# Patient Record
Sex: FEMALE | Race: WHITE | Employment: UNEMPLOYED | URBAN - METROPOLITAN AREA
[De-identification: names, ages, dates, MRNs, and addresses within clinical notes are randomized per-mention and may not be internally consistent; named-entity substitution may affect disease eponyms.]

---

## 2017-02-04 DIAGNOSIS — I10 ESSENTIAL HYPERTENSION: ICD-10-CM

## 2017-02-04 RX ORDER — METOPROLOL SUCCINATE 25 MG/1
TABLET, EXTENDED RELEASE ORAL
Qty: 30 TABLET | Refills: 3 | Status: SHIPPED | OUTPATIENT
Start: 2017-02-04 | End: 2017-08-02 | Stop reason: SDUPTHER

## 2017-02-15 ENCOUNTER — HOSPITAL ENCOUNTER (OUTPATIENT)
Age: 37
Setting detail: SPECIMEN
Discharge: HOME | End: 2017-02-15
Payer: COMMERCIAL

## 2017-02-15 ENCOUNTER — OFFICE VISIT (OUTPATIENT)
Dept: FAMILY MEDICINE CLINIC | Facility: CLINIC | Age: 37
End: 2017-02-15

## 2017-02-15 VITALS
WEIGHT: 129 LBS | BODY MASS INDEX: 20.25 KG/M2 | TEMPERATURE: 98.5 F | HEIGHT: 67 IN | OXYGEN SATURATION: 97 % | DIASTOLIC BLOOD PRESSURE: 78 MMHG | SYSTOLIC BLOOD PRESSURE: 142 MMHG | HEART RATE: 89 BPM

## 2017-02-15 DIAGNOSIS — Z01.818 PREOPERATIVE CLEARANCE: ICD-10-CM

## 2017-02-15 DIAGNOSIS — M54.2 NECK PAIN: ICD-10-CM

## 2017-02-15 DIAGNOSIS — R23.2 HOT FLASHES: ICD-10-CM

## 2017-02-15 DIAGNOSIS — G89.29 CHRONIC INTRACTABLE HEADACHE, UNSPECIFIED HEADACHE TYPE: Primary | ICD-10-CM

## 2017-02-15 DIAGNOSIS — R51.9 CHRONIC INTRACTABLE HEADACHE, UNSPECIFIED HEADACHE TYPE: Primary | ICD-10-CM

## 2017-02-15 LAB
AMPHETAMINE SCREEN URINE: NEGATIVE
AMPHETAMINE SCREEN, URINE: NEGATIVE
BARBITURATE SCREEN URINE: NEGATIVE
BARBITURATE SCREEN, URINE: NEGATIVE
BENZODIAZEPINE SCREEN, URINE: NEGATIVE
BENZODIAZEPINE SCREEN, URINE: NEGATIVE
BUPRENORPHINE URINE: NORMAL
CANNABINOID SCREEN URINE: NEGATIVE
COCAINE METABOLITE SCREEN URINE: NEGATIVE
COCAINE METABOLITE, URINE: NEGATIVE
MDMA URINE: NORMAL
MDMA URINE: NORMAL
METHADONE SCREEN, URINE: NEGATIVE
METHADONE SCREEN, URINE: POSITIVE
METHAMPHETAMINE, URINE: NEGATIVE
METHAMPHETAMINE, URINE: NORMAL
OPIATE SCREEN URINE: NEGATIVE
OPIATES, URINE: NEGATIVE
OXYCODONE SCREEN URINE: NEGATIVE
OXYCODONE SCREEN URINE: NORMAL
PHENCYCLIDINE SCREEN URINE: NEGATIVE
PHENCYCLIDINE, URINE: NEGATIVE
PROPOXYPHENE SCREEN, URINE: NORMAL
PROPOXYPHENE, URINE: NORMAL
TEST INFORMATION: NORMAL
THC: NEGATIVE
TRICYCLIC ANTIDEPRESSANTS, UR: NORMAL
TRICYCLIC ANTIDEPRESSANTS, UR: POSITIVE

## 2017-02-15 PROCEDURE — 99214 OFFICE O/P EST MOD 30 MIN: CPT | Performed by: FAMILY MEDICINE

## 2017-02-15 PROCEDURE — 80305 DRUG TEST PRSMV DIR OPT OBS: CPT | Performed by: FAMILY MEDICINE

## 2017-02-15 RX ORDER — HYDROCODONE BITARTRATE AND ACETAMINOPHEN 5; 325 MG/1; MG/1
1 TABLET ORAL 2 TIMES DAILY PRN
Qty: 20 TABLET | Refills: 0 | Status: CANCELLED | OUTPATIENT
Start: 2017-02-15

## 2017-02-15 RX ORDER — BUSPIRONE HYDROCHLORIDE 15 MG/1
15 TABLET ORAL 3 TIMES DAILY
Qty: 90 TABLET | Refills: 1 | Status: SHIPPED | OUTPATIENT
Start: 2017-02-15 | End: 2017-07-11 | Stop reason: SDUPTHER

## 2017-02-15 RX ORDER — AMLODIPINE BESYLATE 5 MG/1
5 TABLET ORAL DAILY
Qty: 30 TABLET | Refills: 3 | Status: SHIPPED | OUTPATIENT
Start: 2017-02-15 | End: 2017-04-25 | Stop reason: DRUGHIGH

## 2017-02-15 ASSESSMENT — ENCOUNTER SYMPTOMS
SHORTNESS OF BREATH: 0
NAUSEA: 0
COUGH: 0
VOMITING: 0
ABDOMINAL PAIN: 0

## 2017-02-22 DIAGNOSIS — Z01.818 PREOPERATIVE CLEARANCE: ICD-10-CM

## 2017-02-22 DIAGNOSIS — Z13.9 SCREENING: ICD-10-CM

## 2017-02-22 LAB
ALBUMIN SERPL-MCNC: 4.1 G/DL
ALP BLD-CCNC: 57 U/L
ALT SERPL-CCNC: 5 U/L
AST SERPL-CCNC: 12 U/L
BASOPHILS ABSOLUTE: 0.1 /ΜL
BASOPHILS RELATIVE PERCENT: 1.3 %
BILIRUB SERPL-MCNC: 0.3 MG/DL (ref 0.1–1.4)
BUN BLDV-MCNC: 13 MG/DL
CALCIUM SERPL-MCNC: 9.3 MG/DL
CHLORIDE BLD-SCNC: 109 MMOL/L
CHOLESTEROL, TOTAL: 202 MG/DL
CHOLESTEROL/HDL RATIO: 5.3
CO2: 26 MMOL/L
CREAT SERPL-MCNC: 1.1 MG/DL
EOSINOPHILS ABSOLUTE: 0.2 /ΜL
EOSINOPHILS RELATIVE PERCENT: 2.3 %
GFR CALCULATED: 56
GLUCOSE BLD-MCNC: 73 MG/DL
HBA1C MFR BLD: 4.9 %
HCT VFR BLD CALC: 43 % (ref 36–46)
HDLC SERPL-MCNC: 38 MG/DL (ref 35–70)
HEMOGLOBIN: 14.3 G/DL (ref 12–16)
LDL CHOLESTEROL CALCULATED: 121 MG/DL (ref 0–160)
LYMPHOCYTES ABSOLUTE: 2.5 /ΜL
LYMPHOCYTES RELATIVE PERCENT: 34.4 %
MCH RBC QN AUTO: 31.5 PG
MCHC RBC AUTO-ENTMCNC: 33.2 G/DL
MCV RBC AUTO: 95 FL
MONOCYTES ABSOLUTE: 0.5 /ΜL
MONOCYTES RELATIVE PERCENT: 6.5 %
NEUTROPHILS ABSOLUTE: 4 /ΜL
NEUTROPHILS RELATIVE PERCENT: 55.5 %
PDW BLD-RTO: NORMAL %
PLATELET # BLD: 233 K/ΜL
PMV BLD AUTO: 10 FL
POTASSIUM SERPL-SCNC: 4.2 MMOL/L
RBC # BLD: 4.54 10^6/ΜL
SODIUM BLD-SCNC: 141 MMOL/L
TOTAL PROTEIN: 7
TRIGL SERPL-MCNC: 214 MG/DL
TSH SERPL DL<=0.05 MIU/L-ACNC: 2.76 UIU/ML
VLDLC SERPL CALC-MCNC: NORMAL MG/DL
WBC # BLD: 7.1 10^3/ML

## 2017-03-17 ENCOUNTER — HOSPITAL ENCOUNTER (OUTPATIENT)
Dept: MRI IMAGING | Age: 37
Discharge: HOME | End: 2017-03-17
Payer: COMMERCIAL

## 2017-03-17 DIAGNOSIS — M54.2 NECK PAIN: ICD-10-CM

## 2017-03-18 ENCOUNTER — APPOINTMENT (OUTPATIENT)
Dept: CT IMAGING | Age: 37
End: 2017-03-18
Payer: COMMERCIAL

## 2017-03-18 ENCOUNTER — HOSPITAL ENCOUNTER (EMERGENCY)
Age: 37
Discharge: HOME | End: 2017-03-19
Attending: EMERGENCY MEDICINE
Payer: COMMERCIAL

## 2017-03-18 VITALS
DIASTOLIC BLOOD PRESSURE: 106 MMHG | HEART RATE: 95 BPM | WEIGHT: 136 LBS | SYSTOLIC BLOOD PRESSURE: 189 MMHG | BODY MASS INDEX: 21.35 KG/M2 | RESPIRATION RATE: 18 BRPM | HEIGHT: 67 IN | TEMPERATURE: 98.3 F | OXYGEN SATURATION: 98 %

## 2017-03-18 DIAGNOSIS — N17.9 AKI (ACUTE KIDNEY INJURY) (HCC): ICD-10-CM

## 2017-03-18 DIAGNOSIS — M54.12 CERVICAL RADICULOPATHY: Primary | ICD-10-CM

## 2017-03-18 LAB
ABSOLUTE EOS #: 0.1 K/UL (ref 0–0.4)
ABSOLUTE LYMPH #: 2.6 K/UL (ref 1–4.8)
ABSOLUTE MONO #: 0.6 K/UL (ref 0.1–1.3)
ANION GAP SERPL CALCULATED.3IONS-SCNC: 15 MMOL/L (ref 9–17)
BASOPHILS # BLD: 1 % (ref 0–2)
BASOPHILS ABSOLUTE: 0.1 K/UL (ref 0–0.2)
BUN BLDV-MCNC: 19 MG/DL (ref 6–20)
BUN/CREAT BLD: ABNORMAL (ref 9–20)
CALCIUM SERPL-MCNC: 9.2 MG/DL (ref 8.6–10.4)
CHLORIDE BLD-SCNC: 104 MMOL/L (ref 98–107)
CO2: 21 MMOL/L (ref 20–31)
CREAT SERPL-MCNC: 0.97 MG/DL (ref 0.5–0.9)
DIFFERENTIAL TYPE: ABNORMAL
EOSINOPHILS RELATIVE PERCENT: 1 % (ref 0–4)
GFR AFRICAN AMERICAN: >60 ML/MIN
GFR NON-AFRICAN AMERICAN: >60 ML/MIN
GFR SERPL CREATININE-BSD FRML MDRD: ABNORMAL ML/MIN/{1.73_M2}
GFR SERPL CREATININE-BSD FRML MDRD: ABNORMAL ML/MIN/{1.73_M2}
GLUCOSE BLD-MCNC: 119 MG/DL (ref 70–99)
HCT VFR BLD CALC: 36.3 % (ref 36–46)
HEMOGLOBIN: 12.4 G/DL (ref 12–16)
INR BLD: 0.9
LYMPHOCYTES # BLD: 24 % (ref 24–44)
MCH RBC QN AUTO: 31.8 PG (ref 26–34)
MCHC RBC AUTO-ENTMCNC: 34 G/DL (ref 31–37)
MCV RBC AUTO: 93.4 FL (ref 80–100)
MONOCYTES # BLD: 5 % (ref 1–7)
PARTIAL THROMBOPLASTIN TIME: 25.3 SEC (ref 23–31)
PDW BLD-RTO: 13.8 % (ref 11.5–14.9)
PLATELET # BLD: 313 K/UL (ref 150–450)
PLATELET ESTIMATE: ABNORMAL
PMV BLD AUTO: 8.6 FL (ref 6–12)
POTASSIUM SERPL-SCNC: 4.3 MMOL/L (ref 3.7–5.3)
PROTHROMBIN TIME: 10 SEC (ref 9.7–12)
RBC # BLD: 3.89 M/UL (ref 4–5.2)
RBC # BLD: ABNORMAL 10*6/UL
SEG NEUTROPHILS: 69 % (ref 36–66)
SEGMENTED NEUTROPHILS ABSOLUTE COUNT: 7.6 K/UL (ref 1.3–9.1)
SODIUM BLD-SCNC: 140 MMOL/L (ref 135–144)
WBC # BLD: 11 K/UL (ref 3.5–11)
WBC # BLD: ABNORMAL 10*3/UL

## 2017-03-18 PROCEDURE — 85730 THROMBOPLASTIN TIME PARTIAL: CPT

## 2017-03-18 PROCEDURE — 99285 EMERGENCY DEPT VISIT HI MDM: CPT

## 2017-03-18 PROCEDURE — 85610 PROTHROMBIN TIME: CPT

## 2017-03-18 PROCEDURE — 96374 THER/PROPH/DIAG INJ IV PUSH: CPT

## 2017-03-18 PROCEDURE — 70450 CT HEAD/BRAIN W/O DYE: CPT

## 2017-03-18 PROCEDURE — 6360000004 HC RX CONTRAST MEDICATION: Performed by: EMERGENCY MEDICINE

## 2017-03-18 PROCEDURE — 36415 COLL VENOUS BLD VENIPUNCTURE: CPT

## 2017-03-18 PROCEDURE — 2580000003 HC RX 258: Performed by: EMERGENCY MEDICINE

## 2017-03-18 PROCEDURE — 85025 COMPLETE CBC W/AUTO DIFF WBC: CPT

## 2017-03-18 PROCEDURE — 70498 CT ANGIOGRAPHY NECK: CPT

## 2017-03-18 PROCEDURE — 70496 CT ANGIOGRAPHY HEAD: CPT

## 2017-03-18 PROCEDURE — 80048 BASIC METABOLIC PNL TOTAL CA: CPT

## 2017-03-18 RX ORDER — FENTANYL CITRATE 50 UG/ML
50 INJECTION, SOLUTION INTRAMUSCULAR; INTRAVENOUS ONCE
Status: COMPLETED | OUTPATIENT
Start: 2017-03-18 | End: 2017-03-19

## 2017-03-18 RX ORDER — SODIUM CHLORIDE 0.9 % (FLUSH) 0.9 %
10 SYRINGE (ML) INJECTION PRN
Status: DISCONTINUED | OUTPATIENT
Start: 2017-03-18 | End: 2017-03-19 | Stop reason: HOSPADM

## 2017-03-18 RX ORDER — 0.9 % SODIUM CHLORIDE 0.9 %
100 INTRAVENOUS SOLUTION INTRAVENOUS ONCE
Status: COMPLETED | OUTPATIENT
Start: 2017-03-18 | End: 2017-03-19

## 2017-03-18 RX ADMIN — SODIUM CHLORIDE 100 ML: 9 INJECTION, SOLUTION INTRAVENOUS at 23:40

## 2017-03-18 RX ADMIN — Medication 10 ML: at 23:37

## 2017-03-18 RX ADMIN — IOVERSOL 100 ML: 741 INJECTION INTRA-ARTERIAL; INTRAVENOUS at 23:36

## 2017-03-18 ASSESSMENT — PAIN DESCRIPTION - ORIENTATION: ORIENTATION: RIGHT

## 2017-03-18 ASSESSMENT — PAIN SCALES - GENERAL: PAINLEVEL_OUTOF10: 8

## 2017-03-18 ASSESSMENT — PAIN DESCRIPTION - PAIN TYPE: TYPE: ACUTE PAIN

## 2017-03-18 ASSESSMENT — PAIN DESCRIPTION - LOCATION: LOCATION: NECK

## 2017-03-19 PROCEDURE — 6360000002 HC RX W HCPCS: Performed by: EMERGENCY MEDICINE

## 2017-03-19 RX ORDER — HYDROCODONE BITARTRATE AND ACETAMINOPHEN 5; 325 MG/1; MG/1
1 TABLET ORAL EVERY 6 HOURS PRN
Qty: 10 TABLET | Refills: 0 | Status: SHIPPED | OUTPATIENT
Start: 2017-03-19 | End: 2017-03-26

## 2017-03-19 RX ORDER — 0.9 % SODIUM CHLORIDE 0.9 %
1000 INTRAVENOUS SOLUTION INTRAVENOUS ONCE
Status: DISCONTINUED | OUTPATIENT
Start: 2017-03-19 | End: 2017-03-19 | Stop reason: HOSPADM

## 2017-03-19 RX ADMIN — FENTANYL CITRATE 50 MCG: 50 INJECTION INTRAMUSCULAR; INTRAVENOUS at 00:11

## 2017-03-19 ASSESSMENT — PAIN SCALES - GENERAL: PAINLEVEL_OUTOF10: 8

## 2017-03-19 ASSESSMENT — ENCOUNTER SYMPTOMS
SHORTNESS OF BREATH: 0
NAUSEA: 0
VOMITING: 0
ABDOMINAL PAIN: 0
DIARRHEA: 0

## 2017-04-25 ENCOUNTER — OFFICE VISIT (OUTPATIENT)
Dept: FAMILY MEDICINE CLINIC | Age: 37
End: 2017-04-25
Payer: COMMERCIAL

## 2017-04-25 VITALS
DIASTOLIC BLOOD PRESSURE: 96 MMHG | WEIGHT: 126 LBS | RESPIRATION RATE: 20 BRPM | SYSTOLIC BLOOD PRESSURE: 140 MMHG | TEMPERATURE: 98 F | HEART RATE: 101 BPM | BODY MASS INDEX: 20.25 KG/M2 | HEIGHT: 66 IN

## 2017-04-25 DIAGNOSIS — G89.29 CHRONIC NECK PAIN: ICD-10-CM

## 2017-04-25 DIAGNOSIS — G89.29 CHRONIC NONINTRACTABLE HEADACHE, UNSPECIFIED HEADACHE TYPE: ICD-10-CM

## 2017-04-25 DIAGNOSIS — R51.9 CHRONIC NONINTRACTABLE HEADACHE, UNSPECIFIED HEADACHE TYPE: ICD-10-CM

## 2017-04-25 DIAGNOSIS — R93.89 ABNORMAL COMPUTED TOMOGRAPHY ANGIOGRAPHY (CTA) OF NECK: ICD-10-CM

## 2017-04-25 DIAGNOSIS — M54.2 CHRONIC NECK PAIN: ICD-10-CM

## 2017-04-25 DIAGNOSIS — I10 ESSENTIAL HYPERTENSION: Primary | ICD-10-CM

## 2017-04-25 PROCEDURE — 99214 OFFICE O/P EST MOD 30 MIN: CPT | Performed by: FAMILY MEDICINE

## 2017-04-25 RX ORDER — PROMETHAZINE HYDROCHLORIDE 25 MG/1
TABLET ORAL
Refills: 0 | COMMUNITY
Start: 2017-03-21 | End: 2017-06-26 | Stop reason: ALTCHOICE

## 2017-04-25 RX ORDER — ACETAMINOPHEN AND CODEINE PHOSPHATE 300; 30 MG/1; MG/1
1 TABLET ORAL DAILY PRN
Qty: 20 TABLET | Refills: 0 | Status: SHIPPED | OUTPATIENT
Start: 2017-04-25 | End: 2017-06-26 | Stop reason: ALTCHOICE

## 2017-04-25 RX ORDER — OXYCODONE HYDROCHLORIDE AND ACETAMINOPHEN 5; 325 MG/1; MG/1
TABLET ORAL
COMMUNITY
Start: 2017-04-05 | End: 2017-06-26 | Stop reason: ALTCHOICE

## 2017-04-25 RX ORDER — AMLODIPINE BESYLATE 10 MG/1
10 TABLET ORAL DAILY
Qty: 30 TABLET | Refills: 3 | Status: SHIPPED | OUTPATIENT
Start: 2017-04-25 | End: 2017-05-12 | Stop reason: SINTOL

## 2017-04-25 RX ORDER — BACLOFEN 10 MG/1
TABLET ORAL
Refills: 0 | COMMUNITY
Start: 2017-04-17 | End: 2017-06-26 | Stop reason: ALTCHOICE

## 2017-04-25 RX ORDER — CEPHALEXIN 500 MG/1
CAPSULE ORAL
Refills: 0 | COMMUNITY
Start: 2017-03-21 | End: 2017-06-26 | Stop reason: ALTCHOICE

## 2017-04-25 RX ORDER — TIZANIDINE 2 MG/1
2 TABLET ORAL EVERY 6 HOURS PRN
Qty: 40 TABLET | Refills: 0 | Status: SHIPPED | OUTPATIENT
Start: 2017-04-25 | End: 2017-06-26 | Stop reason: ALTCHOICE

## 2017-04-25 RX ORDER — DIMENHYDRINATE 50 MG/1
50 TABLET ORAL NIGHTLY PRN
Qty: 30 TABLET | Refills: 3 | Status: ON HOLD | OUTPATIENT
Start: 2017-04-25 | End: 2019-08-23 | Stop reason: HOSPADM

## 2017-04-25 ASSESSMENT — ENCOUNTER SYMPTOMS
VOMITING: 0
SHORTNESS OF BREATH: 0
COUGH: 0
NAUSEA: 0

## 2017-05-09 ENCOUNTER — TELEPHONE (OUTPATIENT)
Dept: FAMILY MEDICINE CLINIC | Age: 37
End: 2017-05-09

## 2017-05-10 ENCOUNTER — HOSPITAL ENCOUNTER (OUTPATIENT)
Dept: MRI IMAGING | Age: 37
Discharge: HOME | End: 2017-05-10
Payer: COMMERCIAL

## 2017-05-10 DIAGNOSIS — R93.89 ABNORMAL COMPUTED TOMOGRAPHY ANGIOGRAPHY (CTA) OF NECK: Primary | ICD-10-CM

## 2017-05-10 DIAGNOSIS — R93.89 ABNORMAL COMPUTED TOMOGRAPHY ANGIOGRAPHY (CTA) OF NECK: ICD-10-CM

## 2017-05-10 PROCEDURE — 2580000003 HC RX 258: Performed by: FAMILY MEDICINE

## 2017-05-10 PROCEDURE — A9579 GAD-BASE MR CONTRAST NOS,1ML: HCPCS | Performed by: FAMILY MEDICINE

## 2017-05-10 PROCEDURE — 70549 MR ANGIOGRAPH NECK W/O&W/DYE: CPT

## 2017-05-10 PROCEDURE — 6360000004 HC RX CONTRAST MEDICATION: Performed by: FAMILY MEDICINE

## 2017-05-10 RX ORDER — SODIUM CHLORIDE 0.9 % (FLUSH) 0.9 %
10 SYRINGE (ML) INJECTION PRN
Status: DISCONTINUED | OUTPATIENT
Start: 2017-05-10 | End: 2017-05-13 | Stop reason: HOSPADM

## 2017-05-10 RX ORDER — 0.9 % SODIUM CHLORIDE 0.9 %
50 INTRAVENOUS SOLUTION INTRAVENOUS ONCE
Status: COMPLETED | OUTPATIENT
Start: 2017-05-10 | End: 2017-05-10

## 2017-05-10 RX ADMIN — Medication 10 ML: at 15:05

## 2017-05-10 RX ADMIN — SODIUM CHLORIDE 50 ML: 9 INJECTION, SOLUTION INTRAVENOUS at 15:04

## 2017-05-10 RX ADMIN — GADOPENTETATE DIMEGLUMINE 30 ML: 469.01 INJECTION INTRAVENOUS at 15:04

## 2017-05-12 ENCOUNTER — NURSE ONLY (OUTPATIENT)
Dept: FAMILY MEDICINE CLINIC | Age: 37
End: 2017-05-12
Payer: COMMERCIAL

## 2017-05-12 VITALS — OXYGEN SATURATION: 98 % | HEART RATE: 109 BPM | DIASTOLIC BLOOD PRESSURE: 82 MMHG | SYSTOLIC BLOOD PRESSURE: 132 MMHG

## 2017-05-12 DIAGNOSIS — I10 ESSENTIAL HYPERTENSION: Primary | ICD-10-CM

## 2017-05-12 PROCEDURE — 99211 OFF/OP EST MAY X REQ PHY/QHP: CPT | Performed by: FAMILY MEDICINE

## 2017-05-12 RX ORDER — LISINOPRIL 40 MG/1
40 TABLET ORAL DAILY
Qty: 30 TABLET | Refills: 1 | Status: SHIPPED | OUTPATIENT
Start: 2017-05-12 | End: 2017-07-11 | Stop reason: SDUPTHER

## 2017-05-16 RX ORDER — ACETAMINOPHEN AND CODEINE PHOSPHATE 300; 30 MG/1; MG/1
1 TABLET ORAL DAILY PRN
Qty: 20 TABLET | Refills: 0 | OUTPATIENT
Start: 2017-05-16

## 2017-05-31 ENCOUNTER — HOSPITAL ENCOUNTER (EMERGENCY)
Age: 37
Discharge: HOME | End: 2017-05-31
Attending: EMERGENCY MEDICINE
Payer: COMMERCIAL

## 2017-05-31 VITALS
BODY MASS INDEX: 20.09 KG/M2 | WEIGHT: 125 LBS | OXYGEN SATURATION: 97 % | TEMPERATURE: 97.9 F | DIASTOLIC BLOOD PRESSURE: 70 MMHG | SYSTOLIC BLOOD PRESSURE: 118 MMHG | RESPIRATION RATE: 16 BRPM | HEART RATE: 66 BPM | HEIGHT: 66 IN

## 2017-05-31 DIAGNOSIS — R51.9 NONINTRACTABLE HEADACHE, UNSPECIFIED CHRONICITY PATTERN, UNSPECIFIED HEADACHE TYPE: Primary | ICD-10-CM

## 2017-05-31 PROCEDURE — 99283 EMERGENCY DEPT VISIT LOW MDM: CPT

## 2017-05-31 PROCEDURE — 96375 TX/PRO/DX INJ NEW DRUG ADDON: CPT

## 2017-05-31 PROCEDURE — 96374 THER/PROPH/DIAG INJ IV PUSH: CPT

## 2017-05-31 PROCEDURE — 6360000002 HC RX W HCPCS: Performed by: EMERGENCY MEDICINE

## 2017-05-31 PROCEDURE — 2580000003 HC RX 258: Performed by: EMERGENCY MEDICINE

## 2017-05-31 RX ORDER — 0.9 % SODIUM CHLORIDE 0.9 %
1000 INTRAVENOUS SOLUTION INTRAVENOUS ONCE
Status: COMPLETED | OUTPATIENT
Start: 2017-05-31 | End: 2017-05-31

## 2017-05-31 RX ORDER — DIPHENHYDRAMINE HYDROCHLORIDE 50 MG/ML
25 INJECTION INTRAMUSCULAR; INTRAVENOUS ONCE
Status: DISCONTINUED | OUTPATIENT
Start: 2017-05-31 | End: 2017-05-31

## 2017-05-31 RX ORDER — DEXAMETHASONE SODIUM PHOSPHATE 4 MG/ML
10 INJECTION, SOLUTION INTRA-ARTICULAR; INTRALESIONAL; INTRAMUSCULAR; INTRAVENOUS; SOFT TISSUE ONCE
Status: COMPLETED | OUTPATIENT
Start: 2017-05-31 | End: 2017-05-31

## 2017-05-31 RX ORDER — DIPHENHYDRAMINE HYDROCHLORIDE 50 MG/ML
50 INJECTION INTRAMUSCULAR; INTRAVENOUS ONCE
Status: COMPLETED | OUTPATIENT
Start: 2017-05-31 | End: 2017-05-31

## 2017-05-31 RX ADMIN — SODIUM CHLORIDE 1000 ML: 9 INJECTION, SOLUTION INTRAVENOUS at 17:24

## 2017-05-31 RX ADMIN — PROCHLORPERAZINE EDISYLATE 10 MG: 5 INJECTION INTRAMUSCULAR; INTRAVENOUS at 17:25

## 2017-05-31 RX ADMIN — DEXAMETHASONE SODIUM PHOSPHATE 10 MG: 4 INJECTION, SOLUTION INTRAMUSCULAR; INTRAVENOUS at 17:24

## 2017-05-31 RX ADMIN — DIPHENHYDRAMINE HYDROCHLORIDE 50 MG: 50 INJECTION, SOLUTION INTRAMUSCULAR; INTRAVENOUS at 17:25

## 2017-05-31 ASSESSMENT — ENCOUNTER SYMPTOMS
SHORTNESS OF BREATH: 0
COUGH: 0
PHOTOPHOBIA: 1
DIARRHEA: 0
STRIDOR: 0
VOMITING: 0
NAUSEA: 0
ABDOMINAL PAIN: 0

## 2017-05-31 ASSESSMENT — PAIN SCALES - GENERAL: PAINLEVEL_OUTOF10: 8

## 2017-05-31 ASSESSMENT — PAIN DESCRIPTION - FREQUENCY: FREQUENCY: CONTINUOUS

## 2017-05-31 ASSESSMENT — PAIN DESCRIPTION - PAIN TYPE: TYPE: ACUTE PAIN

## 2017-05-31 ASSESSMENT — PAIN DESCRIPTION - LOCATION: LOCATION: HEAD;NECK

## 2017-05-31 ASSESSMENT — PAIN DESCRIPTION - DESCRIPTORS: DESCRIPTORS: ACHING

## 2017-06-12 DIAGNOSIS — F41.8 DEPRESSION WITH ANXIETY: ICD-10-CM

## 2017-06-13 RX ORDER — DULOXETIN HYDROCHLORIDE 60 MG/1
CAPSULE, DELAYED RELEASE ORAL
Qty: 30 CAPSULE | Refills: 0 | Status: SHIPPED | OUTPATIENT
Start: 2017-06-13 | End: 2017-08-02 | Stop reason: SDUPTHER

## 2017-06-26 ENCOUNTER — OFFICE VISIT (OUTPATIENT)
Dept: NEUROLOGY | Age: 37
End: 2017-06-26
Payer: COMMERCIAL

## 2017-06-26 VITALS
DIASTOLIC BLOOD PRESSURE: 89 MMHG | HEIGHT: 66 IN | SYSTOLIC BLOOD PRESSURE: 137 MMHG | WEIGHT: 125.4 LBS | BODY MASS INDEX: 20.15 KG/M2 | HEART RATE: 96 BPM

## 2017-06-26 DIAGNOSIS — M54.12 CERVICAL RADICULAR PAIN: Primary | ICD-10-CM

## 2017-06-26 PROCEDURE — 99214 OFFICE O/P EST MOD 30 MIN: CPT | Performed by: PSYCHIATRY & NEUROLOGY

## 2017-06-26 RX ORDER — DIVALPROEX SODIUM 250 MG/1
TABLET, DELAYED RELEASE ORAL
Qty: 60 TABLET | Refills: 3 | Status: SHIPPED | OUTPATIENT
Start: 2017-06-26 | End: 2019-08-22

## 2017-07-10 ENCOUNTER — HOSPITAL ENCOUNTER (EMERGENCY)
Age: 37
Discharge: HOME | End: 2017-07-10
Attending: EMERGENCY MEDICINE
Payer: COMMERCIAL

## 2017-07-10 ENCOUNTER — APPOINTMENT (OUTPATIENT)
Dept: CT IMAGING | Age: 37
End: 2017-07-10
Payer: COMMERCIAL

## 2017-07-10 VITALS
DIASTOLIC BLOOD PRESSURE: 90 MMHG | BODY MASS INDEX: 20.09 KG/M2 | RESPIRATION RATE: 16 BRPM | SYSTOLIC BLOOD PRESSURE: 137 MMHG | HEIGHT: 67 IN | HEART RATE: 101 BPM | OXYGEN SATURATION: 99 % | WEIGHT: 128 LBS | TEMPERATURE: 98.5 F

## 2017-07-10 DIAGNOSIS — N30.01 ACUTE CYSTITIS WITH HEMATURIA: Primary | ICD-10-CM

## 2017-07-10 LAB
-: ABNORMAL
ABSOLUTE EOS #: 0.1 K/UL (ref 0–0.4)
ABSOLUTE LYMPH #: 2.6 K/UL (ref 1–4.8)
ABSOLUTE MONO #: 0.6 K/UL (ref 0.1–1.3)
AMORPHOUS: ABNORMAL
ANION GAP SERPL CALCULATED.3IONS-SCNC: 12 MMOL/L (ref 9–17)
BACTERIA: ABNORMAL
BASOPHILS # BLD: 1 %
BASOPHILS ABSOLUTE: 0.1 K/UL (ref 0–0.2)
BILIRUBIN URINE: NEGATIVE
BUN BLDV-MCNC: 16 MG/DL (ref 6–20)
BUN/CREAT BLD: ABNORMAL (ref 9–20)
CALCIUM SERPL-MCNC: 8.7 MG/DL (ref 8.6–10.4)
CASTS UA: ABNORMAL /LPF
CHLORIDE BLD-SCNC: 105 MMOL/L (ref 98–107)
CO2: 25 MMOL/L (ref 20–31)
COLOR: ABNORMAL
COMMENT UA: ABNORMAL
CREAT SERPL-MCNC: 1.46 MG/DL (ref 0.5–0.9)
CRYSTALS, UA: ABNORMAL /HPF
DIFFERENTIAL TYPE: ABNORMAL
EOSINOPHILS RELATIVE PERCENT: 1 %
EPITHELIAL CELLS UA: ABNORMAL /HPF
GFR AFRICAN AMERICAN: 48 ML/MIN
GFR NON-AFRICAN AMERICAN: 40 ML/MIN
GFR SERPL CREATININE-BSD FRML MDRD: ABNORMAL ML/MIN/{1.73_M2}
GFR SERPL CREATININE-BSD FRML MDRD: ABNORMAL ML/MIN/{1.73_M2}
GLUCOSE BLD-MCNC: 95 MG/DL (ref 70–99)
GLUCOSE URINE: NEGATIVE
HCG(URINE) PREGNANCY TEST: NEGATIVE
HCT VFR BLD CALC: 32.5 % (ref 36–46)
HEMOGLOBIN: 10.8 G/DL (ref 12–16)
KETONES, URINE: NEGATIVE
LEUKOCYTE ESTERASE, URINE: ABNORMAL
LYMPHOCYTES # BLD: 28 %
MCH RBC QN AUTO: 31.4 PG (ref 26–34)
MCHC RBC AUTO-ENTMCNC: 33.3 G/DL (ref 31–37)
MCV RBC AUTO: 94.3 FL (ref 80–100)
MONOCYTES # BLD: 7 %
MUCUS: ABNORMAL
NITRITE, URINE: NEGATIVE
OTHER OBSERVATIONS UA: ABNORMAL
PDW BLD-RTO: 13.5 % (ref 11.5–14.9)
PH UA: 6 (ref 5–8)
PLATELET # BLD: 245 K/UL (ref 150–450)
PLATELET ESTIMATE: ABNORMAL
PMV BLD AUTO: 8.5 FL (ref 6–12)
POTASSIUM SERPL-SCNC: 4.4 MMOL/L (ref 3.7–5.3)
PROTEIN UA: ABNORMAL
RBC # BLD: 3.44 M/UL (ref 4–5.2)
RBC # BLD: ABNORMAL 10*6/UL
RBC UA: ABNORMAL /HPF
RENAL EPITHELIAL, UA: ABNORMAL /HPF
SEG NEUTROPHILS: 63 %
SEGMENTED NEUTROPHILS ABSOLUTE COUNT: 5.7 K/UL (ref 1.3–9.1)
SODIUM BLD-SCNC: 142 MMOL/L (ref 135–144)
SPECIFIC GRAVITY UA: 1.01 (ref 1–1.03)
TRICHOMONAS: ABNORMAL
TURBIDITY: ABNORMAL
URINE HGB: ABNORMAL
UROBILINOGEN, URINE: NORMAL
WBC # BLD: 9 K/UL (ref 3.5–11)
WBC # BLD: ABNORMAL 10*3/UL
WBC UA: ABNORMAL /HPF
YEAST: ABNORMAL

## 2017-07-10 PROCEDURE — 87086 URINE CULTURE/COLONY COUNT: CPT

## 2017-07-10 PROCEDURE — 36415 COLL VENOUS BLD VENIPUNCTURE: CPT

## 2017-07-10 PROCEDURE — 99284 EMERGENCY DEPT VISIT MOD MDM: CPT

## 2017-07-10 PROCEDURE — 80048 BASIC METABOLIC PNL TOTAL CA: CPT

## 2017-07-10 PROCEDURE — 84703 CHORIONIC GONADOTROPIN ASSAY: CPT

## 2017-07-10 PROCEDURE — 6360000002 HC RX W HCPCS: Performed by: PHYSICIAN ASSISTANT

## 2017-07-10 PROCEDURE — 2580000003 HC RX 258: Performed by: PHYSICIAN ASSISTANT

## 2017-07-10 PROCEDURE — 85025 COMPLETE CBC W/AUTO DIFF WBC: CPT

## 2017-07-10 PROCEDURE — 96374 THER/PROPH/DIAG INJ IV PUSH: CPT

## 2017-07-10 PROCEDURE — 81001 URINALYSIS AUTO W/SCOPE: CPT

## 2017-07-10 PROCEDURE — 6370000000 HC RX 637 (ALT 250 FOR IP): Performed by: PHYSICIAN ASSISTANT

## 2017-07-10 PROCEDURE — 74176 CT ABD & PELVIS W/O CONTRAST: CPT

## 2017-07-10 RX ORDER — KETOROLAC TROMETHAMINE 30 MG/ML
30 INJECTION, SOLUTION INTRAMUSCULAR; INTRAVENOUS ONCE
Status: COMPLETED | OUTPATIENT
Start: 2017-07-10 | End: 2017-07-10

## 2017-07-10 RX ORDER — SULFAMETHOXAZOLE AND TRIMETHOPRIM 800; 160 MG/1; MG/1
1 TABLET ORAL ONCE
Status: COMPLETED | OUTPATIENT
Start: 2017-07-10 | End: 2017-07-10

## 2017-07-10 RX ORDER — 0.9 % SODIUM CHLORIDE 0.9 %
1000 INTRAVENOUS SOLUTION INTRAVENOUS ONCE
Status: COMPLETED | OUTPATIENT
Start: 2017-07-10 | End: 2017-07-10

## 2017-07-10 RX ORDER — SULFAMETHOXAZOLE AND TRIMETHOPRIM 800; 160 MG/1; MG/1
1 TABLET ORAL 2 TIMES DAILY
Qty: 20 TABLET | Refills: 0 | Status: SHIPPED | OUTPATIENT
Start: 2017-07-10 | End: 2017-07-20

## 2017-07-10 RX ORDER — ONDANSETRON 4 MG/1
4 TABLET, ORALLY DISINTEGRATING ORAL ONCE
Status: COMPLETED | OUTPATIENT
Start: 2017-07-10 | End: 2017-07-10

## 2017-07-10 RX ADMIN — SODIUM CHLORIDE 1000 ML: 9 INJECTION, SOLUTION INTRAVENOUS at 20:53

## 2017-07-10 RX ADMIN — KETOROLAC TROMETHAMINE 30 MG: 30 INJECTION, SOLUTION INTRAMUSCULAR at 20:56

## 2017-07-10 RX ADMIN — SULFAMETHOXAZOLE AND TRIMETHOPRIM 1 TABLET: 800; 160 TABLET ORAL at 22:08

## 2017-07-10 RX ADMIN — ONDANSETRON 4 MG: 4 TABLET, ORALLY DISINTEGRATING ORAL at 20:42

## 2017-07-10 ASSESSMENT — ENCOUNTER SYMPTOMS
COUGH: 0
NAUSEA: 1
VOMITING: 0
STRIDOR: 0
BACK PAIN: 1
ABDOMINAL PAIN: 0

## 2017-07-10 ASSESSMENT — PAIN SCALES - GENERAL
PAINLEVEL_OUTOF10: 5
PAINLEVEL_OUTOF10: 4

## 2017-07-10 ASSESSMENT — PAIN DESCRIPTION - LOCATION: LOCATION: BACK

## 2017-07-10 ASSESSMENT — PAIN DESCRIPTION - PAIN TYPE: TYPE: ACUTE PAIN

## 2017-07-10 ASSESSMENT — PAIN DESCRIPTION - DESCRIPTORS: DESCRIPTORS: ACHING

## 2017-07-11 ENCOUNTER — OFFICE VISIT (OUTPATIENT)
Dept: FAMILY MEDICINE CLINIC | Age: 37
End: 2017-07-11
Payer: COMMERCIAL

## 2017-07-11 VITALS
TEMPERATURE: 98.2 F | HEIGHT: 66 IN | BODY MASS INDEX: 20.79 KG/M2 | DIASTOLIC BLOOD PRESSURE: 88 MMHG | WEIGHT: 129.4 LBS | RESPIRATION RATE: 18 BRPM | SYSTOLIC BLOOD PRESSURE: 141 MMHG | HEART RATE: 88 BPM

## 2017-07-11 DIAGNOSIS — R31.9 HEMATURIA: ICD-10-CM

## 2017-07-11 DIAGNOSIS — N20.0 NEPHROLITHIASIS: Primary | ICD-10-CM

## 2017-07-11 DIAGNOSIS — R10.9 FLANK PAIN: ICD-10-CM

## 2017-07-11 DIAGNOSIS — Q61.3 POLYCYSTIC KIDNEY DISEASE: ICD-10-CM

## 2017-07-11 LAB
CULTURE: NORMAL
CULTURE: NORMAL
Lab: NORMAL
SPECIMEN DESCRIPTION: NORMAL
SPECIMEN DESCRIPTION: NORMAL
STATUS: NORMAL

## 2017-07-11 PROCEDURE — 99214 OFFICE O/P EST MOD 30 MIN: CPT | Performed by: NURSE PRACTITIONER

## 2017-07-11 RX ORDER — DIPHENHYDRAMINE HCL 25 MG
25 TABLET ORAL EVERY 6 HOURS PRN
COMMUNITY
End: 2020-06-30

## 2017-07-11 RX ORDER — ACETAMINOPHEN AND CODEINE PHOSPHATE 300; 30 MG/1; MG/1
1 TABLET ORAL 2 TIMES DAILY PRN
Qty: 10 TABLET | Refills: 0 | Status: SHIPPED | OUTPATIENT
Start: 2017-07-11 | End: 2019-08-22

## 2017-07-11 RX ORDER — LISINOPRIL 40 MG/1
40 TABLET ORAL DAILY
Qty: 30 TABLET | Refills: 2 | Status: SHIPPED | OUTPATIENT
Start: 2017-07-11 | End: 2017-10-16 | Stop reason: SDUPTHER

## 2017-07-11 RX ORDER — BUSPIRONE HYDROCHLORIDE 15 MG/1
15 TABLET ORAL 3 TIMES DAILY
Qty: 90 TABLET | Refills: 2 | Status: SHIPPED | OUTPATIENT
Start: 2017-07-11 | End: 2019-08-22

## 2017-07-11 ASSESSMENT — ENCOUNTER SYMPTOMS
SHORTNESS OF BREATH: 0
VOMITING: 0
NAUSEA: 0
COUGH: 0
ABDOMINAL PAIN: 0

## 2017-07-12 ENCOUNTER — HOSPITAL ENCOUNTER (OUTPATIENT)
Dept: NEUROLOGY | Age: 37
Discharge: HOME | End: 2017-07-12
Payer: COMMERCIAL

## 2017-07-12 PROCEDURE — 95910 NRV CNDJ TEST 7-8 STUDIES: CPT | Performed by: PHYSICAL MEDICINE & REHABILITATION

## 2017-07-12 PROCEDURE — 95886 MUSC TEST DONE W/N TEST COMP: CPT | Performed by: PHYSICAL MEDICINE & REHABILITATION

## 2017-08-02 DIAGNOSIS — I10 ESSENTIAL HYPERTENSION: ICD-10-CM

## 2017-08-02 DIAGNOSIS — R10.9 FLANK PAIN: ICD-10-CM

## 2017-08-02 DIAGNOSIS — F41.8 DEPRESSION WITH ANXIETY: ICD-10-CM

## 2017-08-02 RX ORDER — ACETAMINOPHEN AND CODEINE PHOSPHATE 300; 30 MG/1; MG/1
1 TABLET ORAL 2 TIMES DAILY PRN
Qty: 10 TABLET | Refills: 0 | OUTPATIENT
Start: 2017-08-02

## 2017-08-02 RX ORDER — METOPROLOL SUCCINATE 25 MG/1
25 TABLET, EXTENDED RELEASE ORAL DAILY
Qty: 30 TABLET | Refills: 3 | Status: SHIPPED | OUTPATIENT
Start: 2017-08-02 | End: 2017-12-07 | Stop reason: SDUPTHER

## 2017-08-02 RX ORDER — DULOXETIN HYDROCHLORIDE 60 MG/1
60 CAPSULE, DELAYED RELEASE ORAL DAILY
Qty: 30 CAPSULE | Refills: 0 | Status: SHIPPED | OUTPATIENT
Start: 2017-08-02 | End: 2017-09-21 | Stop reason: SDUPTHER

## 2017-09-21 DIAGNOSIS — F41.8 DEPRESSION WITH ANXIETY: ICD-10-CM

## 2017-09-25 RX ORDER — DULOXETIN HYDROCHLORIDE 60 MG/1
CAPSULE, DELAYED RELEASE ORAL
Qty: 30 CAPSULE | Refills: 0 | Status: SHIPPED | OUTPATIENT
Start: 2017-09-25 | End: 2017-11-09 | Stop reason: SDUPTHER

## 2017-10-16 RX ORDER — LISINOPRIL 40 MG/1
TABLET ORAL
Qty: 30 TABLET | Refills: 2 | Status: SHIPPED | OUTPATIENT
Start: 2017-10-16 | End: 2020-06-08 | Stop reason: ALTCHOICE

## 2017-11-09 DIAGNOSIS — F41.8 DEPRESSION WITH ANXIETY: ICD-10-CM

## 2017-11-10 RX ORDER — DULOXETIN HYDROCHLORIDE 60 MG/1
CAPSULE, DELAYED RELEASE ORAL
Qty: 30 CAPSULE | Refills: 0 | Status: SHIPPED | OUTPATIENT
Start: 2017-11-10

## 2017-11-10 NOTE — TELEPHONE ENCOUNTER
Please Approve or Refuse.        Next Visit Date:  Visit date not found    Hemoglobin A1C (%)   Date Value   02/20/2017 4.9             ( goal A1C is < 7)   No results found for: LABMICR  LDL Calculated (mg/dL)   Date Value   02/20/2017 121       (goal LDL is <100)   AST (U/L)   Date Value   02/20/2017 12     ALT (U/L)   Date Value   02/20/2017 5     BUN (mg/dL)   Date Value   07/10/2017 16     BP Readings from Last 3 Encounters:   07/11/17 (!) 141/88   07/10/17 (!) 137/90   06/26/17 137/89          (goal 120/80)        Patient Active Problem List:     Asthma     Smoker     Polycystic kidney     HTN (hypertension)     Eczema     Depression with anxiety     Chronic headaches     Tension vascular headache     Irregular bleeding     Metrorrhagia     Menorrhagia     Dysmenorrhea     Pelvic pain in female     Kidney stone     Acute back pain     Anxiety     Hypertension     Headache     Depression     CKD (chronic kidney disease) stage 3, GFR 30-59 ml/min     Chronic neck pain     Degenerative disc disease, cervical     Encounter for long-term (current) use of other medications     Cervicalgia     Chronic intractable headache

## 2017-12-07 DIAGNOSIS — I10 ESSENTIAL HYPERTENSION: ICD-10-CM

## 2017-12-07 RX ORDER — METOPROLOL SUCCINATE 25 MG/1
TABLET, EXTENDED RELEASE ORAL
Qty: 30 TABLET | Refills: 3 | Status: SHIPPED | OUTPATIENT
Start: 2017-12-07 | End: 2018-07-24 | Stop reason: SDUPTHER

## 2019-08-22 ENCOUNTER — APPOINTMENT (OUTPATIENT)
Dept: CT IMAGING | Age: 39
End: 2019-08-22
Payer: MEDICARE

## 2019-08-22 ENCOUNTER — HOSPITAL ENCOUNTER (OUTPATIENT)
Age: 39
Setting detail: OBSERVATION
Discharge: HOME | End: 2019-08-23
Attending: EMERGENCY MEDICINE | Admitting: INTERNAL MEDICINE
Payer: MEDICARE

## 2019-08-22 DIAGNOSIS — R10.9 RIGHT FLANK PAIN: ICD-10-CM

## 2019-08-22 DIAGNOSIS — N28.1 HEMORRHAGE OF CYST OF NATIVE KIDNEY: Primary | ICD-10-CM

## 2019-08-22 DIAGNOSIS — N28.89 HEMORRHAGE OF CYST OF NATIVE KIDNEY: Primary | ICD-10-CM

## 2019-08-22 LAB
-: ABNORMAL
ABO/RH: NORMAL
ABSOLUTE EOS #: 0.1 K/UL (ref 0–0.4)
ABSOLUTE IMMATURE GRANULOCYTE: ABNORMAL K/UL (ref 0–0.3)
ABSOLUTE LYMPH #: 1.9 K/UL (ref 1–4.8)
ABSOLUTE MONO #: 0.9 K/UL (ref 0.1–1.3)
ALBUMIN SERPL-MCNC: 4.6 G/DL (ref 3.5–5.2)
ALBUMIN/GLOBULIN RATIO: ABNORMAL (ref 1–2.5)
ALP BLD-CCNC: 81 U/L (ref 35–104)
ALT SERPL-CCNC: 8 U/L (ref 5–33)
AMORPHOUS: ABNORMAL
ANION GAP SERPL CALCULATED.3IONS-SCNC: 14 MMOL/L (ref 9–17)
ANTIBODY SCREEN: NEGATIVE
ARM BAND NUMBER: NORMAL
AST SERPL-CCNC: 18 U/L
BACTERIA: ABNORMAL
BASOPHILS # BLD: 1 % (ref 0–2)
BASOPHILS ABSOLUTE: 0.1 K/UL (ref 0–0.2)
BILIRUB SERPL-MCNC: 0.3 MG/DL (ref 0.3–1.2)
BILIRUBIN URINE: NEGATIVE
BUN BLDV-MCNC: 17 MG/DL (ref 6–20)
BUN/CREAT BLD: ABNORMAL (ref 9–20)
CALCIUM SERPL-MCNC: 9.4 MG/DL (ref 8.6–10.4)
CASTS UA: ABNORMAL /LPF
CHLORIDE BLD-SCNC: 104 MMOL/L (ref 98–107)
CO2: 19 MMOL/L (ref 20–31)
COLOR: YELLOW
COMMENT UA: ABNORMAL
CREAT SERPL-MCNC: 1.3 MG/DL (ref 0.5–0.9)
CRYSTALS, UA: ABNORMAL /HPF
DIFFERENTIAL TYPE: ABNORMAL
EOSINOPHILS RELATIVE PERCENT: 1 % (ref 0–4)
EPITHELIAL CELLS UA: ABNORMAL /HPF
EXPIRATION DATE: NORMAL
GFR AFRICAN AMERICAN: 55 ML/MIN
GFR NON-AFRICAN AMERICAN: 45 ML/MIN
GFR SERPL CREATININE-BSD FRML MDRD: ABNORMAL ML/MIN/{1.73_M2}
GFR SERPL CREATININE-BSD FRML MDRD: ABNORMAL ML/MIN/{1.73_M2}
GLUCOSE BLD-MCNC: 106 MG/DL (ref 70–99)
GLUCOSE URINE: NEGATIVE
HCG(URINE) PREGNANCY TEST: NEGATIVE
HCT VFR BLD CALC: 39.3 % (ref 36–46)
HEMOGLOBIN: 13.2 G/DL (ref 12–16)
IMMATURE GRANULOCYTES: ABNORMAL %
KETONES, URINE: NEGATIVE
LEUKOCYTE ESTERASE, URINE: NEGATIVE
LIPASE: 39 U/L (ref 13–60)
LYMPHOCYTES # BLD: 14 % (ref 24–44)
MCH RBC QN AUTO: 32.3 PG (ref 26–34)
MCHC RBC AUTO-ENTMCNC: 33.7 G/DL (ref 31–37)
MCV RBC AUTO: 96.1 FL (ref 80–100)
MONOCYTES # BLD: 6 % (ref 1–7)
MUCUS: ABNORMAL
NITRITE, URINE: NEGATIVE
NRBC AUTOMATED: ABNORMAL PER 100 WBC
OTHER OBSERVATIONS UA: ABNORMAL
PDW BLD-RTO: 12.9 % (ref 11.5–14.9)
PH UA: 5.5 (ref 5–8)
PLATELET # BLD: 327 K/UL (ref 150–450)
PLATELET ESTIMATE: ABNORMAL
PMV BLD AUTO: 8.5 FL (ref 6–12)
POTASSIUM SERPL-SCNC: 3.7 MMOL/L (ref 3.7–5.3)
PROTEIN UA: ABNORMAL
RBC # BLD: 4.09 M/UL (ref 4–5.2)
RBC # BLD: ABNORMAL 10*6/UL
RBC UA: ABNORMAL /HPF
RENAL EPITHELIAL, UA: ABNORMAL /HPF
SEG NEUTROPHILS: 78 % (ref 36–66)
SEGMENTED NEUTROPHILS ABSOLUTE COUNT: 10.9 K/UL (ref 1.3–9.1)
SODIUM BLD-SCNC: 137 MMOL/L (ref 135–144)
SPECIFIC GRAVITY UA: 1.01 (ref 1–1.03)
TOTAL PROTEIN: 8.1 G/DL (ref 6.4–8.3)
TRICHOMONAS: ABNORMAL
TURBIDITY: CLEAR
URINE HGB: ABNORMAL
UROBILINOGEN, URINE: NORMAL
WBC # BLD: 13.8 K/UL (ref 3.5–11)
WBC # BLD: ABNORMAL 10*3/UL
WBC UA: ABNORMAL /HPF
YEAST: ABNORMAL

## 2019-08-22 PROCEDURE — 85025 COMPLETE CBC W/AUTO DIFF WBC: CPT

## 2019-08-22 PROCEDURE — 80053 COMPREHEN METABOLIC PANEL: CPT

## 2019-08-22 PROCEDURE — 86901 BLOOD TYPING SEROLOGIC RH(D): CPT

## 2019-08-22 PROCEDURE — 2580000003 HC RX 258: Performed by: EMERGENCY MEDICINE

## 2019-08-22 PROCEDURE — 96376 TX/PRO/DX INJ SAME DRUG ADON: CPT

## 2019-08-22 PROCEDURE — 74176 CT ABD & PELVIS W/O CONTRAST: CPT

## 2019-08-22 PROCEDURE — 86900 BLOOD TYPING SEROLOGIC ABO: CPT

## 2019-08-22 PROCEDURE — 6360000002 HC RX W HCPCS: Performed by: INTERNAL MEDICINE

## 2019-08-22 PROCEDURE — G0378 HOSPITAL OBSERVATION PER HR: HCPCS

## 2019-08-22 PROCEDURE — 6360000002 HC RX W HCPCS: Performed by: EMERGENCY MEDICINE

## 2019-08-22 PROCEDURE — 96374 THER/PROPH/DIAG INJ IV PUSH: CPT

## 2019-08-22 PROCEDURE — 2580000003 HC RX 258: Performed by: INTERNAL MEDICINE

## 2019-08-22 PROCEDURE — 96375 TX/PRO/DX INJ NEW DRUG ADDON: CPT

## 2019-08-22 PROCEDURE — 86850 RBC ANTIBODY SCREEN: CPT

## 2019-08-22 PROCEDURE — 36415 COLL VENOUS BLD VENIPUNCTURE: CPT

## 2019-08-22 PROCEDURE — 81001 URINALYSIS AUTO W/SCOPE: CPT

## 2019-08-22 PROCEDURE — 99285 EMERGENCY DEPT VISIT HI MDM: CPT

## 2019-08-22 PROCEDURE — 81025 URINE PREGNANCY TEST: CPT

## 2019-08-22 PROCEDURE — 83690 ASSAY OF LIPASE: CPT

## 2019-08-22 RX ORDER — ACETAMINOPHEN 325 MG/1
650 TABLET ORAL EVERY 6 HOURS PRN
Status: ON HOLD | COMMUNITY
End: 2019-08-23 | Stop reason: HOSPADM

## 2019-08-22 RX ORDER — MORPHINE SULFATE 2 MG/ML
2 INJECTION, SOLUTION INTRAMUSCULAR; INTRAVENOUS EVERY 4 HOURS PRN
Status: DISCONTINUED | OUTPATIENT
Start: 2019-08-22 | End: 2019-08-23 | Stop reason: HOSPADM

## 2019-08-22 RX ORDER — MORPHINE SULFATE 4 MG/ML
4 INJECTION, SOLUTION INTRAMUSCULAR; INTRAVENOUS ONCE
Status: COMPLETED | OUTPATIENT
Start: 2019-08-22 | End: 2019-08-22

## 2019-08-22 RX ORDER — SODIUM CHLORIDE 9 MG/ML
INJECTION, SOLUTION INTRAVENOUS CONTINUOUS
Status: DISCONTINUED | OUTPATIENT
Start: 2019-08-22 | End: 2019-08-23 | Stop reason: HOSPADM

## 2019-08-22 RX ORDER — METOPROLOL TARTRATE 50 MG/1
50 TABLET, FILM COATED ORAL 2 TIMES DAILY
COMMUNITY
End: 2020-06-08 | Stop reason: ALTCHOICE

## 2019-08-22 RX ORDER — ONDANSETRON 2 MG/ML
4 INJECTION INTRAMUSCULAR; INTRAVENOUS ONCE
Status: COMPLETED | OUTPATIENT
Start: 2019-08-22 | End: 2019-08-22

## 2019-08-22 RX ORDER — 0.9 % SODIUM CHLORIDE 0.9 %
1000 INTRAVENOUS SOLUTION INTRAVENOUS ONCE
Status: COMPLETED | OUTPATIENT
Start: 2019-08-22 | End: 2019-08-22

## 2019-08-22 RX ORDER — KETOROLAC TROMETHAMINE 30 MG/ML
30 INJECTION, SOLUTION INTRAMUSCULAR; INTRAVENOUS ONCE
Status: COMPLETED | OUTPATIENT
Start: 2019-08-22 | End: 2019-08-22

## 2019-08-22 RX ORDER — ONDANSETRON 2 MG/ML
4 INJECTION INTRAMUSCULAR; INTRAVENOUS EVERY 6 HOURS PRN
Status: DISCONTINUED | OUTPATIENT
Start: 2019-08-22 | End: 2019-08-23 | Stop reason: HOSPADM

## 2019-08-22 RX ADMIN — MORPHINE SULFATE 2 MG: 2 INJECTION, SOLUTION INTRAMUSCULAR; INTRAVENOUS at 22:50

## 2019-08-22 RX ADMIN — SODIUM CHLORIDE: 9 INJECTION, SOLUTION INTRAVENOUS at 22:50

## 2019-08-22 RX ADMIN — ONDANSETRON 4 MG: 2 INJECTION INTRAMUSCULAR; INTRAVENOUS at 17:52

## 2019-08-22 RX ADMIN — ONDANSETRON 4 MG: 2 INJECTION INTRAMUSCULAR; INTRAVENOUS at 22:50

## 2019-08-22 RX ADMIN — KETOROLAC TROMETHAMINE 30 MG: 30 INJECTION, SOLUTION INTRAMUSCULAR at 17:52

## 2019-08-22 RX ADMIN — SODIUM CHLORIDE 1000 ML: 9 INJECTION, SOLUTION INTRAVENOUS at 17:52

## 2019-08-22 RX ADMIN — MORPHINE SULFATE 4 MG: 4 INJECTION, SOLUTION INTRAMUSCULAR; INTRAVENOUS at 17:52

## 2019-08-22 ASSESSMENT — ENCOUNTER SYMPTOMS
BLOOD IN STOOL: 0
SORE THROAT: 0
NAUSEA: 0
DIARRHEA: 0
COLOR CHANGE: 0
BACK PAIN: 0
ABDOMINAL PAIN: 0
VOMITING: 0
COUGH: 0
TROUBLE SWALLOWING: 0
CONSTIPATION: 0
SHORTNESS OF BREATH: 0

## 2019-08-22 ASSESSMENT — PAIN SCALES - GENERAL
PAINLEVEL_OUTOF10: 7
PAINLEVEL_OUTOF10: 9
PAINLEVEL_OUTOF10: 9

## 2019-08-23 VITALS
HEIGHT: 66 IN | HEART RATE: 66 BPM | RESPIRATION RATE: 16 BRPM | BODY MASS INDEX: 20.73 KG/M2 | TEMPERATURE: 97.6 F | WEIGHT: 129 LBS | OXYGEN SATURATION: 99 % | DIASTOLIC BLOOD PRESSURE: 91 MMHG | SYSTOLIC BLOOD PRESSURE: 171 MMHG

## 2019-08-23 LAB
ANION GAP SERPL CALCULATED.3IONS-SCNC: 6 MMOL/L (ref 9–17)
BUN BLDV-MCNC: 14 MG/DL (ref 6–20)
BUN/CREAT BLD: ABNORMAL (ref 9–20)
CALCIUM SERPL-MCNC: 8.7 MG/DL (ref 8.6–10.4)
CHLORIDE BLD-SCNC: 110 MMOL/L (ref 98–107)
CO2: 24 MMOL/L (ref 20–31)
CREAT SERPL-MCNC: 1.48 MG/DL (ref 0.5–0.9)
GFR AFRICAN AMERICAN: 47 ML/MIN
GFR NON-AFRICAN AMERICAN: 39 ML/MIN
GFR SERPL CREATININE-BSD FRML MDRD: ABNORMAL ML/MIN/{1.73_M2}
GFR SERPL CREATININE-BSD FRML MDRD: ABNORMAL ML/MIN/{1.73_M2}
GLUCOSE BLD-MCNC: 90 MG/DL (ref 70–99)
HCT VFR BLD CALC: 32.3 % (ref 36–46)
HCT VFR BLD CALC: 34.5 % (ref 36–46)
HEMOGLOBIN: 11 G/DL (ref 12–16)
HEMOGLOBIN: 11.4 G/DL (ref 12–16)
MCH RBC QN AUTO: 31.8 PG (ref 26–34)
MCHC RBC AUTO-ENTMCNC: 33.1 G/DL (ref 31–37)
MCV RBC AUTO: 96.1 FL (ref 80–100)
NRBC AUTOMATED: ABNORMAL PER 100 WBC
PDW BLD-RTO: 12.5 % (ref 11.5–14.9)
PLATELET # BLD: 251 K/UL (ref 150–450)
PMV BLD AUTO: 8.2 FL (ref 6–12)
POTASSIUM SERPL-SCNC: 4.4 MMOL/L (ref 3.7–5.3)
RBC # BLD: 3.59 M/UL (ref 4–5.2)
SODIUM BLD-SCNC: 140 MMOL/L (ref 135–144)
WBC # BLD: 7 K/UL (ref 3.5–11)

## 2019-08-23 PROCEDURE — G0378 HOSPITAL OBSERVATION PER HR: HCPCS

## 2019-08-23 PROCEDURE — 6370000000 HC RX 637 (ALT 250 FOR IP): Performed by: INTERNAL MEDICINE

## 2019-08-23 PROCEDURE — 85018 HEMOGLOBIN: CPT

## 2019-08-23 PROCEDURE — 96376 TX/PRO/DX INJ SAME DRUG ADON: CPT

## 2019-08-23 PROCEDURE — 6360000002 HC RX W HCPCS: Performed by: INTERNAL MEDICINE

## 2019-08-23 PROCEDURE — 80048 BASIC METABOLIC PNL TOTAL CA: CPT

## 2019-08-23 PROCEDURE — 85027 COMPLETE CBC AUTOMATED: CPT

## 2019-08-23 PROCEDURE — 85014 HEMATOCRIT: CPT

## 2019-08-23 PROCEDURE — 99219 PR INITIAL OBSERVATION CARE/DAY 50 MINUTES: CPT | Performed by: INTERNAL MEDICINE

## 2019-08-23 PROCEDURE — 36415 COLL VENOUS BLD VENIPUNCTURE: CPT

## 2019-08-23 RX ORDER — SODIUM CHLORIDE 0.9 % (FLUSH) 0.9 %
10 SYRINGE (ML) INJECTION EVERY 12 HOURS SCHEDULED
Status: DISCONTINUED | OUTPATIENT
Start: 2019-08-23 | End: 2019-08-23 | Stop reason: HOSPADM

## 2019-08-23 RX ORDER — METOPROLOL TARTRATE 50 MG/1
50 TABLET, FILM COATED ORAL 2 TIMES DAILY
Status: DISCONTINUED | OUTPATIENT
Start: 2019-08-23 | End: 2019-08-23 | Stop reason: HOSPADM

## 2019-08-23 RX ORDER — LISINOPRIL 20 MG/1
40 TABLET ORAL DAILY
Status: DISCONTINUED | OUTPATIENT
Start: 2019-08-23 | End: 2019-08-23 | Stop reason: HOSPADM

## 2019-08-23 RX ORDER — DIPHENHYDRAMINE HCL 25 MG
25 TABLET ORAL EVERY 6 HOURS PRN
Status: DISCONTINUED | OUTPATIENT
Start: 2019-08-23 | End: 2019-08-23 | Stop reason: HOSPADM

## 2019-08-23 RX ORDER — HYDROCODONE BITARTRATE AND ACETAMINOPHEN 5; 325 MG/1; MG/1
1 TABLET ORAL EVERY 8 HOURS PRN
Qty: 9 TABLET | Refills: 0 | Status: SHIPPED | OUTPATIENT
Start: 2019-08-23 | End: 2019-08-26

## 2019-08-23 RX ORDER — SODIUM CHLORIDE 0.9 % (FLUSH) 0.9 %
10 SYRINGE (ML) INJECTION PRN
Status: DISCONTINUED | OUTPATIENT
Start: 2019-08-23 | End: 2019-08-23 | Stop reason: HOSPADM

## 2019-08-23 RX ORDER — DULOXETIN HYDROCHLORIDE 60 MG/1
60 CAPSULE, DELAYED RELEASE ORAL DAILY
Status: DISCONTINUED | OUTPATIENT
Start: 2019-08-23 | End: 2019-08-23 | Stop reason: HOSPADM

## 2019-08-23 RX ORDER — HYDROCODONE BITARTRATE AND ACETAMINOPHEN 5; 325 MG/1; MG/1
1 TABLET ORAL EVERY 6 HOURS PRN
Status: DISCONTINUED | OUTPATIENT
Start: 2019-08-23 | End: 2019-08-23 | Stop reason: HOSPADM

## 2019-08-23 RX ADMIN — MORPHINE SULFATE 2 MG: 2 INJECTION, SOLUTION INTRAMUSCULAR; INTRAVENOUS at 06:11

## 2019-08-23 RX ADMIN — ONDANSETRON 4 MG: 2 INJECTION INTRAMUSCULAR; INTRAVENOUS at 06:11

## 2019-08-23 RX ADMIN — HYDROCODONE BITARTRATE AND ACETAMINOPHEN 1 TABLET: 5; 325 TABLET ORAL at 13:20

## 2019-08-23 RX ADMIN — MORPHINE SULFATE 2 MG: 2 INJECTION, SOLUTION INTRAMUSCULAR; INTRAVENOUS at 12:01

## 2019-08-23 RX ADMIN — METOPROLOL TARTRATE 50 MG: 50 TABLET ORAL at 13:20

## 2019-08-23 ASSESSMENT — PAIN SCALES - GENERAL
PAINLEVEL_OUTOF10: 7
PAINLEVEL_OUTOF10: 8
PAINLEVEL_OUTOF10: 7
PAINLEVEL_OUTOF10: 5

## 2019-08-23 ASSESSMENT — PAIN DESCRIPTION - ORIENTATION: ORIENTATION: RIGHT

## 2019-08-23 ASSESSMENT — PAIN DESCRIPTION - PAIN TYPE: TYPE: ACUTE PAIN

## 2019-08-23 ASSESSMENT — PAIN DESCRIPTION - LOCATION: LOCATION: FLANK

## 2019-08-23 NOTE — ED NOTES
Report given to Staci Pennington RN from ED. Report method in person   The following was reviewed with receiving RN:   Current vital signs:  BP (!) 155/89   Pulse 97   Temp 98.4 °F (36.9 °C) (Oral)   Resp 17   Ht 5' 6\" (1.676 m)   Wt 129 lb (58.5 kg)   LMP 08/22/2019   SpO2 98%   BMI 20.82 kg/m²                MEWS Score: 1     Any medication or safety alerts were reviewed. Any pending diagnostics and notifications were also reviewed, as well as any safety concerns or issues, abnormal labs, abnormal imaging, and abnormal assessment findings. Questions were answered.             Rossy Alvarenga RN  08/23/19 [id number]

## 2019-08-23 NOTE — H&P
[address] [facility].    Date:   8/23/2019  Patient name:  Sally Multani  Date of admission:  8/22/2019  5:27 PM  MRN:   [id number]  YOB: 1980      Cc right flank pain     HPI   Pt admitted with sympts of right flank pain         HPI   1) Location/Symptom right flank pain   2) Timing/Onset: 1 day   3) Context/Setting: hx of APKD   4) Quality: sharp pain   5) Duration: continuous   6) Modifying Factors: ct showed probable hemorrhage in right lower pole   7) Severity: moderate          Past Medical History:   Diagnosis Date    Acute back pain 7/30/2013    Anxiety     Asthma     Chronic headaches 7/10/2013    CKD (chronic kidney disease) stage 3, GFR 30-59 ml/min (HCC)     Degenerative disc disease, cervical     Depression     Dysmenorrhea 9/30/2014    Eczema 11/8/2012    Headache(784.0)     HTN (hypertension) 10/2/2011    Hypertension     Hypocalcemia 5/28/2014    Irregular bleeding 9/30/2014    Kidney stone     Menorrhagia 9/30/2014    Metrorrhagia 9/30/2014    Neck pain, bilateral 5/28/2014    Pelvic pain in female 9/30/2014    Polycystic kidney     Smoker 10/2/2011    Tachycardia 1/20/2014    Tension vascular headache 1/20/2014     Family History   Problem Relation Age of Onset    High Blood Pressure Mother     Asthma Sister     Migraines Sister     High Blood Pressure Father     Other Brother         bad knees, with recent total knee replacement       reports that she has been smoking cigarettes. She has a 12.00 pack-year smoking history. She has never used smokeless tobacco. She reports that she does not drink alcohol or use drugs.   Past Medical History:   Diagnosis Date    Acute back pain 7/30/2013    Anxiety     Asthma     Chronic headaches 7/10/2013    CKD (chronic kidney disease) stage 3, GFR 30-59 ml/min (HCC)     Degenerative disc disease, cervical     Depression     Dysmenorrhea Labs/Imaging     CBC:   Recent Labs     08/22/19  1745 08/23/19  0230   WBC 13.8*  --    HGB 13.2 11.0*     --      BMP:    Recent Labs     08/22/19  1745      K 3.7      CO2 19*   BUN 17   CREATININE 1.30*   GLUCOSE 106*     S. Calcium:  Recent Labs     08/22/19  1745   CALCIUM 9.4     Glycosylated hemoglobin A1C: No results for input(s): LABA1C in the last 72 hours. BNP:No results for input(s): BNP in the last 72 hours. Assessment / Plan      Patient Active Problem List   Diagnosis    Asthma    Smoker    Polycystic kidney    HTN (hypertension)    Eczema    Depression with anxiety    Chronic headaches    Tension vascular headache    Irregular bleeding    Metrorrhagia    Menorrhagia    Dysmenorrhea    Pelvic pain in female    Kidney stone    Acute back pain    Anxiety    Hypertension    Headache    Depression    CKD (chronic kidney disease) stage 3, GFR 30-59 ml/min ([facility])    Chronic neck pain    Degenerative disc disease, cervical    Encounter for long-term (current) use of other medications    Cervicalgia    Chronic intractable headache    Hemorrhage of cyst of native kidney    Abdominal pain       A/P  APKD pt with hemmorage in to right pole   Hb 13 to 11   Repeat hb evening if stable can MD NEVAEH Danielle [address], [address] [place] [address].    Phone [phone]   Fax: [phone]  Answering Service: [phone]

## 2019-08-23 NOTE — PROGRESS NOTES
Medication History completed:    New medications: metoprolol tartrate, acetaminophen    Medications discontinued: metoprolol succinate, multivitamin, fluticasone nasal spray, divalproex, buspirone, albuterol inhaler, acetaminophen-codeine    Changes to dosing: none    Stated allergies: As listed    Other pertinent information: Medications confirmed with [facility].     Thank you,  Dianna Basilio, PharmD, BCPS  [phone]

## 2019-08-23 NOTE — ED NOTES
Dr. Ellis Blinks at bedside to evaluate pt. No new orders received.       Akiko Cueto RN  08/23/19 [id number]

## 2020-06-08 ENCOUNTER — HOSPITAL ENCOUNTER (OUTPATIENT)
Dept: PREADMISSION TESTING | Age: 40
Discharge: HOME | End: 2020-06-12
Payer: MEDICARE

## 2020-06-08 ENCOUNTER — OFFICE VISIT (OUTPATIENT)
Dept: UROLOGY | Age: 40
End: 2020-06-08
Payer: MEDICARE

## 2020-06-08 VITALS — TEMPERATURE: 99 F

## 2020-06-08 PROCEDURE — U0004 COV-19 TEST NON-CDC HGH THRU: HCPCS

## 2020-06-08 PROCEDURE — G8427 DOCREV CUR MEDS BY ELIG CLIN: HCPCS | Performed by: UROLOGY

## 2020-06-08 PROCEDURE — G8420 CALC BMI NORM PARAMETERS: HCPCS | Performed by: UROLOGY

## 2020-06-08 PROCEDURE — 4004F PT TOBACCO SCREEN RCVD TLK: CPT | Performed by: UROLOGY

## 2020-06-08 PROCEDURE — 99214 OFFICE O/P EST MOD 30 MIN: CPT | Performed by: UROLOGY

## 2020-06-08 RX ORDER — LORATADINE 10 MG/1
10 TABLET ORAL DAILY PRN
Status: ON HOLD | COMMUNITY
End: 2021-02-24 | Stop reason: HOSPADM

## 2020-06-08 RX ORDER — AMLODIPINE BESYLATE 5 MG/1
5 TABLET ORAL DAILY
Status: ON HOLD | COMMUNITY
End: 2020-08-01 | Stop reason: HOSPADM

## 2020-06-08 RX ORDER — TAMSULOSIN HYDROCHLORIDE 0.4 MG/1
0.4 CAPSULE ORAL NIGHTLY
COMMUNITY
Start: 2020-05-18 | End: 2021-02-21

## 2020-06-08 ASSESSMENT — ENCOUNTER SYMPTOMS
COLOR CHANGE: 0
COUGH: 0
ABDOMINAL PAIN: 0
EYE REDNESS: 0
VOMITING: 0
BACK PAIN: 0
EYE PAIN: 0
SHORTNESS OF BREATH: 0
NAUSEA: 1
WHEEZING: 0

## 2020-06-08 NOTE — PROGRESS NOTES
Acute back pain 7/30/2013    Anxiety     Asthma     Chronic headaches 7/10/2013    CKD (chronic kidney disease) stage 3, GFR 30-59 ml/min ([facility])     Degenerative disc disease, cervical     Depression     Dysmenorrhea 9/30/2014    Eczema 11/8/2012    Headache(784.0)     HTN (hypertension) 10/2/2011    Hypertension     Hypocalcemia 5/28/2014    Irregular bleeding 9/30/2014    Kidney stone     Menorrhagia 9/30/2014    Metrorrhagia 9/30/2014    Neck pain, bilateral 5/28/2014    Pelvic pain in female 9/30/2014    Polycystic kidney     Smoker 10/2/2011    Tachycardia 1/20/2014    Tension vascular headache 1/20/2014     Past Surgical History:   Procedure Laterality Date    BREAST ENHANCEMENT SURGERY      BREAST LUMPECTOMY      left breast    COSMETIC SURGERY      DILATION AND CURETTAGE OF UTERUS      x2    NERVE BLOCK  07/01/2013    nerve block(right vervical C3/TON/C4/C5    NERVE BLOCK  07/08/2013    nerve block(right vervical C3/TON/C4/C5    OTHER SURGICAL HISTORY  03/10/2014     botox inj     TOE SURGERY      removal of ingrown toe nail-2nd toe on left foot      Family History   Problem Relation Age of Onset    High Blood Pressure Mother     Asthma Sister    Miguel Migraines Sister     High Blood Pressure Father     Other Brother         bad knees, with recent total knee replacement      Outpatient Medications Marked as Taking for the 6/8/20 encounter (Office Visit) with Christiana Moreira MD   Medication Sig Dispense Refill    loratadine (CLARITIN) 10 MG tablet Take 10 mg by mouth daily      amLODIPine (NORVASC) 5 MG tablet Take 5 mg by mouth daily      tamsulosin (FLOMAX) 0.4 MG capsule Take 1 capsule by mouth daily      DULoxetine (CYMBALTA) 60 MG extended release capsule take 1 capsule by mouth once daily 30 capsule 0    Blood Pressure KIT Check BP once/day- goal is <140/90. 1 kit 0       Dust mite extract;  Pollen extract; and Pcn [penicillins]  Social History     Tobacco Use   Smoking Status Current Every Day Smoker    Packs/day: 0.75    Years: 16.00    Pack years: 12.00    Types: Cigarettes   Smokeless Tobacco Never Used   Tobacco Comment    trying  to  cut  down      (If patient a smoker, smoking cessation counseling offered)   Social History     Substance and Sexual Activity   Alcohol Use No    Alcohol/week: 0.0 standard drinks       REVIEW OF SYSTEMS:  Review of Systems    Physical Exam:    This a 41 y.o. female      Vitals:    06/08/20 1419   Temp: 99 °F (37.2 °C)     There is no height or weight on file to calculate BMI. Physical Exam  Constitutional: Patient in no acute distress, ggod grooming, appropriately dressed  Neuro: Alert and oriented to person, place and time. Psych:Mood normal, affect normal  Skin: No rash noted  HEENT: Head: Normocephalic and atraumatic,Conjunctivae and EOM are normal,Nose- normal, Right/Left External Ear: normal, Mouth: Mucosa Moist  Neck: Supple  Lungs: Respiratory effort is normal  Cardiovascular: strong and regular, no lower leg edema  Abdomen: Soft, non-tender, non-distended with no CVA,    Lymphatics: No cervical palpable lymphadenopathy. Bladder non-tender and not distended. Musculoskeletal: Normal gait and station        Assessment and Plan      1. Kidney stone    2. Right flank pain    3. Autosomal dominant polycystic kidney disease            Plan:   Cysto right urs hll stent at [facility]  Pt had urs by Dr. Lien Whatley two weeks ago and no stone was seen; pt has significant continued pain and CT shows 7 mm calc (she has pkd, may be intraparenchymal or in the collecting system; will do URS to differentiate)       Prescriptions Ordered:  No orders of the defined types were placed in this encounter. Orders Placed:  No orders of the defined types were placed in this encounter. Rigo Miller MD    Agree with the ROS entered by the MA.

## 2020-06-08 NOTE — PROGRESS NOTES
Review of Systems   Constitutional: Positive for appetite change. Negative for activity change, chills and fever. Eyes: Negative for pain, redness and visual disturbance. Respiratory: Negative for cough, shortness of breath and wheezing. Cardiovascular: Negative for chest pain and leg swelling. Gastrointestinal: Positive for nausea. Negative for abdominal pain and vomiting. Genitourinary: Positive for flank pain (right ), hematuria and urgency. Negative for difficulty urinating, dysuria, frequency, pelvic pain, vaginal bleeding and vaginal discharge. Musculoskeletal: Negative for back pain, joint swelling and myalgias. Skin: Negative for color change and rash. Neurological: Negative for dizziness, tremors and numbness. Hematological: Negative for adenopathy. Does not bruise/bleed easily.

## 2020-06-09 ENCOUNTER — TELEPHONE (OUTPATIENT)
Dept: UROLOGY | Age: 40
End: 2020-06-09

## 2020-06-09 LAB
SARS-COV-2, PCR: NOT DETECTED
SARS-COV-2, RAPID: NORMAL
SARS-COV-2: NORMAL
SOURCE: NORMAL

## 2020-06-09 NOTE — TELEPHONE ENCOUNTER
Cysto, (RT) URS, HLL, (RT) stetn placement @ [facility] 6/12/20 4:00pm   PAT same day   COVID-19 testing @ [address] flu clinic 6/8/20 4:10pm           Spoke with patient procedure info emailed 6/9/20.

## 2020-06-10 ENCOUNTER — TELEPHONE (OUTPATIENT)
Dept: PRIMARY CARE CLINIC | Age: 40
End: 2020-06-10

## 2020-06-12 ENCOUNTER — ANESTHESIA EVENT (OUTPATIENT)
Dept: OPERATING ROOM | Age: 40
End: 2020-06-12
Payer: MEDICARE

## 2020-06-12 ENCOUNTER — ANESTHESIA (OUTPATIENT)
Dept: OPERATING ROOM | Age: 40
End: 2020-06-12
Payer: MEDICARE

## 2020-06-12 ENCOUNTER — APPOINTMENT (OUTPATIENT)
Dept: GENERAL RADIOLOGY | Age: 40
End: 2020-06-12
Attending: UROLOGY
Payer: MEDICARE

## 2020-06-12 ENCOUNTER — HOSPITAL ENCOUNTER (OUTPATIENT)
Age: 40
Setting detail: OUTPATIENT SURGERY
Discharge: HOME | End: 2020-06-12
Attending: UROLOGY | Admitting: UROLOGY
Payer: MEDICARE

## 2020-06-12 VITALS
HEIGHT: 65 IN | OXYGEN SATURATION: 99 % | BODY MASS INDEX: 18.33 KG/M2 | DIASTOLIC BLOOD PRESSURE: 85 MMHG | TEMPERATURE: 97.7 F | SYSTOLIC BLOOD PRESSURE: 181 MMHG | RESPIRATION RATE: 13 BRPM | WEIGHT: 110 LBS | HEART RATE: 82 BPM

## 2020-06-12 VITALS
DIASTOLIC BLOOD PRESSURE: 89 MMHG | OXYGEN SATURATION: 100 % | SYSTOLIC BLOOD PRESSURE: 140 MMHG | RESPIRATION RATE: 15 BRPM | TEMPERATURE: 95.7 F

## 2020-06-12 LAB
GFR NON-AFRICAN AMERICAN: 20 ML/MIN
GFR SERPL CREATININE-BSD FRML MDRD: 25 ML/MIN
GFR SERPL CREATININE-BSD FRML MDRD: ABNORMAL ML/MIN/{1.73_M2}
GLUCOSE BLD-MCNC: 77 MG/DL (ref 74–100)
POC CREATININE: 2.58 MG/DL (ref 0.51–1.19)

## 2020-06-12 PROCEDURE — 2720000010 HC SURG SUPPLY STERILE: Performed by: UROLOGY

## 2020-06-12 PROCEDURE — C1758 CATHETER, URETERAL: HCPCS | Performed by: UROLOGY

## 2020-06-12 PROCEDURE — C2617 STENT, NON-COR, TEM W/O DEL: HCPCS | Performed by: UROLOGY

## 2020-06-12 PROCEDURE — 2709999900 HC NON-CHARGEABLE SUPPLY: Performed by: UROLOGY

## 2020-06-12 PROCEDURE — 2580000003 HC RX 258: Performed by: ANESTHESIOLOGY

## 2020-06-12 PROCEDURE — 74018 RADEX ABDOMEN 1 VIEW: CPT

## 2020-06-12 PROCEDURE — 6360000002 HC RX W HCPCS: Performed by: PHYSICIAN ASSISTANT

## 2020-06-12 PROCEDURE — 82565 ASSAY OF CREATININE: CPT

## 2020-06-12 PROCEDURE — 6360000002 HC RX W HCPCS: Performed by: SPECIALIST

## 2020-06-12 PROCEDURE — C1769 GUIDE WIRE: HCPCS | Performed by: UROLOGY

## 2020-06-12 PROCEDURE — 6360000002 HC RX W HCPCS: Performed by: ANESTHESIOLOGY

## 2020-06-12 PROCEDURE — 3600000003 HC SURGERY LEVEL 3 BASE: Performed by: UROLOGY

## 2020-06-12 PROCEDURE — 6360000004 HC RX CONTRAST MEDICATION: Performed by: UROLOGY

## 2020-06-12 PROCEDURE — 7100000001 HC PACU RECOVERY - ADDTL 15 MIN: Performed by: UROLOGY

## 2020-06-12 PROCEDURE — 2500000003 HC RX 250 WO HCPCS: Performed by: SPECIALIST

## 2020-06-12 PROCEDURE — 2580000003 HC RX 258: Performed by: UROLOGY

## 2020-06-12 PROCEDURE — 3700000000 HC ANESTHESIA ATTENDED CARE: Performed by: UROLOGY

## 2020-06-12 PROCEDURE — 7100000000 HC PACU RECOVERY - FIRST 15 MIN: Performed by: UROLOGY

## 2020-06-12 PROCEDURE — 81025 URINE PREGNANCY TEST: CPT

## 2020-06-12 PROCEDURE — 7100000010 HC PHASE II RECOVERY - FIRST 15 MIN: Performed by: UROLOGY

## 2020-06-12 PROCEDURE — 3600000013 HC SURGERY LEVEL 3 ADDTL 15MIN: Performed by: UROLOGY

## 2020-06-12 PROCEDURE — 7100000011 HC PHASE II RECOVERY - ADDTL 15 MIN: Performed by: UROLOGY

## 2020-06-12 PROCEDURE — 82947 ASSAY GLUCOSE BLOOD QUANT: CPT

## 2020-06-12 PROCEDURE — 2580000003 HC RX 258: Performed by: SPECIALIST

## 2020-06-12 PROCEDURE — 3700000001 HC ADD 15 MINUTES (ANESTHESIA): Performed by: UROLOGY

## 2020-06-12 DEVICE — URETERAL STENT
Type: IMPLANTABLE DEVICE | Site: URETER | Status: FUNCTIONAL
Brand: POLARIS™ ULTRA

## 2020-06-12 RX ORDER — FENTANYL CITRATE 50 UG/ML
INJECTION, SOLUTION INTRAMUSCULAR; INTRAVENOUS PRN
Status: DISCONTINUED | OUTPATIENT
Start: 2020-06-12 | End: 2020-06-12 | Stop reason: SDUPTHER

## 2020-06-12 RX ORDER — MIDAZOLAM HYDROCHLORIDE 1 MG/ML
INJECTION INTRAMUSCULAR; INTRAVENOUS PRN
Status: DISCONTINUED | OUTPATIENT
Start: 2020-06-12 | End: 2020-06-12 | Stop reason: SDUPTHER

## 2020-06-12 RX ORDER — DOCUSATE SODIUM 100 MG/1
100 CAPSULE, LIQUID FILLED ORAL 2 TIMES DAILY PRN
Qty: 14 CAPSULE | Refills: 0 | Status: SHIPPED | OUTPATIENT
Start: 2020-06-12 | End: 2020-06-19

## 2020-06-12 RX ORDER — FENTANYL CITRATE 50 UG/ML
25 INJECTION, SOLUTION INTRAMUSCULAR; INTRAVENOUS EVERY 5 MIN PRN
Status: DISCONTINUED | OUTPATIENT
Start: 2020-06-12 | End: 2020-06-12 | Stop reason: HOSPADM

## 2020-06-12 RX ORDER — SODIUM CHLORIDE 0.9 % (FLUSH) 0.9 %
10 SYRINGE (ML) INJECTION PRN
Status: DISCONTINUED | OUTPATIENT
Start: 2020-06-12 | End: 2020-06-12 | Stop reason: HOSPADM

## 2020-06-12 RX ORDER — LIDOCAINE HYDROCHLORIDE 10 MG/ML
1 INJECTION, SOLUTION EPIDURAL; INFILTRATION; INTRACAUDAL; PERINEURAL
Status: DISCONTINUED | OUTPATIENT
Start: 2020-06-12 | End: 2020-06-12 | Stop reason: HOSPADM

## 2020-06-12 RX ORDER — LABETALOL HYDROCHLORIDE 5 MG/ML
5 INJECTION, SOLUTION INTRAVENOUS EVERY 10 MIN PRN
Status: DISCONTINUED | OUTPATIENT
Start: 2020-06-12 | End: 2020-06-12 | Stop reason: HOSPADM

## 2020-06-12 RX ORDER — MAGNESIUM HYDROXIDE 1200 MG/15ML
LIQUID ORAL CONTINUOUS PRN
Status: COMPLETED | OUTPATIENT
Start: 2020-06-12 | End: 2020-06-12

## 2020-06-12 RX ORDER — MIDAZOLAM HYDROCHLORIDE 1 MG/ML
1 INJECTION INTRAMUSCULAR; INTRAVENOUS EVERY 10 MIN PRN
Status: DISCONTINUED | OUTPATIENT
Start: 2020-06-12 | End: 2020-06-12 | Stop reason: HOSPADM

## 2020-06-12 RX ORDER — MAGNESIUM HYDROXIDE 1200 MG/15ML
LIQUID ORAL PRN
Status: DISCONTINUED | OUTPATIENT
Start: 2020-06-12 | End: 2020-06-12 | Stop reason: ALTCHOICE

## 2020-06-12 RX ORDER — ONDANSETRON 2 MG/ML
4 INJECTION INTRAMUSCULAR; INTRAVENOUS
Status: DISCONTINUED | OUTPATIENT
Start: 2020-06-12 | End: 2020-06-12 | Stop reason: SDUPTHER

## 2020-06-12 RX ORDER — ONDANSETRON 2 MG/ML
4 INJECTION INTRAMUSCULAR; INTRAVENOUS
Status: COMPLETED | OUTPATIENT
Start: 2020-06-12 | End: 2020-06-12

## 2020-06-12 RX ORDER — TAMSULOSIN HYDROCHLORIDE 0.4 MG/1
0.4 CAPSULE ORAL DAILY
Qty: 7 CAPSULE | Refills: 0 | Status: SHIPPED | OUTPATIENT
Start: 2020-06-12 | End: 2020-06-18

## 2020-06-12 RX ORDER — OXYCODONE HYDROCHLORIDE AND ACETAMINOPHEN 5; 325 MG/1; MG/1
1 TABLET ORAL PRN
Status: DISCONTINUED | OUTPATIENT
Start: 2020-06-12 | End: 2020-06-12 | Stop reason: HOSPADM

## 2020-06-12 RX ORDER — SODIUM CHLORIDE, SODIUM LACTATE, POTASSIUM CHLORIDE, CALCIUM CHLORIDE 600; 310; 30; 20 MG/100ML; MG/100ML; MG/100ML; MG/100ML
INJECTION, SOLUTION INTRAVENOUS CONTINUOUS PRN
Status: DISCONTINUED | OUTPATIENT
Start: 2020-06-12 | End: 2020-06-12 | Stop reason: SDUPTHER

## 2020-06-12 RX ORDER — ONDANSETRON 2 MG/ML
4 INJECTION INTRAMUSCULAR; INTRAVENOUS
Status: DISCONTINUED | OUTPATIENT
Start: 2020-06-12 | End: 2020-06-12 | Stop reason: HOSPADM

## 2020-06-12 RX ORDER — OXYCODONE HYDROCHLORIDE AND ACETAMINOPHEN 5; 325 MG/1; MG/1
2 TABLET ORAL PRN
Status: DISCONTINUED | OUTPATIENT
Start: 2020-06-12 | End: 2020-06-12 | Stop reason: HOSPADM

## 2020-06-12 RX ORDER — OXYBUTYNIN CHLORIDE 10 MG/1
10 TABLET, EXTENDED RELEASE ORAL DAILY
Qty: 7 TABLET | Refills: 0 | Status: SHIPPED | OUTPATIENT
Start: 2020-06-12 | End: 2020-06-30

## 2020-06-12 RX ORDER — LIDOCAINE HYDROCHLORIDE 10 MG/ML
INJECTION, SOLUTION EPIDURAL; INFILTRATION; INTRACAUDAL; PERINEURAL PRN
Status: DISCONTINUED | OUTPATIENT
Start: 2020-06-12 | End: 2020-06-12 | Stop reason: SDUPTHER

## 2020-06-12 RX ORDER — DIPHENHYDRAMINE HYDROCHLORIDE 50 MG/ML
12.5 INJECTION INTRAMUSCULAR; INTRAVENOUS
Status: DISCONTINUED | OUTPATIENT
Start: 2020-06-12 | End: 2020-06-12 | Stop reason: HOSPADM

## 2020-06-12 RX ORDER — IODIXANOL 320 MG/ML
INJECTION, SOLUTION INTRAVASCULAR PRN
Status: DISCONTINUED | OUTPATIENT
Start: 2020-06-12 | End: 2020-06-12 | Stop reason: ALTCHOICE

## 2020-06-12 RX ORDER — SULFAMETHOXAZOLE AND TRIMETHOPRIM 800; 160 MG/1; MG/1
1 TABLET ORAL 2 TIMES DAILY
Qty: 6 TABLET | Refills: 0 | Status: SHIPPED | OUTPATIENT
Start: 2020-06-12 | End: 2020-06-15

## 2020-06-12 RX ORDER — ONDANSETRON 2 MG/ML
INJECTION INTRAMUSCULAR; INTRAVENOUS PRN
Status: DISCONTINUED | OUTPATIENT
Start: 2020-06-12 | End: 2020-06-12 | Stop reason: SDUPTHER

## 2020-06-12 RX ORDER — SODIUM CHLORIDE 0.9 % (FLUSH) 0.9 %
10 SYRINGE (ML) INJECTION EVERY 12 HOURS SCHEDULED
Status: DISCONTINUED | OUTPATIENT
Start: 2020-06-12 | End: 2020-06-12 | Stop reason: HOSPADM

## 2020-06-12 RX ORDER — MORPHINE SULFATE 2 MG/ML
2 INJECTION, SOLUTION INTRAMUSCULAR; INTRAVENOUS EVERY 5 MIN PRN
Status: DISCONTINUED | OUTPATIENT
Start: 2020-06-12 | End: 2020-06-12 | Stop reason: HOSPADM

## 2020-06-12 RX ORDER — SODIUM CHLORIDE, SODIUM LACTATE, POTASSIUM CHLORIDE, CALCIUM CHLORIDE 600; 310; 30; 20 MG/100ML; MG/100ML; MG/100ML; MG/100ML
INJECTION, SOLUTION INTRAVENOUS CONTINUOUS
Status: DISCONTINUED | OUTPATIENT
Start: 2020-06-12 | End: 2020-06-12 | Stop reason: HOSPADM

## 2020-06-12 RX ORDER — HYDROCODONE BITARTRATE AND ACETAMINOPHEN 5; 325 MG/1; MG/1
2 TABLET ORAL EVERY 6 HOURS PRN
Qty: 15 TABLET | Refills: 0 | Status: SHIPPED | OUTPATIENT
Start: 2020-06-12 | End: 2020-06-19

## 2020-06-12 RX ORDER — PROPOFOL 10 MG/ML
INJECTION, EMULSION INTRAVENOUS PRN
Status: DISCONTINUED | OUTPATIENT
Start: 2020-06-12 | End: 2020-06-12 | Stop reason: SDUPTHER

## 2020-06-12 RX ADMIN — MORPHINE SULFATE 2 MG: 2 INJECTION, SOLUTION INTRAMUSCULAR; INTRAVENOUS at 17:14

## 2020-06-12 RX ADMIN — ONDANSETRON 4 MG: 2 INJECTION INTRAMUSCULAR; INTRAVENOUS at 15:51

## 2020-06-12 RX ADMIN — MIDAZOLAM HYDROCHLORIDE 2 MG: 1 INJECTION, SOLUTION INTRAMUSCULAR; INTRAVENOUS at 16:14

## 2020-06-12 RX ADMIN — LIDOCAINE HYDROCHLORIDE 50 MG: 10 INJECTION, SOLUTION EPIDURAL; INFILTRATION; INTRACAUDAL; PERINEURAL at 16:18

## 2020-06-12 RX ADMIN — PROPOFOL 150 MG: 10 INJECTION, EMULSION INTRAVENOUS at 16:18

## 2020-06-12 RX ADMIN — FENTANYL CITRATE 50 MCG: 50 INJECTION INTRAMUSCULAR; INTRAVENOUS at 16:14

## 2020-06-12 RX ADMIN — CEFAZOLIN 2 G: 1 INJECTION, POWDER, FOR SOLUTION INTRAMUSCULAR; INTRAVENOUS at 16:29

## 2020-06-12 RX ADMIN — FENTANYL CITRATE 50 MCG: 50 INJECTION INTRAMUSCULAR; INTRAVENOUS at 16:57

## 2020-06-12 RX ADMIN — ONDANSETRON 4 MG: 2 INJECTION, SOLUTION INTRAMUSCULAR; INTRAVENOUS at 16:45

## 2020-06-12 RX ADMIN — SODIUM CHLORIDE, POTASSIUM CHLORIDE, SODIUM LACTATE AND CALCIUM CHLORIDE: 600; 310; 30; 20 INJECTION, SOLUTION INTRAVENOUS at 16:18

## 2020-06-12 RX ADMIN — MIDAZOLAM HYDROCHLORIDE 1 MG: 1 INJECTION, SOLUTION INTRAMUSCULAR; INTRAVENOUS at 15:47

## 2020-06-12 RX ADMIN — SODIUM CHLORIDE, POTASSIUM CHLORIDE, SODIUM LACTATE AND CALCIUM CHLORIDE: 600; 310; 30; 20 INJECTION, SOLUTION INTRAVENOUS at 15:47

## 2020-06-12 RX ADMIN — FENTANYL CITRATE 50 MCG: 50 INJECTION INTRAMUSCULAR; INTRAVENOUS at 16:18

## 2020-06-12 RX ADMIN — MORPHINE SULFATE 2 MG: 2 INJECTION, SOLUTION INTRAMUSCULAR; INTRAVENOUS at 17:24

## 2020-06-12 RX ADMIN — FENTANYL CITRATE 50 MCG: 50 INJECTION INTRAMUSCULAR; INTRAVENOUS at 16:43

## 2020-06-12 RX ADMIN — FENTANYL CITRATE 25 MCG: 50 INJECTION, SOLUTION INTRAMUSCULAR; INTRAVENOUS at 15:45

## 2020-06-12 ASSESSMENT — PULMONARY FUNCTION TESTS
PIF_VALUE: 22
PIF_VALUE: 12
PIF_VALUE: 3
PIF_VALUE: 1
PIF_VALUE: 20
PIF_VALUE: 13
PIF_VALUE: 20
PIF_VALUE: 0
PIF_VALUE: 18
PIF_VALUE: 17
PIF_VALUE: 4
PIF_VALUE: 4
PIF_VALUE: 2
PIF_VALUE: 3
PIF_VALUE: 12
PIF_VALUE: 4
PIF_VALUE: 20
PIF_VALUE: 4
PIF_VALUE: 6
PIF_VALUE: 2
PIF_VALUE: 3
PIF_VALUE: 18
PIF_VALUE: 4
PIF_VALUE: 4
PIF_VALUE: 17
PIF_VALUE: 12
PIF_VALUE: 4
PIF_VALUE: 21
PIF_VALUE: 13
PIF_VALUE: 4
PIF_VALUE: 0
PIF_VALUE: 0
PIF_VALUE: 2
PIF_VALUE: 1
PIF_VALUE: 4
PIF_VALUE: 0
PIF_VALUE: 2
PIF_VALUE: 6
PIF_VALUE: 16
PIF_VALUE: 13

## 2020-06-12 ASSESSMENT — PAIN SCALES - GENERAL
PAINLEVEL_OUTOF10: 7
PAINLEVEL_OUTOF10: 3
PAINLEVEL_OUTOF10: 7
PAINLEVEL_OUTOF10: 7
PAINLEVEL_OUTOF10: 3

## 2020-06-12 ASSESSMENT — PAIN DESCRIPTION - PAIN TYPE
TYPE: SURGICAL PAIN
TYPE: SURGICAL PAIN

## 2020-06-12 ASSESSMENT — PAIN DESCRIPTION - LOCATION: LOCATION: BACK

## 2020-06-12 ASSESSMENT — PAIN - FUNCTIONAL ASSESSMENT: PAIN_FUNCTIONAL_ASSESSMENT: 0-10

## 2020-06-12 NOTE — ANESTHESIA PRE PROCEDURE
Answered  Comment: trying  to  cut  down      Vital Signs (Current):   Vitals:    06/11/20 1512 06/12/20 1442   BP:  (!) 145/87   Pulse:  87   Resp:  16   Temp:  98.1 °F (36.7 °C)   TempSrc:  Temporal   SpO2:  98%   Weight: 132 lb (59.9 kg) 110 lb (49.9 kg)   Height: 5' 5\" (1.651 m) 5' 5\" (1.651 m)                                              BP Readings from Last 3 Encounters:   06/12/20 (!) 145/87   08/23/19 (!) 171/91   07/11/17 (!) 141/88       NPO Status: Time of last liquid consumption: 2330                        Time of last solid consumption: 2330                        Date of last liquid consumption: 06/11/20                        Date of last solid food consumption: 06/11/20    BMI:   Wt Readings from Last 3 Encounters:   06/12/20 110 lb (49.9 kg)   08/22/19 129 lb (58.5 kg)   07/11/17 129 lb 6.4 oz (58.7 kg)     Body mass index is 18.3 kg/m².     CBC:   Lab Results   Component Value Date    WBC 7.0 08/23/2019    RBC 3.59 08/23/2019    RBC 3.71 01/13/2012    HGB 11.4 08/23/2019    HCT 34.5 08/23/2019    MCV 96.1 08/23/2019    RDW 12.5 08/23/2019     08/23/2019     01/13/2012       CMP:   Lab Results   Component Value Date     08/23/2019    K 4.4 08/23/2019     08/23/2019    CO2 24 08/23/2019    BUN 14 08/23/2019    CREATININE 2.58 06/12/2020    CREATININE 1.48 08/23/2019    GFRAA 47 08/23/2019    LABGLOM 20 06/12/2020    GLUCOSE 90 08/23/2019    PROT 8.1 08/22/2019    CALCIUM 8.7 08/23/2019    BILITOT 0.30 08/22/2019    ALKPHOS 81 08/22/2019    AST 18 08/22/2019    ALT 8 08/22/2019       POC Tests:   Recent Labs     06/12/20  1505   POCGLU 77       Coags:   Lab Results   Component Value Date    PROTIME 10.0 03/18/2017    INR 0.9 03/18/2017    APTT 25.3 03/18/2017       HCG (If Applicable):   Lab Results   Component Value Date    PREGTESTUR NEGATIVE 08/22/2019    HCG NEGATIVE 03/10/2014    HCGQUANT <1 09/30/2014        ABGs: No results found for: PHART, PO2ART, NUR2OHD, VJF4NTS,

## 2020-06-12 NOTE — ANESTHESIA POSTPROCEDURE EVALUATION
Department of Anesthesiology  Postprocedure Note    Patient: Ewell Cheadle  MRN: [id number]  [place]: 1980  Date of evaluation: 6/12/2020  Time:  6:49 PM     Procedure Summary     Date:  06/12/20 Room / Location:  [address]    Anesthesia Start:  1611 Anesthesia Stop:  1348    Procedure:  HOLMIUM - CYSTO, URETEROSCOPY, LASER LITHO, STENT PLACEMENT (Right ) Diagnosis:  (RIGHT KIDNEY STONE, FLANK PAIN)    Surgeon:  Ysabel Duncan MD Responsible Provider:  Anupama Lua MD    Anesthesia Type:  general ASA Status:  2          Anesthesia Type: general    Balbir Phase I: Balbir Score: 10    Balbir Phase II: Balbir Score: 10    Last vitals: Reviewed and per EMR flowsheets.        Anesthesia Post Evaluation    Patient location during evaluation: PACU  Patient participation: complete - patient participated  Level of consciousness: awake and alert  Pain score: 3  Nausea & Vomiting: no vomiting  Cardiovascular status: hemodynamically stable  Respiratory status: room air  Hydration status: euvolemic

## 2020-06-12 NOTE — H&P
Pre-op History and Physical  Nam Arora PA-C    Patient:  Raiza Sands  MRN: [id number]  YOB: 1980    HISTORY OF PRESENT ILLNESS:     The patient is a 41 y.o. female who presents with known right kidney stone 7mm (on CT from May). Underwent URS with Dr. Cookie Moeller two weeks ago, but no stone was seen. She has had continued right flank pain. Here for cystoscopy, right ureteroscopy, holmium laser lithotripsy, stent placement. Patient's old records, notes and chart reviewed and summarized above. Nam Arora PA-C independently reviewed the images and verified the radiology reports from:    No results found. Past Medical History:    Past Medical History:   Diagnosis Date    Anxiety     Asthma     Chronic headaches 7/10/2013    CKD (chronic kidney disease) stage 3, GFR 30-59 ml/min ([facility])     Degenerative disc disease, cervical     Depression     Dysmenorrhea 9/30/2014    Eczema 11/8/2012    Headache(784.0)     HTN (hypertension) 10/2/2011    Hypertension     Hypocalcemia 5/28/2014    Kidney stone     Menorrhagia 9/30/2014    Neck pain, bilateral 5/28/2014    Pelvic pain in female 9/30/2014    Polycystic kidney     Smoker 10/2/2011    Tachycardia 1/20/2014    Tension vascular headache 1/20/2014       Past Surgical History:    Past Surgical History:   Procedure Laterality Date    BREAST ENHANCEMENT SURGERY      BREAST LUMPECTOMY      left breast    DILATION AND CURETTAGE OF UTERUS      x2    NERVE BLOCK  07/01/2013    nerve block(right vervical C3/TON/C4/C5    NERVE BLOCK  07/08/2013    nerve block(right vervical C3/TON/C4/C5    OTHER SURGICAL HISTORY  03/10/2014     botox inj     TOE SURGERY      removal of ingrown toe nail-2nd toe on left foot        Medications Prior to Admission:    Prior to Admission medications    Medication Sig Start Date End Date Taking?  Authorizing Provider   loratadine (CLARITIN) 10 MG tablet Take 10 mg by mouth daily   Yes Historical Provider, MD   amLODIPine (NORVASC) 5 MG tablet Take 5 mg by mouth daily   Yes Historical Provider, MD   tamsulosin (FLOMAX) 0.4 MG capsule Take 1 capsule by mouth daily 5/18/20  Yes Historical Provider, MD   DULoxetine (CYMBALTA) 60 MG extended release capsule take 1 capsule by mouth once daily 11/10/17  Yes Vilma Irizarry MD   diphenhydrAMINE (BENADRYL) 25 MG tablet Take 25 mg by mouth every 6 hours as needed for Itching or Allergies     Historical Provider, MD   Blood Pressure KIT Check BP once/day- goal is <140/90. 3/9/16   ALBARO Astudillo CNP       Allergies:  Dust mite extract; Pcn [penicillins]; and Pollen extract    Social History:    Social History     Socioeconomic History    Marital status:      Spouse name: Not on file    Number of children: Not on file    Years of education: Not on file    Highest education level: Not on file   Occupational History    Occupation: stay at home mom   Social Needs    Financial resource strain: Not on file    Food insecurity     Worry: Not on file     Inability: Not on file    Transportation needs     Medical: Not on file     Non-medical: Not on file   Tobacco Use    Smoking status: Current Every Day Smoker     Packs/day: 0.75     Years: 16.00     Pack years: 12.00     Types: Cigarettes    Smokeless tobacco: Never Used    Tobacco comment: trying  to  cut  down   Substance and Sexual Activity    Alcohol use: No     Alcohol/week: 0.0 standard drinks    Drug use: No    Sexual activity: Yes     Partners: Male     Comment: steady boyfriend   Lifestyle    Physical activity     Days per week: Not on file     Minutes per session: Not on file    Stress: Not on file   Relationships    Social connections     Talks on phone: Not on file     Gets together: Not on file     Attends Worship service: Not on file     Active member of club or organization: Not on file     Attends meetings of clubs or organizations: Not on file     Relationship Jovany Alanis in the last 72 hours. Invalid input(s): NITRATE, GLUCOSEUKETONESUAMORPHOUS     -----------------------------------------------------------------    ASSESSMENT AND PLAN:    Impression:    Right flank pain  Right renal calculus    Patient Active Problem List   Diagnosis    Asthma    Smoker    Polycystic kidney    HTN (hypertension)    Eczema    Depression with anxiety    Chronic headaches    Tension vascular headache    Irregular bleeding    Metrorrhagia    Menorrhagia    Dysmenorrhea    Pelvic pain in female    Kidney stone    Acute back pain    Anxiety    Hypertension    Headache    Depression    CKD (chronic kidney disease) stage 3, GFR 30-59 ml/min ([facility])    Chronic neck pain    Degenerative disc disease, cervical    Encounter for long-term (current) use of other medications    Cervicalgia    Chronic intractable headache    Hemorrhage of cyst of native kidney    Abdominal pain       Plan: Cystoscopy, right ureteroscopy, holmium laser lithotripsy, stent placement in OR today. Consent obtained    The patient was counseled at length about the risks of eric Covid-19 during their perioperative period and any recovery window from their procedure. The patient was made aware that eric Covid-19  may worsen their prognosis for recovering from their procedure  and lend to a higher morbidity and/or mortality risk. All material risks, benefits, and reasonable alternatives including postponing the procedure were discussed. The patient does wish to proceed with the procedure at this time.           Nicholas Taylor PA-C  3:11 PM 6/12/2020

## 2020-06-15 LAB — HCG, PREGNANCY URINE (POC): NEGATIVE

## 2020-06-18 PROBLEM — R31.0 GROSS HEMATURIA: Status: ACTIVE | Noted: 2020-06-18

## 2020-06-18 PROBLEM — N18.30 ACUTE RENAL FAILURE WITH ACUTE TUBULAR NECROSIS SUPERIMPOSED ON STAGE 3 CHRONIC KIDNEY DISEASE (HCC): Status: ACTIVE | Noted: 2020-05-27

## 2020-06-18 PROBLEM — J30.81 ALLERGIC RHINITIS DUE TO ANIMAL HAIR AND DANDER: Status: ACTIVE | Noted: 2020-06-18

## 2020-06-18 PROBLEM — Z88.9 HISTORY OF MULTIPLE ALLERGIES: Status: ACTIVE | Noted: 2020-06-18

## 2020-06-18 PROBLEM — Z97.5 IUD (INTRAUTERINE DEVICE) IN PLACE: Status: ACTIVE | Noted: 2020-06-18

## 2020-06-18 PROBLEM — N18.9 HISTORY OF ANEMIA DUE TO CHRONIC KIDNEY DISEASE: Status: ACTIVE | Noted: 2020-05-27

## 2020-06-18 PROBLEM — I15.0 RENOVASCULAR HYPERTENSION: Status: ACTIVE | Noted: 2020-06-18

## 2020-06-18 PROBLEM — D72.829 LEUKOCYTOSIS: Status: ACTIVE | Noted: 2020-06-18

## 2020-06-18 PROBLEM — Z86.2 HISTORY OF ANEMIA DUE TO CHRONIC KIDNEY DISEASE: Status: ACTIVE | Noted: 2020-05-27

## 2020-06-18 PROBLEM — F33.42 RECURRENT MAJOR DEPRESSION IN FULL REMISSION (HCC): Status: ACTIVE | Noted: 2020-06-18

## 2020-06-18 PROBLEM — N28.89 OTHER SPECIFIED DISORDERS OF KIDNEY AND URETER: Status: ACTIVE | Noted: 2020-06-18

## 2020-06-18 PROBLEM — N17.0 ACUTE RENAL FAILURE WITH ACUTE TUBULAR NECROSIS SUPERIMPOSED ON STAGE 3 CHRONIC KIDNEY DISEASE (HCC): Status: ACTIVE | Noted: 2020-05-27

## 2020-06-18 PROBLEM — N20.1 RIGHT URETERAL STONE: Status: ACTIVE | Noted: 2020-05-27

## 2020-06-18 PROBLEM — E78.5 HYPERLIPIDEMIA: Status: ACTIVE | Noted: 2020-06-18

## 2020-06-18 PROBLEM — M25.50 PAIN IN JOINT: Status: ACTIVE | Noted: 2020-06-18

## 2020-06-18 PROBLEM — N18.4 CKD (CHRONIC KIDNEY DISEASE) STAGE 4, GFR 15-29 ML/MIN (HCC): Status: ACTIVE | Noted: 2020-06-18

## 2020-06-25 ENCOUNTER — HOSPITAL ENCOUNTER (OUTPATIENT)
Dept: CT IMAGING | Age: 40
Discharge: HOME | End: 2020-06-27
Payer: MEDICARE

## 2020-06-25 PROCEDURE — 74176 CT ABD & PELVIS W/O CONTRAST: CPT

## 2020-06-29 ENCOUNTER — TELEPHONE (OUTPATIENT)
Dept: UROLOGY | Age: 40
End: 2020-06-29

## 2020-06-29 NOTE — TELEPHONE ENCOUNTER
Pt called stating \" Dr. Jalen Brown reviewed my CT and told me to contact Dr. Rodriguez  office. I have a cyst on the right kidney and a stone on the right kidney. \" informed pt will discuss with Dr. Rodriguez  appointment made for 7/6/2020.

## 2020-06-30 ENCOUNTER — HOSPITAL ENCOUNTER (OUTPATIENT)
Age: 40
Setting detail: OBSERVATION
Discharge: HOME | End: 2020-07-01
Attending: EMERGENCY MEDICINE | Admitting: INTERNAL MEDICINE
Payer: MEDICARE

## 2020-06-30 ENCOUNTER — APPOINTMENT (OUTPATIENT)
Dept: GENERAL RADIOLOGY | Age: 40
End: 2020-06-30
Payer: MEDICARE

## 2020-06-30 PROBLEM — N20.0 NEPHROLITHIASIS: Status: ACTIVE | Noted: 2020-06-30

## 2020-06-30 LAB
-: ABNORMAL
ABSOLUTE EOS #: 0.1 K/UL (ref 0–0.4)
ABSOLUTE IMMATURE GRANULOCYTE: ABNORMAL K/UL (ref 0–0.3)
ABSOLUTE LYMPH #: 1.7 K/UL (ref 1–4.8)
ABSOLUTE MONO #: 0.6 K/UL (ref 0.1–1.3)
ALBUMIN SERPL-MCNC: 4.5 G/DL (ref 3.5–5.2)
ALBUMIN/GLOBULIN RATIO: ABNORMAL (ref 1–2.5)
ALP BLD-CCNC: 96 U/L (ref 35–104)
ALT SERPL-CCNC: 5 U/L (ref 5–33)
AMORPHOUS: ABNORMAL
AMPHETAMINE SCREEN URINE: NEGATIVE
ANION GAP SERPL CALCULATED.3IONS-SCNC: 10 MMOL/L (ref 9–17)
AST SERPL-CCNC: 11 U/L
BACTERIA: ABNORMAL
BARBITURATE SCREEN URINE: NEGATIVE
BASOPHILS # BLD: 1 % (ref 0–2)
BASOPHILS ABSOLUTE: 0.1 K/UL (ref 0–0.2)
BENZODIAZEPINE SCREEN, URINE: NEGATIVE
BILIRUB SERPL-MCNC: 0.15 MG/DL (ref 0.3–1.2)
BILIRUBIN URINE: NEGATIVE
BUN BLDV-MCNC: 17 MG/DL (ref 6–20)
BUN/CREAT BLD: ABNORMAL (ref 9–20)
BUPRENORPHINE URINE: ABNORMAL
CALCIUM SERPL-MCNC: 9 MG/DL (ref 8.6–10.4)
CANNABINOID SCREEN URINE: NEGATIVE
CASTS UA: ABNORMAL /LPF
CHLORIDE BLD-SCNC: 105 MMOL/L (ref 98–107)
CO2: 22 MMOL/L (ref 20–31)
COCAINE METABOLITE, URINE: NEGATIVE
COLOR: YELLOW
COMMENT UA: ABNORMAL
CREAT SERPL-MCNC: 2.15 MG/DL (ref 0.5–0.9)
CREATININE URINE: 42.2 MG/DL (ref 28–217)
CRYSTALS, UA: ABNORMAL /HPF
DIFFERENTIAL TYPE: ABNORMAL
EOSINOPHILS RELATIVE PERCENT: 1 % (ref 0–4)
EPITHELIAL CELLS UA: ABNORMAL /HPF
GFR AFRICAN AMERICAN: 30 ML/MIN
GFR NON-AFRICAN AMERICAN: 25 ML/MIN
GFR SERPL CREATININE-BSD FRML MDRD: ABNORMAL ML/MIN/{1.73_M2}
GFR SERPL CREATININE-BSD FRML MDRD: ABNORMAL ML/MIN/{1.73_M2}
GLUCOSE BLD-MCNC: 85 MG/DL (ref 70–99)
GLUCOSE URINE: NEGATIVE
HCG(URINE) PREGNANCY TEST: NEGATIVE
HCT VFR BLD CALC: 36.1 % (ref 36–46)
HEMOGLOBIN: 12.3 G/DL (ref 12–16)
IMMATURE GRANULOCYTES: ABNORMAL %
KETONES, URINE: NEGATIVE
LEUKOCYTE ESTERASE, URINE: NEGATIVE
LIPASE: 56 U/L (ref 13–60)
LYMPHOCYTES # BLD: 22 % (ref 24–44)
MCH RBC QN AUTO: 32.3 PG (ref 26–34)
MCHC RBC AUTO-ENTMCNC: 34 G/DL (ref 31–37)
MCV RBC AUTO: 94.8 FL (ref 80–100)
MDMA URINE: ABNORMAL
METHADONE SCREEN, URINE: NEGATIVE
METHAMPHETAMINE, URINE: ABNORMAL
MONOCYTES # BLD: 7 % (ref 1–7)
MUCUS: ABNORMAL
NITRITE, URINE: NEGATIVE
NRBC AUTOMATED: ABNORMAL PER 100 WBC
OPIATES, URINE: POSITIVE
OTHER OBSERVATIONS UA: ABNORMAL
OXYCODONE SCREEN URINE: NEGATIVE
PDW BLD-RTO: 12.5 % (ref 11.5–14.9)
PH UA: 6.5 (ref 5–8)
PHENCYCLIDINE, URINE: NEGATIVE
PLATELET # BLD: 273 K/UL (ref 150–450)
PLATELET ESTIMATE: ABNORMAL
PMV BLD AUTO: 7.9 FL (ref 6–12)
POTASSIUM SERPL-SCNC: 3.9 MMOL/L (ref 3.7–5.3)
PROPOXYPHENE, URINE: ABNORMAL
PROTEIN UA: ABNORMAL
RBC # BLD: 3.81 M/UL (ref 4–5.2)
RBC # BLD: ABNORMAL 10*6/UL
RBC UA: ABNORMAL /HPF
RENAL EPITHELIAL, UA: ABNORMAL /HPF
SEDIMENTATION RATE, ERYTHROCYTE: 40 MM (ref 0–20)
SEG NEUTROPHILS: 69 % (ref 36–66)
SEGMENTED NEUTROPHILS ABSOLUTE COUNT: 5.4 K/UL (ref 1.3–9.1)
SODIUM BLD-SCNC: 137 MMOL/L (ref 135–144)
SODIUM,UR: 54 MMOL/L
SPECIFIC GRAVITY UA: 1 (ref 1–1.03)
TEST INFORMATION: ABNORMAL
TOTAL PROTEIN: 7.3 G/DL (ref 6.4–8.3)
TRICHOMONAS: ABNORMAL
TRICYCLIC ANTIDEPRESSANTS, UR: ABNORMAL
TSH SERPL DL<=0.05 MIU/L-ACNC: 1.14 MIU/L (ref 0.3–5)
TURBIDITY: CLEAR
URINE HGB: NEGATIVE
UROBILINOGEN, URINE: NORMAL
WBC # BLD: 7.9 K/UL (ref 3.5–11)
WBC # BLD: ABNORMAL 10*3/UL
WBC UA: ABNORMAL /HPF
YEAST: ABNORMAL

## 2020-06-30 PROCEDURE — 99220 PR INITIAL OBSERVATION CARE/DAY 70 MINUTES: CPT | Performed by: INTERNAL MEDICINE

## 2020-06-30 PROCEDURE — 85025 COMPLETE CBC W/AUTO DIFF WBC: CPT

## 2020-06-30 PROCEDURE — 36415 COLL VENOUS BLD VENIPUNCTURE: CPT

## 2020-06-30 PROCEDURE — 96376 TX/PRO/DX INJ SAME DRUG ADON: CPT

## 2020-06-30 PROCEDURE — 84443 ASSAY THYROID STIM HORMONE: CPT

## 2020-06-30 PROCEDURE — 80307 DRUG TEST PRSMV CHEM ANLYZR: CPT

## 2020-06-30 PROCEDURE — 96375 TX/PRO/DX INJ NEW DRUG ADDON: CPT

## 2020-06-30 PROCEDURE — G0378 HOSPITAL OBSERVATION PER HR: HCPCS

## 2020-06-30 PROCEDURE — 6360000002 HC RX W HCPCS: Performed by: STUDENT IN AN ORGANIZED HEALTH CARE EDUCATION/TRAINING PROGRAM

## 2020-06-30 PROCEDURE — 80053 COMPREHEN METABOLIC PANEL: CPT

## 2020-06-30 PROCEDURE — 84300 ASSAY OF URINE SODIUM: CPT

## 2020-06-30 PROCEDURE — 2580000003 HC RX 258: Performed by: PHYSICIAN ASSISTANT

## 2020-06-30 PROCEDURE — 82570 ASSAY OF URINE CREATININE: CPT

## 2020-06-30 PROCEDURE — 81001 URINALYSIS AUTO W/SCOPE: CPT

## 2020-06-30 PROCEDURE — 81025 URINE PREGNANCY TEST: CPT

## 2020-06-30 PROCEDURE — 2580000003 HC RX 258: Performed by: STUDENT IN AN ORGANIZED HEALTH CARE EDUCATION/TRAINING PROGRAM

## 2020-06-30 PROCEDURE — 99285 EMERGENCY DEPT VISIT HI MDM: CPT

## 2020-06-30 PROCEDURE — 85651 RBC SED RATE NONAUTOMATED: CPT

## 2020-06-30 PROCEDURE — 6370000000 HC RX 637 (ALT 250 FOR IP): Performed by: STUDENT IN AN ORGANIZED HEALTH CARE EDUCATION/TRAINING PROGRAM

## 2020-06-30 PROCEDURE — 74018 RADEX ABDOMEN 1 VIEW: CPT

## 2020-06-30 PROCEDURE — 96374 THER/PROPH/DIAG INJ IV PUSH: CPT

## 2020-06-30 PROCEDURE — 96372 THER/PROPH/DIAG INJ SC/IM: CPT

## 2020-06-30 PROCEDURE — 83690 ASSAY OF LIPASE: CPT

## 2020-06-30 PROCEDURE — 6360000002 HC RX W HCPCS: Performed by: PHYSICIAN ASSISTANT

## 2020-06-30 RX ORDER — ONDANSETRON 2 MG/ML
4 INJECTION INTRAMUSCULAR; INTRAVENOUS ONCE
Status: COMPLETED | OUTPATIENT
Start: 2020-06-30 | End: 2020-06-30

## 2020-06-30 RX ORDER — MORPHINE SULFATE 4 MG/ML
4 INJECTION, SOLUTION INTRAMUSCULAR; INTRAVENOUS ONCE
Status: COMPLETED | OUTPATIENT
Start: 2020-06-30 | End: 2020-06-30

## 2020-06-30 RX ORDER — ACETAMINOPHEN 325 MG/1
650 TABLET ORAL EVERY 6 HOURS PRN
Status: DISCONTINUED | OUTPATIENT
Start: 2020-06-30 | End: 2020-07-01 | Stop reason: HOSPADM

## 2020-06-30 RX ORDER — MORPHINE SULFATE 4 MG/ML
4 INJECTION, SOLUTION INTRAMUSCULAR; INTRAVENOUS
Status: DISCONTINUED | OUTPATIENT
Start: 2020-06-30 | End: 2020-07-01 | Stop reason: HOSPADM

## 2020-06-30 RX ORDER — AMLODIPINE BESYLATE 5 MG/1
5 TABLET ORAL DAILY
Status: DISCONTINUED | OUTPATIENT
Start: 2020-06-30 | End: 2020-07-01 | Stop reason: HOSPADM

## 2020-06-30 RX ORDER — PROMETHAZINE HYDROCHLORIDE 25 MG/1
12.5 TABLET ORAL EVERY 6 HOURS PRN
Status: DISCONTINUED | OUTPATIENT
Start: 2020-06-30 | End: 2020-07-01 | Stop reason: HOSPADM

## 2020-06-30 RX ORDER — POLYETHYLENE GLYCOL 3350 17 G/17G
17 POWDER, FOR SOLUTION ORAL DAILY PRN
Status: DISCONTINUED | OUTPATIENT
Start: 2020-06-30 | End: 2020-07-01 | Stop reason: HOSPADM

## 2020-06-30 RX ORDER — TAMSULOSIN HYDROCHLORIDE 0.4 MG/1
0.4 CAPSULE ORAL DAILY
Status: DISCONTINUED | OUTPATIENT
Start: 2020-06-30 | End: 2020-07-01 | Stop reason: HOSPADM

## 2020-06-30 RX ORDER — SODIUM CHLORIDE 9 MG/ML
INJECTION, SOLUTION INTRAVENOUS CONTINUOUS
Status: DISCONTINUED | OUTPATIENT
Start: 2020-06-30 | End: 2020-07-01 | Stop reason: HOSPADM

## 2020-06-30 RX ORDER — SODIUM CHLORIDE 0.9 % (FLUSH) 0.9 %
10 SYRINGE (ML) INJECTION EVERY 12 HOURS SCHEDULED
Status: DISCONTINUED | OUTPATIENT
Start: 2020-06-30 | End: 2020-07-01 | Stop reason: HOSPADM

## 2020-06-30 RX ORDER — 0.9 % SODIUM CHLORIDE 0.9 %
1000 INTRAVENOUS SOLUTION INTRAVENOUS ONCE
Status: COMPLETED | OUTPATIENT
Start: 2020-06-30 | End: 2020-06-30

## 2020-06-30 RX ORDER — ONDANSETRON 2 MG/ML
4 INJECTION INTRAMUSCULAR; INTRAVENOUS EVERY 6 HOURS PRN
Status: DISCONTINUED | OUTPATIENT
Start: 2020-06-30 | End: 2020-07-01 | Stop reason: HOSPADM

## 2020-06-30 RX ORDER — DULOXETIN HYDROCHLORIDE 60 MG/1
60 CAPSULE, DELAYED RELEASE ORAL DAILY
Status: DISCONTINUED | OUTPATIENT
Start: 2020-06-30 | End: 2020-07-01 | Stop reason: HOSPADM

## 2020-06-30 RX ORDER — LANOLIN ALCOHOL/MO/W.PET/CERES
3 CREAM (GRAM) TOPICAL NIGHTLY PRN
COMMUNITY
End: 2021-06-19

## 2020-06-30 RX ORDER — MORPHINE SULFATE 2 MG/ML
2 INJECTION, SOLUTION INTRAMUSCULAR; INTRAVENOUS
Status: DISCONTINUED | OUTPATIENT
Start: 2020-06-30 | End: 2020-07-01 | Stop reason: HOSPADM

## 2020-06-30 RX ORDER — ACETAMINOPHEN 650 MG/1
650 SUPPOSITORY RECTAL EVERY 6 HOURS PRN
Status: DISCONTINUED | OUTPATIENT
Start: 2020-06-30 | End: 2020-07-01 | Stop reason: HOSPADM

## 2020-06-30 RX ORDER — SODIUM CHLORIDE 0.9 % (FLUSH) 0.9 %
10 SYRINGE (ML) INJECTION PRN
Status: DISCONTINUED | OUTPATIENT
Start: 2020-06-30 | End: 2020-07-01 | Stop reason: HOSPADM

## 2020-06-30 RX ADMIN — MORPHINE SULFATE 4 MG: 4 INJECTION, SOLUTION INTRAMUSCULAR; INTRAVENOUS at 16:00

## 2020-06-30 RX ADMIN — ONDANSETRON 4 MG: 2 INJECTION INTRAMUSCULAR; INTRAVENOUS at 19:48

## 2020-06-30 RX ADMIN — SODIUM CHLORIDE 1000 ML: 9 INJECTION, SOLUTION INTRAVENOUS at 11:36

## 2020-06-30 RX ADMIN — MORPHINE SULFATE 4 MG: 4 INJECTION, SOLUTION INTRAMUSCULAR; INTRAVENOUS at 13:46

## 2020-06-30 RX ADMIN — ONDANSETRON 4 MG: 2 INJECTION INTRAMUSCULAR; INTRAVENOUS at 11:24

## 2020-06-30 RX ADMIN — SODIUM CHLORIDE: 9 INJECTION, SOLUTION INTRAVENOUS at 15:57

## 2020-06-30 RX ADMIN — MORPHINE SULFATE 4 MG: 4 INJECTION, SOLUTION INTRAMUSCULAR; INTRAVENOUS at 11:24

## 2020-06-30 RX ADMIN — MORPHINE SULFATE 2 MG: 2 INJECTION, SOLUTION INTRAMUSCULAR; INTRAVENOUS at 19:47

## 2020-06-30 RX ADMIN — TAMSULOSIN HYDROCHLORIDE 0.4 MG: 0.4 CAPSULE ORAL at 16:44

## 2020-06-30 RX ADMIN — ENOXAPARIN SODIUM 30 MG: 30 INJECTION SUBCUTANEOUS at 16:00

## 2020-06-30 ASSESSMENT — PAIN DESCRIPTION - ONSET: ONSET: ON-GOING

## 2020-06-30 ASSESSMENT — ENCOUNTER SYMPTOMS
WHEEZING: 0
SINUS PAIN: 0
NAUSEA: 1
COUGH: 0
VOMITING: 1
COLOR CHANGE: 0
CHEST TIGHTNESS: 0
SHORTNESS OF BREATH: 0
SINUS PRESSURE: 0
ABDOMINAL PAIN: 1
ABDOMINAL DISTENTION: 0
DIARRHEA: 0
ANAL BLEEDING: 0
SORE THROAT: 0

## 2020-06-30 ASSESSMENT — PAIN DESCRIPTION - ORIENTATION
ORIENTATION: RIGHT
ORIENTATION: RIGHT

## 2020-06-30 ASSESSMENT — PAIN DESCRIPTION - LOCATION
LOCATION: FLANK
LOCATION: FLANK

## 2020-06-30 ASSESSMENT — PAIN SCALES - GENERAL
PAINLEVEL_OUTOF10: 6
PAINLEVEL_OUTOF10: 6
PAINLEVEL_OUTOF10: 0
PAINLEVEL_OUTOF10: 3
PAINLEVEL_OUTOF10: 7
PAINLEVEL_OUTOF10: 6
PAINLEVEL_OUTOF10: 6
PAINLEVEL_OUTOF10: 7

## 2020-06-30 ASSESSMENT — PAIN DESCRIPTION - FREQUENCY
FREQUENCY: CONTINUOUS
FREQUENCY: INTERMITTENT

## 2020-06-30 ASSESSMENT — PAIN DESCRIPTION - PAIN TYPE
TYPE: CHRONIC PAIN
TYPE: ACUTE PAIN

## 2020-06-30 ASSESSMENT — PAIN DESCRIPTION - PROGRESSION: CLINICAL_PROGRESSION: GRADUALLY WORSENING

## 2020-06-30 ASSESSMENT — PAIN DESCRIPTION - DESCRIPTORS
DESCRIPTORS: TIGHTNESS
DESCRIPTORS: TIGHTNESS;JABBING

## 2020-06-30 ASSESSMENT — PAIN - FUNCTIONAL ASSESSMENT: PAIN_FUNCTIONAL_ASSESSMENT: ACTIVITIES ARE NOT PREVENTED

## 2020-06-30 NOTE — ED NOTES
Report given to Leigh Ann Harvey RN from Med/Surg. Report method by phone. The following was reviewed with receiving RN:   Current vital signs:  BP (!) 159/89   Pulse 79   Temp 98.2 °F (36.8 °C)   Resp 14   Ht 5' 5\" (1.651 m)   Wt 115 lb (52.2 kg)   LMP 06/04/2020   SpO2 99%   BMI 19.14 kg/m²                MEWS Score: 0     Any medication or safety alerts were reviewed. Any pending diagnostics and notifications were also reviewed, as well as any safety concerns or issues, abnormal labs, abnormal imaging, and abnormal assessment findings. Questions were answered.             Jamia Huerta RN  06/30/20 [id number]

## 2020-06-30 NOTE — ED NOTES

## 2020-06-30 NOTE — CONSULTS
NEPHROLOGY CONSULT     Patient :  Janee Estevez; 41 y.o. MRN# [id number]  Location:  2055/2055-01  Attending:  Liudmila Smith MD  Admit Date:  6/30/2020   Hospital Day: 0      Reason for Consult: Acute kidney injury      Chief Complaint: Right-sided flank pain  History Obtained From: Patient    History of Present Illness: This is a 41 y.o. female with past medical history of polycystic kidney disease, CKD stage III, history of kidney stones presents to the ER with complaints of right-sided flank pain for about 1 month, patient has a history of kidney stones requiring ureteral stent placement on 6/12/2020. Patient had a repeat CT abdomen pelvis on 6/25/2020 which showed polycystic kidney disease, showed new small high density cyst noted on the right but otherwise stable disease also showed nonobstructing 7 mm right-sided calculus without definitive hydronephrosis. Patient baseline serum creatinine has been averaging between 2.0 to 2.3 mg/dL since May 2020. Most recent serum creatinine was noted to be elevated 2.8 mg/dL on June 23, 2020 and therefore patient was advised to go to the hospital for further evaluation and management  Serum creatinine today is 2.1 mg/dL. Patient complains of pain more on the right flank than on the left side tenderness pain is 7 out of 10 patient has nausea but no vomiting has not been eating well poor appetite poor oral intake. Patient denies any fever chills patient does have hesitancy in urination and also urgency sometimes during urination. Pt denies any history of  prolonged NSAID use  There has been no recent exposure to IV contrast.   There is no history  of paraprotein disease. Medication review shows no use of ACE-inhibitor/diuretics.     Past Medical History:        Diagnosis Date    Anxiety     Asthma     Chronic headaches 7/10/2013    CKD (chronic kidney disease) stage 3, GFR 30-59 ml/min ([facility])     Degenerative disc disease, cervical     Depression     Dysmenorrhea 9/30/2014    Eczema 11/8/2012    Headache(784.0)     HTN (hypertension) 10/2/2011    Hypertension     Hypocalcemia 5/28/2014    Kidney stone     Menorrhagia 9/30/2014    Neck pain, bilateral 5/28/2014    Pelvic pain in female 9/30/2014    Polycystic kidney     Smoker 10/2/2011    Tachycardia 1/20/2014    Tension vascular headache 1/20/2014       Past Surgical History:        Procedure Laterality Date    BREAST ENHANCEMENT SURGERY      BREAST LUMPECTOMY      left breast    CYSTO/URETERO/PYELOSCOPY, CALCULUS TX Right 6/12/2020    HOLMIUM - CYSTO, RIGHT RETROGRADE PYELOGRAM, RIGHT URETEROSCOPY, LASER LITHO ON STAND BY, RIGHT STENT PLACEMENT performed by Angelina La MD at [address] [place] [address]  06/12/2020    DILATION AND CURETTAGE OF UTERUS      x2    NERVE BLOCK  07/01/2013    nerve block(right vervical C3/TON/C4/C5    NERVE BLOCK  07/08/2013    nerve block(right vervical C3/TON/C4/C5    OTHER SURGICAL HISTORY  03/10/2014     botox inj     TOE SURGERY      removal of ingrown toe nail-2nd toe on left foot     URETEROSCOPY Right 06/12/2020    stent placement       Current Medications:    amLODIPine (NORVASC) tablet 5 mg, Daily  DULoxetine (CYMBALTA) extended release capsule 60 mg, Daily  tamsulosin (FLOMAX) capsule 0.4 mg, Daily  sodium chloride flush 0.9 % injection 10 mL, 2 times per day  sodium chloride flush 0.9 % injection 10 mL, PRN  acetaminophen (TYLENOL) tablet 650 mg, Q6H PRN    Or  acetaminophen (TYLENOL) suppository 650 mg, Q6H PRN  polyethylene glycol (GLYCOLAX) packet 17 g, Daily PRN  promethazine (PHENERGAN) tablet 12.5 mg, Q6H PRN    Or  ondansetron (ZOFRAN) injection 4 mg, Q6H PRN  0.9 % sodium chloride infusion, Continuous  enoxaparin (LOVENOX) injection 30 mg, Daily  morphine (PF) injection 2 mg, Q2H PRN    Or  morphine sulfate (PF) injection 4 mg, Q2H PRN        Allergies:  Bee pollen; Dust mite extract; Pcn [penicillins]; and Pollen extract    Social History: Social History     Socioeconomic History    Marital status:      Spouse name: Not on file    Number of children: Not on file    Years of education: Not on file    Highest education level: Not on file   Occupational History    Occupation: stay at home mom   Social Needs    Financial resource strain: Not on file    Food insecurity     Worry: Not on file     Inability: Not on file    Transportation needs     Medical: Not on file     Non-medical: Not on file   Tobacco Use    Smoking status: Current Every Day Smoker     Packs/day: 0.75     Years: 16.00     Pack years: 12.00     Types: Cigarettes    Smokeless tobacco: Never Used    Tobacco comment: trying  to  cut  down   Substance and Sexual Activity    Alcohol use: No     Alcohol/week: 0.0 standard drinks    Drug use: No    Sexual activity: Yes     Partners: Male     Comment: steady boyfriend   Lifestyle    Physical activity     Days per week: Not on file     Minutes per session: Not on file    Stress: Not on file   Relationships    Social connections     Talks on phone: Not on file     Gets together: Not on file     Attends Hinduism service: Not on file     Active member of club or organization: Not on file     Attends meetings of clubs or organizations: Not on file     Relationship status: Not on file    Intimate partner violence     Fear of current or ex partner: Not on file     Emotionally abused: Not on file     Physically abused: Not on file     Forced sexual activity: Not on file   Other Topics Concern    Not on file   Social History Narrative    Not on file       Family History:   Family History   Problem Relation Age of Onset    High Blood Pressure Mother     Asthma Sister     Migraines Sister     High Blood Pressure Father     Other Brother         bad knees, with recent total knee replacement        Review of Systems:    Constitutional: No fever, no chills, no night sweats,+ fatigue, generalized weakness, loss of appetite  HEENT:  No headache, otalgia, itchy eyes, epistaxis, nasal discharge or sore throat. Cardiac:  No chest pain, dyspnea, orthopnea or PND, palpitations  Chest:   No cough, hemoptysis, pleuritic chest pain, wheezing,SOB  Abdomen:  No abdominal pain, nausea, vomiting, diarrhea, melena, dysphagia                         hematemesis,constipation, abdominal bloating, flank pain  Neuro:              No CVA, TIA or seizure like activity. Skin:   No rashes, no itching. :   No hematuria, no pyuria, no dysuria, complains of right flank pain. Extremities:  No swelling or joint pains. Objective:  CURRENT TEMPERATURE:  Temp: 98.1 °F (36.7 °C)  MAXIMUM TEMPERATURE OVER 24HRS:  Temp (24hrs), Av.1 °F (36.7 °C), Min:98.1 °F (36.7 °C), Max:98.2 °F (36.8 °C)    CURRENT RESPIRATORY RATE:  Resp: 16  CURRENT PULSE:  Pulse: 86  CURRENT BLOOD PRESSURE:  BP: (!) 152/96  24HR BLOOD PRESSURE RANGE:  Systolic (27OXX), KKQ:978 , Min:152 , BKO:790   ; Diastolic (83ERL), CBS:59, Min:89, Max:105    24HR INTAKE/OUTPUT:      Intake/Output Summary (Last 24 hours) at 2020 1529  Last data filed at 2020 1252  Gross per 24 hour   Intake 1000 ml   Output --   Net 1000 ml     Patient Vitals for the past 96 hrs (Last 3 readings):   Weight   20 1410 114 lb 10.2 oz (52 kg)   20 1039 115 lb (52.2 kg)       Physical Exam:  GENERAL APPEARANCE: Alert and cooperative, and appears to be in no acute distress. HEAD: normocephalic  EYES: PERRL, EOMI. Not pale, anicteric   NOSE:  No nasal discharge. THROAT:  Oral cavity and pharynx normal. Moist  CARDIAC: Normal S1 and S2. No S3, S4 or murmurs. Rhythm is regular. LUNGS: Clear to auscultation and percussion without rales, rhonchi, wheezing or diminished breath sounds. NECK: Neck supple, non-tender without lymphadenopathy, masses or thyromegaly.  JVD-neg  BACK: Examination of the spine reveals normal gait and posture, no spinal deformity, symmetry of spinal muscles, without tenderness, decreased range of motion or muscular spasm  MUSKULOSKELETAL: Adequately aligned spine. No joint erythema or tenderness. EXTREMITIES: No edema. Peripheral pulses intact. NEURO: Nonfocal    Labs:   CBC:  Recent Labs     06/30/20  1116   WBC 7.9   RBC 3.81*   HGB 12.3   HCT 36.1   MCV 94.8   MCH 32.3   MCHC 34.0   RDW 12.5      MPV 7.9      BMP:   Recent Labs     06/30/20  1116      K 3.9      CO2 22   BUN 17   CREATININE 2.15*   GLUCOSE 85   CALCIUM 9.0      Phosphorus:  No results for input(s): PHOS in the last 72 hours. Magnesium: No results for input(s): MG in the last 72 hours. Albumin:   Recent Labs     06/30/20  1116   LABALBU 4.5       IRON:  No results for input(s): IRON in the last 72 hours. Iron Saturation:  Invalid input(s): PERCENTFE  TIBC:  No results for input(s): TIBC in the last 72 hours. FERRITIN:  No results for input(s): FERRITIN in the last 72 hours. SPEP: No results for input(s): SPEP in the last 72 hours. Recent Labs     06/30/20  1116   PROT 7.3     UPEP: No results for input(s): TPU in the last 72 hours. Urine Sodium:    Recent Labs     06/30/20  1341   NOHELIA 54      Urine Potassium: No results for input(s): KUR in the last 72 hours. Urine Chloride: Invalid input(s): CLU  Urine Ph:  Invalid input(s): PO4U  Urine Osmolarity: No results for input(s): OSMOU in the last 72 hours. Urine Creatinine:    Recent Labs     06/30/20  1341   LABCREA 42.2     Urine Eosinophils: Invalid input(s): EOSU  Urine Protein:  No results for input(s): TPU in the last 72 hours.   Urinalysis:    Recent Labs     06/30/20  [address] 6.5   WBCUA 0 TO 2   RBCUA 0 TO 2   MUCUS NOT REPORTED   TRICHOMONAS NOT REPORTED   YEAST NOT REPORTED   BACTERIA FEW*   [address] 1.005   LEUKOCYTESUR NEGATIVE   UROBILINOGEN Normal   [address] NEGATIVE   AMORPHOUS NOT REPORTED         Radiology:  Reviewed as

## 2020-06-30 NOTE — H&P (VIEW-ONLY)
[address] [facility].      [address] [facility]     HISTORY AND PHYSICAL EXAMINATION            Date:   6/30/2020  Patient name:  Luis Alfredo Garrison  Date of admission:  6/30/2020 10:34 AM  MRN:   [id number]  Account:  [de-identified]  YOB: 1980  PCP:    Jaye Fernandez MD  Room:   08/08  Code Status:    Prior    Chief Complaint:     Chief Complaint   Patient presents with    Flank Pain     right sided        History Obtained From:     patient, electronic medical record    History of Present Illness:     Pt is a 41 y.o. F with a PMH of CKD IV, adult polycystic kidney disease presenting with 1 month persistent flank pain. Pt follows Nephrology for her CKD and polycystic kidney disease. Nephro was notified of her flank pain and had outpt CT scan done. CT abd revealed 7 mm stone without hydronephrosis. Pt subsequently had a STAT urology consult. However, pt continued to have 8/10 radiating flank pain. Her nephrologist instructed her to present to the ER. Denies any F/C, abd pain, night sweats. Does complain of some nausea along with her pain; denies any vomiting. In the ER, pt was found to have a cr of 2.15 (baseline 1.3). UA was largely unremarkable. Neg for infx or hgb. Nephro, urology consulted in the ER. Pt admitted for further management of her nephrolithiasis and pain control.     Past Medical History:     Past Medical History:   Diagnosis Date    Anxiety     Asthma     Chronic headaches 7/10/2013    CKD (chronic kidney disease) stage 3, GFR 30-59 ml/min ([facility])     Degenerative disc disease, cervical     Depression     Dysmenorrhea 9/30/2014    Eczema 11/8/2012    Headache(784.0)     HTN (hypertension) 10/2/2011    Hypertension     Hypocalcemia 5/28/2014    Kidney stone     Menorrhagia 9/30/2014    Neck pain, bilateral 5/28/2014    Pelvic pain in female 9/30/2014  Polycystic kidney     Smoker 10/2/2011    Tachycardia 1/20/2014    Tension vascular headache 1/20/2014        Past SurgicalHistory:     Past Surgical History:   Procedure Laterality Date    BREAST ENHANCEMENT SURGERY      BREAST LUMPECTOMY      left breast    CYSTO/URETERO/PYELOSCOPY, CALCULUS TX Right 6/12/2020    HOLMIUM - CYSTO, RIGHT RETROGRADE PYELOGRAM, RIGHT URETEROSCOPY, LASER LITHO ON STAND BY, RIGHT STENT PLACEMENT performed by Dawit Pak MD at [address]  06/12/2020    DILATION AND CURETTAGE OF UTERUS      x2    NERVE BLOCK  07/01/2013    nerve block(right vervical C3/TON/C4/C5    NERVE BLOCK  07/08/2013    nerve block(right vervical C3/TON/C4/C5    OTHER SURGICAL HISTORY  03/10/2014     botox inj     TOE SURGERY      removal of ingrown toe nail-2nd toe on left foot     URETEROSCOPY Right 06/12/2020    stent placement        Medications Prior to Admission:        Prior to Admission medications    Medication Sig Start Date End Date Taking?  Authorizing Provider   melatonin 3 MG TABS tablet Take 3 mg by mouth nightly as needed (sleep)   Yes Historical Provider, MD   vitamin D (ERGOCALCIFEROL) 1.25 MG (59292 UT) CAPS capsule Take 1 capsule by mouth once a week 6/24/20  Yes Collette Mechanic, MD   loratadine (CLARITIN) 10 MG tablet Take 10 mg by mouth daily as needed (allergies)    Yes Historical Provider, MD   amLODIPine (NORVASC) 5 MG tablet Take 5 mg by mouth daily   Yes Historical Provider, MD   tamsulosin (FLOMAX) 0.4 MG capsule Take 1 capsule by mouth nightly  5/18/20  Yes Historical Provider, MD   DULoxetine (CYMBALTA) 60 MG extended release capsule take 1 capsule by mouth once daily 11/10/17  Yes Brad Killian MD   Blood Pressure KIT Check BP once/day- goal is <140/90. 3/9/16   ALBARO Mao - CNP        Allergies:     Bee pollen; Dust mite extract; Pcn [penicillins]; and Pollen extract    Social History:     Tobacco:    reports that she has been smoking membranes are moist.      Pharynx: No oropharyngeal exudate or posterior oropharyngeal erythema. Eyes:      Extraocular Movements: Extraocular movements intact. Neck:      Musculoskeletal: Normal range of motion. Cardiovascular:      Rate and Rhythm: Normal rate and regular rhythm. Heart sounds: No murmur. No friction rub. No gallop. Pulmonary:      Effort: Pulmonary effort is normal.      Breath sounds: No wheezing, rhonchi or rales. Abdominal:      General: Abdomen is flat. Tenderness: There is abdominal tenderness (epigastric and right flank). There is no guarding. Hernia: No hernia is present. Musculoskeletal: Normal range of motion. General: No swelling or tenderness. Skin:     General: Skin is warm. Capillary Refill: Capillary refill takes less than 2 seconds. Coloration: Skin is not jaundiced. Findings: No bruising. Neurological:      Mental Status: She is alert and oriented to person, place, and time.          Investigations:     Laboratory Testing:  Recent Results (from the past 24 hour(s))   CBC Auto Differential    Collection Time: 06/30/20 11:16 AM   Result Value Ref Range    WBC 7.9 3.5 - 11.0 k/uL    RBC 3.81 (L) 4.0 - 5.2 m/uL    Hemoglobin 12.3 12.0 - 16.0 g/dL    Hematocrit 36.1 36 - 46 %    MCV 94.8 80 - 100 fL    MCH 32.3 26 - 34 pg    MCHC 34.0 31 - 37 g/dL    RDW 12.5 11.5 - 14.9 %    Platelets 188 757 - 892 k/uL    MPV 7.9 6.0 - 12.0 fL    NRBC Automated NOT REPORTED per 100 WBC    Differential Type NOT REPORTED     Seg Neutrophils 69 (H) 36 - 66 %    Lymphocytes 22 (L) 24 - 44 %    Monocytes 7 1 - 7 %    Eosinophils % 1 0 - 4 %    Basophils 1 0 - 2 %    Immature Granulocytes NOT REPORTED 0 %    Segs Absolute 5.40 1.3 - 9.1 k/uL    Absolute Lymph # 1.70 1.0 - 4.8 k/uL    Absolute Mono # 0.60 0.1 - 1.3 k/uL    Absolute Eos # 0.10 0.0 - 0.4 k/uL    Basophils Absolute 0.10 0.0 - 0.2 k/uL    Absolute Immature Granulocyte NOT REPORTED 0.00 - 0.30 k/uL    WBC Morphology NOT REPORTED     RBC Morphology NOT REPORTED     Platelet Estimate NOT REPORTED    Comprehensive Metabolic Panel w/ Reflex to MG    Collection Time: 06/30/20 11:16 AM   Result Value Ref Range    Glucose 85 70 - 99 mg/dL    BUN 17 6 - 20 mg/dL    CREATININE 2.15 (H) 0.50 - 0.90 mg/dL    Bun/Cre Ratio NOT REPORTED 9 - 20    Calcium 9.0 8.6 - 10.4 mg/dL    Sodium 137 135 - 144 mmol/L    Potassium 3.9 3.7 - 5.3 mmol/L    Chloride 105 98 - 107 mmol/L    CO2 22 20 - 31 mmol/L    Anion Gap 10 9 - 17 mmol/L    Alkaline Phosphatase 96 35 - 104 U/L    ALT 5 5 - 33 U/L    AST 11 <32 U/L    Total Bilirubin 0.15 (L) 0.3 - 1.2 mg/dL    Total Protein 7.3 6.4 - 8.3 g/dL    Alb 4.5 3.5 - 5.2 g/dL    Albumin/Globulin Ratio NOT REPORTED 1.0 - 2.5    GFR Non- 25 (L) >60 mL/min    GFR African American 30 (L) >60 mL/min    GFR Comment          GFR Staging NOT REPORTED    Lipase    Collection Time: 06/30/20 11:16 AM   Result Value Ref Range    Lipase 56 13 - 60 U/L   Urinalysis Reflex to Culture    Collection Time: 06/30/20 11:20 AM   Result Value Ref Range    Color, UA YELLOW YELLOW    Turbidity UA CLEAR CLEAR    Glucose, Ur NEGATIVE NEGATIVE    Bilirubin Urine NEGATIVE NEGATIVE    Ketones, Urine NEGATIVE NEGATIVE    Specific Gravity, UA 1.005 1.000 - 1.030    Urine Hgb NEGATIVE NEGATIVE    pH, UA 6.5 5.0 - 8.0    Protein, UA 1+ (A) NEGATIVE    Urobilinogen, Urine Normal Normal    Nitrite, Urine NEGATIVE NEGATIVE    Leukocyte Esterase, Urine NEGATIVE NEGATIVE    Urinalysis Comments NOT REPORTED    HCG, Pregnancy,Urine    Collection Time: 06/30/20 11:20 AM   Result Value Ref Range    HCG(Urine) Pregnancy Test NEGATIVE NEGATIVE   Microscopic Urinalysis    Collection Time: 06/30/20 11:20 AM   Result Value Ref Range    -          WBC, UA 0 TO 2 /HPF    RBC, UA 0 TO 2 /HPF    Casts UA NOT REPORTED /LPF    Crystals, UA NOT REPORTED None /HPF    Epithelial Cells UA 2 TO 5 /HPF    Renal Epithelial, UA NOT REPORTED 0 /HPF    Bacteria, UA FEW (A) None    Mucus, UA NOT REPORTED None    Trichomonas, UA NOT REPORTED None    Amorphous, UA NOT REPORTED None    Other Observations UA NOT REPORTED NOT REQ. Yeast, UA NOT REPORTED None       Imaging/Diagnostics:  Ct Abdomen Pelvis Wo Contrast Additional Contrast? None    Result Date: 6/25/2020  EXAMINATION: CT OF THE ABDOMEN AND PELVIS WITHOUT CONTRAST 6/25/2020 9:40 am TECHNIQUE: CT of the abdomen and pelvis was performed without the administration of intravenous contrast. Multiplanar reformatted images are provided for review. Dose modulation, iterative reconstruction, and/or weight based adjustment of the mA/kV was utilized to reduce the radiation dose to as low as reasonably achievable. COMPARISON: CT scan the abdomen pelvis from 08/22/2019 HISTORY: ORDERING SYSTEM PROVIDED HISTORY: Kidney stone TECHNOLOGIST PROVIDED HISTORY: stone protocol Is the patient pregnant?->No Reason for Exam: rt flank pain, hx kidney stones Acuity: Unknown Type of Exam: Ongoing Relevant Medical/Surgical History: lithotripsy FINDINGS: Lower Chest: Visualized lung bases clear. Organs: Multiple probable mostly left-sided hepatic cysts, stable by comparison. Spleen, unenhanced limited visualization pancreas, and poorly visualized adrenal glands unchanged. Unremarkable partially contracted gallbladder. Bilateral multi-cystic kidneys compatible with polycystic kidney disease; right kidney now measures 20.1  X 11.3 x 8.4 cm, and the left kidney measures 18.9 x 10.6 x 8.2 cm (versus 19.8 x 10.0 x 8.4 cm, and 19.1 x 9.4 x 8.3 cm previously).   Unchanged 7.4 cm central right calculus, without obvious hydronephrosis (assessment limited without IV contrast..  Multiple bilateral high density cysts identified with a similar distribution; new peripheral 1.2 x 0.9 cm peripheral high density cyst mid lower right kidney (slice 86, series 2) and new 1.1 x 0.8 cm left medial lower high density cyst on the left (slice 76, series 2). No additional definite stone or left hydronephrosis. GI/Bowel: Moderate-large amount retained stool throughout large bowel without abnormal dilatation or wall thickening. Unremarkable stomach and small bowel. Pelvis: IUD in place in a somewhat right-sided unchanged appearing uterus. No clear cut adnexal mass. No pelvic fluid collection. Unremarkable partially fluid-filled urinary bladder. Peritoneum/Retroperitoneum: Moderate calcific ASVD aorta and iliac arteries without aneurysm or interval change. No bulky lymphadenopathy, but assessment limited on this study. Bones/Soft Tissues: No bone worrisome bony or soft tissue abnormality. Limited assessment without IV contrast.  Polycystic kidney disease again demonstrated without significant overall increase kidney size; new small high density cyst noted on the right but otherwise stable disease, as above; nonobstructing 7 mm right sided calculus without definite hydroureteronephrosis. Additional stable findings, as above. Xr Abdomen (kub) (single Ap View)    Result Date: 6/30/2020  EXAMINATION: ONE SUPINE XRAY VIEW(S) OF THE ABDOMEN 6/30/2020 11:37 am COMPARISON: 06/25/2020 HISTORY: ORDERING SYSTEM PROVIDED HISTORY: right kidney stone TECHNOLOGIST PROVIDED HISTORY: right kidney stone Reason for Exam: right flank pain, hx of stones Acuity: Unknown Type of Exam: Unknown FINDINGS: IUD in place centrally in the pelvis. 7 mm stone seen in the right kidney. Pelvic calcifications suggest phleboliths. Gas seen in nondilated bowel loops. 7 mm right renal stone     Xr Abdomen (kub) (single Ap View)    Result Date: 6/12/2020  EXAMINATION: ONE SUPINE XRAY VIEW(S) OF THE ABDOMEN 6/12/2020 4:50 pm COMPARISON: None.  HISTORY: ORDERING SYSTEM PROVIDED HISTORY: intra op image TECHNOLOGIST PROVIDED HISTORY: intra op image Reason for Exam: intra op images 16.9 seconds fluoro time and 883.8 mGy FINDINGS: Double-J ureteral stent and contrast in the collecting system and bladder. Please correlate with clinical service       Assessment :      Primary Problem  Nephrolithiasis    Active Hospital Problems    Diagnosis Date Noted    Nephrolithiasis [N20.0] 06/30/2020    Abdominal pain [R10.9] 08/22/2019    Kidney stone [N20.0]     CKD (chronic kidney disease) stage 3, GFR 30-59 ml/min ([facility]) [N18.3]     Depression with anxiety [F41.8] 05/21/2013    Polycystic kidney [Q61.3] 10/02/2011    Smoker [F17.200] 10/02/2011    HTN (hypertension) [I10] 10/02/2011       Plan:     Patient status Admit as inpatient in the  Med/Surge    Nephrolithiasis of the right kidney, hx of Polycystic kidney disease and Stage IV CKD  - CT scan (06/25): Polycystic kidney disease, small density cysts noted on the right nonobstructing 7 mm right-sided calculus without hydroureteronephrosis  - Urology (06/12): Right-sided ureteroscopy, right-sided stent placement  - Follows up with Dr. Lexi Patel outpt  -Normal saline 100 mls/hour  - Follow-up renal ultrasound  -Flomax 0.4 mg p.o. daily  -Zofran for nausea, Tylenol for pain control  - nephrology and urology on board    HTN  - 160/96 upon presentation   -Continue with Norvasc 5 mg p.o. daily  - continue to monitor    DVT prophylaxis: Lovenox 30 mg p.o. daily  PT/OT    Consultations:   IP CONSULT TO NEPHROLOGY  IP CONSULT TO UROLOGY  IP CONSULT TO INTERNAL MEDICINE  IP CONSULT TO SOCIAL WORK    Patient is admitted as inpatient status because of co-morbiditieslisted above, severity of signs and symptoms as outlined, requirement for current medical therapies and most importantly because of direct risk to patient if care not provided in a hospital setting.     Andria Vivas MD  6/30/2020  1:29 PM    Copy sent to Dr. Georges Kong MD    Attending Physician Statement  I have discussed the care of Yumiko Avila and I have examined the patient myselft and taken ros and hpi , including pertinent history and exam findings,  with the resident. I have reviewed the key elements of all parts of the encounter with the resident. I agree with the assessment, plan and orders as documented by the resident.   Right-sided flank pain for a month 7 out of 10 recent history of lithotripsy attempt 2 weeks ago pain is getting worse some blood in the urine hematuria no fever chills extreme fatigue admitted from nephrology office we will check for the sed rate ultrasound kidneys recent CT scan a week ago nonobstructing 7 mm calculus in the right kidney  Urology and nephrology consult  Moderate protein calorie malnutrition evidence muscle loss and subcutaneous fat loss    Electronically signed by Tre Monet MD

## 2020-06-30 NOTE — ED PROVIDER NOTES
[address]  eMERGENCY dEPARTMENT eNCOUnter      Pt Name: Emma Degroot  MRN: [id number]  [place] 1980  Date of evaluation: 6/30/2020  Provider: Florentin Chisholm PA-C    CHIEF COMPLAINT       Chief Complaint   Patient presents with    Flank Pain     right sided            HISTORY OF PRESENT ILLNESS  (Location/Symptom, Timing/Onset, Context/Setting, Quality, Duration, Modifying Factors, Severity.)   Emma Degroot is a 41 y.o. female with hx of CKD and kidney stones presents to ED with complaints of right sided flank pain x 1 month. Pt states she had an outpatient CT scan on 6/25 which showed an nonobstructing 7mm stone. Pt reports her nephrologist recommend she come into the ED. Currently, pt states pain is 7/10 over right flank and epigastric area. Denies radiation of pain. Reports associated nausea, emesis, and occasional difficult urinating. Denies frequency, urgency, hematuria, chest pain, sob, cough, fevers. Pt has no other complaints. Nursing Notes were reviewed. REVIEW OF SYSTEMS    (2-9 systems for level 4, 10 or more for level 5)     Review of Systems   Flank pain  abd pain  Nausea  Emesis       Except as noted above the remainder of the review of systems was reviewed and negative.        PAST MEDICAL HISTORY     Past Medical History:   Diagnosis Date    Anxiety     Asthma     Chronic headaches 7/10/2013    CKD (chronic kidney disease) stage 3, GFR 30-59 ml/min ([facility])     Degenerative disc disease, cervical     Depression     Dysmenorrhea 9/30/2014    Eczema 11/8/2012    Headache(784.0)     HTN (hypertension) 10/2/2011    Hypertension     Hypocalcemia 5/28/2014    Kidney stone     Menorrhagia 9/30/2014    Neck pain, bilateral 5/28/2014    Pelvic pain in female 9/30/2014    Polycystic kidney     Smoker 10/2/2011    Tachycardia 1/20/2014    Tension vascular headache 1/20/2014     None otherwise stated in nurses notes    SURGICAL HISTORY       Past HISTORY      reports that she has been smoking cigarettes. She has a 12.00 pack-year smoking history. She has never used smokeless tobacco. She reports that she does not drink alcohol or use drugs. lives at home with others     PHYSICAL EXAM    (up to 7 for level 4, 8 or more for level 5)     ED Triage Vitals [06/30/20 1039]   BP Temp Temp Source Pulse Resp SpO2 Height Weight   (!) 192/105 98.1 °F (36.7 °C) Temporal 107 12 98 % 5' 5\" (1.651 m) 115 lb (52.2 kg)       Physical Exam   Nursing note and vitals reviewed. Constitutional: Oriented to person, place, and time and well-developed, well-nourished. Head: Normocephalic and atraumatic. Ear: External ears normal.   Nose: Nose normal and midline. Eyes: Conjunctivae and EOM are normal. Pupils are equal, round, and reactive to light. Neck: Normal range of motion. Neck supple. Throat: Posterior pharynx is without erythema or exudates, airway is patent, no swelling  Cardiovascular: Normal rate, regular rhythm, normal heart sounds and intact distal pulses. Pulmonary/Chest: Effort normal and breath sounds normal. No respiratory distress. No wheezes. No rales. No chest tenderness. Abdominal: Soft. Bowel sounds are normal.  No distension or mass. Mild epigastric tenderness. No rebound, guarding, rigidity. No RLQ tenderness. Right CVA tenderness. Musculoskeletal: Normal range of motion. Neurological: Alert and oriented to person, place, and time. GCS score is 15. Skin: Skin is warm and dry. No rash noted. No erythema. No pallor. Psychiatric: Mood, memory, affect and judgment normal.           DIAGNOSTIC RESULTS     EKG: All EKG's are interpreted by the Emergency Department Physician who either signs or Co-signs this chart in the absence of a cardiologist.        RADIOLOGY:   All plain film, CT, MRI, and formal ultrasound images (except ED bedside ultrasound) are read by the radiologist, see reports below, unless otherwise noted in MDM or here. XR ABDOMEN (KUB) (SINGLE AP VIEW)   Final Result   7 mm right renal stone             Ct Abdomen Pelvis Wo Contrast Additional Contrast? None    Result Date: 6/25/2020  EXAMINATION: CT OF THE ABDOMEN AND PELVIS WITHOUT CONTRAST 6/25/2020 9:40 am TECHNIQUE: CT of the abdomen and pelvis was performed without the administration of intravenous contrast. Multiplanar reformatted images are provided for review. Dose modulation, iterative reconstruction, and/or weight based adjustment of the mA/kV was utilized to reduce the radiation dose to as low as reasonably achievable. COMPARISON: CT scan the abdomen pelvis from 08/22/2019 HISTORY: ORDERING SYSTEM PROVIDED HISTORY: Kidney stone TECHNOLOGIST PROVIDED HISTORY: stone protocol Is the patient pregnant?->No Reason for Exam: rt flank pain, hx kidney stones Acuity: Unknown Type of Exam: Ongoing Relevant Medical/Surgical History: lithotripsy FINDINGS: Lower Chest: Visualized lung bases clear. Organs: Multiple probable mostly left-sided hepatic cysts, stable by comparison. Spleen, unenhanced limited visualization pancreas, and poorly visualized adrenal glands unchanged. Unremarkable partially contracted gallbladder. Bilateral multi-cystic kidneys compatible with polycystic kidney disease; right kidney now measures 20.1  X 11.3 x 8.4 cm, and the left kidney measures 18.9 x 10.6 x 8.2 cm (versus 19.8 x 10.0 x 8.4 cm, and 19.1 x 9.4 x 8.3 cm previously). Unchanged 7.4 cm central right calculus, without obvious hydronephrosis (assessment limited without IV contrast..  Multiple bilateral high density cysts identified with a similar distribution; new peripheral 1.2 x 0.9 cm peripheral high density cyst mid lower right kidney (slice 86, series 2) and new 1.1 x 0.8 cm left medial lower high density cyst on the left (slice 76, series 2). No additional definite stone or left hydronephrosis. GI/Bowel:  Moderate-large amount retained stool throughout large bowel without abnormal dilatation or wall thickening. Unremarkable stomach and small bowel. Pelvis: IUD in place in a somewhat right-sided unchanged appearing uterus. No clear cut adnexal mass. No pelvic fluid collection. Unremarkable partially fluid-filled urinary bladder. Peritoneum/Retroperitoneum: Moderate calcific ASVD aorta and iliac arteries without aneurysm or interval change. No bulky lymphadenopathy, but assessment limited on this study. Bones/Soft Tissues: No bone worrisome bony or soft tissue abnormality. Limited assessment without IV contrast.  Polycystic kidney disease again demonstrated without significant overall increase kidney size; new small high density cyst noted on the right but otherwise stable disease, as above; nonobstructing 7 mm right sided calculus without definite hydroureteronephrosis. Additional stable findings, as above. Xr Abdomen (kub) (single Ap View)    Result Date: 6/12/2020  EXAMINATION: ONE SUPINE XRAY VIEW(S) OF THE ABDOMEN 6/12/2020 4:50 pm COMPARISON: None. HISTORY: ORDERING SYSTEM PROVIDED HISTORY: intra op image TECHNOLOGIST PROVIDED HISTORY: intra op image Reason for Exam: intra op images 16.9 seconds fluoro time and 883.8 mGy FINDINGS: Double-J ureteral stent and contrast in the collecting system and bladder.      Please correlate with clinical service           LABS:  Labs Reviewed   URINE RT REFLEX TO CULTURE - Abnormal; Notable for the following components:       Result Value    Protein, UA 1+ (*)     All other components within normal limits   CBC WITH AUTO DIFFERENTIAL - Abnormal; Notable for the following components:    RBC 3.81 (*)     Seg Neutrophils 69 (*)     Lymphocytes 22 (*)     All other components within normal limits   COMPREHENSIVE METABOLIC PANEL W/ REFLEX TO MG FOR LOW K - Abnormal; Notable for the following components:    CREATININE 2.15 (*)     Total Bilirubin 0.15 (*)     GFR Non- 25 (*)     GFR  30 (*)     All other components has right sided CVA tenderness. Mild epigastric tenderness. Vitals are stable. Hgb Stable. BUN 17. Creatinine improved at 2.15. Was 2.46 on 6/25. UA shows no signs of infection. I spoke with Dr. Mevelyn Cranker. Recommend admission for urology eval.    Will get bladder scan, Renal US. I also spoke with Dr. Karla Holm who is agreeable with admission. Dr. Татьяна Dominguez will be admitting physician. Discussed case with medicine residents. Plan discussed with patient. She is agreeable to admission. ED Medications administered this visit:    Medications   morphine sulfate (PF) injection 4 mg (has no administration in time range)   ondansetron (ZOFRAN) injection 4 mg (4 mg Intravenous Given 6/30/20 1124)   morphine sulfate (PF) injection 4 mg (4 mg Intravenous Given 6/30/20 1124)   0.9 % sodium chloride bolus (0 mLs Intravenous Stopped 6/30/20 1252)       New Prescriptions from this visit:    New Prescriptions    No medications on file       Follow-up:  [facility]  [address]  [phone]              Final Impression:   1. Right flank pain    2. Renal cyst, right    3.  Polycystic kidney disease               (Please note that portions of this note were completed with a voice recognition program.  Efforts were made to edit the dictations but occasionally words are mis-transcribed.)      (Please note that portions of this note were completed with a voice recognition program.  Efforts were made to edit the dictations but occasionally words are mis-transcribed.)    Hola Okeefe, [address] [facility]  06/30/20 [id number]

## 2020-06-30 NOTE — ED PROVIDER NOTES
[address]  eMERGENCY dEPARTMENT eNCOUnter   Attending Attestation     Pt Name: Dale Edmonds  MRN: [id number]  [place] 1980  Date of evaluation: 6/30/20    History, EXAM, MDM:    Dale Edmonds is a 41 y.o. female who presents with Flank Pain (right sided )        Vitals:   Vitals:    06/30/20 1039 06/30/20 1207 06/30/20 1349 06/30/20 1410   BP: (!) 192/105 (!) 160/96 (!) 159/89 (!) 152/96   Pulse: 107 80 79 86   Resp: 12 14 14 16   Temp: 98.1 °F (36.7 °C)  98.2 °F (36.8 °C) 98.1 °F (36.7 °C)   TempSrc: Temporal   Oral   SpO2: 98% 98% 99% 97%   Weight: 115 lb (52.2 kg)   114 lb 10.2 oz (52 kg)   Height: 5' 5\" (1.651 m)   5' 5\" (1.651 m)       I performed a history and physical examination of the patient and discussed management with the APC. I reviewed the APC note and agree with the documented findings and plan of care. Any areas of disagreement are noted on the chart. I was personally present for the key portions of any procedures. I have documented in the chart those procedures where I was not present during the key portions. I have personally reviewed all images and agree with the resident's interpretation. I have reviewed the emergency nurses triage note. I agree with the chief complaint, past medical history, past surgical history, allergies, medications, social and family history as documented unless otherwise noted below. Documentation of the HPI, Physical Exam and Medical Decision Making performed by medical students or scribes is based on my personal performance of the HPI, PE and MDM. For Phys Assistant/ Nurse Practitioner cases/documentation I have had a face to face evaluation of this patient and have completed at least one if not all key elements of the E/M (history, physical exam, and MDM). Additional findings are as noted.     Bernabe Castellanos MD  Attending Emergency  Physician             Bernabe Castellanos MD  06/30/20 [address] MD  06/30/20 8256 Ricki Barton MD  06/30/20 [id number]

## 2020-06-30 NOTE — H&P
[address] [facility]      [facility]     HISTORY AND PHYSICAL EXAMINATION            Date:   6/30/2020  Patient name:  Taqueria Schuster  Date of admission:  6/30/2020 10:34 AM  MRN:   [id number]  Account:  [de-identified]  YOB: 1980  PCP:    Lazara Islas MD  Room:   08/08  Code Status:    Prior    Chief Complaint:     Chief Complaint   Patient presents with    Flank Pain     right sided        History Obtained From:     patient, electronic medical record    History of Present Illness:     Pt is a 41 y.o. F with a PMH of CKD IV, adult polycystic kidney disease presenting with 1 month persistent flank pain. Pt follows Nephrology for her CKD and polycystic kidney disease. Nephro was notified of her flank pain and had outpt CT scan done. CT abd revealed 7 mm stone without hydronephrosis. Pt subsequently had a STAT urology consult. However, pt continued to have 8/10 radiating flank pain. Her nephrologist instructed her to present to the ER. Denies any F/C, abd pain, night sweats. Does complain of some nausea along with her pain; denies any vomiting. In the ER, pt was found to have a cr of 2.15 (baseline 1.3). UA was largely unremarkable. Neg for infx or hgb. Nephro, urology consulted in the ER. Pt admitted for further management of her nephrolithiasis and pain control.     Past Medical History:     Past Medical History:   Diagnosis Date    Anxiety     Asthma     Chronic headaches 7/10/2013    CKD (chronic kidney disease) stage 3, GFR 30-59 ml/min ([facility])     Degenerative disc disease, cervical     Depression     Dysmenorrhea 9/30/2014    Eczema 11/8/2012    Headache(784.0)     HTN (hypertension) 10/2/2011    Hypertension     Hypocalcemia 5/28/2014    Kidney stone     Menorrhagia 9/30/2014    Neck pain, bilateral 5/28/2014    Pelvic pain in female 9/30/2014  Polycystic kidney     Smoker 10/2/2011    Tachycardia 1/20/2014    Tension vascular headache 1/20/2014        Past SurgicalHistory:     Past Surgical History:   Procedure Laterality Date    BREAST ENHANCEMENT SURGERY      BREAST LUMPECTOMY      left breast    CYSTO/URETERO/PYELOSCOPY, CALCULUS TX Right 6/12/2020    HOLMIUM - CYSTO, RIGHT RETROGRADE PYELOGRAM, RIGHT URETEROSCOPY, LASER LITHO ON STAND BY, RIGHT STENT PLACEMENT performed by Savannah Damon MD at [facility]  06/12/2020    DILATION AND CURETTAGE OF UTERUS      x2    NERVE BLOCK  07/01/2013    nerve block(right vervical C3/TON/C4/C5    NERVE BLOCK  07/08/2013    nerve block(right vervical C3/TON/C4/C5    OTHER SURGICAL HISTORY  03/10/2014     botox inj     TOE SURGERY      removal of ingrown toe nail-2nd toe on left foot     URETEROSCOPY Right 06/12/2020    stent placement        Medications Prior to Admission:        Prior to Admission medications    Medication Sig Start Date End Date Taking?  Authorizing Provider   melatonin 3 MG TABS tablet Take 3 mg by mouth nightly as needed (sleep)   Yes Historical Provider, MD   vitamin D (ERGOCALCIFEROL) 1.25 MG (76642 UT) CAPS capsule Take 1 capsule by mouth once a week 6/24/20  Yes Darius Cali MD   loratadine (CLARITIN) 10 MG tablet Take 10 mg by mouth daily as needed (allergies)    Yes Historical Provider, MD   amLODIPine (NORVASC) 5 MG tablet Take 5 mg by mouth daily   Yes Historical Provider, MD   tamsulosin (FLOMAX) 0.4 MG capsule Take 1 capsule by mouth nightly  5/18/20  Yes Historical Provider, MD   DULoxetine (CYMBALTA) 60 MG extended release capsule take 1 capsule by mouth once daily 11/10/17  Yes Carlos Pfeiffer MD   Blood Pressure KIT Check BP once/day- goal is <140/90. 3/9/16   ALBARO Butler - CNP        Allergies:     Bee pollen; Dust mite extract; Pcn [penicillins]; and Pollen extract    Social History:     Tobacco:    reports that she has been smoking cigarettes. She has a 12.00 pack-year smoking history. She has never used smokeless tobacco.  Alcohol:      reports no history of alcohol use. Drug Use:  reports no history of drug use. Family History:     Family History   Problem Relation Age of Onset    High Blood Pressure Mother     Asthma Sister    [facility]ra[facility] Sister     High Blood Pressure Father     Other Brother         bad knees, with recent total knee replacement        Review of Systems:     Positive and Negative as described in HPI. Review of Systems   Constitutional: Negative for fatigue and fever. HENT: Negative for sinus pressure, sinus pain, sore throat and tinnitus. Eyes: Negative for visual disturbance. Respiratory: Negative for cough, chest tightness, shortness of breath and wheezing. Cardiovascular: Negative for chest pain, palpitations and leg swelling. Gastrointestinal: Positive for abdominal pain, nausea and vomiting. Negative for abdominal distention, anal bleeding and diarrhea. Genitourinary: Positive for flank pain. Negative for enuresis, frequency and urgency. Musculoskeletal: Negative for arthralgias, gait problem and neck stiffness. Skin: Negative for color change and rash. Neurological: Negative for dizziness and headaches. Psychiatric/Behavioral: Negative for agitation and confusion. Physical Exam:   BP (!) 160/96   Pulse 80   Temp 98.1 °F (36.7 °C) (Temporal)   Resp 14   Ht 5' 5\" (1.651 m)   Wt 115 lb (52.2 kg)   LMP 2020   SpO2 98%   BMI 19.14 kg/m²   Temp (24hrs), Av.1 °F (36.7 °C), Min:98.1 °F (36.7 °C), Max:98.1 °F (36.7 °C)    No results for input(s): POCGLU in the last 72 hours. Intake/Output Summary (Last 24 hours) at 2020 1329  Last data filed at 2020 1252  Gross per 24 hour   Intake 1000 ml   Output --   Net 1000 ml       Physical Exam  Constitutional:       Appearance: Normal appearance. HENT:      Head: Atraumatic.       Mouth/Throat:      Mouth: Mucous k/uL    WBC Morphology NOT REPORTED     RBC Morphology NOT REPORTED     Platelet Estimate NOT REPORTED    Comprehensive Metabolic Panel w/ Reflex to MG    Collection Time: 06/30/20 11:16 AM   Result Value Ref Range    Glucose 85 70 - 99 mg/dL    BUN 17 6 - 20 mg/dL    CREATININE 2.15 (H) 0.50 - 0.90 mg/dL    Bun/Cre Ratio NOT REPORTED 9 - 20    Calcium 9.0 8.6 - 10.4 mg/dL    Sodium 137 135 - 144 mmol/L    Potassium 3.9 3.7 - 5.3 mmol/L    Chloride 105 98 - 107 mmol/L    CO2 22 20 - 31 mmol/L    Anion Gap 10 9 - 17 mmol/L    Alkaline Phosphatase 96 35 - 104 U/L    ALT 5 5 - 33 U/L    AST 11 <32 U/L    Total Bilirubin 0.15 (L) 0.3 - 1.2 mg/dL    Total Protein 7.3 6.4 - 8.3 g/dL    Alb 4.5 3.5 - 5.2 g/dL    Albumin/Globulin Ratio NOT REPORTED 1.0 - 2.5    GFR Non- 25 (L) >60 mL/min    GFR African American 30 (L) >60 mL/min    GFR Comment          GFR Staging NOT REPORTED    Lipase    Collection Time: 06/30/20 11:16 AM   Result Value Ref Range    Lipase 56 13 - 60 U/L   Urinalysis Reflex to Culture    Collection Time: 06/30/20 11:20 AM   Result Value Ref Range    Color, UA YELLOW YELLOW    Turbidity UA CLEAR CLEAR    Glucose, Ur NEGATIVE NEGATIVE    Bilirubin Urine NEGATIVE NEGATIVE    Ketones, Urine NEGATIVE NEGATIVE    Specific Gravity, UA 1.005 1.000 - 1.030    Urine Hgb NEGATIVE NEGATIVE    pH, UA 6.5 5.0 - 8.0    Protein, UA 1+ (A) NEGATIVE    Urobilinogen, Urine Normal Normal    Nitrite, Urine NEGATIVE NEGATIVE    Leukocyte Esterase, Urine NEGATIVE NEGATIVE    Urinalysis Comments NOT REPORTED    HCG, Pregnancy,Urine    Collection Time: 06/30/20 11:20 AM   Result Value Ref Range    HCG(Urine) Pregnancy Test NEGATIVE NEGATIVE   Microscopic Urinalysis    Collection Time: 06/30/20 11:20 AM   Result Value Ref Range    -          WBC, UA 0 TO 2 /HPF    RBC, UA 0 TO 2 /HPF    Casts UA NOT REPORTED /LPF    Crystals, UA NOT REPORTED None /HPF    Epithelial Cells UA 2 TO 5 /HPF    Renal Epithelial, UA NOT (slice 76, series 2). No additional definite stone or left hydronephrosis. GI/Bowel: Moderate-large amount retained stool throughout large bowel without abnormal dilatation or wall thickening. Unremarkable stomach and small bowel. Pelvis: IUD in place in a somewhat right-sided unchanged appearing uterus. No clear cut adnexal mass. No pelvic fluid collection. Unremarkable partially fluid-filled urinary bladder. Peritoneum/Retroperitoneum: Moderate calcific ASVD aorta and iliac arteries without aneurysm or interval change. No bulky lymphadenopathy, but assessment limited on this study. Bones/Soft Tissues: No bone worrisome bony or soft tissue abnormality. Limited assessment without IV contrast.  Polycystic kidney disease again demonstrated without significant overall increase kidney size; new small high density cyst noted on the right but otherwise stable disease, as above; nonobstructing 7 mm right sided calculus without definite hydroureteronephrosis. Additional stable findings, as above. Xr Abdomen (kub) (single Ap View)    Result Date: 6/30/2020  EXAMINATION: ONE SUPINE XRAY VIEW(S) OF THE ABDOMEN 6/30/2020 11:37 am COMPARISON: 06/25/2020 HISTORY: ORDERING SYSTEM PROVIDED HISTORY: right kidney stone TECHNOLOGIST PROVIDED HISTORY: right kidney stone Reason for Exam: right flank pain, hx of stones Acuity: Unknown Type of Exam: Unknown FINDINGS: IUD in place centrally in the pelvis. 7 mm stone seen in the right kidney. Pelvic calcifications suggest phleboliths. Gas seen in nondilated bowel loops. 7 mm right renal stone     Xr Abdomen (kub) (single Ap View)    Result Date: 6/12/2020  EXAMINATION: ONE SUPINE XRAY VIEW(S) OF THE ABDOMEN 6/12/2020 4:50 pm COMPARISON: None.  HISTORY: ORDERING SYSTEM PROVIDED HISTORY: intra op image TECHNOLOGIST PROVIDED HISTORY: intra op image Reason for Exam: intra op images 16.9 seconds fluoro time and 883.8 mGy FINDINGS: Double-J ureteral stent and contrast in the

## 2020-06-30 NOTE — CARE COORDINATION
CASE MANAGEMENT NOTE:    Admission Date:  6/30/2020 Chong Mccollum is a 41 y.o.  female    Admitted for : Nephrolithiasis [N20.0]    Met with:  Patient    PCP:  Yudith Odell                                Insurance:  [employer]      Current Residence/ Living Arrangements:  independently at home w/ SO & Kids          Current Services PTA:  No    Is patient agreeable to VNS: No    Freedom of choice provided:  Yes    List of [address] provided: No, declines    VNS chosen:  No    DME:  none    Home Oxygen: No    Nebulizer: No    CPAP/BIPAP: No    Supplier: N/A    Potential Assistance Needed: Yes, Pt wants to follow at Pain Management    SNF needed: No    Freedom of choice and list provided: NA    Pharmacy:  [address] [place] [address] on [facility]       Does Patient want to use MEDS to BEDS? No    Is the Patient an [facility] with Readmission Risk Score greater than 14%? No  If yes, pt needs a follow up appointment made within 7 days. Family Members/Caregivers that pt would like involved in their care:    Yes    If yes, list name here:  MomNamita    Transportation Provider:  Patient             Is patient in Isolation/One on One/Altered Mental Status? No  If yes, skip next question. If no, would they like an I-Pad to  use? No  If yes, call [phone]. Discharge Plan:  6/30/20 [employer] Pt. Lives in 2 story home w Steps, w/ SO & Kids. No DME. Denies VNS. Is interested in Pain Management Clinic. PT/OT. Renal US, Cr 2.15, Bun 17. Fluids. Nephro/Urology to see. 7 MM Rt Stone.  Will follow//KB                 Electronically signed by: Kanwal Allen RN on 6/30/2020 at 3:18 PM

## 2020-06-30 NOTE — PROGRESS NOTES
Medication History completed:    New medications: melatonin, loratadine    Medications discontinued: oxybutynin XL    Changes to dosing:   Tamsulosin changed to 0.4 mg nightly  Ergocalciferol is taken on Tuesdays    Stated allergies: As listed    Other pertinent information: Medications confirmed with [facility].      Thank you,  Robb Jackson, PharmD, BCPS  [phone]

## 2020-07-01 ENCOUNTER — APPOINTMENT (OUTPATIENT)
Dept: ULTRASOUND IMAGING | Age: 40
End: 2020-07-01
Payer: MEDICARE

## 2020-07-01 VITALS
OXYGEN SATURATION: 99 % | BODY MASS INDEX: 19.32 KG/M2 | DIASTOLIC BLOOD PRESSURE: 83 MMHG | HEIGHT: 65 IN | TEMPERATURE: 97.9 F | RESPIRATION RATE: 18 BRPM | WEIGHT: 115.96 LBS | HEART RATE: 75 BPM | SYSTOLIC BLOOD PRESSURE: 147 MMHG

## 2020-07-01 LAB
ABSOLUTE EOS #: 0.2 K/UL (ref 0–0.4)
ABSOLUTE IMMATURE GRANULOCYTE: ABNORMAL K/UL (ref 0–0.3)
ABSOLUTE LYMPH #: 2.3 K/UL (ref 1–4.8)
ABSOLUTE MONO #: 0.4 K/UL (ref 0.1–1.3)
ANION GAP SERPL CALCULATED.3IONS-SCNC: 7 MMOL/L (ref 9–17)
ANTI-NUCLEAR ANTIBODY (ANA): NEGATIVE
BASOPHILS # BLD: 1 % (ref 0–2)
BASOPHILS ABSOLUTE: 0.1 K/UL (ref 0–0.2)
BUN BLDV-MCNC: 17 MG/DL (ref 6–20)
BUN/CREAT BLD: ABNORMAL (ref 9–20)
CALCIUM SERPL-MCNC: 8.4 MG/DL (ref 8.6–10.4)
CHLORIDE BLD-SCNC: 110 MMOL/L (ref 98–107)
CO2: 24 MMOL/L (ref 20–31)
CREAT SERPL-MCNC: 2.03 MG/DL (ref 0.5–0.9)
DIFFERENTIAL TYPE: ABNORMAL
EOSINOPHILS RELATIVE PERCENT: 3 % (ref 0–4)
GFR AFRICAN AMERICAN: 33 ML/MIN
GFR NON-AFRICAN AMERICAN: 27 ML/MIN
GFR SERPL CREATININE-BSD FRML MDRD: ABNORMAL ML/MIN/{1.73_M2}
GFR SERPL CREATININE-BSD FRML MDRD: ABNORMAL ML/MIN/{1.73_M2}
GLUCOSE BLD-MCNC: 97 MG/DL (ref 70–99)
HCT VFR BLD CALC: 30.7 % (ref 36–46)
HEMOGLOBIN: 10.5 G/DL (ref 12–16)
IMMATURE GRANULOCYTES: ABNORMAL %
LYMPHOCYTES # BLD: 36 % (ref 24–44)
MCH RBC QN AUTO: 32.9 PG (ref 26–34)
MCHC RBC AUTO-ENTMCNC: 34.2 G/DL (ref 31–37)
MCV RBC AUTO: 96.3 FL (ref 80–100)
MONOCYTES # BLD: 7 % (ref 1–7)
NRBC AUTOMATED: ABNORMAL PER 100 WBC
PDW BLD-RTO: 13 % (ref 11.5–14.9)
PLATELET # BLD: 216 K/UL (ref 150–450)
PLATELET ESTIMATE: ABNORMAL
PMV BLD AUTO: 8.4 FL (ref 6–12)
POTASSIUM SERPL-SCNC: 4.1 MMOL/L (ref 3.7–5.3)
RBC # BLD: 3.19 M/UL (ref 4–5.2)
RBC # BLD: ABNORMAL 10*6/UL
SEG NEUTROPHILS: 53 % (ref 36–66)
SEGMENTED NEUTROPHILS ABSOLUTE COUNT: 3.4 K/UL (ref 1.3–9.1)
SODIUM BLD-SCNC: 141 MMOL/L (ref 135–144)
WBC # BLD: 6.3 K/UL (ref 3.5–11)
WBC # BLD: ABNORMAL 10*3/UL

## 2020-07-01 PROCEDURE — 6370000000 HC RX 637 (ALT 250 FOR IP): Performed by: STUDENT IN AN ORGANIZED HEALTH CARE EDUCATION/TRAINING PROGRAM

## 2020-07-01 PROCEDURE — 80048 BASIC METABOLIC PNL TOTAL CA: CPT

## 2020-07-01 PROCEDURE — 96372 THER/PROPH/DIAG INJ SC/IM: CPT

## 2020-07-01 PROCEDURE — 86038 ANTINUCLEAR ANTIBODIES: CPT

## 2020-07-01 PROCEDURE — 2580000003 HC RX 258: Performed by: STUDENT IN AN ORGANIZED HEALTH CARE EDUCATION/TRAINING PROGRAM

## 2020-07-01 PROCEDURE — 99217 PR OBSERVATION CARE DISCHARGE MANAGEMENT: CPT | Performed by: INTERNAL MEDICINE

## 2020-07-01 PROCEDURE — G0378 HOSPITAL OBSERVATION PER HR: HCPCS

## 2020-07-01 PROCEDURE — 36415 COLL VENOUS BLD VENIPUNCTURE: CPT

## 2020-07-01 PROCEDURE — 85025 COMPLETE CBC W/AUTO DIFF WBC: CPT

## 2020-07-01 PROCEDURE — 6360000002 HC RX W HCPCS: Performed by: STUDENT IN AN ORGANIZED HEALTH CARE EDUCATION/TRAINING PROGRAM

## 2020-07-01 PROCEDURE — 96376 TX/PRO/DX INJ SAME DRUG ADON: CPT

## 2020-07-01 PROCEDURE — 76770 US EXAM ABDO BACK WALL COMP: CPT

## 2020-07-01 RX ORDER — HYDROCODONE BITARTRATE AND ACETAMINOPHEN 5; 325 MG/1; MG/1
1 TABLET ORAL EVERY 6 HOURS PRN
Qty: 10 TABLET | Refills: 0 | Status: SHIPPED | OUTPATIENT
Start: 2020-07-01 | End: 2020-07-04

## 2020-07-01 RX ADMIN — ENOXAPARIN SODIUM 30 MG: 30 INJECTION SUBCUTANEOUS at 09:11

## 2020-07-01 RX ADMIN — SODIUM CHLORIDE: 9 INJECTION, SOLUTION INTRAVENOUS at 03:37

## 2020-07-01 RX ADMIN — ACETAMINOPHEN 650 MG: 325 TABLET, FILM COATED ORAL at 12:08

## 2020-07-01 RX ADMIN — DULOXETINE HYDROCHLORIDE 60 MG: 60 CAPSULE, DELAYED RELEASE ORAL at 09:10

## 2020-07-01 RX ADMIN — Medication 10 ML: at 12:10

## 2020-07-01 RX ADMIN — MORPHINE SULFATE 2 MG: 2 INJECTION, SOLUTION INTRAMUSCULAR; INTRAVENOUS at 09:28

## 2020-07-01 RX ADMIN — MORPHINE SULFATE 2 MG: 2 INJECTION, SOLUTION INTRAMUSCULAR; INTRAVENOUS at 12:10

## 2020-07-01 RX ADMIN — MORPHINE SULFATE 2 MG: 2 INJECTION, SOLUTION INTRAMUSCULAR; INTRAVENOUS at 06:00

## 2020-07-01 RX ADMIN — MORPHINE SULFATE 2 MG: 2 INJECTION, SOLUTION INTRAMUSCULAR; INTRAVENOUS at 00:23

## 2020-07-01 RX ADMIN — ONDANSETRON 4 MG: 2 INJECTION INTRAMUSCULAR; INTRAVENOUS at 09:28

## 2020-07-01 RX ADMIN — AMLODIPINE BESYLATE 5 MG: 5 TABLET ORAL at 09:10

## 2020-07-01 ASSESSMENT — PAIN DESCRIPTION - LOCATION
LOCATION: FLANK
LOCATION: BACK;FLANK

## 2020-07-01 ASSESSMENT — PAIN SCALES - GENERAL
PAINLEVEL_OUTOF10: 0
PAINLEVEL_OUTOF10: 6
PAINLEVEL_OUTOF10: 5
PAINLEVEL_OUTOF10: 5
PAINLEVEL_OUTOF10: 6
PAINLEVEL_OUTOF10: 6
PAINLEVEL_OUTOF10: 4
PAINLEVEL_OUTOF10: 5

## 2020-07-01 ASSESSMENT — PAIN DESCRIPTION - PAIN TYPE
TYPE: ACUTE PAIN
TYPE: ACUTE PAIN

## 2020-07-01 ASSESSMENT — PAIN - FUNCTIONAL ASSESSMENT: PAIN_FUNCTIONAL_ASSESSMENT: ACTIVITIES ARE NOT PREVENTED

## 2020-07-01 ASSESSMENT — PAIN DESCRIPTION - DESCRIPTORS: DESCRIPTORS: JABBING

## 2020-07-01 ASSESSMENT — PAIN DESCRIPTION - FREQUENCY: FREQUENCY: CONTINUOUS

## 2020-07-01 ASSESSMENT — PAIN DESCRIPTION - PROGRESSION: CLINICAL_PROGRESSION: NOT CHANGED

## 2020-07-01 ASSESSMENT — PAIN DESCRIPTION - ORIENTATION
ORIENTATION: RIGHT
ORIENTATION: RIGHT

## 2020-07-01 ASSESSMENT — PAIN SCALES - WONG BAKER: WONGBAKER_NUMERICALRESPONSE: 0

## 2020-07-01 ASSESSMENT — PAIN DESCRIPTION - ONSET: ONSET: ON-GOING

## 2020-07-01 NOTE — CONSULTS
Department of Urology  Urology Consult Note    Patient:  Zane Meng  MRN: [id number]  YOB: 1980    Reason for Consult:  kidney stone    CHIEF COMPLAINT:    Chief Complaint   Patient presents with    Flank Pain     right sided        History Obtained From:   patient    HISTORY OF PRESENT ILLNESS:    The patient is a 41 y.o. female with significant past medical history of pckd who presents with right flank pain for one month. She has renal insuff with a cr level around 2.3. currently her cr is 2.1. pain is 0/14 intermittent colicky. Pain has been present for one month. She otherwise urinates well and feels that she generally empties her bladder.  Ct shows a 7mm stone non obs right kidney, kub also shows the same stone    Past Medical History:        Diagnosis Date    Anxiety     Asthma     Chronic headaches 7/10/2013    CKD (chronic kidney disease) stage 3, GFR 30-59 ml/min ([facility])     Degenerative disc disease, cervical     Depression     Dysmenorrhea 9/30/2014    Eczema 11/8/2012    Headache(784.0)     HTN (hypertension) 10/2/2011    Hypertension     Hypocalcemia 5/28/2014    Kidney stone     Menorrhagia 9/30/2014    Neck pain, bilateral 5/28/2014    Pelvic pain in female 9/30/2014    Polycystic kidney     Smoker 10/2/2011    Tachycardia 1/20/2014    Tension vascular headache 1/20/2014     Past Surgical History:        Procedure Laterality Date    BREAST ENHANCEMENT SURGERY      BREAST LUMPECTOMY      left breast    CYSTO/URETERO/PYELOSCOPY, CALCULUS TX Right 6/12/2020    HOLMIUM - CYSTO, RIGHT RETROGRADE PYELOGRAM, RIGHT URETEROSCOPY, LASER LITHO ON STAND BY, RIGHT STENT PLACEMENT performed by Noman Spence MD at [facility]  06/12/2020    DILATION AND CURETTAGE OF UTERUS      x2    NERVE BLOCK  07/01/2013    nerve block(right vervical C3/TON/C4/C5    NERVE BLOCK  07/08/2013    nerve block(right vervical C3/TON/C4/C5    OTHER SURGICAL HISTORY  03/10/2014 botox inj     TOE SURGERY      removal of ingrown toe nail-2nd toe on left foot     URETEROSCOPY Right 06/12/2020    stent placement     Current Medications:   Current Facility-Administered Medications: amLODIPine (NORVASC) tablet 5 mg, 5 mg, Oral, Daily  DULoxetine (CYMBALTA) extended release capsule 60 mg, 60 mg, Oral, Daily  tamsulosin (FLOMAX) capsule 0.4 mg, 0.4 mg, Oral, Daily  sodium chloride flush 0.9 % injection 10 mL, 10 mL, Intravenous, 2 times per day  sodium chloride flush 0.9 % injection 10 mL, 10 mL, Intravenous, PRN  acetaminophen (TYLENOL) tablet 650 mg, 650 mg, Oral, Q6H PRN **OR** acetaminophen (TYLENOL) suppository 650 mg, 650 mg, Rectal, Q6H PRN  polyethylene glycol (GLYCOLAX) packet 17 g, 17 g, Oral, Daily PRN  promethazine (PHENERGAN) tablet 12.5 mg, 12.5 mg, Oral, Q6H PRN **OR** ondansetron (ZOFRAN) injection 4 mg, 4 mg, Intravenous, Q6H PRN  0.9 % sodium chloride infusion, , Intravenous, Continuous  enoxaparin (LOVENOX) injection 30 mg, 30 mg, Subcutaneous, Daily  morphine (PF) injection 2 mg, 2 mg, Intravenous, Q2H PRN **OR** morphine sulfate (PF) injection 4 mg, 4 mg, Intravenous, Q2H PRN    Allergies: ALG@    Social History:   Social History     Socioeconomic History    Marital status:      Spouse name: Not on file    Number of children: Not on file    Years of education: Not on file    Highest education level: Not on file   Occupational History    Occupation: stay at home mom   Social Needs    Financial resource strain: Not on file    Food insecurity     Worry: Not on file     Inability: Not on file    Transportation needs     Medical: Not on file     Non-medical: Not on file   Tobacco Use    Smoking status: Current Every Day Smoker     Packs/day: 0.75     Years: 16.00     Pack years: 12.00     Types: Cigarettes    Smokeless tobacco: Never Used    Tobacco comment: trying  to  cut  down   Substance and Sexual Activity    Alcohol use: No     Alcohol/week: 0.0 standard drinks    Drug use: No    Sexual activity: Yes     Partners: Male     Comment: steady boyfriend   Lifestyle    Physical activity     Days per week: Not on file     Minutes per session: Not on file    Stress: Not on file   Relationships    Social connections     Talks on phone: Not on file     Gets together: Not on file     Attends Jewish service: Not on file     Active member of club or organization: Not on file     Attends meetings of clubs or organizations: Not on file     Relationship status: Not on file    Intimate partner violence     Fear of current or ex partner: Not on file     Emotionally abused: Not on file     Physically abused: Not on file     Forced sexual activity: Not on file   Other Topics Concern    Not on file   Social History Narrative    Not on file       Family History:       Problem Relation Age of Onset    High Blood Pressure Mother     Asthma Sister     Migraines Sister     High Blood Pressure Father     Other Brother         bad knees, with recent total knee replacement        Review of Systems:  Constitutional: Negative for fever, chills and activity change. Eyes: Negative for pain, redness and visual disturbance. Respiratory: Negative for cough, shortness of breath and wheezing. Cardiovascular: Negative for chest pain and leg swelling. Gastrointestinal: Negative for nausea, vomiting and abdominal pain. Endocrine: Negative for polydipsia and polyphagia. Genitourinary: Negative for dysuria, frequency, hematuria, flank pain and difficulty urinating. Musculoskeletal: Negative for myalgias, back pain and joint swelling. Skin: Negative for color change, rash and wound. Allergic/Immunologic: Negative for environmental allergies and food allergies. Neurological: Negative for dizziness, tremors and numbness. Hematological: Negative for adenopathy. Does not bruise/bleed easily. Psychiatric/Behavioral: Negative for confusion and dysphoric mood.  The patient is Reason for Exam: right flank pain, hx of stones Acuity: Unknown Type of Exam: Unknown FINDINGS: IUD in place centrally in the pelvis. 7 mm stone seen in the right kidney. Pelvic calcifications suggest phleboliths. Gas seen in nondilated bowel loops. 7 mm right renal stone       Impression:    Patient Active Problem List   Diagnosis    Asthma    Smoker    Polycystic kidney    HTN (hypertension)    Eczema    Depression with anxiety    Chronic headaches    Tension vascular headache    Irregular bleeding    Metrorrhagia    Menorrhagia    Dysmenorrhea    Pelvic pain in female    Kidney stone    Acute back pain    Anxiety    Hypertension    Headache    Depression    CKD (chronic kidney disease) stage 3, GFR 30-59 ml/min (formerly [facility])    Chronic neck pain    Degenerative disc disease, cervical    Encounter for long-term (current) use of other medications    Cervicalgia    Chronic intractable headache    Hemorrhage of cyst of native kidney    Abdominal pain    CKD (chronic kidney disease) stage 4, GFR 15-29 ml/min (formerly [facility])    Acute renal failure with acute tubular necrosis superimposed on stage 3 chronic kidney disease (formerly [facility])    Allergic rhinitis due to animal hair and dander    Congenital multiple renal cysts    Gross hematuria    History of anemia due to chronic kidney disease    History of multiple allergies    Hyperlipidemia    IUD (intrauterine device) in place    Leukocytosis    Other specified disorders of kidney and ureter    Pain in joint    Recurrent major depression in full remission ([facility] [address]    Renovascular hypertension    Right ureteral stone    Nephrolithiasis       Plan: its unclear why pt is having pain as her stone is nonobstructive and there is no hydro. Its possible that some of her pain may be related to the renal cysts.    Either way the plan can be stone treatment as an outpt to remove this etiology from the differential.   Pt to f/u with dr Chasity Saravia in 1-2 weeks    Electronically signed by Sabrina Canas MD on 7/1/2020 at 6:43 AM

## 2020-07-01 NOTE — DISCHARGE SUMMARY
Gastrointestinal: Negative for abdominal pain, nausea and vomiting. Negative for abdominal distention, anal bleeding and diarrhea. Genitourinary: Negative for enuresis, frequency and urgency. Musculoskeletal: Negative for arthralgias, gait problem and neck stiffness. Skin: Negative for color change and rash. Neurological: Negative for dizziness and headaches. Psychiatric/Behavioral: Negative for agitation and confusion. Physical Exam  Constitutional:       Appearance: Normal appearance. HENT:      Head: Atraumatic. Mouth/Throat:      Mouth: Mucous membranes are moist.      Pharynx: No oropharyngeal exudate or posterior oropharyngeal erythema. Eyes:      Extraocular Movements: Extraocular movements intact. Neck:      Musculoskeletal: Normal range of motion. Cardiovascular:      Rate and Rhythm: Normal rate and regular rhythm. Heart sounds: No murmur. No friction rub. No gallop. Pulmonary:      Effort: Pulmonary effort is normal.      Breath sounds: No wheezing, rhonchi or rales. Abdominal:      General: Abdomen is flat. Tenderness: There is no abdominal tenderness or  guarding. Hernia: No hernia is present. Musculoskeletal: Normal range of motion. General: No swelling or tenderness. Skin:     General: Skin is warm. Capillary Refill: Capillary refill takes less than 2 seconds. Coloration: Skin is not jaundiced. Findings: No bruising. Neurological:      Mental Status: She is alert and oriented to person, place, and time.          Significant Diagnostic Studies:   Labs / Micro:  CBC:   Lab Results   Component Value Date    WBC 6.3 07/01/2020    RBC 3.19 07/01/2020    RBC 3.71 01/13/2012    HGB 10.5 07/01/2020    HCT 30.7 07/01/2020    MCV 96.3 07/01/2020    MCH 32.9 07/01/2020    MCHC 34.2 07/01/2020    RDW 13.0 07/01/2020     07/01/2020     01/13/2012     BMP:    Lab Results   Component Value Date    GLUCOSE 97 07/01/2020    NA 141 07/01/2020    K 4.1 07/01/2020     07/01/2020    CO2 24 07/01/2020    ANIONGAP 7 07/01/2020    BUN 17 07/01/2020    CREATININE 2.03 07/01/2020    BUNCRER NOT REPORTED 07/01/2020    CALCIUM 8.4 07/01/2020    LABGLOM 27 07/01/2020    GFRAA 33 07/01/2020    GFR      07/01/2020    GFR NOT REPORTED 07/01/2020        Radiology:    Ct Abdomen Pelvis Wo Contrast Additional Contrast? None    Result Date: 6/25/2020  EXAMINATION: CT OF THE ABDOMEN AND PELVIS WITHOUT CONTRAST 6/25/2020 9:40 am TECHNIQUE: CT of the abdomen and pelvis was performed without the administration of intravenous contrast. Multiplanar reformatted images are provided for review. Dose modulation, iterative reconstruction, and/or weight based adjustment of the mA/kV was utilized to reduce the radiation dose to as low as reasonably achievable. COMPARISON: CT scan the abdomen pelvis from 08/22/2019 HISTORY: ORDERING SYSTEM PROVIDED HISTORY: Kidney stone TECHNOLOGIST PROVIDED HISTORY: stone protocol Is the patient pregnant?->No Reason for Exam: rt flank pain, hx kidney stones Acuity: Unknown Type of Exam: Ongoing Relevant Medical/Surgical History: lithotripsy FINDINGS: Lower Chest: Visualized lung bases clear. Organs: Multiple probable mostly left-sided hepatic cysts, stable by comparison. Spleen, unenhanced limited visualization pancreas, and poorly visualized adrenal glands unchanged. Unremarkable partially contracted gallbladder. Bilateral multi-cystic kidneys compatible with polycystic kidney disease; right kidney now measures 20.1  X 11.3 x 8.4 cm, and the left kidney measures 18.9 x 10.6 x 8.2 cm (versus 19.8 x 10.0 x 8.4 cm, and 19.1 x 9.4 x 8.3 cm previously).   Unchanged 7.4 cm central right calculus, without obvious hydronephrosis (assessment limited without IV contrast..  Multiple bilateral high density cysts identified with a similar distribution; new peripheral 1.2 x 0.9 cm peripheral high density cyst mid lower right kidney (slice 86, fluoro time and 883.8 mGy FINDINGS: Double-J ureteral stent and contrast in the collecting system and bladder. Please correlate with clinical service     Us Renal Complete    Result Date: 7/1/2020  EXAMINATION: RETROPERITONEAL ULTRASOUND OF THE KIDNEYS 7/1/2020 COMPARISON: CT scan of the abdomen pelvis from 06/25/2020, and previous ultrasound from 05/09/2016 HISTORY: ORDERING SYSTEM PROVIDED HISTORY: Adult polycystic kidney disease, nephrolithiasis TECHNOLOGIST PROVIDED HISTORY: Adult polycystic kidney disease, nephrolithiasis Additional history of chronic kidney disease. FINDINGS: Kidneys: The right kidney measures 19.2 x 10.7 x 8.7 cm and the left kidney measures 18.2 x 10.7 x 8.4 cm. Cortical thickness is 2.2 cm on the right and 2.4 cm on the left. Kidneys demonstrate unchanged cortical echogenicity, again with innumerable small bilateral cysts, similar to the previous study. No evidence of hydronephrosis or left intrarenal stones. Known right nonobstructing stone poorly visualized. Several echogenic foci detected, comparable to those seen on CT. No suspicious focal lesion seen. Bilateral polycystic kidneys; compared to 05/09/2016, mild increase in overall size bilaterally with several small high density foci but no suspicious focal lesion. Known nonobstructing right kidney stone not well seen sonographically. Consultations:    Consults:     Final Specialist Recommendations/Findings:   IP CONSULT TO NEPHROLOGY  IP CONSULT TO UROLOGY  IP CONSULT TO INTERNAL MEDICINE  IP CONSULT TO SOCIAL WORK      The patient was seen and examined on day of discharge and this discharge summary is in conjunction with any daily progress note from day of discharge. Discharge plan:       Disposition: Home    Physician Follow Up:      Carina Haddad MD  [facility]  [address] [facility] [address]          [facility], [address] [place] [address]  [phone]    In 1 week  follow up stone removal    Mackenzie Hill MD  [address]  [facility]  [phone]    In 1 week  Hospital Follow up       Requiring Further Evaluation/Follow Up POST HOSPITALIZATION/Incidental Findings: F/U  Dr. Dutch Fleming for right nephrolithias one week    Diet: regular diet    Activity: As tolerated    Instructions to Patient: Keep hydrated, discussed medication compliance, should symptoms return or worsen return to hospital ED. Discharge Medications:      Medication List      CONTINUE taking these medications    amLODIPine 5 MG tablet  Commonly known as:  NORVASC     Blood Pressure Kit  Check BP once/day- goal is <140/90. DULoxetine 60 MG extended release capsule  Commonly known as:  CYMBALTA  take 1 capsule by mouth once daily     loratadine 10 MG tablet  Commonly known as:  CLARITIN     melatonin 3 MG Tabs tablet     tamsulosin 0.4 MG capsule  Commonly known as:  FLOMAX     vitamin D 1.25 MG (87657 UT) Caps capsule  Commonly known as:  ERGOCALCIFEROL  Take 1 capsule by mouth once a week            Time Spent on discharge is  35 mins in patient examination, evaluation, counseling as well as medication reconciliation, prescriptions for required medications, discharge plan and follow up. Electronically signed by   Nayana Shea MD  7/1/2020  11:41 AM      Thank you Dr. Mackenzie Hill MD for the opportunity to be involved in this patient's care. Attending Physician Statement  I have discussed the care of Felipa Aguilera and I have examined the patient myselft and taken ros and hpi , including pertinent history and exam findings,  with the resident. I have reviewed the key elements of all parts of the encounter with the resident. I agree with the assessment, plan and orders as documented by the resident.       Electronically signed by Robyn Hawkins MD

## 2020-07-01 NOTE — PROGRESS NOTES
NEPHROLOGY progress note    Patient :  Jackie Pratt; 41 y.o. MRN# [id number]  Location:  2055/2055-01  Attending:  Chaitanya Torres MD  Admit Date:  6/30/2020   Hospital Day: 0      Reason for Consult: Acute kidney injury      Chief Complaint: Right-sided flank pain  History Obtained From: Patient    History of Present Illness: This is a 41 y.o. female with past medical history of polycystic kidney disease, CKD stage III, history of kidney stones presents to the ER with complaints of right-sided flank pain for about 1 month, patient has a history of kidney stones requiring ureteral stent placement on 6/12/2020. Patient had a repeat CT abdomen pelvis on 6/25/2020 which showed polycystic kidney disease, showed new small high density cyst noted on the right but otherwise stable disease also showed nonobstructing 7 mm right-sided calculus without definitive hydronephrosis. Patient baseline serum creatinine has been averaging between 2.0 to 2.3 mg/dL since May 2020. Most recent serum creatinine was noted to be elevated 2.8 mg/dL on June 23, 2020 and therefore patient was advised to go to the hospital for further evaluation and management  Serum creatinine today is 2.1 mg/dL. Patient complains of pain more on the right flank than on the left side tenderness pain is 7 out of 10 patient has nausea but no vomiting has not been eating well poor appetite poor oral intake. Patient denies any fever chills patient does have hesitancy in urination and also urgency sometimes during urination. Pt denies any history of  prolonged NSAID use  There has been no recent exposure to IV contrast.   There is no history  of paraprotein disease. Medication review shows no use of ACE-inhibitor/diuretics. Subjective/interval history. Patient seen and examined pain is better controlled no trouble urination no nausea vomiting serum creatinine slightly improved to 2.0 mg/dL.   Patient was seen by urologist      Past Medical History:        Diagnosis Date    Anxiety     Asthma     Chronic headaches 7/10/2013    CKD (chronic kidney disease) stage 3, GFR 30-59 ml/min ([facility])     Degenerative disc disease, cervical     Depression     Dysmenorrhea 9/30/2014    Eczema 11/8/2012    Headache(784.0)     HTN (hypertension) 10/2/2011    Hypertension     Hypocalcemia 5/28/2014    Kidney stone     Menorrhagia 9/30/2014    Neck pain, bilateral 5/28/2014    Pelvic pain in female 9/30/2014    Polycystic kidney     Smoker 10/2/2011    Tachycardia 1/20/2014    Tension vascular headache 1/20/2014       Past Surgical History:        Procedure Laterality Date    BREAST ENHANCEMENT SURGERY      BREAST LUMPECTOMY      left breast    CYSTO/URETERO/PYELOSCOPY, CALCULUS TX Right 6/12/2020    HOLMIUM - CYSTO, RIGHT RETROGRADE PYELOGRAM, RIGHT URETEROSCOPY, LASER LITHO ON STAND BY, RIGHT STENT PLACEMENT performed by Dell Kim MD at [address] [place] [address]  06/12/2020    DILATION AND CURETTAGE OF UTERUS      x2    NERVE BLOCK  07/01/2013    nerve block(right vervical C3/TON/C4/C5    NERVE BLOCK  07/08/2013    nerve block(right vervical C3/TON/C4/C5    OTHER SURGICAL HISTORY  03/10/2014     botox inj     TOE SURGERY      removal of ingrown toe nail-2nd toe on left foot     URETEROSCOPY Right 06/12/2020    stent placement       Current Medications:    amLODIPine (NORVASC) tablet 5 mg, Daily  DULoxetine (CYMBALTA) extended release capsule 60 mg, Daily  tamsulosin (FLOMAX) capsule 0.4 mg, Daily  sodium chloride flush 0.9 % injection 10 mL, 2 times per day  sodium chloride flush 0.9 % injection 10 mL, PRN  acetaminophen (TYLENOL) tablet 650 mg, Q6H PRN    Or  acetaminophen (TYLENOL) suppository 650 mg, Q6H PRN  polyethylene glycol (GLYCOLAX) packet 17 g, Daily PRN  promethazine (PHENERGAN) tablet 12.5 mg, Q6H PRN    Or  ondansetron (ZOFRAN) injection 4 mg, Q6H PRN  0.9 % sodium chloride infusion, Continuous  enoxaparin (LOVENOX) injection 30 mg, Daily  morphine (PF) injection 2 mg, Q2H PRN    Or  morphine sulfate (PF) injection 4 mg, Q2H PRN        Allergies:  Bee pollen; Dust mite extract; Pcn [penicillins]; and Pollen extract    Social History:   Social History     Socioeconomic History    Marital status:      Spouse name: Not on file    Number of children: Not on file    Years of education: Not on file    Highest education level: Not on file   Occupational History    Occupation: stay at home mom   Social Needs    Financial resource strain: Not on file    Food insecurity     Worry: Not on file     Inability: Not on file    Transportation needs     Medical: Not on file     Non-medical: Not on file   Tobacco Use    Smoking status: Current Every Day Smoker     Packs/day: 0.75     Years: 16.00     Pack years: 12.00     Types: Cigarettes    Smokeless tobacco: Never Used    Tobacco comment: trying  to  cut  down   Substance and Sexual Activity    Alcohol use: No     Alcohol/week: 0.0 standard drinks    Drug use: No    Sexual activity: Yes     Partners: Male     Comment: steady boyfriend   Lifestyle    Physical activity     Days per week: Not on file     Minutes per session: Not on file    Stress: Not on file   Relationships    Social connections     Talks on phone: Not on file     Gets together: Not on file     Attends Congregational service: Not on file     Active member of club or organization: Not on file     Attends meetings of clubs or organizations: Not on file     Relationship status: Not on file    Intimate partner violence     Fear of current or ex partner: Not on file     Emotionally abused: Not on file     Physically abused: Not on file     Forced sexual activity: Not on file   Other Topics Concern    Not on file   Social History Narrative    Not on file       Family History:   Family History   Problem Relation Age of Onset    High Blood Pressure Mother     Asthma Sister     Migraines Sister     High Blood Pressure Father     Other Brother         bad knees, with recent total knee replacement        Review of Systems:    Constitutional: No fever, no chills, no night sweats,+ fatigue, generalized weakness, loss of appetite  HEENT:  No headache, otalgia, itchy eyes, epistaxis, nasal discharge or sore throat. Cardiac:  No chest pain, dyspnea, orthopnea or PND, palpitations  Chest:   No cough, hemoptysis, pleuritic chest pain, wheezing,SOB  Abdomen:  No abdominal pain, nausea, vomiting, diarrhea, melena, dysphagia                         hematemesis,constipation, abdominal bloating, flank pain  Neuro:              No CVA, TIA or seizure like activity. Skin:   No rashes, no itching. :   No hematuria, no pyuria, no dysuria, complains of right flank pain. Extremities:  No swelling or joint pains. Objective:  CURRENT TEMPERATURE:  Temp: 98.7 °F (37.1 °C)  MAXIMUM TEMPERATURE OVER 24HRS:  Temp (24hrs), Av.5 °F (36.9 °C), Min:98.1 °F (36.7 °C), Max:98.8 °F (37.1 °C)    CURRENT RESPIRATORY RATE:  Resp: 17  CURRENT PULSE:  Pulse: 86  CURRENT BLOOD PRESSURE:  BP: (!) 151/82  24HR BLOOD PRESSURE RANGE:  Systolic (45ZUJ), DYQ:311 , Min:151 , TDX:213   ; Diastolic (82VLH), VFS:06, Min:82, Max:96    24HR INTAKE/OUTPUT:      Intake/Output Summary (Last 24 hours) at 2020 1409  Last data filed at 2020 1216  Gross per 24 hour   Intake 1545 ml   Output 2125 ml   Net -580 ml     Patient Vitals for the past 96 hrs (Last 3 readings):   Weight   20 0545 115 lb 15.4 oz (52.6 kg)   20 1410 114 lb 10.2 oz (52 kg)   20 1039 115 lb (52.2 kg)       Physical Exam:  GENERAL APPEARANCE: Alert and cooperative, and appears to be in no acute distress. HEAD: normocephalic  EYES: PERRL, EOMI. Not pale, anicteric   NOSE:  No nasal discharge. THROAT:  Oral cavity and pharynx normal. Moist  CARDIAC: Normal S1 and S2. No S3, S4 or murmurs. Rhythm is regular.   LUNGS: Clear to auscultation and percussion without rales, rhonchi, wheezing or diminished breath sounds. NECK: Neck supple, non-tender without lymphadenopathy, masses or thyromegaly. JVD-neg  BACK: Examination of the spine reveals normal gait and posture, no spinal deformity, symmetry of spinal muscles, without tenderness, decreased range of motion or muscular spasm  MUSKULOSKELETAL: Adequately aligned spine. No joint erythema or tenderness. EXTREMITIES: No edema. Peripheral pulses intact. NEURO: Nonfocal    Labs:   CBC:  Recent Labs     06/30/20  1116 07/01/20  0536   WBC 7.9 6.3   RBC 3.81* 3.19*   HGB 12.3 10.5*   HCT 36.1 30.7*   MCV 94.8 96.3   MCH 32.3 32.9   MCHC 34.0 34.2   RDW 12.5 13.0    216   MPV 7.9 8.4      BMP:   Recent Labs     06/30/20  1116 07/01/20  0536    141   K 3.9 4.1    110*   CO2 22 24   BUN 17 17   CREATININE 2.15* 2.03*   GLUCOSE 85 97   CALCIUM 9.0 8.4*      Phosphorus:  No results for input(s): PHOS in the last 72 hours. Magnesium: No results for input(s): MG in the last 72 hours. Albumin:   Recent Labs     06/30/20  1116   LABALBU 4.5       IRON:  No results for input(s): IRON in the last 72 hours. Iron Saturation:  Invalid input(s): PERCENTFE  TIBC:  No results for input(s): TIBC in the last 72 hours. FERRITIN:  No results for input(s): FERRITIN in the last 72 hours. SPEP: No results for input(s): SPEP in the last 72 hours. Recent Labs     06/30/20  1116   PROT 7.3     UPEP: No results for input(s): TPU in the last 72 hours. Urine Sodium:    Recent Labs     06/30/20  1341   NOHELIA 54      Urine Potassium: No results for input(s): KUR in the last 72 hours. Urine Chloride: Invalid input(s): CLU  Urine Ph:  Invalid input(s): PO4U  Urine Osmolarity: No results for input(s): OSMOU in the last 72 hours. Urine Creatinine:    Recent Labs     06/30/20  1341   LABCREA 42.2     Urine Eosinophils: Invalid input(s): EOSU  Urine Protein:  No results for input(s): TPU in the last 72 hours.   Urinalysis:    Recent Labs 06/30/20  [address] 6.5   [address] 0 TO 2   RBCUA 0 TO 2   MUCUS NOT REPORTED   TRICHOMONAS NOT REPORTED   YEAST NOT REPORTED   BACTERIA FEW*   [address] 1.005   LEUKOCYTESUR NEGATIVE   UROBILINOGEN Normal   BILIRUBINUR NEGATIVE   GLUCOSEU NEGATIVE   [address] NEGATIVE   AMORPHOUS NOT REPORTED         Radiology:  Reviewed as available. Assessment:  1. SILVANO on chronic kidney disease, nonoliguric rule out urinary retention, other causes could be prerenal azotemia due to decreased oral intake, creatinine peaked to 2.8 mg/dL on 6/23/2020, today serum creatinine is improved to 2.0 mg/dL  2. History of nephrolithiasis requiring ureteral stent on 6/12/2020  CKD stage IV secondary to obstructive kidney disease in combination with progressive polycystic kidney disease. Baseline serum creatinine is around 2.0 to 2.3 mg/dL   CT abdomen and pelvis on 6/25/2020 showed     Bilateral multi-cystic kidneys compatible with polycystic kidney disease;   right kidney now measures 20.1  X 11.3 x 8.4 cm, and the left kidney measures   18.9 x 10.6 x 8.2 cm (versus 19.8 x 10.0 x 8.4 cm, and 19.1 x 9.4 x 8.3 cm   previously).  Unchanged 7.4 cm central right calculus, without obvious   hydronephrosis (assessment limited without IV contrast..  Multiple bilateral   high density cysts identified with a similar distribution; new peripheral 1.2   x 0.9 cm peripheral high density cyst mid lower right kidney (slice 86,   series 2) and new 1.1 x 0.8 cm left medial lower high density cyst on the   left (slice 76, series 2).  No additional definite stone or left   hydronephrosis. 3. Essential hypertension       Plan:  1. Discharge planning. 2. Follow-up urology outpatient for stone removal  3. Follow-up Dr. Paula Vallejo in CKD clinic      Thank you for the consultation.       Electronically signed by Liban Herman MD on 7/1/2020 at 2:09 PM

## 2020-07-01 NOTE — CARE COORDINATION
ONGOING DISCHARGE PLAN:    Spoke with patient regarding discharge plan and patient confirms that plan is still to return to home w/ SO & Kids. Denies VNS. Writer notified Pt. If she was still interested in Pain Clinic, she would need to speak to her Primary & get Referral.    Per Urology notes, Pt. Has Nonobstructive Stone. Plan is for outpt tx. Cr today 2.03. Remains on IV Fluids. Nephro following. Anticipate DC today w/ no needs. Will continue to follow for additional discharge needs.     Electronically signed by Shelton Brandon RN on 7/1/2020 at 12:04 PM

## 2020-07-02 ENCOUNTER — TELEPHONE (OUTPATIENT)
Dept: UROLOGY | Age: 40
End: 2020-07-02

## 2020-07-06 ENCOUNTER — OFFICE VISIT (OUTPATIENT)
Dept: UROLOGY | Age: 40
End: 2020-07-06
Payer: MEDICARE

## 2020-07-06 VITALS — HEIGHT: 65 IN | WEIGHT: 120 LBS | BODY MASS INDEX: 19.99 KG/M2 | TEMPERATURE: 98.2 F

## 2020-07-06 PROCEDURE — G8420 CALC BMI NORM PARAMETERS: HCPCS | Performed by: UROLOGY

## 2020-07-06 PROCEDURE — 99214 OFFICE O/P EST MOD 30 MIN: CPT | Performed by: UROLOGY

## 2020-07-06 PROCEDURE — 4004F PT TOBACCO SCREEN RCVD TLK: CPT | Performed by: UROLOGY

## 2020-07-06 PROCEDURE — G8427 DOCREV CUR MEDS BY ELIG CLIN: HCPCS | Performed by: UROLOGY

## 2020-07-06 ASSESSMENT — ENCOUNTER SYMPTOMS
SHORTNESS OF BREATH: 0
COUGH: 0
NAUSEA: 1
ABDOMINAL PAIN: 0
VOMITING: 0
WHEEZING: 0
BACK PAIN: 0
EYE REDNESS: 0
EYE PAIN: 0

## 2020-07-06 NOTE — PROGRESS NOTES
[facility]  [facility] - [facility]  Patrick Orona [address] [place] [id number]  Dept: [address] [facility] Urology Office Note - Established    Patient:  Madina Christiansen  YOB: 1980  Date: 7/6/2020    The patient is a 41 y.o. female whopresents today for evaluation of the following problems:   Chief Complaint   Patient presents with    Nephrolithiasis     kidney stone and cyst with KUB        HPI  Kidney calculus:  Patient is here today for a kidney calculus which was first noted several week(s) ago. Location: Right mid pole  Size: 7 mm  Current medical Rx for renal calculus: none  Passed recent calculus? No  Stone composition: unknown  Flank pain? yes - right  Hematuria? None  Pt had urs by me and dr. Fior Soares. Did not see stone in the collecting system in either urs. Lab Results   Component Value Date    CALCIUM 8.4 (L) 07/01/2020    PHOS 3.6 06/23/2020     Lab Results   Component Value Date     07/01/2020    K 4.1 07/01/2020     (H) 07/01/2020    CO2 24 07/01/2020         Summary of old records: N/A    Additional History: N/A    Procedures Today: N/A    Urinalysis today:  No results found for this visit on 07/06/20.     Imaging Reviewed during this Office Visit: kub persistent 7mm right renal calc    (results were independently reviewed by physician and radiology report verified)    AUA Symptom Score (7/6/2020):  INCOMPLETE EMPTYING: How often have you had the sensation of not emptying your bladder?: Not at all  FREQUENCY: How often do you have to urinate less than every two hours?: Almost always  INTERMITTENCY: How often have you found you stopped and started again several times when you urinated?: Not at all  URGENCY: How often have you found it difficult to postpone urination?: About Half the time  WEAK STREAM: How often have you had a weak urinary stream?: Less than Half the time  STRAINING: How often have you had to strain to start urination?: Not at all  NOCTURIA: How many times did you typically get up at night to uriniate?: 2 Times  TOTAL I-PSS SCORE[de-identified] 12  How would you feel if you were to spend the rest of your life with your urinary condition?: Terrible    Last BUN and creatinine:  Lab Results   Component Value Date    BUN 17 07/01/2020     Lab Results   Component Value Date    CREATININE 2.03 (H) 07/01/2020       Additional Lab/Culture results: none    PAST MEDICAL, FAMILY AND SOCIAL HISTORY UPDATE:  Past Medical History:   Diagnosis Date    Anxiety     Asthma     Chronic headaches 7/10/2013    CKD (chronic kidney disease) stage 3, GFR 30-59 ml/min ([facility])     Degenerative disc disease, cervical     Depression     Dysmenorrhea 9/30/2014    Eczema 11/8/2012    Headache(784.0)     HTN (hypertension) 10/2/2011    Hypertension     Hypocalcemia 5/28/2014    Kidney stone     Menorrhagia 9/30/2014    Neck pain, bilateral 5/28/2014    Pelvic pain in female 9/30/2014    Polycystic kidney     Smoker 10/2/2011    Tachycardia 1/20/2014    Tension vascular headache 1/20/2014     Past Surgical History:   Procedure Laterality Date    BREAST ENHANCEMENT SURGERY      BREAST LUMPECTOMY      left breast    CYSTO/URETERO/PYELOSCOPY, CALCULUS TX Right 6/12/2020    HOLMIUM - CYSTO, RIGHT RETROGRADE PYELOGRAM, RIGHT URETEROSCOPY, LASER LITHO ON STAND BY, RIGHT STENT PLACEMENT performed by Marilin Lou MD at [email or web address]  06/12/2020    DILATION AND CURETTAGE OF UTERUS      x2    NERVE BLOCK  07/01/2013    nerve block(right vervical C3/TON/C4/C5    NERVE BLOCK  07/08/2013    nerve block(right vervical C3/TON/C4/C5    OTHER SURGICAL HISTORY  03/10/2014     botox inj     TOE SURGERY      removal of ingrown toe nail-2nd toe on left foot     URETEROSCOPY Right 06/12/2020    stent placement     Family History   Problem Relation Age of Onset    High Blood Pressure Mother     Asthma Sister     Migraines Sister     High Blood Ear: Normal; Left External Ear: Normal  Mouth: Mucosa Moist  Neck: Supple  Lungs: Respiratory effort is normal  Cardiovascular: Warm & Pink  Abdomen: Soft, non-tender, non-distended with no CVA,  No flank tenderness,  Or hepatosplenomegaly   Lymphatics: No palpable lymphadenopathy. Bladder non-tender and not distended. Musculoskeletal: Normalgait and station      and Plan      1. Kidney stone    2. Right flank pain    3. Autosomal dominant polycystic kidney disease           Plan:   Right ESWL     Return for surgery. Prescriptions Ordered:  No orders of the defined types were placed in this encounter. Orders Placed:  No orders of the defined types were placed in this encounter. Tori Schroeder MD    Agree with the ROS entered by the MA.

## 2020-07-09 ENCOUNTER — TELEPHONE (OUTPATIENT)
Dept: UROLOGY | Age: 40
End: 2020-07-09

## 2020-07-09 NOTE — TELEPHONE ENCOUNTER
Pt PCP called [facility] office stating \" pt is in a lot of pain and needs to be scheduled for surgery with Dr. Nuris Cooper. Would like staff call pt. \" Brian Fairly called pt, pt states \" I am having pain 6/10. The pain is not consent, I feel like I am being kicked. The pain is not severe. Tylenol is not helping the pain. Right side is bugging the heck out of me. The left hurts as well but not as bad. \" informed pt if pain gets severe to go to ER preferably [facility]. Will discuss with Courtney IRVIN-CNP return call. Pt gave verbal understanding call ended.

## 2020-07-09 NOTE — TELEPHONE ENCOUNTER
Please let pt know  will discuss with Dr Mimi Okeefe and surgical schedulers firs thing tomorrow  to see when pt can be added on.

## 2020-07-13 ENCOUNTER — TELEPHONE (OUTPATIENT)
Dept: UROLOGY | Age: 40
End: 2020-07-13

## 2020-07-13 NOTE — TELEPHONE ENCOUNTER
(RT) MELANIA @ [facility] 7/23/20 7:15am **STOP BLOOD THINNERS 7/16/20**   PAT same day, patient to have urine done ASAP order in Epic  COVID-19 testing @ [facility] 7/19/20 7:00am           Spoke with patient, procedure info emailed.

## 2020-07-14 ENCOUNTER — HOSPITAL ENCOUNTER (OUTPATIENT)
Age: 40
Setting detail: SPECIMEN
Discharge: HOME | End: 2020-07-14
Payer: MEDICARE

## 2020-07-14 LAB
-: NORMAL
AMORPHOUS: NORMAL
BACTERIA: NORMAL
BILIRUBIN URINE: NEGATIVE
CASTS UA: NORMAL /LPF (ref 0–8)
COLOR: YELLOW
COMMENT UA: ABNORMAL
CRYSTALS, UA: NORMAL /HPF
EPITHELIAL CELLS UA: NORMAL /HPF (ref 0–5)
GLUCOSE URINE: NEGATIVE
KETONES, URINE: NEGATIVE
LEUKOCYTE ESTERASE, URINE: NEGATIVE
MUCUS: NORMAL
NITRITE, URINE: NEGATIVE
OTHER OBSERVATIONS UA: NORMAL
PH UA: 5.5 (ref 5–8)
PROTEIN UA: ABNORMAL
RBC UA: NORMAL /HPF (ref 0–4)
RENAL EPITHELIAL, UA: NORMAL /HPF
SPECIFIC GRAVITY UA: 1.01 (ref 1–1.03)
TRICHOMONAS: NORMAL
TURBIDITY: CLEAR
URINE HGB: NEGATIVE
UROBILINOGEN, URINE: NORMAL
WBC UA: NORMAL /HPF (ref 0–5)
YEAST: NORMAL

## 2020-07-15 ENCOUNTER — TELEPHONE (OUTPATIENT)
Dept: UROLOGY | Age: 40
End: 2020-07-15

## 2020-07-15 NOTE — TELEPHONE ENCOUNTER
Pt left voicemail on nurse triage line yesterday requesting a work note keeping her off from 6/19/20-7/23/20. Per Dr Alicia Suarez protocol, pt will only be exempt from work on the day of the procedure, which is scheduled for 7/23/20. Pt was informed and verbalized understanding. Work excuse will be written for the day of surgery, with return date of the following day only.

## 2020-07-15 NOTE — LETTER
[facility] - [address] [address]  Phone: [phone]  Fax: [phone]    Harrison Barton MD        July 15, 2020     Patient: Young Meneses   YOB: 1980   Date of Visit: 7/15/2020       To Whom It May Concern: It is my medical opinion that Mauricio Rubio be excused from work on 7/23/20 for surgery. Patient is able to return to work the following day 7/24/20 without restrictions. .    If you have any questions or concerns, please don't hesitate to call.     Sincerely,        Harrison Barton MD

## 2020-07-19 ENCOUNTER — HOSPITAL ENCOUNTER (OUTPATIENT)
Dept: PREADMISSION TESTING | Age: 40
Setting detail: SPECIMEN
Discharge: HOME | End: 2020-07-23
Payer: MEDICARE

## 2020-07-19 LAB
SARS-COV-2, PCR: NORMAL
SARS-COV-2, RAPID: NORMAL
SARS-COV-2: NOT DETECTED
SOURCE: NORMAL

## 2020-07-19 PROCEDURE — U0003 INFECTIOUS AGENT DETECTION BY NUCLEIC ACID (DNA OR RNA); SEVERE ACUTE RESPIRATORY SYNDROME CORONAVIRUS 2 (SARS-COV-2) (CORONAVIRUS DISEASE [COVID-19]), AMPLIFIED PROBE TECHNIQUE, MAKING USE OF HIGH THROUGHPUT TECHNOLOGIES AS DESCRIBED BY CMS-2020-01-R: HCPCS

## 2020-07-23 ENCOUNTER — APPOINTMENT (OUTPATIENT)
Dept: GENERAL RADIOLOGY | Age: 40
End: 2020-07-23
Attending: UROLOGY
Payer: MEDICARE

## 2020-07-23 ENCOUNTER — ANESTHESIA EVENT (OUTPATIENT)
Dept: OPERATING ROOM | Age: 40
End: 2020-07-23
Payer: MEDICARE

## 2020-07-23 ENCOUNTER — HOSPITAL ENCOUNTER (OUTPATIENT)
Age: 40
Setting detail: OUTPATIENT SURGERY
Discharge: HOME | End: 2020-07-23
Attending: UROLOGY | Admitting: UROLOGY
Payer: MEDICARE

## 2020-07-23 ENCOUNTER — ANESTHESIA (OUTPATIENT)
Dept: OPERATING ROOM | Age: 40
End: 2020-07-23
Payer: MEDICARE

## 2020-07-23 VITALS
HEIGHT: 65 IN | TEMPERATURE: 98.3 F | RESPIRATION RATE: 16 BRPM | BODY MASS INDEX: 19.99 KG/M2 | SYSTOLIC BLOOD PRESSURE: 168 MMHG | OXYGEN SATURATION: 99 % | WEIGHT: 120 LBS | DIASTOLIC BLOOD PRESSURE: 81 MMHG | HEART RATE: 80 BPM

## 2020-07-23 VITALS — OXYGEN SATURATION: 100 % | DIASTOLIC BLOOD PRESSURE: 67 MMHG | TEMPERATURE: 94.6 F | SYSTOLIC BLOOD PRESSURE: 109 MMHG

## 2020-07-23 LAB
-: NORMAL
HCG, PREGNANCY URINE (POC): NEGATIVE

## 2020-07-23 PROCEDURE — 74018 RADEX ABDOMEN 1 VIEW: CPT

## 2020-07-23 PROCEDURE — 6360000002 HC RX W HCPCS: Performed by: NURSE ANESTHETIST, CERTIFIED REGISTERED

## 2020-07-23 PROCEDURE — 7100000031 HC ASPR PHASE II RECOVERY - ADDTL 15 MIN: Performed by: UROLOGY

## 2020-07-23 PROCEDURE — 2500000003 HC RX 250 WO HCPCS: Performed by: NURSE ANESTHETIST, CERTIFIED REGISTERED

## 2020-07-23 PROCEDURE — 3600000001 HC SURGERY LEVEL 1  BASE: Performed by: UROLOGY

## 2020-07-23 PROCEDURE — 3700000001 HC ADD 15 MINUTES (ANESTHESIA): Performed by: UROLOGY

## 2020-07-23 PROCEDURE — 2709999900 HC NON-CHARGEABLE SUPPLY: Performed by: UROLOGY

## 2020-07-23 PROCEDURE — 2500000003 HC RX 250 WO HCPCS: Performed by: UROLOGY

## 2020-07-23 PROCEDURE — 81025 URINE PREGNANCY TEST: CPT

## 2020-07-23 PROCEDURE — 2580000003 HC RX 258: Performed by: UROLOGY

## 2020-07-23 PROCEDURE — 7100000001 HC PACU RECOVERY - ADDTL 15 MIN: Performed by: UROLOGY

## 2020-07-23 PROCEDURE — 7100000000 HC PACU RECOVERY - FIRST 15 MIN: Performed by: UROLOGY

## 2020-07-23 PROCEDURE — 3700000000 HC ANESTHESIA ATTENDED CARE: Performed by: UROLOGY

## 2020-07-23 PROCEDURE — 3600000011 HC SURGERY LEVEL 1  ADDTL 15MIN: Performed by: UROLOGY

## 2020-07-23 PROCEDURE — 7100000030 HC ASPR PHASE II RECOVERY - FIRST 15 MIN: Performed by: UROLOGY

## 2020-07-23 PROCEDURE — 6360000002 HC RX W HCPCS: Performed by: ANESTHESIOLOGY

## 2020-07-23 RX ORDER — METOCLOPRAMIDE HYDROCHLORIDE 5 MG/ML
10 INJECTION INTRAMUSCULAR; INTRAVENOUS
Status: COMPLETED | OUTPATIENT
Start: 2020-07-23 | End: 2020-07-23

## 2020-07-23 RX ORDER — GLYCOPYRROLATE 1 MG/5 ML
SYRINGE (ML) INTRAVENOUS PRN
Status: DISCONTINUED | OUTPATIENT
Start: 2020-07-23 | End: 2020-07-23 | Stop reason: SDUPTHER

## 2020-07-23 RX ORDER — DEXAMETHASONE SODIUM PHOSPHATE 4 MG/ML
INJECTION, SOLUTION INTRA-ARTICULAR; INTRALESIONAL; INTRAMUSCULAR; INTRAVENOUS; SOFT TISSUE PRN
Status: DISCONTINUED | OUTPATIENT
Start: 2020-07-23 | End: 2020-07-23 | Stop reason: SDUPTHER

## 2020-07-23 RX ORDER — HYDROCODONE BITARTRATE AND ACETAMINOPHEN 5; 325 MG/1; MG/1
2 TABLET ORAL PRN
Status: DISCONTINUED | OUTPATIENT
Start: 2020-07-23 | End: 2020-07-23 | Stop reason: HOSPADM

## 2020-07-23 RX ORDER — CLINDAMYCIN PHOSPHATE 900 MG/50ML
900 INJECTION INTRAVENOUS ONCE
Status: COMPLETED | OUTPATIENT
Start: 2020-07-23 | End: 2020-07-23

## 2020-07-23 RX ORDER — MIDAZOLAM HYDROCHLORIDE 1 MG/ML
INJECTION INTRAMUSCULAR; INTRAVENOUS PRN
Status: DISCONTINUED | OUTPATIENT
Start: 2020-07-23 | End: 2020-07-23 | Stop reason: SDUPTHER

## 2020-07-23 RX ORDER — FENTANYL CITRATE 50 UG/ML
25 INJECTION, SOLUTION INTRAMUSCULAR; INTRAVENOUS EVERY 5 MIN PRN
Status: DISCONTINUED | OUTPATIENT
Start: 2020-07-23 | End: 2020-07-23 | Stop reason: HOSPADM

## 2020-07-23 RX ORDER — DIPHENHYDRAMINE HYDROCHLORIDE 50 MG/ML
12.5 INJECTION INTRAMUSCULAR; INTRAVENOUS
Status: DISCONTINUED | OUTPATIENT
Start: 2020-07-23 | End: 2020-07-23 | Stop reason: HOSPADM

## 2020-07-23 RX ORDER — LABETALOL 20 MG/4 ML (5 MG/ML) INTRAVENOUS SYRINGE
5 EVERY 10 MIN PRN
Status: DISCONTINUED | OUTPATIENT
Start: 2020-07-23 | End: 2020-07-23 | Stop reason: HOSPADM

## 2020-07-23 RX ORDER — HYDROCODONE BITARTRATE AND ACETAMINOPHEN 5; 325 MG/1; MG/1
1 TABLET ORAL PRN
Status: DISCONTINUED | OUTPATIENT
Start: 2020-07-23 | End: 2020-07-23 | Stop reason: HOSPADM

## 2020-07-23 RX ORDER — ONDANSETRON 2 MG/ML
4 INJECTION INTRAMUSCULAR; INTRAVENOUS
Status: COMPLETED | OUTPATIENT
Start: 2020-07-23 | End: 2020-07-23

## 2020-07-23 RX ORDER — MORPHINE SULFATE 2 MG/ML
2 INJECTION, SOLUTION INTRAMUSCULAR; INTRAVENOUS EVERY 5 MIN PRN
Status: DISCONTINUED | OUTPATIENT
Start: 2020-07-23 | End: 2020-07-23 | Stop reason: HOSPADM

## 2020-07-23 RX ORDER — MEPERIDINE HYDROCHLORIDE 25 MG/ML
12.5 INJECTION INTRAMUSCULAR; INTRAVENOUS; SUBCUTANEOUS EVERY 5 MIN PRN
Status: DISCONTINUED | OUTPATIENT
Start: 2020-07-23 | End: 2020-07-23 | Stop reason: HOSPADM

## 2020-07-23 RX ORDER — FENTANYL CITRATE 50 UG/ML
INJECTION, SOLUTION INTRAMUSCULAR; INTRAVENOUS PRN
Status: DISCONTINUED | OUTPATIENT
Start: 2020-07-23 | End: 2020-07-23 | Stop reason: SDUPTHER

## 2020-07-23 RX ORDER — HYDROCODONE BITARTRATE AND ACETAMINOPHEN 5; 325 MG/1; MG/1
1 TABLET ORAL EVERY 4 HOURS PRN
Qty: 10 TABLET | Refills: 0 | Status: SHIPPED | OUTPATIENT
Start: 2020-07-23 | End: 2020-07-26

## 2020-07-23 RX ORDER — PROPOFOL 10 MG/ML
INJECTION, EMULSION INTRAVENOUS PRN
Status: DISCONTINUED | OUTPATIENT
Start: 2020-07-23 | End: 2020-07-23 | Stop reason: SDUPTHER

## 2020-07-23 RX ORDER — SODIUM CHLORIDE, SODIUM LACTATE, POTASSIUM CHLORIDE, CALCIUM CHLORIDE 600; 310; 30; 20 MG/100ML; MG/100ML; MG/100ML; MG/100ML
INJECTION, SOLUTION INTRAVENOUS CONTINUOUS
Status: DISCONTINUED | OUTPATIENT
Start: 2020-07-23 | End: 2020-07-23 | Stop reason: HOSPADM

## 2020-07-23 RX ORDER — HYDRALAZINE HYDROCHLORIDE 20 MG/ML
5 INJECTION INTRAMUSCULAR; INTRAVENOUS EVERY 10 MIN PRN
Status: DISCONTINUED | OUTPATIENT
Start: 2020-07-23 | End: 2020-07-23 | Stop reason: HOSPADM

## 2020-07-23 RX ORDER — FENTANYL CITRATE 50 UG/ML
50 INJECTION, SOLUTION INTRAMUSCULAR; INTRAVENOUS EVERY 5 MIN PRN
Status: DISCONTINUED | OUTPATIENT
Start: 2020-07-23 | End: 2020-07-23 | Stop reason: HOSPADM

## 2020-07-23 RX ORDER — LIDOCAINE HYDROCHLORIDE 10 MG/ML
INJECTION, SOLUTION EPIDURAL; INFILTRATION; INTRACAUDAL; PERINEURAL PRN
Status: DISCONTINUED | OUTPATIENT
Start: 2020-07-23 | End: 2020-07-23 | Stop reason: SDUPTHER

## 2020-07-23 RX ADMIN — METOCLOPRAMIDE 10 MG: 5 INJECTION, SOLUTION INTRAMUSCULAR; INTRAVENOUS at 08:46

## 2020-07-23 RX ADMIN — LIDOCAINE HYDROCHLORIDE 50 MG: 10 INJECTION, SOLUTION EPIDURAL; INFILTRATION; INTRACAUDAL; PERINEURAL at 07:16

## 2020-07-23 RX ADMIN — PROPOFOL 200 MG: 10 INJECTION, EMULSION INTRAVENOUS at 07:16

## 2020-07-23 RX ADMIN — HYDROMORPHONE HYDROCHLORIDE 0.5 MG: 1 INJECTION, SOLUTION INTRAMUSCULAR; INTRAVENOUS; SUBCUTANEOUS at 08:55

## 2020-07-23 RX ADMIN — ONDANSETRON 4 MG: 2 INJECTION INTRAMUSCULAR; INTRAVENOUS at 08:35

## 2020-07-23 RX ADMIN — HYDROMORPHONE HYDROCHLORIDE 0.5 MG: 1 INJECTION, SOLUTION INTRAMUSCULAR; INTRAVENOUS; SUBCUTANEOUS at 08:35

## 2020-07-23 RX ADMIN — Medication 0.2 MG: at 07:18

## 2020-07-23 RX ADMIN — CLINDAMYCIN PHOSPHATE 900 MG: 900 INJECTION, SOLUTION INTRAVENOUS at 07:19

## 2020-07-23 RX ADMIN — DEXAMETHASONE SODIUM PHOSPHATE 8 MG: 4 INJECTION, SOLUTION INTRA-ARTICULAR; INTRALESIONAL; INTRAMUSCULAR; INTRAVENOUS; SOFT TISSUE at 07:16

## 2020-07-23 RX ADMIN — MIDAZOLAM 2 MG: 1 INJECTION INTRAMUSCULAR; INTRAVENOUS at 07:13

## 2020-07-23 RX ADMIN — SODIUM CHLORIDE, POTASSIUM CHLORIDE, SODIUM LACTATE AND CALCIUM CHLORIDE: 600; 310; 30; 20 INJECTION, SOLUTION INTRAVENOUS at 07:03

## 2020-07-23 RX ADMIN — FENTANYL CITRATE 50 MCG: 50 INJECTION, SOLUTION INTRAMUSCULAR; INTRAVENOUS at 07:13

## 2020-07-23 RX ADMIN — FENTANYL CITRATE 50 MCG: 50 INJECTION, SOLUTION INTRAMUSCULAR; INTRAVENOUS at 07:26

## 2020-07-23 ASSESSMENT — PULMONARY FUNCTION TESTS
PIF_VALUE: 11
PIF_VALUE: 14
PIF_VALUE: 7
PIF_VALUE: 15
PIF_VALUE: 2
PIF_VALUE: 14
PIF_VALUE: 10
PIF_VALUE: 15
PIF_VALUE: 15
PIF_VALUE: 11
PIF_VALUE: 14
PIF_VALUE: 15
PIF_VALUE: 11
PIF_VALUE: 15
PIF_VALUE: 2
PIF_VALUE: 9
PIF_VALUE: 11
PIF_VALUE: 11
PIF_VALUE: 2
PIF_VALUE: 15
PIF_VALUE: 21
PIF_VALUE: 14
PIF_VALUE: 11
PIF_VALUE: 14
PIF_VALUE: 15
PIF_VALUE: 14
PIF_VALUE: 15
PIF_VALUE: 1
PIF_VALUE: 14
PIF_VALUE: 13
PIF_VALUE: 8
PIF_VALUE: 1
PIF_VALUE: 11
PIF_VALUE: 0
PIF_VALUE: 15
PIF_VALUE: 13
PIF_VALUE: 1
PIF_VALUE: 15
PIF_VALUE: 11
PIF_VALUE: 0
PIF_VALUE: 13
PIF_VALUE: 13
PIF_VALUE: 15
PIF_VALUE: 2
PIF_VALUE: 15
PIF_VALUE: 12
PIF_VALUE: 1
PIF_VALUE: 15
PIF_VALUE: 15
PIF_VALUE: 13
PIF_VALUE: 15
PIF_VALUE: 3
PIF_VALUE: 14
PIF_VALUE: 12
PIF_VALUE: 11

## 2020-07-23 ASSESSMENT — ENCOUNTER SYMPTOMS: STRIDOR: 0

## 2020-07-23 ASSESSMENT — PAIN DESCRIPTION - DESCRIPTORS
DESCRIPTORS: BURNING

## 2020-07-23 ASSESSMENT — PAIN DESCRIPTION - PAIN TYPE
TYPE: SURGICAL PAIN

## 2020-07-23 ASSESSMENT — PAIN SCALES - GENERAL
PAINLEVEL_OUTOF10: 0
PAINLEVEL_OUTOF10: 7
PAINLEVEL_OUTOF10: 7
PAINLEVEL_OUTOF10: 4

## 2020-07-23 ASSESSMENT — PAIN DESCRIPTION - LOCATION
LOCATION: FLANK
LOCATION: PERINEUM
LOCATION: PERINEUM

## 2020-07-23 ASSESSMENT — PAIN - FUNCTIONAL ASSESSMENT: PAIN_FUNCTIONAL_ASSESSMENT: 0-10

## 2020-07-23 ASSESSMENT — PAIN DESCRIPTION - ORIENTATION: ORIENTATION: RIGHT

## 2020-07-23 NOTE — OP NOTE
Operative Note      Patient: Dale Edmonds  YOB: 1980  MRN: [id number]    Date of Procedure: 7/23/2020    Pre-Op Diagnosis: RIGHT KIDNEY STONE  (PAT ON ADMIT ORDERS IN [facility])    Post-Op Diagnosis: Same       Procedure(s):  ESWL EXTRACORPOREAL SHOCK WAVE LITHOTRIPSY    Surgeon(s):  Tori Schroeder MD    Assistant:   * No surgical staff found *    Anesthesia: General    Estimated Blood Loss (mL): Minimal    Complications: None    Specimens:   * No specimens in log *    Implants:  * No implants in log *      Drains: * No LDAs found *    Findings: 7 mm right renal stone    Detailed Description of Procedure: The patient was brought to the operating room. She was prepped identified. She was administered a general anesthetic and placed in supine position. C arm fluoroscopy was used to localize a stone in the right kidney. A total of 3000 shocks was delivered to the stone. There was a 3-minute pause after 100 shocks. The stone appeared to fragment and the procedure was terminated. The plan for the patient is to be discharged home.     Electronically signed by Tori Schroeder MD on 7/23/2020 at 8:02 AM

## 2020-07-23 NOTE — INTERVAL H&P NOTE
Update History & Physical    The patient's History and Physical of June 30, 2020 was reviewed with the patient and I examined the patient. There was no change. The surgical site was confirmed by the patient and me. Patient rates her pain at 7/10. NPO past midnight. Plan: The risks, benefits, expected outcome, and alternative to the recommended procedure have been discussed with the patient. Patient understands and wants to proceed with the procedure.      Electronically signed by ALBARO Garza CNP on 7/23/2020 at 6:21 AM

## 2020-07-23 NOTE — ANESTHESIA POSTPROCEDURE EVALUATION
POST- ANESTHESIA EVALUATION       Pt Name: Marylou Damon  MRN: [id number]  YOB: 1980  Date of evaluation: 7/23/2020  Time:  9:27 AM      /65   Pulse 73   Temp 97.7 °F (36.5 °C)   Resp 12   Ht 5' 5\" (1.651 m)   Wt 120 lb (54.4 kg)   SpO2 95%   BMI 19.97 kg/m²      Consciousness Level  Awake  Cardiopulmonary Status  Stable  Pain Adequately Treated YES  Nausea / Vomiting  NO  Adequate Hydration  YES  Anesthesia Related Complications NONE      Electronically signed by Berta Luna MD on 7/23/2020 at 9:27 AM       Department of Anesthesiology  Postprocedure Note    Patient: Marylou Damon  MRN: [id number]  YOB: 1980  Date of evaluation: 7/23/2020  Time:  9:26 AM     Procedure Summary     Date:  07/23/20 Room / Location:  [address] [place] [address] Zara Parada 03 / [facility]: [facility]    Anesthesia Start:  3066 Anesthesia Stop:  9147    Procedure:  ESWL EXTRACORPOREAL SHOCK WAVE LITHOTRIPSY (Right ) Diagnosis:  (RIGHT KIDNEY STONE  (PAT ON ADMIT ORDERS IN [facility]))    Surgeon:  Mallory Ontiveros MD Responsible Provider:  Berta Luna MD    Anesthesia Type:  general ASA Status:  2          Anesthesia Type: general    Balbir Phase I: Balbir Score: 9    Balbir Phase II:      Last vitals: Reviewed and per EMR flowsheets.        Anesthesia Post Evaluation

## 2020-07-23 NOTE — ANESTHESIA PRE PROCEDURE
Department of Anesthesiology  Preprocedure Note       Name:  Chong Mccollum   Age:  41 y.o.  :  1980                                          MRN:  [id number]         Date:  2020      Surgeon: Rody Lee):  Kapil Alarcon MD    Procedure: Procedure(s):  ESWL EXTRACORPOREAL SHOCK WAVE LITHOTRIPSY    Medications prior to admission:   Prior to Admission medications    Medication Sig Start Date End Date Taking? Authorizing Provider   melatonin 3 MG TABS tablet Take 3 mg by mouth nightly as needed (sleep)    Historical Provider, MD   vitamin D (ERGOCALCIFEROL) 1.25 MG (92210 UT) CAPS capsule Take 1 capsule by mouth once a week 20   Renard Gomez MD   loratadine (CLARITIN) 10 MG tablet Take 10 mg by mouth daily as needed (allergies)     Historical Provider, MD   amLODIPine (NORVASC) 5 MG tablet Take 5 mg by mouth daily    Historical Provider, MD   tamsulosin (FLOMAX) 0.4 MG capsule Take 0.4 mg by mouth nightly  20   Historical Provider, MD   DULoxetine (CYMBALTA) 60 MG extended release capsule take 1 capsule by mouth once daily 11/10/17   Flor Burton MD   Blood Pressure KIT Check BP once/day- goal is <140/90. 3/9/16   ALBARO Mireles - CNP       Current medications:    Current Facility-Administered Medications   Medication Dose Route Frequency Provider Last Rate Last Dose    lactated ringers infusion   Intravenous Continuous Kapil Alarcon MD           Allergies:     Allergies   Allergen Reactions    Bee Pollen     Dust Mite Extract      Chest congestion , sneezing    Pcn [Penicillins] Rash    Pollen Extract      Chest congesting , sneezing        Problem List:    Patient Active Problem List   Diagnosis Code    Asthma J45.909    Smoker F17.200    Polycystic kidney Q61.3    HTN (hypertension) I10    Eczema L30.9    Depression with anxiety F41.8    Chronic headaches R51    Tension vascular headache G44.209    Irregular bleeding N92.6    Metrorrhagia N92.1    Menorrhagia N92.0    Dysmenorrhea N94.6    Pelvic pain in female R10.2    Kidney stone N20.0    Acute back pain M54.9    Anxiety F41.9    Hypertension I10    Headache R51    Depression F32.9    CKD (chronic kidney disease) stage 3, GFR 30-59 ml/min (HCC) N18.3    Chronic neck pain M54.2, G89.29    Degenerative disc disease, cervical M50.30    Encounter for long-term (current) use of other medications Z79.899    Cervicalgia M54.2    Chronic intractable headache R51    Hemorrhage of cyst of native kidney N28.89, N28.1    Abdominal pain R10.9    CKD (chronic kidney disease) stage 4, GFR 15-29 ml/min (HCC) N18.4    Acute renal failure with acute tubular necrosis superimposed on stage 3 chronic kidney disease (HCC) N17.0, N18.3    Allergic rhinitis due to animal hair and dander J30.81    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    Gross hematuria R31.0    History of anemia due to chronic kidney disease N18.9, Z86.2    History of multiple allergies Z91.89    Hyperlipidemia E78.5    IUD (intrauterine device) in place Z97.5    Leukocytosis D72.829    Other specified disorders of kidney and ureter N28.89    Pain in joint M25.50    Recurrent major depression in full remission ([facility] [address] F33.42    Renovascular hypertension I15.0    Right ureteral stone N20.1    Nephrolithiasis N20.0       Past Medical History:        Diagnosis Date    Anxiety     Asthma     Chronic headaches 7/10/2013    CKD (chronic kidney disease) stage 3, GFR 30-59 ml/min (HCC)     Degenerative disc disease, cervical     Depression     Dysmenorrhea 9/30/2014    Eczema 11/8/2012    Headache(784.0)     HTN (hypertension) 10/2/2011    Hypertension     Hypocalcemia 5/28/2014    Kidney stone     Menorrhagia 9/30/2014    Neck pain, bilateral 5/28/2014    Pelvic pain in female 9/30/2014    Polycystic kidney     Smoker 10/2/2011    Tachycardia 1/20/2014    Tension vascular headache 1/20/2014       Past Surgical History: Procedure Laterality Date    BREAST ENHANCEMENT SURGERY      BREAST LUMPECTOMY      left breast    CYSTO/URETERO/PYELOSCOPY, CALCULUS TX Right 6/12/2020    HOLMIUM - CYSTO, RIGHT RETROGRADE PYELOGRAM, RIGHT URETEROSCOPY, LASER LITHO ON STAND BY, RIGHT STENT PLACEMENT performed by Harrison Barton MD at [address]  06/12/2020    DILATION AND CURETTAGE OF UTERUS      x2    NERVE BLOCK  07/01/2013    nerve block(right vervical C3/TON/C4/C5    NERVE BLOCK  07/08/2013    nerve block(right vervical C3/TON/C4/C5    OTHER SURGICAL HISTORY  03/10/2014     botox inj     TOE SURGERY      removal of ingrown toe nail-2nd toe on left foot     URETEROSCOPY Right 06/12/2020    stent placement       Social History:    Social History     Tobacco Use    Smoking status: Current Every Day Smoker     Packs/day: 0.75     Years: 16.00     Pack years: 12.00     Types: Cigarettes    Smokeless tobacco: Never Used    Tobacco comment: trying  to  cut  down   Substance Use Topics    Alcohol use: No     Alcohol/week: 0.0 standard drinks                                Ready to quit: Not Answered  Counseling given: Not Answered  Comment: trying  to  cut  down      Vital Signs (Current): There were no vitals filed for this visit.                                            BP Readings from Last 3 Encounters:   07/01/20 (!) 147/83   06/12/20 (!) 181/85   06/12/20 (!) 140/89       NPO Status:                                                                                 BMI:   Wt Readings from Last 3 Encounters:   07/06/20 120 lb (54.4 kg)   07/01/20 115 lb 15.4 oz (52.6 kg)   06/12/20 110 lb (49.9 kg)     There is no height or weight on file to calculate BMI.    CBC:   Lab Results   Component Value Date    WBC 6.3 07/01/2020    RBC 3.19 07/01/2020    RBC 3.71 01/13/2012    HGB 10.5 07/01/2020    HCT 30.7 07/01/2020    MCV 96.3 07/01/2020    RDW 13.0 07/01/2020     07/01/2020     01/13/2012       CMP:   Lab Anesthesia Plan      general     ASA 2     (GA/LMA)  Induction: intravenous. MIPS: Postoperative opioids intended and Prophylactic antiemetics administered. Anesthetic plan and risks discussed with patient. Plan discussed with CRNA.                   Magali Stanton MD   7/23/2020

## 2020-07-27 ENCOUNTER — TELEPHONE (OUTPATIENT)
Dept: UROLOGY | Age: 40
End: 2020-07-27

## 2020-07-30 ENCOUNTER — HOSPITAL ENCOUNTER (OUTPATIENT)
Age: 40
Setting detail: OBSERVATION
Discharge: HOME | DRG: 463 | End: 2020-08-01
Attending: EMERGENCY MEDICINE | Admitting: INTERNAL MEDICINE
Payer: MEDICARE

## 2020-07-30 ENCOUNTER — APPOINTMENT (OUTPATIENT)
Dept: CT IMAGING | Age: 40
DRG: 463 | End: 2020-07-30
Payer: MEDICARE

## 2020-07-30 LAB
ABSOLUTE EOS #: 0.2 K/UL (ref 0–0.4)
ABSOLUTE IMMATURE GRANULOCYTE: ABNORMAL K/UL (ref 0–0.3)
ABSOLUTE LYMPH #: 2.3 K/UL (ref 1–4.8)
ABSOLUTE MONO #: 0.8 K/UL (ref 0.1–1.3)
BASOPHILS # BLD: 1 % (ref 0–2)
BASOPHILS ABSOLUTE: 0.1 K/UL (ref 0–0.2)
BILIRUBIN URINE: NEGATIVE
COLOR: YELLOW
COMMENT UA: ABNORMAL
DIFFERENTIAL TYPE: ABNORMAL
EOSINOPHILS RELATIVE PERCENT: 2 % (ref 0–4)
GLUCOSE URINE: NEGATIVE
HCG(URINE) PREGNANCY TEST: NEGATIVE
HCT VFR BLD CALC: 36.4 % (ref 36–46)
HEMOGLOBIN: 12.2 G/DL (ref 12–16)
IMMATURE GRANULOCYTES: ABNORMAL %
KETONES, URINE: NEGATIVE
LEUKOCYTE ESTERASE, URINE: NEGATIVE
LYMPHOCYTES # BLD: 21 % (ref 24–44)
MCH RBC QN AUTO: 32 PG (ref 26–34)
MCHC RBC AUTO-ENTMCNC: 33.6 G/DL (ref 31–37)
MCV RBC AUTO: 95.1 FL (ref 80–100)
MONOCYTES # BLD: 7 % (ref 1–7)
NITRITE, URINE: NEGATIVE
NRBC AUTOMATED: ABNORMAL PER 100 WBC
PDW BLD-RTO: 13.3 % (ref 11.5–14.9)
PH UA: 6.5 (ref 5–8)
PLATELET # BLD: 291 K/UL (ref 150–450)
PLATELET ESTIMATE: ABNORMAL
PMV BLD AUTO: 8.6 FL (ref 6–12)
PROTEIN UA: ABNORMAL
RBC # BLD: 3.82 M/UL (ref 4–5.2)
RBC # BLD: ABNORMAL 10*6/UL
SEG NEUTROPHILS: 69 % (ref 36–66)
SEGMENTED NEUTROPHILS ABSOLUTE COUNT: 7.5 K/UL (ref 1.3–9.1)
SPECIFIC GRAVITY UA: 1.01 (ref 1–1.03)
TURBIDITY: CLEAR
URINE HGB: ABNORMAL
UROBILINOGEN, URINE: NORMAL
WBC # BLD: 10.9 K/UL (ref 3.5–11)
WBC # BLD: ABNORMAL 10*3/UL

## 2020-07-30 PROCEDURE — 6360000002 HC RX W HCPCS: Performed by: EMERGENCY MEDICINE

## 2020-07-30 PROCEDURE — 80053 COMPREHEN METABOLIC PANEL: CPT

## 2020-07-30 PROCEDURE — 83690 ASSAY OF LIPASE: CPT

## 2020-07-30 PROCEDURE — 96361 HYDRATE IV INFUSION ADD-ON: CPT

## 2020-07-30 PROCEDURE — 81001 URINALYSIS AUTO W/SCOPE: CPT

## 2020-07-30 PROCEDURE — 85025 COMPLETE CBC W/AUTO DIFF WBC: CPT

## 2020-07-30 PROCEDURE — 74176 CT ABD & PELVIS W/O CONTRAST: CPT

## 2020-07-30 PROCEDURE — 81025 URINE PREGNANCY TEST: CPT

## 2020-07-30 PROCEDURE — 2580000003 HC RX 258: Performed by: EMERGENCY MEDICINE

## 2020-07-30 PROCEDURE — 99285 EMERGENCY DEPT VISIT HI MDM: CPT

## 2020-07-30 PROCEDURE — 96375 TX/PRO/DX INJ NEW DRUG ADDON: CPT

## 2020-07-30 PROCEDURE — 36415 COLL VENOUS BLD VENIPUNCTURE: CPT

## 2020-07-30 PROCEDURE — 96374 THER/PROPH/DIAG INJ IV PUSH: CPT

## 2020-07-30 RX ORDER — ONDANSETRON 2 MG/ML
4 INJECTION INTRAMUSCULAR; INTRAVENOUS ONCE
Status: COMPLETED | OUTPATIENT
Start: 2020-07-30 | End: 2020-07-30

## 2020-07-30 RX ORDER — MORPHINE SULFATE 4 MG/ML
4 INJECTION, SOLUTION INTRAMUSCULAR; INTRAVENOUS ONCE
Status: COMPLETED | OUTPATIENT
Start: 2020-07-30 | End: 2020-07-30

## 2020-07-30 RX ORDER — 0.9 % SODIUM CHLORIDE 0.9 %
1000 INTRAVENOUS SOLUTION INTRAVENOUS ONCE
Status: COMPLETED | OUTPATIENT
Start: 2020-07-30 | End: 2020-07-31

## 2020-07-30 RX ADMIN — MORPHINE SULFATE 4 MG: 4 INJECTION, SOLUTION INTRAMUSCULAR; INTRAVENOUS at 23:28

## 2020-07-30 RX ADMIN — ONDANSETRON 4 MG: 2 INJECTION INTRAMUSCULAR; INTRAVENOUS at 23:29

## 2020-07-30 RX ADMIN — SODIUM CHLORIDE 1000 ML: 9 INJECTION, SOLUTION INTRAVENOUS at 23:28

## 2020-07-30 ASSESSMENT — PAIN SCALES - GENERAL: PAINLEVEL_OUTOF10: 8

## 2020-07-31 PROBLEM — R10.9 FLANK PAIN: Status: ACTIVE | Noted: 2020-07-31

## 2020-07-31 LAB
-: ABNORMAL
-: ABNORMAL
ALBUMIN SERPL-MCNC: 4.2 G/DL (ref 3.5–5.2)
ALBUMIN/GLOBULIN RATIO: ABNORMAL (ref 1–2.5)
ALP BLD-CCNC: 83 U/L (ref 35–104)
ALT SERPL-CCNC: 7 U/L (ref 5–33)
AMORPHOUS: ABNORMAL
AMORPHOUS: ABNORMAL
ANION GAP SERPL CALCULATED.3IONS-SCNC: 13 MMOL/L (ref 9–17)
ANION GAP SERPL CALCULATED.3IONS-SCNC: 9 MMOL/L (ref 9–17)
AST SERPL-CCNC: 11 U/L
BACTERIA: ABNORMAL
BACTERIA: ABNORMAL
BILIRUB SERPL-MCNC: <0.15 MG/DL (ref 0.3–1.2)
BILIRUBIN URINE: NEGATIVE
BUN BLDV-MCNC: 18 MG/DL (ref 6–20)
BUN BLDV-MCNC: 22 MG/DL (ref 6–20)
BUN/CREAT BLD: ABNORMAL (ref 9–20)
BUN/CREAT BLD: ABNORMAL (ref 9–20)
CALCIUM SERPL-MCNC: 8.3 MG/DL (ref 8.6–10.4)
CALCIUM SERPL-MCNC: 8.9 MG/DL (ref 8.6–10.4)
CASTS UA: ABNORMAL /LPF
CASTS UA: ABNORMAL /LPF
CHLORIDE BLD-SCNC: 105 MMOL/L (ref 98–107)
CHLORIDE BLD-SCNC: 106 MMOL/L (ref 98–107)
CO2: 19 MMOL/L (ref 20–31)
CO2: 23 MMOL/L (ref 20–31)
COLOR: YELLOW
COMMENT UA: ABNORMAL
CREAT SERPL-MCNC: 2.13 MG/DL (ref 0.5–0.9)
CREAT SERPL-MCNC: 2.37 MG/DL (ref 0.5–0.9)
CRYSTALS, UA: ABNORMAL /HPF
CRYSTALS, UA: ABNORMAL /HPF
EPITHELIAL CELLS UA: ABNORMAL /HPF
EPITHELIAL CELLS UA: ABNORMAL /HPF
GFR AFRICAN AMERICAN: 27 ML/MIN
GFR AFRICAN AMERICAN: 31 ML/MIN
GFR NON-AFRICAN AMERICAN: 22 ML/MIN
GFR NON-AFRICAN AMERICAN: 25 ML/MIN
GFR SERPL CREATININE-BSD FRML MDRD: ABNORMAL ML/MIN/{1.73_M2}
GLUCOSE BLD-MCNC: 104 MG/DL (ref 70–99)
GLUCOSE BLD-MCNC: 96 MG/DL (ref 70–99)
GLUCOSE URINE: NEGATIVE
KETONES, URINE: NEGATIVE
LEUKOCYTE ESTERASE, URINE: NEGATIVE
LIPASE: 1564 U/L (ref 13–60)
LIPASE: 96 U/L (ref 13–60)
MUCUS: ABNORMAL
MUCUS: ABNORMAL
NITRITE, URINE: NEGATIVE
OTHER OBSERVATIONS UA: ABNORMAL
OTHER OBSERVATIONS UA: ABNORMAL
PH UA: 6 (ref 5–8)
POTASSIUM SERPL-SCNC: 4.1 MMOL/L (ref 3.7–5.3)
POTASSIUM SERPL-SCNC: 4.2 MMOL/L (ref 3.7–5.3)
PROTEIN UA: ABNORMAL
RBC UA: ABNORMAL /HPF
RBC UA: ABNORMAL /HPF
RENAL EPITHELIAL, UA: ABNORMAL /HPF
RENAL EPITHELIAL, UA: ABNORMAL /HPF
SODIUM BLD-SCNC: 137 MMOL/L (ref 135–144)
SODIUM BLD-SCNC: 138 MMOL/L (ref 135–144)
SPECIFIC GRAVITY UA: 1.01 (ref 1–1.03)
TOTAL PROTEIN: 7.3 G/DL (ref 6.4–8.3)
TRICHOMONAS: ABNORMAL
TRICHOMONAS: ABNORMAL
TURBIDITY: ABNORMAL
URINE HGB: ABNORMAL
UROBILINOGEN, URINE: NORMAL
WBC UA: ABNORMAL /HPF
WBC UA: ABNORMAL /HPF
YEAST: ABNORMAL
YEAST: ABNORMAL

## 2020-07-31 PROCEDURE — 96361 HYDRATE IV INFUSION ADD-ON: CPT

## 2020-07-31 PROCEDURE — 99223 1ST HOSP IP/OBS HIGH 75: CPT | Performed by: INTERNAL MEDICINE

## 2020-07-31 PROCEDURE — 6360000002 HC RX W HCPCS: Performed by: INTERNAL MEDICINE

## 2020-07-31 PROCEDURE — 2500000003 HC RX 250 WO HCPCS: Performed by: INTERNAL MEDICINE

## 2020-07-31 PROCEDURE — 96376 TX/PRO/DX INJ SAME DRUG ADON: CPT

## 2020-07-31 PROCEDURE — 6370000000 HC RX 637 (ALT 250 FOR IP): Performed by: INTERNAL MEDICINE

## 2020-07-31 PROCEDURE — 96375 TX/PRO/DX INJ NEW DRUG ADDON: CPT

## 2020-07-31 PROCEDURE — 81001 URINALYSIS AUTO W/SCOPE: CPT

## 2020-07-31 PROCEDURE — 83690 ASSAY OF LIPASE: CPT

## 2020-07-31 PROCEDURE — 6360000002 HC RX W HCPCS: Performed by: EMERGENCY MEDICINE

## 2020-07-31 PROCEDURE — G0378 HOSPITAL OBSERVATION PER HR: HCPCS

## 2020-07-31 PROCEDURE — 87086 URINE CULTURE/COLONY COUNT: CPT

## 2020-07-31 PROCEDURE — 80048 BASIC METABOLIC PNL TOTAL CA: CPT

## 2020-07-31 PROCEDURE — 96365 THER/PROPH/DIAG IV INF INIT: CPT

## 2020-07-31 PROCEDURE — 2580000003 HC RX 258: Performed by: EMERGENCY MEDICINE

## 2020-07-31 PROCEDURE — 36415 COLL VENOUS BLD VENIPUNCTURE: CPT

## 2020-07-31 PROCEDURE — 96372 THER/PROPH/DIAG INJ SC/IM: CPT

## 2020-07-31 PROCEDURE — 2580000003 HC RX 258: Performed by: INTERNAL MEDICINE

## 2020-07-31 PROCEDURE — 6370000000 HC RX 637 (ALT 250 FOR IP): Performed by: EMERGENCY MEDICINE

## 2020-07-31 RX ORDER — 0.9 % SODIUM CHLORIDE 0.9 %
1000 INTRAVENOUS SOLUTION INTRAVENOUS ONCE
Status: COMPLETED | OUTPATIENT
Start: 2020-07-31 | End: 2020-07-31

## 2020-07-31 RX ORDER — ERGOCALCIFEROL 1.25 MG/1
50000 CAPSULE ORAL WEEKLY
Status: DISCONTINUED | OUTPATIENT
Start: 2020-08-04 | End: 2020-08-01 | Stop reason: HOSPADM

## 2020-07-31 RX ORDER — CETIRIZINE HYDROCHLORIDE 10 MG/1
5 TABLET ORAL DAILY
Status: DISCONTINUED | OUTPATIENT
Start: 2020-07-31 | End: 2020-08-01 | Stop reason: HOSPADM

## 2020-07-31 RX ORDER — SODIUM CHLORIDE 0.9 % (FLUSH) 0.9 %
10 SYRINGE (ML) INJECTION PRN
Status: DISCONTINUED | OUTPATIENT
Start: 2020-07-31 | End: 2020-08-01 | Stop reason: HOSPADM

## 2020-07-31 RX ORDER — SODIUM CHLORIDE 0.9 % (FLUSH) 0.9 %
10 SYRINGE (ML) INJECTION EVERY 12 HOURS SCHEDULED
Status: DISCONTINUED | OUTPATIENT
Start: 2020-07-31 | End: 2020-08-01 | Stop reason: HOSPADM

## 2020-07-31 RX ORDER — ONDANSETRON 2 MG/ML
4 INJECTION INTRAMUSCULAR; INTRAVENOUS EVERY 6 HOURS PRN
Status: DISCONTINUED | OUTPATIENT
Start: 2020-07-31 | End: 2020-08-01 | Stop reason: HOSPADM

## 2020-07-31 RX ORDER — MORPHINE SULFATE 2 MG/ML
2 INJECTION, SOLUTION INTRAMUSCULAR; INTRAVENOUS
Status: DISCONTINUED | OUTPATIENT
Start: 2020-07-31 | End: 2020-08-01 | Stop reason: HOSPADM

## 2020-07-31 RX ORDER — AMLODIPINE BESYLATE 5 MG/1
5 TABLET ORAL DAILY
Status: DISCONTINUED | OUTPATIENT
Start: 2020-07-31 | End: 2020-08-01 | Stop reason: HOSPADM

## 2020-07-31 RX ORDER — CIPROFLOXACIN 2 MG/ML
400 INJECTION, SOLUTION INTRAVENOUS EVERY 12 HOURS
Status: DISCONTINUED | OUTPATIENT
Start: 2020-07-31 | End: 2020-08-01 | Stop reason: HOSPADM

## 2020-07-31 RX ORDER — HYDRALAZINE HYDROCHLORIDE 20 MG/ML
10 INJECTION INTRAMUSCULAR; INTRAVENOUS EVERY 6 HOURS PRN
Status: DISCONTINUED | OUTPATIENT
Start: 2020-07-31 | End: 2020-08-01 | Stop reason: HOSPADM

## 2020-07-31 RX ORDER — TAMSULOSIN HYDROCHLORIDE 0.4 MG/1
0.4 CAPSULE ORAL NIGHTLY
Status: DISCONTINUED | OUTPATIENT
Start: 2020-07-31 | End: 2020-08-01 | Stop reason: HOSPADM

## 2020-07-31 RX ORDER — SODIUM CHLORIDE 9 MG/ML
INJECTION, SOLUTION INTRAVENOUS CONTINUOUS
Status: DISCONTINUED | OUTPATIENT
Start: 2020-07-31 | End: 2020-08-01 | Stop reason: HOSPADM

## 2020-07-31 RX ORDER — MORPHINE SULFATE 4 MG/ML
4 INJECTION, SOLUTION INTRAMUSCULAR; INTRAVENOUS ONCE
Status: COMPLETED | OUTPATIENT
Start: 2020-07-31 | End: 2020-07-31

## 2020-07-31 RX ORDER — HEPARIN SODIUM 5000 [USP'U]/ML
5000 INJECTION, SOLUTION INTRAVENOUS; SUBCUTANEOUS 2 TIMES DAILY
Status: DISCONTINUED | OUTPATIENT
Start: 2020-07-31 | End: 2020-08-01 | Stop reason: HOSPADM

## 2020-07-31 RX ORDER — OXYCODONE HYDROCHLORIDE 5 MG/1
5 TABLET ORAL ONCE
Status: COMPLETED | OUTPATIENT
Start: 2020-07-31 | End: 2020-07-31

## 2020-07-31 RX ORDER — MORPHINE SULFATE 4 MG/ML
4 INJECTION, SOLUTION INTRAMUSCULAR; INTRAVENOUS
Status: DISCONTINUED | OUTPATIENT
Start: 2020-07-31 | End: 2020-08-01 | Stop reason: HOSPADM

## 2020-07-31 RX ORDER — DULOXETIN HYDROCHLORIDE 60 MG/1
60 CAPSULE, DELAYED RELEASE ORAL DAILY
Status: DISCONTINUED | OUTPATIENT
Start: 2020-07-31 | End: 2020-08-01 | Stop reason: HOSPADM

## 2020-07-31 RX ORDER — ACETAMINOPHEN 325 MG/1
650 TABLET ORAL EVERY 4 HOURS PRN
Status: DISCONTINUED | OUTPATIENT
Start: 2020-07-31 | End: 2020-08-01 | Stop reason: HOSPADM

## 2020-07-31 RX ADMIN — SODIUM CHLORIDE: 9 INJECTION, SOLUTION INTRAVENOUS at 08:15

## 2020-07-31 RX ADMIN — MORPHINE SULFATE 4 MG: 4 INJECTION, SOLUTION INTRAMUSCULAR; INTRAVENOUS at 02:21

## 2020-07-31 RX ADMIN — SODIUM CHLORIDE 1000 ML: 9 INJECTION, SOLUTION INTRAVENOUS at 02:21

## 2020-07-31 RX ADMIN — FAMOTIDINE 20 MG: 10 INJECTION, SOLUTION INTRAVENOUS at 17:18

## 2020-07-31 RX ADMIN — MORPHINE SULFATE 4 MG: 4 INJECTION, SOLUTION INTRAMUSCULAR; INTRAVENOUS at 12:50

## 2020-07-31 RX ADMIN — HEPARIN SODIUM 5000 UNITS: 5000 INJECTION INTRAVENOUS; SUBCUTANEOUS at 20:57

## 2020-07-31 RX ADMIN — CETIRIZINE HYDROCHLORIDE 5 MG: 10 TABLET, FILM COATED ORAL at 08:24

## 2020-07-31 RX ADMIN — TAMSULOSIN HYDROCHLORIDE 0.4 MG: 0.4 CAPSULE ORAL at 20:57

## 2020-07-31 RX ADMIN — OXYCODONE 5 MG: 5 TABLET ORAL at 01:26

## 2020-07-31 RX ADMIN — MORPHINE SULFATE 4 MG: 4 INJECTION, SOLUTION INTRAMUSCULAR; INTRAVENOUS at 04:37

## 2020-07-31 RX ADMIN — MORPHINE SULFATE 2 MG: 2 INJECTION, SOLUTION INTRAMUSCULAR; INTRAVENOUS at 22:02

## 2020-07-31 RX ADMIN — AMLODIPINE BESYLATE 5 MG: 5 TABLET ORAL at 08:24

## 2020-07-31 RX ADMIN — CIPROFLOXACIN 400 MG: 2 INJECTION, SOLUTION INTRAVENOUS at 17:18

## 2020-07-31 RX ADMIN — HEPARIN SODIUM 5000 UNITS: 5000 INJECTION INTRAVENOUS; SUBCUTANEOUS at 12:57

## 2020-07-31 RX ADMIN — DULOXETINE HYDROCHLORIDE 60 MG: 60 CAPSULE, DELAYED RELEASE ORAL at 08:25

## 2020-07-31 RX ADMIN — MORPHINE SULFATE 4 MG: 4 INJECTION, SOLUTION INTRAMUSCULAR; INTRAVENOUS at 19:54

## 2020-07-31 RX ADMIN — MORPHINE SULFATE 4 MG: 4 INJECTION, SOLUTION INTRAMUSCULAR; INTRAVENOUS at 09:09

## 2020-07-31 RX ADMIN — MORPHINE SULFATE 2 MG: 2 INJECTION, SOLUTION INTRAMUSCULAR; INTRAVENOUS at 07:24

## 2020-07-31 RX ADMIN — ONDANSETRON 4 MG: 2 INJECTION INTRAMUSCULAR; INTRAVENOUS at 09:09

## 2020-07-31 RX ADMIN — MORPHINE SULFATE 4 MG: 4 INJECTION, SOLUTION INTRAMUSCULAR; INTRAVENOUS at 17:23

## 2020-07-31 RX ADMIN — ONDANSETRON 4 MG: 2 INJECTION INTRAMUSCULAR; INTRAVENOUS at 19:59

## 2020-07-31 ASSESSMENT — PAIN DESCRIPTION - DESCRIPTORS
DESCRIPTORS: SQUEEZING
DESCRIPTORS: PRESSURE;SQUEEZING
DESCRIPTORS: SQUEEZING;POUNDING
DESCRIPTORS: TIGHTNESS;SQUEEZING

## 2020-07-31 ASSESSMENT — PAIN SCALES - GENERAL
PAINLEVEL_OUTOF10: 6
PAINLEVEL_OUTOF10: 0
PAINLEVEL_OUTOF10: 8
PAINLEVEL_OUTOF10: 6
PAINLEVEL_OUTOF10: 0
PAINLEVEL_OUTOF10: 0
PAINLEVEL_OUTOF10: 7
PAINLEVEL_OUTOF10: 10
PAINLEVEL_OUTOF10: 0
PAINLEVEL_OUTOF10: 6
PAINLEVEL_OUTOF10: 8
PAINLEVEL_OUTOF10: 9
PAINLEVEL_OUTOF10: 8
PAINLEVEL_OUTOF10: 7
PAINLEVEL_OUTOF10: 10
PAINLEVEL_OUTOF10: 0

## 2020-07-31 ASSESSMENT — PAIN DESCRIPTION - LOCATION
LOCATION: ABDOMEN
LOCATION: BACK
LOCATION: BACK
LOCATION: ABDOMEN
LOCATION: BACK
LOCATION: BACK

## 2020-07-31 ASSESSMENT — PAIN DESCRIPTION - PAIN TYPE
TYPE: ACUTE PAIN

## 2020-07-31 ASSESSMENT — ENCOUNTER SYMPTOMS
BACK PAIN: 0
SORE THROAT: 0
EYE PAIN: 0
NAUSEA: 1
VOMITING: 0
CONSTIPATION: 0
CHEST TIGHTNESS: 0
SHORTNESS OF BREATH: 0
COUGH: 0
DIARRHEA: 0
ABDOMINAL PAIN: 1

## 2020-07-31 ASSESSMENT — PAIN DESCRIPTION - DIRECTION: RADIATING_TOWARDS: GROIN

## 2020-07-31 ASSESSMENT — PAIN DESCRIPTION - ORIENTATION
ORIENTATION: LOWER

## 2020-07-31 ASSESSMENT — PAIN DESCRIPTION - ONSET: ONSET: ON-GOING

## 2020-07-31 ASSESSMENT — PAIN DESCRIPTION - FREQUENCY
FREQUENCY: CONTINUOUS

## 2020-07-31 NOTE — PROGRESS NOTES
Patient arrives to room 2051 from ED per wheelchair. No signs of distress noted, complaining of constant lower back pain.  Updated on plan of care, oriented to room

## 2020-07-31 NOTE — ED NOTES
Mode of arrival (squad #, walk in, police, etc) : walk in        Chief complaint(s): flank pain        Arrival Note (brief scenario, treatment PTA, etc). : Pt co bilateral flank pain radiating to bilateral inguinal area worsening over the last 2 days. Pt states she has Stage 4 kidney disease and pain has been \"unbearable\". Pt reports dysuria: \"feels like razor blades\". Pt also reports hematuria. Pt A&Ox4, ambulatory, GCS=15, RR eupneic. C= \"Have you ever felt that you should Cut down on your drinking? \"  No  A= \"Have people Annoyed you by criticizing your drinking? \"  No  G= \"Have you ever felt bad or Guilty about your drinking? \"  No  E= \"Have you ever had a drink as an Eye-opener first thing in the morning to steady your nerves or to help a hangover? \"  No      Deferred []      Reason for deferring: N/A    *If yes to two or more: probable alcohol abuse. Shareen Peabody, RN  07/31/20 0747

## 2020-07-31 NOTE — H&P
History and Physical Service  [facility] Internal Medicine    HISTORY AND PHYSICAL EXAMINATION            Date:   7/31/2020  Patient name:  Emma Keith  MRN:   [id number]  Account:  [de-identified]  YOB: 1980  PCP:    Monica Beavers MD  Code Status:    Full Code    Chief Complaint:     Chief Complaint   Patient presents with    Abdominal Pain         History Obtained From:     Patient, EMR, nursing staff  His  HPI   tory Obtained From:  Past [address] History:[place] History: This patient is a 41 y.o. Non-/non  femalewho presents with right flank pain and vomiting  Status post lithotripsy done 7 days prior and has been in pain since. Severe dysuria. Worsening flank pain severe at the time of presentation  Initially on the right side now some pain on the left side as well  Reports associated nausea and sweating, no diarrhea or constipation. Known history of polycystic kidney disease. Follows with nephrology as well as urology. Baseline creatinine around 1.5      Review of Systems:     -Negative for fever and chills  -Denies cough or shortness of breath. Denies chest pain or palpitations.  -Positive for his dysuria and jake flank pain no increase in frequency of urination  -Positive for abdominal pain nausea  A 10 point review of systems was performed and and negative except as mentioned in HPI  Positive and Negative as described in HPI.       Past Medical History:     Past Medical History:   Diagnosis Date    Anxiety     Asthma     Chronic headaches 7/10/2013    CKD (chronic kidney disease) stage 3, GFR 30-59 ml/min ([facility])     Degenerative disc disease, cervical     Depression     Dysmenorrhea 9/30/2014    Eczema 11/8/2012    Headache(784.0)     HTN (hypertension) 10/2/2011    Hypertension     Hypocalcemia 5/28/2014    Kidney stone     Menorrhagia 9/30/2014    Neck pain, bilateral 5/28/2014    Pelvic pain in female 9/30/2014    Polycystic kidney     Smoker 10/2/2011    Tachycardia 1/20/2014    Tension vascular headache 1/20/2014        Past Surgical History:     Past Surgical History:   Procedure Laterality Date    BREAST ENHANCEMENT SURGERY      BREAST LUMPECTOMY      left breast    CYSTO/URETERO/PYELOSCOPY, CALCULUS TX Right 6/12/2020    HOLMIUM - CYSTO, RIGHT RETROGRADE PYELOGRAM, RIGHT URETEROSCOPY, LASER LITHO ON STAND BY, RIGHT STENT PLACEMENT performed by Mallory Ontiveros MD at [address] [place]  06/12/2020    DILATION AND CURETTAGE OF UTERUS      x2    LITHOTRIPSY Right 7/23/2020    ESWL EXTRACORPOREAL SHOCK WAVE LITHOTRIPSY performed by Mallory Ontiveros MD at [address]  07/01/2013    nerve block(right vervical C3/TON/C4/C5    NERVE BLOCK  07/08/2013    nerve block(right vervical C3/TON/C4/C5    OTHER SURGICAL HISTORY  03/10/2014     botox inj     TOE SURGERY      removal of ingrown toe nail-2nd toe on left foot     URETEROSCOPY Right 06/12/2020    stent placement        Medications Prior to Admission:     Prior to Admission medications    Medication Sig Start Date End Date Taking?  Authorizing Provider   melatonin 3 MG TABS tablet Take 3 mg by mouth nightly as needed (sleep)    Historical Provider, MD   vitamin D (ERGOCALCIFEROL) 1.25 MG (70667 UT) CAPS capsule Take 1 capsule by mouth once a week 6/24/20   Tatiana Marsh MD   loratadine (CLARITIN) 10 MG tablet Take 10 mg by mouth daily as needed (allergies)     Historical Provider, MD   amLODIPine (NORVASC) 5 MG tablet Take 5 mg by mouth daily    Historical Provider, MD   tamsulosin (FLOMAX) 0.4 MG capsule Take 0.4 mg by mouth nightly  5/18/20   Historical Provider, MD   DULoxetine (CYMBALTA) 60 MG extended release capsule take 1 capsule by mouth once daily 11/10/17   Usama Gilliam MD   Blood Pressure KIT Check BP once/day- goal is <140/90. 3/9/16   [place] Art, APRN - CNP        Allergies:     Bee pollen; Dust mite extract; Pcn [penicillins]; and Pollen extract    Social History:     Tobacco:    reports that she has been smoking cigarettes. She has a 16.00 pack-year smoking history. She has never used smokeless tobacco.  Alcohol:      reports no history of alcohol use. Drug Use:  reports no history of drug use. Family History:     Family History   Problem Relation Age of Onset    High Blood Pressure Mother     Asthma Sister    Deisy Hernandez Migraines Sister     High Blood Pressure Father     Other Brother         bad knees, with recent total knee replacement            Physical Exam:   BP (!) 157/93   Pulse 68   Temp 97.8 °F (36.6 °C) (Oral)   Resp 16   Ht 5' 8\" (1.727 m)   Wt 126 lb 1.7 oz (57.2 kg)   SpO2 97%   BMI 19.17 kg/m²   No results for input(s): POCGLU in the last 72 hours. General Appearance:  alert, well appearing, and in no acute distress  Mental status: oriented to person, place, and time with normal affect  Head:  normocephalic, atraumatic. Eye: no icterus, redness, pupils equal and reactive, extraocular eye movements intact, conjunctiva clear  Ear: normal external ear, no discharge, hearing intact  Nose:  no drainage noted  Mouth: mucous membranes moist  Neck: supple, no carotid bruits, thyroid not palpable  Lungs: Bilateral equal air entry, clear to ausculation, no wheezing, rales or rhonchi, normal effort  Cardiovascular: normal rate, regular rhythm, no murmur, gallop, rub. Abdomen: Soft, bilateral flank tenderness, mild epigastric tenderness.   Neurologic: There are no new focal motor or sensory deficits, normal muscle tone and bulk, no abnormal sensation, normal speech, cranial nerves II through XII grossly intact  Skin: No gross lesions, rashes, bruising or bleeding on exposed skin area  Extremities:  peripheral pulses palpable, no pedal edema or calf pain with palpation  Psych: normal affect     Investigations:      Laboratory Testing:  Recent Results (from the past 24 hour(s))   CBC Auto Differential    Collection Time: 07/30/20 11:22 PM   Result Value Ref Range    WBC 10.9 3.5 - 11.0 k/uL    RBC 3.82 (L) 4.0 - 5.2 m/uL    Hemoglobin 12.2 12.0 - 16.0 g/dL    Hematocrit 36.4 36 - 46 %    MCV 95.1 80 - 100 fL    MCH 32.0 26 - 34 pg    MCHC 33.6 31 - 37 g/dL    RDW 13.3 11.5 - 14.9 %    Platelets 096 987 - 179 k/uL    MPV 8.6 6.0 - 12.0 fL    NRBC Automated NOT REPORTED per 100 WBC    Differential Type NOT REPORTED     Seg Neutrophils 69 (H) 36 - 66 %    Lymphocytes 21 (L) 24 - 44 %    Monocytes 7 1 - 7 %    Eosinophils % 2 0 - 4 %    Basophils 1 0 - 2 %    Immature Granulocytes NOT REPORTED 0 %    Segs Absolute 7.50 1.3 - 9.1 k/uL    Absolute Lymph # 2.30 1.0 - 4.8 k/uL    Absolute Mono # 0.80 0.1 - 1.3 k/uL    Absolute Eos # 0.20 0.0 - 0.4 k/uL    Basophils Absolute 0.10 0.0 - 0.2 k/uL    Absolute Immature Granulocyte NOT REPORTED 0.00 - 0.30 k/uL    WBC Morphology NOT REPORTED     RBC Morphology NOT REPORTED     Platelet Estimate NOT REPORTED    Comprehensive Metabolic Panel    Collection Time: 07/30/20 11:22 PM   Result Value Ref Range    Glucose 96 70 - 99 mg/dL    BUN 22 (H) 6 - 20 mg/dL    CREATININE 2.37 (H) 0.50 - 0.90 mg/dL    Bun/Cre Ratio NOT REPORTED 9 - 20    Calcium 8.9 8.6 - 10.4 mg/dL    Sodium 137 135 - 144 mmol/L    Potassium 4.1 3.7 - 5.3 mmol/L    Chloride 105 98 - 107 mmol/L    CO2 19 (L) 20 - 31 mmol/L    Anion Gap 13 9 - 17 mmol/L    Alkaline Phosphatase 83 35 - 104 U/L    ALT 7 5 - 33 U/L    AST 11 <32 U/L    Total Bilirubin <0.15 (L) 0.3 - 1.2 mg/dL    Total Protein 7.3 6.4 - 8.3 g/dL    Alb 4.2 3.5 - 5.2 g/dL    Albumin/Globulin Ratio NOT REPORTED 1.0 - 2.5    GFR Non-African American 22 (L) >60 mL/min    GFR  27 (L) >60 mL/min    GFR Comment          GFR Staging NOT REPORTED    Lipase    Collection Time: 07/30/20 11:22 PM   Result Value Ref Range    Lipase 96 (H) 13 - 60 U/L   Urinalysis Reflex to Culture    Collection Time: 07/30/20 11:25 PM    Specimen: Urine, clean catch   Result Value Ref Range Color, UA YELLOW YELLOW    Turbidity UA CLEAR CLEAR    Glucose, Ur NEGATIVE NEGATIVE    Bilirubin Urine NEGATIVE NEGATIVE    Ketones, Urine NEGATIVE NEGATIVE    Specific Gravity, UA 1.008 1.000 - 1.030    Urine Hgb MOD (A) NEGATIVE    pH, UA 6.5 5.0 - 8.0    Protein, UA 2+ (A) NEGATIVE    Urobilinogen, Urine Normal Normal    Nitrite, Urine NEGATIVE NEGATIVE    Leukocyte Esterase, Urine NEGATIVE NEGATIVE    Urinalysis Comments NOT REPORTED    HCG, Pregnancy,Urine    Collection Time: 07/30/20 11:25 PM   Result Value Ref Range    HCG(Urine) Pregnancy Test NEGATIVE NEGATIVE   Microscopic Urinalysis    Collection Time: 07/30/20 11:25 PM   Result Value Ref Range    -          WBC, UA 0 TO 2 /HPF    RBC, UA 2 TO 5 /HPF    Casts UA NOT REPORTED /LPF    Crystals, UA NOT REPORTED None /HPF    Epithelial Cells UA 2 TO 5 /HPF    Renal Epithelial, UA NOT REPORTED 0 /HPF    Bacteria, UA FEW (A) None    Mucus, UA NOT REPORTED None    Trichomonas, UA NOT REPORTED None    Amorphous, UA NOT REPORTED None    Other Observations UA NOT REPORTED NOT REQ.     Yeast, UA NOT REPORTED None   Basic Metabolic Panel w/ Reflex to MG    Collection Time: 07/31/20 11:27 AM   Result Value Ref Range    Glucose 104 (H) 70 - 99 mg/dL    BUN 18 6 - 20 mg/dL    CREATININE 2.13 (H) 0.50 - 0.90 mg/dL    Bun/Cre Ratio NOT REPORTED 9 - 20    Calcium 8.3 (L) 8.6 - 10.4 mg/dL    Sodium 138 135 - 144 mmol/L    Potassium 4.2 3.7 - 5.3 mmol/L    Chloride 106 98 - 107 mmol/L    CO2 23 20 - 31 mmol/L    Anion Gap 9 9 - 17 mmol/L    GFR Non-African American 25 (L) >60 mL/min    GFR  31 (L) >60 mL/min    GFR Comment          GFR Staging NOT REPORTED    Lipase    Collection Time: 07/31/20 11:27 AM   Result Value Ref Range    Lipase 1,564 (H) 13 - 60 U/L       Recent Labs     07/31/20  1127 07/30/20  2322  07/23/20  0631   HGB  --  12.2  --   --    HCT  --  36.4  --   --    WBC  --  10.9  --   --    MCV  --  95.1  --   --     137   < >  --    K 4.2 Pancreas: Normal. Genitourinary: Redemonstration of extensive diffuse cystic replacement of both kidneys consistent with polycystic kidney disease. Stable nonobstructing 7 mm stone in the right kidney. It is difficult to evaluate for hydronephrosis and both ureters are not well seen. However, given this there is no obvious hydronephrosis. The urinary bladder is decompressed. IUD in the uterus. Bowel: Normal caliber bowel. No evidence of acute appendicitis. Vasculature: Atherosclerosis. No abdominal aortic aneurysm. Bones and Soft Tissues: No acute abnormality. Retroperitoneum/Mesentery: No intraperitoneal free air, ascites or fluid collection. No lymphadenopathy in the abdomen or pelvis. Generalized paucity of mesenteric fat. 1.  Redemonstration of extensive diffuse cystic replacement of both kidneys consistent with polycystic kidney disease. 2.  Stable nonobstructing 7 mm stone in the right kidney. It is difficult to evaluate for hydronephrosis and both ureters are not well seen. However, given this there is no obvious obstructive uropathy. 3.  No bowel obstruction. No evidence of acute appendicitis. Xr Abdomen (kub) (single Ap View)    Result Date: 7/23/2020  EXAMINATION: ONE SUPINE XRAY VIEW(S) OF THE ABDOMEN 7/23/2020 6:32 am COMPARISON: 06/30/2020 HISTORY: ORDERING SYSTEM PROVIDED HISTORY: preop TECHNOLOGIST PROVIDED HISTORY: preop Reason for Exam: pre-op, kidney stones Acuity: Unknown Type of Exam: Unknown FINDINGS: Single frontal view the abdomen is submitted for review. There is a hyperdense presumed calculus overlying the upper quadrant of the abdomen. Moderate stool burden. Nonobstructive bowel gas pattern. IUD noted in place. Stable appearing right-sided nephrolithiasis.         Current Facility-Administered Medications   Medication Dose Route Frequency Provider Last Rate Last Dose    sodium chloride flush 0.9 % injection 10 mL  10 mL Intravenous 2 times per day Donal Estes MD  sodium chloride flush 0.9 % injection 10 mL  10 mL Intravenous PRN Nathaniel Dutta MD        acetaminophen (TYLENOL) tablet 650 mg  650 mg Oral Q4H PRN Nathaniel Dutta MD        morphine (PF) injection 2 mg  2 mg Intravenous Q2H PRN Nathaniel Dutta MD   2 mg at 07/31/20 0724    Or    morphine sulfate (PF) injection 4 mg  4 mg Intravenous Q2H PRN Nathaniel Dutta MD   4 mg at 07/31/20 1250    ondansetron (ZOFRAN) injection 4 mg  4 mg Intravenous Q6H PRN Nathaniel Dutta MD   4 mg at 07/31/20 0909    0.9 % sodium chloride infusion   Intravenous Continuous Nathanielamandeep Dutta  mL/hr at 07/31/20 0815      amLODIPine (NORVASC) tablet 5 mg  5 mg Oral Daily Nathaniel Dutta MD   5 mg at 07/31/20 0824    DULoxetine (CYMBALTA) extended release capsule 60 mg  60 mg Oral Daily Nathaniel Dutta MD   60 mg at 07/31/20 0825    cetirizine (ZYRTEC) tablet 5 mg  5 mg Oral Daily [place] MD Tra   5 mg at 07/31/20 0824    melatonin ER tablet 3 mg  3 mg Oral Nightly PRN Nathaniel Dutta MD        tamsulosin (FLOMAX) capsule 0.4 mg  0.4 mg Oral Nightly Nathaniel MD Elina Dutta [START ON 8/4/2020] vitamin D (ERGOCALCIFEROL) capsule 50,000 Units  50,000 Units Oral Weekly Nathaniel Dutta MD        heparin (porcine) injection 5,000 Units  5,000 Units Subcutaneous BID Jamie Paulson MD   5,000 Units at 07/31/20 1257       Impressions :     1. Active Problems:    Flank pain  Resolved Problems:    * No resolved hospital problems.  *        2.  has a past medical history of Anxiety, Asthma, Chronic headaches (7/10/2013), CKD (chronic kidney disease) stage 3, GFR 30-59 ml/min ([facility] [address] Degenerative disc disease, cervical, Depression, Dysmenorrhea (9/30/2014), Eczema (11/8/2012), Headache(784.0), HTN (hypertension) (10/2/2011), Hypertension, Hypocalcemia (5/28/2014), Kidney stone, Menorrhagia (9/30/2014), Neck pain, bilateral (5/28/2014), Pelvic pain in female (9/30/2014), Polycystic kidney, Smoker (10/2/2011), Tachycardia (1/20/2014), and Tension vascular headache (1/20/2014). Plans:     1. Flank pain-suspect acute complicated pyelonephritis-start IV Rocephin, saline hydration ongoing  2. SILVANO on CKD stage III-intrinsic renal, nephrology consulted  3. Nephrolithiasis continue tamsulosin  4. Polycystic kidney disease. 5. CKD stage III  6. Hypertension-continue Norvasc. 7. Anxiety and depression-continue Cymbalta. 8. Elevated lipase- likely secondary to renal failure-no epigastric pain no GI symptoms, pancreas normal on CT.  Will monitor        DVT pplx-Heparin  Antibiotics started/continued-IV Onel Schulz MD  7/31/2020  1:34 PM

## 2020-07-31 NOTE — CONSULTS
Urology Consultation    Patient:  Shreya Ventura  MRN: [id number]  YOB: 1980    CHIEF COMPLAINT:  Flank pain    HISTORY OF PRESENT ILLNESS:   The patient is a 41 y.o. female who presents with right sided flank pain. She has known PCKD and recently underwent an ESWL for a right sided renal stone. She began having severe pain last night on the right flank, and on the left. She denies any fevers. She has had some nausea. No hematuria. Patient's old records, notes and chart reviewed and summarized above.     Past Medical History:    Past Medical History:   Diagnosis Date    Anxiety     Asthma     Chronic headaches 7/10/2013    CKD (chronic kidney disease) stage 3, GFR 30-59 ml/min ([facility])     Degenerative disc disease, cervical     Depression     Dysmenorrhea 9/30/2014    Eczema 11/8/2012    Headache(784.0)     HTN (hypertension) 10/2/2011    Hypertension     Hypocalcemia 5/28/2014    Kidney stone     Menorrhagia 9/30/2014    Neck pain, bilateral 5/28/2014    Pelvic pain in female 9/30/2014    Polycystic kidney     Smoker 10/2/2011    Tachycardia 1/20/2014    Tension vascular headache 1/20/2014       Past Surgical History:    Past Surgical History:   Procedure Laterality Date    BREAST ENHANCEMENT SURGERY      BREAST LUMPECTOMY      left breast    CYSTO/URETERO/PYELOSCOPY, CALCULUS TX Right 6/12/2020    HOLMIUM - CYSTO, RIGHT RETROGRADE PYELOGRAM, RIGHT URETEROSCOPY, LASER LITHO ON STAND BY, RIGHT STENT PLACEMENT performed by Darek Dominguez MD at [address] [facility]  06/12/2020    DILATION AND CURETTAGE OF UTERUS      x2    LITHOTRIPSY Right 7/23/2020    ESWL EXTRACORPOREAL SHOCK WAVE LITHOTRIPSY performed by Darek Dominguez MD at [address]  07/01/2013    nerve block(right vervical C3/TON/C4/C5    NERVE BLOCK  07/08/2013    nerve block(right vervical C3/TON/C4/C5    OTHER SURGICAL HISTORY  03/10/2014     botox inj     TOE SURGERY      removal of ingrown toe nail-2nd toe on left foot     URETEROSCOPY Right 06/12/2020    stent placement     Previous  surgery: ESWL, URS   Medications:    Scheduled Meds:   sodium chloride flush  10 mL Intravenous 2 times per day    amLODIPine  5 mg Oral Daily    DULoxetine  60 mg Oral Daily    cetirizine  5 mg Oral Daily    tamsulosin  0.4 mg Oral Nightly    [START ON 8/4/2020] vitamin D  50,000 Units Oral Weekly    heparin (porcine)  5,000 Units Subcutaneous BID    ciprofloxacin  400 mg Intravenous Q12H    famotidine (PEPCID) injection  20 mg Intravenous Daily     Continuous Infusions:   sodium chloride 100 mL/hr at 07/31/20 0815     PRN Meds:.sodium chloride flush, acetaminophen, morphine **OR** morphine, ondansetron, melatonin ER, hydrALAZINE    Allergies:  Bee pollen; Dust mite extract; Pcn [penicillins]; and Pollen extract    Social History:    Social History     Socioeconomic History    Marital status:      Spouse name: Not on file    Number of children: Not on file    Years of education: Not on file    Highest education level: Not on file   Occupational History    Occupation: stay at home mom   Social Needs    Financial resource strain: Not on file    Food insecurity     Worry: Not on file     Inability: Not on file    Transportation needs     Medical: Not on file     Non-medical: Not on file   Tobacco Use    Smoking status: Current Every Day Smoker     Packs/day: 1.00     Years: 16.00     Pack years: 16.00     Types: Cigarettes    Smokeless tobacco: Never Used    Tobacco comment: trying  to  cut  down   Substance and Sexual Activity    Alcohol use: No     Alcohol/week: 0.0 standard drinks    Drug use: No    Sexual activity: Yes     Partners: Male     Comment: steady boyfriend   Lifestyle    Physical activity     Days per week: Not on file     Minutes per session: Not on file    Stress: Not on file   Relationships    Social connections     Talks on phone: Not on file     Gets Chronic neck pain    Degenerative disc disease, cervical    Encounter for long-term (current) use of other medications    Cervicalgia    Chronic intractable headache    Hemorrhage of cyst of native kidney    Abdominal pain    CKD (chronic kidney disease) stage 4, GFR 15-29 ml/min ([facility])    Acute renal failure with acute tubular necrosis superimposed on stage 3 chronic kidney disease (HCC)    Allergic rhinitis due to animal hair and dander    Congenital multiple renal cysts    Gross hematuria    History of anemia due to chronic kidney disease    History of multiple allergies    Hyperlipidemia    IUD (intrauterine device) in place    Leukocytosis    Other specified disorders of kidney and ureter    Pain in joint    Recurrent major depression in full remission ([facility] [address]    Renovascular hypertension    Right ureteral stone    Nephrolithiasis    Flank pain       Plan:     41 y/0 female admitted with flank pain. Has known renal stone, which has not changed, no hydro noted. U/A does not look suspicious for infection. Pain likely related to PCKD. No intervention at this time. Can F/U with Dr Jan Doran as outpatient. Thank you.        Colyb Dawkisn  5:17 PM 7/31/2020

## 2020-07-31 NOTE — PLAN OF CARE
Problem: Pain:  Goal: Pain level will decrease  Description: Pain level will decrease  Outcome: Ongoing  Note: Patient medicated for pain as ordered with  moderate effectiveness  effectivenss   Goal: Control of acute pain  Description: Control of acute pain  Outcome: Ongoing  Goal: Control of chronic pain  Description: Control of chronic pain  Outcome: Ongoing     Problem: Coping:  Goal: Expressions of feelings of enhanced comfort will increase  Description: Expressions of feelings of enhanced comfort will increase  Outcome: Ongoing     Problem: Urinary Elimination:  Goal: Ability to achieve a balanced intake and output will improve  Description: Ability to achieve a balanced intake and output will improve  Outcome: Ongoing  Note: Patient complains of pain with urination  Goal: Ability to recognize the need to void and respond appropriately will improve  Description: Ability to recognize the need to void and respond appropriately will improve  Outcome: Ongoing  Goal: Will remain free from infection  Description: Will remain free from infection  Outcome: Ongoing

## 2020-07-31 NOTE — PROGRESS NOTES
Bedside rounding done, per Tay Mittal and [facility] [place], operated by [facility] Rn's  IV intact, no redness noted  IV tubing labeled  Pt. Wants to go outside to smoke  Pt.  C/o of indigestion  Dr. Henri Judge notified of pt's indigestion

## 2020-07-31 NOTE — PROGRESS NOTES
Writer takes over  care of patient. Patient wants to go out side to smoke.  Patient un hooked from iv at this time

## 2020-07-31 NOTE — ED PROVIDER NOTES
lumpectomy; Nerve Block (07/01/2013); Nerve Block (07/08/2013); other surgical history (03/10/2014 ); Toe Surgery; Breast enhancement surgery; Cystoscopy (06/12/2020); Ureteroscopy (Right, 06/12/2020); cysto/uretero/pyeloscopy, calculus tx (Right, 6/12/2020); and Lithotripsy (Right, 7/23/2020). Allergies:  Bee pollen; Dust mite extract; Pcn [penicillins]; and Pollen extract     Home Meds:   Prior to Visit Medications    Medication Sig Taking? Authorizing Provider   melatonin 3 MG TABS tablet Take 3 mg by mouth nightly as needed (sleep)  Historical Provider, MD   vitamin D (ERGOCALCIFEROL) 1.25 MG (85893 UT) CAPS capsule Take 1 capsule by mouth once a week  Aniyah Erwin MD   loratadine (CLARITIN) 10 MG tablet Take 10 mg by mouth daily as needed (allergies)   Historical Provider, MD   amLODIPine (NORVASC) 5 MG tablet Take 5 mg by mouth daily  Historical Provider, MD   tamsulosin (FLOMAX) 0.4 MG capsule Take 0.4 mg by mouth nightly   Historical Provider, MD   DULoxetine (CYMBALTA) 60 MG extended release capsule take 1 capsule by mouth once daily  Bernabe Ortiz MD   Blood Pressure KIT Check BP once/day- goal is <140/90. Mateus Sevilla, APRN - CNP     Please note that medications prescribed at discharge will auto-populate into this medication list when note is refreshed. Please look at prescription date andprescriber to clarify. Family History:family history includes Asthma in her sister; High Blood Pressure in her father and mother; Migraines in her sister; Other in her brother. Social History: She reports that she has been smoking cigarettes. She has a 16.00 pack-year smoking history. She has never used smokeless tobacco. She reports that she does not drink alcohol or use drugs. She reports being sexually active and has had partner(s) who are Male. REVIEW OF SYSTEMS    (2-9 systems for level 4, 10 or more for level 5)      Review of Systems   Constitutional: Positive for appetite change. Negative for chills and fever. HENT: Negative for congestion and sore throat. Eyes: Negative for pain and visual disturbance. Respiratory: Negative for cough, chest tightness and shortness of breath. Cardiovascular: Negative for chest pain and palpitations. Gastrointestinal: Positive for abdominal pain and nausea. Negative for constipation, diarrhea and vomiting. Genitourinary: Positive for dysuria and flank pain. Negative for frequency. Musculoskeletal: Negative for arthralgias, back pain, joint swelling and myalgias. Skin: Negative for rash and wound. Neurological: Negative for dizziness, light-headedness and headaches. PHYSICAL EXAM   (up to 7 for level 4, 8 or more for level 5)      Initial Vitals   ED Triage Vitals [07/30/20 2208]   BP Temp Temp Source Pulse Resp SpO2 Height Weight   (!) 181/99 98.5 °F (36.9 °C) Oral 107 16 98 % 5' 5\" (1.651 m) 125 lb (56.7 kg)       Physical Exam  Vitals signs and nursing note reviewed. Constitutional:       Appearance: Normal appearance. Comments: Appears uncomfortable but non-toxic   HENT:      Head: Normocephalic and atraumatic. Mouth/Throat:      Comments: Patient is currently wearing a facial mask. Given that patient is not complaining of sore throat or difficulty swallowing, mask was left in place to decrease risk of aersolization of particles in the setting of current CoVID-19 pandemic    Eyes:      Extraocular Movements: Extraocular movements intact. Conjunctiva/sclera: Conjunctivae normal.   Neck:      Musculoskeletal: Normal range of motion. No neck rigidity or muscular tenderness. Cardiovascular:      Rate and Rhythm: Regular rhythm. Tachycardia present. Heart sounds: No murmur. No friction rub. No gallop. Pulmonary:      Effort: Pulmonary effort is normal.      Breath sounds: Normal breath sounds. No wheezing or rhonchi. Abdominal:      General: There is no distension. Palpations: Abdomen is soft.  There is no mass. Tenderness: There is no abdominal tenderness. There is right CVA tenderness. There is no left CVA tenderness. Musculoskeletal: Normal range of motion. General: No swelling or tenderness. Skin:     General: Skin is warm. Capillary Refill: Capillary refill takes less than 2 seconds. Findings: No bruising or erythema. Neurological:      General: No focal deficit present. Mental Status: She is alert and oriented to person, place, and time. DIFFERENTIAL DIAGNOSIS/IMPRESSION     DDX: Nephrolithiasis, partial passed kidney stone, UTI, pyelonephritis, musculoskeletal    Impression: 41 y.o. female who presents with right flank pain. Has a history of positive kidney disease with a 7 mm stone that was in the kidney that had lithotripsy done a week ago, the pain is now worsening and also has dysuria. On exam appears extremely uncomfortable and is tachycardic. Cannot sit still. Does have CVA tenderness on the right but no significant abdominal tenderness. And concern for a retained stone versus infection. Will get basic labs, UA, and CT scan of abdomen and pelvis without contrast to look for kidney stone. Will give morphine, Zofran, and IV fluids and then reevaluate    Patient was screened and has no clinical signs or symptoms of a CoVID-19 infection at this time. However, given current pandemic and atypical presentations, face mask, eye protection, and gloves were worn during examination. DIAGNOSTIC RESULTS     EKG: All EKG's are interpreted by the Emergency Department Physician who either signs or Co-signs this chart in the absence of a cardiologist.    Not clinically indicated at this time.       LABS: Labswere reviewed by me and abnormal results are displayed above     Labs Reviewed   CBC WITH AUTO DIFFERENTIAL - Abnormal; Notable for the following components:       Result Value    RBC 3.82 (*)     Seg Neutrophils 69 (*)     Lymphocytes 21 (*)     All other components within normal limits   COMPREHENSIVE METABOLIC PANEL - Abnormal; Notable for the following components:    BUN 22 (*)     CREATININE 2.37 (*)     CO2 19 (*)     Total Bilirubin <0.15 (*)     GFR Non- 22 (*)     GFR  27 (*)     All other components within normal limits   LIPASE - Abnormal; Notable for the following components:    Lipase 96 (*)     All other components within normal limits   URINE RT REFLEX TO CULTURE - Abnormal; Notable for the following components:    Urine Hgb MOD (*)     Protein, UA 2+ (*)     All other components within normal limits   MICROSCOPIC URINALYSIS - Abnormal; Notable for the following components:    Bacteria, UA FEW (*)     All other components within normal limits   PREGNANCY, URINE       RADIOLOGY: All plain film, CT, MRI, and formal ultrasound images (except ED bedside ultrasound) are read by the radiologist, see reports below, unless otherwise noted in ED Course, MDM or here. Ct Abdomen Pelvis Wo Contrast    Result Date: 7/30/2020  EXAMINATION: CT OF THE ABDOMEN AND PELVIS WITHOUT CONTRAST 7/30/2020 11:04 pm TECHNIQUE: CT of the abdomen and pelvis was performed without the administration of intravenous contrast. Multiplanar reformatted images are provided for review. Dose modulation, iterative reconstruction, and/or weight based adjustment of the mA/kV was utilized to reduce the radiation dose to as low as reasonably achievable. COMPARISON: June 25, 2020. HISTORY: ORDERING SYSTEM PROVIDED HISTORY: Abdominal pain TECHNOLOGIST PROVIDED HISTORY: Abdominal pain Is the patient pregnant?->No Reason for Exam: patient complains of bilateral flank pain h/o kidney stones and polycystic kidney disease Acuity: Acute Relevant Medical/Surgical History: surgeries to area of interest include DNC, stent placements, lithotripsy FINDINGS: Lower Chest: No acute abnormality.  Liver: Normal. Gallbladder and Bile Ducts: Normal. Spleen: Normal. Adrenal Glands: Normal. Pancreas: Normal. Genitourinary: Redemonstration of extensive diffuse cystic replacement of both kidneys consistent with polycystic kidney disease. Stable nonobstructing 7 mm stone in the right kidney. It is difficult to evaluate for hydronephrosis and both ureters are not well seen. However, given this there is no obvious hydronephrosis. The urinary bladder is decompressed. IUD in the uterus. Bowel: Normal caliber bowel. No evidence of acute appendicitis. Vasculature: Atherosclerosis. No abdominal aortic aneurysm. Bones and Soft Tissues: No acute abnormality. Retroperitoneum/Mesentery: No intraperitoneal free air, ascites or fluid collection. No lymphadenopathy in the abdomen or pelvis. Generalized paucity of mesenteric fat. 1.  Redemonstration of extensive diffuse cystic replacement of both kidneys consistent with polycystic kidney disease. 2.  Stable nonobstructing 7 mm stone in the right kidney. It is difficult to evaluate for hydronephrosis and both ureters are not well seen. However, given this there is no obvious obstructive uropathy. 3.  No bowel obstruction. No evidence of acute appendicitis. BEDSIDE ULTRASOUND:  Not clinically indicated at this time.       ED COURSE      ED Medication Orders (From admission, onward)    Start Ordered     Status Ordering Provider    07/31/20 0230 07/31/20 0217  morphine sulfate (PF) injection 4 mg  ONCE      Last MAR action:  Given - by Jean Cifuentes on 07/31/20 at [facility], [place]    07/31/20 0230 07/31/20 0219  0.9 % sodium chloride bolus  ONCE      Last MAR action:  Stopped - by Nestor Chaidez on 07/31/20 at [address] [place], [place] [id number], [place]    07/31/20 0130 07/31/20 0119  oxyCODONE (ROXICODONE) immediate release tablet 5 mg  ONCE      Last MAR action:  Given - by ZACHARY SEVERINO on 07/31/20 at [address] [facility]    07/30/20 2315 07/30/20 2300  0.9 % sodium chloride bolus  ONCE      Last MAR action:  Stopped - by Jean Cifuentes on 07/31/20 at [facility]    07/30/20 2315 07/30/20 2300  morphine sulfate (PF) injection 4 mg  ONCE      Last MAR action:  Given - by ZACHARY SEVERINO on 07/30/20 at [facility] [id number]    07/30/20 2315 07/30/20 2300  ondansetron (ZOFRAN) injection 4 mg  ONCE      Last MAR action:  Given - by Barbvictoriano Roa on 07/30/20 at [address] [place]          EMERGENCYDEPARTMENT COURSE:  ED Course as of Jul 31 1021   Fri Jul 31, 2020   0230 Patient still in significant pain despite getting around the morphine, and Roxicodone. As soon as the meds start to wear off, she becomes tachycardic and cannot sit still. Giving another round of morphine. Given significant pain with a 7 mm stone unchanged in the lithotripsy, will call urology as well as get her admitted to primary care. [CK]   8009 Spoke to Dr. Lata Lambert with urology. Discussed the findings, he states since that patient did not have any signs of actual obstruction at this time and no UA, no acute intervention needed at this moment, but will evaluate the patient once admitted. [CK]   4038 Spke to Dr. Gilberto Hubbard who accepted the admission    [CK]      ED Course User Index  [CK] Cris Montalvo MD        PROCEDURES:  None     CONSULTS:  IP CONSULT TO UROLOGY  IP CONSULT TO INTERNAL MEDICINE    CRITICAL CARE:  None      FINAL IMPRESSION      1. Flank pain    2.  Nephrolithiasis          DISPOSITION / PLAN     DISPOSITION Admitted 07/31/2020 03:15:17 AM      PATIENT REFERRED TO:  Mackenzie iHll MD  [place] [id number]  [facility]  [phone]            DISCHARGE MEDICATIONS:  Current Discharge Medication List          Cris Montalvo MD  Emergency Medicine Attending      (Please note that portions of this note were completed with a voice recognition program.  Efforts were made to edit the dictations but occasionallywords are mis-transcribed.)         Cris Montalvo MD  07/31/20 4776

## 2020-08-01 VITALS
HEIGHT: 68 IN | WEIGHT: 126.1 LBS | SYSTOLIC BLOOD PRESSURE: 132 MMHG | TEMPERATURE: 98.8 F | BODY MASS INDEX: 19.11 KG/M2 | DIASTOLIC BLOOD PRESSURE: 79 MMHG | OXYGEN SATURATION: 96 % | RESPIRATION RATE: 16 BRPM | HEART RATE: 85 BPM

## 2020-08-01 LAB
ANION GAP SERPL CALCULATED.3IONS-SCNC: 7 MMOL/L (ref 9–17)
BUN BLDV-MCNC: 17 MG/DL (ref 6–20)
BUN/CREAT BLD: ABNORMAL (ref 9–20)
CALCIUM SERPL-MCNC: 8.1 MG/DL (ref 8.6–10.4)
CHLORIDE BLD-SCNC: 108 MMOL/L (ref 98–107)
CO2: 22 MMOL/L (ref 20–31)
CREAT SERPL-MCNC: 2.19 MG/DL (ref 0.5–0.9)
CULTURE: NO GROWTH
GFR AFRICAN AMERICAN: 30 ML/MIN
GFR NON-AFRICAN AMERICAN: 25 ML/MIN
GFR SERPL CREATININE-BSD FRML MDRD: ABNORMAL ML/MIN/{1.73_M2}
GFR SERPL CREATININE-BSD FRML MDRD: ABNORMAL ML/MIN/{1.73_M2}
GLUCOSE BLD-MCNC: 92 MG/DL (ref 70–99)
HCT VFR BLD CALC: 31.3 % (ref 36–46)
HEMOGLOBIN: 10.4 G/DL (ref 12–16)
LIPASE: 58 U/L (ref 13–60)
Lab: NORMAL
MCH RBC QN AUTO: 32.6 PG (ref 26–34)
MCHC RBC AUTO-ENTMCNC: 33.2 G/DL (ref 31–37)
MCV RBC AUTO: 98.1 FL (ref 80–100)
NRBC AUTOMATED: ABNORMAL PER 100 WBC
PDW BLD-RTO: 13.8 % (ref 11.5–14.9)
PLATELET # BLD: 234 K/UL (ref 150–450)
PMV BLD AUTO: 7.8 FL (ref 6–12)
POTASSIUM SERPL-SCNC: 4.1 MMOL/L (ref 3.7–5.3)
RBC # BLD: 3.19 M/UL (ref 4–5.2)
SODIUM BLD-SCNC: 137 MMOL/L (ref 135–144)
SPECIMEN DESCRIPTION: NORMAL
WBC # BLD: 7.2 K/UL (ref 3.5–11)

## 2020-08-01 PROCEDURE — G0378 HOSPITAL OBSERVATION PER HR: HCPCS

## 2020-08-01 PROCEDURE — 6360000002 HC RX W HCPCS: Performed by: INTERNAL MEDICINE

## 2020-08-01 PROCEDURE — 83690 ASSAY OF LIPASE: CPT

## 2020-08-01 PROCEDURE — 2580000003 HC RX 258: Performed by: INTERNAL MEDICINE

## 2020-08-01 PROCEDURE — 99239 HOSP IP/OBS DSCHRG MGMT >30: CPT | Performed by: INTERNAL MEDICINE

## 2020-08-01 PROCEDURE — 36415 COLL VENOUS BLD VENIPUNCTURE: CPT

## 2020-08-01 PROCEDURE — 85027 COMPLETE CBC AUTOMATED: CPT

## 2020-08-01 PROCEDURE — 80048 BASIC METABOLIC PNL TOTAL CA: CPT

## 2020-08-01 PROCEDURE — 6370000000 HC RX 637 (ALT 250 FOR IP): Performed by: INTERNAL MEDICINE

## 2020-08-01 PROCEDURE — 96376 TX/PRO/DX INJ SAME DRUG ADON: CPT

## 2020-08-01 PROCEDURE — 96372 THER/PROPH/DIAG INJ SC/IM: CPT

## 2020-08-01 PROCEDURE — 96361 HYDRATE IV INFUSION ADD-ON: CPT

## 2020-08-01 PROCEDURE — 1200000000 HC SEMI PRIVATE

## 2020-08-01 PROCEDURE — 2500000003 HC RX 250 WO HCPCS: Performed by: INTERNAL MEDICINE

## 2020-08-01 PROCEDURE — 96366 THER/PROPH/DIAG IV INF ADDON: CPT

## 2020-08-01 RX ORDER — OXYCODONE HYDROCHLORIDE AND ACETAMINOPHEN 5; 325 MG/1; MG/1
1 TABLET ORAL EVERY 8 HOURS PRN
Qty: 6 TABLET | Refills: 0 | Status: SHIPPED | OUTPATIENT
Start: 2020-08-01 | End: 2020-08-01 | Stop reason: SDUPTHER

## 2020-08-01 RX ORDER — OXYCODONE HYDROCHLORIDE AND ACETAMINOPHEN 5; 325 MG/1; MG/1
1 TABLET ORAL EVERY 8 HOURS PRN
Qty: 6 TABLET | Refills: 0 | Status: SHIPPED | OUTPATIENT
Start: 2020-08-01 | End: 2020-08-03

## 2020-08-01 RX ADMIN — SODIUM CHLORIDE: 9 INJECTION, SOLUTION INTRAVENOUS at 01:24

## 2020-08-01 RX ADMIN — MORPHINE SULFATE 2 MG: 2 INJECTION, SOLUTION INTRAMUSCULAR; INTRAVENOUS at 01:22

## 2020-08-01 RX ADMIN — FAMOTIDINE 20 MG: 10 INJECTION, SOLUTION INTRAVENOUS at 09:00

## 2020-08-01 RX ADMIN — SODIUM CHLORIDE: 9 INJECTION, SOLUTION INTRAVENOUS at 13:10

## 2020-08-01 RX ADMIN — CIPROFLOXACIN 400 MG: 2 INJECTION, SOLUTION INTRAVENOUS at 14:41

## 2020-08-01 RX ADMIN — AMLODIPINE BESYLATE 5 MG: 5 TABLET ORAL at 09:05

## 2020-08-01 RX ADMIN — MORPHINE SULFATE 2 MG: 2 INJECTION, SOLUTION INTRAMUSCULAR; INTRAVENOUS at 15:41

## 2020-08-01 RX ADMIN — MORPHINE SULFATE 2 MG: 2 INJECTION, SOLUTION INTRAMUSCULAR; INTRAVENOUS at 09:11

## 2020-08-01 RX ADMIN — MORPHINE SULFATE 2 MG: 2 INJECTION, SOLUTION INTRAMUSCULAR; INTRAVENOUS at 13:06

## 2020-08-01 RX ADMIN — CIPROFLOXACIN 400 MG: 2 INJECTION, SOLUTION INTRAVENOUS at 03:30

## 2020-08-01 RX ADMIN — CETIRIZINE HYDROCHLORIDE 5 MG: 10 TABLET, FILM COATED ORAL at 09:05

## 2020-08-01 RX ADMIN — HEPARIN SODIUM 5000 UNITS: 5000 INJECTION INTRAVENOUS; SUBCUTANEOUS at 09:07

## 2020-08-01 RX ADMIN — DULOXETINE HYDROCHLORIDE 60 MG: 60 CAPSULE, DELAYED RELEASE ORAL at 09:04

## 2020-08-01 ASSESSMENT — PAIN SCALES - GENERAL
PAINLEVEL_OUTOF10: 6
PAINLEVEL_OUTOF10: 8
PAINLEVEL_OUTOF10: 8
PAINLEVEL_OUTOF10: 6
PAINLEVEL_OUTOF10: 6
PAINLEVEL_OUTOF10: 8
PAINLEVEL_OUTOF10: 9
PAINLEVEL_OUTOF10: 8
PAINLEVEL_OUTOF10: 0

## 2020-08-01 ASSESSMENT — PAIN DESCRIPTION - LOCATION
LOCATION: BACK

## 2020-08-01 ASSESSMENT — PAIN DESCRIPTION - ORIENTATION
ORIENTATION: LOWER
ORIENTATION: LOWER

## 2020-08-01 ASSESSMENT — PAIN DESCRIPTION - DESCRIPTORS
DESCRIPTORS: ACHING;SHARP
DESCRIPTORS: SHARP;SQUEEZING

## 2020-08-01 ASSESSMENT — PAIN DESCRIPTION - PAIN TYPE
TYPE: ACUTE PAIN

## 2020-08-01 NOTE — CONSULTS
NEPHROLOGY CONSULT       Reason for Consult: Acute kidney injury      Chief Complaint: Right flank pain  History Obtained From: Patient    History of Present Illness: This is a 41 y.o. female with history of adult polycystic kidney disease, hypertension, CKD stage III with creatinine of 1.48 mg/dL in August 2019 and recently 2.0 mg/dL. Patient  has history of bilateral nephrolithiasis. Patient had right laser lithotripsy in June 2020 and recently ESWL 1 week ago by Dr. Rosy Dumont. Patient presented to the ER with abrupt onset of right flank pain which has been persistent. She denied any dysuria, fever nausea or vomiting. She had elevated creatinine of 2.37 mg/dL with BUN of 22. Denies any history of recurrent UTIs. CT scan showed redemonstration of extensive diffuse cystic replacement of both kidneys consistent with polycystic kidney disease, stable nonobstructing 7 m stone in the right kidney. No bowel obstruction no evidence of acute appendicitis.      Past Medical History:        Diagnosis Date    Anxiety     Asthma     Chronic headaches 7/10/2013    CKD (chronic kidney disease) stage 3, GFR 30-59 ml/min ([facility])     Degenerative disc disease, cervical     Depression     Dysmenorrhea 9/30/2014    Eczema 11/8/2012    Headache(784.0)     HTN (hypertension) 10/2/2011    Hypertension     Hypocalcemia 5/28/2014    Kidney stone     Menorrhagia 9/30/2014    Neck pain, bilateral 5/28/2014    Pelvic pain in female 9/30/2014    Polycystic kidney     Smoker 10/2/2011    Tachycardia 1/20/2014    Tension vascular headache 1/20/2014       Past Surgical History:        Procedure Laterality Date    BREAST ENHANCEMENT SURGERY      BREAST LUMPECTOMY      left breast    CYSTO/URETERO/PYELOSCOPY, CALCULUS TX Right 6/12/2020    HOLMIUM - CYSTO, RIGHT RETROGRADE PYELOGRAM, RIGHT URETEROSCOPY, LASER LITHO ON STAND BY, RIGHT STENT PLACEMENT performed by Boris Mcwilliams MD at [address] [place] [address]  06/12/2020  DILATION AND CURETTAGE OF UTERUS      x2    LITHOTRIPSY Right 7/23/2020    ESWL EXTRACORPOREAL SHOCK WAVE LITHOTRIPSY performed by Carrillo Sampson MD at [address] [place] [address]  07/01/2013    nerve block(right vervical C3/TON/C4/C5    NERVE BLOCK  07/08/2013    nerve block(right vervical C3/TON/C4/C5    OTHER SURGICAL HISTORY  03/10/2014     botox inj     TOE SURGERY      removal of ingrown toe nail-2nd toe on left foot     URETEROSCOPY Right 06/12/2020    stent placement       Current Medications:    sodium chloride flush 0.9 % injection 10 mL, 2 times per day  sodium chloride flush 0.9 % injection 10 mL, PRN  acetaminophen (TYLENOL) tablet 650 mg, Q4H PRN  morphine (PF) injection 2 mg, Q2H PRN    Or  morphine sulfate (PF) injection 4 mg, Q2H PRN  ondansetron (ZOFRAN) injection 4 mg, Q6H PRN  0.9 % sodium chloride infusion, Continuous  amLODIPine (NORVASC) tablet 5 mg, Daily  DULoxetine (CYMBALTA) extended release capsule 60 mg, Daily  cetirizine (ZYRTEC) tablet 5 mg, Daily  melatonin ER tablet 3 mg, Nightly PRN  tamsulosin (FLOMAX) capsule 0.4 mg, Nightly  [START ON 8/4/2020] vitamin D (ERGOCALCIFEROL) capsule 50,000 Units, Weekly  heparin (porcine) injection 5,000 Units, BID  ciprofloxacin (CIPRO) IVPB 400 mg, Q12H  hydrALAZINE (APRESOLINE) injection 10 mg, Q6H PRN  famotidine (PEPCID) injection 20 mg, Daily        Allergies:  Bee pollen; Dust mite extract; Pcn [penicillins]; and Pollen extract    Social History:   Social History     Socioeconomic History    Marital status:      Spouse name: Not on file    Number of children: Not on file    Years of education: Not on file    Highest education level: Not on file   Occupational History    Occupation: stay at home mom   Social Needs    Financial resource strain: Not on file    Food insecurity     Worry: Not on file     Inability: Not on file    Transportation needs     Medical: Not on file     Non-medical: Not on file   Tobacco Use    Smoking status: Current Every Day Smoker     Packs/day: 1.00     Years: 16.00     Pack years: 16.00     Types: Cigarettes    Smokeless tobacco: Never Used    Tobacco comment: trying  to  cut  down   Substance and Sexual Activity    Alcohol use: No     Alcohol/week: 0.0 standard drinks    Drug use: No    Sexual activity: Yes     Partners: Male     Comment: steady boyfriend   Lifestyle    Physical activity     Days per week: Not on file     Minutes per session: Not on file    Stress: Not on file   Relationships    Social connections     Talks on phone: Not on file     Gets together: Not on file     Attends Nondenominational service: Not on file     Active member of club or organization: Not on file     Attends meetings of clubs or organizations: Not on file     Relationship status: Not on file    Intimate partner violence     Fear of current or ex partner: Not on file     Emotionally abused: Not on file     Physically abused: Not on file     Forced sexual activity: Not on file   Other Topics Concern    Not on file   Social History Narrative    Not on file       Family History:   Family History   Problem Relation Age of Onset    High Blood Pressure Mother     Asthma Sister     Migraines Sister     High Blood Pressure Father     Other Brother         bad knees, with recent total knee replacement        Review of Systems:    Constitutional: No fever, no chills, no night sweats, fatigue, generalized weakness, loss of appetite  HEENT:  No headache, otalgia, itchy eyes, epistaxis, nasal discharge or sore throat. Cardiac:  No chest pain, dyspnea, orthopnea or PND, palpitations  Chest:  No cough, hemoptysis, pleuritic chest pain, wheezing,SOB  Abdomen:  No abdominal pain, nausea, vomiting, diarrhea, melena, dysphagia hematemesis,constipation, abdominal bloating, flank pain  Neuro:  No CVA, TIA or seizure like activity. Skin:   No rashes, no itching.   :   No hematuria, no pyuria, no dysuria, no flank pain.  Extremities:  No swelling or joint pains. Objective:  CURRENT TEMPERATURE:  Temp: 98.8 °F (37.1 °C)  MAXIMUM TEMPERATURE OVER 24HRS:  Temp (24hrs), Av.8 °F (37.1 °C), Min:98.6 °F (37 °C), Max:99 °F (37.2 °C)    CURRENT RESPIRATORY RATE:  Resp: 16  CURRENT PULSE:  Pulse: 85  CURRENT BLOOD PRESSURE:  BP: 132/79  24HR BLOOD PRESSURE RANGE:  Systolic (32WWN), UWU:844 , Min:132 , NS   ; Diastolic (19WUG), VEQ:54, Min:79, Max:88    24HR INTAKE/OUTPUT:      Intake/Output Summary (Last 24 hours) at 2020 1751  Last data filed at 2020 1517  Gross per 24 hour   Intake 3055 ml   Output 3800 ml   Net -745 ml     Patient Vitals for the past 96 hrs (Last 3 readings):   Weight   20 0724 126 lb 1.7 oz (57.2 kg)   20 2208 125 lb (56.7 kg)         Physical Exam:  GENERAL APPEARANCE: Alert and cooperative, and appears to be in no acute distress. HEAD: normocephalic  EYES: PERRL, EOMI. Not pale, anicteric   NOSE:  No nasal discharge. THROAT:  Oral cavity and pharynx normal. Moist  NECK: Neck supple, non-tender without lymphadenopathy, masses or thyromegaly. JVD-neg  CARDIAC: Normal S1 and S2. No S3, S4 or murmurs. Rhythm is regular. LUNGS: Clear to auscultation and percussion without rales, rhonchi, wheezing or diminished breath sounds. ABD-Soft non distended, BS+ Non tender no organomegally  BACK: Examination of the spine reveals  no spinal deformity, without tenderness,   MUSKULOSKELETAL: Adequately aligned spine. No joint erythema or tenderness. EXTREMITIES: No edema. Peripheral pulses intact.    NEURO:Alert oriented x 3 ,power 5/5 in all extremities      Labs:   CBC:  Recent Labs     20  2322 20  0602   WBC 10.9 7.2   RBC 3.82* 3.19*   HGB 12.2 10.4*   HCT 36.4 31.3*   MCV 95.1 98.1   MCH 32.0 32.6   MCHC 33.6 33.2   RDW 13.3 13.8    234   MPV 8.6 7.8      BMP:   Recent Labs     20  2322 20  1127 20  0602    138 137   K 4.1 4.2 4.1    106 108*   CO2 19* 23 22   BUN 22* 18 17   CREATININE 2.37* 2.13* 2.19*   GLUCOSE 96 104* 92   CALCIUM 8.9 8.3* 8.1*        Phosphorus:  No results for input(s): PHOS in the last 72 hours. Magnesium: No results for input(s): MG in the last 72 hours. Albumin:   Recent Labs     07/30/20  2322   LABALBU 4.2       IRON:  No results found for: IRON  Iron Saturation:  No components found for: PERCENTFE  TIBC:  No results found for: TIBC  FERRITIN:  No results found for: FERRITIN  SPEP:   Lab Results   Component Value Date    PROT 7.3 07/30/2020     UPEP: No results found for: TPU     C3: No results found for: C3  C4: No results found for: C4  MPO ANCA:  No results found for: MPO . PR3 ANCA:  No results found for: PR3  Urine Sodium:    Lab Results   Component Value Date    NOHELIA 54 06/30/2020      Urine Potassium:  No results found for: KUR  Urine Chloride:  No components found for: CLU  Urine Ph:  No components found for: PO4U  Urine Osmolarity:  No results found for: OSMOU  Urine Creatinine:    Lab Results   Component Value Date    LABCREA 42.2 06/30/2020     Urine Eosinophils: No components found for: EOSU  Urine Protein:  No results found for: TPU  Urinalysis:  U/A:   Lab Results   Component Value Date    NITRU NEGATIVE 07/31/2020    COLORU YELLOW 07/31/2020    PHUR 6.0 07/31/2020    WBCUA 0 TO 2 07/31/2020    RBCUA 2 TO 5 07/31/2020    MUCUS NOT REPORTED 07/31/2020    TRICHOMONAS NOT REPORTED 07/31/2020    YEAST NOT REPORTED 07/31/2020    BACTERIA FEW 07/31/2020    CLARITYU Clear 04/26/2016    SPECGRAV 1.007 07/31/2020    LEUKOCYTESUR NEGATIVE 07/31/2020    UROBILINOGEN Normal 07/31/2020    BILIRUBINUR NEGATIVE 07/31/2020    BILIRUBINUR neg 09/30/2014    BLOODU Positive 04/26/2016    GLUCOSEU NEGATIVE 07/31/2020    [address] NEGATIVE 07/31/2020    AMORPHOUS NOT REPORTED 07/31/2020         Radiology:  Reviewed as available. Assessment:  1.   Chronic kidney disease stage III secondary to adult polycystic kidney disease    2. Acute kidney injury superimposed on CKD stage III secondary to intravascular volume depletion. No evidence of obstruction on abdominal CT scan-creatinine improving currently 2.1 on IV fluid hydration. 3.  Right flank pain possibly secondary to expanding cysts. Rule out urinary tract infection. No evidence of obstructing stone. 4.  Essential hypertension-controlled       Plan:  1. Continue IV fluids with 0.9 saline at 125 cc/h  2. Comprehensive urine testing including Urinalysis, Urine sodium, potassium, chloride, Urine protein and creatinine to quantify the proteinuria if present. Will check urinary eosinophils. 3.  Urology has been consulted-recommendations noted  4. Check a urine culture  5. Follow-up with urology as outpatient    Thank you for the consultation.       Electronically signed by Elizabeth Burnett MD on 8/1/2020 at 5:51 PM

## 2020-08-01 NOTE — PLAN OF CARE
Problem: Pain:  Goal: Pain level will decrease  Description: Pain level will decrease  Outcome: Ongoing  Note: Patient medicated for pain as ordered with effectivenss   Goal: Control of acute pain  Description: Control of acute pain  Outcome: Ongoing  Goal: Control of chronic pain  Description: Control of chronic pain  Outcome: Ongoing     Problem: Coping:  Goal: Expressions of feelings of enhanced comfort will increase  Description: Expressions of feelings of enhanced comfort will increase  Outcome: Ongoing     Problem: Urinary Elimination:  Goal: Ability to achieve a balanced intake and output will improve  Description: Ability to achieve a balanced intake and output will improve  Outcome: Ongoing  Goal: Ability to recognize the need to void and respond appropriately will improve  Description: Ability to recognize the need to void and respond appropriately will improve  Outcome: Ongoing  Goal: Will remain free from infection  Description: Will remain free from infection  Outcome: Ongoing

## 2020-08-01 NOTE — DISCHARGE SUMMARY
[place] [address] [facility]    Discharge Summary     Patient ID: Kayy Estrada  :  1980   MRN: [id number]     ACCOUNT:  [de-identified]   Patient's PCP: Alfreda Bustamante MD  Admit Date: 2020   Discharge Date: 2020    Length of Stay: 0  Code Status:  Full Code  Admitting Physician: Maribeth Tracy MD  Discharge Physician: Jess Rodriguez MD     Active Discharge Diagnoses:     Primary Problem  <principal problem not specified>      [place] Problems    Diagnosis Date Noted    Flank pain [R10.9] 2020    Depression with anxiety [F41.8] 2013    Polycystic kidney [Q61.3] 10/02/2011    HTN (hypertension) [I10] 10/02/2011       Admission Condition:  fair     Discharged Condition: fair    Hospital Stay:     Hospital Course:  Kayy Estrada is a 41 y.o. female who was admitted for the management of <principal problem not specified> , presented to ER with Abdominal Pain    This patient is a 41 y.o. Non-/non  femalewho presents with right flank pain and vomiting  Status post lithotripsy done 7 days prior and has been in pain since. Severe dysuria. Worsening flank pain severe at the time of presentation  Initially on the right side then some pain on the left side as well  Reports associated nausea and sweating, no diarrhea or constipation. Known history of polycystic kidney disease. Follows with nephrology as well as urology. Baseline creat 1.5    1. Flank pain-initially suspect acute complicated pyelonephritis-start IV Rocephin, saline hydration. UA not suggestive of infection- suspect pain is due to expanding cysts  2. SILVANO on CKD stage III-intrinsic renal, sec to intravascular volume depletion, nephrology consulted  3. Nephrolithiasis - unchanged on CT, continue tamsulosin  4. Polycystic kidney disease. 5. CKD stage III sec to #4  6. Hypertension-continue Norvasc.   7. Anxiety and depression-continue not well seen. However, given this there is no obvious hydronephrosis. The urinary bladder is decompressed. IUD in the uterus. Bowel: Normal caliber bowel. No evidence of acute appendicitis. Vasculature: Atherosclerosis. No abdominal aortic aneurysm. Bones and Soft Tissues: No acute abnormality. Retroperitoneum/Mesentery: No intraperitoneal free air, ascites or fluid collection. No lymphadenopathy in the abdomen or pelvis. Generalized paucity of mesenteric fat. 1.  Redemonstration of extensive diffuse cystic replacement of both kidneys consistent with polycystic kidney disease. 2.  Stable nonobstructing 7 mm stone in the right kidney. It is difficult to evaluate for hydronephrosis and both ureters are not well seen. However, given this there is no obvious obstructive uropathy. 3.  No bowel obstruction. No evidence of acute appendicitis. Xr Abdomen (kub) (single Ap View)    Result Date: 7/23/2020  EXAMINATION: ONE SUPINE XRAY VIEW(S) OF THE ABDOMEN 7/23/2020 6:32 am COMPARISON: 06/30/2020 HISTORY: ORDERING SYSTEM PROVIDED HISTORY: preop TECHNOLOGIST PROVIDED HISTORY: preop Reason for Exam: pre-op, kidney stones Acuity: Unknown Type of Exam: Unknown FINDINGS: Single frontal view the abdomen is submitted for review. There is a hyperdense presumed calculus overlying the upper quadrant of the abdomen. Moderate stool burden. Nonobstructive bowel gas pattern. IUD noted in place. Stable appearing right-sided nephrolithiasis. Consultations:    Consults:     Final Specialist Recommendations/Findings:   IP CONSULT TO UROLOGY  IP CONSULT TO INTERNAL MEDICINE  IP CONSULT TO NEPHROLOGY      The patient was seen and examined on day of discharge and this discharge summary is in conjunction with any daily progress note from day of discharge.     Discharge plan:     Disposition:  home    Instructions to Patient: Keep follow-up appointments      Requiring Further Evaluation/Follow Up POST HOSPITALIZATION/Incidental Findings:    Physician Follow Up: Monica Beavers MD  [facility]  [address]  [facility]  [phone]             Diet: regular  Activity: As tolerated    Discharge Medications:      Medication List      START taking these medications    oxyCODONE-acetaminophen 5-325 MG per tablet  Commonly known as:  Percocet  Take 1 tablet by mouth every 8 hours as needed for Pain for up to 2 days. Intended supply: 7 days. Take lowest dose possible to manage pain        CONTINUE taking these medications    Blood Pressure Kit  Check BP once/day- goal is <140/90. DULoxetine 60 MG extended release capsule  Commonly known as:  CYMBALTA  take 1 capsule by mouth once daily     loratadine 10 MG tablet  Commonly known as:  CLARITIN     melatonin 3 MG Tabs tablet     tamsulosin 0.4 MG capsule  Commonly known as:  FLOMAX     vitamin D 1.25 MG (58803 UT) Caps capsule  Commonly known as:  ERGOCALCIFEROL  Take 1 capsule by mouth once a week        STOP taking these medications    amLODIPine 5 MG tablet  Commonly known as:  NORVASC           Where to Get Your Medications      You can get these medications from any pharmacy    Bring a paper prescription for each of these medications  · oxyCODONE-acetaminophen 5-325 MG per tablet         Time Spent on discharge is  35 mins in patient examination, evaluation, counseling as well as medication reconciliation, prescriptions for required medications, discharge plan and follow up. Electronically signed by   Ramana Hernández MD  8/1/2020  4:07 PM      Thank you Dr. Monica Beavers MD for the opportunity to be involved in this patient's care.

## 2020-08-04 ENCOUNTER — TELEPHONE (OUTPATIENT)
Dept: UROLOGY | Age: 40
End: 2020-08-04

## 2020-08-04 ENCOUNTER — OFFICE VISIT (OUTPATIENT)
Dept: UROLOGY | Age: 40
End: 2020-08-04

## 2020-08-04 VITALS — TEMPERATURE: 98.6 F

## 2020-08-04 PROCEDURE — 99024 POSTOP FOLLOW-UP VISIT: CPT | Performed by: NURSE PRACTITIONER

## 2020-08-04 ASSESSMENT — ENCOUNTER SYMPTOMS
NAUSEA: 0
WHEEZING: 0
CONSTIPATION: 0
EYE REDNESS: 0
ABDOMINAL PAIN: 0
DIARRHEA: 0
COUGH: 0
VOMITING: 0
EYE PAIN: 0
SHORTNESS OF BREATH: 0
BACK PAIN: 1

## 2020-08-04 NOTE — PROGRESS NOTES
[facility]  [facility] UROLOGY SPECIALISTS - [address] [address]  Dept: [address] [facility] Urology Office Note - Established    Patient:  Kemal Welch  YOB: 1980  Date: 8/4/2020    The patient is a 41 y.o. female whopresents today for evaluation of the following problems:   Chief Complaint   Patient presents with    Flank Pain    Nephrolithiasis       HPI  Here post ESWL for flank pain. She has Hx of ESWL on 7/23/2020. Was readmited to hospital for pain- 7/30/2020. CT showed non-obstructing stone and her hospitalization did not relieve her pain. She has temporary relief with showering. She is here today with the same pain she was seen with in the hospital- using Tylenol without relief. Urine culture was negative. Has PCKD and sees Nephrology. Summary of old records: N/A    Additional History:      Procedures Today: N/A    Urinalysis today:  No results found for this visit on 08/04/20. Imaging Reviewed during this Office Visit:   EXAMINATION:    CT OF THE ABDOMEN AND PELVIS WITHOUT CONTRAST 7/30/2020 11:04 pm         TECHNIQUE:    CT of the abdomen and pelvis was performed without the administration of    intravenous contrast. Multiplanar reformatted images are provided for review.     Dose modulation, iterative reconstruction, and/or weight based adjustment of    the mA/kV was utilized to reduce the radiation dose to as low as reasonably    achievable.         COMPARISON:    June 25, 2020.         HISTORY:    ORDERING SYSTEM PROVIDED HISTORY: Abdominal pain    TECHNOLOGIST PROVIDED HISTORY:    Abdominal pain    Is the patient pregnant?->No    Reason for Exam: patient complains of bilateral flank pain h/o kidney stones    and polycystic kidney disease    Acuity: Acute    Relevant Medical/Surgical History: surgeries to area of interest include DNC,    stent placements, lithotripsy         FINDINGS:    Lower Chest: No acute abnormality.      Liver: Normal.         Gallbladder and Bile Ducts: Normal.         Spleen: Normal.         Adrenal Glands: Normal.         Pancreas: Normal.         Genitourinary: Redemonstration of extensive diffuse cystic replacement of    both kidneys consistent with polycystic kidney disease.  Stable    nonobstructing 7 mm stone in the right kidney.  It is difficult to evaluate    for hydronephrosis and both ureters are not well seen.  However, given this    there is no obvious hydronephrosis.  The urinary bladder is decompressed. IUD in the uterus.         Bowel: Normal caliber bowel.  No evidence of acute appendicitis.         Vasculature: Atherosclerosis.  No abdominal aortic aneurysm.         Bones and Soft Tissues: No acute abnormality.         Retroperitoneum/Mesentery: No intraperitoneal free air, ascites or fluid    collection. No lymphadenopathy in the abdomen or pelvis.  Generalized paucity    of mesenteric fat.              Impression    1.  Redemonstration of extensive diffuse cystic replacement of both kidneys    consistent with polycystic kidney disease.         2.  Stable nonobstructing 7 mm stone in the right kidney.  It is difficult to    evaluate for hydronephrosis and both ureters are not well seen.  However,    given this there is no obvious obstructive uropathy.         3.  No bowel obstruction.  No evidence of acute appendicitis.        (results were independently reviewed by physician and radiology report verified)    AUA Symptom Score (8/4/2020):  INCOMPLETE EMPTYING: How often have you had the sensation of not emptying your bladder?: Not at all  FREQUENCY: How often do you have to urinate less than every two hours?: About Half the time  INTERMITTENCY: How often have you found you stopped and started again several times when you urinated?: Less than Half the time  URGENCY: How often have you found it difficult to postpone urination?: More than Half the time  WEAK STREAM: How often have you had a weak urinary stream?: Not at all  STRAINING: How often have you had to strain to start  urination?: Less than Half the time  NOCTURIA: How many times did you typically get up at night to uriniate?: 5 Times  TOTAL I-PSS SCORE[de-identified] 16  How would you feel if you were to spend the rest of your life with your urinary condition?: Mostly Dissatisfied    Last BUN and creatinine:  Lab Results   Component Value Date    BUN 17 08/01/2020     Lab Results   Component Value Date    CREATININE 2.19 (H) 08/01/2020       Additional Lab/Culture results:     PAST MEDICAL, FAMILY AND SOCIAL HISTORY UPDATE:  Past Medical History:   Diagnosis Date    Anxiety     Asthma     Chronic headaches 7/10/2013    CKD (chronic kidney disease) stage 3, GFR 30-59 ml/min ([facility] [address]     Degenerative disc disease, cervical     Depression     Dysmenorrhea 9/30/2014    Eczema 11/8/2012    Headache(784.0)     HTN (hypertension) 10/2/2011    Hypertension     Hypocalcemia 5/28/2014    Kidney stone     Menorrhagia 9/30/2014    Neck pain, bilateral 5/28/2014    Pelvic pain in female 9/30/2014    Polycystic kidney     Smoker 10/2/2011    Tachycardia 1/20/2014    Tension vascular headache 1/20/2014     Past Surgical History:   Procedure Laterality Date    BREAST ENHANCEMENT SURGERY      BREAST LUMPECTOMY      left breast    CYSTO/URETERO/PYELOSCOPY, CALCULUS TX Right 6/12/2020    HOLMIUM - CYSTO, RIGHT RETROGRADE PYELOGRAM, RIGHT URETEROSCOPY, LASER LITHO ON STAND BY, RIGHT STENT PLACEMENT performed by Morgan Can MD at [facility]  06/12/2020    DILATION AND CURETTAGE OF UTERUS      x2    LITHOTRIPSY Right 7/23/2020    ESWL EXTRACORPOREAL SHOCK WAVE LITHOTRIPSY performed by Morgan Can MD at [address]  07/01/2013    nerve block(right vervical C3/TON/C4/C5    NERVE BLOCK  07/08/2013    nerve block(right vervical C3/TON/C4/C5    OTHER SURGICAL HISTORY  03/10/2014     botox inj     TOE SURGERY      removal of ingrown toe nail-2nd toe on left foot     URETEROSCOPY Right 06/12/2020    stent placement     Family History   Problem Relation Age of Onset    High Blood Pressure Mother     Asthma Sister    [facility] Sister     High Blood Pressure Father     Other Brother         bad knees, with recent total knee replacement      Outpatient Medications Marked as Taking for the 8/4/20 encounter (Office Visit) with ALBARO Gong CNP   Medication Sig Dispense Refill    melatonin 3 MG TABS tablet Take 3 mg by mouth nightly as needed (sleep)      vitamin D (ERGOCALCIFEROL) 1.25 MG (34115 UT) CAPS capsule Take 1 capsule by mouth once a week 24 capsule 0    loratadine (CLARITIN) 10 MG tablet Take 10 mg by mouth daily as needed (allergies)       tamsulosin (FLOMAX) 0.4 MG capsule Take 0.4 mg by mouth nightly       DULoxetine (CYMBALTA) 60 MG extended release capsule take 1 capsule by mouth once daily 30 capsule 0    Blood Pressure KIT Check BP once/day- goal is <140/90. 1 kit 0      (All medications reviewed and updated by provider sincelast office visit or hospitalization)   Bee pollen; Dust mite extract; Pcn [penicillins]; and Pollen extract  Social History     Tobacco Use   Smoking Status Current Every Day Smoker    Packs/day: 1.00    Years: 16.00    Pack years: 16.00    Types: Cigarettes   Smokeless Tobacco Never Used   Tobacco Comment    trying  to  cut  down      (If patient a smoker, smoking cessation counseling offered)     Social History     Substance and Sexual Activity   Alcohol Use No    Alcohol/week: 0.0 standard drinks       REVIEW OF SYSTEMS:  Review of Systems      Physical Exam:      Vitals:    08/04/20 1526   Temp: 98.6 °F (37 °C)     There is no height or weight on file to calculate BMI. Patient is a 41 y.o. female in noacute distress and alert and oriented to person, place and time. Physical Exam  Constitutional: Patient in no acute distress.   Neuro: Alert andoriented to person, place and time.  Psych: Mood normal, affect normal  Skin: No rash noted  HEENT: Head: Normocephalic and atraumatic  Conjunctivae and EOM are normal. Pupils are equal, round  Nose: Normal  Right External Ear: Normal; Left External Ear: Normal  Mouth: Mucosa Moist  Neck: Supple  Lungs: Respiratory effort is normal  Cardiovascular: Warm & Pink  Abdomen: Soft, non-tender, non-distended guarding,shoulders rounded forward. Bladder non-tender and not distended. Musculoskeletal: bilateral posterior intercostal tenderness bilaterally lower ribs, Rt>Lt      and Plan      1. Right flank pain    2. Kidney stone    3. Polycystic kidney           Plan:    spoke to Dr Katherine Ortiz- reviewed s/s. CT. He feels pain is not Urological in origin. Follow up with PCP/Nephrology     Consider heat to area. Reviewed discharge note and Urology consult hospital notes. No follow-ups on file. Prescriptions Ordered:  No orders of the defined types were placed in this encounter. Orders Placed:  No orders of the defined types were placed in this encounter. ALBARO Felton - CNP    Reviewed and Agree with the ROS entered by the MA.

## 2020-08-04 NOTE — TELEPHONE ENCOUNTER
Patient called in office stating that she is having a lof of pain in her lower back. Patient states that she is having pain in both kidneys, but mostly her left one. Patient recently went to the ER on 7/30/20. Patient also had surgery with Dr Sarah Beth Cavazos on 7/23/20 ESWL.  Please advise

## 2020-08-04 NOTE — PATIENT INSTRUCTIONS
spoke to Dr Katherine Ortiz- reviewed s/s. CT. He feels pain is not Urological in origin.  Follow up with PCP/Nephrology      Consider heat to area.      Reviewed discharge note and Urology consult hospital notes

## 2020-08-04 NOTE — PROGRESS NOTES
Review of Systems   Constitutional: Negative for appetite change, chills and fever. Eyes: Negative for pain, redness and visual disturbance. Respiratory: Negative for cough, shortness of breath and wheezing. Cardiovascular: Negative for chest pain and leg swelling. Gastrointestinal: Negative for abdominal pain, constipation, diarrhea, nausea and vomiting. Genitourinary: Positive for dysuria and urgency. Negative for difficulty urinating, flank pain, frequency and hematuria. Musculoskeletal: Positive for back pain (bilat). Negative for joint swelling and myalgias. Skin: Negative for rash and wound. Neurological: Negative for dizziness, tremors and numbness. Hematological: Does not bruise/bleed easily.

## 2020-08-04 NOTE — LETTER
[facility]  [facility] UROLOGY SPECIALISTS - [address]  Dept: [phone]  Dept Fax: [phone]        8/4/20    Patient: Julia Dutta  YOB: 1980    Dear Hoang Luis MD,    I had the pleasure of seeing one of your patients, Eddie Lei today in the office today. Below are the relevant portions of my assessment and plan of care. IMPRESSION:  1. Right flank pain    2. Kidney stone    3. Polycystic kidney        PLAN:  spoke to Dr Rolando Syed- reviewed s/s. CT. He feels pain is not Urological in origin. Follow up with PCP/Nephrology     Consider heat to area. Reviewed discharge note and Urology consult hospital notes. No follow-ups on file. Prescriptions Ordered:  No orders of the defined types were placed in this encounter. Orders Placed:  No orders of the defined types were placed in this encounter. Thank you for allowing me to participate in the care of this patient. I will keep you updated on this patient's follow up and I look forward to serving you and your patients again in the future.     Lizbeth Gary, ALBARO - CNP

## 2020-08-04 NOTE — TELEPHONE ENCOUNTER
Per Dr Rosy Fung, pt was advised to see Juani Wharton CNP for further evaluation. Pt was scheduled an appt for this afternoon to be seen in the office.

## 2020-08-05 ENCOUNTER — APPOINTMENT (OUTPATIENT)
Dept: CT IMAGING | Age: 40
End: 2020-08-05
Payer: MEDICARE

## 2020-08-05 ENCOUNTER — HOSPITAL ENCOUNTER (OUTPATIENT)
Age: 40
Setting detail: OBSERVATION
Discharge: HOME | End: 2020-08-06
Attending: EMERGENCY MEDICINE | Admitting: INTERNAL MEDICINE
Payer: MEDICARE

## 2020-08-05 ENCOUNTER — HOSPITAL ENCOUNTER (OUTPATIENT)
Age: 40
Discharge: HOME | End: 2020-08-05
Payer: MEDICARE

## 2020-08-05 LAB
-: ABNORMAL
ABSOLUTE EOS #: 0.1 K/UL (ref 0–0.4)
ABSOLUTE IMMATURE GRANULOCYTE: ABNORMAL K/UL (ref 0–0.3)
ABSOLUTE LYMPH #: 1.9 K/UL (ref 1–4.8)
ABSOLUTE MONO #: 0.6 K/UL (ref 0.1–1.3)
AMORPHOUS: ABNORMAL
ANION GAP SERPL CALCULATED.3IONS-SCNC: 15 MMOL/L (ref 9–17)
BACTERIA: ABNORMAL
BASOPHILS # BLD: 1 % (ref 0–2)
BASOPHILS ABSOLUTE: 0.1 K/UL (ref 0–0.2)
BILIRUBIN URINE: NEGATIVE
BUN BLDV-MCNC: 23 MG/DL (ref 6–20)
CALCIUM SERPL-MCNC: 8.9 MG/DL (ref 8.6–10.4)
CASTS UA: ABNORMAL /LPF
CHLORIDE BLD-SCNC: 103 MMOL/L (ref 98–107)
CO2: 21 MMOL/L (ref 20–31)
COLOR: YELLOW
COMMENT UA: ABNORMAL
CREAT SERPL-MCNC: 2.66 MG/DL (ref 0.5–0.9)
CRYSTALS, UA: ABNORMAL /HPF
DIFFERENTIAL TYPE: ABNORMAL
EOSINOPHILS RELATIVE PERCENT: 1 % (ref 0–4)
EPITHELIAL CELLS UA: ABNORMAL /HPF
GFR AFRICAN AMERICAN: 24 ML/MIN
GFR NON-AFRICAN AMERICAN: 20 ML/MIN
GFR SERPL CREATININE-BSD FRML MDRD: ABNORMAL ML/MIN/{1.73_M2}
GFR SERPL CREATININE-BSD FRML MDRD: ABNORMAL ML/MIN/{1.73_M2}
GLUCOSE URINE: NEGATIVE
HCT VFR BLD CALC: 37.1 % (ref 36–46)
HEMOGLOBIN: 12.5 G/DL (ref 12–16)
IMMATURE GRANULOCYTES: ABNORMAL %
KETONES, URINE: NEGATIVE
LEUKOCYTE ESTERASE, URINE: NEGATIVE
LYMPHOCYTES # BLD: 20 % (ref 24–44)
MAGNESIUM: 2 MG/DL (ref 1.6–2.6)
MCH RBC QN AUTO: 32.6 PG (ref 26–34)
MCHC RBC AUTO-ENTMCNC: 33.7 G/DL (ref 31–37)
MCV RBC AUTO: 96.8 FL (ref 80–100)
MONOCYTES # BLD: 7 % (ref 1–7)
MUCUS: ABNORMAL
NITRITE, URINE: NEGATIVE
NRBC AUTOMATED: ABNORMAL PER 100 WBC
OTHER OBSERVATIONS UA: ABNORMAL
PDW BLD-RTO: 13.7 % (ref 11.5–14.9)
PH UA: 6 (ref 5–8)
PHOSPHORUS: 4.6 MG/DL (ref 2.6–4.5)
PLATELET # BLD: 297 K/UL (ref 150–450)
PLATELET ESTIMATE: ABNORMAL
PMV BLD AUTO: 8.3 FL (ref 6–12)
POTASSIUM SERPL-SCNC: 4.5 MMOL/L (ref 3.7–5.3)
PROTEIN UA: ABNORMAL
RBC # BLD: 3.83 M/UL (ref 4–5.2)
RBC # BLD: ABNORMAL 10*6/UL
RBC UA: ABNORMAL /HPF
RENAL EPITHELIAL, UA: ABNORMAL /HPF
SEG NEUTROPHILS: 71 % (ref 36–66)
SEGMENTED NEUTROPHILS ABSOLUTE COUNT: 6.7 K/UL (ref 1.3–9.1)
SODIUM BLD-SCNC: 139 MMOL/L (ref 135–144)
SPECIFIC GRAVITY UA: 1.01 (ref 1–1.03)
TRICHOMONAS: ABNORMAL
TURBIDITY: CLEAR
URINE HGB: ABNORMAL
UROBILINOGEN, URINE: NORMAL
VITAMIN D 25-HYDROXY: 24.2 NG/ML (ref 30–100)
WBC # BLD: 9.4 K/UL (ref 3.5–11)
WBC # BLD: ABNORMAL 10*3/UL
WBC UA: ABNORMAL /HPF
YEAST: ABNORMAL

## 2020-08-05 PROCEDURE — 2580000003 HC RX 258: Performed by: PHYSICIAN ASSISTANT

## 2020-08-05 PROCEDURE — 82565 ASSAY OF CREATININE: CPT

## 2020-08-05 PROCEDURE — 99285 EMERGENCY DEPT VISIT HI MDM: CPT

## 2020-08-05 PROCEDURE — 84520 ASSAY OF UREA NITROGEN: CPT

## 2020-08-05 PROCEDURE — 81001 URINALYSIS AUTO W/SCOPE: CPT

## 2020-08-05 PROCEDURE — 96372 THER/PROPH/DIAG INJ SC/IM: CPT

## 2020-08-05 PROCEDURE — 36415 COLL VENOUS BLD VENIPUNCTURE: CPT

## 2020-08-05 PROCEDURE — 80051 ELECTROLYTE PANEL: CPT

## 2020-08-05 PROCEDURE — 84100 ASSAY OF PHOSPHORUS: CPT

## 2020-08-05 PROCEDURE — 6360000002 HC RX W HCPCS: Performed by: PHYSICIAN ASSISTANT

## 2020-08-05 PROCEDURE — 83735 ASSAY OF MAGNESIUM: CPT

## 2020-08-05 PROCEDURE — 82310 ASSAY OF CALCIUM: CPT

## 2020-08-05 PROCEDURE — 96375 TX/PRO/DX INJ NEW DRUG ADDON: CPT

## 2020-08-05 PROCEDURE — 96376 TX/PRO/DX INJ SAME DRUG ADON: CPT

## 2020-08-05 PROCEDURE — 74176 CT ABD & PELVIS W/O CONTRAST: CPT

## 2020-08-05 PROCEDURE — 2580000003 HC RX 258: Performed by: STUDENT IN AN ORGANIZED HEALTH CARE EDUCATION/TRAINING PROGRAM

## 2020-08-05 PROCEDURE — G0378 HOSPITAL OBSERVATION PER HR: HCPCS

## 2020-08-05 PROCEDURE — 82306 VITAMIN D 25 HYDROXY: CPT

## 2020-08-05 PROCEDURE — 96361 HYDRATE IV INFUSION ADD-ON: CPT

## 2020-08-05 PROCEDURE — 85025 COMPLETE CBC W/AUTO DIFF WBC: CPT

## 2020-08-05 PROCEDURE — 6360000002 HC RX W HCPCS: Performed by: STUDENT IN AN ORGANIZED HEALTH CARE EDUCATION/TRAINING PROGRAM

## 2020-08-05 PROCEDURE — 6370000000 HC RX 637 (ALT 250 FOR IP): Performed by: STUDENT IN AN ORGANIZED HEALTH CARE EDUCATION/TRAINING PROGRAM

## 2020-08-05 PROCEDURE — 96374 THER/PROPH/DIAG INJ IV PUSH: CPT

## 2020-08-05 RX ORDER — SODIUM CHLORIDE 0.9 % (FLUSH) 0.9 %
10 SYRINGE (ML) INJECTION PRN
Status: DISCONTINUED | OUTPATIENT
Start: 2020-08-05 | End: 2020-08-06 | Stop reason: HOSPADM

## 2020-08-05 RX ORDER — MORPHINE SULFATE 4 MG/ML
4 INJECTION, SOLUTION INTRAMUSCULAR; INTRAVENOUS
Status: DISCONTINUED | OUTPATIENT
Start: 2020-08-05 | End: 2020-08-06 | Stop reason: HOSPADM

## 2020-08-05 RX ORDER — ACETAMINOPHEN 325 MG/1
650 TABLET ORAL EVERY 6 HOURS PRN
Status: DISCONTINUED | OUTPATIENT
Start: 2020-08-05 | End: 2020-08-06 | Stop reason: HOSPADM

## 2020-08-05 RX ORDER — DULOXETIN HYDROCHLORIDE 60 MG/1
60 CAPSULE, DELAYED RELEASE ORAL DAILY
Status: DISCONTINUED | OUTPATIENT
Start: 2020-08-05 | End: 2020-08-06 | Stop reason: HOSPADM

## 2020-08-05 RX ORDER — AMLODIPINE BESYLATE 5 MG/1
5 TABLET ORAL DAILY
Status: ON HOLD | COMMUNITY
End: 2022-03-09 | Stop reason: HOSPADM

## 2020-08-05 RX ORDER — 0.9 % SODIUM CHLORIDE 0.9 %
1000 INTRAVENOUS SOLUTION INTRAVENOUS ONCE
Status: COMPLETED | OUTPATIENT
Start: 2020-08-05 | End: 2020-08-05

## 2020-08-05 RX ORDER — SODIUM CHLORIDE 9 MG/ML
INJECTION, SOLUTION INTRAVENOUS CONTINUOUS
Status: DISCONTINUED | OUTPATIENT
Start: 2020-08-05 | End: 2020-08-06 | Stop reason: HOSPADM

## 2020-08-05 RX ORDER — POLYETHYLENE GLYCOL 3350 17 G/17G
17 POWDER, FOR SOLUTION ORAL DAILY PRN
Status: DISCONTINUED | OUTPATIENT
Start: 2020-08-05 | End: 2020-08-06 | Stop reason: HOSPADM

## 2020-08-05 RX ORDER — MORPHINE SULFATE 2 MG/ML
2 INJECTION, SOLUTION INTRAMUSCULAR; INTRAVENOUS
Status: DISCONTINUED | OUTPATIENT
Start: 2020-08-05 | End: 2020-08-06 | Stop reason: HOSPADM

## 2020-08-05 RX ORDER — MORPHINE SULFATE 2 MG/ML
2 INJECTION, SOLUTION INTRAMUSCULAR; INTRAVENOUS ONCE
Status: COMPLETED | OUTPATIENT
Start: 2020-08-05 | End: 2020-08-05

## 2020-08-05 RX ORDER — ACETAMINOPHEN 650 MG/1
650 SUPPOSITORY RECTAL EVERY 6 HOURS PRN
Status: DISCONTINUED | OUTPATIENT
Start: 2020-08-05 | End: 2020-08-06 | Stop reason: HOSPADM

## 2020-08-05 RX ORDER — SODIUM CHLORIDE 0.9 % (FLUSH) 0.9 %
10 SYRINGE (ML) INJECTION EVERY 12 HOURS SCHEDULED
Status: DISCONTINUED | OUTPATIENT
Start: 2020-08-05 | End: 2020-08-06 | Stop reason: HOSPADM

## 2020-08-05 RX ORDER — TAMSULOSIN HYDROCHLORIDE 0.4 MG/1
0.4 CAPSULE ORAL NIGHTLY
Status: DISCONTINUED | OUTPATIENT
Start: 2020-08-05 | End: 2020-08-06 | Stop reason: HOSPADM

## 2020-08-05 RX ORDER — ONDANSETRON 2 MG/ML
4 INJECTION INTRAMUSCULAR; INTRAVENOUS EVERY 6 HOURS PRN
Status: DISCONTINUED | OUTPATIENT
Start: 2020-08-05 | End: 2020-08-06 | Stop reason: HOSPADM

## 2020-08-05 RX ORDER — PROMETHAZINE HYDROCHLORIDE 25 MG/1
12.5 TABLET ORAL EVERY 6 HOURS PRN
Status: DISCONTINUED | OUTPATIENT
Start: 2020-08-05 | End: 2020-08-06 | Stop reason: HOSPADM

## 2020-08-05 RX ORDER — MORPHINE SULFATE 4 MG/ML
4 INJECTION, SOLUTION INTRAMUSCULAR; INTRAVENOUS ONCE
Status: COMPLETED | OUTPATIENT
Start: 2020-08-05 | End: 2020-08-05

## 2020-08-05 RX ORDER — HEPARIN SODIUM 5000 [USP'U]/ML
5000 INJECTION, SOLUTION INTRAVENOUS; SUBCUTANEOUS EVERY 8 HOURS SCHEDULED
Status: DISCONTINUED | OUTPATIENT
Start: 2020-08-05 | End: 2020-08-06 | Stop reason: HOSPADM

## 2020-08-05 RX ADMIN — ONDANSETRON 4 MG: 2 INJECTION INTRAMUSCULAR; INTRAVENOUS at 18:46

## 2020-08-05 RX ADMIN — MORPHINE SULFATE 4 MG: 4 INJECTION, SOLUTION INTRAMUSCULAR; INTRAVENOUS at 21:21

## 2020-08-05 RX ADMIN — TAMSULOSIN HYDROCHLORIDE 0.4 MG: 0.4 CAPSULE ORAL at 21:21

## 2020-08-05 RX ADMIN — Medication 10 ML: at 21:22

## 2020-08-05 RX ADMIN — MORPHINE SULFATE 4 MG: 4 INJECTION, SOLUTION INTRAMUSCULAR; INTRAVENOUS at 18:38

## 2020-08-05 RX ADMIN — HEPARIN SODIUM 5000 UNITS: 5000 INJECTION INTRAVENOUS; SUBCUTANEOUS at 21:21

## 2020-08-05 RX ADMIN — SODIUM CHLORIDE: 9 INJECTION, SOLUTION INTRAVENOUS at 16:05

## 2020-08-05 RX ADMIN — SODIUM CHLORIDE 1000 ML: 9 INJECTION, SOLUTION INTRAVENOUS at 11:48

## 2020-08-05 RX ADMIN — HEPARIN SODIUM 5000 UNITS: 5000 INJECTION INTRAVENOUS; SUBCUTANEOUS at 16:13

## 2020-08-05 RX ADMIN — MORPHINE SULFATE 2 MG: 2 INJECTION, SOLUTION INTRAMUSCULAR; INTRAVENOUS at 13:08

## 2020-08-05 RX ADMIN — DULOXETINE HYDROCHLORIDE 60 MG: 60 CAPSULE, DELAYED RELEASE ORAL at 16:13

## 2020-08-05 RX ADMIN — MORPHINE SULFATE 4 MG: 4 INJECTION, SOLUTION INTRAMUSCULAR; INTRAVENOUS at 11:48

## 2020-08-05 RX ADMIN — MORPHINE SULFATE 4 MG: 4 INJECTION, SOLUTION INTRAMUSCULAR; INTRAVENOUS at 15:57

## 2020-08-05 ASSESSMENT — PAIN SCALES - GENERAL
PAINLEVEL_OUTOF10: 8
PAINLEVEL_OUTOF10: 7
PAINLEVEL_OUTOF10: 8
PAINLEVEL_OUTOF10: 6
PAINLEVEL_OUTOF10: 4
PAINLEVEL_OUTOF10: 10
PAINLEVEL_OUTOF10: 8
PAINLEVEL_OUTOF10: 6
PAINLEVEL_OUTOF10: 7

## 2020-08-05 ASSESSMENT — ENCOUNTER SYMPTOMS
NAUSEA: 1
CONSTIPATION: 0
CHEST TIGHTNESS: 0
SHORTNESS OF BREATH: 0
BLOOD IN STOOL: 0
ABDOMINAL DISTENTION: 0
VOMITING: 0
ABDOMINAL PAIN: 0
DIARRHEA: 0
COUGH: 0

## 2020-08-05 ASSESSMENT — PAIN DESCRIPTION - PAIN TYPE: TYPE: ACUTE PAIN

## 2020-08-05 ASSESSMENT — PAIN DESCRIPTION - LOCATION: LOCATION: FLANK

## 2020-08-05 ASSESSMENT — PAIN DESCRIPTION - ORIENTATION: ORIENTATION: RIGHT;LEFT

## 2020-08-05 ASSESSMENT — PAIN DESCRIPTION - DESCRIPTORS: DESCRIPTORS: CONSTANT

## 2020-08-05 NOTE — ED PROVIDER NOTES
[address] ED  eMERGENCY dEPARTMENT eNCOUnter      Pt Name: Janee Estevez  MRN: [id number]  [place] 1980  Date of evaluation: 8/5/2020  Provider: Alisha Galan PA-C    CHIEF COMPLAINT       Chief Complaint   Patient presents with    Flank Pain           HISTORY OF PRESENT ILLNESS  (Location/Symptom, Timing/Onset, Context/Setting, Quality, Duration, Modifying Factors, Severity.)   Janee Estevez is a 41 y.o. female with history of CKD stage 3, HTN, PKD presents to ED with complaints of severe bilateral flank pain. Pt states this has been ongoing for awhile but became severe recently. Pt did see her nephrologist this morning who recommend she come to the ED if her pain is uncontrollable. Pt did have outpatient blood work completed. Additionally, pt reports she saw urology yesterday regarding right sided non-obstructing stone. Dr. Fransisco Truong does not feel stone is causing patients flank pain. Currently, pain is severe with no radiation. Mild nausea. No emesis, abd pain, dysuria, urgency, frequency, hematuria, vaginal bleeding, vaginal discharge, chest pain, sob, fevers, chills. Pt takes tylenol at home with minimal relief. No other complaints. Nursing Notes were reviewed. REVIEW OF SYSTEMS    (2-9 systems for level 4, 10 or more for level 5)     Review of Systems   Flank pain  Nausea       Except as noted above the remainder of the review of systems was reviewed and negative.        PAST MEDICAL HISTORY     Past Medical History:   Diagnosis Date    Anxiety     Asthma     Chronic headaches 7/10/2013    CKD (chronic kidney disease) stage 3, GFR 30-59 ml/min ([facility])     Degenerative disc disease, cervical     Depression     Dysmenorrhea 9/30/2014    Eczema 11/8/2012    Headache(784.0)     HTN (hypertension) 10/2/2011    Hypertension     Hypocalcemia 5/28/2014    Kidney stone     Menorrhagia 9/30/2014    Neck pain, bilateral 5/28/2014    Pelvic pain in female 9/30/2014 Family Status   Relation Name Status    Mother  Alive    Sister  Alive    Father  Alive    Brother  Alive    Cydney  Alive   Delmi Yadav Son  Alive      None otherwise stated in nurses notes    SOCIAL HISTORY      reports that she has been smoking cigarettes. She has a 16.00 pack-year smoking history. She has never used smokeless tobacco. She reports that she does not drink alcohol or use drugs. lives at home with others     PHYSICAL EXAM    (up to 7 for level 4, 8 or more for level 5)     ED Triage Vitals [08/05/20 1108]   BP Temp Temp Source Pulse Resp SpO2 Height Weight   (!) 179/94 98.6 °F (37 °C) Oral 91 20 99 % 5' 5\" (1.651 m) 124 lb (56.2 kg)       Physical Exam   Nursing note and vitals reviewed. Constitutional: Oriented to person, place, and time and well-developed, well-nourished. Head: Normocephalic and atraumatic. Ear: External ears normal.   Nose: Nose normal and midline. Eyes: Conjunctivae and EOM are normal. Pupils are equal, round, and reactive to light. Neck: Normal range of motion. Neck supple. Throat: Posterior pharynx is without erythema or exudates, airway is patent, no swelling  Cardiovascular: Normal rate, regular rhythm, normal heart sounds and intact distal pulses. Pulmonary/Chest: Effort normal and breath sounds normal. No respiratory distress. No wheezes. No rales. No chest tenderness. Abdominal: Soft. Bowel sounds are normal. No distension and no mass. There is no tenderness. There is no rebound and no guarding. right and left flank pain with palpation. Musculoskeletal: Normal range of motion. Neurological: Alert and oriented to person, place, and time. GCS score is 15. Skin: Skin is warm and dry. No rash noted. No erythema. No pallor.    Psychiatric: Mood, memory, affect and judgment normal.           DIAGNOSTIC RESULTS     EKG: All EKG's are interpreted by the Emergency Department Physician who either signs or Co-signs this chart in the absence of a cardiologist.        RADIOLOGY:   All plain film, CT, MRI, and formal ultrasound images (except ED bedside ultrasound) are read by the radiologist, see reports below, unless otherwise noted in MDM or here. CT ABDOMEN PELVIS WO CONTRAST Additional Contrast? None   Final Result   Bilateral polycystic kidney disease. Stable 7 mm right renal calculus   without convincing evidence for hydronephrosis or urinary obstruction. No bowel obstruction or inflammation. No free intraperitoneal air or fluid. Ct Abdomen Pelvis Wo Contrast    Result Date: 7/30/2020  EXAMINATION: CT OF THE ABDOMEN AND PELVIS WITHOUT CONTRAST 7/30/2020 11:04 pm TECHNIQUE: CT of the abdomen and pelvis was performed without the administration of intravenous contrast. Multiplanar reformatted images are provided for review. Dose modulation, iterative reconstruction, and/or weight based adjustment of the mA/kV was utilized to reduce the radiation dose to as low as reasonably achievable. COMPARISON: June 25, 2020. HISTORY: ORDERING SYSTEM PROVIDED HISTORY: Abdominal pain TECHNOLOGIST PROVIDED HISTORY: Abdominal pain Is the patient pregnant?->No Reason for Exam: patient complains of bilateral flank pain h/o kidney stones and polycystic kidney disease Acuity: Acute Relevant Medical/Surgical History: surgeries to area of interest include DNC, stent placements, lithotripsy FINDINGS: Lower Chest: No acute abnormality. Liver: Normal. Gallbladder and Bile Ducts: Normal. Spleen: Normal. Adrenal Glands: Normal. Pancreas: Normal. Genitourinary: Redemonstration of extensive diffuse cystic replacement of both kidneys consistent with polycystic kidney disease. Stable nonobstructing 7 mm stone in the right kidney. It is difficult to evaluate for hydronephrosis and both ureters are not well seen. However, given this there is no obvious hydronephrosis. The urinary bladder is decompressed. IUD in the uterus.  Bowel: Normal Encounter   Medications    0.9 % sodium chloride bolus    morphine sulfate (PF) injection 4 mg    morphine (PF) injection 2 mg         CONSULTS:  IP CONSULT TO NEPHROLOGY  IP CONSULT TO INTERNAL MEDICINE    PROCEDURES:  None      FINAL IMPRESSION      1. Flank pain    2. Intractable pain    3. Stage 3 chronic kidney disease ([facility] [address]    4. PKD (polycystic kidney disease)          DISPOSITION/PLAN   DISPOSITION     PATIENT REFERRED TO:  No follow-up provider specified. DISCHARGE MEDICATIONS:  New Prescriptions    No medications on file         Summation      Patient Course:      Hx of CKD, PKD. Severe bilateral flank pain. Pt did see urology yesterday regarding right non-obstructing stone. Saw nephrologist today. Informed to come to ED if pain is uncontrolled. Pt has lab work done today outpatient. Lab work was reviewed. No leukocytosis. BUN - 23. CR - 2.66. Will get ct scan, fluids, pain control. Ct scan shows stable 7mm non-obstructing renal calculus. No hydronephrosis. Labs reviewed that were completed outpatient this am.   BUN - 23  CR - 2.66  Slightly bumped. Pt given fluids, morphine. On re-exam, pain is not controlled. Discussed admission with patient for pain control. She is agreeable. Spoke with Dr. Hank Ybarra who will be on as a consult. Discussed case with Dr. Jen Moss who accepted admission. ED Medications administered this visit:    Medications   0.9 % sodium chloride bolus (1,000 mLs Intravenous New Bag 8/5/20 1148)   morphine sulfate (PF) injection 4 mg (4 mg Intravenous Given 8/5/20 1148)   morphine (PF) injection 2 mg (2 mg Intravenous Given 8/5/20 1308)       New Prescriptions from this visit:    New Prescriptions    No medications on file       Follow-up:  No follow-up provider specified. Final Impression:   1. Flank pain    2. Intractable pain    3. Stage 3 chronic kidney disease ([facility] [address]    4.  PKD (polycystic kidney disease) (Please note that portions of this note were completed with a voice recognition program.  Efforts were made to edit the dictations but occasionally words are mis-transcribed.)      (Please note that portions of this note were completed with a voice recognition program.  Efforts were made to edit the dictations but occasionally words are mis-transcribed.)    Sharon Tracy. [address], PA-C  08/05/20 8799

## 2020-08-05 NOTE — PROGRESS NOTES
Patient admitted to room 2106, Telemetry applied, call light within reach, bed locked in lowest position and no distress noted. While going through patient admission patient asked to go outside and smoke. Gay continue to monitor.

## 2020-08-05 NOTE — PROGRESS NOTES
Medication History completed:    New medications: amlodipine    Medications discontinued: none    Changes to dosing: none    Stated allergies: As listed    Other pertinent information: Medications confirmed with [facility].      Thank you,  Tiffanie Jennings, PharmD, BCPS  [phone]

## 2020-08-05 NOTE — H&P
[facility]      [facility]     HISTORY AND PHYSICAL EXAMINATION            Date:   8/6/2020  Patient name:  Michael Padgett  Date of admission:  8/5/2020 11:11 AM  MRN:   [id number]  Account:  [de-identified]  YOB: 1980  PCP:    Elsie Harmon MD  Room:   [address] [place] [address]  Code Status:    Full Code    Chief Complaint:     Chief Complaint   Patient presents with    Flank Pain       History Obtained From:     patient    History of Present Illness: The patient is a 41 y.o. Non-/non  female who presents with B/L Flank Pain. The patient is a 41 y.o.  female with history of CKD stage 3, HTN, PKD, who presented with severe bilateral flank pain at ER today morning. Pain is sharp, non-radiating to groin, 7/10. Pt says its been going on since May 2020 but the pain was mild. According to pt she had a stone in her right kidney/ureter at that time and underwent lithotripsy 10days ago. Since then the pain has increased severly. She takes tylenol but it doesn't help. She doesn not have fever, chills, hematuria, constipation, dysuria, diarrhea, abdominal pain. She does report urinary urgency and incomplete evacuation of bladder. She is on morphine prn for pain control.         Past Medical History:     Past Medical History:   Diagnosis Date    Anxiety     Asthma     Chronic headaches 7/10/2013    CKD (chronic kidney disease) stage 3, GFR 30-59 ml/min ([facility])     Degenerative disc disease, cervical     Depression     Dysmenorrhea 9/30/2014    Eczema 11/8/2012    Headache(784.0)     HTN (hypertension) 10/2/2011    Hypertension     Hypocalcemia 5/28/2014    Kidney stone     Menorrhagia 9/30/2014    Neck pain, bilateral 5/28/2014    Pelvic pain in female 9/30/2014    Polycystic kidney     Smoker 10/2/2011    Tachycardia 1/20/2014    Tension vascular headache 1/20/2014        Past SurgicalHistory:     Past Surgical History:   Procedure Laterality Date    BREAST ENHANCEMENT SURGERY      BREAST LUMPECTOMY      left breast    CYSTO/URETERO/PYELOSCOPY, CALCULUS TX Right 6/12/2020    HOLMIUM - CYSTO, RIGHT RETROGRADE PYELOGRAM, RIGHT URETEROSCOPY, LASER LITHO ON STAND BY, RIGHT STENT PLACEMENT performed by Crys Pate MD at [address] [place]  06/12/2020    DILATION AND CURETTAGE OF UTERUS      x2    LITHOTRIPSY Right 7/23/2020    ESWL EXTRACORPOREAL SHOCK WAVE LITHOTRIPSY performed by Crys Pate MD at [address]  07/01/2013    nerve block(right vervical C3/TON/C4/C5    NERVE BLOCK  07/08/2013    nerve block(right vervical C3/TON/C4/C5    OTHER SURGICAL HISTORY  03/10/2014     botox inj     TOE SURGERY      removal of ingrown toe nail-2nd toe on left foot     URETEROSCOPY Right 06/12/2020    stent placement        Medications Prior to Admission:        Prior to Admission medications    Medication Sig Start Date End Date Taking?  Authorizing Provider   amLODIPine (NORVASC) 5 MG tablet Take 5 mg by mouth daily   Yes Historical Provider, MD   melatonin 3 MG TABS tablet Take 3 mg by mouth nightly as needed (sleep)   Yes Historical Provider, MD   vitamin D (ERGOCALCIFEROL) 1.25 MG (02535 UT) CAPS capsule Take 1 capsule by mouth once a week 6/24/20  Yes Margi Martinez MD   loratadine (CLARITIN) 10 MG tablet Take 10 mg by mouth daily as needed (allergies)    Yes Historical Provider, MD   tamsulosin (FLOMAX) 0.4 MG capsule Take 0.4 mg by mouth nightly  5/18/20  Yes Historical Provider, MD   DULoxetine (CYMBALTA) 60 MG extended release capsule take 1 capsule by mouth once daily 11/10/17  Yes Valentino Both, MD   Blood Pressure KIT Check BP once/day- goal is <140/90. 3/9/16   ALBARO Gomez - CNP        Allergies:     Bee pollen; Dust mite extract; Pcn [penicillins]; and Pollen extract    Social History:     Tobacco: reports that she has been smoking cigarettes. She has a 16.00 pack-year smoking history. She has never used smokeless tobacco.  Alcohol:      reports no history of alcohol use. Drug Use:  reports no history of drug use. Family History:     Family History   Problem Relation Age of Onset    High Blood Pressure Mother     Asthma Sister    [employer] Migraines Sister     High Blood Pressure Father     Other Brother         bad knees, with recent total knee replacement        Review of Systems:     Positive and Negative as described in HPI. Review of Systems   Constitutional: Negative for activity change, chills, fatigue and fever. Respiratory: Negative for cough, chest tightness and shortness of breath. Cardiovascular: Negative for chest pain, palpitations and leg swelling. Gastrointestinal: Positive for nausea. Negative for abdominal distention, abdominal pain, blood in stool, constipation, diarrhea and vomiting. Genitourinary: Positive for difficulty urinating and flank pain. Negative for dysuria, enuresis and hematuria. Neurological: Negative. Psychiatric/Behavioral: Negative for agitation and confusion. The patient is not nervous/anxious. Physical Exam:   /81   Pulse 74   Temp 98 °F (36.7 °C) (Oral)   Resp 18   Ht 5' 5\" (1.651 m)   Wt 121 lb 14.6 oz (55.3 kg)   LMP 07/15/2020 (Approximate)   SpO2 99%   BMI 20.29 kg/m²   Temp (24hrs), Av °F (36.7 °C), Min:97.6 °F (36.4 °C), Max:98.4 °F (36.9 °C)    No results for input(s): POCGLU in the last 72 hours. Intake/Output Summary (Last 24 hours) at 2020 1351  Last data filed at 2020 1227  Gross per 24 hour   Intake 1279.62 ml   Output 2250 ml   Net -970.38 ml       Physical Exam  Constitutional:       General: She is in acute distress. Appearance: Normal appearance. HENT:      Head: Normocephalic and atraumatic. Cardiovascular:      Rate and Rhythm: Normal rate and regular rhythm.    Pulmonary:      Effort: Pulmonary effort is normal. No respiratory distress. Breath sounds: Normal breath sounds. Abdominal:      General: Abdomen is flat. There is no distension. Tenderness: There is no abdominal tenderness. There is no right CVA tenderness or left CVA tenderness. Skin:     General: Skin is warm and dry. Capillary Refill: Capillary refill takes less than 2 seconds. Neurological:      General: No focal deficit present. Mental Status: She is alert and oriented to person, place, and time.          Investigations:     Laboratory Testing:  Recent Results (from the past 24 hour(s))   Basic Metabolic Panel w/ Reflex to MG    Collection Time: 08/06/20  4:56 AM   Result Value Ref Range    Glucose 89 70 - 99 mg/dL    BUN 21 (H) 6 - 20 mg/dL    CREATININE 2.29 (H) 0.50 - 0.90 mg/dL    Bun/Cre Ratio NOT REPORTED 9 - 20    Calcium 8.2 (L) 8.6 - 10.4 mg/dL    Sodium 140 135 - 144 mmol/L    Potassium 4.4 3.7 - 5.3 mmol/L    Chloride 109 (H) 98 - 107 mmol/L    CO2 21 20 - 31 mmol/L    Anion Gap 10 9 - 17 mmol/L    GFR Non-African American 23 (L) >60 mL/min    GFR  28 (L) >60 mL/min    GFR Comment          GFR Staging NOT REPORTED    CBC auto differential    Collection Time: 08/06/20  4:56 AM   Result Value Ref Range    WBC 6.4 3.5 - 11.0 k/uL    RBC 3.63 (L) 4.0 - 5.2 m/uL    Hemoglobin 11.5 (L) 12.0 - 16.0 g/dL    Hematocrit 35.0 (L) 36 - 46 %    MCV 96.5 80 - 100 fL    MCH 31.6 26 - 34 pg    MCHC 32.7 31 - 37 g/dL    RDW 13.7 11.5 - 14.9 %    Platelets 268 919 - 746 k/uL    MPV 8.6 6.0 - 12.0 fL    NRBC Automated NOT REPORTED per 100 WBC    Differential Type NOT REPORTED     Seg Neutrophils 60 36 - 66 %    Lymphocytes 31 24 - 44 %    Monocytes 6 1 - 7 %    Eosinophils % 2 0 - 4 %    Basophils 1 0 - 2 %    Immature Granulocytes NOT REPORTED 0 %    Segs Absolute 3.80 1.3 - 9.1 k/uL    Absolute Lymph # 2.00 1.0 - 4.8 k/uL    Absolute Mono # 0.40 0.1 - 1.3 k/uL    Absolute Eos # 0.10 0.0 - 0.4 k/uL Basophils Absolute 0.10 0.0 - 0.2 k/uL    Absolute Immature Granulocyte NOT REPORTED 0.00 - 0.30 k/uL    WBC Morphology NOT REPORTED     RBC Morphology NOT REPORTED     Platelet Estimate NOT REPORTED        Imaging/Diagnostics:  Ct Abdomen Pelvis Wo Contrast Additional Contrast? None    Result Date: 8/5/2020  EXAMINATION: CT OF THE ABDOMEN AND PELVIS WITHOUT CONTRAST 8/5/2020 12:26 pm TECHNIQUE: CT of the abdomen and pelvis was performed without the administration of intravenous contrast. Multiplanar reformatted images are provided for review. Dose modulation, iterative reconstruction, and/or weight based adjustment of the mA/kV was utilized to reduce the radiation dose to as low as reasonably achievable. COMPARISON: 07/30/2020 HISTORY: ORDERING SYSTEM PROVIDED HISTORY: bilateral flank pain. TECHNOLOGIST PROVIDED HISTORY: bilateral flank pain. Is the patient pregnant?->No Reason for Exam: bilat flank  pain, hx stones Acuity: Unknown Type of Exam: Unknown FINDINGS: Lower Chest: Visualized lung bases are clear without focal airspace consolidation, sizeable effusion, or pneumothorax. No definite pulmonary nodules or masses. Base of the heart is normal in size without pericardial fluid collection. Organs: The liver, gallbladder, pancreas, and spleen are grossly within normal limits. No evidence for intrahepatic or extrahepatic biliary ductal dilatation. GI/Bowel: There is no evidence for bowel obstruction or inflammation. No free intraperitoneal air or fluid. Small bowel loops are normal in caliber. No evidence for hiatal hernia. Appendix is unremarkable. Pelvis: IUD noted in place. Peritoneum/Retroperitoneum: Adrenal glands are not clearly visualized. Bilateral placed the kidneys identified. There multiple hyperdense renal foci which are most compatible with hemorrhagic cysts. Right-sided nephrolithiasis noted. No urinary obstruction or hydronephrosis. Urinary bladder is unremarkable. Vasculature:   The aorta is normal in caliber. No definite lymphadenopathy identified. Bones/Soft Tissues: No evidence for acute fracture or dislocation. No lytic or blastic lesions. Bilateral polycystic kidney disease. Stable 7 mm right renal calculus without convincing evidence for hydronephrosis or urinary obstruction. No bowel obstruction or inflammation. No free intraperitoneal air or fluid. Ct Abdomen Pelvis Wo Contrast    Result Date: 7/30/2020  EXAMINATION: CT OF THE ABDOMEN AND PELVIS WITHOUT CONTRAST 7/30/2020 11:04 pm TECHNIQUE: CT of the abdomen and pelvis was performed without the administration of intravenous contrast. Multiplanar reformatted images are provided for review. Dose modulation, iterative reconstruction, and/or weight based adjustment of the mA/kV was utilized to reduce the radiation dose to as low as reasonably achievable. COMPARISON: June 25, 2020. HISTORY: ORDERING SYSTEM PROVIDED HISTORY: Abdominal pain TECHNOLOGIST PROVIDED HISTORY: Abdominal pain Is the patient pregnant?->No Reason for Exam: patient complains of bilateral flank pain h/o kidney stones and polycystic kidney disease Acuity: Acute Relevant Medical/Surgical History: surgeries to area of interest include DNC, stent placements, lithotripsy FINDINGS: Lower Chest: No acute abnormality. Liver: Normal. Gallbladder and Bile Ducts: Normal. Spleen: Normal. Adrenal Glands: Normal. Pancreas: Normal. Genitourinary: Redemonstration of extensive diffuse cystic replacement of both kidneys consistent with polycystic kidney disease. Stable nonobstructing 7 mm stone in the right kidney. It is difficult to evaluate for hydronephrosis and both ureters are not well seen. However, given this there is no obvious hydronephrosis. The urinary bladder is decompressed. IUD in the uterus. Bowel: Normal caliber bowel. No evidence of acute appendicitis. Vasculature: Atherosclerosis. No abdominal aortic aneurysm.  Bones and Soft Tissues: No acute abnormality. Retroperitoneum/Mesentery: No intraperitoneal free air, ascites or fluid collection. No lymphadenopathy in the abdomen or pelvis. Generalized paucity of mesenteric fat. 1.  Redemonstration of extensive diffuse cystic replacement of both kidneys consistent with polycystic kidney disease. 2.  Stable nonobstructing 7 mm stone in the right kidney. It is difficult to evaluate for hydronephrosis and both ureters are not well seen. However, given this there is no obvious obstructive uropathy. 3.  No bowel obstruction. No evidence of acute appendicitis. Xr Abdomen (kub) (single Ap View)    Result Date: 7/23/2020  EXAMINATION: ONE SUPINE XRAY VIEW(S) OF THE ABDOMEN 7/23/2020 6:32 am COMPARISON: 06/30/2020 HISTORY: ORDERING SYSTEM PROVIDED HISTORY: preop TECHNOLOGIST PROVIDED HISTORY: preop Reason for Exam: pre-op, kidney stones Acuity: Unknown Type of Exam: Unknown FINDINGS: Single frontal view the abdomen is submitted for review. There is a hyperdense presumed calculus overlying the upper quadrant of the abdomen. Moderate stool burden. Nonobstructive bowel gas pattern. IUD noted in place. Stable appearing right-sided nephrolithiasis. Assessment :      Primary Problem  B/l flank pain    Active Hospital Problems    Diagnosis Date Noted    Nephrolithiasis [N20.0] 06/30/2020       Plan:     Patient status Admit as inpatient in the  Progressive Unit/Step down    1. B/L flank pain  -Pt has Hx of B/L polycystic kidney disease. -CT scan showed 7mm stone in R renal calculus without hydronephrosis or urinary obstruction.  -Urinary urgency and incomplete evacuation reported by patient.  -Cr today 2.66, baseline 1.5, GFR 20.   -Morphine prn for pain relief. -Nephrology consulted.     2. Depression/anxiety:  -Continue Cymbalta 60mg    Consultations:   IP CONSULT TO NEPHROLOGY  IP CONSULT TO INTERNAL MEDICINE  IP CONSULT TO SOCIAL WORK     Patient is admitted as inpatient status because of co-morbiditieslisted above, severity of signs and symptoms as outlined, requirement for current medical therapies and most importantly because of direct risk to patient if care not provided in a hospital setting. Lay Mireles MD  8/6/2020  1:51 PM  Attending Physician Statement    I have discussed the case of Mitchell Ramirez, including pertinent history and exam findings with the resident. I have seen and examined the patient and the key elements of the encounter have been performed by me. I agree with the assessment, plan, and orders as documented by the resident. She has history of polycystic kidney disease and stage II CKD. She is admitted with right-sided nephrolithiasis which involves renal pelvis. Also has hydronephrosis.   Consult urology and nephrology  Electronically signed by Lay Mireles MD on 8/6/2020 at 1:51 PM

## 2020-08-05 NOTE — PROGRESS NOTES
[facility]  DVT Prophylaxis and Vaccine Status  Work List  Mandatory for all patients      Patient must be on both Chemical prophylaxis and Mechanical prophylaxis.  If chemical/mechanical prophylaxis is not ordered, the physician must document a reason for not using prophylaxis     Chemical Prophylaxis  Is patient on chemical prophylaxis: Yes  If no chemical prophylaxis Is a order in for No Chemical VTE prophylaxisNo  If no was the physician notified not applicable      Mechanical Prophylaxis  Is patient on mechanical prophylaxis, intermittent pneumatic compression device: Yes  If no was the physician notified not applicable        Pneumonia Vaccine  Vaccine indicated:  Up-to-date  If indicated was the vaccine given: not applicable    Influenza Vaccine (applicable from October through March):  Vaccine indicated: Not indicated  If indicated was the vaccine given: not applicable    Patient Education  Education completed on DVT prophylaxis: yes

## 2020-08-06 VITALS
OXYGEN SATURATION: 97 % | RESPIRATION RATE: 18 BRPM | WEIGHT: 121.91 LBS | DIASTOLIC BLOOD PRESSURE: 88 MMHG | TEMPERATURE: 98.3 F | SYSTOLIC BLOOD PRESSURE: 138 MMHG | HEART RATE: 84 BPM | HEIGHT: 65 IN | BODY MASS INDEX: 20.31 KG/M2

## 2020-08-06 LAB
ABSOLUTE EOS #: 0.1 K/UL (ref 0–0.4)
ABSOLUTE IMMATURE GRANULOCYTE: ABNORMAL K/UL (ref 0–0.3)
ABSOLUTE LYMPH #: 2 K/UL (ref 1–4.8)
ABSOLUTE MONO #: 0.4 K/UL (ref 0.1–1.3)
ALBUMIN SERPL-MCNC: 4.1 G/DL (ref 3.5–5.2)
ALBUMIN/GLOBULIN RATIO: ABNORMAL (ref 1–2.5)
ALP BLD-CCNC: 72 U/L (ref 35–104)
ALT SERPL-CCNC: 6 U/L (ref 5–33)
AMYLASE: 81 U/L (ref 28–100)
ANION GAP SERPL CALCULATED.3IONS-SCNC: 10 MMOL/L (ref 9–17)
AST SERPL-CCNC: 12 U/L
BASOPHILS # BLD: 1 % (ref 0–2)
BASOPHILS ABSOLUTE: 0.1 K/UL (ref 0–0.2)
BILIRUB SERPL-MCNC: 0.17 MG/DL (ref 0.3–1.2)
BILIRUBIN DIRECT: <0.08 MG/DL
BILIRUBIN, INDIRECT: ABNORMAL MG/DL (ref 0–1)
BUN BLDV-MCNC: 21 MG/DL (ref 6–20)
BUN/CREAT BLD: ABNORMAL (ref 9–20)
CALCIUM SERPL-MCNC: 8.2 MG/DL (ref 8.6–10.4)
CHLORIDE BLD-SCNC: 109 MMOL/L (ref 98–107)
CO2: 21 MMOL/L (ref 20–31)
CREAT SERPL-MCNC: 2.29 MG/DL (ref 0.5–0.9)
DIFFERENTIAL TYPE: ABNORMAL
EOSINOPHILS RELATIVE PERCENT: 2 % (ref 0–4)
GFR AFRICAN AMERICAN: 28 ML/MIN
GFR NON-AFRICAN AMERICAN: 23 ML/MIN
GFR SERPL CREATININE-BSD FRML MDRD: ABNORMAL ML/MIN/{1.73_M2}
GFR SERPL CREATININE-BSD FRML MDRD: ABNORMAL ML/MIN/{1.73_M2}
GLOBULIN: ABNORMAL G/DL (ref 1.5–3.8)
GLUCOSE BLD-MCNC: 89 MG/DL (ref 70–99)
HCT VFR BLD CALC: 35 % (ref 36–46)
HEMOGLOBIN: 11.5 G/DL (ref 12–16)
IMMATURE GRANULOCYTES: ABNORMAL %
LIPASE: 91 U/L (ref 13–60)
LYMPHOCYTES # BLD: 31 % (ref 24–44)
MCH RBC QN AUTO: 31.6 PG (ref 26–34)
MCHC RBC AUTO-ENTMCNC: 32.7 G/DL (ref 31–37)
MCV RBC AUTO: 96.5 FL (ref 80–100)
MONOCYTES # BLD: 6 % (ref 1–7)
NRBC AUTOMATED: ABNORMAL PER 100 WBC
PDW BLD-RTO: 13.7 % (ref 11.5–14.9)
PLATELET # BLD: 252 K/UL (ref 150–450)
PLATELET ESTIMATE: ABNORMAL
PMV BLD AUTO: 8.6 FL (ref 6–12)
POTASSIUM SERPL-SCNC: 4.4 MMOL/L (ref 3.7–5.3)
RBC # BLD: 3.63 M/UL (ref 4–5.2)
RBC # BLD: ABNORMAL 10*6/UL
SEG NEUTROPHILS: 60 % (ref 36–66)
SEGMENTED NEUTROPHILS ABSOLUTE COUNT: 3.8 K/UL (ref 1.3–9.1)
SODIUM BLD-SCNC: 140 MMOL/L (ref 135–144)
TOTAL PROTEIN: 6.7 G/DL (ref 6.4–8.3)
WBC # BLD: 6.4 K/UL (ref 3.5–11)
WBC # BLD: ABNORMAL 10*3/UL

## 2020-08-06 PROCEDURE — 2580000003 HC RX 258: Performed by: STUDENT IN AN ORGANIZED HEALTH CARE EDUCATION/TRAINING PROGRAM

## 2020-08-06 PROCEDURE — 80076 HEPATIC FUNCTION PANEL: CPT

## 2020-08-06 PROCEDURE — 99223 1ST HOSP IP/OBS HIGH 75: CPT | Performed by: INTERNAL MEDICINE

## 2020-08-06 PROCEDURE — 6360000002 HC RX W HCPCS: Performed by: STUDENT IN AN ORGANIZED HEALTH CARE EDUCATION/TRAINING PROGRAM

## 2020-08-06 PROCEDURE — 36415 COLL VENOUS BLD VENIPUNCTURE: CPT

## 2020-08-06 PROCEDURE — 6370000000 HC RX 637 (ALT 250 FOR IP): Performed by: INTERNAL MEDICINE

## 2020-08-06 PROCEDURE — 96372 THER/PROPH/DIAG INJ SC/IM: CPT

## 2020-08-06 PROCEDURE — G0378 HOSPITAL OBSERVATION PER HR: HCPCS

## 2020-08-06 PROCEDURE — 80048 BASIC METABOLIC PNL TOTAL CA: CPT

## 2020-08-06 PROCEDURE — 83690 ASSAY OF LIPASE: CPT

## 2020-08-06 PROCEDURE — 96361 HYDRATE IV INFUSION ADD-ON: CPT

## 2020-08-06 PROCEDURE — 85025 COMPLETE CBC W/AUTO DIFF WBC: CPT

## 2020-08-06 PROCEDURE — 6370000000 HC RX 637 (ALT 250 FOR IP): Performed by: STUDENT IN AN ORGANIZED HEALTH CARE EDUCATION/TRAINING PROGRAM

## 2020-08-06 PROCEDURE — 82150 ASSAY OF AMYLASE: CPT

## 2020-08-06 PROCEDURE — 96376 TX/PRO/DX INJ SAME DRUG ADON: CPT

## 2020-08-06 RX ORDER — TRAMADOL HYDROCHLORIDE 50 MG/1
50 TABLET ORAL EVERY 6 HOURS PRN
Status: DISCONTINUED | OUTPATIENT
Start: 2020-08-06 | End: 2020-08-06 | Stop reason: HOSPADM

## 2020-08-06 RX ORDER — PANTOPRAZOLE SODIUM 40 MG/1
40 TABLET, DELAYED RELEASE ORAL
Status: DISCONTINUED | OUTPATIENT
Start: 2020-08-06 | End: 2020-08-06 | Stop reason: HOSPADM

## 2020-08-06 RX ORDER — TRAMADOL HYDROCHLORIDE 50 MG/1
50 TABLET ORAL EVERY 6 HOURS PRN
Qty: 20 TABLET | Refills: 0 | Status: SHIPPED | OUTPATIENT
Start: 2020-08-06 | End: 2020-08-11

## 2020-08-06 RX ADMIN — MORPHINE SULFATE 4 MG: 4 INJECTION, SOLUTION INTRAMUSCULAR; INTRAVENOUS at 12:17

## 2020-08-06 RX ADMIN — MORPHINE SULFATE 4 MG: 4 INJECTION, SOLUTION INTRAMUSCULAR; INTRAVENOUS at 00:12

## 2020-08-06 RX ADMIN — HEPARIN SODIUM 5000 UNITS: 5000 INJECTION INTRAVENOUS; SUBCUTANEOUS at 06:16

## 2020-08-06 RX ADMIN — SODIUM CHLORIDE: 9 INJECTION, SOLUTION INTRAVENOUS at 04:48

## 2020-08-06 RX ADMIN — DULOXETINE HYDROCHLORIDE 60 MG: 60 CAPSULE, DELAYED RELEASE ORAL at 10:11

## 2020-08-06 RX ADMIN — ONDANSETRON 4 MG: 2 INJECTION INTRAMUSCULAR; INTRAVENOUS at 01:11

## 2020-08-06 RX ADMIN — MORPHINE SULFATE 4 MG: 4 INJECTION, SOLUTION INTRAMUSCULAR; INTRAVENOUS at 10:11

## 2020-08-06 RX ADMIN — TRAMADOL HYDROCHLORIDE 50 MG: 50 TABLET, FILM COATED ORAL at 16:21

## 2020-08-06 RX ADMIN — HEPARIN SODIUM 5000 UNITS: 5000 INJECTION INTRAVENOUS; SUBCUTANEOUS at 14:19

## 2020-08-06 RX ADMIN — PANTOPRAZOLE SODIUM 40 MG: 40 TABLET, DELAYED RELEASE ORAL at 14:39

## 2020-08-06 RX ADMIN — ONDANSETRON 4 MG: 2 INJECTION INTRAMUSCULAR; INTRAVENOUS at 14:39

## 2020-08-06 RX ADMIN — MORPHINE SULFATE 4 MG: 4 INJECTION, SOLUTION INTRAMUSCULAR; INTRAVENOUS at 14:19

## 2020-08-06 RX ADMIN — MORPHINE SULFATE 4 MG: 4 INJECTION, SOLUTION INTRAMUSCULAR; INTRAVENOUS at 04:48

## 2020-08-06 ASSESSMENT — ENCOUNTER SYMPTOMS
RECTAL PAIN: 0
DIARRHEA: 0
CONSTIPATION: 0
VOMITING: 0
SHORTNESS OF BREATH: 0
COUGH: 0
SORE THROAT: 0
BLOOD IN STOOL: 0
ABDOMINAL DISTENTION: 0
ABDOMINAL PAIN: 1
CHEST TIGHTNESS: 0
NAUSEA: 0

## 2020-08-06 ASSESSMENT — PAIN SCALES - GENERAL
PAINLEVEL_OUTOF10: 8
PAINLEVEL_OUTOF10: 3
PAINLEVEL_OUTOF10: 6
PAINLEVEL_OUTOF10: 8
PAINLEVEL_OUTOF10: 9
PAINLEVEL_OUTOF10: 9
PAINLEVEL_OUTOF10: 5
PAINLEVEL_OUTOF10: 3
PAINLEVEL_OUTOF10: 6
PAINLEVEL_OUTOF10: 4
PAINLEVEL_OUTOF10: 8

## 2020-08-06 NOTE — PROGRESS NOTES
Writer went through discharge paperwork with patient and boyfriend. Patient and boyfriend insisted if the pain continues to stay the same, do they come back here. I advised them to call their PCP first unless emergent. Patient states understanding of discharge paperwork.

## 2020-08-06 NOTE — PLAN OF CARE
Problem: Pain:  Goal: Pain level will decrease  Description: Pain level will decrease  8/6/2020 1644 by Suraj Mancini RN  Outcome: Ongoing  8/6/2020 0345 by Wei Tavarez RN  Outcome: Ongoing  Note: Patient given pain medication as ordered. Goal: Control of acute pain  Description: Control of acute pain  Outcome: Ongoing  Goal: Control of chronic pain  Description: Control of chronic pain  Outcome: Ongoing   Patient pain was controlled with use of PO and IV pain medications.

## 2020-08-06 NOTE — PROGRESS NOTES
[address] [facility].    PROGRESS NOTE             8/6/2020    1:27 PM    Name:   Donna Benz  MRN:     [id number]     Acct:      [de-identified]   Room:   2106/2106-01  IP Day:  0  Admit Date:  8/5/2020 11:11 AM    PCP:  Chung Coronel MD  Code Status:  Full Code    Subjective:     C/C:   Chief Complaint   Patient presents with    Flank Pain     Interval History Status: not changed. Patient seen and examined at the bedside. Patient still complains of bilateral flank pain. She rates it 7 out of 10. Patient is able to pass urine. No hematuria. No dysuria. She is afebrile. Nephrology was consulted, advised maintain hydration. Taking morphine as needed for pain control, Zofran as needed for nausea. Vital signs stable. Review of Systems:     Review of Systems   Constitutional: Negative for activity change, chills, diaphoresis, fatigue, fever and unexpected weight change. HENT: Negative for sore throat. Respiratory: Negative for cough, chest tightness and shortness of breath. Cardiovascular: Negative for chest pain, palpitations and leg swelling. Gastrointestinal: Positive for abdominal pain. Negative for abdominal distention, blood in stool, constipation, diarrhea, nausea, rectal pain and vomiting. Genitourinary: Positive for flank pain and urgency. Negative for difficulty urinating, dysuria, frequency and hematuria. Neurological: Negative for dizziness, weakness, light-headedness and headaches. Psychiatric/Behavioral: Negative for agitation and hallucinations. The patient is not nervous/anxious. Medications: Allergies:     Allergies   Allergen Reactions    Bee Pollen     Dust Mite Extract      Chest congestion , sneezing    Pcn [Penicillins] Rash    Pollen Extract      Chest congesting , sneezing        Current Meds:   Scheduled Meds:    DULoxetine  60 mg Oral Daily    tamsulosin  0.4 mg Oral Nightly    sodium chloride flush  10 mL Intravenous 2 times per day    heparin (porcine)  5,000 Units Subcutaneous 3 times per day     Continuous Infusions:    sodium chloride 100 mL/hr at 20 0448     PRN Meds: sodium chloride flush, acetaminophen **OR** acetaminophen, polyethylene glycol, promethazine **OR** ondansetron, morphine **OR** morphine    Data:     Past Medical History:   has a past medical history of Anxiety, Asthma, Chronic headaches, CKD (chronic kidney disease) stage 3, GFR 30-59 ml/min ([facility]), Degenerative disc disease, cervical, Depression, Dysmenorrhea, Eczema, Headache(784.0), HTN (hypertension), Hypertension, Hypocalcemia, Kidney stone, Menorrhagia, Neck pain, bilateral, Pelvic pain in female, Polycystic kidney, Smoker, Tachycardia, and Tension vascular headache. Social History:   reports that she has been smoking cigarettes. She has a 16.00 pack-year smoking history. She has never used smokeless tobacco. She reports that she does not drink alcohol or use drugs. Family History:   Family History   Problem Relation Age of Onset    High Blood Pressure Mother     Asthma Sister    [employer] Migraines Sister     High Blood Pressure Father     Other Brother         bad knees, with recent total knee replacement        Vitals:  /81   Pulse 74   Temp 98 °F (36.7 °C) (Oral)   Resp 18   Ht 5' 5\" (1.651 m)   Wt 121 lb 14.6 oz (55.3 kg)   LMP 07/15/2020 (Approximate)   SpO2 99%   BMI 20.29 kg/m²   Temp (24hrs), Av.1 °F (36.7 °C), Min:97.6 °F (36.4 °C), Max:98.4 °F (36.9 °C)    No results for input(s): POCGLU in the last 72 hours. I/O(24Hr):     Intake/Output Summary (Last 24 hours) at 2020 1327  Last data filed at 2020 1227  Gross per 24 hour   Intake 1279.62 ml   Output 2250 ml   Net -970.38 ml       Labs:    [unfilled]    Lab Results   Component Value Date/Time    SPECIAL NOT REPORTED 2020 05:07 PM     Lab Results   Component Value Date/Time    CULTURE NO GROWTH 2020 05:07 PM       [unfilled]    Radiology:    Ct Abdomen Pelvis Wo Contrast Additional Contrast? None    Result Date: 8/5/2020  EXAMINATION: CT OF THE ABDOMEN AND PELVIS WITHOUT CONTRAST 8/5/2020 12:26 pm TECHNIQUE: CT of the abdomen and pelvis was performed without the administration of intravenous contrast. Multiplanar reformatted images are provided for review. Dose modulation, iterative reconstruction, and/or weight based adjustment of the mA/kV was utilized to reduce the radiation dose to as low as reasonably achievable. COMPARISON: 07/30/2020 HISTORY: ORDERING SYSTEM PROVIDED HISTORY: bilateral flank pain. TECHNOLOGIST PROVIDED HISTORY: bilateral flank pain. Is the patient pregnant?->No Reason for Exam: bilat flank  pain, hx stones Acuity: Unknown Type of Exam: Unknown FINDINGS: Lower Chest: Visualized lung bases are clear without focal airspace consolidation, sizeable effusion, or pneumothorax. No definite pulmonary nodules or masses. Base of the heart is normal in size without pericardial fluid collection. Organs: The liver, gallbladder, pancreas, and spleen are grossly within normal limits. No evidence for intrahepatic or extrahepatic biliary ductal dilatation. GI/Bowel: There is no evidence for bowel obstruction or inflammation. No free intraperitoneal air or fluid. Small bowel loops are normal in caliber. No evidence for hiatal hernia. Appendix is unremarkable. Pelvis: IUD noted in place. Peritoneum/Retroperitoneum: Adrenal glands are not clearly visualized. Bilateral placed the kidneys identified. There multiple hyperdense renal foci which are most compatible with hemorrhagic cysts. Right-sided nephrolithiasis noted. No urinary obstruction or hydronephrosis. Urinary bladder is unremarkable. Vasculature: The aorta is normal in caliber. No definite lymphadenopathy identified. Bones/Soft Tissues: No evidence for acute fracture or dislocation. No lytic or blastic lesions.      Bilateral polycystic kidney disease. Stable 7 mm right renal calculus without convincing evidence for hydronephrosis or urinary obstruction. No bowel obstruction or inflammation. No free intraperitoneal air or fluid. Ct Abdomen Pelvis Wo Contrast    Result Date: 7/30/2020  EXAMINATION: CT OF THE ABDOMEN AND PELVIS WITHOUT CONTRAST 7/30/2020 11:04 pm TECHNIQUE: CT of the abdomen and pelvis was performed without the administration of intravenous contrast. Multiplanar reformatted images are provided for review. Dose modulation, iterative reconstruction, and/or weight based adjustment of the mA/kV was utilized to reduce the radiation dose to as low as reasonably achievable. COMPARISON: June 25, 2020. HISTORY: ORDERING SYSTEM PROVIDED HISTORY: Abdominal pain TECHNOLOGIST PROVIDED HISTORY: Abdominal pain Is the patient pregnant?->No Reason for Exam: patient complains of bilateral flank pain h/o kidney stones and polycystic kidney disease Acuity: Acute Relevant Medical/Surgical History: surgeries to area of interest include DNC, stent placements, lithotripsy FINDINGS: Lower Chest: No acute abnormality. Liver: Normal. Gallbladder and Bile Ducts: Normal. Spleen: Normal. Adrenal Glands: Normal. Pancreas: Normal. Genitourinary: Redemonstration of extensive diffuse cystic replacement of both kidneys consistent with polycystic kidney disease. Stable nonobstructing 7 mm stone in the right kidney. It is difficult to evaluate for hydronephrosis and both ureters are not well seen. However, given this there is no obvious hydronephrosis. The urinary bladder is decompressed. IUD in the uterus. Bowel: Normal caliber bowel. No evidence of acute appendicitis. Vasculature: Atherosclerosis. No abdominal aortic aneurysm. Bones and Soft Tissues: No acute abnormality. Retroperitoneum/Mesentery: No intraperitoneal free air, ascites or fluid collection. No lymphadenopathy in the abdomen or pelvis. Generalized paucity of mesenteric fat.      1.

## 2020-08-06 NOTE — ED PROVIDER NOTES
eMERGENCY dEPARTMENT eNCOUnter   Independent Attestation     Pt Name: Kemal Welch  MRN: [id number]  [place] 1980  Date of evaluation: 8/6/20     Kemal Welch is a 41 y.o. female with CC: Flank Pain      Based on the medical record the care appears appropriate. I was personally available for consultation in the Emergency Department.     Della Collet, MD  Attending Emergency Physician                    Maru Collins MD  08/06/20 9316

## 2020-08-06 NOTE — PROGRESS NOTES
[address]   OCCUPATIONAL THERAPY MISSED TREATMENT NOTE   INPATIENT   Date: 20  Patient Name: Emma Keith       Room:   MRN: [id number]   Account #: [de-identified]    : 1980  (41 y.o.)  Gender: female                 REASON FOR MISSED TREATMENT:  Patient with another ancillary department   -   Needs OT eval: 20 OT attempt at 10:52 a.m ,pt w pastoral care         Julio César Henning, OT

## 2020-08-06 NOTE — PROGRESS NOTES
Patient continues to have high BP. Previous reading was 177/91. RN notified Lisa Deutsch CNP of continuing high BP.  Waiting for response

## 2020-08-06 NOTE — FLOWSHEET NOTE
Writer received Perfect Serve message from patient's nurse, BODØ, regarding providing support to patient; patient talked about her relationship concerns with her current sig other, the hardships of being chronically ill, trying to take care of her 5 children while being sick, being unable to work due to her health issues; her two failed marital relationships; and her relationship with her mother; writer encouraged good self care for patient; patient discussed her spiritual beliefs and declined prayer, stating her \"Catholic is private to her\"; patient very grateful for listening presence, support and visit; writer talked with patient's nurse, BODØ regarding referral;     08/06/20 1100   Encounter Summary   Services provided to: Patient   Referral/Consult From: Patient;Nurse   Support System Parent; Children;Friends/neighbors   Continue Visiting   (8/6/20)   Complexity of Encounter Moderate   Length of Encounter 30 minutes   Spiritual Assessment Completed Yes   Crisis   Type Relationship concerns   Assessment Approachable;Tearful; Anxious; Helplessness; Anticipatory grief   Intervention Active listening;Explored feelings, thoughts, concerns;Sustaining presence/ Ministry of presence;Empowerment; Discussed illness/injury and it's impact; Discussed belief system/Catholic practices/darinel   Outcome Comfort;Expressed gratitude;Engaged in conversation; Shared life review;Expressed feelings/needs/concerns;Coping; Hopeful;Receptive;Venting emotion

## 2020-08-06 NOTE — CARE COORDINATION
CASE MANAGEMENT NOTE:    Admission Date:  8/5/2020 Farzana Vital is a 41 y.o.  female    Admitted for : Nephrolithiasis [N20.0]    Met with:  Patient    PCP:  Tony Smith                                Insurance:  [employer]      Current Residence/ Living Arrangements:  independently at home w/ Family         Current Services PTA:  No    Is patient agreeable to VNS: No    Freedom of choice provided:  Yes    List of [address] provided: No,declines    VNS chosen:  No    DME:  none    Home Oxygen: No    Nebulizer: No    CPAP/BIPAP: No    Supplier: N/A    Potential Assistance Needed: No    SNF needed: No    Freedom of choice and list provided: NA    Pharmacy:  [facility]       Does Patient want to use MEDS to BEDS? No    Is the Patient an [facility] with Readmission Risk Score greater than 14%? No  If yes, pt needs a follow up appointment made within 7 days. Family Members/Caregivers that pt would like involved in their care:    Yes    If yes, list name here:  [address] [place] [address]    Transportation Provider:  Patient             Is patient in Isolation/One on One/Altered Mental Status? No  If yes, skip next question. If no, would they like an I-Pad to  use? No  If yes, call [phone]. Discharge Plan:  8/6/20 [employer] Pt. Lives in 2 story home, stays on 1st floor w/ Family. No DME. Denies VNS/Needs. Just DC 8/1, Stable 7mm Stone, Nephro, IV Fluids.  Anticipate DC today w/ no needs, will follow//KB                 Electronically signed by: Margi Goldstein RN on 8/6/2020 at 11:34 AM

## 2020-08-06 NOTE — PROGRESS NOTES
Physical Therapy  DATE: 2020    NAME: Katelynn Sotomayor  MRN: [id number]   : 1980    Patient not seen this date for Physical Therapy due to:  [] Blood transfusion in progress  [] Hemodialysis  [x]  Patient Declined  Pt was with  and deferred PT treatment.  [] Spine Precautions   [] Strict Bedrest  [] Surgery/ Procedure  [] Testing      [] Other        [] PT being discontinued at this time. Patient independent. No further needs. [] PT being discontinued at this time as the patient has been transferred to palliative care. No further needs.     Jackie Olvera, PT

## 2020-08-06 NOTE — PLAN OF CARE
Problem: Pain:  Goal: Pain level will decrease  Description: Pain level will decrease  Outcome: Ongoing  Note: Patient given pain medication as ordered.

## 2020-08-06 NOTE — CONSULTS
NEPHROLOGY CONSULT       Reason for Consult: Acute kidney injury and persistent right flank pain      Chief Complaint: Persistent  right flank pain. History Obtained From:Patient    History of Present Illness:    Mariia Mendez is a 41 y.o. female  with significant past medical history of autosomal dominant polycystic kidney disease [ADPKD], Nephrolithiasis, [Status post multiple lithotripsies and he ESWL performed by Dr. Rosy Dumont in late July 2020], systemic hypertension and chronic kidney disease stage III [baseline serum creatinine 1.48 mg/dL in 2019 and 2.0 mg/dL in July/August 2020],   She had recently presented to the emergency department on 7/30/2020 for further evaluation of abrupt onset of right flank pain with CT scan showing extensive diffuse bilateral renal cysts with stable nonobstructing 7 mm stone in the right kidney. She was admitted and hydrated with improvement in serum creatinine from 2.37 mg/dL at admission to 2.19 mg/dL at discharge. She was seen in the office by Dr. Jj Alva  yesterday and continued to complain of persistent right flank pain and was referred to the ER for evaluation and admitted for pain management. Patient serum creatinine on admission was 2.66 mg/dL with BUN of 23. Patient denies any nausea, vomiting, diarrhea, fever, dysuria, gross hematuria or cloudy urine. Abdominal CT scan without contrast on 8/5/2020 again revealed stable  nonobstructing 7 mm stone in the right kidney without hydronephrosis. No bowel obstruction or inflammation.     Past Medical History:        Diagnosis Date    Anxiety     Asthma     Chronic headaches 7/10/2013    CKD (chronic kidney disease) stage 3, GFR 30-59 ml/min ([facility])     Degenerative disc disease, cervical     Depression     Dysmenorrhea 9/30/2014    Eczema 11/8/2012    Headache(784.0)     HTN (hypertension) 10/2/2011    Hypertension     Hypocalcemia 5/28/2014    Kidney stone     Menorrhagia 9/30/2014    Neck pain, bilateral 5/28/2014    Pelvic pain in female 9/30/2014    Polycystic kidney     Smoker 10/2/2011    Tachycardia 1/20/2014    Tension vascular headache 1/20/2014       Past Surgical History:        Procedure Laterality Date    BREAST ENHANCEMENT SURGERY      BREAST LUMPECTOMY      left breast    CYSTO/URETERO/PYELOSCOPY, CALCULUS TX Right 6/12/2020    HOLMIUM - CYSTO, RIGHT RETROGRADE PYELOGRAM, RIGHT URETEROSCOPY, LASER LITHO ON STAND BY, RIGHT STENT PLACEMENT performed by Marilin Lou MD at [facility]  06/12/2020   [facility]      x2    LITHOTRIPSY Right 7/23/2020    ESWL EXTRACORPOREAL SHOCK WAVE LITHOTRIPSY performed by Marilin Lou MD at [address]  07/01/2013    nerve block(right vervical C3/TON/C4/C5    NERVE BLOCK  07/08/2013    nerve block(right vervical C3/TON/C4/C5    OTHER SURGICAL HISTORY  03/10/2014     botox inj     TOE SURGERY      removal of ingrown toe nail-2nd toe on left foot     URETEROSCOPY Right 06/12/2020    stent placement       Current Medications:    DULoxetine (CYMBALTA) extended release capsule 60 mg, Daily  tamsulosin (FLOMAX) capsule 0.4 mg, Nightly  sodium chloride flush 0.9 % injection 10 mL, 2 times per day  sodium chloride flush 0.9 % injection 10 mL, PRN  acetaminophen (TYLENOL) tablet 650 mg, Q6H PRN    Or  acetaminophen (TYLENOL) suppository 650 mg, Q6H PRN  polyethylene glycol (GLYCOLAX) packet 17 g, Daily PRN  promethazine (PHENERGAN) tablet 12.5 mg, Q6H PRN    Or  ondansetron (ZOFRAN) injection 4 mg, Q6H PRN  0.9 % sodium chloride infusion, Continuous  heparin (porcine) injection 5,000 Units, 3 times per day  morphine (PF) injection 2 mg, Q2H PRN    Or  morphine sulfate (PF) injection 4 mg, Q2H PRN        Allergies:  Bee pollen; Dust mite extract; Pcn [penicillins]; and Pollen extract    Social History:   Social History     Socioeconomic History    Marital status:      Spouse name: Not on file    Number of children: Not on file    Years of education: Not on file    Highest education level: Not on file   Occupational History    Occupation: stay at home mom   Social Needs    Financial resource strain: Not on file    Food insecurity     Worry: Not on file     Inability: Not on file    Transportation needs     Medical: Not on file     Non-medical: Not on file   Tobacco Use    Smoking status: Current Every Day Smoker     Packs/day: 1.00     Years: 16.00     Pack years: 16.00     Types: Cigarettes    Smokeless tobacco: Never Used    Tobacco comment: trying  to  cut  down   Substance and Sexual Activity    Alcohol use: No     Alcohol/week: 0.0 standard drinks    Drug use: No    Sexual activity: Yes     Partners: Male     Comment: steady boyfriend   Lifestyle    Physical activity     Days per week: Not on file     Minutes per session: Not on file    Stress: Not on file   Relationships    Social connections     Talks on phone: Not on file     Gets together: Not on file     Attends Latter-day service: Not on file     Active member of club or organization: Not on file     Attends meetings of clubs or organizations: Not on file     Relationship status: Not on file    Intimate partner violence     Fear of current or ex partner: Not on file     Emotionally abused: Not on file     Physically abused: Not on file     Forced sexual activity: Not on file   Other Topics Concern    Not on file   Social History Narrative    Not on file       Family History:   Family History   Problem Relation Age of Onset    High Blood Pressure Mother     Asthma Sister     Migraines Sister     High Blood Pressure Father     Other Brother         bad knees, with recent total knee replacement        Review of Systems:    Constitutional: No fever, no chills, no night sweats, fatigue, generalized weakness, loss of appetite  HEENT:  No headache, otalgia, itchy eyes, epistaxis, nasal discharge or sore throat.   Cardiac:  No chest pain, dyspnea, orthopnea or PND, palpitations  Chest:  No cough, hemoptysis, pleuritic chest pain, wheezing,SOB  Abdomen:  Right sided abdominal pain, no nausea, vomiting, diarrhea, melena, dysphagia hematemesis,constipation, abdominal bloating, flank pain  Neuro:  No CVA, TIA or seizure like activity. Skin:   No rashes, no itching. :   No hematuria, no pyuria, no dysuria, no flank pain. Extremities:  No swelling or joint pains. Objective:  CURRENT TEMPERATURE:  Temp: 98 °F (36.7 °C)  MAXIMUM TEMPERATURE OVER 24HRS:  Temp (24hrs), Av °F (36.7 °C), Min:97.6 °F (36.4 °C), Max:98.4 °F (36.9 °C)    CURRENT RESPIRATORY RATE:  Resp: 18  CURRENT PULSE:  Pulse: 74  CURRENT BLOOD PRESSURE:  BP: 131/81  24HR BLOOD PRESSURE RANGE:  Systolic (64XJM), LAS:491 , Min:131 , GET:386   ; Diastolic (85VBI), ALU:94, Min:81, Max:92    24HR INTAKE/OUTPUT:      Intake/Output Summary (Last 24 hours) at 2020 1420  Last data filed at 2020 1227  Gross per 24 hour   Intake 1279.62 ml   Output 2250 ml   Net -970.38 ml     Patient Vitals for the past 96 hrs (Last 3 readings):   Weight   20 0448 121 lb 14.6 oz (55.3 kg)   20 1440 121 lb 14.6 oz (55.3 kg)   20 1108 124 lb (56.2 kg)         Physical Exam:  GENERAL APPEARANCE: Alert and cooperative, and appears to be in no acute distress. HEAD: normocephalic  EYES: PERRL, EOMI. Not pale, anicteric   NOSE:  No nasal discharge. THROAT:  Oral cavity and pharynx normal. Moist  NECK: Neck supple, non-tender without lymphadenopathy, masses or thyromegaly. JVD-neg  CARDIAC: Normal S1 and S2. No S3, S4 or murmurs. Rhythm is regular. LUNGS: Clear to auscultation and percussion without rales, rhonchi, wheezing or diminished breath sounds. ABD-Soft non distended, BS+ Non tender no organomegally  BACK: Examination of the spine reveals  no spinal deformity, without tenderness,   MUSKULOSKELETAL: Adequately aligned spine. No joint erythema or tenderness.    EXTREMITIES: No 08/05/2020    UROBILINOGEN Normal 08/05/2020    BILIRUBINUR NEGATIVE 08/05/2020    BILIRUBINUR neg 09/30/2014    BLOODU Positive 04/26/2016    GLUCOSEU NEGATIVE 08/05/2020    [address] NEGATIVE 08/05/2020    AMORPHOUS NOT REPORTED 08/05/2020         Radiology:  Reviewed as available. Assessment:  1. Chronic kidney disease stage 3B secondary to adult polycystic kidney disease     2. Acute kidney injury superimposed on CKD stage III secondary to dehydration  No evidence of obstruction on abdominal CT scan-creatinine improving currently 2.29 mg/dl  on IV fluid hydration.     3. Right flank pain possibly secondary to expanding cysts. No evidence of urinary tract infection. No evidence of obstructing stone.     4.  Essential hypertension-controlled        Plan:  1. Continue IV fluids with 0.9 saline at 125 cc/h  2.Start tramadol 50 mg every 6 hourly as needed for pain  3. Follow-up with urology as outpatient  4. Would recommend obtaining an MRI of bilateral kidneys in the O/P to assess total kidney volume and evaluate if patient is a candidate for Jynarque       Cleared to discharge from renal standpoint    Thank you for the consultation.       Electronically signed by April Galo MD on 8/6/2020 at 2:20 PM

## 2020-08-19 ENCOUNTER — HOSPITAL ENCOUNTER (EMERGENCY)
Age: 40
Discharge: HOME | End: 2020-08-19
Attending: EMERGENCY MEDICINE
Payer: MEDICARE

## 2020-08-19 VITALS
BODY MASS INDEX: 21.33 KG/M2 | OXYGEN SATURATION: 98 % | WEIGHT: 128 LBS | HEART RATE: 93 BPM | SYSTOLIC BLOOD PRESSURE: 199 MMHG | DIASTOLIC BLOOD PRESSURE: 96 MMHG | HEIGHT: 65 IN | RESPIRATION RATE: 16 BRPM | TEMPERATURE: 98.4 F

## 2020-08-19 LAB
-: NORMAL
ABSOLUTE EOS #: 0.2 K/UL (ref 0–0.4)
ABSOLUTE IMMATURE GRANULOCYTE: ABNORMAL K/UL (ref 0–0.3)
ABSOLUTE LYMPH #: 2.5 K/UL (ref 1–4.8)
ABSOLUTE MONO #: 0.7 K/UL (ref 0.1–1.3)
ALBUMIN SERPL-MCNC: 4.2 G/DL (ref 3.5–5.2)
ALBUMIN/GLOBULIN RATIO: ABNORMAL (ref 1–2.5)
ALP BLD-CCNC: 76 U/L (ref 35–104)
ALT SERPL-CCNC: 6 U/L (ref 5–33)
AMORPHOUS: NORMAL
ANION GAP SERPL CALCULATED.3IONS-SCNC: 11 MMOL/L (ref 9–17)
AST SERPL-CCNC: 10 U/L
BACTERIA: NORMAL
BASOPHILS # BLD: 1 % (ref 0–2)
BASOPHILS ABSOLUTE: 0.1 K/UL (ref 0–0.2)
BILIRUB SERPL-MCNC: <0.15 MG/DL (ref 0.3–1.2)
BILIRUBIN DIRECT: <0.08 MG/DL
BILIRUBIN URINE: NEGATIVE
BILIRUBIN, INDIRECT: ABNORMAL MG/DL (ref 0–1)
BUN BLDV-MCNC: 22 MG/DL (ref 6–20)
BUN/CREAT BLD: ABNORMAL (ref 9–20)
CALCIUM SERPL-MCNC: 8.6 MG/DL (ref 8.6–10.4)
CASTS UA: NORMAL /LPF
CHLORIDE BLD-SCNC: 107 MMOL/L (ref 98–107)
CO2: 21 MMOL/L (ref 20–31)
COLOR: YELLOW
COMMENT UA: ABNORMAL
CREAT SERPL-MCNC: 2.26 MG/DL (ref 0.5–0.9)
CRYSTALS, UA: NORMAL /HPF
DIFFERENTIAL TYPE: ABNORMAL
EOSINOPHILS RELATIVE PERCENT: 2 % (ref 0–4)
EPITHELIAL CELLS UA: NORMAL /HPF
GFR AFRICAN AMERICAN: 29 ML/MIN
GFR NON-AFRICAN AMERICAN: 24 ML/MIN
GFR SERPL CREATININE-BSD FRML MDRD: ABNORMAL ML/MIN/{1.73_M2}
GFR SERPL CREATININE-BSD FRML MDRD: ABNORMAL ML/MIN/{1.73_M2}
GLOBULIN: ABNORMAL G/DL (ref 1.5–3.8)
GLUCOSE BLD-MCNC: 87 MG/DL (ref 70–99)
GLUCOSE URINE: NEGATIVE
HCT VFR BLD CALC: 34.6 % (ref 36–46)
HEMOGLOBIN: 11.7 G/DL (ref 12–16)
IMMATURE GRANULOCYTES: ABNORMAL %
KETONES, URINE: NEGATIVE
LEUKOCYTE ESTERASE, URINE: NEGATIVE
LIPASE: 62 U/L (ref 13–60)
LYMPHOCYTES # BLD: 24 % (ref 24–44)
MCH RBC QN AUTO: 32.8 PG (ref 26–34)
MCHC RBC AUTO-ENTMCNC: 33.9 G/DL (ref 31–37)
MCV RBC AUTO: 96.5 FL (ref 80–100)
MONOCYTES # BLD: 7 % (ref 1–7)
MUCUS: NORMAL
NITRITE, URINE: NEGATIVE
NRBC AUTOMATED: ABNORMAL PER 100 WBC
OTHER OBSERVATIONS UA: NORMAL
PDW BLD-RTO: 13.8 % (ref 11.5–14.9)
PH UA: 6 (ref 5–8)
PLATELET # BLD: 265 K/UL (ref 150–450)
PLATELET ESTIMATE: ABNORMAL
PMV BLD AUTO: 8.8 FL (ref 6–12)
POTASSIUM SERPL-SCNC: 4.3 MMOL/L (ref 3.7–5.3)
PROTEIN UA: ABNORMAL
RBC # BLD: 3.58 M/UL (ref 4–5.2)
RBC # BLD: ABNORMAL 10*6/UL
RBC UA: NORMAL /HPF
RENAL EPITHELIAL, UA: NORMAL /HPF
SEG NEUTROPHILS: 66 % (ref 36–66)
SEGMENTED NEUTROPHILS ABSOLUTE COUNT: 7.1 K/UL (ref 1.3–9.1)
SODIUM BLD-SCNC: 139 MMOL/L (ref 135–144)
SPECIFIC GRAVITY UA: 1.01 (ref 1–1.03)
TOTAL PROTEIN: 6.8 G/DL (ref 6.4–8.3)
TRICHOMONAS: NORMAL
TURBIDITY: CLEAR
URINE HGB: NEGATIVE
UROBILINOGEN, URINE: NORMAL
WBC # BLD: 10.6 K/UL (ref 3.5–11)
WBC # BLD: ABNORMAL 10*3/UL
WBC UA: NORMAL /HPF
YEAST: NORMAL

## 2020-08-19 PROCEDURE — 85025 COMPLETE CBC W/AUTO DIFF WBC: CPT

## 2020-08-19 PROCEDURE — 6370000000 HC RX 637 (ALT 250 FOR IP): Performed by: EMERGENCY MEDICINE

## 2020-08-19 PROCEDURE — 80048 BASIC METABOLIC PNL TOTAL CA: CPT

## 2020-08-19 PROCEDURE — 2580000003 HC RX 258: Performed by: EMERGENCY MEDICINE

## 2020-08-19 PROCEDURE — 96374 THER/PROPH/DIAG INJ IV PUSH: CPT

## 2020-08-19 PROCEDURE — 96375 TX/PRO/DX INJ NEW DRUG ADDON: CPT

## 2020-08-19 PROCEDURE — 6360000002 HC RX W HCPCS: Performed by: EMERGENCY MEDICINE

## 2020-08-19 PROCEDURE — 80076 HEPATIC FUNCTION PANEL: CPT

## 2020-08-19 PROCEDURE — 36415 COLL VENOUS BLD VENIPUNCTURE: CPT

## 2020-08-19 PROCEDURE — 99284 EMERGENCY DEPT VISIT MOD MDM: CPT

## 2020-08-19 PROCEDURE — 96376 TX/PRO/DX INJ SAME DRUG ADON: CPT

## 2020-08-19 PROCEDURE — 81001 URINALYSIS AUTO W/SCOPE: CPT

## 2020-08-19 PROCEDURE — 83690 ASSAY OF LIPASE: CPT

## 2020-08-19 RX ORDER — ONDANSETRON 2 MG/ML
4 INJECTION INTRAMUSCULAR; INTRAVENOUS ONCE
Status: COMPLETED | OUTPATIENT
Start: 2020-08-19 | End: 2020-08-19

## 2020-08-19 RX ORDER — CYCLOBENZAPRINE HCL 10 MG
10 TABLET ORAL ONCE
Status: COMPLETED | OUTPATIENT
Start: 2020-08-19 | End: 2020-08-19

## 2020-08-19 RX ORDER — 0.9 % SODIUM CHLORIDE 0.9 %
1000 INTRAVENOUS SOLUTION INTRAVENOUS ONCE
Status: COMPLETED | OUTPATIENT
Start: 2020-08-19 | End: 2020-08-19

## 2020-08-19 RX ADMIN — SODIUM CHLORIDE 1000 ML: 9 INJECTION, SOLUTION INTRAVENOUS at 01:49

## 2020-08-19 RX ADMIN — HYDROMORPHONE HYDROCHLORIDE 0.5 MG: 1 INJECTION, SOLUTION INTRAMUSCULAR; INTRAVENOUS; SUBCUTANEOUS at 02:43

## 2020-08-19 RX ADMIN — ONDANSETRON 4 MG: 2 INJECTION INTRAMUSCULAR; INTRAVENOUS at 01:49

## 2020-08-19 RX ADMIN — HYDROMORPHONE HYDROCHLORIDE 0.5 MG: 1 INJECTION, SOLUTION INTRAMUSCULAR; INTRAVENOUS; SUBCUTANEOUS at 01:50

## 2020-08-19 RX ADMIN — CYCLOBENZAPRINE 10 MG: 10 TABLET, FILM COATED ORAL at 02:42

## 2020-08-19 ASSESSMENT — PAIN DESCRIPTION - LOCATION: LOCATION: FLANK

## 2020-08-19 ASSESSMENT — ENCOUNTER SYMPTOMS
VOMITING: 0
DIARRHEA: 0
SHORTNESS OF BREATH: 0
BACK PAIN: 0
ABDOMINAL PAIN: 1
COUGH: 0

## 2020-08-19 ASSESSMENT — PAIN DESCRIPTION - PAIN TYPE: TYPE: CHRONIC PAIN

## 2020-08-19 ASSESSMENT — PAIN SCALES - GENERAL
PAINLEVEL_OUTOF10: 8
PAINLEVEL_OUTOF10: 8
PAINLEVEL_OUTOF10: 4
PAINLEVEL_OUTOF10: 6

## 2020-08-19 NOTE — ED PROVIDER NOTES
EMERGENCY DEPARTMENT ENCOUNTER    Pt Name: Kemal Welch  MRN: [id number]  [place] 1980  Date of evaluation: 8/19/20  CHIEF COMPLAINT       Chief Complaint   Patient presents with    Flank Pain     HISTORY OF PRESENT ILLNESS   HPI     This is a 39-year-old female with a history of polycystic kidney disease who comes in today with right flank pain. This is been going on chronically for the past few months. She states that she is getting in with the pain clinic but they will not prescribe her any pain medications until she gets an MRI of her back. She has been having worsening right flank pain. She has not taken anything at home for pain. The patient states that she had 5 hours during the day when she could not urinate but she had the feeling that she needed to urinate. She has known history of stones. She denies any burning with urination. She denies any blood in her urine. She denies any fevers or chills. She states that the pain is mostly in her right flank but does radiate into her abdomen    REVIEW OF SYSTEMS     Review of Systems   Constitutional: Negative for fever. HENT: Negative for congestion. Respiratory: Negative for cough and shortness of breath. Cardiovascular: Negative for chest pain. Gastrointestinal: Positive for abdominal pain. Negative for diarrhea and vomiting. Genitourinary: Positive for flank pain. Negative for dysuria. Musculoskeletal: Negative for back pain. Skin: Negative for rash. Neurological: Negative for headaches. All other systems reviewed and are negative.     PASTMEDICAL HISTORY     Past Medical History:   Diagnosis Date    Anxiety     Asthma     Chronic headaches 7/10/2013    CKD (chronic kidney disease) stage 3, GFR 30-59 ml/min ([facility])     Degenerative disc disease, cervical     Depression     Dysmenorrhea 9/30/2014    Eczema 11/8/2012    Headache(784.0)     HTN (hypertension) 10/2/2011    Hypertension     Hypocalcemia 5/28/2014    Kidney stone     Menorrhagia 9/30/2014    Neck pain, bilateral 5/28/2014    Pelvic pain in female 9/30/2014    Polycystic kidney     Smoker 10/2/2011    Tachycardia 1/20/2014    Tension vascular headache 1/20/2014     SURGICAL HISTORY       Past Surgical History:   Procedure Laterality Date    BREAST ENHANCEMENT SURGERY      BREAST LUMPECTOMY      left breast    CYSTO/URETERO/PYELOSCOPY, CALCULUS TX Right 6/12/2020    HOLMIUM - CYSTO, RIGHT RETROGRADE PYELOGRAM, RIGHT URETEROSCOPY, LASER LITHO ON STAND BY, RIGHT STENT PLACEMENT performed by Curt Mares MD at [place] [address]  06/12/2020    [facility]      x2    LITHOTRIPSY Right 7/23/2020    ESWL EXTRACORPOREAL SHOCK WAVE LITHOTRIPSY performed by Curt Mares MD at [address] [place] [address]  07/01/2013    nerve block(right vervical C3/TON/C4/C5    NERVE BLOCK  07/08/2013    nerve block(right vervical C3/TON/C4/C5    OTHER SURGICAL HISTORY  03/10/2014     botox inj     TOE SURGERY      removal of ingrown toe nail-2nd toe on left foot     URETEROSCOPY Right 06/12/2020    stent placement     CURRENT MEDICATIONS       Previous Medications    AMLODIPINE (NORVASC) 5 MG TABLET    Take 5 mg by mouth daily    BLOOD PRESSURE KIT    Check BP once/day- goal is <140/90. DULOXETINE (CYMBALTA) 60 MG EXTENDED RELEASE CAPSULE    take 1 capsule by mouth once daily    LORATADINE (CLARITIN) 10 MG TABLET    Take 10 mg by mouth daily as needed (allergies)     MELATONIN 3 MG TABS TABLET    Take 3 mg by mouth nightly as needed (sleep)    TAMSULOSIN (FLOMAX) 0.4 MG CAPSULE    Take 0.4 mg by mouth nightly     VITAMIN D (ERGOCALCIFEROL) 1.25 MG (92256 UT) CAPS CAPSULE    Take 1 capsule by mouth once a week     ALLERGIES     is allergic to bee pollen; dust mite extract; pcn [penicillins]; and pollen extract. FAMILY HISTORY     She indicated that her mother is alive. She indicated that her father is alive.  She indicated that her sister is alive. She indicated that her brother is alive. She indicated that all of her four daughters are alive. She indicated that her son is alive. SOCIAL HISTORY       Social History     Tobacco Use    Smoking status: Current Every Day Smoker     Packs/day: 1.00     Years: 16.00     Pack years: 16.00     Types: Cigarettes    Smokeless tobacco: Never Used    Tobacco comment: trying  to  cut  down   Substance Use Topics    Alcohol use: No     Alcohol/week: 0.0 standard drinks    Drug use: No     PHYSICAL EXAM     INITIAL VITALS: BP (!) 187/92   Pulse 93   Temp 98.4 °F (36.9 °C)   Resp 16   Ht 5' 5\" (1.651 m)   Wt 128 lb (58.1 kg)   SpO2 99%   BMI 21.30 kg/m²    Physical Exam  Vitals signs and nursing note reviewed. Constitutional:       Appearance: She is well-developed. Comments: Thin appearing female who is rubbing her back   HENT:      Head: Normocephalic and atraumatic. Eyes:      Conjunctiva/sclera: Conjunctivae normal.   Neck:      Musculoskeletal: Neck supple. Cardiovascular:      Rate and Rhythm: Normal rate and regular rhythm. Heart sounds: No murmur. No friction rub. Pulmonary:      Effort: Pulmonary effort is normal. No respiratory distress. Breath sounds: Normal breath sounds. Abdominal:      General: There is no distension. Palpations: Abdomen is soft. Tenderness: There is no abdominal tenderness. There is right CVA tenderness and left CVA tenderness. There is no guarding or rebound. Musculoskeletal:      Comments: Right lumbar paraspinal muscle spasm with no midline spinal tenderness   Skin:     General: Skin is warm and dry. Capillary Refill: Capillary refill takes less than 2 seconds. Neurological:      Mental Status: She is alert. DIAGNOSTIC RESULTS   RADIOLOGY:All plain film, CT, MRI, and formal ultrasound images (except ED bedside ultrasound) are read by the radiologist, see reports below, unless otherwisenoted in MDM or here.   No orders to display     LABS: All lab results were reviewed by myself, and all abnormals are listed below. Labs Reviewed   CBC WITH AUTO DIFFERENTIAL - Abnormal; Notable for the following components:       Result Value    RBC 3.58 (*)     Hemoglobin 11.7 (*)     Hematocrit 34.6 (*)     All other components within normal limits   BASIC METABOLIC PANEL - Abnormal; Notable for the following components:    BUN 22 (*)     CREATININE 2.26 (*)     GFR Non- 24 (*)     GFR  29 (*)     All other components within normal limits   LIPASE - Abnormal; Notable for the following components:    Lipase 62 (*)     All other components within normal limits   HEPATIC FUNCTION PANEL - Abnormal; Notable for the following components: Total Bilirubin <0.15 (*)     All other components within normal limits   URINALYSIS - Abnormal; Notable for the following components:    Protein, UA 2+ (*)     All other components within normal limits   MICROSCOPIC URINALYSIS       EMERGENCY DEPARTMENTCOURSE:   Differential diagnosis includes exacerbation of chronic pain, kidney stone, PKD. The patient has known polycystic kidney disease she has a known nonobstructing 7 mm stone which could be causing her pain. This appears to be a chronic problem there was no hydronephrosis on her scan August 5. Will repeat labs and reassess. 3:17 AM EDT  Patient's creatinine is baseline at 2.26. BUN is elevated but this is also her baseline. No other electrolyte abnormality no anion gap elevation. No elevation in LFTs. She does have a lipase elevation of 62 but it is less than 1 it was in August.  No leukocytosis chronic anemia with a hemoglobin of 11.7 urinalysis is negative for infection. After 2 doses of Dilaudid the patient feels better she would like to go home the patient will be discharged follow-up with pain clinic.            Vitals:    Vitals:    08/19/20 0031 08/19/20 0148 08/19/20 0215 08/19/20 0230   BP: (!) 196/114 (!) 190/99 (!) 184/95 (!) 187/92   Pulse:       Resp:       Temp:       SpO2:   98% 99%   Weight:       Height:           The patient was given the following medications while in the emergency department:  Orders Placed This Encounter   Medications    0.9 % sodium chloride bolus    HYDROmorphone (DILAUDID) injection 0.5 mg    ondansetron (ZOFRAN) injection 4 mg    HYDROmorphone (DILAUDID) injection 0.5 mg    cyclobenzaprine (FLEXERIL) tablet 10 mg         FINAL IMPRESSION      1.  Right flank pain         DISPOSITION/PLAN   DISPOSITION Decision To Discharge 08/19/2020 03:17:37 AM      PATIENT REFERRED TO:  [facility]  [address]  Schedule an appointment as soon as possible for a visit     Mallorie Aguilar MD  Attending Emergency Physician    This charting supersedes any ED resident or staff charting and was written using speech recognition software        Mallorie Aguilar MD  08/19/20 3509

## 2020-08-19 NOTE — ED NOTES
Pt ambulates to restroom with steady gait to provide urine sample.       [facility]  08/19/20 8882

## 2021-02-15 ENCOUNTER — HOSPITAL ENCOUNTER (EMERGENCY)
Age: 41
Discharge: HOME | End: 2021-02-15
Attending: STUDENT IN AN ORGANIZED HEALTH CARE EDUCATION/TRAINING PROGRAM
Payer: MEDICARE

## 2021-02-15 ENCOUNTER — APPOINTMENT (OUTPATIENT)
Dept: CT IMAGING | Age: 41
End: 2021-02-15
Payer: MEDICARE

## 2021-02-15 VITALS
OXYGEN SATURATION: 95 % | DIASTOLIC BLOOD PRESSURE: 99 MMHG | SYSTOLIC BLOOD PRESSURE: 171 MMHG | BODY MASS INDEX: 19.99 KG/M2 | HEART RATE: 81 BPM | HEIGHT: 65 IN | RESPIRATION RATE: 16 BRPM | WEIGHT: 120 LBS | TEMPERATURE: 98.8 F

## 2021-02-15 DIAGNOSIS — R31.9 URINARY TRACT INFECTION WITH HEMATURIA, SITE UNSPECIFIED: Primary | ICD-10-CM

## 2021-02-15 DIAGNOSIS — N39.0 URINARY TRACT INFECTION WITH HEMATURIA, SITE UNSPECIFIED: Primary | ICD-10-CM

## 2021-02-15 LAB
-: ABNORMAL
ABSOLUTE EOS #: 0.2 K/UL (ref 0–0.4)
ABSOLUTE IMMATURE GRANULOCYTE: ABNORMAL K/UL (ref 0–0.3)
ABSOLUTE LYMPH #: 1.7 K/UL (ref 1–4.8)
ABSOLUTE MONO #: 0.5 K/UL (ref 0.1–1.3)
AMORPHOUS: ABNORMAL
ANION GAP SERPL CALCULATED.3IONS-SCNC: 11 MMOL/L (ref 9–17)
BACTERIA: ABNORMAL
BASOPHILS # BLD: 1 % (ref 0–2)
BASOPHILS ABSOLUTE: 0.1 K/UL (ref 0–0.2)
BILIRUBIN URINE: ABNORMAL
BUN BLDV-MCNC: 26 MG/DL (ref 6–20)
BUN/CREAT BLD: ABNORMAL (ref 9–20)
CALCIUM SERPL-MCNC: 8.5 MG/DL (ref 8.6–10.4)
CASTS UA: ABNORMAL /LPF
CHLORIDE BLD-SCNC: 107 MMOL/L (ref 98–107)
CO2: 23 MMOL/L (ref 20–31)
COLOR: ABNORMAL
COMMENT UA: ABNORMAL
CREAT SERPL-MCNC: 2.6 MG/DL (ref 0.5–0.9)
CRYSTALS, UA: ABNORMAL /HPF
DIFFERENTIAL TYPE: ABNORMAL
EOSINOPHILS RELATIVE PERCENT: 3 % (ref 0–4)
EPITHELIAL CELLS UA: ABNORMAL /HPF
GFR AFRICAN AMERICAN: 24 ML/MIN
GFR NON-AFRICAN AMERICAN: 20 ML/MIN
GFR SERPL CREATININE-BSD FRML MDRD: ABNORMAL ML/MIN/{1.73_M2}
GFR SERPL CREATININE-BSD FRML MDRD: ABNORMAL ML/MIN/{1.73_M2}
GLUCOSE BLD-MCNC: 89 MG/DL (ref 70–99)
GLUCOSE URINE: NEGATIVE
HCG(URINE) PREGNANCY TEST: NEGATIVE
HCT VFR BLD CALC: 31.4 % (ref 36–46)
HEMOGLOBIN: 10.6 G/DL (ref 12–16)
IMMATURE GRANULOCYTES: ABNORMAL %
KETONES, URINE: ABNORMAL
LEUKOCYTE ESTERASE, URINE: ABNORMAL
LYMPHOCYTES # BLD: 23 % (ref 24–44)
MCH RBC QN AUTO: 32.6 PG (ref 26–34)
MCHC RBC AUTO-ENTMCNC: 33.9 G/DL (ref 31–37)
MCV RBC AUTO: 96.2 FL (ref 80–100)
MONOCYTES # BLD: 7 % (ref 1–7)
MUCUS: ABNORMAL
NITRITE, URINE: NEGATIVE
NRBC AUTOMATED: ABNORMAL PER 100 WBC
OTHER OBSERVATIONS UA: ABNORMAL
PDW BLD-RTO: 12.8 % (ref 11.5–14.9)
PH UA: 7 (ref 5–8)
PLATELET # BLD: 271 K/UL (ref 150–450)
PLATELET ESTIMATE: ABNORMAL
PMV BLD AUTO: 8.3 FL (ref 6–12)
POTASSIUM SERPL-SCNC: 4.7 MMOL/L (ref 3.7–5.3)
PROTEIN UA: ABNORMAL
RBC # BLD: 3.26 M/UL (ref 4–5.2)
RBC # BLD: ABNORMAL 10*6/UL
RBC UA: ABNORMAL /HPF
RENAL EPITHELIAL, UA: ABNORMAL /HPF
SEG NEUTROPHILS: 66 % (ref 36–66)
SEGMENTED NEUTROPHILS ABSOLUTE COUNT: 5.1 K/UL (ref 1.3–9.1)
SODIUM BLD-SCNC: 141 MMOL/L (ref 135–144)
SPECIFIC GRAVITY UA: 1.01 (ref 1–1.03)
TRICHOMONAS: ABNORMAL
TURBIDITY: ABNORMAL
URINE HGB: ABNORMAL
UROBILINOGEN, URINE: NORMAL
WBC # BLD: 7.6 K/UL (ref 3.5–11)
WBC # BLD: ABNORMAL 10*3/UL
WBC UA: ABNORMAL /HPF
YEAST: ABNORMAL

## 2021-02-15 PROCEDURE — 96374 THER/PROPH/DIAG INJ IV PUSH: CPT

## 2021-02-15 PROCEDURE — 85025 COMPLETE CBC W/AUTO DIFF WBC: CPT

## 2021-02-15 PROCEDURE — 80048 BASIC METABOLIC PNL TOTAL CA: CPT

## 2021-02-15 PROCEDURE — 96375 TX/PRO/DX INJ NEW DRUG ADDON: CPT

## 2021-02-15 PROCEDURE — 74176 CT ABD & PELVIS W/O CONTRAST: CPT

## 2021-02-15 PROCEDURE — 87086 URINE CULTURE/COLONY COUNT: CPT

## 2021-02-15 PROCEDURE — 6360000002 HC RX W HCPCS: Performed by: PHYSICIAN ASSISTANT

## 2021-02-15 PROCEDURE — 6360000002 HC RX W HCPCS: Performed by: STUDENT IN AN ORGANIZED HEALTH CARE EDUCATION/TRAINING PROGRAM

## 2021-02-15 PROCEDURE — 81025 URINE PREGNANCY TEST: CPT

## 2021-02-15 PROCEDURE — 6370000000 HC RX 637 (ALT 250 FOR IP): Performed by: STUDENT IN AN ORGANIZED HEALTH CARE EDUCATION/TRAINING PROGRAM

## 2021-02-15 PROCEDURE — 99285 EMERGENCY DEPT VISIT HI MDM: CPT

## 2021-02-15 PROCEDURE — 36415 COLL VENOUS BLD VENIPUNCTURE: CPT

## 2021-02-15 PROCEDURE — 81001 URINALYSIS AUTO W/SCOPE: CPT

## 2021-02-15 RX ORDER — CEPHALEXIN 250 MG/1
250 CAPSULE ORAL 2 TIMES DAILY
Qty: 14 CAPSULE | Refills: 0 | Status: ON HOLD | OUTPATIENT
Start: 2021-02-15 | End: 2021-02-24 | Stop reason: HOSPADM

## 2021-02-15 RX ORDER — MORPHINE SULFATE 4 MG/ML
2 INJECTION, SOLUTION INTRAMUSCULAR; INTRAVENOUS ONCE
Status: COMPLETED | OUTPATIENT
Start: 2021-02-15 | End: 2021-02-15

## 2021-02-15 RX ORDER — CEPHALEXIN 250 MG/1
500 CAPSULE ORAL ONCE
Status: DISCONTINUED | OUTPATIENT
Start: 2021-02-15 | End: 2021-02-15

## 2021-02-15 RX ORDER — ONDANSETRON 2 MG/ML
4 INJECTION INTRAMUSCULAR; INTRAVENOUS ONCE
Status: COMPLETED | OUTPATIENT
Start: 2021-02-15 | End: 2021-02-15

## 2021-02-15 RX ORDER — CEPHALEXIN 250 MG/1
250 CAPSULE ORAL ONCE
Status: COMPLETED | OUTPATIENT
Start: 2021-02-15 | End: 2021-02-15

## 2021-02-15 RX ORDER — KETOROLAC TROMETHAMINE 30 MG/ML
30 INJECTION, SOLUTION INTRAMUSCULAR; INTRAVENOUS ONCE
Status: DISCONTINUED | OUTPATIENT
Start: 2021-02-15 | End: 2021-02-15

## 2021-02-15 RX ADMIN — CEPHALEXIN 250 MG: 250 CAPSULE ORAL at 20:11

## 2021-02-15 RX ADMIN — HYDROMORPHONE HYDROCHLORIDE 1 MG: 1 INJECTION, SOLUTION INTRAMUSCULAR; INTRAVENOUS; SUBCUTANEOUS at 18:22

## 2021-02-15 RX ADMIN — MORPHINE SULFATE 2 MG: 4 INJECTION, SOLUTION INTRAMUSCULAR; INTRAVENOUS at 20:18

## 2021-02-15 RX ADMIN — ONDANSETRON 4 MG: 2 INJECTION INTRAMUSCULAR; INTRAVENOUS at 18:15

## 2021-02-15 ASSESSMENT — PAIN SCALES - GENERAL
PAINLEVEL_OUTOF10: 7
PAINLEVEL_OUTOF10: 7

## 2021-02-15 ASSESSMENT — PAIN DESCRIPTION - FREQUENCY: FREQUENCY: INTERMITTENT

## 2021-02-15 ASSESSMENT — PAIN DESCRIPTION - PAIN TYPE: TYPE: ACUTE PAIN

## 2021-02-15 ASSESSMENT — PAIN DESCRIPTION - DESCRIPTORS: DESCRIPTORS: STABBING;CONSTANT

## 2021-02-15 ASSESSMENT — PAIN DESCRIPTION - LOCATION: LOCATION: BACK

## 2021-02-15 NOTE — ED PROVIDER NOTES
CYSTO/URETERO/PYELOSCOPY, CALCULUS TX Right 6/12/2020    HOLMIUM - CYSTO, RIGHT RETROGRADE PYELOGRAM, RIGHT URETEROSCOPY, LASER LITHO ON STAND BY, RIGHT STENT PLACEMENT performed by Tyshawn Flores MD at [address]  06/12/2020   [facility]      x2    LITHOTRIPSY Right 7/23/2020    ESWL EXTRACORPOREAL SHOCK WAVE LITHOTRIPSY performed by Tyshawn Flores MD at [address]  07/01/2013    nerve block(right vervical C3/TON/C4/C5    NERVE BLOCK  07/08/2013    nerve block(right vervical C3/TON/C4/C5    OTHER SURGICAL HISTORY  03/10/2014     botox inj     TOE SURGERY      removal of ingrown toe nail-2nd toe on left foot     URETEROSCOPY Right 06/12/2020    stent placement     None otherwise stated in nurses notes    [address]       Discharge Medication List as of 2/15/2021  8:04 PM      CONTINUE these medications which have NOT CHANGED    Details   vitamin D (ERGOCALCIFEROL) 1.25 MG (89540 UT) CAPS capsule take 1 capsule by mouth every week, Disp-24 capsule, R-0Normal      amLODIPine (NORVASC) 5 MG tablet Take 5 mg by mouth dailyHistorical Med      melatonin 3 MG TABS tablet Take 3 mg by mouth nightly as needed (sleep)Historical Med      loratadine (CLARITIN) 10 MG tablet Take 10 mg by mouth daily as needed (allergies) Historical Med      tamsulosin (FLOMAX) 0.4 MG capsule Take 0.4 mg by mouth nightly Historical Med      DULoxetine (CYMBALTA) 60 MG extended release capsule take 1 capsule by mouth once daily, Disp-30 capsule, R-0Normal      Blood Pressure KIT Disp-1 kit, R-0, NormalCheck BP once/day- goal is <140/90.              ALLERGIES     Bee pollen, Dust mite extract, Pcn [penicillins], and Pollen extract    FAMILY HISTORY           Problem Relation Age of Onset    High Blood Pressure Mother     Asthma Sister     Migraines Sister     High Blood Pressure Father     Other Brother         bad knees, with recent total knee replacement      Family Status Relation Name Status    Mother  Alive    Sister  Alive    Father  Alive    Brother  Alive    Cydney  Alive   Nellie Fitch Son  Alive      None otherwise stated in nurses notes    SOCIAL HISTORY      reports that she has been smoking cigarettes. She has a 16.00 pack-year smoking history. She has never used smokeless tobacco. She reports that she does not drink alcohol or use drugs. lives at home with others     PHYSICAL EXAM    (up to 7 for level 4, 8 or more for level 5)     ED Triage Vitals [02/15/21 1657]   BP Temp Temp Source Pulse Resp SpO2 Height Weight   (!) 158/90 98.8 °F (37.1 °C) Oral 99 16 97 % 5' 5\" (1.651 m) 120 lb (54.4 kg)       Physical Exam   Nursing note and vitals reviewed. Constitutional: Oriented to person, place, and time and well-developed, well-nourished. Head: Normocephalic and atraumatic. Ear: External ears normal.   Nose: Nose normal and midline. Eyes: Conjunctivae and EOM are normal. Pupils are equal, round, and reactive to light. Neck: Normal range of motion. Neck supple. Throat: Posterior pharynx is without erythema or exudates, airway is patent, no swelling  Cardiovascular: Normal rate, regular rhythm, normal heart sounds and intact distal pulses. Pulmonary/Chest: Effort normal and breath sounds normal. No respiratory distress. No wheezes. No rales. No chest tenderness. Abdominal: Soft. Bowel sounds are normal. No distension and no mass. There is tenderness in RLQ, suprapubic. There is no rebound and no guarding. No rigidity. Right CVA tenderness. Musculoskeletal: Normal range of motion. Neurological: Alert and oriented to person, place, and time. GCS score is 15. Skin: Skin is warm and dry. No rash noted. No erythema. No pallor.    Psychiatric: Mood, memory, affect and judgment normal.           DIAGNOSTIC RESULTS     EKG: All EKG's are interpreted by the Emergency Department Physician who either signs or Co-signs this m)          Patient instructed to return to the emergency room if symptoms worsen, return, or any other concern right away which is agreed by the patient    ED MEDS:  Orders Placed This Encounter   Medications    ondansetron (ZOFRAN) injection 4 mg    DISCONTD: ketorolac (TORADOL) injection 30 mg    HYDROmorphone (DILAUDID) injection 1 mg    DISCONTD: cephALEXin (KEFLEX) capsule 500 mg     Order Specific Question:   Antimicrobial Indications     Answer:   Urinary Tract Infection    cephALEXin (KEFLEX) capsule 250 mg     Order Specific Question:   Antimicrobial Indications     Answer:   Urinary Tract Infection    cephALEXin (KEFLEX) 250 MG capsule     Sig: Take 1 capsule by mouth 2 times daily for 7 days     Dispense:  14 capsule     Refill:  0    morphine sulfate (PF) injection 2 mg         CONSULTS:  None    PROCEDURES:  None      FINAL IMPRESSION      1. Urinary tract infection with hematuria, site unspecified          DISPOSITION/PLAN   DISPOSITION     PATIENT REFERRED TO:  MD Manuel Eli [id number]  [facility]  [phone]    Call   As needed      DISCHARGE MEDICATIONS:  Discharge Medication List as of 2/15/2021  8:04 PM      START taking these medications    Details   cephALEXin (KEFLEX) 250 MG capsule Take 1 capsule by mouth 2 times daily for 7 days, Disp-14 capsule, R-0Print               Summation      Patient Course:    Right flank pain radiating around to groin with hematuria x 3 days. Associated nausea and emesis. No fevers. Suspect kidney stone. Will get labs, imaging, UA. Case signed out to Dr. Anu Rodriguez.        ED Medications administered this visit:    Medications   ondansetron (ZOFRAN) injection 4 mg (4 mg Intravenous Given 2/15/21 1815)   HYDROmorphone (DILAUDID) injection 1 mg (1 mg Intravenous Given 2/15/21 1822)   cephALEXin (KEFLEX) capsule 250 mg (250 mg Oral Given 2/15/21 2011)   morphine sulfate (PF) injection 2 mg (2 mg Intravenous Given 2/15/21 2018)       New Prescriptions from this visit:    Discharge Medication List as of 2/15/2021  8:04 PM      START taking these medications    Details   cephALEXin (KEFLEX) 250 MG capsule Take 1 capsule by mouth 2 times daily for 7 days, Disp-14 capsule, R-0Print             Follow-up:  MD Manuel Swift [address]  [facility] [address]  [phone]    Call   As needed        Final Impression:   1.  Urinary tract infection with hematuria, site unspecified               (Please note that portions of this note were completed with a voice recognition program.  Efforts were made to edit the dictations but occasionally words are mis-transcribed.)      (Please note that portions of this note were completed with a voice recognition program.  Efforts were made to edit the dictations but occasionally words are mis-transcribed.)    Cholo Guardado, [address] [facility], [place]  02/15/21 [address] [facility], [place]  02/16/21 7648

## 2021-02-16 LAB
CULTURE: NORMAL
Lab: NORMAL
SPECIMEN DESCRIPTION: NORMAL

## 2021-02-16 NOTE — ED PROVIDER NOTES
EMERGENCY DEPARTMENT ENCOUNTER   ATTENDING ATTESTATION     Pt Name: Deanna Carl  MRN: [id number]  [place] 1980  Date of evaluation: 2/16/21   Deanna Carl is a 41 y.o. female with CC: Dysuria, Back Pain (right lower), and Hematuria    MDM:   49-year-old female presented with dysuria and some back pain and hematuria. History of polycystic kidneys and kidney stones. Stable vital signs. CT scan showed a 7 mm nonobstructing stone in the right kidney. Signs of infection in urine. Creatinine near baseline at 2.6. Sent off culture and start p.o. antibiotics. Patient comfortable with plan. Discharge home          CRITICAL CARE:       EKG: All EKG's are interpreted by the Emergency Department Physician who either signs or Co-signs this chart in the absence of a cardiologist.      RADIOLOGY:All plain film, CT, MRI, and formal ultrasound images (except ED bedside ultrasound) are read by the radiologist, see reports below, unless otherwise noted in MDM or here. CT ABDOMEN PELVIS WO CONTRAST Additional Contrast? None   Final Result   Enlarged polycystic kidneys, appearing unchanged from prior, as is a 7-mm   non-obstructing right intrarenal calculus. No ureteral calculi or   hydronephrosis. LABS: All lab results were reviewed by myself, and all abnormals are listed below. Labs Reviewed   URINE RT REFLEX TO CULTURE - Abnormal; Notable for the following components:       Result Value    Color, UA RED (*)     Turbidity UA CLOUDY (*)     Bilirubin Urine   (*)     Value: Presumptive positive. Unable to confirm due to unavailability of reagent.     Ketones, Urine TRACE (*)     Urine Hgb LARGE (*)     Protein, UA 2+ (*)     Leukocyte Esterase, Urine MOD (*)     All other components within normal limits   CBC WITH AUTO DIFFERENTIAL - Abnormal; Notable for the following components:    RBC 3.26 (*)     Hemoglobin 10.6 (*)     Hematocrit 31.4 (*)     Lymphocytes 23 (*)     All other components within normal ingrown toe nail-2nd toe on left foot     URETEROSCOPY Right 06/12/2020    stent placement     CURRENT MEDICATIONS       Discharge Medication List as of 2/15/2021  8:04 PM      CONTINUE these medications which have NOT CHANGED    Details   vitamin D (ERGOCALCIFEROL) 1.25 MG (94543 UT) CAPS capsule take 1 capsule by mouth every week, Disp-24 capsule, R-0Normal      amLODIPine (NORVASC) 5 MG tablet Take 5 mg by mouth dailyHistorical Med      melatonin 3 MG TABS tablet Take 3 mg by mouth nightly as needed (sleep)Historical Med      loratadine (CLARITIN) 10 MG tablet Take 10 mg by mouth daily as needed (allergies) Historical Med      tamsulosin (FLOMAX) 0.4 MG capsule Take 0.4 mg by mouth nightly Historical Med      DULoxetine (CYMBALTA) 60 MG extended release capsule take 1 capsule by mouth once daily, Disp-30 capsule, R-0Normal      Blood Pressure KIT Disp-1 kit, R-0, NormalCheck BP once/day- goal is <140/90. ALLERGIES     is allergic to bee pollen; dust mite extract; pcn [penicillins]; and pollen extract. FAMILY HISTORY     She indicated that her mother is alive. She indicated that her father is alive. She indicated that her sister is alive. She indicated that her brother is alive. She indicated that all of her four daughters are alive. She indicated that her son is alive. SOCIAL HISTORY       Social History     Tobacco Use    Smoking status: Current Every Day Smoker     Packs/day: 1.00     Years: 16.00     Pack years: 16.00     Types: Cigarettes    Smokeless tobacco: Never Used    Tobacco comment: trying  to  cut  down   Substance Use Topics    Alcohol use: No     Alcohol/week: 0.0 standard drinks    Drug use: No       I personally evaluated and examined the patient in conjunction with the APC and agree with the assessment, treatment plan, and disposition of the patient as recorded by the APC.    Alice Johnson MD  Attending Emergency Physician          Alice Johnson MD  02/16/21 [address]

## 2021-02-21 ENCOUNTER — HOSPITAL ENCOUNTER (INPATIENT)
Age: 41
LOS: 3 days | Discharge: HOME | DRG: 469 | End: 2021-02-24
Attending: EMERGENCY MEDICINE | Admitting: INTERNAL MEDICINE
Payer: MEDICARE

## 2021-02-21 DIAGNOSIS — N20.1 RIGHT URETERAL STONE: ICD-10-CM

## 2021-02-21 DIAGNOSIS — N18.9 ACUTE RENAL FAILURE SUPERIMPOSED ON CHRONIC KIDNEY DISEASE, UNSPECIFIED CKD STAGE, UNSPECIFIED ACUTE RENAL FAILURE TYPE (HCC): Primary | ICD-10-CM

## 2021-02-21 DIAGNOSIS — N17.9 ACUTE RENAL FAILURE SUPERIMPOSED ON CHRONIC KIDNEY DISEASE, UNSPECIFIED CKD STAGE, UNSPECIFIED ACUTE RENAL FAILURE TYPE (HCC): Primary | ICD-10-CM

## 2021-02-21 DIAGNOSIS — R10.9 FLANK PAIN: ICD-10-CM

## 2021-02-21 LAB
ABSOLUTE EOS #: 0.1 K/UL (ref 0–0.4)
ABSOLUTE IMMATURE GRANULOCYTE: ABNORMAL K/UL (ref 0–0.3)
ABSOLUTE LYMPH #: 2.2 K/UL (ref 1–4.8)
ABSOLUTE MONO #: 0.6 K/UL (ref 0.1–1.3)
ANION GAP SERPL CALCULATED.3IONS-SCNC: 14 MMOL/L (ref 9–17)
BASOPHILS # BLD: 1 % (ref 0–2)
BASOPHILS ABSOLUTE: 0.1 K/UL (ref 0–0.2)
BUN BLDV-MCNC: 26 MG/DL (ref 6–20)
BUN/CREAT BLD: ABNORMAL (ref 9–20)
CALCIUM SERPL-MCNC: 8.7 MG/DL (ref 8.6–10.4)
CHLORIDE BLD-SCNC: 103 MMOL/L (ref 98–107)
CO2: 22 MMOL/L (ref 20–31)
CREAT SERPL-MCNC: 3.34 MG/DL (ref 0.5–0.9)
CREATININE URINE: 39.7 MG/DL (ref 28–217)
DIFFERENTIAL TYPE: ABNORMAL
EOSINOPHILS RELATIVE PERCENT: 2 % (ref 0–4)
GFR AFRICAN AMERICAN: 18 ML/MIN
GFR NON-AFRICAN AMERICAN: 15 ML/MIN
GFR SERPL CREATININE-BSD FRML MDRD: ABNORMAL ML/MIN/{1.73_M2}
GFR SERPL CREATININE-BSD FRML MDRD: ABNORMAL ML/MIN/{1.73_M2}
GLUCOSE BLD-MCNC: 90 MG/DL (ref 70–99)
HCT VFR BLD CALC: 33 % (ref 36–46)
HEMOGLOBIN: 11.1 G/DL (ref 12–16)
IMMATURE GRANULOCYTES: ABNORMAL %
LYMPHOCYTES # BLD: 25 % (ref 24–44)
MCH RBC QN AUTO: 32.6 PG (ref 26–34)
MCHC RBC AUTO-ENTMCNC: 33.8 G/DL (ref 31–37)
MCV RBC AUTO: 96.3 FL (ref 80–100)
MONOCYTES # BLD: 7 % (ref 1–7)
NRBC AUTOMATED: ABNORMAL PER 100 WBC
OSMOLALITY URINE: 236 MOSM/KG (ref 80–1300)
PDW BLD-RTO: 12.8 % (ref 11.5–14.9)
PLATELET # BLD: 310 K/UL (ref 150–450)
PLATELET ESTIMATE: ABNORMAL
PMV BLD AUTO: 8.1 FL (ref 6–12)
POTASSIUM SERPL-SCNC: 4.8 MMOL/L (ref 3.7–5.3)
RBC # BLD: 3.42 M/UL (ref 4–5.2)
RBC # BLD: ABNORMAL 10*6/UL
SEG NEUTROPHILS: 65 % (ref 36–66)
SEGMENTED NEUTROPHILS ABSOLUTE COUNT: 5.9 K/UL (ref 1.3–9.1)
SODIUM BLD-SCNC: 139 MMOL/L (ref 135–144)
SODIUM,UR: 57 MMOL/L
TOTAL PROTEIN, URINE: 55 MG/DL
WBC # BLD: 8.9 K/UL (ref 3.5–11)
WBC # BLD: ABNORMAL 10*3/UL

## 2021-02-21 PROCEDURE — 6360000002 HC RX W HCPCS: Performed by: EMERGENCY MEDICINE

## 2021-02-21 PROCEDURE — 83935 ASSAY OF URINE OSMOLALITY: CPT

## 2021-02-21 PROCEDURE — 82570 ASSAY OF URINE CREATININE: CPT

## 2021-02-21 PROCEDURE — 85025 COMPLETE CBC W/AUTO DIFF WBC: CPT

## 2021-02-21 PROCEDURE — 96374 THER/PROPH/DIAG INJ IV PUSH: CPT

## 2021-02-21 PROCEDURE — 84300 ASSAY OF URINE SODIUM: CPT

## 2021-02-21 PROCEDURE — 80048 BASIC METABOLIC PNL TOTAL CA: CPT

## 2021-02-21 PROCEDURE — 99284 EMERGENCY DEPT VISIT MOD MDM: CPT

## 2021-02-21 PROCEDURE — 1200000000 HC SEMI PRIVATE

## 2021-02-21 PROCEDURE — 2580000003 HC RX 258: Performed by: EMERGENCY MEDICINE

## 2021-02-21 PROCEDURE — 6360000002 HC RX W HCPCS: Performed by: NURSE PRACTITIONER

## 2021-02-21 PROCEDURE — 36415 COLL VENOUS BLD VENIPUNCTURE: CPT

## 2021-02-21 PROCEDURE — 81001 URINALYSIS AUTO W/SCOPE: CPT

## 2021-02-21 PROCEDURE — 2580000003 HC RX 258: Performed by: NURSE PRACTITIONER

## 2021-02-21 PROCEDURE — 87086 URINE CULTURE/COLONY COUNT: CPT

## 2021-02-21 PROCEDURE — 84156 ASSAY OF PROTEIN URINE: CPT

## 2021-02-21 RX ORDER — SODIUM CHLORIDE 0.9 % (FLUSH) 0.9 %
10 SYRINGE (ML) INJECTION PRN
Status: DISCONTINUED | OUTPATIENT
Start: 2021-02-21 | End: 2021-02-24 | Stop reason: HOSPADM

## 2021-02-21 RX ORDER — 0.9 % SODIUM CHLORIDE 0.9 %
1000 INTRAVENOUS SOLUTION INTRAVENOUS ONCE
Status: COMPLETED | OUTPATIENT
Start: 2021-02-21 | End: 2021-02-21

## 2021-02-21 RX ORDER — PROMETHAZINE HYDROCHLORIDE 25 MG/1
12.5 TABLET ORAL EVERY 6 HOURS PRN
Status: DISCONTINUED | OUTPATIENT
Start: 2021-02-21 | End: 2021-02-24 | Stop reason: HOSPADM

## 2021-02-21 RX ORDER — ACETAMINOPHEN 650 MG/1
650 SUPPOSITORY RECTAL EVERY 6 HOURS PRN
Status: DISCONTINUED | OUTPATIENT
Start: 2021-02-21 | End: 2021-02-24 | Stop reason: HOSPADM

## 2021-02-21 RX ORDER — SODIUM CHLORIDE 9 MG/ML
INJECTION, SOLUTION INTRAVENOUS CONTINUOUS
Status: DISCONTINUED | OUTPATIENT
Start: 2021-02-21 | End: 2021-02-22

## 2021-02-21 RX ORDER — ONDANSETRON 2 MG/ML
4 INJECTION INTRAMUSCULAR; INTRAVENOUS EVERY 6 HOURS PRN
Status: DISCONTINUED | OUTPATIENT
Start: 2021-02-21 | End: 2021-02-24 | Stop reason: HOSPADM

## 2021-02-21 RX ORDER — MORPHINE SULFATE 2 MG/ML
2 INJECTION, SOLUTION INTRAMUSCULAR; INTRAVENOUS
Status: DISCONTINUED | OUTPATIENT
Start: 2021-02-21 | End: 2021-02-23

## 2021-02-21 RX ORDER — MORPHINE SULFATE 4 MG/ML
4 INJECTION, SOLUTION INTRAMUSCULAR; INTRAVENOUS ONCE
Status: COMPLETED | OUTPATIENT
Start: 2021-02-21 | End: 2021-02-21

## 2021-02-21 RX ORDER — SODIUM CHLORIDE 0.9 % (FLUSH) 0.9 %
10 SYRINGE (ML) INJECTION EVERY 12 HOURS SCHEDULED
Status: DISCONTINUED | OUTPATIENT
Start: 2021-02-21 | End: 2021-02-24 | Stop reason: HOSPADM

## 2021-02-21 RX ORDER — MORPHINE SULFATE 4 MG/ML
4 INJECTION, SOLUTION INTRAMUSCULAR; INTRAVENOUS
Status: DISCONTINUED | OUTPATIENT
Start: 2021-02-21 | End: 2021-02-23

## 2021-02-21 RX ORDER — POLYETHYLENE GLYCOL 3350 17 G
2 POWDER IN PACKET (EA) ORAL PRN
Status: DISCONTINUED | OUTPATIENT
Start: 2021-02-21 | End: 2021-02-24 | Stop reason: HOSPADM

## 2021-02-21 RX ORDER — ACETAMINOPHEN 325 MG/1
650 TABLET ORAL EVERY 6 HOURS PRN
Status: DISCONTINUED | OUTPATIENT
Start: 2021-02-21 | End: 2021-02-24 | Stop reason: HOSPADM

## 2021-02-21 RX ORDER — ERGOCALCIFEROL 1.25 MG/1
50000 CAPSULE ORAL WEEKLY
Status: DISCONTINUED | OUTPATIENT
Start: 2021-02-23 | End: 2021-02-24 | Stop reason: HOSPADM

## 2021-02-21 RX ORDER — AMLODIPINE BESYLATE 5 MG/1
5 TABLET ORAL DAILY
Status: DISCONTINUED | OUTPATIENT
Start: 2021-02-22 | End: 2021-02-24 | Stop reason: HOSPADM

## 2021-02-21 RX ADMIN — ONDANSETRON 4 MG: 2 INJECTION INTRAMUSCULAR; INTRAVENOUS at 22:48

## 2021-02-21 RX ADMIN — SODIUM CHLORIDE: 9 INJECTION, SOLUTION INTRAVENOUS at 21:40

## 2021-02-21 RX ADMIN — HYDROMORPHONE HYDROCHLORIDE 0.5 MG: 1 INJECTION, SOLUTION INTRAMUSCULAR; INTRAVENOUS; SUBCUTANEOUS at 21:40

## 2021-02-21 RX ADMIN — CEFTRIAXONE SODIUM 1000 MG: 1 INJECTION, POWDER, FOR SOLUTION INTRAMUSCULAR; INTRAVENOUS at 21:40

## 2021-02-21 RX ADMIN — MORPHINE SULFATE 4 MG: 4 INJECTION, SOLUTION INTRAMUSCULAR; INTRAVENOUS at 18:57

## 2021-02-21 RX ADMIN — MORPHINE SULFATE 4 MG: 4 INJECTION, SOLUTION INTRAMUSCULAR; INTRAVENOUS at 22:48

## 2021-02-21 RX ADMIN — SODIUM CHLORIDE, PRESERVATIVE FREE 10 ML: 5 INJECTION INTRAVENOUS at 21:42

## 2021-02-21 RX ADMIN — SODIUM CHLORIDE 1000 ML: 9 INJECTION, SOLUTION INTRAVENOUS at 18:57

## 2021-02-21 ASSESSMENT — ENCOUNTER SYMPTOMS
BLOOD IN STOOL: 0
COUGH: 0
SORE THROAT: 0
FACIAL SWELLING: 0
SINUS PRESSURE: 0
NAUSEA: 1
COLOR CHANGE: 0
ABDOMINAL PAIN: 1
BACK PAIN: 0
TROUBLE SWALLOWING: 0
NAUSEA: 0
RHINORRHEA: 0
WHEEZING: 0
SHORTNESS OF BREATH: 0
VOMITING: 0
EYE REDNESS: 0
EYE DISCHARGE: 0
EYE PAIN: 0
CHEST TIGHTNESS: 0
CONSTIPATION: 0
BACK PAIN: 1
DIARRHEA: 0

## 2021-02-21 ASSESSMENT — PAIN SCALES - GENERAL
PAINLEVEL_OUTOF10: 3
PAINLEVEL_OUTOF10: 7
PAINLEVEL_OUTOF10: 8
PAINLEVEL_OUTOF10: 8

## 2021-02-21 ASSESSMENT — PAIN DESCRIPTION - FREQUENCY: FREQUENCY: CONTINUOUS

## 2021-02-21 ASSESSMENT — PAIN DESCRIPTION - ONSET: ONSET: PROGRESSIVE

## 2021-02-21 ASSESSMENT — PAIN DESCRIPTION - PAIN TYPE: TYPE: ACUTE PAIN

## 2021-02-21 ASSESSMENT — PAIN DESCRIPTION - DESCRIPTORS: DESCRIPTORS: STABBING

## 2021-02-21 ASSESSMENT — PAIN DESCRIPTION - LOCATION: LOCATION: FLANK

## 2021-02-21 NOTE — ED NOTES
Mode of arrival:  car      Residence prior to admit: home      Chief complaint on admission:pt is having bilateral flank pain , blood in her urine, was seen on 2/15 for same but hasn't got better, skin dry , warm , no signs of acute distress , call light in reach         [facility] you ever felt that you should Cut down on your drinking? \"  No  A= \"Have people Annoyed you by criticizing your drinking? \"  No  G= \"Have you ever felt bad or Guilty about your drinking? \"  No  E= \"Have you ever had a drink as an Eye-opener first thing in the morning to steady your nerves or to help a hangover? \"  No      Deferred []      Reason for deferring: N/A    *If yes to two or more: probable alcohol abuse. Alida Nunez RN  02/21/21 6053

## 2021-02-21 NOTE — ED PROVIDER NOTES
pallor, rash and wound. Neurological: Negative for dizziness, tremors, seizures, syncope, speech difficulty, weakness, numbness and headaches. Psychiatric/Behavioral: Negative for confusion, decreased concentration, hallucinations, self-injury, sleep disturbance and suicidal ideas.        PAST MEDICAL HISTORY     Past Medical History:   Diagnosis Date    Anxiety     Asthma     Chronic headaches 7/10/2013    CKD (chronic kidney disease) stage 3, GFR 30-59 ml/min     Degenerative disc disease, cervical     Depression     Dysmenorrhea 9/30/2014    Eczema 11/8/2012    Headache(784.0)     HTN (hypertension) 10/2/2011    Hypertension     Hypocalcemia 5/28/2014    Kidney stone     Menorrhagia 9/30/2014    Neck pain, bilateral 5/28/2014    Pelvic pain in female 9/30/2014    Polycystic kidney     Smoker 10/2/2011    Tachycardia 1/20/2014    Tension vascular headache 1/20/2014       SURGICAL HISTORY       Past Surgical History:   Procedure Laterality Date    BREAST ENHANCEMENT SURGERY      BREAST LUMPECTOMY      left breast    CYSTO/URETERO/PYELOSCOPY, CALCULUS TX Right 6/12/2020    HOLMIUM - CYSTO, RIGHT RETROGRADE PYELOGRAM, RIGHT URETEROSCOPY, LASER LITHO ON STAND BY, RIGHT STENT PLACEMENT performed by Mitul Flores MD at [address] [place] [address]  06/12/2020    [address] [place] [address]      x2    LITHOTRIPSY Right 7/23/2020    ESWL EXTRACORPOREAL SHOCK WAVE LITHOTRIPSY performed by Mitul Flores MD at [place] [address]  07/01/2013    nerve block(right vervical C3/TON/C4/C5    NERVE BLOCK  07/08/2013    nerve block(right vervical C3/TON/C4/C5    OTHER SURGICAL HISTORY  03/10/2014     botox inj     TOE SURGERY      removal of ingrown toe nail-2nd toe on left foot     URETEROSCOPY Right 06/12/2020    stent placement       CURRENT MEDICATIONS       Previous Medications    AMLODIPINE (NORVASC) 5 MG TABLET    Take 5 mg by mouth daily    BLOOD PRESSURE KIT    Check BP once/day- goal is no mass. Tenderness: There is abdominal tenderness in the right lower quadrant, suprapubic area and left lower quadrant. There is right CVA tenderness and left CVA tenderness. There is no guarding or rebound. Musculoskeletal: Normal range of motion. General: No tenderness. Skin:     General: Skin is warm and dry. Coloration: Skin is not pale. Findings: No erythema or rash. Neurological:      Mental Status: She is alert and oriented to person, place, and time. Cranial Nerves: No cranial nerve deficit. Sensory: No sensory deficit. Motor: No abnormal muscle tone. Coordination: Coordination normal.      Deep Tendon Reflexes: Reflexes normal.   Psychiatric:         Behavior: Behavior normal.         Thought Content: Thought content normal.         Judgment: Judgment normal.         DIAGNOSTIC RESULTS     RADIOLOGY:All plain film, CT,MRI, and formal ultrasound images (except ED bedside ultrasound) are read by the radiologist and the interpretations are directly viewed by the emergency physician. LABS: All lab results were reviewed by myself, and all abnormals are listed below. Labs Reviewed   CBC WITH AUTO DIFFERENTIAL - Abnormal; Notable for the following components:       Result Value    RBC 3.42 (*)     Hemoglobin 11.1 (*)     Hematocrit 33.0 (*)     All other components within normal limits   BASIC METABOLIC PANEL - Abnormal; Notable for the following components:    BUN 26 (*)     CREATININE 3.34 (*)     GFR Non- 15 (*)     GFR  18 (*)     All other components within normal limits         MEDICAL DECISION MAKING:     Patient has a known stable kidney stone with polycystic kidney disease and chronic flank pain issues. Sounds like urology has been consulted in the past and did not really feel like they could do anything for. Patient just had a CT scan that looked the same as the last 4 CT scans.   Patient has had 4 in the last

## 2021-02-22 ENCOUNTER — APPOINTMENT (OUTPATIENT)
Dept: CT IMAGING | Age: 41
DRG: 469 | End: 2021-02-22
Payer: MEDICARE

## 2021-02-22 PROBLEM — E87.0 HYPERNATREMIA: Status: ACTIVE | Noted: 2021-02-22

## 2021-02-22 PROBLEM — D50.0 IRON DEFICIENCY ANEMIA DUE TO CHRONIC BLOOD LOSS: Status: ACTIVE | Noted: 2021-02-22

## 2021-02-22 PROBLEM — N39.0 URINARY TRACT INFECTION WITH HEMATURIA: Status: ACTIVE | Noted: 2021-02-22

## 2021-02-22 PROBLEM — R31.9 URINARY TRACT INFECTION WITH HEMATURIA: Status: ACTIVE | Noted: 2021-02-22

## 2021-02-22 PROBLEM — Z86.59 HISTORY OF MAJOR DEPRESSION: Status: ACTIVE | Noted: 2021-02-22

## 2021-02-22 LAB
-: ABNORMAL
ALBUMIN SERPL-MCNC: 3.4 G/DL (ref 3.5–5.2)
ALBUMIN/GLOBULIN RATIO: ABNORMAL (ref 1–2.5)
ALP BLD-CCNC: 71 U/L (ref 35–104)
ALT SERPL-CCNC: 6 U/L (ref 5–33)
AMORPHOUS: ABNORMAL
ANION GAP SERPL CALCULATED.3IONS-SCNC: 10 MMOL/L (ref 9–17)
AST SERPL-CCNC: 10 U/L
BACTERIA: ABNORMAL
BILIRUB SERPL-MCNC: 0.17 MG/DL (ref 0.3–1.2)
BILIRUBIN URINE: NEGATIVE
BUN BLDV-MCNC: 21 MG/DL (ref 6–20)
BUN/CREAT BLD: ABNORMAL (ref 9–20)
CALCIUM SERPL-MCNC: 8.2 MG/DL (ref 8.6–10.4)
CASTS UA: ABNORMAL /LPF
CHLORIDE BLD-SCNC: 113 MMOL/L (ref 98–107)
CO2: 22 MMOL/L (ref 20–31)
COLOR: ABNORMAL
COMMENT UA: ABNORMAL
CREAT SERPL-MCNC: 2.87 MG/DL (ref 0.5–0.9)
CRYSTALS, UA: ABNORMAL /HPF
EPITHELIAL CELLS UA: ABNORMAL /HPF
GFR AFRICAN AMERICAN: 22 ML/MIN
GFR NON-AFRICAN AMERICAN: 18 ML/MIN
GFR SERPL CREATININE-BSD FRML MDRD: ABNORMAL ML/MIN/{1.73_M2}
GFR SERPL CREATININE-BSD FRML MDRD: ABNORMAL ML/MIN/{1.73_M2}
GLUCOSE BLD-MCNC: 92 MG/DL (ref 70–99)
GLUCOSE URINE: NEGATIVE
HCT VFR BLD CALC: 29.5 % (ref 36–46)
HEMOGLOBIN: 9.7 G/DL (ref 12–16)
INR BLD: 1
KETONES, URINE: ABNORMAL
LEUKOCYTE ESTERASE, URINE: ABNORMAL
MAGNESIUM: 1.8 MG/DL (ref 1.6–2.6)
MCH RBC QN AUTO: 32.1 PG (ref 26–34)
MCHC RBC AUTO-ENTMCNC: 32.9 G/DL (ref 31–37)
MCV RBC AUTO: 97.7 FL (ref 80–100)
MUCUS: ABNORMAL
NITRITE, URINE: NEGATIVE
NRBC AUTOMATED: ABNORMAL PER 100 WBC
OTHER OBSERVATIONS UA: ABNORMAL
PDW BLD-RTO: 12.9 % (ref 11.5–14.9)
PH UA: 7 (ref 5–8)
PLATELET # BLD: 271 K/UL (ref 150–450)
PMV BLD AUTO: 8.5 FL (ref 6–12)
POTASSIUM SERPL-SCNC: 4.3 MMOL/L (ref 3.7–5.3)
PROTEIN UA: ABNORMAL
PROTHROMBIN TIME: 12.9 SEC (ref 11.8–14.6)
RBC # BLD: 3.02 M/UL (ref 4–5.2)
RBC UA: ABNORMAL /HPF
RENAL EPITHELIAL, UA: ABNORMAL /HPF
SODIUM BLD-SCNC: 145 MMOL/L (ref 135–144)
SPECIFIC GRAVITY UA: 1.01 (ref 1–1.03)
TOTAL PROTEIN: 5.8 G/DL (ref 6.4–8.3)
TRICHOMONAS: ABNORMAL
TURBIDITY: ABNORMAL
URINE HGB: ABNORMAL
UROBILINOGEN, URINE: NORMAL
WBC # BLD: 6.1 K/UL (ref 3.5–11)
WBC UA: ABNORMAL /HPF
YEAST: ABNORMAL

## 2021-02-22 PROCEDURE — 80053 COMPREHEN METABOLIC PANEL: CPT

## 2021-02-22 PROCEDURE — 99222 1ST HOSP IP/OBS MODERATE 55: CPT | Performed by: INTERNAL MEDICINE

## 2021-02-22 PROCEDURE — 2580000003 HC RX 258: Performed by: NURSE PRACTITIONER

## 2021-02-22 PROCEDURE — 6370000000 HC RX 637 (ALT 250 FOR IP): Performed by: NURSE PRACTITIONER

## 2021-02-22 PROCEDURE — 36415 COLL VENOUS BLD VENIPUNCTURE: CPT

## 2021-02-22 PROCEDURE — 83735 ASSAY OF MAGNESIUM: CPT

## 2021-02-22 PROCEDURE — 2580000003 HC RX 258: Performed by: INTERNAL MEDICINE

## 2021-02-22 PROCEDURE — 6360000002 HC RX W HCPCS: Performed by: NURSE PRACTITIONER

## 2021-02-22 PROCEDURE — 74176 CT ABD & PELVIS W/O CONTRAST: CPT

## 2021-02-22 PROCEDURE — 1200000000 HC SEMI PRIVATE

## 2021-02-22 PROCEDURE — 85610 PROTHROMBIN TIME: CPT

## 2021-02-22 PROCEDURE — 85027 COMPLETE CBC AUTOMATED: CPT

## 2021-02-22 RX ORDER — SODIUM CHLORIDE 450 MG/100ML
INJECTION, SOLUTION INTRAVENOUS CONTINUOUS
Status: DISCONTINUED | OUTPATIENT
Start: 2021-02-22 | End: 2021-02-24 | Stop reason: HOSPADM

## 2021-02-22 RX ADMIN — CEFTRIAXONE SODIUM 1000 MG: 1 INJECTION, POWDER, FOR SOLUTION INTRAMUSCULAR; INTRAVENOUS at 19:44

## 2021-02-22 RX ADMIN — MORPHINE SULFATE 4 MG: 4 INJECTION, SOLUTION INTRAMUSCULAR; INTRAVENOUS at 22:42

## 2021-02-22 RX ADMIN — AMLODIPINE BESYLATE 5 MG: 5 TABLET ORAL at 07:29

## 2021-02-22 RX ADMIN — SODIUM CHLORIDE: 9 INJECTION, SOLUTION INTRAVENOUS at 07:30

## 2021-02-22 RX ADMIN — MORPHINE SULFATE 2 MG: 2 INJECTION, SOLUTION INTRAMUSCULAR; INTRAVENOUS at 15:05

## 2021-02-22 RX ADMIN — SODIUM CHLORIDE: 4.5 INJECTION, SOLUTION INTRAVENOUS at 10:17

## 2021-02-22 RX ADMIN — DULOXETINE 90 MG: 30 CAPSULE, DELAYED RELEASE ORAL at 07:29

## 2021-02-22 RX ADMIN — MORPHINE SULFATE 4 MG: 4 INJECTION, SOLUTION INTRAMUSCULAR; INTRAVENOUS at 07:28

## 2021-02-22 RX ADMIN — MORPHINE SULFATE 4 MG: 4 INJECTION, SOLUTION INTRAMUSCULAR; INTRAVENOUS at 10:12

## 2021-02-22 RX ADMIN — MORPHINE SULFATE 4 MG: 4 INJECTION, SOLUTION INTRAMUSCULAR; INTRAVENOUS at 01:57

## 2021-02-22 RX ADMIN — SODIUM CHLORIDE: 4.5 INJECTION, SOLUTION INTRAVENOUS at 19:44

## 2021-02-22 RX ADMIN — PROMETHAZINE HYDROCHLORIDE 12.5 MG: 25 TABLET ORAL at 07:29

## 2021-02-22 RX ADMIN — PROMETHAZINE HYDROCHLORIDE 12.5 MG: 25 TABLET ORAL at 19:44

## 2021-02-22 RX ADMIN — MORPHINE SULFATE 2 MG: 2 INJECTION, SOLUTION INTRAMUSCULAR; INTRAVENOUS at 19:44

## 2021-02-22 RX ADMIN — ENOXAPARIN SODIUM 30 MG: 30 INJECTION SUBCUTANEOUS at 19:43

## 2021-02-22 ASSESSMENT — PAIN SCALES - GENERAL
PAINLEVEL_OUTOF10: 0
PAINLEVEL_OUTOF10: 0
PAINLEVEL_OUTOF10: 8
PAINLEVEL_OUTOF10: 8
PAINLEVEL_OUTOF10: 0

## 2021-02-22 ASSESSMENT — PAIN DESCRIPTION - PAIN TYPE
TYPE: ACUTE PAIN
TYPE: ACUTE PAIN
TYPE: CHRONIC PAIN

## 2021-02-22 ASSESSMENT — PAIN DESCRIPTION - LOCATION: LOCATION: BACK

## 2021-02-22 NOTE — PROGRESS NOTES
Patient's bed is noted to be a \"telemetry\" bed. This RN clarified with Olga Lidia Valdovinos NP whether telemetry is required for this patient. It is not required and a miscellaneous order was placed stating patient is not required to be on telemetry for this bed.

## 2021-02-22 NOTE — ED NOTES
Admission Dx: acute renal failure    Pts Chief Complaints on Arrival: flank pain    ADL's - Self-care    Pending Diagnostics:  none    Residence PTA: multiple-story home    Special Considerations/Circumstances:  none    Vitals: Current vital signs:  BP (!) 160/95   Pulse 89   Temp 98.4 °F (36.9 °C) (Oral)   Resp 15   Ht 5' 7\" (1.702 m)   Wt 123 lb (55.8 kg)   LMP 02/07/2021   SpO2 98%   BMI 19.26 kg/m²                MEWS Score: [address] [place] [address], RN  02/21/21 2028

## 2021-02-22 NOTE — PROGRESS NOTES
I sent a perfect serve message to Dr. Nonda Duane at 2:30 pm on 2/22/21. [address] [place] [address]

## 2021-02-22 NOTE — CARE COORDINATION
CASE MANAGEMENT NOTE:    Admission Date:  2/21/2021 Emy Mathur is a 41 y.o.  female    Admitted for : Acute kidney injury superimposed on CKD ([facility] [address] [N17.9, N18.9]    Met with:  Patient    PCP:  Dr Alex Ortega:  [facility]      Current Residence/ Living Arrangements:  independently at home             Current Services PTA:  Yes , follows with Pain Mgmt on [address]    Is patient agreeable to VNS: No    Freedom of choice provided:  NA    List of [address] provided: NA    VNS chosen:  No    DME:  none    Home Oxygen: No    Nebulizer: No    CPAP/BIPAP: No    Supplier: N/A    Potential Assistance Needed: No    SNF needed: No    Freedom of choice and list provided: NA    Pharmacy:  [facility]       Does Patient want to use MEDS to BEDS? No    Is patient currently receiving oral anticoagulation therapy? No    Is the Patient an [facility] with Readmission Risk Score greater than 14%? No  If yes, pt needs a follow up appointment made within 7 days. Family Members/Caregivers that pt would like involved in their care:    Yes    If yes, list name here: Mother Sophie Meza    Transportation Provider:  Family             Is patient in Isolation/One on One/Altered Mental Status? No  If yes, skip next question. If no, would they like an I-Pad to  use? No  If yes, call [phone]. Discharge Plan:  2/22/21- [facility] - Patient is from home alone. Follows with Pain Mgmt on [address] for chronic back pain. Denies need for VNS, Has no DMe's. Plan is to return home with no needs. Will follow . //pf             Electronically signed by: Erica Parisi RN on 2/22/2021 at 2:01 PM

## 2021-02-22 NOTE — CONSULTS
Department of Urology  Urology Consult Note    Patient:  Yoly Mulligan  MRN: [id number]  YOB: 1980    Reason for Consult:  Gross hematuria and history of kidney stone  Requesting Physician:  Dr. Nevaeh Villegas:    Chief Complaint   Patient presents with    Flank Pain     Hx of kidney stones-here last week- started on Keflex    Hematuria       History Obtained From:   patient    HISTORY OF PRESENT ILLNESS:    The patient is a 41 y.o. female with significant past medical history of polycystic kidney disease and renal stones who presents with gross hematuria and right lower quadrant pain. She did have a CT scan yesterday which demonstrates polycystic kidney disease with what appears to be internal hemorrhage. She does also have a nonobstructing 7 mm right renal stone. She has had lithotripsy in the past but the stone did not fragment with this. She has no other stones. Her pain localizes to the right lower quadrant and radiates to the right groin. Her pain is coupled with gross hematuria. She feels that she is emptying her bladder.     Past Medical History:        Diagnosis Date    Anxiety     Asthma     Chronic headaches 7/10/2013    CKD (chronic kidney disease) stage 3, GFR 30-59 ml/min     Degenerative disc disease, cervical     Depression     Dysmenorrhea 9/30/2014    Eczema 11/8/2012    Headache(784.0)     HTN (hypertension) 10/2/2011    Hypertension     Hypocalcemia 5/28/2014    Kidney stone     Menorrhagia 9/30/2014    Neck pain, bilateral 5/28/2014    Pelvic pain in female 9/30/2014    Polycystic kidney     Smoker 10/2/2011    Tachycardia 1/20/2014    Tension vascular headache 1/20/2014     Past Surgical History:        Procedure Laterality Date    BREAST ENHANCEMENT SURGERY      BREAST LUMPECTOMY      left breast    CYSTO/URETERO/PYELOSCOPY, CALCULUS TX Right 6/12/2020    HOLMIUM - CYSTO, RIGHT RETROGRADE PYELOGRAM, RIGHT URETEROSCOPY, LASER LITHO ON      Spouse name: Not on file    Number of children: Not on file    Years of education: Not on file    Highest education level: Not on file   Occupational History    Occupation: stay at home mom   Social Needs    Financial resource strain: Not on file    Food insecurity     Worry: Not on file     Inability: Not on file    Transportation needs     Medical: Not on file     Non-medical: Not on file   Tobacco Use    Smoking status: Current Every Day Smoker     Packs/day: 1.00     Years: 16.00     Pack years: 16.00     Types: Cigarettes    Smokeless tobacco: Never Used    Tobacco comment: trying  to  cut  down   Substance and Sexual Activity    Alcohol use: No     Alcohol/week: 0.0 standard drinks    Drug use: No    Sexual activity: Yes     Partners: Male     Comment: steady boyfriend   Lifestyle    Physical activity     Days per week: Not on file     Minutes per session: Not on file    Stress: Not on file   Relationships    Social connections     Talks on phone: Not on file     Gets together: Not on file     Attends Protestant service: Not on file     Active member of club or organization: Not on file     Attends meetings of clubs or organizations: Not on file     Relationship status: Not on file    Intimate partner violence     Fear of current or ex partner: Not on file     Emotionally abused: Not on file     Physically abused: Not on file     Forced sexual activity: Not on file   Other Topics Concern    Not on file   Social History Narrative    Not on file       Family History:       Problem Relation Age of Onset    High Blood Pressure Mother     Asthma Sister     Migraines Sister     High Blood Pressure Father     Other Brother         bad knees, with recent total knee replacement        Review of Systems:  Constitutional: Negative for fever, chills and activity change. Eyes: Negative for pain, redness and visual disturbance.    Respiratory: Negative for cough, shortness of breath and CO2 22 22   BUN 26* 21*   CREATININE 3.34* 2.87*       Recent Labs     02/21/21  0459   COLORU RED*   PHUR 7.0   WBCUA 10 TO 20   RBCUA TOO NUMEROUS TO COUNT   MUCUS NOT REPORTED   TRICHOMONAS NOT REPORTED   YEAST NOT REPORTED   BACTERIA FEW*   SPECGRAV 1.008   LEUKOCYTESUR MOD*   UROBILINOGEN Normal   BILIRUBINUR NEGATIVE       Additional Lab/culture results:    Physical Exam:  Constitutional: Patient in no acute distress; Neuro: alert and oriented to person place and time. Psych: Mood and affect normal.  Skin: Normal  Lungs: Respiratory effort normal  Cardiovascular:  Normal peripheral pulses  Abdomen: Soft, non-tender, non-distended with no CVA, flank pain, hepatosplenomegaly or hernia. Kidneys normal.  Bladder non-tender and not distended. Lymphatics: no palpable lymphadenopathy        Interval Imaging Findings:   Ct Abdomen Pelvis Wo Contrast Additional Contrast? None    Result Date: 2/22/2021  EXAMINATION: CT OF THE ABDOMEN AND PELVIS WITHOUT CONTRAST 2/22/2021 8:36 am TECHNIQUE: CT of the abdomen and pelvis was performed without the administration of intravenous contrast. Multiplanar reformatted images are provided for review. Dose modulation, iterative reconstruction, and/or weight based adjustment of the mA/kV was utilized to reduce the radiation dose to as low as reasonably achievable. COMPARISON: 02/15/2021, 08/05/2020 HISTORY: ORDERING SYSTEM PROVIDED HISTORY: flank pain TECHNOLOGIST PROVIDED HISTORY: flank pain Is the patient pregnant?->No Reason for Exam: low back pain which radiates to the front bilaterally for over a week Acuity: Unknown Type of Exam: Unknown FINDINGS: Lower Chest: Lung bases are hyperinflated. Mild right lower lobe bronchial wall thickening. Mosaic attenuation suggested. No pleural or pericardial effusion. Partially imaged breast implants. Pectus excavatum.  Organs: Evaluation is limited by the absence of contrast.  Similar scattered hypodensities throughout the liver, presumably representing cysts, the largest measuring 1.7 cm near the hepatic hilum. No biliary ductal dilatation. Gallbladder is contracted without calcified cholelithiasis. Spleen, pancreas, and adrenal glands are grossly unremarkable. Markedly enlarged kidneys replaced by innumerable cysts, some which are hyperdense and others which have curvilinear mural calcifications. Unchanged 7 mm calcification in the anterior interpolar region of the right kidney. No discrete calcification along the expected course of the ureters. GI/Bowel: No bowel obstruction. 9 x 3 mm appendicolith in the distal tip of the appendix without discrete tracey appendiceal inflammatory change or appendiceal enlargement. Pelvis: Urinary bladder appears within normal limits. Intrauterine device appears grossly appropriate in positioning. Adnexa not well evaluated. Peritoneum/Retroperitoneum: Mild graying of the intra-abdominal fat. No free fluid or free air. Atherosclerotic calcifications which are advanced for age. Bones/Soft Tissues: No acute findings. Unchanged sequelae of polycystic kidney disease with markedly enlarged kidneys due to innumerable cysts, some of which have internal hemorrhagic or proteinaceous content with some scattered curvilinear mural calcifications. Unchanged 7 mm nephrolith in the interpolar region of the right kidney. No definitive stones along the expected course of the ureters or within the urinary bladder. Appendicoliths within the normal caliber appendix. No findings to suggest acute appendicitis. Hyperinflation of the partially imaged lung bases which may be due to sequelae of COPD. Suggestion of mild mosaic attenuation and some mild bronchial wall thickening suggests acute or chronic airways disease.        Impression:    Patient Active Problem List   Diagnosis    Asthma    Smoker    Polycystic kidney    Eczema    Depression with anxiety    Chronic headaches    Tension vascular headache    catheter and starting continuous bladder irrigation for now.     Electronically signed by Tadeo Fan MD on 2/22/2021 at 4:15 PM

## 2021-02-22 NOTE — PROGRESS NOTES
New consult called to Dr. Tang Alonso. Reviewed current diagnosis, etc No new orders at present time. Will see in morning.

## 2021-02-22 NOTE — CONSULTS
Department of Internal Medicine  Nephrology Isaias Monte MD   Consult Note      SUBJECTIVE: This is a 41 y.o. female with a significant past medical history of Systemic hypertension, depression, nephrolithiasis [required cystoscopy and placement of a right double-J ureteral stent with attached string on 6/12/2020] and chronic kidney disease stage IIIb secondary to autosomal dominant polycystic kidney disease diagnosed in 2009 [baseline serum creatinine 2.1 mg/dL], who presented to the emergency department yesterday with a 1 week history of flank pain and gross hematuria. Symptoms initially started on 2/15/2021 with gross hematuria with subsequent onset of bilateral flank pain 24 hours later. She presented to the emergency department on 2/15/2021 and had CT scan which showed stable enlarged polycystic kidneys with no hydronephrosis and showed stable 7 mm nonobstructing right intrarenal calculus and she was sent home on antibiotics and pain medications. However symptoms persisted and hence presentation to the emergency department again yesterday 2/21/2021. Urine culture performed on 2/15/2021 was negative. Serum creatinine on 2/15/2021 was 2.6 mg/dL with serum creatinine BUNs/creatinine at presentation yesterday was 26/3.34 mg/dL and hence nephrology consultation. She is well-known to me and I her nephrologist.  She feels better today and currently does not have nausea and right flank pain is improved. She is nonoliguric but continues to have gross hematuria. Urinalysis showed large blood and urine microscopy showed too numerous to count RBC but only 10-20 WBC per hpf.     Bee pollen, Dust mite extract, Pcn [penicillins], and Pollen extract    Past Medical History:   Diagnosis Date    Anxiety     Asthma     Chronic headaches 7/10/2013    CKD (chronic kidney disease) stage 3, GFR 30-59 ml/min     Degenerative disc disease, cervical     Depression     Dysmenorrhea 9/30/2014    Eczema 11/8/2012  Headache(784.0)     HTN (hypertension) 10/2/2011    Hypertension     Hypocalcemia 5/28/2014    Kidney stone     Menorrhagia 9/30/2014    Neck pain, bilateral 5/28/2014    Pelvic pain in female 9/30/2014    Polycystic kidney     Smoker 10/2/2011    Tachycardia 1/20/2014    Tension vascular headache 1/20/2014     Scheduled Meds:   amLODIPine  5 mg Oral Daily    DULoxetine  90 mg Oral Daily    [START ON 2/23/2021] vitamin D  50,000 Units Oral Weekly    sodium chloride flush  10 mL Intravenous 2 times per day    enoxaparin  30 mg Subcutaneous Daily    cefTRIAXone (ROCEPHIN) IV  1,000 mg Intravenous Q24H    influenza virus vaccine  0.5 mL Intramuscular Prior to discharge     Continuous Infusions:   sodium chloride 125 mL/hr at 02/22/21 1017     PRN Meds:.sodium chloride flush, promethazine **OR** ondansetron, acetaminophen **OR** acetaminophen, nicotine polacrilex, morphine **OR** morphine    Family History   Problem Relation Age of Onset    High Blood Pressure Mother     Asthma Sister     Migraines Sister     High Blood Pressure Father     Other Brother         bad knees, with recent total knee replacement         Social History     Socioeconomic History    Marital status:      Spouse name: None    Number of children: None    Years of education: None    Highest education level: None   Occupational History    Occupation: stay at home mom   Social Needs    Financial resource strain: None    Food insecurity     Worry: None     Inability: None    Transportation needs     Medical: None     Non-medical: None   Tobacco Use    Smoking status: Current Every Day Smoker     Packs/day: 1.00     Years: 16.00     Pack years: 16.00     Types: Cigarettes    Smokeless tobacco: Never Used    Tobacco comment: trying  to  cut  down   Substance and Sexual Activity    Alcohol use: No     Alcohol/week: 0.0 standard drinks    Drug use: No    Sexual activity: Yes     Partners: Male     Comment: steady boyfriend   Lifestyle    Physical activity     Days per week: None     Minutes per session: None    Stress: None   Relationships    Social connections     Talks on phone: None     Gets together: None     Attends Christian service: None     Active member of club or organization: None     Attends meetings of clubs or organizations: None     Relationship status: None    Intimate partner violence     Fear of current or ex partner: None     Emotionally abused: None     Physically abused: None     Forced sexual activity: None   Other Topics Concern    None   Social History Narrative    None     Review of systems: CNS - no headache or dizziness; Cardiac - no chest pain; Respiratory - no shortness of breath; Gastrointestinal - +nausea, but no vomiting or diarrhea; Musculoskeletal - general body aches; Skin/Integument - no rashes.     Physical Exam:    VITALS:  /75   Pulse 72   Temp 98.6 °F (37 °C) (Oral)   Resp 14   Ht 5' 6\" (1.676 m)   Wt 131 lb 2.8 oz (59.5 kg)   LMP 02/07/2021   SpO2 96%   BMI 21.17 kg/m²   24HR INTAKE/OUTPUT:    Intake/Output Summary (Last 24 hours) at 2/22/2021 1122  Last data filed at 2/22/2021 0847  Gross per 24 hour   Intake 1225 ml   Output 1575 ml   Net -350 ml       Constitutional: alert, appears stated age and cooperative    Skin: Skin color, texture, turgor normal. No rashes or lesions    Head: Normocephalic, without obvious abnormality, atraumatic     Cardiovascular/Edema: regular rate and rhythm, S1, S2 normal, no murmur, click, rub or gallop    Respiratory: Lungs: clear to auscultation bilaterally    Abdomen: soft, non-tender; bowel sounds normal; no masses,  no organomegaly    Back: Right costovertebral angle tenderness    Extremities: extremities normal, atraumatic, no cyanosis or edema    Neuro:  Grossly normal      CBC:   Recent Labs     02/21/21  1850 02/22/21  0708   WBC 8.9 6.1   HGB 11.1* 9.7*    271     BMP:    Recent Labs     02/21/21  1850 02/22/21  0708    145*   K 4.8 4.3    113*   CO2 22 22   BUN 26* 21*   CREATININE 3.34* 2.87*   GLUCOSE 90 92       Lab Results   Component Value Date    NITRU NEGATIVE 02/21/2021    COLORU RED 02/21/2021    PHUR 7.0 02/21/2021    WBCUA 10 TO 20 02/21/2021    RBCUA TOO NUMEROUS TO COUNT 02/21/2021    MUCUS NOT REPORTED 02/21/2021    TRICHOMONAS NOT REPORTED 02/21/2021    YEAST NOT REPORTED 02/21/2021    BACTERIA FEW 02/21/2021    CLARITYU Clear 04/26/2016    SPECGRAV 1.008 02/21/2021    LEUKOCYTESUR MOD 02/21/2021    UROBILINOGEN Normal 02/21/2021    BILIRUBINUR NEGATIVE 02/21/2021    BILIRUBINUR neg 09/30/2014    BLOODU Positive 04/26/2016    GLUCOSEU NEGATIVE 02/21/2021    KETUA TRACE 02/21/2021    AMORPHOUS NOT REPORTED 02/21/2021     Urine Sodium:     Lab Results   Component Value Date    NOHELIA 57 02/21/2021     Urine Osmolarity:   Lab Results   Component Value Date    OSMOU 236 02/21/2021     Urine Creatinine:     Lab Results   Component Value Date    LABCREA 39.7 02/21/2021     IMPRESSION/RECOMMENDATIONS:      1. Acute kidney injury superimposed on chronic kidney disease stage IIIb - most consistent with prerenal azotemia as well as dehydration due to nausea complicating flank pain. Renal function is improving with hydration. Plan: IV fluid 0.45 normal saline at 125 mL/h. Urine culture and sensitivity. Random urine eosinophils. Basic metabolic profile daily. 2.  Gross hematuria - likely due to ruptured cyst.  Recent urine culture was negative but repeat urine culture is pending. Empiric antibiotic therapy. Pain management. Continue IV fluid. 3.  Hypernatremia - estimated free water deficit 1.5 L. Hypotonic hydration. Prognosis is guarded. Thank you very much for the courtesy of this consultation.       Gray Solares MD FACP  Attending Nephrologist  2/22/2021 11:20 AM

## 2021-02-22 NOTE — H&P
[address] [facility]     HISTORY AND PHYSICAL EXAMINATION            Date:   2/22/2021  Patientname:  Denise Nash  Date of admission:  2/21/2021  6:40 PM  MRN:   [id number]  Account:  [de-identified]  YOB: 1980  PCP:    Rosemarie De Santiago MD  Room:   2071/2071-01  Code Status:    Full Code    CHIEF COMPLAINT     Chief Complaint   Patient presents with    Flank Pain     Hx of kidney stones-here last week- started on Keflex    Hematuria       HISTORY OF PRESENT ILLNESS  (Character, Onset, Location, Duration,  Exacerbating/RelievingFactors, Radiation,   Associated Symptoms, Severity )      The patient is a 41 y.o.  female, with a history of asthma, CKD, kidney stones, polycystic kidney-with past history of hemorrhage of cyst of native kidney, and renovascular hypertension, who presents with flank pain and hematuria. According to patient, she was seen in the ED on 2/15/202 and she was discharged home on antibiotics for a UTI and was found to have a kidney stone at that time. Reports that she has taken her Keflex as ordered (only a few doses left) but reports that symptoms are worsening. Symptoms are associated with nausea, decreased urine output, urgency, and painful urination, stating that it feels as if she is \"peeing knives. \"   Denies fever, chills, chest pain, cough, and diarrhea. There are no aggravating or alleviating factors. Symptoms are reported as constant, moderate to severe, and progressively worsening. PAST MEDICAL HISTORY   Patient  has a past medical history of Anxiety, Asthma, Chronic headaches, CKD (chronic kidney disease) stage 3, GFR 30-59 ml/min, Degenerative disc disease, cervical, Depression, Dysmenorrhea, Eczema, Headache(784.0), HTN (hypertension), Hypertension, Hypocalcemia, Kidney stone, Menorrhagia, Neck pain, bilateral, Pelvic pain in female, Polycystic kidney, Smoker, Tachycardia, and Tension vascular headache.     PAST SURGICAL HISTORY Patient  has a past surgical history that includes Dilation and curettage of uterus; Breast lumpectomy; Nerve Block (07/01/2013); Nerve Block (07/08/2013); other surgical history (03/10/2014 ); Toe Surgery; Breast enhancement surgery; Cystoscopy (06/12/2020); Ureteroscopy (Right, 06/12/2020); cysto/uretero/pyeloscopy, calculus tx (Right, 6/12/2020); and Lithotripsy (Right, 7/23/2020). FAMILY HISTORY    Patient family history includes Asthma in her sister; High Blood Pressure in her father and mother; Migraines in her sister; Other in her brother. SOCIAL HISTORY    Patient  reports that she has been smoking cigarettes. She has a 16.00 pack-year smoking history. She has never used smokeless tobacco. She reports that she does not drink alcohol or use drugs. HOME MEDICATIONS        Prior to Admission medications    Medication Sig Start Date End Date Taking? Authorizing Provider   cephALEXin (KEFLEX) 250 MG capsule Take 1 capsule by mouth 2 times daily for 7 days 2/15/21 2/22/21 Yes Sherwin Bowers MD   vitamin D (ERGOCALCIFEROL) 1.25 MG (11300 UT) CAPS capsule take 1 capsule by mouth every week  Patient taking differently: tuesday 12/28/20  Yes Gail Leyden, MD   amLODIPine (NORVASC) 5 MG tablet Take 5 mg by mouth daily   Yes Historical Provider, MD   DULoxetine (CYMBALTA) 60 MG extended release capsule take 1 capsule by mouth once daily  Patient taking differently: 90 mg  11/10/17  Yes Eleuterio Louie MD   melatonin 3 MG TABS tablet Take 3 mg by mouth nightly as needed (sleep)    Historical Provider, MD   loratadine (CLARITIN) 10 MG tablet Take 10 mg by mouth daily as needed (allergies)     Historical Provider, MD   Blood Pressure KIT Check BP once/day- goal is <140/90. 3/9/16   ALBARO Roberts - CNP       ALLERGIES      Bee pollen, Dust mite extract, Pcn [penicillins], and Pollen extract    REVIEW OF SYSTEMS     Review of Systems   Constitutional: Negative for chills, diaphoresis and fever. HENT: Negative for congestion and sore throat. Respiratory: Negative for cough, shortness of breath and wheezing. Cardiovascular: Negative for chest pain, palpitations and leg swelling. Gastrointestinal: Positive for abdominal pain and nausea. Negative for constipation, diarrhea and vomiting. Genitourinary: Positive for decreased urine volume, dysuria, flank pain, hematuria, pelvic pain and urgency. Negative for frequency. Musculoskeletal: Positive for back pain. Negative for myalgias. Skin: Negative for rash. Neurological: Negative for dizziness, weakness and headaches. Psychiatric/Behavioral: The patient is not nervous/anxious. PHYSICAL EXAM      BP (!) 151/93   Pulse 83   Temp 98.7 °F (37.1 °C) (Oral)   Resp 14   Ht 5' 6\" (1.676 m)   Wt 125 lb (56.7 kg)   LMP 02/07/2021   SpO2 96%   BMI 20.18 kg/m²  Body mass index is 20.18 kg/m². Physical Exam  Constitutional:       General: She is not in acute distress. Appearance: She is well-developed. She is not diaphoretic. HENT:      Head: Normocephalic and atraumatic. Mouth/Throat:      Mouth: Mucous membranes are dry. Eyes:      Conjunctiva/sclera: Conjunctivae normal.      Pupils: Pupils are equal, round, and reactive to light. Neck:      Musculoskeletal: Normal range of motion and neck supple. Trachea: No tracheal deviation. Cardiovascular:      Rate and Rhythm: Normal rate and regular rhythm. Heart sounds: Normal heart sounds. No murmur. No friction rub. No gallop. Pulmonary:      Effort: Pulmonary effort is normal. No respiratory distress. Breath sounds: Normal breath sounds. No wheezing or rales. Chest:      Chest wall: No tenderness. Abdominal:      General: Bowel sounds are normal. There is no distension. Palpations: Abdomen is soft. Tenderness: There is no abdominal tenderness. There is right CVA tenderness and left CVA tenderness. There is no guarding.    Musculoskeletal: Normal range of motion. General: No tenderness. Lymphadenopathy:      Cervical: No cervical adenopathy. Skin:     General: Skin is warm and dry. Coloration: Skin is not pale. Findings: No erythema or rash. Neurological:      Mental Status: She is alert and oriented to person, place, and time. Motor: No seizure activity. Coordination: Coordination normal.   Psychiatric:         Behavior: Behavior normal.         Thought Content: Thought content normal.       DIAGNOSTICS      EKG: none    Labs:  CBC:   Recent Labs     02/21/21  1850   WBC 8.9   HGB 11.1*        BMP:    Recent Labs     02/21/21  1850      K 4.8      CO2 22   BUN 26*   CREATININE 3.34*   GLUCOSE 90     S. Calcium:  Recent Labs     02/21/21  1850   CALCIUM 8.7     S. Ionized Calcium:No results for input(s): IONCA in the last 72 hours. S. Magnesium:No results for input(s): MG in the last 72 hours. S. Phosphorus:No results for input(s): PHOS in the last 72 hours. S. Glucose:No results for input(s): POCGLU in the last 72 hours. Glycosylated hemoglobin A1C:   Lab Results   Component Value Date    LABA1C 4.9 02/20/2017     Hepatic: No results for input(s): AST, ALT, ALB, ALKPHOS in the last 72 hours. Invalid input(s):  PROT,  BILITOT,  BILIDIR  CARDIAC ENZY: No results for input(s): CKTOTAL, CKMB, CKMBINDEX, TROPHS, MYOGLOBIN in the last 72 hours. INR: No results for input(s): INR in the last 72 hours. BNP: No results for input(s): PROBNP in the last 72 hours. ABGs: No results for input(s): PH, PCO2, PO2, HCO3, O2SAT in the last 72 hours. Lipids: No results for input(s): CHOL, TRIG, HDL, LDLCALC in the last 72 hours. Invalid input(s): LDL  Pancreatic functions:No results for input(s): LIPASE, AMYLASE in the last 72 hours. Jancarlose President: No results for input(s): LACTA in the last 72 hours.   Thyroid functions:   Lab Results   Component Value Date    TSH 1.14 06/30/2020      U/A:No results for input(s): NITRITE, COLORU, WBCUA, RBCUA, MUCUS, BACTERIA, CLARITYU, SPECGRAV, LEUKOCYTESUR, BLOODU, GLUCOSEU, AMORPHOUS in the last 72 hours. Invalid input(s): Zack Burrows    Imaging/Diagonstics:     Ct Abdomen Pelvis Wo Contrast Additional Contrast? None    Result Date: 2/15/2021  EXAMINATION: CT OF THE ABDOMEN AND PELVIS WITHOUT CONTRAST 2/15/2021 5:57 pm HISTORY: ORDERING SYSTEM PROVIDED HISTORY: right flank pain TECHNOLOGIST PROVIDED HISTORY: right flank pain Decision Support Exception->Emergency Medical Condition (MA) Is the patient pregnant?->No Reason for Exam: right flank pain; patient c/o hematuria x2 days with back pain Acuity: Acute Type of Exam: Initial Relevant Medical/Surgical History: h/o kidney stones, polycystic kidney disease TECHNIQUE: Multidetector CT acquisition through the abdomen and pelvis was performed without oral or intravenous contrast material.  Automatic Exposure Control was utilized as a means of radiation dose reduction. COMPARISON: CT studies from August 5, 2020 and July 30, 2020. FINDINGS: CT Abdomen:  Heart size is normal.  The visualized lung bases are clear. The spleen, pancreas, gallbladder, and adrenal glands have an unremarkable unenhanced CT appearance. A few small benign hepatic cysts appear unchanged. The kidneys are again seen to be diffusely enlarged, replaced with innumerable simple and hyperdense, likely hemorrhagic and/or proteinaceous, cysts. A 7-mm calcification within the anterior interpolar region of the right kidney is unchanged, possibly a non-obstructing calculus. The ureters are decompressed. No hydronephrosis. No ureteral calculi are apparent. The stomach and the small and large bowel loops are normal in contour, caliber and morphology, without acute or significant abnormality. No dilated loops or areas of bowel wall thickening. There is no free fluid or extraluminal gas. No enlarged or suspicious mesenteric or retroperitoneal lymphadenopathy.  The abdominal aorta and iliac arteries are normal in caliber. No significant osseous abnormality. CT Pelvis: No pelvic free fluid or enlarged or suspicious pelvic or inguinal lymphadenopathy. No appreciable uterine or adnexal abnormality. An IUD is positioned within the uterus. The urinary bladder and the pelvic bowel loops and osseous structures are unremarkable. Enlarged polycystic kidneys, appearing unchanged from prior, as is a 7-mm non-obstructing right intrarenal calculus. No ureteral calculi or hydronephrosis. BMP:   Recent Labs     02/21/21  1850 02/22/21  0708    145*   K 4.8 4.3   CO2 22 22   BUN 26* 21*   CREATININE 3.34* 2.87*   LABGLOM 15* 18*   GLUCOSE 90 92                ASSESSMENT  and  PLAN     Principal Problem:    Acute kidney injury superimposed on CKD (HCC)  Active Problems:    Smoker    Kidney stone    Urinary tract infection with hematuria  Resolved Problems:    * No resolved hospital problems.  *    Plan:    Acute Kidney Injury  -Patient with history of polycystic kidneys  -Creatinine 3.34; Baseline 2.3  -NS fluid bolus in ED  -continue IVF on admission  -hold diuretics/nephrotoxic medications  -check renal ultrasound in am  -Consult nephrologist  -monitor BMP    Nephrolithiasis  -CT abd/pelvis on 2/15  --7mm non-obstructing right interrenal calculus  ---Seems unchanged in size and location since CT on 6/25/20  ----New CT not obtained in ED d/t stone appeared stable on 2/15  -IV fluids   -Pain and nausea control  -Strain all urine  -Consider urology consult  --Patient sees Dr. Walker Button tract infection with hematuria  -Patient thought to have UTI on 2/15  --Started on Keflex 500 mg BID  ---Change to Rocephin while inpatient to complete last 2 days of therapy  -Urine culture -no significant growth on 2/16  -new UA with reflex culture pending    Tobacco use   -smoking cessation education  -nicotine Lozenge PRN  --Instructed on effective use of lozenge    Consultations: IP CONSULT TO [address] [facility] Joey, APRN - CNP   2/22/2021  4:33 AM    Diaz Alvarez [address]  [address] Phone  and add on       I have discussed the care of Rajat Dickens ,   including pertinent history and exam findings,      2/22/21   with the Mitesh Walden CNP  I have seen and examined the patient and the key elements of all parts of the encounter have been performed by me . I agree with the assessment, plan and orders as documented by the resident. Principal Problem:    Acute kidney injury superimposed on chronic kidney disease ([facility] [address]  Active Problems:    Smoker    Polycystic kidney    Kidney stone    Renovascular hypertension    Right ureteral stone    History of major depression    Iron deficiency anemia due to chronic blood loss    Hypernatremia  Resolved Problems:    * No resolved hospital problems. *       Admitted with gross hematuria  Her urination is quite painful  In the past she has had right ureteral stone  She she was seen in the emergency room with similar complaints a week ago and was started on antibiotic with no relief and gross hematuria persisted  Urine culture was unremarkable    She is known to have polycystic kidney disease  Also has acute on chronic kidney disease  Creatinine has gone up to 3.34  Seen by nephrologist  Suspected to have ruptured cyst in the polycystic kidney  Will get urology    Patient has had hemoglobin drop  Symptoms have persisted and failed outpatient treatment  Her pyuria is not impressive    We will continue Rocephin till the urine culture comes back    Patient is acutely ill requiring IV fluids need to find out the cause of hematuria  Has significant pain  And acute on chronic kidney disease    Fluid dehydration is noted  Receiving fluid replacement       Medications: Allergies:     Allergies   Allergen Reactions    Bee Pollen     Dust Mite Extract      Chest congestion , sneezing    Pcn [Penicillins] Rash    Pollen Extract      Chest congesting , sneezing        Current Meds:   Scheduled Meds:    amLODIPine  5 mg Oral Daily    DULoxetine  90 mg Oral Daily    [START ON 2/23/2021] vitamin D  50,000 Units Oral Weekly    sodium chloride flush  10 mL Intravenous 2 times per day    enoxaparin  30 mg Subcutaneous Daily    cefTRIAXone (ROCEPHIN) IV  1,000 mg Intravenous Q24H    influenza virus vaccine  0.5 mL Intramuscular Prior to discharge     Continuous Infusions:    sodium chloride 125 mL/hr at 02/22/21 1017     PRN Meds: sodium chloride flush, promethazine **OR** ondansetron, acetaminophen **OR** acetaminophen, nicotine polacrilex, morphine **OR** morphine      ---- ;     [address], [address] [place] [address].    Phone [phone]   Fax: [phone]  Answering Service: [phone]

## 2021-02-23 ENCOUNTER — APPOINTMENT (OUTPATIENT)
Dept: ULTRASOUND IMAGING | Age: 41
DRG: 469 | End: 2021-02-23
Payer: MEDICARE

## 2021-02-23 LAB
ALBUMIN SERPL-MCNC: 3.7 G/DL (ref 3.5–5.2)
ALBUMIN/GLOBULIN RATIO: ABNORMAL (ref 1–2.5)
ALP BLD-CCNC: 77 U/L (ref 35–104)
ALT SERPL-CCNC: 6 U/L (ref 5–33)
ANION GAP SERPL CALCULATED.3IONS-SCNC: 9 MMOL/L (ref 9–17)
AST SERPL-CCNC: 11 U/L
BILIRUB SERPL-MCNC: 0.16 MG/DL (ref 0.3–1.2)
BUN BLDV-MCNC: 18 MG/DL (ref 6–20)
BUN/CREAT BLD: ABNORMAL (ref 9–20)
CALCIUM SERPL-MCNC: 8.2 MG/DL (ref 8.6–10.4)
CHLORIDE BLD-SCNC: 108 MMOL/L (ref 98–107)
CO2: 23 MMOL/L (ref 20–31)
CREAT SERPL-MCNC: 2.63 MG/DL (ref 0.5–0.9)
CULTURE: NO GROWTH
GFR AFRICAN AMERICAN: 24 ML/MIN
GFR NON-AFRICAN AMERICAN: 20 ML/MIN
GFR SERPL CREATININE-BSD FRML MDRD: ABNORMAL ML/MIN/{1.73_M2}
GFR SERPL CREATININE-BSD FRML MDRD: ABNORMAL ML/MIN/{1.73_M2}
GLUCOSE BLD-MCNC: 87 MG/DL (ref 70–99)
HCT VFR BLD CALC: 29.4 % (ref 36–46)
HEMOGLOBIN: 9.8 G/DL (ref 12–16)
Lab: NORMAL
MCH RBC QN AUTO: 32.6 PG (ref 26–34)
MCHC RBC AUTO-ENTMCNC: 33.4 G/DL (ref 31–37)
MCV RBC AUTO: 97.7 FL (ref 80–100)
NRBC AUTOMATED: ABNORMAL PER 100 WBC
PDW BLD-RTO: 12.7 % (ref 11.5–14.9)
PLATELET # BLD: 280 K/UL (ref 150–450)
PMV BLD AUTO: 7.9 FL (ref 6–12)
POTASSIUM SERPL-SCNC: 4.3 MMOL/L (ref 3.7–5.3)
RBC # BLD: 3.01 M/UL (ref 4–5.2)
SODIUM BLD-SCNC: 140 MMOL/L (ref 135–144)
SPECIMEN DESCRIPTION: NORMAL
TOTAL PROTEIN: 6.2 G/DL (ref 6.4–8.3)
WBC # BLD: 6.5 K/UL (ref 3.5–11)

## 2021-02-23 PROCEDURE — 6360000002 HC RX W HCPCS: Performed by: NURSE PRACTITIONER

## 2021-02-23 PROCEDURE — 76770 US EXAM ABDO BACK WALL COMP: CPT

## 2021-02-23 PROCEDURE — 2580000003 HC RX 258: Performed by: INTERNAL MEDICINE

## 2021-02-23 PROCEDURE — 80053 COMPREHEN METABOLIC PANEL: CPT

## 2021-02-23 PROCEDURE — 2580000003 HC RX 258: Performed by: NURSE PRACTITIONER

## 2021-02-23 PROCEDURE — 36415 COLL VENOUS BLD VENIPUNCTURE: CPT

## 2021-02-23 PROCEDURE — 51798 US URINE CAPACITY MEASURE: CPT

## 2021-02-23 PROCEDURE — 6370000000 HC RX 637 (ALT 250 FOR IP): Performed by: NURSE PRACTITIONER

## 2021-02-23 PROCEDURE — 6360000002 HC RX W HCPCS: Performed by: INTERNAL MEDICINE

## 2021-02-23 PROCEDURE — 99232 SBSQ HOSP IP/OBS MODERATE 35: CPT | Performed by: INTERNAL MEDICINE

## 2021-02-23 PROCEDURE — 85027 COMPLETE CBC AUTOMATED: CPT

## 2021-02-23 PROCEDURE — 6370000000 HC RX 637 (ALT 250 FOR IP): Performed by: INTERNAL MEDICINE

## 2021-02-23 PROCEDURE — 1200000000 HC SEMI PRIVATE

## 2021-02-23 RX ORDER — HYDROCODONE BITARTRATE AND ACETAMINOPHEN 5; 325 MG/1; MG/1
1 TABLET ORAL EVERY 6 HOURS PRN
Status: DISCONTINUED | OUTPATIENT
Start: 2021-02-23 | End: 2021-02-24 | Stop reason: HOSPADM

## 2021-02-23 RX ORDER — MORPHINE SULFATE 2 MG/ML
2 INJECTION, SOLUTION INTRAMUSCULAR; INTRAVENOUS EVERY 6 HOURS PRN
Status: DISCONTINUED | OUTPATIENT
Start: 2021-02-23 | End: 2021-02-24 | Stop reason: HOSPADM

## 2021-02-23 RX ADMIN — MORPHINE SULFATE 4 MG: 4 INJECTION, SOLUTION INTRAMUSCULAR; INTRAVENOUS at 02:10

## 2021-02-23 RX ADMIN — SODIUM CHLORIDE, PRESERVATIVE FREE 10 ML: 5 INJECTION INTRAVENOUS at 19:39

## 2021-02-23 RX ADMIN — SODIUM CHLORIDE: 4.5 INJECTION, SOLUTION INTRAVENOUS at 06:01

## 2021-02-23 RX ADMIN — AMLODIPINE BESYLATE 5 MG: 5 TABLET ORAL at 07:29

## 2021-02-23 RX ADMIN — PROMETHAZINE HYDROCHLORIDE 12.5 MG: 25 TABLET ORAL at 07:31

## 2021-02-23 RX ADMIN — SODIUM CHLORIDE: 4.5 INJECTION, SOLUTION INTRAVENOUS at 16:37

## 2021-02-23 RX ADMIN — IRON SUCROSE 300 MG: 20 INJECTION, SOLUTION INTRAVENOUS at 14:27

## 2021-02-23 RX ADMIN — ENOXAPARIN SODIUM 30 MG: 30 INJECTION SUBCUTANEOUS at 07:29

## 2021-02-23 RX ADMIN — HYDROCODONE BITARTRATE AND ACETAMINOPHEN 1 TABLET: 5; 325 TABLET ORAL at 19:56

## 2021-02-23 RX ADMIN — ERGOCALCIFEROL 50000 UNITS: 1.25 CAPSULE ORAL at 07:33

## 2021-02-23 RX ADMIN — MORPHINE SULFATE 4 MG: 4 INJECTION, SOLUTION INTRAMUSCULAR; INTRAVENOUS at 10:08

## 2021-02-23 RX ADMIN — MORPHINE SULFATE 2 MG: 2 INJECTION, SOLUTION INTRAMUSCULAR; INTRAVENOUS at 18:55

## 2021-02-23 RX ADMIN — HYDROCODONE BITARTRATE AND ACETAMINOPHEN 1 TABLET: 5; 325 TABLET ORAL at 13:33

## 2021-02-23 RX ADMIN — DULOXETINE 90 MG: 30 CAPSULE, DELAYED RELEASE ORAL at 07:29

## 2021-02-23 RX ADMIN — MORPHINE SULFATE 4 MG: 4 INJECTION, SOLUTION INTRAMUSCULAR; INTRAVENOUS at 06:00

## 2021-02-23 ASSESSMENT — PAIN DESCRIPTION - ORIENTATION
ORIENTATION: RIGHT;LEFT
ORIENTATION: RIGHT;LEFT

## 2021-02-23 ASSESSMENT — PAIN SCALES - GENERAL
PAINLEVEL_OUTOF10: 8
PAINLEVEL_OUTOF10: 7
PAINLEVEL_OUTOF10: 2
PAINLEVEL_OUTOF10: 0
PAINLEVEL_OUTOF10: 7
PAINLEVEL_OUTOF10: 4
PAINLEVEL_OUTOF10: 5

## 2021-02-23 ASSESSMENT — PAIN DESCRIPTION - PAIN TYPE
TYPE: ACUTE PAIN

## 2021-02-23 ASSESSMENT — PAIN DESCRIPTION - LOCATION
LOCATION: FLANK

## 2021-02-23 ASSESSMENT — PAIN DESCRIPTION - PROGRESSION
CLINICAL_PROGRESSION: NOT CHANGED
CLINICAL_PROGRESSION: NOT CHANGED

## 2021-02-23 ASSESSMENT — PAIN DESCRIPTION - DESCRIPTORS: DESCRIPTORS: ACHING

## 2021-02-23 ASSESSMENT — PAIN DESCRIPTION - DIRECTION: RADIATING_TOWARDS: GROIN/ABDOMEN

## 2021-02-23 ASSESSMENT — PAIN DESCRIPTION - ONSET
ONSET: ON-GOING
ONSET: ON-GOING

## 2021-02-23 NOTE — PROGRESS NOTES
[facility]    Progress Note     2/23/2021    11:58 AM    Name:   Sunni Oneil  MRN:     [id number]     Acct:      [de-identified]   Room:   2071/2071-01  IP Day:  2  Admit Date:  2/21/2021  6:40 PM    PCP:   Kyra Choudhury MD  Code Status:  Full Code    Subjective:     C/C:   Chief Complaint   Patient presents with    Flank Pain     Hx of kidney stones-here last week- started on Keflex    Hematuria     Principal Problem:    Acute kidney injury superimposed on chronic kidney disease ([address]  Active Problems:    Smoker    Polycystic kidney    Kidney stone    Renovascular hypertension    Right ureteral stone    History of major depression    Iron deficiency anemia due to chronic blood loss    Hypernatremia  Resolved Problems:    * No resolved hospital problems. *      Interval History Status: improved still has hematuria and saad.        Admitted with gross hematuria  She had she had a ER visit for the same complaints a week ago and was treated as outpatient antibiotic but it did not work  Urine cultures were negative  Dr. Michael Sos note noted  He agrees with most likely cause of hematuria ruptured cyst    Admitted with gross hematuria  Her urination is quite painful  In the past she has had right ureteral stone  She she was seen in the emergency room with similar complaints a week ago and was started on antibiotic with no relief and gross hematuria persisted  Urine culture was unremarkable     She is known to have polycystic kidney disease  Also has acute on chronic kidney disease  Creatinine has gone up to 3.34  Seen by nephrologist  Suspected to have ruptured cyst in the polycystic kidney  Will get urology     Patient has had hemoglobin drop  Symptoms have persisted and failed outpatient treatment  Her pyuria is not impressive     We will continue Rocephin till the urine culture comes back     Patient is acutely ill requiring IV fluids need to find out the cause of hematuria  Has significant pain  And acute on chronic kidney disease     Fluid dehydration is noted  Receiving fluid replacement             Significant last 24 hr data reviewed ;   Vitals:    02/22/21 0733 02/22/21 1342 02/22/21 2022 02/23/21 0645   BP:  (!) 152/85 (!) 141/77 (!) 147/87   Pulse: 68 76 76 74   Resp:  16 14 14   Temp:  97.8 °F (36.6 °C) 98 °F (36.7 °C) 97.6 °F (36.4 °C)   TempSrc:  Oral Oral Oral   SpO2:  97% 100% 97%   Weight:       Height:          Recent Results (from the past 24 hour(s))   Comprehensive Metabolic Panel w/ Reflex to MG    Collection Time: 02/23/21  6:18 AM   Result Value Ref Range    Glucose 87 70 - 99 mg/dL    BUN 18 6 - 20 mg/dL    CREATININE 2.63 (H) 0.50 - 0.90 mg/dL    Bun/Cre Ratio NOT REPORTED 9 - 20    Calcium 8.2 (L) 8.6 - 10.4 mg/dL    Sodium 140 135 - 144 mmol/L    Potassium 4.3 3.7 - 5.3 mmol/L    Chloride 108 (H) 98 - 107 mmol/L    CO2 23 20 - 31 mmol/L    Anion Gap 9 9 - 17 mmol/L    Alkaline Phosphatase 77 35 - 104 U/L    ALT 6 5 - 33 U/L    AST 11 <32 U/L    Total Bilirubin 0.16 (L) 0.3 - 1.2 mg/dL    Total Protein 6.2 (L) 6.4 - 8.3 g/dL    Albumin 3.7 3.5 - 5.2 g/dL    Albumin/Globulin Ratio NOT REPORTED 1.0 - 2.5    GFR Non-African American 20 (L) >60 mL/min    GFR  24 (L) >60 mL/min    GFR Comment          GFR Staging NOT REPORTED    CBC    Collection Time: 02/23/21  6:18 AM   Result Value Ref Range    WBC 6.5 3.5 - 11.0 k/uL    RBC 3.01 (L) 4.0 - 5.2 m/uL    Hemoglobin 9.8 (L) 12.0 - 16.0 g/dL    Hematocrit 29.4 (L) 36 - 46 %    MCV 97.7 80 - 100 fL    MCH 32.6 26 - 34 pg    MCHC 33.4 31 - 37 g/dL    RDW 12.7 11.5 - 14.9 %    Platelets 391 393 - 732 k/uL    MPV 7.9 6.0 - 12.0 fL    NRBC Automated NOT REPORTED per 100 WBC     No results for input(s): POCGLU in the last 72 hours.      Ct Abdomen Pelvis Wo Contrast Additional Contrast? None    Result Date: 2/22/2021  EXAMINATION: CT OF THE ABDOMEN AND PELVIS WITHOUT CONTRAST 2/22/2021 8:36 am TECHNIQUE: CT of the abdomen and pelvis was performed without the administration of intravenous contrast. Multiplanar reformatted images are provided for review. Dose modulation, iterative reconstruction, and/or weight based adjustment of the mA/kV was utilized to reduce the radiation dose to as low as reasonably achievable. COMPARISON: 02/15/2021, 08/05/2020 HISTORY: ORDERING SYSTEM PROVIDED HISTORY: flank pain TECHNOLOGIST PROVIDED HISTORY: flank pain Is the patient pregnant?->No Reason for Exam: low back pain which radiates to the front bilaterally for over a week Acuity: Unknown Type of Exam: Unknown FINDINGS: Lower Chest: Lung bases are hyperinflated. Mild right lower lobe bronchial wall thickening. Mosaic attenuation suggested. No pleural or pericardial effusion. Partially imaged breast implants. Pectus excavatum. Organs: Evaluation is limited by the absence of contrast.  Similar scattered hypodensities throughout the liver, presumably representing cysts, the largest measuring 1.7 cm near the hepatic hilum. No biliary ductal dilatation. Gallbladder is contracted without calcified cholelithiasis. Spleen, pancreas, and adrenal glands are grossly unremarkable. Markedly enlarged kidneys replaced by innumerable cysts, some which are hyperdense and others which have curvilinear mural calcifications. Unchanged 7 mm calcification in the anterior interpolar region of the right kidney. No discrete calcification along the expected course of the ureters. GI/Bowel: No bowel obstruction. 9 x 3 mm appendicolith in the distal tip of the appendix without discrete tracey appendiceal inflammatory change or appendiceal enlargement. Pelvis: Urinary bladder appears within normal limits. Intrauterine device appears grossly appropriate in positioning. Adnexa not well evaluated. Peritoneum/Retroperitoneum: Mild graying of the intra-abdominal fat. No free fluid or free air.   Atherosclerotic calcifications which are advanced for age. Bones/Soft Tissues: No acute findings. Unchanged sequelae of polycystic kidney disease with markedly enlarged kidneys due to innumerable cysts, some of which have internal hemorrhagic or proteinaceous content with some scattered curvilinear mural calcifications. Unchanged 7 mm nephrolith in the interpolar region of the right kidney. No definitive stones along the expected course of the ureters or within the urinary bladder. Appendicoliths within the normal caliber appendix. No findings to suggest acute appendicitis. Hyperinflation of the partially imaged lung bases which may be due to sequelae of COPD. Suggestion of mild mosaic attenuation and some mild bronchial wall thickening suggests acute or chronic airways disease. HPI:   See history in H and P      Review of Systems:     Constitutional:  negative for chills, fevers, sweats  Respiratory:  negative for cough, dyspnea on exertion, hemoptysis, shortness of breath, wheezing  Cardiovascular:  negative for chest pain, chest pressure/discomfort, lower extremity edema, palpitations  Gastrointestinal:  negative for abdominal pain, constipation, diarrhea, nausea, vomiting  Neurological:  negative for dizziness, headache  Data:     Past Medical History:  no change     Social History:  no change    Family History: @no change    Vitals:      I/O (24Hr):     Intake/Output Summary (Last 24 hours) at 2/23/2021 1158  Last data filed at 2/23/2021 0901  Gross per 24 hour   Intake 2783.33 ml   Output 3225 ml   Net -441.67 ml       Labs:    URINE ANALYSIS: No results found for: LABURIN     CBC:  Lab Results   Component Value Date    WBC 6.5 02/23/2021    HGB 9.8 02/23/2021     02/23/2021     01/13/2012        BMP:    Lab Results   Component Value Date     02/23/2021    K 4.3 02/23/2021     02/23/2021    CO2 23 02/23/2021    BUN 18 02/23/2021    CREATININE 2.63 02/23/2021    GLUCOSE 87 02/23/2021      LIVER PROFILE:  Lab Results Component Value Date    ALT 6 2021    AST 11 2021    PROT 6.2 2021    BILITOT 0.16 2021    BILIDIR <0.08 2020    LABALBU 3.7 2021               Radiology:  Medications: Allergies:      Current Meds:   Scheduled Meds:    amLODIPine  5 mg Oral Daily    DULoxetine  90 mg Oral Daily    vitamin D  50,000 Units Oral Weekly    sodium chloride flush  10 mL Intravenous 2 times per day    enoxaparin  30 mg Subcutaneous Daily    cefTRIAXone (ROCEPHIN) IV  1,000 mg Intravenous Q24H    influenza virus vaccine  0.5 mL Intramuscular Prior to discharge     Continuous Infusions:    sodium chloride 125 mL/hr at 21 0700     PRN Meds: sodium chloride flush, promethazine **OR** ondansetron, acetaminophen **OR** acetaminophen, nicotine polacrilex, morphine **OR** morphine      Physical Examination:        BP (!) 147/87   Pulse 74   Temp 97.6 °F (36.4 °C) (Oral)   Resp 14   Ht 5' 6\" (1.676 m)   Wt 131 lb 2.8 oz (59.5 kg)   LMP 2021   SpO2 97%   BMI 21.17 kg/m²   Temp (24hrs), Av.8 °F (36.6 °C), Min:97.6 °F (36.4 °C), Max:98 °F (36.7 °C)    No results for input(s): POCGLU in the last 72 hours. Intake/Output Summary (Last 24 hours) at 2021 1158  Last data filed at 2021 0901  Gross per 24 hour   Intake 2783.33 ml   Output 3225 ml   Net -441.67 ml       General Appearance:  alert, well appearing, and in no acute distress  Mental status: oriented to person, place, and time with normal affect  Head:  normocephalic, atraumatic.   Eye: no icterus, redness, pupils equal and reactive, extraocular eye movements intact, conjunctiva clear  Ear: normal external ear, no discharge, hearing intact  Nose:  no drainage noted  Mouth: mucous membranes moist  Neck: supple, no carotid bruits, thyroid not palpable  Lungs: Bilateral equal air entry, clear to ausculation, no wheezing, rales or rhonchi, normal effort  Cardiovascular: normal rate, regular rhythm, no murmur, gallop, rub.  Abdomen: Soft, nontender, nondistended, normal bowel sounds, no hepatomegaly or splenomegaly  Neurologic: There are no new focal motor or sensory deficits, normal muscle tone and bulk, no abnormal sensation, normal speech, cranial nerves II through XII grossly intact  Skin: No gross lesions, rashes, bruising or bleeding on exposed skin area  Extremities:  peripheral pulses palpable, no pedal edema or calf pain with palpation  Psych:       Assessment:        Primary Problem  Acute kidney injury superimposed on chronic kidney disease [facility])    Active Hospital Problems    Diagnosis Date Noted    History of major depression [Z86.59] 02/22/2021    Iron deficiency anemia due to chronic blood loss [D50.0] 02/22/2021    Hypernatremia [E87.0] 02/22/2021    Acute kidney injury superimposed on chronic kidney disease ([facility] [address] [N17.9, N18.9] 02/21/2021    Renovascular hypertension [I15.0] 06/18/2020    Right ureteral stone [N20.1] 05/27/2020    Kidney stone [N20.0]     Smoker [F17.200] 10/02/2011    Polycystic kidney [Q61.3] 10/02/2011     Plan:        1. Acute rehab persists  2. Urology consult noted  3. Urologist and nephrologist both believe that hemorrhage is from ruptured cyst  4. Patient has SILVANO on CKD    BMP:   Recent Labs     02/22/21  0708 02/23/21  0618   * 140   K 4.3 4.3   CO2 22 23   BUN 21* 18   CREATININE 2.87* 2.63*   LABGLOM 18* 20*   GLUCOSE 92 87            5. Charge planning in progress  6. Serum creatinine is 2.63 was 2.87 yesterday  7. We will continue with IV therapy  8. Her hypernatremia has resolved  9. Hopefully she will be able to go home tomorrow  10.  ll will discharge when okay with nephrology    Deloris Hanks MD  2/23/2021  11:58 AM

## 2021-02-23 NOTE — PLAN OF CARE
Problem: Pain:  Description: Pain management should include both nonpharmacologic and pharmacologic interventions.   Goal: Pain level will decrease  Description: Pain level will decrease  2/23/2021 1730 by Yoshi Camejo RN  Outcome: Ongoing  2/23/2021 1553 by Yoshi Camejo RN  Outcome: Met This Shift  2/23/2021 0411 by Emil Carroll RN  Outcome: Ongoing  Goal: Control of acute pain  Description: Control of acute pain  2/23/2021 1730 by Yoshi Camejo RN  Outcome: Ongoing  2/23/2021 1553 by Yoshi Camejo RN  Outcome: Met This Shift  2/23/2021 0411 by Emil Carroll RN  Outcome: Ongoing  Note: Education provided; pain care plan reviewed with patient  Goal: Control of chronic pain  Description: Control of chronic pain  2/23/2021 1730 by Yoshi Camejo RN  Outcome: Ongoing  2/23/2021 1553 by Yoshi Camejo RN  Outcome: Met This Shift  2/23/2021 0411 by Emil Carroll RN  Outcome: Ongoing     Problem: Falls - Risk of:  Goal: Will remain free from falls  Description: Will remain free from falls  2/23/2021 1730 by Yoshi Camejo RN  Outcome: Met This Shift  2/23/2021 1553 by Yoshi Camejo RN  Outcome: Met This Shift  2/23/2021 0411 by Emil Carroll RN  Outcome: Ongoing  Note: Education provided; pt remained free from falls  Goal: Absence of physical injury  Description: Absence of physical injury  2/23/2021 1730 by Yoshi Camejo RN  Outcome: Met This Shift  2/23/2021 1553 by Yoshi Camejo RN  Outcome: Met This Shift  2/23/2021 0411 by Emil Carroll RN  Outcome: Ongoing

## 2021-02-23 NOTE — PROGRESS NOTES
Department of Internal Medicine  Nephrology Ankush Rachel MD   Progress Note    Interval history: Patient was seen and examined today and she continues to have gross hematuria. She also still has flank pain bilaterally [right worse than left] radiating down to the groin. She does not have fever or chills and is nonoliguric. History of presenting illness: This is a 41 y.o. female with a significant past medical history of Systemic hypertension, depression, nephrolithiasis [required cystoscopy and placement of a right double-J ureteral stent with attached string on 6/12/2020] and chronic kidney disease stage IIIb secondary to autosomal dominant polycystic kidney disease diagnosed in 2009 [baseline serum creatinine 2.1 mg/dL], who presented to the emergency department yesterday with a 1 week history of flank pain and gross hematuria. Symptoms initially started on 2/15/2021 with gross hematuria with subsequent onset of bilateral flank pain 24 hours later. She presented to the emergency department on 2/15/2021 and had CT scan which showed stable enlarged polycystic kidneys with no hydronephrosis and showed stable 7 mm nonobstructing right intrarenal calculus and she was sent home on antibiotics and pain medications. However symptoms persisted and hence presentation to the emergency department again yesterday 2/21/2021. Urine culture performed on 2/15/2021 was negative. Serum creatinine on 2/15/2021 was 2.6 mg/dL with serum creatinine BUNs/creatinine at presentation yesterday was 26/3.34 mg/dL and hence nephrology consultation. She is well-known to me and I her nephrologist.  She feels better today and currently does not have nausea and right flank pain is improved. She is nonoliguric but continues to have gross hematuria. Urinalysis showed large blood and urine microscopy showed too numerous to count RBC but only 10-20 WBC per hpf.     Scheduled Meds:   amLODIPine  5 mg Oral Daily    DULoxetine  90 02/21/2021    RBCUA TOO NUMEROUS TO COUNT 02/21/2021    MUCUS NOT REPORTED 02/21/2021    TRICHOMONAS NOT REPORTED 02/21/2021    YEAST NOT REPORTED 02/21/2021    BACTERIA FEW 02/21/2021    CLARITYU Clear 04/26/2016    SPECGRAV 1.008 02/21/2021    LEUKOCYTESUR MOD 02/21/2021    UROBILINOGEN Normal 02/21/2021    BILIRUBINUR NEGATIVE 02/21/2021    BILIRUBINUR neg 09/30/2014    BLOODU Positive 04/26/2016    GLUCOSEU NEGATIVE 02/21/2021    KETUA TRACE 02/21/2021    AMORPHOUS NOT REPORTED 02/21/2021     Urine Sodium:     Lab Results   Component Value Date    NOHELIA 57 02/21/2021     Urine Osmolarity:   Lab Results   Component Value Date    OSMOU 236 02/21/2021     Urine Creatinine:     Lab Results   Component Value Date    LABCREA 39.7 02/21/2021     IMPRESSION/RECOMMENDATIONS:      1. Acute kidney injury superimposed on chronic kidney disease stage IIIb - most consistent with prerenal azotemia as well as dehydration due to nausea complicating flank pain. Renal function is improving with hydration. Plan: Continue IV fluid 0.45 normal saline at 125 mL/h. Monitor urine output closely. Basic metabolic profile daily. 2.  Gross hematuria - likely due to ruptured cyst.  Recent urine culture was negative and repeat urine culture remains negative so far this admission. Renal ultrasound today. Continue empiric antibiotic therapy. Pain management. Continue IV fluid. 3.  Hypernatremia - estimated free water deficit 1.5 L. Serum sodium is improving with hypotonic hydration. Prognosis is guarded.     Lou Sharpe MD FACP  Attending Nephrologist  2/23/2021 8:33 AM

## 2021-02-23 NOTE — CARE COORDINATION
ONGOING DISCHARGE PLAN:    Spoke with patient regarding discharge plan and patient confirms that plan is still to return to home alone. Denies VNS. Cr today 2.63, from 2.87, Nephro following. Renal US. Urology on board, for Hematuria. Per Notes, Check PVR. Needs outpatient Cysto. HGB today, 9.8. Will continue to follow for additional discharge needs.     Electronically signed by Lei Vera RN on 2/23/2021 at 12:31 PM

## 2021-02-23 NOTE — PROGRESS NOTES
Urology Progress Note    Subjective: doing a bit better today. Pain is present but improving. She has most pain at the end of voiding. Urine still bloody. She feels like she is emptying her bladder. Patient Vitals for the past 24 hrs:   BP Temp Temp src Pulse Resp SpO2   02/23/21 0645 (!) 147/87 97.6 °F (36.4 °C) Oral 74 14 97 %   02/22/21 2022 (!) 141/77 98 °F (36.7 °C) Oral 76 14 100 %   02/22/21 1342 (!) 152/85 97.8 °F (36.6 °C) Oral 76 16 97 %       Intake/Output Summary (Last 24 hours) at 2/23/2021 0754  Last data filed at 2/23/2021 0735  Gross per 24 hour   Intake 3508.33 ml   Output 3700 ml   Net -191.67 ml       Recent Labs     02/21/21  1850 02/22/21  0708 02/23/21  0618   WBC 8.9 6.1 6.5   HGB 11.1* 9.7* 9.8*   HCT 33.0* 29.5* 29.4*   MCV 96.3 97.7 97.7    271 280     Recent Labs     02/21/21  1850 02/22/21  0708 02/23/21  0618    145* 140   K 4.8 4.3 4.3    113* 108*   CO2 22 22 23   BUN 26* 21* 18   CREATININE 3.34* 2.87* 2.63*       Recent Labs     02/21/21  0459   COLORU RED*   PHUR 7.0   WBCUA 10 TO 20   RBCUA TOO NUMEROUS TO COUNT   MUCUS NOT REPORTED   TRICHOMONAS NOT REPORTED   YEAST NOT REPORTED   BACTERIA FEW*   SPECGRAV 1.008   LEUKOCYTESUR MOD*   UROBILINOGEN Normal   BILIRUBINUR NEGATIVE       Additional Lab/culture results:    Physical Exam: NAD, comfortable.      Interval Imaging Findings:    Impression:    Patient Active Problem List   Diagnosis    Asthma    Smoker    Polycystic kidney    Eczema    Depression with anxiety    Chronic headaches    Tension vascular headache    Irregular bleeding    Metrorrhagia    Menorrhagia    Dysmenorrhea    Pelvic pain in female    Kidney stone    Acute back pain    Anxiety    Hypertension    Headache    Depression    CKD (chronic kidney disease) stage 3, GFR 30-59 ml/min ([facility])    Chronic neck pain    Degenerative disc disease, cervical    Encounter for long-term (current) use of other medications    Cervicalgia    Chronic intractable headache    Hemorrhage of cyst of native kidney    Abdominal pain    CKD (chronic kidney disease) stage 4, GFR 15-29 ml/min (HCC)    Acute renal failure with acute tubular necrosis superimposed on stage 3 chronic kidney disease (HCC)    Allergic rhinitis due to animal hair and dander    Congenital multiple renal cysts    Gross hematuria    History of anemia due to chronic kidney disease    History of multiple allergies    Hyperlipidemia    IUD (intrauterine device) in place    Leukocytosis    Other specified disorders of kidney and ureter    Pain in joint    Recurrent major depression in full remission ([address]    Renovascular hypertension    Right ureteral stone    Nephrolithiasis    Flank pain    Acute kidney injury superimposed on chronic kidney disease ([address]    Urinary tract infection with hematuria    History of major depression    Iron deficiency anemia due to chronic blood loss    Hypernatremia       Plan:     Has h/o PCKD. Has known non obstructing stone. Acute pain likely related to cyst hemorrhage.   hgb stable. Hematuria should continue to improve. Will check PVR. If minimal, will avoid catheter for now. Needs cysto as outpatient. Stone stable, not likely cause of her pain.      Colby Dawkins  7:54 AM 2/23/2021

## 2021-02-23 NOTE — PROGRESS NOTES
Bedside reporting done, per Mara Rn's  IV intact, no redness noted  HOB up  @  50 degrees  Pt. Requests a nausea medication, when Marilyn Flavors brings her night time medications  Cont.  To strain and measure pt's urine in the restroom

## 2021-02-23 NOTE — PLAN OF CARE
Pt A/O x 4, VSS, no acute changes overnight, will continue to monitor      Problem: Pain:  Goal: Pain level will decrease  Description: Pain level will decrease  2/23/2021 0411 by Florentino Zuniga RN  Outcome: Ongoing  2/22/2021 2003 by Africa Centeno RN  Outcome: Ongoing  Goal: Control of acute pain  Description: Control of acute pain  2/23/2021 0411 by Florentino Zuniga RN  Outcome: Ongoing  Note: Education provided; pain care plan reviewed with patient  2/22/2021 2003 by Africa Centeno RN  Outcome: Ongoing  Goal: Control of chronic pain  Description: Control of chronic pain  Outcome: Ongoing     Problem: Falls - Risk of:  Goal: Will remain free from falls  Description: Will remain free from falls  2/23/2021 0411 by Florentino Zuniga RN  Outcome: Ongoing  Note: Education provided; pt remained free from falls  2/22/2021 2003 by Africa Centeno RN  Outcome: Ongoing  Goal: Absence of physical injury  Description: Absence of physical injury  2/23/2021 0411 by Florentino Zuniga RN  Outcome: Ongoing  2/22/2021 2003 by Africa Centeno RN  Outcome: Ongoing

## 2021-02-24 VITALS
HEART RATE: 70 BPM | OXYGEN SATURATION: 98 % | BODY MASS INDEX: 21.83 KG/M2 | WEIGHT: 131 LBS | DIASTOLIC BLOOD PRESSURE: 83 MMHG | RESPIRATION RATE: 15 BRPM | TEMPERATURE: 98 F | SYSTOLIC BLOOD PRESSURE: 143 MMHG | HEIGHT: 65 IN

## 2021-02-24 LAB
ALBUMIN SERPL-MCNC: 3.3 G/DL (ref 3.5–5.2)
ALBUMIN/GLOBULIN RATIO: ABNORMAL (ref 1–2.5)
ALP BLD-CCNC: 71 U/L (ref 35–104)
ALT SERPL-CCNC: 6 U/L (ref 5–33)
ANION GAP SERPL CALCULATED.3IONS-SCNC: 9 MMOL/L (ref 9–17)
AST SERPL-CCNC: 11 U/L
BILIRUB SERPL-MCNC: 0.2 MG/DL (ref 0.3–1.2)
BUN BLDV-MCNC: 19 MG/DL (ref 6–20)
BUN/CREAT BLD: ABNORMAL (ref 9–20)
CALCIUM SERPL-MCNC: 8.2 MG/DL (ref 8.6–10.4)
CHLORIDE BLD-SCNC: 111 MMOL/L (ref 98–107)
CO2: 21 MMOL/L (ref 20–31)
CREAT SERPL-MCNC: 2.62 MG/DL (ref 0.5–0.9)
GFR AFRICAN AMERICAN: 24 ML/MIN
GFR NON-AFRICAN AMERICAN: 20 ML/MIN
GFR SERPL CREATININE-BSD FRML MDRD: ABNORMAL ML/MIN/{1.73_M2}
GFR SERPL CREATININE-BSD FRML MDRD: ABNORMAL ML/MIN/{1.73_M2}
GLUCOSE BLD-MCNC: 84 MG/DL (ref 70–99)
HCT VFR BLD CALC: 27.6 % (ref 36–46)
HEMOGLOBIN: 9.1 G/DL (ref 12–16)
MCH RBC QN AUTO: 32.3 PG (ref 26–34)
MCHC RBC AUTO-ENTMCNC: 33 G/DL (ref 31–37)
MCV RBC AUTO: 97.9 FL (ref 80–100)
NRBC AUTOMATED: ABNORMAL PER 100 WBC
PDW BLD-RTO: 12.5 % (ref 11.5–14.9)
PLATELET # BLD: 254 K/UL (ref 150–450)
PMV BLD AUTO: 8 FL (ref 6–12)
POTASSIUM SERPL-SCNC: 4.5 MMOL/L (ref 3.7–5.3)
RBC # BLD: 2.82 M/UL (ref 4–5.2)
SODIUM BLD-SCNC: 141 MMOL/L (ref 135–144)
TOTAL PROTEIN: 5.6 G/DL (ref 6.4–8.3)
WBC # BLD: 6.8 K/UL (ref 3.5–11)

## 2021-02-24 PROCEDURE — 85027 COMPLETE CBC AUTOMATED: CPT

## 2021-02-24 PROCEDURE — 99239 HOSP IP/OBS DSCHRG MGMT >30: CPT | Performed by: INTERNAL MEDICINE

## 2021-02-24 PROCEDURE — 2580000003 HC RX 258: Performed by: INTERNAL MEDICINE

## 2021-02-24 PROCEDURE — 6360000002 HC RX W HCPCS: Performed by: NURSE PRACTITIONER

## 2021-02-24 PROCEDURE — 80053 COMPREHEN METABOLIC PANEL: CPT

## 2021-02-24 PROCEDURE — G0008 ADMIN INFLUENZA VIRUS VAC: HCPCS | Performed by: EMERGENCY MEDICINE

## 2021-02-24 PROCEDURE — 6370000000 HC RX 637 (ALT 250 FOR IP): Performed by: NURSE PRACTITIONER

## 2021-02-24 PROCEDURE — 36415 COLL VENOUS BLD VENIPUNCTURE: CPT

## 2021-02-24 PROCEDURE — 90686 IIV4 VACC NO PRSV 0.5 ML IM: CPT | Performed by: EMERGENCY MEDICINE

## 2021-02-24 PROCEDURE — 6360000002 HC RX W HCPCS: Performed by: INTERNAL MEDICINE

## 2021-02-24 PROCEDURE — 6370000000 HC RX 637 (ALT 250 FOR IP): Performed by: INTERNAL MEDICINE

## 2021-02-24 PROCEDURE — 6360000002 HC RX W HCPCS: Performed by: EMERGENCY MEDICINE

## 2021-02-24 RX ORDER — HYDROCODONE BITARTRATE AND ACETAMINOPHEN 5; 325 MG/1; MG/1
1 TABLET ORAL EVERY 6 HOURS PRN
Qty: 10 TABLET | Refills: 0 | Status: SHIPPED | OUTPATIENT
Start: 2021-02-24 | End: 2021-02-27

## 2021-02-24 RX ADMIN — AMLODIPINE BESYLATE 5 MG: 5 TABLET ORAL at 08:02

## 2021-02-24 RX ADMIN — MORPHINE SULFATE 2 MG: 2 INJECTION, SOLUTION INTRAMUSCULAR; INTRAVENOUS at 08:54

## 2021-02-24 RX ADMIN — HYDROCODONE BITARTRATE AND ACETAMINOPHEN 1 TABLET: 5; 325 TABLET ORAL at 13:24

## 2021-02-24 RX ADMIN — SODIUM CHLORIDE: 4.5 INJECTION, SOLUTION INTRAVENOUS at 02:37

## 2021-02-24 RX ADMIN — SODIUM CHLORIDE: 4.5 INJECTION, SOLUTION INTRAVENOUS at 10:53

## 2021-02-24 RX ADMIN — MORPHINE SULFATE 2 MG: 2 INJECTION, SOLUTION INTRAMUSCULAR; INTRAVENOUS at 01:18

## 2021-02-24 RX ADMIN — MORPHINE SULFATE 2 MG: 2 INJECTION, SOLUTION INTRAMUSCULAR; INTRAVENOUS at 15:15

## 2021-02-24 RX ADMIN — ONDANSETRON 4 MG: 2 INJECTION INTRAMUSCULAR; INTRAVENOUS at 08:54

## 2021-02-24 RX ADMIN — HYDROCODONE BITARTRATE AND ACETAMINOPHEN 1 TABLET: 5; 325 TABLET ORAL at 06:27

## 2021-02-24 RX ADMIN — DULOXETINE 90 MG: 30 CAPSULE, DELAYED RELEASE ORAL at 08:02

## 2021-02-24 RX ADMIN — INFLUENZA A VIRUS A/VICTORIA/2454/2019 IVR-207 (H1N1) ANTIGEN (PROPIOLACTONE INACTIVATED), INFLUENZA A VIRUS A/HONG KONG/2671/2019 IVR-208 (H3N2) ANTIGEN (PROPIOLACTONE INACTIVATED), INFLUENZA B VIRUS B/VICTORIA/705/2018 BVR-11 ANTIGEN (PROPIOLACTONE INACTIVATED), INFLUENZA B VIRUS B/PHUKET/3073/2013 BVR-1B ANTIGEN (PROPIOLACTONE INACTIVATED) 0.5 ML: 15; 15; 15; 15 INJECTION, SUSPENSION INTRAMUSCULAR at 15:56

## 2021-02-24 RX ADMIN — ENOXAPARIN SODIUM 30 MG: 30 INJECTION SUBCUTANEOUS at 08:01

## 2021-02-24 ASSESSMENT — PAIN DESCRIPTION - PROGRESSION
CLINICAL_PROGRESSION: GRADUALLY IMPROVING

## 2021-02-24 ASSESSMENT — PAIN - FUNCTIONAL ASSESSMENT
PAIN_FUNCTIONAL_ASSESSMENT: ACTIVITIES ARE NOT PREVENTED
PAIN_FUNCTIONAL_ASSESSMENT: ACTIVITIES ARE NOT PREVENTED

## 2021-02-24 ASSESSMENT — PAIN SCALES - GENERAL
PAINLEVEL_OUTOF10: 6
PAINLEVEL_OUTOF10: 5
PAINLEVEL_OUTOF10: 2
PAINLEVEL_OUTOF10: 3

## 2021-02-24 ASSESSMENT — PAIN DESCRIPTION - DESCRIPTORS: DESCRIPTORS: ACHING

## 2021-02-24 ASSESSMENT — PAIN DESCRIPTION - LOCATION
LOCATION: FLANK
LOCATION: FLANK

## 2021-02-24 ASSESSMENT — PAIN DESCRIPTION - DIRECTION: RADIATING_TOWARDS: GROIN

## 2021-02-24 ASSESSMENT — PAIN DESCRIPTION - ONSET
ONSET: PROGRESSIVE
ONSET: PROGRESSIVE

## 2021-02-24 ASSESSMENT — PAIN DESCRIPTION - PAIN TYPE: TYPE: ACUTE PAIN

## 2021-02-24 NOTE — PROGRESS NOTES
Department of Internal Medicine  Nephrology Alicia Moreland MD   Progress Note    Interval history: Patient was seen and examined today and  gross hematuria is slowly clearing. She also still has flank pain bilaterally [right worse than left] which improves with pain medication. She does not have fever or chills and is nonoliguric. History of presenting illness: This is a 41 y.o. female with a significant past medical history of Systemic hypertension, depression, nephrolithiasis [required cystoscopy and placement of a right double-J ureteral stent with attached string on 6/12/2020] and chronic kidney disease stage IIIb secondary to autosomal dominant polycystic kidney disease diagnosed in 2009 [baseline serum creatinine 2.1 mg/dL], who presented to the emergency department yesterday with a 1 week history of flank pain and gross hematuria. Symptoms initially started on 2/15/2021 with gross hematuria with subsequent onset of bilateral flank pain 24 hours later. She presented to the emergency department on 2/15/2021 and had CT scan which showed stable enlarged polycystic kidneys with no hydronephrosis and showed stable 7 mm nonobstructing right intrarenal calculus and she was sent home on antibiotics and pain medications. However symptoms persisted and hence presentation to the emergency department again yesterday 2/21/2021. Urine culture performed on 2/15/2021 was negative. Serum creatinine on 2/15/2021 was 2.6 mg/dL with serum creatinine BUNs/creatinine at presentation yesterday was 26/3.34 mg/dL and hence nephrology consultation. She is well-known to me and I her nephrologist.  She feels better today and currently does not have nausea and right flank pain is improved. She is nonoliguric but continues to have gross hematuria. Urinalysis showed large blood and urine microscopy showed too numerous to count RBC but only 10-20 WBC per hpf.     Scheduled Meds:   amLODIPine  5 mg Oral Daily    DULoxetine  90 mg Oral Daily    vitamin D  50,000 Units Oral Weekly    sodium chloride flush  10 mL Intravenous 2 times per day    enoxaparin  30 mg Subcutaneous Daily    influenza virus vaccine  0.5 mL Intramuscular Prior to discharge     Continuous Infusions:   sodium chloride 125 mL/hr at 02/24/21 1053     PRN Meds:. HYDROcodone-acetaminophen, morphine, sodium chloride flush, promethazine **OR** ondansetron, acetaminophen **OR** acetaminophen, nicotine polacrilex    Physical Exam:    VITALS:  BP (!) 143/83   Pulse 70   Temp 98 °F (36.7 °C) (Oral)   Resp 15   Ht 5' 5\" (1.651 m)   Wt 131 lb (59.4 kg)   LMP 02/07/2021   SpO2 98%   BMI 21.80 kg/m²   24HR INTAKE/OUTPUT:      Intake/Output Summary (Last 24 hours) at 2/24/2021 1548  Last data filed at 2/24/2021 1119  Gross per 24 hour   Intake 3134 ml   Output 2950 ml   Net 184 ml     Constitutional: alert, appears stated age and cooperative    Skin: Skin color, texture, turgor normal. No rashes or lesions    Head: Normocephalic, without obvious abnormality, atraumatic     Cardiovascular/Edema: regular rate and rhythm, S1, S2 normal, no murmur, click, rub or gallop    Respiratory: Lungs: clear to auscultation bilaterally    Abdomen: soft, non-tender; bowel sounds normal; no masses,  no organomegaly    Back: Right costovertebral angle tenderness    Extremities: extremities normal, atraumatic, no cyanosis or edema    Neuro:  Grossly normal      CBC:   Recent Labs     02/22/21  0708 02/23/21  0618 02/24/21  0619   WBC 6.1 6.5 6.8   HGB 9.7* 9.8* 9.1*    280 254     BMP:    Recent Labs     02/22/21  0708 02/23/21  0618 02/24/21  0619   * 140 141   K 4.3 4.3 4.5   * 108* 111*   CO2 22 23 21   BUN 21* 18 19   CREATININE 2.87* 2.63* 2.62*   GLUCOSE 92 87 84       Lab Results   Component Value Date    NITRU NEGATIVE 02/21/2021    COLORU RED 02/21/2021    PHUR 7.0 02/21/2021    WBCUA 10 TO 20 02/21/2021    RBCUA TOO NUMEROUS TO COUNT 02/21/2021 MUCUS NOT REPORTED 02/21/2021    TRICHOMONAS NOT REPORTED 02/21/2021    YEAST NOT REPORTED 02/21/2021    BACTERIA FEW 02/21/2021    CLARITYU Clear 04/26/2016    SPECGRAV 1.008 02/21/2021    LEUKOCYTESUR MOD 02/21/2021    UROBILINOGEN Normal 02/21/2021    BILIRUBINUR NEGATIVE 02/21/2021    BILIRUBINUR neg 09/30/2014    BLOODU Positive 04/26/2016    GLUCOSEU NEGATIVE 02/21/2021    KETUA TRACE 02/21/2021    AMORPHOUS NOT REPORTED 02/21/2021     Urine Sodium:     Lab Results   Component Value Date    NOHELIA 57 02/21/2021     Urine Osmolarity:   Lab Results   Component Value Date    OSMOU 236 02/21/2021     Urine Creatinine:     Lab Results   Component Value Date    LABCREA 39.7 02/21/2021     IMPRESSION/RECOMMENDATIONS:      1. Acute kidney injury superimposed on chronic kidney disease stage IIIb - most consistent with prerenal azotemia as well as dehydration due to nausea complicating flank pain. Renal function is improving with hydration. Plan:   decrease IV fluid 0.45 normal saline to 75 cc/hour. Monitor urine output closely. Basic metabolic profile daily. 2.  Gross hematuria - likely due to ruptured cyst.  Recent urine culture was negative and repeat urine culture remains negative so far this admission. Extensive cystic lesions throughout bilateral kidneys on renal ultrasound with mild right hydronephrosis and debris versus nonshadowing stone identified in the bladder measuring 10 mm    Plan  Continue empiric antibiotic therapy. Pain management. Continue IV fluid. 3.  Hypernatremia -improving with hypotonic hydration. Serum creatinine has plateaued at 2.6 and trending towards patient's baseline. Okay to discharge from renal standpoint and follow-up with nephrology in 3 weeks. Obtain a BMP in 1 week. Prognosis is guarded.     MD TERI Sotelo      2/24/2021 3:48 PM

## 2021-02-24 NOTE — DISCHARGE SUMMARY
[place] [address] Internal Medicine    Discharge Summary     Patient ID: Olvin Damian  :  1980   MRN: [id number]     ACCOUNT:  [de-identified]   Patient's PCP: Nikhil De Dios MD  Admit Date: 2021   Discharge Date:  21    Length of Stay: 3  Code Status:  Full Code  Admitting Physician: Kieran Mi MD  Discharge Physician: Abigail Hay MD     Active Discharge Diagnoses:     Primary Problem  Acute kidney injury superimposed on chronic kidney disease [facility])      [place] Problems    Diagnosis Date Noted    History of major depression [Z86.59] 2021    Iron deficiency anemia due to chronic blood loss [D50.0] 2021    Hypernatremia [E87.0] 2021    Acute kidney injury superimposed on chronic kidney disease ([address] [N17.9, N18.9] 2021    Renovascular hypertension [I15.0] 2020    Right ureteral stone [N20.1] 2020    Kidney stone [N20.0]     Smoker [F17.200] 10/02/2011    Polycystic kidney [Q61.3] 10/02/2011       Admission Condition:  fair     Discharged Condition: good    Hospital Stay:     Hospital Course:  Olvin Damian is a 41 y.o. female who was admitted for the management of   .     , presented with Flank Pain (Hx of kidney stones-here last week- started on Keflex) and Hematuria      ,                         Acute kidney injury superimposed on chronic kidney disease ([address] Principal Problem:    Acute kidney injury superimposed on chronic kidney disease ([address]  Active Problems:    Smoker    Polycystic kidney    Kidney stone    Renovascular hypertension    Right ureteral stone    History of major depression    Iron deficiency anemia due to chronic blood loss    Hypernatremia  Resolved Problems:    * No resolved hospital problems.  *       Significant therapeutic interventions:        Admitted with gross hematuria  She had she had a ER visit for the same complaints a week ago and was treated as outpatient antibiotic but it did not work  Urine cultures were negative  Dr. Jodi Givens note noted  He agrees with most likely cause of hematuria ruptured cyst     Admitted with gross hematuria  Her urination is quite painful  In the past she has had right ureteral stone  She she was seen in the emergency room with similar complaints a week ago and was started on antibiotic with no relief and gross hematuria persisted  Urine culture was unremarkable     She is known to have polycystic kidney disease  Also has acute on chronic kidney disease  Creatinine has gone up to 3.34  Seen by nephrologist  Suspected to have ruptured cyst in the polycystic kidney  Will get urology     Patient has had hemoglobin drop  Symptoms have persisted and failed outpatient treatment  Her pyuria is not impressive     We will continue Rocephin till the urine culture comes back     Patient is acutely ill requiring IV fluids need to find out the cause of hematuria  Has significant pain  And acute on chronic kidney disease     Fluid dehydration is noted  Receiving fluid replacement                  BMP:   Recent Labs     02/23/21  0618 02/24/21  0619    141   K 4.3 4.5   CO2 23 21   BUN 18 19   CREATININE 2.63* 2.62*   LABGLOM 20* 20*   GLUCOSE 87 84                   Significant Diagnostic Studies:   Labs / Micro:       Results for orders placed or performed during the hospital encounter of 02/21/21   Culture, Urine    Specimen: Urine, clean catch   Result Value Ref Range    Specimen Description . CLEAN CATCH URINE     Special Requests NOT REPORTED     Culture NO GROWTH    CBC Auto Differential   Result Value Ref Range    WBC 8.9 3.5 - 11.0 k/uL    RBC 3.42 (L) 4.0 - 5.2 m/uL    Hemoglobin 11.1 (L) 12.0 - 16.0 g/dL    Hematocrit 33.0 (L) 36 - 46 %    MCV 96.3 80 - 100 fL    MCH 32.6 26 - 34 pg    MCHC 33.8 31 - 37 g/dL    RDW 12.8 11.5 - 14.9 %    Platelets 866 703 - 207 k/uL    MPV 8.1 6.0 - 12.0 fL    NRBC - 2.6 mg/dL   CBC   Result Value Ref Range    WBC 6.1 3.5 - 11.0 k/uL    RBC 3.02 (L) 4.0 - 5.2 m/uL    Hemoglobin 9.7 (L) 12.0 - 16.0 g/dL    Hematocrit 29.5 (L) 36 - 46 %    MCV 97.7 80 - 100 fL    MCH 32.1 26 - 34 pg    MCHC 32.9 31 - 37 g/dL    RDW 12.9 11.5 - 14.9 %    Platelets 917 982 - 961 k/uL    MPV 8.5 6.0 - 12.0 fL    NRBC Automated NOT REPORTED per 100 WBC   Protime-INR   Result Value Ref Range    Protime 12.9 11.8 - 14.6 sec    INR 1.0    Sodium, urine, random   Result Value Ref Range    Sodium,Ur 57 mmol/L   Protein, urine, random   Result Value Ref Range    Total Protein, Urine 55 mg/dL   Osmolality, urine   Result Value Ref Range    Osmolality, Ur 236 80 - 1300 mOsm/kg   Creatinine, urine, random   Result Value Ref Range    Creatinine, Ur 39.7 28.0 - 217.0 mg/dL   Urinalysis Reflex to Culture    Specimen: Urine, clean catch   Result Value Ref Range    Color, UA RED (A) YELLOW    Turbidity UA CLOUDY (A) CLEAR    Glucose, Ur NEGATIVE NEGATIVE    Bilirubin Urine NEGATIVE NEGATIVE    Ketones, Urine TRACE (A) NEGATIVE    Specific Gravity, UA 1.008 1.000 - 1.030    Urine Hgb LARGE (A) NEGATIVE    pH, UA 7.0 5.0 - 8.0    Protein, UA 1+ (A) NEGATIVE    Urobilinogen, Urine Normal Normal    Nitrite, Urine NEGATIVE NEGATIVE    Leukocyte Esterase, Urine MOD (A) NEGATIVE    Urinalysis Comments NOT REPORTED    Microscopic Urinalysis   Result Value Ref Range    -          WBC, UA 10 TO 20 /HPF    RBC, UA TOO NUMEROUS TO COUNT /HPF    Casts UA NOT REPORTED /LPF    Crystals, UA NOT REPORTED None /HPF    Epithelial Cells UA 2 TO 5 /HPF    Renal Epithelial, UA NOT REPORTED 0 /HPF    Bacteria, UA FEW (A) None    Mucus, UA NOT REPORTED None    Trichomonas, UA NOT REPORTED None    Amorphous, UA NOT REPORTED None    Other Observations UA NOT REPORTED NOT REQ.     Yeast, UA NOT REPORTED None   Comprehensive Metabolic Panel w/ Reflex to MG   Result Value Ref Range    Glucose 87 70 - 99 mg/dL    BUN 18 6 - 20 mg/dL CREATININE 2.63 (H) 0.50 - 0.90 mg/dL    Bun/Cre Ratio NOT REPORTED 9 - 20    Calcium 8.2 (L) 8.6 - 10.4 mg/dL    Sodium 140 135 - 144 mmol/L    Potassium 4.3 3.7 - 5.3 mmol/L    Chloride 108 (H) 98 - 107 mmol/L    CO2 23 20 - 31 mmol/L    Anion Gap 9 9 - 17 mmol/L    Alkaline Phosphatase 77 35 - 104 U/L    ALT 6 5 - 33 U/L    AST 11 <32 U/L    Total Bilirubin 0.16 (L) 0.3 - 1.2 mg/dL    Total Protein 6.2 (L) 6.4 - 8.3 g/dL    Albumin 3.7 3.5 - 5.2 g/dL    Albumin/Globulin Ratio NOT REPORTED 1.0 - 2.5    GFR Non-African American 20 (L) >60 mL/min    GFR  24 (L) >60 mL/min    GFR Comment          GFR Staging NOT REPORTED    CBC   Result Value Ref Range    WBC 6.5 3.5 - 11.0 k/uL    RBC 3.01 (L) 4.0 - 5.2 m/uL    Hemoglobin 9.8 (L) 12.0 - 16.0 g/dL    Hematocrit 29.4 (L) 36 - 46 %    MCV 97.7 80 - 100 fL    MCH 32.6 26 - 34 pg    MCHC 33.4 31 - 37 g/dL    RDW 12.7 11.5 - 14.9 %    Platelets 977 944 - 927 k/uL    MPV 7.9 6.0 - 12.0 fL    NRBC Automated NOT REPORTED per 100 WBC   Comprehensive Metabolic Panel w/ Reflex to MG   Result Value Ref Range    Glucose 84 70 - 99 mg/dL    BUN 19 6 - 20 mg/dL    CREATININE 2.62 (H) 0.50 - 0.90 mg/dL    Bun/Cre Ratio NOT REPORTED 9 - 20    Calcium 8.2 (L) 8.6 - 10.4 mg/dL    Sodium 141 135 - 144 mmol/L    Potassium 4.5 3.7 - 5.3 mmol/L    Chloride 111 (H) 98 - 107 mmol/L    CO2 21 20 - 31 mmol/L    Anion Gap 9 9 - 17 mmol/L    Alkaline Phosphatase 71 35 - 104 U/L    ALT 6 5 - 33 U/L    AST 11 <32 U/L    Total Bilirubin 0.20 (L) 0.3 - 1.2 mg/dL    Total Protein 5.6 (L) 6.4 - 8.3 g/dL    Albumin 3.3 (L) 3.5 - 5.2 g/dL    Albumin/Globulin Ratio NOT REPORTED 1.0 - 2.5    GFR Non-African American 20 (L) >60 mL/min    GFR  24 (L) >60 mL/min    GFR Comment          GFR Staging NOT REPORTED    CBC   Result Value Ref Range    WBC 6.8 3.5 - 11.0 k/uL    RBC 2.82 (L) 4.0 - 5.2 m/uL    Hemoglobin 9.1 (L) 12.0 - 16.0 g/dL    Hematocrit 27.6 (L) 36 - 46 %    MCV 97.9 80 - 100 fL    MCH 32.3 26 - 34 pg    MCHC 33.0 31 - 37 g/dL    RDW 12.5 11.5 - 14.9 %    Platelets 003 647 - 988 k/uL    MPV 8.0 6.0 - 12.0 fL    NRBC Automated NOT REPORTED per 100 WBC        {Radiology:    Ct Abdomen Pelvis Wo Contrast Additional Contrast? None    Result Date: 2/22/2021  EXAMINATION: CT OF THE ABDOMEN AND PELVIS WITHOUT CONTRAST 2/22/2021 8:36 am TECHNIQUE: CT of the abdomen and pelvis was performed without the administration of intravenous contrast. Multiplanar reformatted images are provided for review. Dose modulation, iterative reconstruction, and/or weight based adjustment of the mA/kV was utilized to reduce the radiation dose to as low as reasonably achievable. COMPARISON: 02/15/2021, 08/05/2020 HISTORY: ORDERING SYSTEM PROVIDED HISTORY: flank pain TECHNOLOGIST PROVIDED HISTORY: flank pain Is the patient pregnant?->No Reason for Exam: low back pain which radiates to the front bilaterally for over a week Acuity: Unknown Type of Exam: Unknown FINDINGS: Lower Chest: Lung bases are hyperinflated. Mild right lower lobe bronchial wall thickening. Mosaic attenuation suggested. No pleural or pericardial effusion. Partially imaged breast implants. Pectus excavatum. Organs: Evaluation is limited by the absence of contrast.  Similar scattered hypodensities throughout the liver, presumably representing cysts, the largest measuring 1.7 cm near the hepatic hilum. No biliary ductal dilatation. Gallbladder is contracted without calcified cholelithiasis. Spleen, pancreas, and adrenal glands are grossly unremarkable. Markedly enlarged kidneys replaced by innumerable cysts, some which are hyperdense and others which have curvilinear mural calcifications. Unchanged 7 mm calcification in the anterior interpolar region of the right kidney. No discrete calcification along the expected course of the ureters. GI/Bowel: No bowel obstruction.   9 x 3 mm appendicolith in the distal tip of the appendix without discrete tracey appendiceal inflammatory change or appendiceal enlargement. Pelvis: Urinary bladder appears within normal limits. Intrauterine device appears grossly appropriate in positioning. Adnexa not well evaluated. Peritoneum/Retroperitoneum: Mild graying of the intra-abdominal fat. No free fluid or free air. Atherosclerotic calcifications which are advanced for age. Bones/Soft Tissues: No acute findings. Unchanged sequelae of polycystic kidney disease with markedly enlarged kidneys due to innumerable cysts, some of which have internal hemorrhagic or proteinaceous content with some scattered curvilinear mural calcifications. Unchanged 7 mm nephrolith in the interpolar region of the right kidney. No definitive stones along the expected course of the ureters or within the urinary bladder. Appendicoliths within the normal caliber appendix. No findings to suggest acute appendicitis. Hyperinflation of the partially imaged lung bases which may be due to sequelae of COPD. Suggestion of mild mosaic attenuation and some mild bronchial wall thickening suggests acute or chronic airways disease. Ct Abdomen Pelvis Wo Contrast Additional Contrast? None    Result Date: 2/15/2021  EXAMINATION: CT OF THE ABDOMEN AND PELVIS WITHOUT CONTRAST 2/15/2021 5:57 pm HISTORY: ORDERING SYSTEM PROVIDED HISTORY: right flank pain TECHNOLOGIST PROVIDED HISTORY: right flank pain Decision Support Exception->Emergency Medical Condition (MA) Is the patient pregnant?->No Reason for Exam: right flank pain; patient c/o hematuria x2 days with back pain Acuity: Acute Type of Exam: Initial Relevant Medical/Surgical History: h/o kidney stones, polycystic kidney disease TECHNIQUE: Multidetector CT acquisition through the abdomen and pelvis was performed without oral or intravenous contrast material.  Automatic Exposure Control was utilized as a means of radiation dose reduction.  COMPARISON: CT studies from August 5, 2020 and July 30, 2020. FINDINGS: CT Abdomen:  Heart size is normal.  The visualized lung bases are clear. The spleen, pancreas, gallbladder, and adrenal glands have an unremarkable unenhanced CT appearance. A few small benign hepatic cysts appear unchanged. The kidneys are again seen to be diffusely enlarged, replaced with innumerable simple and hyperdense, likely hemorrhagic and/or proteinaceous, cysts. A 7-mm calcification within the anterior interpolar region of the right kidney is unchanged, possibly a non-obstructing calculus. The ureters are decompressed. No hydronephrosis. No ureteral calculi are apparent. The stomach and the small and large bowel loops are normal in contour, caliber and morphology, without acute or significant abnormality. No dilated loops or areas of bowel wall thickening. There is no free fluid or extraluminal gas. No enlarged or suspicious mesenteric or retroperitoneal lymphadenopathy. The abdominal aorta and iliac arteries are normal in caliber. No significant osseous abnormality. CT Pelvis: No pelvic free fluid or enlarged or suspicious pelvic or inguinal lymphadenopathy. No appreciable uterine or adnexal abnormality. An IUD is positioned within the uterus. The urinary bladder and the pelvic bowel loops and osseous structures are unremarkable. Enlarged polycystic kidneys, appearing unchanged from prior, as is a 7-mm non-obstructing right intrarenal calculus. No ureteral calculi or hydronephrosis. Us Renal Complete    Result Date: 2/24/2021  EXAMINATION: RETROPERITONEAL ULTRASOUND OF THE KIDNEYS AND URINARY BLADDER 2/23/2021 COMPARISON: February 22, 2021. February 15, 2021. HISTORY: ORDERING SYSTEM PROVIDED HISTORY:  Gross hematuria, PKD TECHNOLOGIST PROVIDED HISTORY: Gross hematuria, PKD FINDINGS: Kidneys: The right kidney measures 21.5 cm in length and the left kidney measures 19.5 cm in length. Kidneys are enlarged with innumerable cysts throughout the parenchyma.   No obvious solid

## 2021-02-24 NOTE — PROGRESS NOTES
[facility]    Progress Note     2/24/2021    3:11 PM    Name:   Phill Bullock  MRN:     [id number]     Acct:      [de-identified]   Room:   2071/2071-01  IP Day:  3  Admit Date:  2/21/2021  6:40 PM    PCP:   Kathi Sky MD  Code Status:  Full Code    Subjective:     C/C:   Chief Complaint   Patient presents with    Flank Pain     Hx of kidney stones-here last week- started on Keflex    Hematuria     Principal Problem:    Acute kidney injury superimposed on chronic kidney disease ([address]  Active Problems:    Smoker    Polycystic kidney    Kidney stone    Renovascular hypertension    Right ureteral stone    History of major depression    Iron deficiency anemia due to chronic blood loss    Hypernatremia  Resolved Problems:    * No resolved hospital problems. *      Interval History Status: improved still has hematuria and saad.        Admitted with gross hematuria  She had she had a ER visit for the same complaints a week ago and was treated as outpatient antibiotic but it did not work  Urine cultures were negative  Dr. Cordell Dorantes note noted  He agrees with most likely cause of hematuria ruptured cyst    Admitted with gross hematuria  Her urination is quite painful  In the past she has had right ureteral stone  She she was seen in the emergency room with similar complaints a week ago and was started on antibiotic with no relief and gross hematuria persisted  Urine culture was unremarkable     She is known to have polycystic kidney disease  Also has acute on chronic kidney disease  Creatinine has gone up to 3.34  Seen by nephrologist  Suspected to have ruptured cyst in the polycystic kidney  Will get urology     Patient has had hemoglobin drop  Symptoms have persisted and failed outpatient treatment  Her pyuria is not impressive     We will continue Rocephin till the urine culture comes back     Patient is acutely ill requiring IV fluids need to find out the cause of hematuria  Has significant pain  And acute on chronic kidney disease     Fluid dehydration is noted  Receiving fluid replacement             Significant last 24 hr data reviewed ;   Vitals:    02/23/21 1315 02/23/21 1926 02/24/21 0700 02/24/21 1115   BP: (!) 146/83 (!) 163/80 (!) 161/84 (!) 143/83   Pulse: 99 77 79 70   Resp: 14 16 16 15   Temp: 98.6 °F (37 °C) 98.2 °F (36.8 °C) 98.6 °F (37 °C) 98 °F (36.7 °C)   TempSrc: Oral Oral Oral Oral   SpO2: 99% 100% 98%    Weight:   131 lb (59.4 kg)    Height:   5' 5\" (1.651 m)       Recent Results (from the past 24 hour(s))   Comprehensive Metabolic Panel w/ Reflex to MG    Collection Time: 02/24/21  6:19 AM   Result Value Ref Range    Glucose 84 70 - 99 mg/dL    BUN 19 6 - 20 mg/dL    CREATININE 2.62 (H) 0.50 - 0.90 mg/dL    Bun/Cre Ratio NOT REPORTED 9 - 20    Calcium 8.2 (L) 8.6 - 10.4 mg/dL    Sodium 141 135 - 144 mmol/L    Potassium 4.5 3.7 - 5.3 mmol/L    Chloride 111 (H) 98 - 107 mmol/L    CO2 21 20 - 31 mmol/L    Anion Gap 9 9 - 17 mmol/L    Alkaline Phosphatase 71 35 - 104 U/L    ALT 6 5 - 33 U/L    AST 11 <32 U/L    Total Bilirubin 0.20 (L) 0.3 - 1.2 mg/dL    Total Protein 5.6 (L) 6.4 - 8.3 g/dL    Albumin 3.3 (L) 3.5 - 5.2 g/dL    Albumin/Globulin Ratio NOT REPORTED 1.0 - 2.5    GFR Non-African American 20 (L) >60 mL/min    GFR  24 (L) >60 mL/min    GFR Comment          GFR Staging NOT REPORTED    CBC    Collection Time: 02/24/21  6:19 AM   Result Value Ref Range    WBC 6.8 3.5 - 11.0 k/uL    RBC 2.82 (L) 4.0 - 5.2 m/uL    Hemoglobin 9.1 (L) 12.0 - 16.0 g/dL    Hematocrit 27.6 (L) 36 - 46 %    MCV 97.9 80 - 100 fL    MCH 32.3 26 - 34 pg    MCHC 33.0 31 - 37 g/dL    RDW 12.5 11.5 - 14.9 %    Platelets 837 551 - 624 k/uL    MPV 8.0 6.0 - 12.0 fL    NRBC Automated NOT REPORTED per 100 WBC     No results for input(s): POCGLU in the last 72 hours.      Ct Abdomen Pelvis Wo Contrast Additional Contrast? None    Result Date: 2/22/2021  EXAMINATION: CT OF THE ABDOMEN AND PELVIS WITHOUT CONTRAST 2/22/2021 8:36 am TECHNIQUE: CT of the abdomen and pelvis was performed without the administration of intravenous contrast. Multiplanar reformatted images are provided for review. Dose modulation, iterative reconstruction, and/or weight based adjustment of the mA/kV was utilized to reduce the radiation dose to as low as reasonably achievable. COMPARISON: 02/15/2021, 08/05/2020 HISTORY: ORDERING SYSTEM PROVIDED HISTORY: flank pain TECHNOLOGIST PROVIDED HISTORY: flank pain Is the patient pregnant?->No Reason for Exam: low back pain which radiates to the front bilaterally for over a week Acuity: Unknown Type of Exam: Unknown FINDINGS: Lower Chest: Lung bases are hyperinflated. Mild right lower lobe bronchial wall thickening. Mosaic attenuation suggested. No pleural or pericardial effusion. Partially imaged breast implants. Pectus excavatum. Organs: Evaluation is limited by the absence of contrast.  Similar scattered hypodensities throughout the liver, presumably representing cysts, the largest measuring 1.7 cm near the hepatic hilum. No biliary ductal dilatation. Gallbladder is contracted without calcified cholelithiasis. Spleen, pancreas, and adrenal glands are grossly unremarkable. Markedly enlarged kidneys replaced by innumerable cysts, some which are hyperdense and others which have curvilinear mural calcifications. Unchanged 7 mm calcification in the anterior interpolar region of the right kidney. No discrete calcification along the expected course of the ureters. GI/Bowel: No bowel obstruction. 9 x 3 mm appendicolith in the distal tip of the appendix without discrete tracey appendiceal inflammatory change or appendiceal enlargement. Pelvis: Urinary bladder appears within normal limits. Intrauterine device appears grossly appropriate in positioning. Adnexa not well evaluated. Peritoneum/Retroperitoneum: Mild graying of the intra-abdominal fat.   No free fluid or free air. Atherosclerotic calcifications which are advanced for age. Bones/Soft Tissues: No acute findings. Unchanged sequelae of polycystic kidney disease with markedly enlarged kidneys due to innumerable cysts, some of which have internal hemorrhagic or proteinaceous content with some scattered curvilinear mural calcifications. Unchanged 7 mm nephrolith in the interpolar region of the right kidney. No definitive stones along the expected course of the ureters or within the urinary bladder. Appendicoliths within the normal caliber appendix. No findings to suggest acute appendicitis. Hyperinflation of the partially imaged lung bases which may be due to sequelae of COPD. Suggestion of mild mosaic attenuation and some mild bronchial wall thickening suggests acute or chronic airways disease. HPI:   See history in H and P      Review of Systems:     Constitutional:  negative for chills, fevers, sweats  Respiratory:  negative for cough, dyspnea on exertion, hemoptysis, shortness of breath, wheezing  Cardiovascular:  negative for chest pain, chest pressure/discomfort, lower extremity edema, palpitations  Gastrointestinal:  negative for abdominal pain, constipation, diarrhea, nausea, vomiting  Neurological:  negative for dizziness, headache  Data:     Past Medical History:  no change     Social History:  no change    Family History: @no change    Vitals:      I/O (24Hr):     Intake/Output Summary (Last 24 hours) at 2/24/2021 1511  Last data filed at 2/24/2021 1119  Gross per 24 hour   Intake 3134 ml   Output 2950 ml   Net 184 ml       Labs:    URINE ANALYSIS: No results found for: LABURIN     CBC:  Lab Results   Component Value Date    WBC 6.8 02/24/2021    HGB 9.1 02/24/2021     02/24/2021     01/13/2012        BMP:    Lab Results   Component Value Date     02/24/2021    K 4.5 02/24/2021     02/24/2021    CO2 21 02/24/2021    BUN 19 02/24/2021    CREATININE 2.62 02/24/2021 GLUCOSE 84 2021      LIVER PROFILE:  Lab Results   Component Value Date    ALT 6 2021    AST 11 2021    PROT 5.6 2021    BILITOT 0.20 2021    BILIDIR <0.08 2020    LABALBU 3.3 2021               Radiology:  Medications: Allergies:      Current Meds:   Scheduled Meds:    amLODIPine  5 mg Oral Daily    DULoxetine  90 mg Oral Daily    vitamin D  50,000 Units Oral Weekly    sodium chloride flush  10 mL Intravenous 2 times per day    enoxaparin  30 mg Subcutaneous Daily    influenza virus vaccine  0.5 mL Intramuscular Prior to discharge     Continuous Infusions:    sodium chloride 125 mL/hr at 21 1053     PRN Meds: HYDROcodone-acetaminophen, morphine, sodium chloride flush, promethazine **OR** ondansetron, acetaminophen **OR** acetaminophen, nicotine polacrilex      Physical Examination:        BP (!) 143/83   Pulse 70   Temp 98 °F (36.7 °C) (Oral)   Resp 15   Ht 5' 5\" (1.651 m)   Wt 131 lb (59.4 kg)   LMP 2021   SpO2 98%   BMI 21.80 kg/m²   Temp (24hrs), Av.3 °F (36.8 °C), Min:98 °F (36.7 °C), Max:98.6 °F (37 °C)    No results for input(s): POCGLU in the last 72 hours. Intake/Output Summary (Last 24 hours) at 2021 1511  Last data filed at 2021 1119  Gross per 24 hour   Intake 3134 ml   Output 2950 ml   Net 184 ml       General Appearance:  alert, well appearing, and in no acute distress  Mental status: oriented to person, place, and time with normal affect  Head:  normocephalic, atraumatic.   Eye: no icterus, redness, pupils equal and reactive, extraocular eye movements intact, conjunctiva clear  Ear: normal external ear, no discharge, hearing intact  Nose:  no drainage noted  Mouth: mucous membranes moist  Neck: supple, no carotid bruits, thyroid not palpable  Lungs: Bilateral equal air entry, clear to ausculation, no wheezing, rales or rhonchi, normal effort  Cardiovascular: normal rate, regular rhythm, no murmur, gallop, rub.  Abdomen: Soft, nontender, nondistended, normal bowel sounds, no hepatomegaly or splenomegaly  Neurologic: There are no new focal motor or sensory deficits, normal muscle tone and bulk, no abnormal sensation, normal speech, cranial nerves II through XII grossly intact  Skin: No gross lesions, rashes, bruising or bleeding on exposed skin area  Extremities:  peripheral pulses palpable, no pedal edema or calf pain with palpation  Psych:       Assessment:        Primary Problem  Acute kidney injury superimposed on chronic kidney disease [facility])    Active Hospital Problems    Diagnosis Date Noted    History of major depression [Z86.59] 02/22/2021    Iron deficiency anemia due to chronic blood loss [D50.0] 02/22/2021    Hypernatremia [E87.0] 02/22/2021    Acute kidney injury superimposed on chronic kidney disease ([facility] [address] [N17.9, N18.9] 02/21/2021    Renovascular hypertension [I15.0] 06/18/2020    Right ureteral stone [N20.1] 05/27/2020    Kidney stone [N20.0]     Smoker [F17.200] 10/02/2011    Polycystic kidney [Q61.3] 10/02/2011     Plan:        1. Acute rehab persists  2. Urology consult noted  3. Urologist and nephrologist both believe that hemorrhage is from ruptured cyst  4. Patient has SILVANO on CKD    BMP:   Recent Labs     02/23/21  0618 02/24/21  0619    141   K 4.3 4.5   CO2 23 21   BUN 18 19   CREATININE 2.63* 2.62*   LABGLOM 20* 20*   GLUCOSE 87 84            5. Charge planning in progress  6. Serum creatinine is 2.63 was 2.87 yesterday  7. We will continue with IV therapy  8. Her hypernatremia has resolved  9. Hopefully she will be able to go home tomorrow  10. ll will discharge when okay with nephrology  2/24/21  Home today .  See discharge summary  Deloris Hanks MD  2/24/2021  3:11 PM

## 2021-02-24 NOTE — PLAN OF CARE
Problem: Pain:  Goal: Pain level will decrease  Description: Pain level will decrease  2/24/2021 0405 by Laure Kothari RN  Outcome: Ongoing  Note: Flank/groin pain. Medicated as needed. Patient tolerating      Problem: Pain:  Goal: Control of acute pain  Description: Control of acute pain  2/24/2021 0405 by Laure Kothari RN  Outcome: Ongoing  Note: Flank/groin pain. Medicated as needed. Patient tolerating      Problem: Pain:  Goal: Control of chronic pain  Description: Control of chronic pain  2/24/2021 0405 by Laure Kothari RN  Outcome: Ongoing  Note: Flank/groin pain. Medicated as needed. Patient tolerating      Problem: Falls - Risk of:  Goal: Will remain free from falls  Description: Will remain free from falls  2/24/2021 0405 by Laure Kothari RN  Outcome: Ongoing  Note: Pt remained absent from falls. Call light within reach. Bed locked and in lowest position. Problem: Falls - Risk of:  Goal: Absence of physical injury  Description: Absence of physical injury  2/24/2021 0405 by Laure Kothari RN  Outcome: Ongoing  Note: Patient remains free of injury.  safe environment maintained

## 2021-02-24 NOTE — PLAN OF CARE
Problem: Pain:  Goal: Pain level will decrease  Description: Pain level will decrease  2/24/2021 1609 by Ruperto Vital RN  Outcome: Completed  2/24/2021 0405 by Chilton Cheadle, RN  Outcome: Ongoing  Note: Flank/groin pain. Medicated as needed. Patient tolerating   Goal: Control of acute pain  Description: Control of acute pain  2/24/2021 1609 by Ruperto Vital RN  Outcome: Completed  2/24/2021 0405 by Chilton Cheadle, RN  Outcome: Ongoing  Note: Flank/groin pain. Medicated as needed. Patient tolerating   Goal: Control of chronic pain  Description: Control of chronic pain  2/24/2021 1609 by Ruperto Vital RN  Outcome: Completed  2/24/2021 0405 by Chilton Cheadle, RN  Outcome: Ongoing  Note: Flank/groin pain. Medicated as needed. Patient tolerating      Problem: Falls - Risk of:  Goal: Will remain free from falls  Description: Will remain free from falls  2/24/2021 1609 by Ruperto Vital RN  Outcome: Completed  2/24/2021 0405 by Chilton Cheadle, RN  Outcome: Ongoing  Note: Pt remained absent from falls. Call light within reach. Bed locked and in lowest position. Goal: Absence of physical injury  Description: Absence of physical injury  2/24/2021 1609 by Ruperto Vital RN  Outcome: Completed  2/24/2021 0405 by Chilton Cheadle, RN  Outcome: Ongoing  Note: Patient remains free of injury.  safe environment maintained

## 2021-02-25 ENCOUNTER — CARE COORDINATION (OUTPATIENT)
Dept: CASE MANAGEMENT | Age: 41
End: 2021-02-25

## 2021-02-25 NOTE — CARE COORDINATION
[facility] Transitions Initial Follow Up Call    Call within 2 business days of discharge: Yes    Patient: [address]y [address] Patient : 1980   MRN: [id number]  Reason for Admission: flank pain  Discharge Date: 21 RARS: Readmission Risk Score: 14      Last Discharge [facility]       Complaint Diagnosis Description Type Department Provider    21 Flank Pain; Hematuria Acute renal failure superimposed on chronic kidney disease, unspecified CKD stage, unspecified acute renal failure type ([facility] [address] . .. ED to Hosp-Admission (Discharged) (ADMITTED) Иван Roy MD; Spencer Castellano. .. # 1 attempt-unable to reach patient, left vm message, requested call back//JU    Facility: [address] [place] [address]    Non-face-to-face services provided:      Care Transitions 24 Hour Call    Care Transitions Interventions         Follow Up  No future appointments.     Rody Sarabia RN

## 2021-03-08 ENCOUNTER — OFFICE VISIT (OUTPATIENT)
Dept: UROLOGY | Age: 41
End: 2021-03-08
Payer: MEDICARE

## 2021-03-08 VITALS
TEMPERATURE: 98.5 F | RESPIRATION RATE: 18 BRPM | SYSTOLIC BLOOD PRESSURE: 118 MMHG | DIASTOLIC BLOOD PRESSURE: 73 MMHG | BODY MASS INDEX: 21.83 KG/M2 | HEART RATE: 76 BPM | WEIGHT: 131 LBS | HEIGHT: 65 IN

## 2021-03-08 DIAGNOSIS — Q61.3 POLYCYSTIC KIDNEY: ICD-10-CM

## 2021-03-08 DIAGNOSIS — N20.0 KIDNEY STONE: Primary | ICD-10-CM

## 2021-03-08 DIAGNOSIS — R31.0 GROSS HEMATURIA: ICD-10-CM

## 2021-03-08 PROCEDURE — G8427 DOCREV CUR MEDS BY ELIG CLIN: HCPCS | Performed by: UROLOGY

## 2021-03-08 PROCEDURE — 99214 OFFICE O/P EST MOD 30 MIN: CPT | Performed by: UROLOGY

## 2021-03-08 PROCEDURE — G8482 FLU IMMUNIZE ORDER/ADMIN: HCPCS | Performed by: UROLOGY

## 2021-03-08 PROCEDURE — 4004F PT TOBACCO SCREEN RCVD TLK: CPT | Performed by: UROLOGY

## 2021-03-08 PROCEDURE — G8420 CALC BMI NORM PARAMETERS: HCPCS | Performed by: UROLOGY

## 2021-03-08 PROCEDURE — 1111F DSCHRG MED/CURRENT MED MERGE: CPT | Performed by: UROLOGY

## 2021-03-08 RX ORDER — ONDANSETRON 4 MG/1
4 TABLET, FILM COATED ORAL EVERY 8 HOURS PRN
Status: ON HOLD | COMMUNITY
End: 2021-05-18 | Stop reason: HOSPADM

## 2021-03-08 ASSESSMENT — ENCOUNTER SYMPTOMS
NAUSEA: 0
BACK PAIN: 0
EYE PAIN: 0
COUGH: 0
EYE REDNESS: 0
VOMITING: 0
ABDOMINAL PAIN: 0
DIARRHEA: 0
WHEEZING: 0
SHORTNESS OF BREATH: 0
CONSTIPATION: 0

## 2021-03-08 NOTE — PROGRESS NOTES
[address] [address]  Dept: [address] [facility] Urology Office Note - Established    Patient:  Sally Lee  YOB: 1980  Date: 3/8/2021    The patient is a 41 y.o. female whopresents today for evaluation of the following problems:   Chief Complaint   Patient presents with    Follow-up     kidney / stone / [facility] f/u       HPI  This is a very pleasant 57-year-old female who has a history of polycystic kidneys. She does also have a history of stones. She has had lithotripsy on her right renal stone but this did not fragment. She was recently admitted with gross hematuria. She was on continuous bladder irrigation. She was having tremendous right flank pain at that time. Thankfully this is resolved. Her hematuria is also resolved grossly. Summary of old records: N/A    Additional History: N/A    Procedures Today: N/A    Urinalysis today:  No results found for this visit on 03/08/21. Imaging Reviewed during this Office Visit: CT demonstrates bilateral polycystic kidneys with 6 mm nonobstructing stone in the right kidney. There was no hydronephrosis on this exam.  1 day later, on February 23, she had a renal ultrasound which demonstrates mild right hydronephrosis and polycystic kidneys. (results were independently reviewed by physician and radiology report verified)    AUA Symptom Score (3/8/2021):   INCOMPLETE EMPTYING: How often have you had the sensation of not emptying your bladder?: Not at all  FREQUENCY: How often do you have to urinate less than every two hours?: Not at all  INTERMITTENCY: How often have you found you stopped and started again several times when you urinated?: Not at all  URGENCY: How often have you found it difficult to postpone urination?: Not at all  WEAK STREAM: How often have you had a weak urinary stream?: Not at all  STRAINING: How often have you had to strain to start  urination?: Not at all  NOCTURIA: How many times did you typically get up at night to uriniate?: NONE  TOTAL I-PSS SCORE[de-identified] 0       Last BUN and creatinine:  Lab Results   Component Value Date    BUN 19 02/24/2021     Lab Results   Component Value Date    CREATININE 2.62 (H) 02/24/2021       Additional Lab/Culture results: none    PAST MEDICAL, FAMILY AND SOCIAL HISTORY UPDATE:  Past Medical History:   Diagnosis Date    Anxiety     Asthma     Chronic headaches 7/10/2013    CKD (chronic kidney disease) stage 3, GFR 30-59 ml/min     Degenerative disc disease, cervical     Depression     Dysmenorrhea 9/30/2014    Eczema 11/8/2012    Headache(784.0)     HTN (hypertension) 10/2/2011    Hypertension     Hypocalcemia 5/28/2014    Kidney stone     Menorrhagia 9/30/2014    Neck pain, bilateral 5/28/2014    Pelvic pain in female 9/30/2014    Polycystic kidney     Smoker 10/2/2011    Tachycardia 1/20/2014    Tension vascular headache 1/20/2014     Past Surgical History:   Procedure Laterality Date    BREAST ENHANCEMENT SURGERY      BREAST LUMPECTOMY      left breast    CYSTO/URETERO/PYELOSCOPY, CALCULUS TX Right 6/12/2020    HOLMIUM - CYSTO, RIGHT RETROGRADE PYELOGRAM, RIGHT URETEROSCOPY, LASER LITHO ON STAND BY, RIGHT STENT PLACEMENT performed by Haroldo Welch MD at [address]  06/12/2020    DILATION AND CURETTAGE OF UTERUS      x2    LITHOTRIPSY Right 7/23/2020    ESWL EXTRACORPOREAL SHOCK WAVE LITHOTRIPSY [facility] [address] [facility] [address]  07/01/2013    nerve block(right vervical C3/TON/C4/C5    NERVE BLOCK  07/08/2013    nerve block(right vervical C3/TON/C4/C5    OTHER SURGICAL HISTORY  03/10/2014     botox inj     TOE SURGERY      removal of ingrown toe nail-2nd toe on left foot     URETEROSCOPY Right 06/12/2020    stent placement     Family History   Problem Relation Age of Onset    High Blood Pressure Mother     Asthma Sister    Mehdi Patel Moist        and Plan      1. Kidney stone    2. Polycystic kidney    3. Gross hematuria           Plan:   Upper urinary tract imaging demonstrates bilateral polycystic kidney disease with nonobstructing right renal stone. This is previously been treated with lithotripsy but did not fragment. The patient will need a cystoscopy to complete her hematuria evaluation. Return for Next available appointment, Cystoscopy. Prescriptions Ordered:  No orders of the defined types were placed in this encounter. Orders Placed:  No orders of the defined types were placed in this encounter. Arnol Olson MD    Agree with the ROS entered by the MA.

## 2021-05-16 ENCOUNTER — HOSPITAL ENCOUNTER (INPATIENT)
Age: 41
LOS: 1 days | Discharge: HOME | DRG: 469 | End: 2021-05-18
Attending: EMERGENCY MEDICINE | Admitting: INTERNAL MEDICINE
Payer: MEDICARE

## 2021-05-16 ENCOUNTER — APPOINTMENT (OUTPATIENT)
Dept: CT IMAGING | Age: 41
DRG: 469 | End: 2021-05-16
Payer: MEDICARE

## 2021-05-16 DIAGNOSIS — N20.0 RENAL STONE: ICD-10-CM

## 2021-05-16 DIAGNOSIS — Q61.3 PKD (POLYCYSTIC KIDNEY DISEASE): Primary | ICD-10-CM

## 2021-05-16 DIAGNOSIS — N17.9 AKI (ACUTE KIDNEY INJURY) (HCC): ICD-10-CM

## 2021-05-16 LAB
-: NORMAL
ABSOLUTE EOS #: 0.1 K/UL (ref 0–0.4)
ABSOLUTE IMMATURE GRANULOCYTE: ABNORMAL K/UL (ref 0–0.3)
ABSOLUTE LYMPH #: 1.8 K/UL (ref 1–4.8)
ABSOLUTE MONO #: 0.3 K/UL (ref 0.1–1.3)
AMORPHOUS: NORMAL
ANION GAP SERPL CALCULATED.3IONS-SCNC: 10 MMOL/L (ref 9–17)
BACTERIA: NORMAL
BASOPHILS # BLD: 1 % (ref 0–2)
BASOPHILS ABSOLUTE: 0.1 K/UL (ref 0–0.2)
BILIRUBIN URINE: NEGATIVE
BUN BLDV-MCNC: 32 MG/DL (ref 6–20)
BUN/CREAT BLD: ABNORMAL (ref 9–20)
CALCIUM SERPL-MCNC: 8.8 MG/DL (ref 8.6–10.4)
CASTS UA: NORMAL /LPF
CHLORIDE BLD-SCNC: 106 MMOL/L (ref 98–107)
CO2: 24 MMOL/L (ref 20–31)
COLOR: YELLOW
COMMENT UA: ABNORMAL
CREAT SERPL-MCNC: 3.21 MG/DL (ref 0.5–0.9)
CRYSTALS, UA: NORMAL /HPF
DIFFERENTIAL TYPE: ABNORMAL
EOSINOPHILS RELATIVE PERCENT: 1 % (ref 0–4)
EPITHELIAL CELLS UA: NORMAL /HPF
GFR AFRICAN AMERICAN: 19 ML/MIN
GFR NON-AFRICAN AMERICAN: 16 ML/MIN
GFR SERPL CREATININE-BSD FRML MDRD: ABNORMAL ML/MIN/{1.73_M2}
GFR SERPL CREATININE-BSD FRML MDRD: ABNORMAL ML/MIN/{1.73_M2}
GLUCOSE BLD-MCNC: 92 MG/DL (ref 70–99)
GLUCOSE URINE: NEGATIVE
HCG(URINE) PREGNANCY TEST: NEGATIVE
HCT VFR BLD CALC: 32.3 % (ref 36–46)
HEMOGLOBIN: 11.1 G/DL (ref 12–16)
IMMATURE GRANULOCYTES: ABNORMAL %
INR BLD: 0.9
KETONES, URINE: NEGATIVE
LEUKOCYTE ESTERASE, URINE: ABNORMAL
LYMPHOCYTES # BLD: 25 % (ref 24–44)
MAGNESIUM: 2.2 MG/DL (ref 1.6–2.6)
MCH RBC QN AUTO: 32.9 PG (ref 26–34)
MCHC RBC AUTO-ENTMCNC: 34.3 G/DL (ref 31–37)
MCV RBC AUTO: 95.9 FL (ref 80–100)
MONOCYTES # BLD: 5 % (ref 1–7)
MUCUS: NORMAL
NITRITE, URINE: NEGATIVE
NRBC AUTOMATED: ABNORMAL PER 100 WBC
OTHER OBSERVATIONS UA: NORMAL
PARTIAL THROMBOPLASTIN TIME: 36.3 SEC (ref 24–36)
PDW BLD-RTO: 12.7 % (ref 11.5–14.9)
PH UA: 7 (ref 5–8)
PLATELET # BLD: 288 K/UL (ref 150–450)
PLATELET ESTIMATE: ABNORMAL
PMV BLD AUTO: 7.9 FL (ref 6–12)
POTASSIUM SERPL-SCNC: 4.4 MMOL/L (ref 3.7–5.3)
PROTEIN UA: ABNORMAL
PROTHROMBIN TIME: 11.9 SEC (ref 11.8–14.6)
RBC # BLD: 3.36 M/UL (ref 4–5.2)
RBC # BLD: ABNORMAL 10*6/UL
RBC UA: NORMAL /HPF
RENAL EPITHELIAL, UA: NORMAL /HPF
SEG NEUTROPHILS: 68 % (ref 36–66)
SEGMENTED NEUTROPHILS ABSOLUTE COUNT: 5.1 K/UL (ref 1.3–9.1)
SODIUM BLD-SCNC: 140 MMOL/L (ref 135–144)
SPECIFIC GRAVITY UA: 1.01 (ref 1–1.03)
TRICHOMONAS: NORMAL
TURBIDITY: ABNORMAL
URINE HGB: ABNORMAL
UROBILINOGEN, URINE: NORMAL
WBC # BLD: 7.5 K/UL (ref 3.5–11)
WBC # BLD: ABNORMAL 10*3/UL
WBC UA: NORMAL /HPF
YEAST: NORMAL

## 2021-05-16 PROCEDURE — 2580000003 HC RX 258: Performed by: EMERGENCY MEDICINE

## 2021-05-16 PROCEDURE — 96376 TX/PRO/DX INJ SAME DRUG ADON: CPT

## 2021-05-16 PROCEDURE — 85730 THROMBOPLASTIN TIME PARTIAL: CPT

## 2021-05-16 PROCEDURE — 85025 COMPLETE CBC W/AUTO DIFF WBC: CPT

## 2021-05-16 PROCEDURE — 83735 ASSAY OF MAGNESIUM: CPT

## 2021-05-16 PROCEDURE — 85610 PROTHROMBIN TIME: CPT

## 2021-05-16 PROCEDURE — 80048 BASIC METABOLIC PNL TOTAL CA: CPT

## 2021-05-16 PROCEDURE — 99283 EMERGENCY DEPT VISIT LOW MDM: CPT

## 2021-05-16 PROCEDURE — 96374 THER/PROPH/DIAG INJ IV PUSH: CPT

## 2021-05-16 PROCEDURE — 81001 URINALYSIS AUTO W/SCOPE: CPT

## 2021-05-16 PROCEDURE — 74176 CT ABD & PELVIS W/O CONTRAST: CPT

## 2021-05-16 PROCEDURE — 36415 COLL VENOUS BLD VENIPUNCTURE: CPT

## 2021-05-16 PROCEDURE — 99222 1ST HOSP IP/OBS MODERATE 55: CPT | Performed by: INTERNAL MEDICINE

## 2021-05-16 PROCEDURE — 6360000002 HC RX W HCPCS: Performed by: EMERGENCY MEDICINE

## 2021-05-16 PROCEDURE — 81025 URINE PREGNANCY TEST: CPT

## 2021-05-16 PROCEDURE — 96375 TX/PRO/DX INJ NEW DRUG ADDON: CPT

## 2021-05-16 RX ORDER — MORPHINE SULFATE 4 MG/ML
4 INJECTION, SOLUTION INTRAMUSCULAR; INTRAVENOUS ONCE
Status: COMPLETED | OUTPATIENT
Start: 2021-05-16 | End: 2021-05-16

## 2021-05-16 RX ORDER — SODIUM CHLORIDE 9 MG/ML
INJECTION, SOLUTION INTRAVENOUS CONTINUOUS
Status: CANCELLED | OUTPATIENT
Start: 2021-05-17

## 2021-05-16 RX ORDER — HYDROXYZINE 50 MG/1
50 TABLET, FILM COATED ORAL NIGHTLY
COMMUNITY

## 2021-05-16 RX ORDER — 0.9 % SODIUM CHLORIDE 0.9 %
1000 INTRAVENOUS SOLUTION INTRAVENOUS ONCE
Status: COMPLETED | OUTPATIENT
Start: 2021-05-16 | End: 2021-05-16

## 2021-05-16 RX ORDER — ONDANSETRON 2 MG/ML
4 INJECTION INTRAMUSCULAR; INTRAVENOUS ONCE
Status: COMPLETED | OUTPATIENT
Start: 2021-05-16 | End: 2021-05-16

## 2021-05-16 RX ORDER — 0.9 % SODIUM CHLORIDE 0.9 %
1000 INTRAVENOUS SOLUTION INTRAVENOUS ONCE
Status: COMPLETED | OUTPATIENT
Start: 2021-05-16 | End: 2021-05-17

## 2021-05-16 RX ORDER — HYDROCODONE BITARTRATE AND ACETAMINOPHEN 5; 325 MG/1; MG/1
1 TABLET ORAL EVERY 6 HOURS PRN
COMMUNITY
End: 2021-06-19

## 2021-05-16 RX ADMIN — SODIUM CHLORIDE 1000 ML: 9 INJECTION, SOLUTION INTRAVENOUS at 21:40

## 2021-05-16 RX ADMIN — SODIUM CHLORIDE 1000 ML: 9 INJECTION, SOLUTION INTRAVENOUS at 23:50

## 2021-05-16 RX ADMIN — Medication 4 MG: at 21:45

## 2021-05-16 RX ADMIN — ONDANSETRON 4 MG: 2 INJECTION, SOLUTION INTRAMUSCULAR; INTRAVENOUS at 21:44

## 2021-05-16 RX ADMIN — Medication 4 MG: at 23:30

## 2021-05-16 ASSESSMENT — ENCOUNTER SYMPTOMS
ABDOMINAL PAIN: 1
VOMITING: 0
BACK PAIN: 0
COUGH: 0
DIARRHEA: 0
SHORTNESS OF BREATH: 0

## 2021-05-17 PROBLEM — R30.0 DYSURIA: Status: ACTIVE | Noted: 2021-05-17

## 2021-05-17 LAB
ANION GAP SERPL CALCULATED.3IONS-SCNC: 9 MMOL/L (ref 9–17)
BUN BLDV-MCNC: 27 MG/DL (ref 6–20)
BUN/CREAT BLD: ABNORMAL (ref 9–20)
CALCIUM SERPL-MCNC: 7.9 MG/DL (ref 8.6–10.4)
CHLORIDE BLD-SCNC: 110 MMOL/L (ref 98–107)
CO2: 23 MMOL/L (ref 20–31)
CREAT SERPL-MCNC: 2.85 MG/DL (ref 0.5–0.9)
GFR AFRICAN AMERICAN: 22 ML/MIN
GFR NON-AFRICAN AMERICAN: 18 ML/MIN
GFR SERPL CREATININE-BSD FRML MDRD: ABNORMAL ML/MIN/{1.73_M2}
GFR SERPL CREATININE-BSD FRML MDRD: ABNORMAL ML/MIN/{1.73_M2}
GLUCOSE BLD-MCNC: 81 MG/DL (ref 70–99)
HCT VFR BLD CALC: 28.4 % (ref 36–46)
HEMOGLOBIN: 9.6 G/DL (ref 12–16)
MAGNESIUM: 2 MG/DL (ref 1.6–2.6)
MCH RBC QN AUTO: 32.9 PG (ref 26–34)
MCHC RBC AUTO-ENTMCNC: 33.7 G/DL (ref 31–37)
MCV RBC AUTO: 97.7 FL (ref 80–100)
NRBC AUTOMATED: ABNORMAL PER 100 WBC
PDW BLD-RTO: 12.9 % (ref 11.5–14.9)
PHOSPHORUS: 3.2 MG/DL (ref 2.6–4.5)
PLATELET # BLD: 245 K/UL (ref 150–450)
PMV BLD AUTO: 8.1 FL (ref 6–12)
POTASSIUM SERPL-SCNC: 4.1 MMOL/L (ref 3.7–5.3)
RBC # BLD: 2.91 M/UL (ref 4–5.2)
SODIUM BLD-SCNC: 142 MMOL/L (ref 135–144)
WBC # BLD: 7.6 K/UL (ref 3.5–11)

## 2021-05-17 PROCEDURE — 84100 ASSAY OF PHOSPHORUS: CPT

## 2021-05-17 PROCEDURE — 6370000000 HC RX 637 (ALT 250 FOR IP): Performed by: NURSE PRACTITIONER

## 2021-05-17 PROCEDURE — 80048 BASIC METABOLIC PNL TOTAL CA: CPT

## 2021-05-17 PROCEDURE — 2580000003 HC RX 258: Performed by: NURSE PRACTITIONER

## 2021-05-17 PROCEDURE — 36415 COLL VENOUS BLD VENIPUNCTURE: CPT

## 2021-05-17 PROCEDURE — 1200000000 HC SEMI PRIVATE

## 2021-05-17 PROCEDURE — 85027 COMPLETE CBC AUTOMATED: CPT

## 2021-05-17 PROCEDURE — 83735 ASSAY OF MAGNESIUM: CPT

## 2021-05-17 PROCEDURE — 6360000002 HC RX W HCPCS: Performed by: NURSE PRACTITIONER

## 2021-05-17 RX ORDER — MAGNESIUM SULFATE 1 G/100ML
1000 INJECTION INTRAVENOUS PRN
Status: DISCONTINUED | OUTPATIENT
Start: 2021-05-17 | End: 2021-05-18 | Stop reason: HOSPADM

## 2021-05-17 RX ORDER — ACETAMINOPHEN 325 MG/1
650 TABLET ORAL EVERY 6 HOURS PRN
Status: DISCONTINUED | OUTPATIENT
Start: 2021-05-17 | End: 2021-05-18 | Stop reason: HOSPADM

## 2021-05-17 RX ORDER — LANOLIN ALCOHOL/MO/W.PET/CERES
3 CREAM (GRAM) TOPICAL NIGHTLY PRN
Status: DISCONTINUED | OUTPATIENT
Start: 2021-05-17 | End: 2021-05-18 | Stop reason: HOSPADM

## 2021-05-17 RX ORDER — NICOTINE 21 MG/24HR
1 PATCH, TRANSDERMAL 24 HOURS TRANSDERMAL DAILY PRN
Status: DISCONTINUED | OUTPATIENT
Start: 2021-05-17 | End: 2021-05-18 | Stop reason: HOSPADM

## 2021-05-17 RX ORDER — SODIUM CHLORIDE 9 MG/ML
25 INJECTION, SOLUTION INTRAVENOUS PRN
Status: DISCONTINUED | OUTPATIENT
Start: 2021-05-17 | End: 2021-05-18 | Stop reason: HOSPADM

## 2021-05-17 RX ORDER — ONDANSETRON 2 MG/ML
4 INJECTION INTRAMUSCULAR; INTRAVENOUS EVERY 6 HOURS PRN
Status: DISCONTINUED | OUTPATIENT
Start: 2021-05-17 | End: 2021-05-18 | Stop reason: HOSPADM

## 2021-05-17 RX ORDER — MORPHINE SULFATE 2 MG/ML
2 INJECTION, SOLUTION INTRAMUSCULAR; INTRAVENOUS EVERY 4 HOURS PRN
Status: DISCONTINUED | OUTPATIENT
Start: 2021-05-17 | End: 2021-05-18 | Stop reason: HOSPADM

## 2021-05-17 RX ORDER — POLYETHYLENE GLYCOL 3350 17 G/17G
17 POWDER, FOR SOLUTION ORAL DAILY PRN
Status: DISCONTINUED | OUTPATIENT
Start: 2021-05-17 | End: 2021-05-18 | Stop reason: HOSPADM

## 2021-05-17 RX ORDER — SODIUM CHLORIDE 450 MG/100ML
INJECTION, SOLUTION INTRAVENOUS CONTINUOUS
Status: DISCONTINUED | OUTPATIENT
Start: 2021-05-17 | End: 2021-05-18 | Stop reason: HOSPADM

## 2021-05-17 RX ORDER — PROMETHAZINE HYDROCHLORIDE 25 MG/1
12.5 TABLET ORAL EVERY 6 HOURS PRN
Status: DISCONTINUED | OUTPATIENT
Start: 2021-05-17 | End: 2021-05-18 | Stop reason: HOSPADM

## 2021-05-17 RX ORDER — ACETAMINOPHEN 650 MG/1
650 SUPPOSITORY RECTAL EVERY 6 HOURS PRN
Status: DISCONTINUED | OUTPATIENT
Start: 2021-05-17 | End: 2021-05-18 | Stop reason: HOSPADM

## 2021-05-17 RX ORDER — SODIUM CHLORIDE 0.9 % (FLUSH) 0.9 %
5-40 SYRINGE (ML) INJECTION EVERY 12 HOURS SCHEDULED
Status: DISCONTINUED | OUTPATIENT
Start: 2021-05-17 | End: 2021-05-18 | Stop reason: HOSPADM

## 2021-05-17 RX ORDER — MORPHINE SULFATE 4 MG/ML
4 INJECTION, SOLUTION INTRAMUSCULAR; INTRAVENOUS EVERY 4 HOURS PRN
Status: DISCONTINUED | OUTPATIENT
Start: 2021-05-17 | End: 2021-05-18 | Stop reason: HOSPADM

## 2021-05-17 RX ORDER — HYDROXYZINE HYDROCHLORIDE 25 MG/1
50 TABLET, FILM COATED ORAL NIGHTLY
Status: DISCONTINUED | OUTPATIENT
Start: 2021-05-17 | End: 2021-05-18 | Stop reason: HOSPADM

## 2021-05-17 RX ORDER — SODIUM CHLORIDE 0.9 % (FLUSH) 0.9 %
10 SYRINGE (ML) INJECTION PRN
Status: DISCONTINUED | OUTPATIENT
Start: 2021-05-17 | End: 2021-05-18 | Stop reason: HOSPADM

## 2021-05-17 RX ORDER — HEPARIN SODIUM 5000 [USP'U]/ML
5000 INJECTION, SOLUTION INTRAVENOUS; SUBCUTANEOUS EVERY 8 HOURS SCHEDULED
Status: DISCONTINUED | OUTPATIENT
Start: 2021-05-17 | End: 2021-05-18 | Stop reason: HOSPADM

## 2021-05-17 RX ADMIN — MORPHINE SULFATE 4 MG: 4 INJECTION, SOLUTION INTRAMUSCULAR; INTRAVENOUS at 09:18

## 2021-05-17 RX ADMIN — SODIUM CHLORIDE: 4.5 INJECTION, SOLUTION INTRAVENOUS at 02:08

## 2021-05-17 RX ADMIN — HYDROXYZINE HYDROCHLORIDE 50 MG: 25 TABLET, FILM COATED ORAL at 02:01

## 2021-05-17 RX ADMIN — SODIUM CHLORIDE, PRESERVATIVE FREE 10 ML: 5 INJECTION INTRAVENOUS at 09:18

## 2021-05-17 RX ADMIN — HEPARIN SODIUM 5000 UNITS: 5000 INJECTION INTRAVENOUS; SUBCUTANEOUS at 22:12

## 2021-05-17 RX ADMIN — MORPHINE SULFATE 4 MG: 4 INJECTION, SOLUTION INTRAMUSCULAR; INTRAVENOUS at 03:36

## 2021-05-17 RX ADMIN — MORPHINE SULFATE 4 MG: 4 INJECTION, SOLUTION INTRAMUSCULAR; INTRAVENOUS at 13:16

## 2021-05-17 RX ADMIN — CEFTRIAXONE SODIUM 1000 MG: 1 INJECTION, POWDER, FOR SOLUTION INTRAMUSCULAR; INTRAVENOUS at 02:01

## 2021-05-17 RX ADMIN — HYDROXYZINE HYDROCHLORIDE 50 MG: 25 TABLET, FILM COATED ORAL at 20:46

## 2021-05-17 RX ADMIN — MORPHINE SULFATE 4 MG: 4 INJECTION, SOLUTION INTRAMUSCULAR; INTRAVENOUS at 22:12

## 2021-05-17 RX ADMIN — SODIUM CHLORIDE: 4.5 INJECTION, SOLUTION INTRAVENOUS at 18:42

## 2021-05-17 RX ADMIN — MORPHINE SULFATE 4 MG: 4 INJECTION, SOLUTION INTRAMUSCULAR; INTRAVENOUS at 17:53

## 2021-05-17 RX ADMIN — Medication 10 ML: at 13:12

## 2021-05-17 RX ADMIN — HEPARIN SODIUM 5000 UNITS: 5000 INJECTION INTRAVENOUS; SUBCUTANEOUS at 05:35

## 2021-05-17 ASSESSMENT — ENCOUNTER SYMPTOMS
CONSTIPATION: 0
NAUSEA: 1
SHORTNESS OF BREATH: 0
DIARRHEA: 0
BACK PAIN: 1
VOMITING: 0
ALLERGIC/IMMUNOLOGIC NEGATIVE: 1
COUGH: 0
COLOR CHANGE: 0
ABDOMINAL PAIN: 0
RHINORRHEA: 0
WHEEZING: 0
PHOTOPHOBIA: 0
SORE THROAT: 0

## 2021-05-17 ASSESSMENT — PAIN SCALES - GENERAL
PAINLEVEL_OUTOF10: 7
PAINLEVEL_OUTOF10: 7

## 2021-05-17 NOTE — PROGRESS NOTES
[address] [facility]  DVT Prophylaxis and Vaccine Status  Work List  Mandatory for all patients        Patient must be on both Chemical prophylaxis and Mechanical prophylaxis.  If chemical/mechanical prophylaxis is not ordered, the physician must document a reason for not using prophylaxis      Chemical Prophylaxis  Is patient on chemical prophylaxis: Yes  If no chemical prophylaxis Is a order in for No Chemical VTE prophylaxisYes  If no was the physician notified not applicable        Mechanical Prophylaxis  Is patient on mechanical prophylaxis, intermittent pneumatic compression device: Yes  If no was the physician notified not applicable           Pneumonia Vaccine  Vaccine indicated:  Not indicated  If indicated was the vaccine given: not applicable     Influenza Vaccine (applicable from October through March):  Vaccine indicated: Not indicated  If indicated was the vaccine given: not applicable     Patient Education  Education completed on DVT prophylaxis: yes

## 2021-05-17 NOTE — H&P
lumpectomy; Nerve Block (07/01/2013); Nerve Block (07/08/2013); other surgical history (03/10/2014 ); Toe Surgery; Breast enhancement surgery; Cystoscopy (06/12/2020); Ureteroscopy (Right, 06/12/2020); cysto/uretero/pyeloscopy, calculus tx (Right, 6/12/2020); and Lithotripsy (Right, 7/23/2020). FAMILY HISTORY    Patient family history includes Asthma in her sister; High Blood Pressure in her father and mother; Migraines in her sister; Other in her brother. SOCIAL HISTORY    Patient  reports that she has been smoking cigarettes. She has a 16.00 pack-year smoking history. She has never used smokeless tobacco. She reports that she does not drink alcohol and does not use drugs. HOME MEDICATIONS        Prior to Admission medications    Medication Sig Start Date End Date Taking? Authorizing Provider   HYDROcodone-acetaminophen (NORCO) 5-325 MG per tablet Take 1 tablet by mouth every 6 hours as needed for Pain. Yes Historical Provider, MD   hydrOXYzine (ATARAX) 50 MG tablet Take 50 mg by mouth nightly   Yes Historical Provider, MD   ondansetron (ZOFRAN) 4 MG tablet Take 4 mg by mouth every 8 hours as needed for Nausea or Vomiting    Historical Provider, MD   amLODIPine (NORVASC) 5 MG tablet Take 5 mg by mouth daily    Historical Provider, MD   melatonin 3 MG TABS tablet Take 3 mg by mouth nightly as needed (sleep)    Historical Provider, MD   DULoxetine (CYMBALTA) 60 MG extended release capsule take 1 capsule by mouth once daily  Patient taking differently: 90 mg  11/10/17   Lisa Gaytan MD   Blood Pressure KIT Check BP once/day- goal is <140/90. 3/9/16   ALBARO Horne - CNP       ALLERGIES      Bee pollen, Dust mite extract, Pcn [penicillins], and Pollen extract    REVIEW OF SYSTEMS     Review of Systems   Constitutional: Positive for activity change and appetite change. Negative for chills, diaphoresis and fatigue. HENT: Negative for congestion, rhinorrhea and sore throat.     Eyes: Negative for photophobia and visual disturbance. Respiratory: Negative for cough, shortness of breath and wheezing. Cardiovascular: Negative for chest pain and leg swelling. Gastrointestinal: Positive for nausea. Negative for abdominal pain, constipation, diarrhea and vomiting. Endocrine: Negative for polydipsia, polyphagia and polyuria. Genitourinary: Positive for dysuria and flank pain. Negative for hematuria and pelvic pain. Musculoskeletal: Positive for back pain. Negative for arthralgias and myalgias. Skin: Negative for color change and rash. Allergic/Immunologic: Negative. Neurological: Negative for dizziness, weakness, light-headedness and headaches. Hematological: Negative. Psychiatric/Behavioral: Negative for confusion and decreased concentration. The patient is not nervous/anxious. PHYSICAL EXAM      BP (!) 183/94   Pulse 82   Temp 97.9 °F (36.6 °C) (Oral)   Resp 16   Ht 5' 5\" (1.651 m)   Wt 126 lb 12.2 oz (57.5 kg)   LMP 04/25/2021 (Approximate)   SpO2 98%   BMI 21.09 kg/m²  Body mass index is 21.09 kg/m². Physical Exam  Constitutional:       Appearance: Normal appearance. She is well-developed and well-groomed. She is ill-appearing. HENT:      Head: Normocephalic. Right Ear: External ear normal.      Left Ear: External ear normal.      Nose: Nose normal.      Mouth/Throat:      Mouth: Mucous membranes are dry. Eyes:      Extraocular Movements: Extraocular movements intact. Conjunctiva/sclera: Conjunctivae normal.      Pupils: Pupils are equal, round, and reactive to light. Cardiovascular:      Rate and Rhythm: Normal rate and regular rhythm. Pulses: Normal pulses. Heart sounds: Normal heart sounds, S1 normal and S2 normal.   Pulmonary:      Effort: Pulmonary effort is normal.      Breath sounds: Normal breath sounds. No decreased breath sounds, wheezing, rhonchi or rales.    Abdominal:      General: Bowel sounds are normal.      Palpations: Abdomen is soft. Tenderness: There is no abdominal tenderness. There is right CVA tenderness. There is no left CVA tenderness. Musculoskeletal:      Right upper arm: Normal.      Left upper arm: Normal.      Cervical back: Normal range of motion and neck supple. Right hip: Normal.      Left hip: Normal.      Right lower leg: No edema. Left lower leg: No edema. Skin:     General: Skin is warm and dry. Capillary Refill: Capillary refill takes less than 2 seconds. Findings: No rash. Neurological:      Mental Status: She is alert and oriented to person, place, and time. GCS: GCS eye subscore is 4. GCS verbal subscore is 5. GCS motor subscore is 6. Psychiatric:         Mood and Affect: Mood normal.         Behavior: Behavior normal.         Thought Content: Thought content normal.         Judgment: Judgment normal.       DIAGNOSTICS      EKG:     Labs:  CBC:   Recent Labs     05/16/21 2147   WBC 7.5   HGB 11.1*        BMP:    Recent Labs     05/16/21 2147      K 4.4      CO2 24   BUN 32*   CREATININE 3.21*   GLUCOSE 92     S. Calcium:  Recent Labs     05/16/21 2147   CALCIUM 8.8     S. Ionized Calcium:No results for input(s): IONCA in the last 72 hours. S. Magnesium:  Recent Labs     05/16/21 2147   MG 2.2     S. Phosphorus:No results for input(s): PHOS in the last 72 hours. S. Glucose:No results for input(s): POCGLU in the last 72 hours. Glycosylated hemoglobin A1C:   Lab Results   Component Value Date    LABA1C 4.9 02/20/2017     Hepatic: No results for input(s): AST, ALT, ALB, ALKPHOS in the last 72 hours. Invalid input(s):  PROT,  BILITOT,  BILIDIR  CARDIAC ENZY: No results for input(s): CKTOTAL, CKMB, CKMBINDEX, TROPHS, MYOGLOBIN in the last 72 hours. INR:   Recent Labs     05/16/21 2147   INR 0.9     BNP: No results for input(s): PROBNP in the last 72 hours. ABGs: No results for input(s): PH, PCO2, PO2, HCO3, O2SAT in the last 72 hours.   Lipids: No results for input(s): CHOL, TRIG, HDL, LDLCALC in the last 72 hours. Invalid input(s): LDL  Pancreatic functions:No results for input(s): LIPASE, AMYLASE in the last 72 hours. Delta Barthel: No results for input(s): LACTA in the last 72 hours. Thyroid functions:   Lab Results   Component Value Date    TSH 1.14 06/30/2020      U/A:  Recent Labs     05/16/21  2146   [address] [facility] [address] 5 TO 10   RBCUA None   MUCUS NOT REPORTED   BACTERIA NOT REPORTED   SPECGRAV 1.009   LEUKOCYTESUR MOD*   GLUCOSEU NEGATIVE   AMORPHOUS NOT REPORTED       Imaging/Diagonstics:     CT ABDOMEN PELVIS WO CONTRAST Additional Contrast? None    Result Date: 5/16/2021  EXAMINATION: CT OF THE ABDOMEN AND PELVIS WITHOUT CONTRAST 5/16/2021 10:18 pm TECHNIQUE: CT of the abdomen and pelvis was performed without the administration of intravenous contrast. Multiplanar reformatted images are provided for review. Dose modulation, iterative reconstruction, and/or weight based adjustment of the mA/kV was utilized to reduce the radiation dose to as low as reasonably achievable. COMPARISON: February 22, 2021 CT abdomen and pelvis HISTORY: ORDERING SYSTEM PROVIDED HISTORY: right flank pain TECHNOLOGIST PROVIDED HISTORY: right flank pain Decision Support Exception - unselect if not a suspected or confirmed emergency medical condition->Emergency Medical Condition ([place]) Is the patient pregnant?->No Reason for Exam: Right flank pain Acuity: Acute Type of Exam: Initial Additional signs and symptoms: Pt c/o right flank pain for past 3 hours. Pt states h/o kidney stones that are unable to be removed. FINDINGS: Lower Chest: Pectus excavatum. Breast implants. Organs: The abdominal wall appears normal. The liver, spleen, pancreas, and adrenals appear normal.  Multiple simple cysts throughout the liver unchanged. Gallbladder normal. Innumerable simple and dense hemorrhagic cysts throughout the kidneys which are enlarged bilaterally.   These appear unchanged and measure up to 35 mm on the left. 5 mm nonobstructing nephrolith on the right. The bladder appears normal. GI/Bowel: The stomach,small bowel, and colon appear normal. Appendiculoliths within the appendix unchanged. Appendix appears normal in size. Pelvis: IUD noted in the uterus. Peritoneum/Retroperitoneum: The abdominal aorta and iliac arteries are normal in caliber. There is no pathologic adenopathy. Bones/Soft Tissues: Normal     Polycystic kidney disease. 5 mm nonobstructing nephrolith on the right. No significant change or acute disease. ASSESSMENT  and  PLAN     Principal Problem:    SILVANO (acute kidney injury) ([facility] [address]  Active Problems:    Smoker    Polycystic kidney    CKD (chronic kidney disease) stage 4, GFR 15-29 ml/min ([facility])    Congenital multiple renal cysts    Flank pain    Acute kidney injury superimposed on chronic kidney disease ([address]    Dysuria  Resolved Problems:    * No resolved hospital problems. *    Plan:    Acute kidney injury superimposed on chronic kidney disease  -Creatinine 3.21, BUN 32, creatinine 2/24/2021 2.62  -Potassium 4.4, sodium 140  -Patient was given a liter of normal saline in the ED  -Maintenance IV [email or web address]  -Consult nephrology    Right flank pain/polycystic kidney disease  -CT abdomen pelvis shows polycystic kidney disease. 5 mm nonobstructing nephrolith on the right. No significant change or acute disease. -ER physician spoke with urologist on call. Will see patient in the morning.  -Morphine as needed, Zofran as needed    Dysuria  -Urinalysis shows 5-10 white blood cells, moderate leuks, large blood, urine sent for culture  -Rocephin 1 g daily    Smoker  -Nicotine patch    DVT prophylaxis-heparin subcu    Consultations:     IP CONSULT TO UROLOGY  IP CONSULT TO NEPHROLOGY      ALBARO Taylor - CNP   5/17/2021  1:49 AM    [address] Andrew Alvarez [address] [place] [address], [address] [place] [address].    Phone  and add on       I have discussed the care of Coy Bishop ,   including pertinent history and exam findings,      5/17/21   with the michael lema CNP  I have seen and examined the patient and the key elements of all parts of the encounter have been performed by me . I agree with the assessment, plan and orders as documented by the resident. Principal Problem:    SILVANO (acute kidney injury) ([facility] [address]  Active Problems:    Smoker    Polycystic kidney    CKD (chronic kidney disease) stage 4, GFR 15-29 ml/min (HCC)    Congenital multiple renal cysts    Flank pain    Acute kidney injury superimposed on chronic kidney disease ([facility] [address]    Dysuria  Resolved Problems:    * No resolved hospital problems. *        -             Medications: Allergies: Allergies   Allergen Reactions    Bee Pollen     Dust Mite Extract      Chest congestion , sneezing    Pcn [Penicillins] Rash    Pollen Extract      Chest congesting , sneezing        Current Meds:   Scheduled Meds:    sodium chloride flush  5-40 mL Intravenous 2 times per day    heparin (porcine)  5,000 Units Subcutaneous 3 times per day    hydrOXYzine  50 mg Oral Nightly    cefTRIAXone (ROCEPHIN) IV  1,000 mg Intravenous Q24H     Continuous Infusions:    sodium chloride      sodium chloride 100 mL/hr at 05/17/21 0208     PRN Meds: sodium chloride flush, sodium chloride, magnesium sulfate, promethazine **OR** ondansetron, polyethylene glycol, nicotine, acetaminophen **OR** acetaminophen, morphine **OR** morphine, melatonin      ---- ;     MD NEVAEH P[address], [address] [place] [address].    Phone [phone]   Fax: [phone]  Answering Service: [phone]

## 2021-05-17 NOTE — CARE COORDINATION
CASE MANAGEMENT NOTE:    Admission Date:  5/16/2021 Opal Salazar is a 35 y.o.  female    Admitted for : SILVANO (acute kidney injury) ([facility] [address] [N17.9]    Met with:  Patient    PCP:  Mumtaz Simons                                Insurance:  [employer]      Is patient alert and oriented at time of discussion:  Yes    Current Residence/ Living Arrangements:  independently at home, Lives alone             Current Services PTA:  Yes, Pain Management on [employer], for her Back Pain    Does patient go to outpatient dialysis: No  If yes, location and chair time: NA    Is patient agreeable to VNS: No    Freedom of choice provided:  No    List of [address] provided: No    VNS chosen:  No, Denies the need    DME:  none    Home Oxygen: No    Nebulizer: No    CPAP/BIPAP: No    Supplier: N/A    Potential Assistance Needed: No    SNF needed: No    Freedom of choice and list provided: NA    Pharmacy:  [address] [place] [address] on [address]       Does Patient want to use MEDS to BEDS? No    Is patient currently receiving oral anticoagulation therapy? No    Is the Patient an [facility] with Readmission Risk Score greater than 14%? No  If yes, pt needs a follow up appointment made within 7 days. Family Members/Caregivers that pt would like involved in their care:    Yes    If yes, list name here:  Feli Blandon, [address], [address]    Transportation Provider:  Patient             Discharge Plan:  5/17/21 [employer] Pt. Lives alone, in 2 story home, has liveable 1st floor. No DME. Denies VNS, Current w/Pain Management on [employer], for her Back. IV Rocephin, Cr. 3.21/2.85, Bun 32/27, Urology.  Denies needs, will follow//KB                 Electronically signed by: Teena Doss RN on 5/17/2021 at 9:28 AM

## 2021-05-17 NOTE — CONSULTS
Dr. Mel Franco contacted via [address] [place] [address] @ [facility] [phone] for consult.  Dr. Mel rFanco said will see patient in AM.

## 2021-05-17 NOTE — PLAN OF CARE
Problem: Pain:  Goal: Pain level will decrease  Description: Pain level will decrease  Outcome: Ongoing  Note: Pain is controlled with PRN pain medications. Goal: Control of acute pain  Description: Control of acute pain  Outcome: Ongoing  Goal: Control of chronic pain  Description: Control of chronic pain  Outcome: Ongoing     Problem: Fluid Volume:  Goal: Will show no signs or symptoms of fluid imbalance  Description: Will show no signs or symptoms of fluid imbalance  Outcome: Ongoing  Note: Patient showed no signs of fluid imbalance this shift.

## 2021-05-18 VITALS
RESPIRATION RATE: 18 BRPM | BODY MASS INDEX: 21.01 KG/M2 | TEMPERATURE: 98.4 F | WEIGHT: 126.1 LBS | HEIGHT: 65 IN | SYSTOLIC BLOOD PRESSURE: 176 MMHG | DIASTOLIC BLOOD PRESSURE: 87 MMHG | OXYGEN SATURATION: 98 % | HEART RATE: 75 BPM

## 2021-05-18 LAB
ANION GAP SERPL CALCULATED.3IONS-SCNC: 11 MMOL/L (ref 9–17)
BUN BLDV-MCNC: 27 MG/DL (ref 6–20)
BUN/CREAT BLD: ABNORMAL (ref 9–20)
CALCIUM SERPL-MCNC: 8.2 MG/DL (ref 8.6–10.4)
CHLORIDE BLD-SCNC: 109 MMOL/L (ref 98–107)
CO2: 20 MMOL/L (ref 20–31)
CREAT SERPL-MCNC: 2.55 MG/DL (ref 0.5–0.9)
GFR AFRICAN AMERICAN: 25 ML/MIN
GFR NON-AFRICAN AMERICAN: 21 ML/MIN
GFR SERPL CREATININE-BSD FRML MDRD: ABNORMAL ML/MIN/{1.73_M2}
GFR SERPL CREATININE-BSD FRML MDRD: ABNORMAL ML/MIN/{1.73_M2}
GLUCOSE BLD-MCNC: 84 MG/DL (ref 70–99)
HCT VFR BLD CALC: 30.4 % (ref 36–46)
HEMOGLOBIN: 10.4 G/DL (ref 12–16)
MAGNESIUM: 1.9 MG/DL (ref 1.6–2.6)
MCH RBC QN AUTO: 33.6 PG (ref 26–34)
MCHC RBC AUTO-ENTMCNC: 34.1 G/DL (ref 31–37)
MCV RBC AUTO: 98.6 FL (ref 80–100)
NRBC AUTOMATED: ABNORMAL PER 100 WBC
PDW BLD-RTO: 13 % (ref 11.5–14.9)
PLATELET # BLD: 231 K/UL (ref 150–450)
PMV BLD AUTO: 8.4 FL (ref 6–12)
POTASSIUM SERPL-SCNC: 4.2 MMOL/L (ref 3.7–5.3)
RBC # BLD: 3.08 M/UL (ref 4–5.2)
SODIUM BLD-SCNC: 140 MMOL/L (ref 135–144)
WBC # BLD: 7.1 K/UL (ref 3.5–11)

## 2021-05-18 PROCEDURE — 6360000002 HC RX W HCPCS: Performed by: NURSE PRACTITIONER

## 2021-05-18 PROCEDURE — 99238 HOSP IP/OBS DSCHRG MGMT 30/<: CPT | Performed by: INTERNAL MEDICINE

## 2021-05-18 PROCEDURE — 80048 BASIC METABOLIC PNL TOTAL CA: CPT

## 2021-05-18 PROCEDURE — 85027 COMPLETE CBC AUTOMATED: CPT

## 2021-05-18 PROCEDURE — 83735 ASSAY OF MAGNESIUM: CPT

## 2021-05-18 PROCEDURE — 36415 COLL VENOUS BLD VENIPUNCTURE: CPT

## 2021-05-18 PROCEDURE — 2580000003 HC RX 258: Performed by: NURSE PRACTITIONER

## 2021-05-18 RX ORDER — CEFUROXIME AXETIL 250 MG/1
250 TABLET ORAL 2 TIMES DAILY
Qty: 6 TABLET | Refills: 0 | Status: SHIPPED | OUTPATIENT
Start: 2021-05-18 | End: 2021-05-21

## 2021-05-18 RX ADMIN — Medication 10 ML: at 07:50

## 2021-05-18 RX ADMIN — MORPHINE SULFATE 4 MG: 4 INJECTION, SOLUTION INTRAMUSCULAR; INTRAVENOUS at 07:49

## 2021-05-18 RX ADMIN — MORPHINE SULFATE 4 MG: 4 INJECTION, SOLUTION INTRAMUSCULAR; INTRAVENOUS at 02:45

## 2021-05-18 RX ADMIN — CEFTRIAXONE SODIUM 1000 MG: 1 INJECTION, POWDER, FOR SOLUTION INTRAMUSCULAR; INTRAVENOUS at 01:57

## 2021-05-18 RX ADMIN — MORPHINE SULFATE 4 MG: 4 INJECTION, SOLUTION INTRAMUSCULAR; INTRAVENOUS at 13:27

## 2021-05-18 RX ADMIN — HEPARIN SODIUM 5000 UNITS: 5000 INJECTION INTRAVENOUS; SUBCUTANEOUS at 06:12

## 2021-05-18 ASSESSMENT — PAIN SCALES - GENERAL
PAINLEVEL_OUTOF10: 4
PAINLEVEL_OUTOF10: 8

## 2021-05-18 ASSESSMENT — PAIN DESCRIPTION - LOCATION: LOCATION: FLANK

## 2021-05-18 NOTE — DISCHARGE INSTR - COC
Continuity of Care Form    Patient Name: Robe Morris   :  1980  MRN:  [id number]    Admit date:  2021  Discharge date:  ***    Code Status Order: Full Code   Advance Directives:   Advance Care Flowsheet Documentation       Date/Time Healthcare Directive Type of Healthcare Directive Copy in [address] Agent's Name Healthcare Agent's Phone Number    21 0130  No, patient does not have an advance directive for healthcare treatment -- -- -- -- --            Admitting Physician:  Jose D Taylor MD  PCP: Sarah Shepherd PA-C    Discharging Nurse: [facility] Unit/Room#: 8083/1401-10  Discharging Unit Phone Number: ***    Emergency Contact:   Extended Emergency Contact Information  Primary Emergency Contact: Audrey Dimas  Address: [address] Phone: [phone]  Work Phone: [phone]  Mobile Phone: [phone]  Relation: Parent  Secondary Emergency Contact: [address] [place] [address] Phone: [phone]  Work Phone: [phone]  Mobile Phone: [phone]  Relation: Other    Past Surgical History:  Past Surgical History:   Procedure Laterality Date    BREAST ENHANCEMENT SURGERY      BREAST LUMPECTOMY      left breast    CYSTO/URETERO/PYELOSCOPY, CALCULUS TX Right 2020    HOLMIUM - CYSTO, RIGHT RETROGRADE PYELOGRAM, RIGHT URETEROSCOPY, LASER LITHO ON STAND BY, RIGHT STENT PLACEMENT performed by Bryan Yung MD at [address] [place] [address]  2020    Katy Slack      x2    LITHOTRIPSY Right 2020    ESWL EXTRACORPOREAL SHOCK WAVE LITHOTRIPSY performed by Bryan Yung MD at [facility]  2013    nerve block(right vervical C3/TON/C4/C5    NERVE BLOCK  2013    nerve block(right vervical C3/TON/C4/C5    OTHER SURGICAL HISTORY  03/10/2014     botox inj     TOE SURGERY      removal of ingrown toe nail-2nd toe on left foot URETEROSCOPY Right 06/12/2020    stent placement       Immunization History:   Immunization History   Administered Date(s) Administered    Influenza Vaccine, unspecified formulation 10/26/2016    Influenza Virus Vaccine 11/24/2014, 10/30/2015, 10/31/2018    Influenza, High Dose (Fluzone 65 yrs and older) 12/06/2011, 10/02/2012    Influenza, Quadv, IM, PF (6 mo and older Fluzone, Flulaval, Fluarix, and 3 yrs and older Afluria) 02/24/2021    Pneumococcal Polysaccharide ([id number]) 10/30/2015       Active Problems:  Patient Active Problem List   Diagnosis Code    Asthma J45.909    Smoker F17.200    Polycystic kidney Q61.3    Eczema L30.9    Depression with anxiety F41.8    Chronic headaches R51.9, G89.29    Tension vascular headache G44.209    Irregular bleeding N92.6    Metrorrhagia N92.1    Menorrhagia N92.0    Dysmenorrhea N94.6    Pelvic pain in female R10.2    Kidney stone N20.0    Acute back pain M54.9    Anxiety F41.9    Hypertension I10    Headache R51.9    Depression F32.9    CKD (chronic kidney disease) stage 3, GFR 30-59 ml/min (HCC) N18.30    Chronic neck pain M54.2, G89.29    Degenerative disc disease, cervical M50.30    Encounter for long-term (current) use of other medications Z79.899    Cervicalgia M54.2    Chronic intractable headache R51.9, G89.29    Hemorrhage of cyst of native kidney N28.89, N28.1    Abdominal pain R10.9    CKD (chronic kidney disease) stage 4, GFR 15-29 ml/min (HCC) N18.4    Acute renal failure with acute tubular necrosis superimposed on stage 3 chronic kidney disease (HCC) N17.0, N18.30    Allergic rhinitis due to animal hair and dander J30.81    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    Gross hematuria R31.0    History of anemia due to chronic kidney disease N18.9, Z86.2    History of multiple allergies Z91.89    Hyperlipidemia E78.5    IUD (intrauterine device) in place Z97.5    Leukocytosis D72.829    Other specified disorders of kidney and ureter N28.89    Pain in joint M25.50    Recurrent major depression in full remission ([facility] [address] F33.42    Renovascular hypertension I15.0    Right ureteral stone N20.1    Right nephrolithiasis N20.0    Flank pain R10.9    Acute kidney injury superimposed on chronic kidney disease (HCC) N17.9, N18.9    Urinary tract infection with hematuria N39.0, R31.9    History of major depression Z86.59    Iron deficiency anemia due to chronic blood loss D50.0    Hypernatremia E87.0    SILVANO (acute kidney injury) ([facility] [address] N17.9    Dysuria R30.0       Isolation/Infection:   Isolation            No Isolation          Patient Infection Status       Infection Onset Added Last Indicated Last Indicated By Review Planned Expiration Resolved Resolved By    None active    Resolved    COVID-19 Rule Out 07/19/20 07/19/20 07/19/20 COVID-19 (Ordered)   07/19/20 Rule-Out Test Resulted            Nurse Assessment:  Last Vital Signs: BP (!) 176/87   Pulse 75   Temp 98.4 °F (36.9 °C) (Oral)   Resp 18   Ht 5' 5\" (1.651 m)   Wt 126 lb 1.7 oz (57.2 kg)   LMP 04/25/2021 (Approximate)   SpO2 98%   BMI 20.98 kg/m²     Last documented pain score (0-10 scale): Pain Level: 4  Last Weight:   Wt Readings from Last 1 Encounters:   05/17/21 126 lb 1.7 oz (57.2 kg)     Mental Status:  {IP PT MENTAL STATUS:20030:::0}    IV Access:  { ALETHA IV ACCESS:314560103:::0}    Nursing Mobility/ADLs:  Walking   {CHP DME ADLs:507269967:::0}  Transfer  {CHP DME ADLs:538596975:::0}  Bathing  {CHP DME ADLs:432881308:::0}  Dressing  {CHP DME ADLs:848536419:::0}  Toileting  {CHP DME ADLs:087272693:::0}  Feeding  {CHP DME ADLs:988531005:::0}  Med Admin  {P DME ADLs:905119899:::0}  Med Delivery   { ALETHA MED Delivery:156676814:::0}    Wound Care Documentation and Therapy:        Elimination:  Continence:    Bowel: {YES / AV:02563}  Bladder: {YES / VR:76356}  Urinary Catheter: {Urinary Catheter:429514419:::0}   Colostomy/Ileostomy/Ileal Conduit: {YES / VT:12183}       Date of Last BM: ***    Intake/Output Discharge: ***    Physician Certification: I certify the above information and transfer of Robe Solkristin  is necessary for the continuing treatment of the diagnosis listed and that she requires {Admit to Appropriate Level of Care:09444:::0} for {GREATER/LESS:777858998} 30 days.      Update Admission H&P: {CHP DME Changes in HandP:853759534:::0}    PHYSICIAN SIGNATURE:  {Esignature:456721611:::0}

## 2021-05-18 NOTE — PROGRESS NOTES
8.2 (L) 8.6 - 10.4 mg/dL    Sodium 140 135 - 144 mmol/L    Potassium 4.2 3.7 - 5.3 mmol/L    Chloride 109 (H) 98 - 107 mmol/L    CO2 20 20 - 31 mmol/L    Anion Gap 11 9 - 17 mmol/L    GFR Non-African American 21 (L) >60 mL/min    GFR  25 (L) >60 mL/min    GFR Comment          GFR Staging NOT REPORTED    CBC    Collection Time: 05/18/21  6:22 AM   Result Value Ref Range    WBC 7.1 3.5 - 11.0 k/uL    RBC 3.08 (L) 4.0 - 5.2 m/uL    Hemoglobin 10.4 (L) 12.0 - 16.0 g/dL    Hematocrit 30.4 (L) 36 - 46 %    MCV 98.6 80 - 100 fL    MCH 33.6 26 - 34 pg    MCHC 34.1 31 - 37 g/dL    RDW 13.0 11.5 - 14.9 %    Platelets 409 720 - 418 k/uL    MPV 8.4 6.0 - 12.0 fL    NRBC Automated NOT REPORTED per 100 WBC   Magnesium    Collection Time: 05/18/21  6:22 AM   Result Value Ref Range    Magnesium 1.9 1.6 - 2.6 mg/dL     No results for input(s): POCGLU in the last 72 hours. CT ABDOMEN PELVIS WO CONTRAST Additional Contrast? None    Result Date: 5/16/2021  EXAMINATION: CT OF THE ABDOMEN AND PELVIS WITHOUT CONTRAST 5/16/2021 10:18 pm TECHNIQUE: CT of the abdomen and pelvis was performed without the administration of intravenous contrast. Multiplanar reformatted images are provided for review. Dose modulation, iterative reconstruction, and/or weight based adjustment of the mA/kV was utilized to reduce the radiation dose to as low as reasonably achievable. COMPARISON: February 22, 2021 CT abdomen and pelvis HISTORY: ORDERING SYSTEM PROVIDED HISTORY: right flank pain TECHNOLOGIST PROVIDED HISTORY: right flank pain Decision Support Exception - unselect if not a suspected or confirmed emergency medical condition->Emergency Medical Condition (MA) Is the patient pregnant?->No Reason for Exam: Right flank pain Acuity: Acute Type of Exam: Initial Additional signs and symptoms: Pt c/o right flank pain for past 3 hours. Pt states h/o kidney stones that are unable to be removed. FINDINGS: Lower Chest: Pectus excavatum.   Breast 2021    BUN 27 2021    CREATININE 2.55 2021    GLUCOSE 84 2021      LIVER PROFILE:  Lab Results   Component Value Date    ALT 6 2021    AST 11 2021    PROT 5.6 2021    BILITOT 0.20 2021    BILIDIR <0.08 2020    LABALBU 3.3 2021               Radiology:  Medications: Allergies:      Current Meds:   Scheduled Meds:    sodium chloride flush  5-40 mL Intravenous 2 times per day    heparin (porcine)  5,000 Units Subcutaneous 3 times per day    hydrOXYzine  50 mg Oral Nightly    cefTRIAXone (ROCEPHIN) IV  1,000 mg Intravenous Q24H     Continuous Infusions:    sodium chloride      sodium chloride 100 mL/hr at 21 1842     PRN Meds: sodium chloride flush, sodium chloride, magnesium sulfate, promethazine **OR** ondansetron, polyethylene glycol, nicotine, acetaminophen **OR** acetaminophen, morphine **OR** morphine, melatonin      Physical Examination:        BP (!) 176/87   Pulse 75   Temp 98.4 °F (36.9 °C) (Oral)   Resp 18   Ht 5' 5\" (1.651 m)   Wt 126 lb 1.7 oz (57.2 kg)   LMP 2021 (Approximate)   SpO2 98%   BMI 20.98 kg/m²   Temp (24hrs), Av.5 °F (36.9 °C), Min:98.4 °F (36.9 °C), Max:98.6 °F (37 °C)    No results for input(s): POCGLU in the last 72 hours. Intake/Output Summary (Last 24 hours) at 2021 0957  Last data filed at 2021 0615  Gross per 24 hour   Intake 1215 ml   Output --   Net 1215 ml       General Appearance:  alert, well appearing, and in no acute distress  Mental status: oriented to person, place, and time with normal affect  Head:  normocephalic, atraumatic.   Eye: no icterus, redness, pupils equal and reactive, extraocular eye movements intact, conjunctiva clear  Ear: normal external ear, no discharge, hearing intact  Nose:  no drainage noted  Mouth: mucous membranes moist  Neck: supple, no carotid bruits, thyroid not palpable  Lungs: Bilateral equal air entry, clear to ausculation, no wheezing, rales or rhonchi, normal effort  Cardiovascular: normal rate, regular rhythm, no murmur, gallop, rub. Abdomen: Soft, nontender, nondistended, normal bowel sounds, no hepatomegaly or splenomegaly  Neurologic: There are no new focal motor or sensory deficits, normal muscle tone and bulk, no abnormal sensation, normal speech, cranial nerves II through XII grossly intact  Skin: No gross lesions, rashes, bruising or bleeding on exposed skin area  Extremities:  peripheral pulses palpable, no pedal edema or calf pain with palpation  Psych:       Assessment:        Primary Problem  SILVANO (acute kidney injury) [facility])    Active Hospital Problems    Diagnosis Date Noted    Dysuria [R30.0] 05/17/2021    SILVANO (acute kidney injury) ([facility] [address] [N17.9] 05/16/2021    Acute kidney injury superimposed on chronic kidney disease ([facility] [address] [N17.9, N18.9] 02/21/2021    Flank pain [R10.9] 07/31/2020    Right nephrolithiasis [N20.0] 06/30/2020    CKD (chronic kidney disease) stage 4, GFR 15-29 ml/min ([facility] [address] [N18.4] 06/18/2020    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 Discharge home . 2. He is known to have polycystic kidneys and has CKD stage  3. Has nonobstructing right nephrolithiasis  4. 5 mm stone  5. No surgical intervention planned  6. Still has some pain  7. Creatinine improved from 2.8-2.55  8. Her baseline creatinine is in that range  9.  We plan to discharge her to    Javi Park MD  5/18/2021  9:57 AM

## 2021-05-18 NOTE — PLAN OF CARE
Problem: Pain:  Goal: Pain level will decrease  Description: Pain level will decrease  5/18/2021 0404 by Zonia Fritz RN  Outcome: Ongoing  Note: Pain is controlled with PRN pain medications. 5/17/2021 1718 by Baltazar Delgado RN  Outcome: Ongoing  Note: Pain medication given as ordered for pain control this shift.   Goal: Control of acute pain  Description: Control of acute pain  5/18/2021 0404 by Zonia Fritz RN  Outcome: Ongoing  5/17/2021 1718 by Baltazar Delgado RN  Outcome: Ongoing  Goal: Control of chronic pain  Description: Control of chronic pain  5/18/2021 0404 by Zonia Fritz RN  Outcome: Ongoing  5/17/2021 1718 by Baltazar Delgado RN  Outcome: Ongoing     Problem: Fluid Volume:  Goal: Will show no signs or symptoms of fluid imbalance  Description: Will show no signs or symptoms of fluid imbalance  5/18/2021 0404 by Zonia Fritz RN  Outcome: Ongoing  5/17/2021 1718 by Baltazar Delgado RN  Outcome: Ongoing

## 2021-05-18 NOTE — FLOWSHEET NOTE
05/17/21 2021   Encounter Summary   Services provided to: Patient   Referral/Consult From: Rounding   Complexity of Encounter Low   Length of Encounter 15 minutes   Spiritual/Worship   Type Spiritual support   Assessment Sleeping   Intervention Prayer

## 2021-05-18 NOTE — CARE COORDINATION
ONGOING DISCHARGE PLAN:    Patient is alert and oriented x4. Spoke with patient regarding discharge plan and patient confirms that plan is still to Return to home alone, w/ no needs. Denies VNS. CR today 2.55 from 3.21 on admission. Bun 27 from 32 on admission. Nephro following. Anticipate DC today. Will continue to follow for additional discharge needs.     Electronically signed by Aileen Mcdonald RN on 5/18/2021 at 10:26 AM

## 2021-05-18 NOTE — DISCHARGE SUMMARY
[facility] Internal Medicine    Discharge Summary     Patient ID: Essence Cline  :  1980   MRN: [id number]     ACCOUNT:  [de-identified]   Patient's PCP: Caitlin Price PA-C  Admit Date: 2021   Discharge Date:  21    Length of Stay: 1  Code Status:  Full Code  Admitting Physician: Devon Bernal MD  Discharge Physician: Devon Bernal MD     Active Discharge Diagnoses:     Primary Problem  SILVANO (acute kidney injury) [facility] Problems    Diagnosis Date Noted    Dysuria [R30.0] 2021    SILVANO (acute kidney injury) ([address] [N17.9] 2021    Acute kidney injury superimposed on chronic kidney disease ([address] [N17.9, N18.9] 2021    Flank pain [R10.9] 2020    Right nephrolithiasis [N20.0] 2020    CKD (chronic kidney disease) stage 4, GFR 15-29 ml/min ([address] [N18.4] 2020    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 female who was admitted for the management of   .     , presented with Flank Pain (right)      ,                         SILVANO (acute kidney injury) ([address] Principal Problem:    SILVANO (acute kidney injury) ([address]  Active Problems:    Smoker    Polycystic kidney    CKD (chronic kidney disease) stage 4, GFR 15-29 ml/min (HCC)    Congenital multiple renal cysts    Right nephrolithiasis    Flank pain    Acute kidney injury superimposed on chronic kidney disease ([address]    Dysuria  Resolved Problems:    * No resolved hospital problems. *       Significant therapeutic interventions:          The patient is a 35 y.o.  female, with a history of chronic kidney disease, polycystic kidney disease, hypertension, kidney stone, headache, kidney disease, and lower back pain. Patient presents with right flank pain and pressure with urination starting 5 PM today. She has had previous stents for kidney stones. Also complains nausea but no vomiting. Denies chest pain, cough, shortness of breath, abdominal pain, hematuria, fever or chills. Denies injury.     HPI   1) Location/Symptom right flank pain, nausea, dysuria  2) Timing/Onset: Sudden onset 5 PM  3) Context/Setting: Patient states he took it Norco at home which did not help her pain, history of CKD and kidney stones  4) Quality: Sharp, aching  5) Duration: continuous   6) Modifying Factors: No aggravating or alleviating factors. 7) Severity: moderate      PAST MEDICAL HISTORY   Patient  has a past medical history of Anxiety, Asthma, Chronic headaches, CKD (chronic kidney disease) stage 3, GFR 30-59 ml/min, Degenerative disc disease, cervical, Depression, Dysmenorrhea, Eczema, Headache(784.0), HTN (hypertension), Hypertension, Hypocalcemia, Kidney stone, Menorrhagia, Neck pain, bilateral, Pelvic pain in female, Polycystic kidney, Smoker, Tachycardia, and Tension vascular headache.   Creatinine was 2.85 on admission and it has come down to 2.55 which is close to her baseline  She has a 5 mm nonobstructing stone in the right kidney  Urology does not plan any intervention  Discussed with patient she is comfortable going home and follow-up with urologist        Significant Diagnostic Studies:   Labs / Luna Click:       Results for orders placed or performed during the hospital encounter of 05/16/21   CBC Auto Differential   Result Value Ref Range    WBC 7.5 3.5 - 11.0 k/uL    RBC 3.36 (L) 4.0 - 5.2 m/uL    Hemoglobin 11.1 (L) 12.0 - 16.0 g/dL    Hematocrit 32.3 (L) 36 - 46 %    MCV 95.9 80 - 100 fL    MCH 32.9 26 - 34 pg    MCHC 34.3 31 - 37 g/dL    RDW 12.7 11.5 - 14.9 %    Platelets 408 340 - 223 k/uL    MPV 7.9 6.0 - 12.0 fL    NRBC Automated NOT REPORTED per 100 WBC    Differential Type NOT REPORTED     Seg Neutrophils 68 (H) 36 - 66 %    Lymphocytes 25 24 - 44 %    Monocytes 5 1 - 7 %    Eosinophils % 1 0 - 4 %    Basophils 1 0 - 2 %    Immature Granulocytes NOT REPORTED 0 %    Segs Absolute 5.10 1.3 - 9.1 k/uL    Absolute Lymph # 1.80 1.0 - 4.8 k/uL    Absolute Mono # 0.30 0.1 - 1.3 k/uL    Absolute Eos # 0.10 0.0 - 0.4 k/uL    Basophils Absolute 0.10 0.0 - 0.2 k/uL    Absolute Immature Granulocyte NOT REPORTED 0.00 - 0.30 k/uL    WBC Morphology NOT REPORTED     RBC Morphology NOT REPORTED     Platelet Estimate NOT REPORTED    Basic Metabolic Panel   Result Value Ref Range    Glucose 92 70 - 99 mg/dL    BUN 32 (H) 6 - 20 mg/dL    CREATININE 3.21 (H) 0.50 - 0.90 mg/dL    Bun/Cre Ratio NOT REPORTED 9 - 20    Calcium 8.8 8.6 - 10.4 mg/dL    Sodium 140 135 - 144 mmol/L    Potassium 4.4 3.7 - 5.3 mmol/L    Chloride 106 98 - 107 mmol/L    CO2 24 20 - 31 mmol/L    Anion Gap 10 9 - 17 mmol/L    GFR Non-African American 16 (L) >60 mL/min    GFR  19 (L) >60 mL/min    GFR Comment          GFR Staging NOT REPORTED    Magnesium   Result Value Ref Range    Magnesium 2.2 1.6 - 2.6 mg/dL   APTT   Result Value Ref Range    PTT 36.3 (H) 24.0 - 36.0 sec   Protime-INR   Result Value Ref Range    Protime 11.9 11.8 - 14.6 sec    INR 0.9    Urinalysis   Result Value Ref Range    Color, UA YELLOW YELLOW    Turbidity UA CLOUDY (A) CLEAR    Glucose, Ur NEGATIVE NEGATIVE    Bilirubin Urine NEGATIVE NEGATIVE    Ketones, Urine NEGATIVE NEGATIVE    Specific Gravity, UA 1.009 1.000 - 1.030    Urine Hgb LARGE (A) NEGATIVE    pH, UA 7.0 5.0 - 8.0    Protein, UA 1+ (A) NEGATIVE    Urobilinogen, Urine Normal Normal    Nitrite, Urine NEGATIVE NEGATIVE    Leukocyte Esterase, Urine MOD (A) NEGATIVE    Urinalysis Comments NOT REPORTED    HCG, Pregnancy,Urine   Result Value Ref Range    HCG(Urine) Pregnancy Test NEGATIVE NEGATIVE   Microscopic Urinalysis   Result Value Ref Range    -          WBC, UA 5 TO 10 /HPF    RBC, UA None /HPF    Casts UA NOT REPORTED /LPF    Crystals, UA NOT REPORTED None /HPF    Epithelial Cells UA 2 TO 5 /HPF    Renal Epithelial, UA NOT REPORTED 0 /HPF    Bacteria, UA NOT REPORTED None    Mucus, UA NOT REPORTED None    Trichomonas, UA NOT REPORTED None    Amorphous, UA NOT REPORTED None    Other Observations UA NOT REPORTED NOT REQ.     Yeast, UA NOT REPORTED None   Basic Metabolic Panel w/ Reflex to MG   Result Value Ref Range    Glucose 81 70 - 99 mg/dL    BUN 27 (H) 6 - 20 mg/dL    CREATININE 2.85 (H) 0.50 - 0.90 mg/dL    Bun/Cre Ratio NOT REPORTED 9 - 20    Calcium 7.9 (L) 8.6 - 10.4 mg/dL    Sodium 142 135 - 144 mmol/L    Potassium 4.1 3.7 - 5.3 mmol/L    Chloride 110 (H) 98 - 107 mmol/L    CO2 23 20 - 31 mmol/L    Anion Gap 9 9 - 17 mmol/L    GFR Non-African American 18 (L) >60 mL/min    GFR  22 (L) >60 mL/min    GFR Comment          GFR Staging NOT REPORTED    CBC   Result Value Ref Range    WBC 7.6 3.5 - 11.0 k/uL    RBC 2.91 (L) 4.0 - 5.2 m/uL    Hemoglobin 9.6 (L) 12.0 - 16.0 g/dL    Hematocrit 28.4 (L) 36 - 46 %    MCV 97.7 80 - 100 fL    MCH 32.9 26 - 34 pg    MCHC 33.7 31 - 37 g/dL    RDW 12.9 11.5 - 14.9 %    Platelets 679 362 - 458 k/uL    MPV 8.1 6.0 - 12.0 fL    NRBC Automated NOT REPORTED per 100 WBC   Magnesium   Result Value Ref Range    Magnesium 2.0 1.6 - 2.6 mg/dL   PHOSPHORUS   Result Value Ref Range    Phosphorus 3.2 2.6 - 4.5 mg/dL   Basic Metabolic Panel w/ Reflex to MG   Result Value Ref Range    Glucose 84 70 - 99 mg/dL    BUN 27 (H) 6 - 20 mg/dL    CREATININE 2.55 (H) 0.50 - 0.90 mg/dL    Bun/Cre Ratio NOT REPORTED 9 - 20    Calcium 8.2 (L) 8.6 - 10.4 mg/dL    Sodium 140 135 - 144 mmol/L    Potassium 4.2 3.7 - 5.3 mmol/L    Chloride 109 (H) 98 - 107 mmol/L    CO2 20 20 - 31 mmol/L    Anion Gap 11 9 - 17 mmol/L    GFR Non-African American 21 (L) >60 mL/min    GFR  25 (L) >60 mL/min    GFR Comment          GFR Staging NOT REPORTED    CBC   Result Value Ref Range    WBC 7.1 3.5 - 11.0 k/uL    RBC 3.08 (L) 4.0 - 5.2 m/uL    Hemoglobin 10.4 (L) 12.0 - 16.0 g/dL    Hematocrit 30.4 (L) 36 - 46 %    MCV 98.6 80 - 100 fL    MCH 33.6 26 - 34 pg    MCHC 34.1 31 - 37 g/dL    RDW 13.0 11.5 - 14.9 %    Platelets 876 529 - 777 k/uL    MPV 8.4 6.0 - 12.0 fL    NRBC Automated NOT REPORTED per 100 WBC   Magnesium   Result Value Ref Range    Magnesium 1.9 1.6 - 2.6 mg/dL        {Radiology:    CT ABDOMEN PELVIS WO CONTRAST Additional Contrast? None    Result Date: 5/16/2021  EXAMINATION: CT OF THE ABDOMEN AND PELVIS WITHOUT CONTRAST 5/16/2021 10:18 pm TECHNIQUE: CT of the abdomen and pelvis was performed without the administration of intravenous contrast. Multiplanar reformatted images are provided for review. Dose modulation, iterative reconstruction, and/or weight based adjustment of the mA/kV was utilized to reduce the radiation dose to as low as reasonably achievable. COMPARISON: February 22, 2021 CT abdomen and pelvis HISTORY: ORDERING SYSTEM PROVIDED HISTORY: right flank pain TECHNOLOGIST PROVIDED HISTORY: right flank pain Decision Support Exception - unselect if not a suspected or confirmed emergency medical condition->Emergency Medical Condition (MA) Is the patient pregnant?->No Reason for Exam: Right flank pain Acuity: Acute Type of Exam: Initial Additional signs and symptoms: Pt c/o right flank pain for past 3 hours. Pt states h/o kidney stones that are unable to be removed. FINDINGS: Lower Chest: Pectus excavatum. Breast implants. Organs: The abdominal wall appears normal. The liver, spleen, pancreas, and adrenals appear normal.  Multiple simple cysts throughout the liver unchanged. Gallbladder normal. Innumerable simple and dense hemorrhagic cysts throughout the kidneys which are enlarged bilaterally. These appear unchanged and measure up to 35 mm on the left. 5 mm nonobstructing nephrolith on the right. The bladder appears normal. GI/Bowel:  The stomach,small bowel, and colon appear normal. Appendiculoliths within the appendix unchanged. Appendix appears normal in size. Pelvis: IUD noted in the uterus. Peritoneum/Retroperitoneum: The abdominal aorta and iliac arteries are normal in caliber. There is no pathologic adenopathy. Bones/Soft Tissues: Normal     Polycystic kidney disease. 5 mm nonobstructing nephrolith on the right. No significant change or acute disease. Consultations:    Consults:     Final Specialist Recommendations/Findings:   IP CONSULT TO UROLOGY  IP CONSULT TO NEPHROLOGY  IP CONSULT TO UROLOGY      The patient was seen and examined on day of discharge and this discharge summary is in conjunction with any daily progress note from day of discharge. Discharge plan:     Disposition: Home    Physician Follow Up: With PCP and as specified     Requiring Further Evaluation/Follow Up POST HOSPITALIZATION/Incidental Findings:    Diet: regular     Activity: As tolerated    I  Discharge Medications:      Medication List      ASK your doctor about these medications    amLODIPine 5 MG tablet  Commonly known as: NORVASC     Blood Pressure Kit  Check BP once/day- goal is <140/90. DULoxetine 60 MG extended release capsule  Commonly known as: CYMBALTA  take 1 capsule by mouth once daily     HYDROcodone-acetaminophen 5-325 MG per tablet  Commonly known as: NORCO     hydrOXYzine 50 MG tablet  Commonly known as: ATARAX     melatonin 3 MG Tabs tablet     ondansetron 4 MG tablet  Commonly known as: ZOFRAN              Time spent on discharge planning ;          [] less than 30 minutes . [x]   more  than 30 minutes . Ellectronically signed by   Jordon Alcantar MD      Thank you Dr. Maury Ortiz PA-C for the opportunity to be involved in this patient's care. Please note that this chart was generated using voice recognition Dragon dictation software.   Although every effort was made to ensure the accuracy of this automated transcription, some errors in transcription may have occurred.

## 2021-06-18 PROCEDURE — 96375 TX/PRO/DX INJ NEW DRUG ADDON: CPT

## 2021-06-18 PROCEDURE — 99285 EMERGENCY DEPT VISIT HI MDM: CPT

## 2021-06-18 PROCEDURE — 96365 THER/PROPH/DIAG IV INF INIT: CPT

## 2021-06-18 PROCEDURE — 96376 TX/PRO/DX INJ SAME DRUG ADON: CPT

## 2021-06-18 ASSESSMENT — PAIN SCALES - GENERAL: PAINLEVEL_OUTOF10: 8

## 2021-06-19 ENCOUNTER — APPOINTMENT (OUTPATIENT)
Dept: CT IMAGING | Age: 41
DRG: 469 | End: 2021-06-19
Payer: MEDICARE

## 2021-06-19 ENCOUNTER — HOSPITAL ENCOUNTER (INPATIENT)
Age: 41
LOS: 4 days | Discharge: HOME | DRG: 469 | End: 2021-06-23
Attending: STUDENT IN AN ORGANIZED HEALTH CARE EDUCATION/TRAINING PROGRAM | Admitting: INTERNAL MEDICINE
Payer: MEDICARE

## 2021-06-19 DIAGNOSIS — R10.9 FLANK PAIN: Primary | ICD-10-CM

## 2021-06-19 DIAGNOSIS — N17.9 ACUTE KIDNEY INJURY (HCC): ICD-10-CM

## 2021-06-19 DIAGNOSIS — Q61.3 POLYCYSTIC KIDNEY DISEASE: ICD-10-CM

## 2021-06-19 DIAGNOSIS — N39.0 URINARY TRACT INFECTION WITHOUT HEMATURIA, SITE UNSPECIFIED: ICD-10-CM

## 2021-06-19 PROBLEM — N18.9 ACUTE KIDNEY INJURY SUPERIMPOSED ON CKD (HCC): Status: ACTIVE | Noted: 2021-06-19

## 2021-06-19 LAB
-: ABNORMAL
ABSOLUTE EOS #: 0.2 K/UL (ref 0–0.4)
ABSOLUTE IMMATURE GRANULOCYTE: ABNORMAL K/UL (ref 0–0.3)
ABSOLUTE LYMPH #: 1.7 K/UL (ref 1–4.8)
ABSOLUTE MONO #: 0.4 K/UL (ref 0.1–1.3)
ALBUMIN SERPL-MCNC: 4.4 G/DL (ref 3.5–5.2)
ALBUMIN/GLOBULIN RATIO: ABNORMAL (ref 1–2.5)
ALP BLD-CCNC: 105 U/L (ref 35–104)
ALT SERPL-CCNC: <5 U/L (ref 5–33)
AMORPHOUS: ABNORMAL
ANION GAP SERPL CALCULATED.3IONS-SCNC: 15 MMOL/L (ref 9–17)
AST SERPL-CCNC: 11 U/L
BACTERIA: ABNORMAL
BASOPHILS # BLD: 1 % (ref 0–2)
BASOPHILS ABSOLUTE: 0.1 K/UL (ref 0–0.2)
BILIRUB SERPL-MCNC: 0.18 MG/DL (ref 0.3–1.2)
BILIRUBIN URINE: NEGATIVE
BUN BLDV-MCNC: 30 MG/DL (ref 6–20)
BUN/CREAT BLD: ABNORMAL (ref 9–20)
CALCIUM SERPL-MCNC: 8.3 MG/DL (ref 8.6–10.4)
CASTS UA: ABNORMAL /LPF
CHLORIDE BLD-SCNC: 105 MMOL/L (ref 98–107)
CO2: 21 MMOL/L (ref 20–31)
COLOR: YELLOW
COMMENT UA: ABNORMAL
CREAT SERPL-MCNC: 3.67 MG/DL (ref 0.5–0.9)
CRYSTALS, UA: ABNORMAL /HPF
DIFFERENTIAL TYPE: ABNORMAL
EOSINOPHILS RELATIVE PERCENT: 2 % (ref 0–4)
EPITHELIAL CELLS UA: ABNORMAL /HPF
GFR AFRICAN AMERICAN: 16 ML/MIN
GFR NON-AFRICAN AMERICAN: 13 ML/MIN
GFR SERPL CREATININE-BSD FRML MDRD: ABNORMAL ML/MIN/{1.73_M2}
GFR SERPL CREATININE-BSD FRML MDRD: ABNORMAL ML/MIN/{1.73_M2}
GLUCOSE BLD-MCNC: 90 MG/DL (ref 70–99)
GLUCOSE URINE: NEGATIVE
HCG(URINE) PREGNANCY TEST: NEGATIVE
HCT VFR BLD CALC: 32.8 % (ref 36–46)
HEMOGLOBIN: 11.1 G/DL (ref 12–16)
IMMATURE GRANULOCYTES: ABNORMAL %
KETONES, URINE: NEGATIVE
LEUKOCYTE ESTERASE, URINE: ABNORMAL
LIPASE: 28 U/L (ref 13–60)
LYMPHOCYTES # BLD: 16 % (ref 24–44)
MCH RBC QN AUTO: 33 PG (ref 26–34)
MCHC RBC AUTO-ENTMCNC: 33.7 G/DL (ref 31–37)
MCV RBC AUTO: 98 FL (ref 80–100)
MONOCYTES # BLD: 4 % (ref 1–7)
MUCUS: ABNORMAL
NITRITE, URINE: NEGATIVE
NRBC AUTOMATED: ABNORMAL PER 100 WBC
OTHER OBSERVATIONS UA: ABNORMAL
PDW BLD-RTO: 14.8 % (ref 11.5–14.9)
PH UA: 6.5 (ref 5–8)
PLATELET # BLD: 264 K/UL (ref 150–450)
PLATELET ESTIMATE: ABNORMAL
PMV BLD AUTO: 9.1 FL (ref 6–12)
POTASSIUM SERPL-SCNC: 4.3 MMOL/L (ref 3.7–5.3)
PROTEIN UA: ABNORMAL
RBC # BLD: 3.35 M/UL (ref 4–5.2)
RBC # BLD: ABNORMAL 10*6/UL
RBC UA: ABNORMAL /HPF
RENAL EPITHELIAL, UA: ABNORMAL /HPF
SEG NEUTROPHILS: 77 % (ref 36–66)
SEGMENTED NEUTROPHILS ABSOLUTE COUNT: 8.4 K/UL (ref 1.3–9.1)
SODIUM BLD-SCNC: 141 MMOL/L (ref 135–144)
SODIUM,UR: 51 MMOL/L
SPECIFIC GRAVITY UA: 1.01 (ref 1–1.03)
TOTAL PROTEIN, URINE: 25 MG/DL
TOTAL PROTEIN: 7.6 G/DL (ref 6.4–8.3)
TRICHOMONAS: ABNORMAL
TURBIDITY: CLEAR
URINE HGB: ABNORMAL
UROBILINOGEN, URINE: NORMAL
WBC # BLD: 10.8 K/UL (ref 3.5–11)
WBC # BLD: ABNORMAL 10*3/UL
WBC UA: ABNORMAL /HPF
YEAST: ABNORMAL

## 2021-06-19 PROCEDURE — 2580000003 HC RX 258: Performed by: NURSE PRACTITIONER

## 2021-06-19 PROCEDURE — 6370000000 HC RX 637 (ALT 250 FOR IP): Performed by: NURSE PRACTITIONER

## 2021-06-19 PROCEDURE — G0378 HOSPITAL OBSERVATION PER HR: HCPCS

## 2021-06-19 PROCEDURE — 96365 THER/PROPH/DIAG IV INF INIT: CPT

## 2021-06-19 PROCEDURE — 2060000000 HC ICU INTERMEDIATE R&B

## 2021-06-19 PROCEDURE — 99223 1ST HOSP IP/OBS HIGH 75: CPT | Performed by: INTERNAL MEDICINE

## 2021-06-19 PROCEDURE — 96376 TX/PRO/DX INJ SAME DRUG ADON: CPT

## 2021-06-19 PROCEDURE — 6360000002 HC RX W HCPCS: Performed by: STUDENT IN AN ORGANIZED HEALTH CARE EDUCATION/TRAINING PROGRAM

## 2021-06-19 PROCEDURE — 6360000002 HC RX W HCPCS: Performed by: NURSE PRACTITIONER

## 2021-06-19 PROCEDURE — 81001 URINALYSIS AUTO W/SCOPE: CPT

## 2021-06-19 PROCEDURE — 80053 COMPREHEN METABOLIC PANEL: CPT

## 2021-06-19 PROCEDURE — 96372 THER/PROPH/DIAG INJ SC/IM: CPT

## 2021-06-19 PROCEDURE — 96375 TX/PRO/DX INJ NEW DRUG ADDON: CPT

## 2021-06-19 PROCEDURE — 83690 ASSAY OF LIPASE: CPT

## 2021-06-19 PROCEDURE — 87086 URINE CULTURE/COLONY COUNT: CPT

## 2021-06-19 PROCEDURE — 74176 CT ABD & PELVIS W/O CONTRAST: CPT

## 2021-06-19 PROCEDURE — 85025 COMPLETE CBC W/AUTO DIFF WBC: CPT

## 2021-06-19 PROCEDURE — 2580000003 HC RX 258: Performed by: STUDENT IN AN ORGANIZED HEALTH CARE EDUCATION/TRAINING PROGRAM

## 2021-06-19 PROCEDURE — 84300 ASSAY OF URINE SODIUM: CPT

## 2021-06-19 PROCEDURE — 84156 ASSAY OF PROTEIN URINE: CPT

## 2021-06-19 PROCEDURE — 81025 URINE PREGNANCY TEST: CPT

## 2021-06-19 PROCEDURE — 36415 COLL VENOUS BLD VENIPUNCTURE: CPT

## 2021-06-19 RX ORDER — NICOTINE 21 MG/24HR
1 PATCH, TRANSDERMAL 24 HOURS TRANSDERMAL DAILY
Status: DISCONTINUED | OUTPATIENT
Start: 2021-06-19 | End: 2021-06-23 | Stop reason: HOSPADM

## 2021-06-19 RX ORDER — HEPARIN SODIUM 5000 [USP'U]/ML
5000 INJECTION, SOLUTION INTRAVENOUS; SUBCUTANEOUS EVERY 8 HOURS SCHEDULED
Status: DISCONTINUED | OUTPATIENT
Start: 2021-06-19 | End: 2021-06-23 | Stop reason: HOSPADM

## 2021-06-19 RX ORDER — ACETAMINOPHEN 650 MG/1
650 SUPPOSITORY RECTAL EVERY 6 HOURS PRN
Status: DISCONTINUED | OUTPATIENT
Start: 2021-06-19 | End: 2021-06-23 | Stop reason: HOSPADM

## 2021-06-19 RX ORDER — FENTANYL CITRATE 50 UG/ML
100 INJECTION, SOLUTION INTRAMUSCULAR; INTRAVENOUS ONCE
Status: COMPLETED | OUTPATIENT
Start: 2021-06-19 | End: 2021-06-19

## 2021-06-19 RX ORDER — METOPROLOL SUCCINATE 50 MG/1
50 TABLET, EXTENDED RELEASE ORAL DAILY
COMMUNITY

## 2021-06-19 RX ORDER — AMLODIPINE BESYLATE 5 MG/1
5 TABLET ORAL DAILY
Status: DISCONTINUED | OUTPATIENT
Start: 2021-06-19 | End: 2021-06-23 | Stop reason: HOSPADM

## 2021-06-19 RX ORDER — ONDANSETRON 2 MG/ML
4 INJECTION INTRAMUSCULAR; INTRAVENOUS EVERY 6 HOURS PRN
Status: DISCONTINUED | OUTPATIENT
Start: 2021-06-19 | End: 2021-06-23 | Stop reason: HOSPADM

## 2021-06-19 RX ORDER — FENTANYL CITRATE 50 UG/ML
100 INJECTION, SOLUTION INTRAMUSCULAR; INTRAVENOUS
Status: DISCONTINUED | OUTPATIENT
Start: 2021-06-19 | End: 2021-06-21

## 2021-06-19 RX ORDER — ONDANSETRON 4 MG/1
4 TABLET, ORALLY DISINTEGRATING ORAL EVERY 8 HOURS PRN
Status: DISCONTINUED | OUTPATIENT
Start: 2021-06-19 | End: 2021-06-23 | Stop reason: HOSPADM

## 2021-06-19 RX ORDER — SODIUM CHLORIDE 9 MG/ML
INJECTION, SOLUTION INTRAVENOUS CONTINUOUS
Status: DISCONTINUED | OUTPATIENT
Start: 2021-06-19 | End: 2021-06-20

## 2021-06-19 RX ORDER — 0.9 % SODIUM CHLORIDE 0.9 %
1000 INTRAVENOUS SOLUTION INTRAVENOUS ONCE
Status: COMPLETED | OUTPATIENT
Start: 2021-06-19 | End: 2021-06-19

## 2021-06-19 RX ORDER — ACETAMINOPHEN 325 MG/1
650 TABLET ORAL EVERY 6 HOURS PRN
Status: DISCONTINUED | OUTPATIENT
Start: 2021-06-19 | End: 2021-06-23 | Stop reason: HOSPADM

## 2021-06-19 RX ORDER — FENTANYL CITRATE 50 UG/ML
50 INJECTION, SOLUTION INTRAMUSCULAR; INTRAVENOUS ONCE
Status: COMPLETED | OUTPATIENT
Start: 2021-06-19 | End: 2021-06-19

## 2021-06-19 RX ORDER — HYDROXYZINE HYDROCHLORIDE 25 MG/1
50 TABLET, FILM COATED ORAL NIGHTLY
Status: DISCONTINUED | OUTPATIENT
Start: 2021-06-19 | End: 2021-06-23 | Stop reason: HOSPADM

## 2021-06-19 RX ORDER — FENTANYL CITRATE 50 UG/ML
50 INJECTION, SOLUTION INTRAMUSCULAR; INTRAVENOUS
Status: DISCONTINUED | OUTPATIENT
Start: 2021-06-19 | End: 2021-06-21

## 2021-06-19 RX ORDER — SODIUM CHLORIDE 0.9 % (FLUSH) 0.9 %
5-40 SYRINGE (ML) INJECTION EVERY 12 HOURS SCHEDULED
Status: DISCONTINUED | OUTPATIENT
Start: 2021-06-19 | End: 2021-06-23 | Stop reason: HOSPADM

## 2021-06-19 RX ORDER — SODIUM CHLORIDE 0.9 % (FLUSH) 0.9 %
5-40 SYRINGE (ML) INJECTION PRN
Status: DISCONTINUED | OUTPATIENT
Start: 2021-06-19 | End: 2021-06-23 | Stop reason: HOSPADM

## 2021-06-19 RX ORDER — METOPROLOL SUCCINATE 50 MG/1
50 TABLET, EXTENDED RELEASE ORAL DAILY
Status: DISCONTINUED | OUTPATIENT
Start: 2021-06-19 | End: 2021-06-23 | Stop reason: HOSPADM

## 2021-06-19 RX ORDER — SODIUM CHLORIDE 9 MG/ML
25 INJECTION, SOLUTION INTRAVENOUS PRN
Status: DISCONTINUED | OUTPATIENT
Start: 2021-06-19 | End: 2021-06-23 | Stop reason: HOSPADM

## 2021-06-19 RX ADMIN — FENTANYL CITRATE 100 MCG: 50 INJECTION, SOLUTION INTRAMUSCULAR; INTRAVENOUS at 03:03

## 2021-06-19 RX ADMIN — FENTANYL CITRATE 100 MCG: 0.05 INJECTION, SOLUTION INTRAMUSCULAR; INTRAVENOUS at 20:37

## 2021-06-19 RX ADMIN — FENTANYL CITRATE 100 MCG: 0.05 INJECTION, SOLUTION INTRAMUSCULAR; INTRAVENOUS at 18:06

## 2021-06-19 RX ADMIN — HEPARIN SODIUM 5000 UNITS: 5000 INJECTION INTRAVENOUS; SUBCUTANEOUS at 09:24

## 2021-06-19 RX ADMIN — SODIUM CHLORIDE 1000 ML: 9 INJECTION, SOLUTION INTRAVENOUS at 04:58

## 2021-06-19 RX ADMIN — HEPARIN SODIUM 5000 UNITS: 5000 INJECTION INTRAVENOUS; SUBCUTANEOUS at 21:58

## 2021-06-19 RX ADMIN — HEPARIN SODIUM 5000 UNITS: 5000 INJECTION INTRAVENOUS; SUBCUTANEOUS at 14:44

## 2021-06-19 RX ADMIN — FENTANYL CITRATE 100 MCG: 0.05 INJECTION, SOLUTION INTRAMUSCULAR; INTRAVENOUS at 09:27

## 2021-06-19 RX ADMIN — SODIUM CHLORIDE: 9 INJECTION, SOLUTION INTRAVENOUS at 07:54

## 2021-06-19 RX ADMIN — CEFTRIAXONE SODIUM 1000 MG: 1 INJECTION, POWDER, FOR SOLUTION INTRAMUSCULAR; INTRAVENOUS at 04:14

## 2021-06-19 RX ADMIN — FENTANYL CITRATE 100 MCG: 0.05 INJECTION, SOLUTION INTRAMUSCULAR; INTRAVENOUS at 22:41

## 2021-06-19 RX ADMIN — METOPROLOL SUCCINATE 50 MG: 50 TABLET, EXTENDED RELEASE ORAL at 09:24

## 2021-06-19 RX ADMIN — SODIUM CHLORIDE: 9 INJECTION, SOLUTION INTRAVENOUS at 20:38

## 2021-06-19 RX ADMIN — AMLODIPINE BESYLATE 5 MG: 5 TABLET ORAL at 09:24

## 2021-06-19 RX ADMIN — FENTANYL CITRATE 100 MCG: 0.05 INJECTION, SOLUTION INTRAMUSCULAR; INTRAVENOUS at 14:44

## 2021-06-19 RX ADMIN — HYDROXYZINE HYDROCHLORIDE 50 MG: 25 TABLET, FILM COATED ORAL at 19:30

## 2021-06-19 RX ADMIN — FENTANYL CITRATE 50 MCG: 50 INJECTION, SOLUTION INTRAMUSCULAR; INTRAVENOUS at 04:12

## 2021-06-19 RX ADMIN — ACETAMINOPHEN 650 MG: 325 TABLET ORAL at 09:24

## 2021-06-19 ASSESSMENT — ENCOUNTER SYMPTOMS
COUGH: 0
SHORTNESS OF BREATH: 0
FACIAL SWELLING: 0
BACK PAIN: 0
VOMITING: 0
WHEEZING: 0
NAUSEA: 0
EYE ITCHING: 0
DIARRHEA: 0
SORE THROAT: 0
ABDOMINAL PAIN: 0
CONSTIPATION: 0
RHINORRHEA: 0
PHOTOPHOBIA: 0
COLOR CHANGE: 0
NAUSEA: 1

## 2021-06-19 ASSESSMENT — PAIN SCALES - GENERAL
PAINLEVEL_OUTOF10: 8
PAINLEVEL_OUTOF10: 5
PAINLEVEL_OUTOF10: 5
PAINLEVEL_OUTOF10: 8
PAINLEVEL_OUTOF10: 7
PAINLEVEL_OUTOF10: 9
PAINLEVEL_OUTOF10: 8

## 2021-06-19 NOTE — ED TRIAGE NOTES
Pt arrives to ED c/o flank pain states she has chronic kidney issues and is having a flare up. Pt is in NAD pt is A&Ox4,GCS=15. Pt ambulates with steady gait.

## 2021-06-19 NOTE — PROGRESS NOTES
Pt arrived to floor via stretcher from ED and was transfered to bed with staff assist.  Vitals taken. Monitor applied. Admission and assessment complete. No distress noted. See doc flowsheet and admission navigator for details. POC and education initiated and reviewed with patient. Call light within reach, and pt educated on its use. Bed in lowest position, and locked. Side rails up x 2. Denied further questions or needs at this time. Will continue to monitor.

## 2021-06-19 NOTE — ED NOTES
Report given to Ester Wagoner RN from PCU. Report method by phone   The following was reviewed with receiving RN:   Current vital signs:  /87   Pulse 75   Temp 98.5 °F (36.9 °C) (Oral)   Resp 18   Ht 5' 5\" (1.651 m)   Wt 125 lb (56.7 kg)   LMP 06/01/2021 (Approximate)   SpO2 97%   BMI 20.80 kg/m²                MEWS Score: 1     Any medication or safety alerts were reviewed. Any pending diagnostics and notifications were also reviewed, as well as any safety concerns or issues, abnormal labs, abnormal imaging, and abnormal assessment findings. Questions were answered.            Yandel Edwards RN  06/19/21 [id number]

## 2021-06-19 NOTE — ED NOTES
OB consult return call from resident: stated unless the patient is having sx such as cramping or bleeding, the IUD will not be removed. Suggests patient have a pelvic exam. Aware this patient is admitted to the floor and is not an ED patient. Resident to follow up as to said exam. Patient presently not complaining of vaginal bleeding or pelvic pain.       Edward David RN  06/19/21 5625

## 2021-06-19 NOTE — H&P
patient is not nervous/anxious. PHYSICAL EXAM      /66   Pulse 76   Temp 98.6 °F (37 °C) (Oral)   Resp 16   Ht 5' 5\" (1.651 m)   Wt 125 lb (56.7 kg)   LMP 06/01/2021 (Approximate)   SpO2 99%   BMI 20.80 kg/m²  Body mass index is 20.8 kg/m². Physical Exam  Constitutional:       General: She is not in acute distress. Appearance: She is well-developed. She is not diaphoretic. HENT:      Head: Normocephalic and atraumatic. Eyes:      Conjunctiva/sclera: Conjunctivae normal.      Pupils: Pupils are equal, round, and reactive to light. Neck:      Trachea: No tracheal deviation. Cardiovascular:      Rate and Rhythm: Normal rate and regular rhythm. Heart sounds: Normal heart sounds. No murmur heard. No friction rub. No gallop. Pulmonary:      Effort: Pulmonary effort is normal. No respiratory distress. Breath sounds: Normal breath sounds. No wheezing or rales. Chest:      Chest wall: No tenderness. Abdominal:      General: Bowel sounds are normal. There is no distension. Palpations: Abdomen is soft. Tenderness: There is no abdominal tenderness. There is right CVA tenderness and left CVA tenderness. There is no guarding. Musculoskeletal:         General: No tenderness. Normal range of motion. Cervical back: Normal range of motion and neck supple. Left lower leg: Edema (trace) present. Lymphadenopathy:      Cervical: No cervical adenopathy. Skin:     General: Skin is warm and dry. Coloration: Skin is not pale. Findings: No erythema or rash. Neurological:      Mental Status: She is alert and oriented to person, place, and time. Motor: No seizure activity. Coordination: Coordination normal.   Psychiatric:         Behavior: Behavior normal.         Thought Content:  Thought content normal.       DIAGNOSTICS      EKG: none    Labs:  CBC:   Recent Labs     06/19/21  0300   WBC 10.8   HGB 11.1*        BMP:    Recent Labs 06/19/21  0300      K 4.3      CO2 21   BUN 30*   CREATININE 3.67*   GLUCOSE 90     S. Calcium:  Recent Labs     06/19/21  0300   CALCIUM 8.3*     S. Ionized Calcium:No results for input(s): IONCA in the last 72 hours. S. Magnesium:No results for input(s): MG in the last 72 hours. S. Phosphorus:No results for input(s): PHOS in the last 72 hours. S. Glucose:No results for input(s): POCGLU in the last 72 hours. Glycosylated hemoglobin A1C:   Lab Results   Component Value Date    LABA1C 4.9 02/20/2017     Hepatic:   Recent Labs     06/19/21  0300   AST 11   ALT <5*   ALKPHOS 105*     CARDIAC ENZY: No results for input(s): CKTOTAL, CKMB, CKMBINDEX, TROPHS, MYOGLOBIN in the last 72 hours. INR: No results for input(s): INR in the last 72 hours. BNP: No results for input(s): PROBNP in the last 72 hours. ABGs: No results for input(s): PH, PCO2, PO2, HCO3, O2SAT in the last 72 hours. Lipids: No results for input(s): CHOL, TRIG, HDL, LDLCALC in the last 72 hours. Invalid input(s): LDL  Pancreatic functions:  Recent Labs     06/19/21  0300   LIPASE 28     S. LacticAcid: No results for input(s): LACTA in the last 72 hours. Thyroid functions:   Lab Results   Component Value Date    TSH 1.14 06/30/2020      U/A:  Recent Labs     06/19/21  0319   COLORU YELLOW   WBCUA 5 TO 10   RBCUA 0 TO 2   MUCUS NOT REPORTED   BACTERIA FEW*   SPECGRAV 1.009   LEUKOCYTESUR MOD*   GLUCOSEU NEGATIVE   AMORPHOUS NOT REPORTED       Imaging/Diagonstics:     CT ABDOMEN PELVIS WO CONTRAST Additional Contrast? None    Result Date: 6/19/2021  EXAMINATION: CT OF THE ABDOMEN AND PELVIS WITHOUT CONTRAST 6/19/2021 3:56 am TECHNIQUE: CT of the abdomen and pelvis was performed without the administration of intravenous contrast. Multiplanar reformatted images are provided for review.  Dose modulation, iterative reconstruction, and/or weight based adjustment of the mA/kV was utilized to reduce the radiation dose to as low as reasonably achievable. COMPARISON: None. HISTORY: ORDERING SYSTEM PROVIDED HISTORY: flank pain TECHNOLOGIST PROVIDED HISTORY: flank pain Decision Support Exception - unselect if not a suspected or confirmed emergency medical condition->Emergency Medical Condition (MA) Is the patient pregnant?->No Reason for Exam: Bilateral kidney pain Acuity: Acute Type of Exam: Initial FINDINGS: Abdomen/Pelvis: Lower chest: The lung bases are well aerated. Pleural surfaces are unremarkable and no evidence of pleural effusion is identified. Heart is mildly enlarged. Organs: Redemonstration of polycystic kidney disease is noted with extensive parenchymal replacement seen with innumerable cysts, some of which demonstrate internal density consistent with hemorrhagic cysts. Mid right renal collecting system ovoid calculus measuring up to 6 mm in length is noted without associated urinary obstruction identified. The liver, gallbladder, spleen, pancreas, adrenal glands, kidneys, are otherwise unremarkable in appearance. GI/Bowel: The stomach is unremarkable without wall thickening or distention. Bowel loops are unremarkable in appearance without evidence of obstruction, distension or mucosal thickening. Pelvis: The urinary bladder is well distended and unremarkable in appearance. No evidence of pelvic free fluid is seen. Uterus is retroverted in position and unremarkable in appearance. Intrauterine contraceptive device appears low lying within the endometrial canal with distal arm near the cervical os. Peritoneum/Retroperitoneum: No evidence of retroperitoneal or intraperitoneal lymphadenopathy is identified. No evidence of intraperitoneal free fluid is seen. Bones/Soft Tissues: Partial visualization of bilateral breast augmentation is noted without evidence of complication identified. The bones, skeletal muscle bundles, fascial planes and subcutaneous soft tissues are unremarkable in appearance.      1. Mid right renal collecting system 6 mm diameter ovoid calculus without associated urinary obstruction. 2. Redemonstration severe parenchymal changes associated with polycystic kidney disease. 3. Suspected low lying intrauterine contraceptive device. Recommend clinical correlation. ASSESSMENT  and  PLAN     Principal Problem:    Acute kidney injury superimposed on CKD ([facility])  Active Problems:    Smoker    Polycystic kidney    CKD (chronic kidney disease) stage 4, GFR 15-29 ml/min ([facility])    IUD (intrauterine device) in place    Urinary tract infection with hematuria  Resolved Problems:    * No resolved hospital problems. *    Plan:    Acute Kidney Injury superimposed on CKD  -History of polycystic kidney disease and CKD stage 4  -Creatinine 3.76; Baseline 2.6  -NS fluid bolus in ED  -continue IVF on admission  -hold diuretics/nephrotoxic medications  -Consult nephrologist  -monitor BMP  -CT abdomen/pelvis shows:  --Mid right renal collecting system 6 mm calculus without obstruction. --Redemonstration severe polycystic kidney disease. --Suspected low lying intrauterine contraceptive device. ---Consult OB/GYN to check IUD    Urinary tract infection  -Rocephin 1 gm IV q 24 hrs  -Urine culture pending    Tobacco use   -smoking cessation education  -nicotine patch    Consultations:     ALBARO Casiano - CNP   6/19/2021  6:45 AM    [address], [address] [place] [address]. Phone  and add on       I have discussed the care of Edgar Pardees ,   including pertinent history and exam findings,      6/19/21   with the Zeinab Carrillo CNP  I have seen and examined the patient and the key elements of all parts of the encounter have been performed by me . I agree with the assessment, plan and orders as documented by the resident.      Principal Problem:    Acute kidney injury superimposed on CKD [facility])  Active Problems:    Smoker    Polycystic kidney    CKD

## 2021-06-19 NOTE — ED PROVIDER NOTES
EMERGENCY DEPARTMENT ENCOUNTER    Pt Name: Hector Ang  MRN: [id number]  [place] 1980  Date of evaluation: 6/19/21  CHIEF COMPLAINT       Chief Complaint   Patient presents with    Flank Pain     HISTORY OF PRESENT ILLNESS   HPI  47-year-old female history of polycystic kidney disease hypertension, asthma, CKD presents for evaluation of flank pain. Symptoms ongoing for the past several months but worsening over the past several days. Has associated dysuria and some suprapubic pressure over the past several days. No fever chills. No nausea or vomiting. Mild bloating feeling in her abdomen which is been present intermittently for the past several months. Has been admitted twice in the past several months for similar symptoms. Has known kidney stone which urology is seen and evaluated no acute intervention for. Her flank pain tonight she says is similar to what she has had in the past.  No recent injuries or trauma. No other home treatments prior to arrival.  Symptoms are moderate and progressive. REVIEW OF SYSTEMS     Review of Systems   Constitutional: Negative for chills and fatigue. HENT: Negative for facial swelling, postnasal drip and rhinorrhea. Eyes: Negative for photophobia and itching. Respiratory: Negative for cough and shortness of breath. Cardiovascular: Negative for chest pain and leg swelling. Gastrointestinal: Negative for abdominal pain, diarrhea, nausea and vomiting. Genitourinary: Positive for dysuria and flank pain. Negative for hematuria. Musculoskeletal: Negative for arthralgias and joint swelling. Skin: Negative for color change and rash. Neurological: Negative for dizziness, numbness and headaches.      PASTMEDICAL HISTORY     Past Medical History:   Diagnosis Date    Anxiety     Asthma     Chronic headaches 7/10/2013    CKD (chronic kidney disease) stage 3, GFR 30-59 ml/min ([facility])     Degenerative disc disease, cervical     Depression     mite extract, pcn [penicillins], and pollen extract. FAMILY HISTORY     She indicated that her mother is alive. She indicated that her father is alive. She indicated that her sister is alive. She indicated that her brother is alive. She indicated that all of her four daughters are alive. She indicated that her son is alive. SOCIAL HISTORY       Social History     Tobacco Use    Smoking status: Current Every Day Smoker     Packs/day: 1.00     Years: 16.00     Pack years: 16.00     Types: Cigarettes    Smokeless tobacco: Never Used    Tobacco comment: trying  to  cut  down   Vaping Use    Vaping Use: Never used   Substance Use Topics    Alcohol use: No     Alcohol/week: 0.0 standard drinks    Drug use: No     PHYSICAL EXAM     INITIAL VITALS: /66   Pulse 76   Temp 98.6 °F (37 °C) (Oral)   Resp 16   Ht 5' 5\" (1.651 m)   Wt 125 lb (56.7 kg)   LMP 2021 (Approximate)   SpO2 99%   BMI 20.80 kg/m²    Physical Exam  Constitutional:       Appearance: She is normal weight. HENT:      Head: Normocephalic and atraumatic. Eyes:      Extraocular Movements: Extraocular movements intact. Pupils: Pupils are equal, round, and reactive to light. Cardiovascular:      Rate and Rhythm: Normal rate and regular rhythm. Pulmonary:      Effort: Pulmonary effort is normal.      Breath sounds: Normal breath sounds. Abdominal:      General: Abdomen is flat. There is no distension. Palpations: There is no mass. Comments: Soft nondistended abdomen   Musculoskeletal:         General: No swelling. Normal range of motion. Cervical back: Normal range of motion and neck supple. Comments: Right CVA tenderness   Skin:     General: Skin is warm and dry. Neurological:      General: No focal deficit present. Mental Status: She is alert. Mental status is at baseline.          MEDICAL DECISION MAKIN-year-old female history of polycystic kidney disease presents for evaluation of acute on chronic flank pain and some urinary symptoms. Will evaluate urinalysis to check for UTI, pregnancy. Will check kidney function and blood counts. No pelvic pain I do not think this is a torsion. BMP shows acute kidney injury. Signs of UTI on UA. Stable stone on CT scan no signs of acute obstruction. Question of low lying IUD on CT - patient will need GYN consult on the floor will admit for hydration, pain control, antibiotics for UTI          CRITICAL CARE:       PROCEDURES:    Procedures    DIAGNOSTIC RESULTS   EKG:All EKG's are interpreted by the Emergency Department Physician who either signs or Co-signs this chart in the absence of a cardiologist.        RADIOLOGY:All plain film, CT, MRI, and formal ultrasound images (except ED bedside ultrasound) are read by the radiologist, see reports below, unless otherwisenoted in MDM or here. CT ABDOMEN PELVIS WO CONTRAST Additional Contrast? None   Final Result   1. Mid right renal collecting system 6 mm diameter ovoid calculus without   associated urinary obstruction. 2. Redemonstration severe parenchymal changes associated with polycystic   kidney disease. 3. Suspected low lying intrauterine contraceptive device. Recommend clinical   correlation. LABS: All lab results were reviewed by myself, and all abnormals are listed below.   Labs Reviewed   CBC WITH AUTO DIFFERENTIAL - Abnormal; Notable for the following components:       Result Value    RBC 3.35 (*)     Hemoglobin 11.1 (*)     Hematocrit 32.8 (*)     Seg Neutrophils 77 (*)     Lymphocytes 16 (*)     All other components within normal limits   COMPREHENSIVE METABOLIC PANEL W/ REFLEX TO MG FOR LOW K - Abnormal; Notable for the following components:    BUN 30 (*)     CREATININE 3.67 (*)     Calcium 8.3 (*)     Alkaline Phosphatase 105 (*)     ALT <5 (*)     Total Bilirubin 0.18 (*)     GFR Non- 13 (*)     GFR  16 (*)     All other components within normal limits   URINALYSIS - Abnormal; Notable for the following components:    Urine Hgb TRACE (*)     Protein, UA 1+ (*)     Leukocyte Esterase, Urine MOD (*)     All other components within normal limits   MICROSCOPIC URINALYSIS - Abnormal; Notable for the following components:    Bacteria, UA FEW (*)     All other components within normal limits   CULTURE, URINE   LIPASE   PREGNANCY, URINE       EMERGENCY DEPARTMENTCOURSE:         Vitals:    Vitals:    06/18/21 2122   BP: 106/66   Pulse: 76   Resp: 16   Temp: 98.6 °F (37 °C)   TempSrc: Oral   SpO2: 99%   Weight: 125 lb (56.7 kg)   Height: 5' 5\" (1.651 m)       The patient was given the following medications while in the emergency department:  Orders Placed This Encounter   Medications    fentaNYL (SUBLIMAZE) injection 100 mcg    cefTRIAXone (ROCEPHIN) 1000 mg IVPB in 50 mL D5W minibag     Order Specific Question:   Antimicrobial Indications     Answer:   Urinary Tract Infection    fentaNYL (SUBLIMAZE) injection 50 mcg    0.9 % sodium chloride IV bolus 1,000 mL     CONSULTS:  None    FINAL IMPRESSION      1. Flank pain    2. Polycystic kidney disease    3. Acute kidney injury ([address]    4. Urinary tract infection without hematuria, site unspecified          DISPOSITION/PLAN   DISPOSITION Admitted 06/19/2021 06:08:52 AM      PATIENT REFERRED TO:  No follow-up provider specified.   DISCHARGE MEDICATIONS:  New Prescriptions    No medications on file     Chester Mead MD  Attending Emergency Physician                    Chester Mead MD  06/19/21 6584

## 2021-06-20 LAB
ALBUMIN SERPL-MCNC: 3.5 G/DL (ref 3.5–5.2)
ALBUMIN/GLOBULIN RATIO: ABNORMAL (ref 1–2.5)
ALP BLD-CCNC: 77 U/L (ref 35–104)
ALT SERPL-CCNC: <5 U/L (ref 5–33)
ANION GAP SERPL CALCULATED.3IONS-SCNC: 10 MMOL/L (ref 9–17)
ANTI DNA DOUBLE STRANDED: 4.9 IU/ML
ANTI-NUCLEAR ANTIBODY (ANA): NEGATIVE
AST SERPL-CCNC: 6 U/L
BILIRUB SERPL-MCNC: <0.15 MG/DL (ref 0.3–1.2)
BUN BLDV-MCNC: 28 MG/DL (ref 6–20)
BUN/CREAT BLD: ABNORMAL (ref 9–20)
CALCIUM IONIZED: 1.16 MMOL/L (ref 1.13–1.33)
CALCIUM SERPL-MCNC: 8.1 MG/DL (ref 8.6–10.4)
CHLORIDE BLD-SCNC: 110 MMOL/L (ref 98–107)
CO2: 21 MMOL/L (ref 20–31)
CREAT SERPL-MCNC: 3.45 MG/DL (ref 0.5–0.9)
CULTURE: NORMAL
ENA ANTIBODIES SCREEN: 0.1 U/ML
GFR AFRICAN AMERICAN: 18 ML/MIN
GFR NON-AFRICAN AMERICAN: 14 ML/MIN
GFR SERPL CREATININE-BSD FRML MDRD: ABNORMAL ML/MIN/{1.73_M2}
GFR SERPL CREATININE-BSD FRML MDRD: ABNORMAL ML/MIN/{1.73_M2}
GLUCOSE BLD-MCNC: 97 MG/DL (ref 70–99)
HCT VFR BLD CALC: 29.4 % (ref 36–46)
HEMOGLOBIN: 9.7 G/DL (ref 12–16)
INR BLD: 0.9
Lab: NORMAL
MAGNESIUM: 1.9 MG/DL (ref 1.6–2.6)
MCH RBC QN AUTO: 33.2 PG (ref 26–34)
MCHC RBC AUTO-ENTMCNC: 33 G/DL (ref 31–37)
MCV RBC AUTO: 100.6 FL (ref 80–100)
NRBC AUTOMATED: ABNORMAL PER 100 WBC
PDW BLD-RTO: 14.7 % (ref 11.5–14.9)
PLATELET # BLD: 215 K/UL (ref 150–450)
PMV BLD AUTO: 8.6 FL (ref 6–12)
POTASSIUM SERPL-SCNC: 5.1 MMOL/L (ref 3.7–5.3)
PROTHROMBIN TIME: 12.7 SEC (ref 11.8–14.6)
PTH INTACT: 427 PG/ML (ref 15–65)
RBC # BLD: 2.92 M/UL (ref 4–5.2)
SODIUM BLD-SCNC: 141 MMOL/L (ref 135–144)
SPECIMEN DESCRIPTION: NORMAL
TOTAL PROTEIN: 6.1 G/DL (ref 6.4–8.3)
URIC ACID: 5.2 MG/DL (ref 2.4–5.7)
WBC # BLD: 7.4 K/UL (ref 3.5–11)

## 2021-06-20 PROCEDURE — 2580000003 HC RX 258: Performed by: INTERNAL MEDICINE

## 2021-06-20 PROCEDURE — 2060000000 HC ICU INTERMEDIATE R&B

## 2021-06-20 PROCEDURE — 36415 COLL VENOUS BLD VENIPUNCTURE: CPT

## 2021-06-20 PROCEDURE — 6360000002 HC RX W HCPCS: Performed by: NURSE PRACTITIONER

## 2021-06-20 PROCEDURE — 85027 COMPLETE CBC AUTOMATED: CPT

## 2021-06-20 PROCEDURE — 2580000003 HC RX 258: Performed by: NURSE PRACTITIONER

## 2021-06-20 PROCEDURE — 96376 TX/PRO/DX INJ SAME DRUG ADON: CPT

## 2021-06-20 PROCEDURE — 96372 THER/PROPH/DIAG INJ SC/IM: CPT

## 2021-06-20 PROCEDURE — 84550 ASSAY OF BLOOD/URIC ACID: CPT

## 2021-06-20 PROCEDURE — 83970 ASSAY OF PARATHORMONE: CPT

## 2021-06-20 PROCEDURE — 83735 ASSAY OF MAGNESIUM: CPT

## 2021-06-20 PROCEDURE — 6370000000 HC RX 637 (ALT 250 FOR IP): Performed by: NURSE PRACTITIONER

## 2021-06-20 PROCEDURE — 96366 THER/PROPH/DIAG IV INF ADDON: CPT

## 2021-06-20 PROCEDURE — 85610 PROTHROMBIN TIME: CPT

## 2021-06-20 PROCEDURE — G0378 HOSPITAL OBSERVATION PER HR: HCPCS

## 2021-06-20 PROCEDURE — 80053 COMPREHEN METABOLIC PANEL: CPT

## 2021-06-20 PROCEDURE — 99232 SBSQ HOSP IP/OBS MODERATE 35: CPT | Performed by: INTERNAL MEDICINE

## 2021-06-20 PROCEDURE — 86225 DNA ANTIBODY NATIVE: CPT

## 2021-06-20 PROCEDURE — 86038 ANTINUCLEAR ANTIBODIES: CPT

## 2021-06-20 PROCEDURE — 82330 ASSAY OF CALCIUM: CPT

## 2021-06-20 RX ORDER — SODIUM CHLORIDE 450 MG/100ML
INJECTION, SOLUTION INTRAVENOUS CONTINUOUS
Status: DISCONTINUED | OUTPATIENT
Start: 2021-06-20 | End: 2021-06-22

## 2021-06-20 RX ADMIN — SODIUM CHLORIDE: 4.5 INJECTION, SOLUTION INTRAVENOUS at 13:34

## 2021-06-20 RX ADMIN — SODIUM CHLORIDE: 9 INJECTION, SOLUTION INTRAVENOUS at 11:23

## 2021-06-20 RX ADMIN — CEFTRIAXONE SODIUM 1000 MG: 1 INJECTION, POWDER, FOR SOLUTION INTRAMUSCULAR; INTRAVENOUS at 04:30

## 2021-06-20 RX ADMIN — FENTANYL CITRATE 100 MCG: 0.05 INJECTION, SOLUTION INTRAMUSCULAR; INTRAVENOUS at 13:34

## 2021-06-20 RX ADMIN — HYDROXYZINE HYDROCHLORIDE 50 MG: 25 TABLET, FILM COATED ORAL at 19:59

## 2021-06-20 RX ADMIN — ACETAMINOPHEN 650 MG: 325 TABLET ORAL at 22:10

## 2021-06-20 RX ADMIN — FENTANYL CITRATE 100 MCG: 0.05 INJECTION, SOLUTION INTRAMUSCULAR; INTRAVENOUS at 00:54

## 2021-06-20 RX ADMIN — HEPARIN SODIUM 5000 UNITS: 5000 INJECTION INTRAVENOUS; SUBCUTANEOUS at 05:42

## 2021-06-20 RX ADMIN — FENTANYL CITRATE 100 MCG: 0.05 INJECTION, SOLUTION INTRAMUSCULAR; INTRAVENOUS at 11:18

## 2021-06-20 RX ADMIN — METOPROLOL SUCCINATE 50 MG: 50 TABLET, EXTENDED RELEASE ORAL at 08:46

## 2021-06-20 RX ADMIN — DULOXETINE HYDROCHLORIDE 90 MG: 60 CAPSULE, DELAYED RELEASE ORAL at 08:46

## 2021-06-20 RX ADMIN — AMLODIPINE BESYLATE 5 MG: 5 TABLET ORAL at 08:46

## 2021-06-20 RX ADMIN — FENTANYL CITRATE 100 MCG: 0.05 INJECTION, SOLUTION INTRAMUSCULAR; INTRAVENOUS at 22:02

## 2021-06-20 RX ADMIN — FENTANYL CITRATE 100 MCG: 0.05 INJECTION, SOLUTION INTRAMUSCULAR; INTRAVENOUS at 08:52

## 2021-06-20 RX ADMIN — HEPARIN SODIUM 5000 UNITS: 5000 INJECTION INTRAVENOUS; SUBCUTANEOUS at 22:02

## 2021-06-20 RX ADMIN — HEPARIN SODIUM 5000 UNITS: 5000 INJECTION INTRAVENOUS; SUBCUTANEOUS at 13:34

## 2021-06-20 RX ADMIN — FENTANYL CITRATE 100 MCG: 0.05 INJECTION, SOLUTION INTRAMUSCULAR; INTRAVENOUS at 18:20

## 2021-06-20 ASSESSMENT — PAIN SCALES - GENERAL
PAINLEVEL_OUTOF10: 9
PAINLEVEL_OUTOF10: 0
PAINLEVEL_OUTOF10: 8
PAINLEVEL_OUTOF10: 8
PAINLEVEL_OUTOF10: 3
PAINLEVEL_OUTOF10: 8
PAINLEVEL_OUTOF10: 8
PAINLEVEL_OUTOF10: 0
PAINLEVEL_OUTOF10: 7
PAINLEVEL_OUTOF10: 8
PAINLEVEL_OUTOF10: 4

## 2021-06-20 NOTE — CONSULTS
Department of Internal Medicine  Nephrology Nora Christian MD   Consult Note    Reason for consultation: management of acute kidney injury superimposed on chronic kidney disease. Consulting physician: Adriano Sadler MD    History of presenting illness: This is a 35 y.o. female with a significant past medical history of Systemic hypertension, Bronchial asthma, depression, nephrolithiasis [required cystoscopy and placement of a right double-J ureteral stent with attached string on 6/12/2020] and chronic kidney disease stage IIIb secondary to autosomal dominant polycystic kidney disease diagnosed in 2009 [baseline serum creatinine 2.1 mg/dL], who presented to the emergency department on 6/18/2021 with complaints of worsening flank pain over several weeks associated with suprapubic pledge show and left leg swelling without pain. She has had 2 admissions in the recent month for similar complaints. Laboratory studies was remarkable for elevated serum creatinine above baseline at 3.67 mg/dL and hence nephrology consultation. CT scan of the abdomen and pelvis performed at presentation without contrast showed:  1. Mid right renal collecting system 6 mm diameter ovoid calculus without   associated urinary obstruction. 2. Redemonstration severe parenchymal changes associated with polycystic   kidney disease. 3. Suspected low lying intrauterine contraceptive device.  Recommend clinical   correlation.      Bee pollen, Dust mite extract, Pcn [penicillins], and Pollen extract    Past Medical History:   Diagnosis Date    Anxiety     Asthma     Chronic headaches 7/10/2013    CKD (chronic kidney disease) stage 3, GFR 30-59 ml/min ([facility]     Degenerative disc disease, cervical     Depression     Dysmenorrhea 9/30/2014    Eczema 11/8/2012    Headache(784.0)     HTN (hypertension) 10/2/2011    Hypertension     Hypocalcemia 5/28/2014    Kidney stone     Menorrhagia 9/30/2014    Neck pain, bilateral 5/28/2014    Pelvic pain in female 9/30/2014    Polycystic kidney     Smoker 10/2/2011    Tachycardia 1/20/2014    Tension vascular headache 1/20/2014       Scheduled Meds:   amLODIPine  5 mg Oral Daily    DULoxetine  90 mg Oral Daily    hydrOXYzine  50 mg Oral Nightly    metoprolol succinate  50 mg Oral Daily    cefTRIAXone (ROCEPHIN) IV  1,000 mg Intravenous Q24H    sodium chloride flush  5-40 mL Intravenous 2 times per day    heparin (porcine)  5,000 Units Subcutaneous 3 times per day    nicotine  1 patch Transdermal Daily     Continuous Infusions:   sodium chloride 75 mL/hr at 06/20/21 1123    sodium chloride      sodium chloride 75 mL/hr at 06/19/21 2038     PRN Meds:.fentanNYL **OR** fentanNYL, sodium chloride flush, sodium chloride, ondansetron **OR** ondansetron, acetaminophen **OR** acetaminophen    Family History   Problem Relation Age of Onset    High Blood Pressure Mother     Asthma Sister     Migraines Sister     High Blood Pressure Father     Other Brother         bad knees, with recent total knee replacement         Social History     Socioeconomic History    Marital status:      Spouse name: None    Number of children: None    Years of education: None    Highest education level: None   Occupational History    Occupation: stay at home mom   Tobacco Use    Smoking status: Current Every Day Smoker     Packs/day: 1.00     Years: 16.00     Pack years: 16.00     Types: Cigarettes    Smokeless tobacco: Never Used    Tobacco comment: trying  to  cut  down   Vaping Use    Vaping Use: Never used   Substance and Sexual Activity    Alcohol use: No     Alcohol/week: 0.0 standard drinks    Drug use: No    Sexual activity: Yes     Partners: Male     Comment: steady boyfriend   Other Topics Concern    None   Social History Narrative    None     Social Determinants of Health     Financial Resource Strain:     Difficulty of Paying Living Expenses:    Food Insecurity:     Worried About [facility] in the Last Year:    [address] in the Last Year:    Transportation Needs:     Lack of Transportation (Medical):  Lack of Transportation (Non-Medical):    Physical Activity:     Days of Exercise per Week:     Minutes of Exercise per Session:    Stress:     Feeling of Stress :    Social Connections:     Frequency of Communication with Friends and Family:     Frequency of Social Gatherings with Friends and Family:     Attends Gnosticism Services:     Active Member of Clubs or Organizations:     Attends Club or Organization Meetings:     Marital Status:    Intimate Partner Violence:     Fear of Current or Ex-Partner:     Emotionally Abused:     Physically Abused:     Sexually Abused:      Review of systems: CNS - no headache or dizziness; Cardiac - no chest pain; Respiratory - no shortness of breath; Gastrointestinal - Bilateral flank pain; nausea;; Musculoskeletal - general body aches; Leg swelling; Skin/Integument - no rashes. Physical Exam:    VITALS:  BP (!) 141/83   Pulse 75   Temp 98.3 °F (36.8 °C) (Oral)   Resp 17   Ht 5' 4\" (1.626 m)   Wt 126 lb 8.7 oz (57.4 kg)   LMP 06/01/2021 (Approximate)   SpO2 97%   BMI 21.72 kg/m²   24HR INTAKE/OUTPUT:    Intake/Output Summary (Last 24 hours) at 6/20/2021 1237  Last data filed at 6/20/2021 1128  Gross per 24 hour   Intake 736 ml   Output 1900 ml   Net -1164 ml       Constitutional: alert, appears stated age and cooperative    Skin: Skin color, texture, turgor normal. No rashes or lesions    Head: Normocephalic, without obvious abnormality, atraumatic     Cardiovascular/Edema: regular rate and rhythm, S1, S2 normal, no murmur, click, rub or gallop    Respiratory: Lungs: clear to auscultation bilaterally    Abdomen: soft, non-tender; bowel sounds normal; no masses,  no organomegaly    Back: symmetric, no curvature. ROM normal. No CVA tenderness.     Extremities: edema Trace bilateral pedal edema    Neuro: Grossly normal      CBC:   Recent Labs     06/19/21  0300 06/20/21  0503   WBC 10.8 7.4   HGB 11.1* 9.7*    215     BMP:    Recent Labs     06/19/21  0300 06/20/21  0503    141   K 4.3 5.1    110*   CO2 21 21   BUN 30* 28*   CREATININE 3.67* 3.45*   GLUCOSE 90 97       Lab Results   Component Value Date    NITRU NEGATIVE 06/19/2021    COLORU YELLOW 06/19/2021    PHUR 6.5 06/19/2021    WBCUA 5 TO 10 06/19/2021    RBCUA 0 TO 2 06/19/2021    MUCUS NOT REPORTED 06/19/2021    TRICHOMONAS NOT REPORTED 06/19/2021    YEAST NOT REPORTED 06/19/2021    BACTERIA FEW 06/19/2021    CLARITYU Clear 04/26/2016    SPECGRAV 1.009 06/19/2021    LEUKOCYTESUR MOD 06/19/2021    UROBILINOGEN Normal 06/19/2021    BILIRUBINUR NEGATIVE 06/19/2021    BILIRUBINUR neg 09/30/2014    BLOODU Positive 04/26/2016    GLUCOSEU NEGATIVE 06/19/2021    KETUA NEGATIVE 06/19/2021    AMORPHOUS NOT REPORTED 06/19/2021     Urine Sodium:     Lab Results   Component Value Date    NOHELIA 51 06/19/2021     Urine Osmolarity:   Lab Results   Component Value Date    OSMOU 236 02/21/2021     Urine Protein:   No results found for: TPU  Urine Creatinine:     Lab Results   Component Value Date    LABCREA 39.7 02/21/2021     IMPRESSION/RECOMMENDATIONS:      1. Acute kidney injury superimposed on chronic kidney disease stage IIIb - differentials include progression of underlying polycystic kidney disease versus prerenal azotemia vs Obstructive nephropathy from kidney stone. She is currently not uremic but GFR is only 14 mL/min and I have advised patient that she may need to start dialysis sooner rather than later. Plan: IV fluid 0.45 normal saline at 100 mL/h. Bladder scan. Random urine electrolytes. Strict input and output documentation. Urinalysis and urine microscopy. Avoid nephrotoxic agents. Basic metabolic profile daily. 2.  Borderline hyperkalemia - will monitor response to hydration.     3.  Nephrolithiasis - patient has nonobstructing

## 2021-06-20 NOTE — PLAN OF CARE
Problem: Falls - Risk of:  Goal: Will remain free from falls  Description: Will remain free from falls  Outcome: Ongoing  Note: Pt is up per self, gait steady. Remains free from falls. Problem: Falls - Risk of:  Goal: Absence of physical injury  Description: Absence of physical injury  Outcome: Ongoing  Note: Pt remains free from any physical injury. Will monitor      Problem: Physical Regulation:  Goal: Will remain free from infection  Description: Will remain free from infection  Outcome: Ongoing  Note: Pt shows no signs or symptoms of infection at this time. VS stable, alert and oriented x4. Hand hygiene utilized upon entry and exit. Will continue to monitor. Problem: Pain:  Goal: Pain level will decrease  Description: Pain level will decrease  Outcome: Ongoing  Note: Pt has complained of pain for this RN. Pt received fentanyl.  Will monitor

## 2021-06-20 NOTE — CARE COORDINATION
CASE MANAGEMENT NOTE:    Admission Date:  6/19/2021 Wm Santa is a 35 y.o.  female    Admitted for : Acute kidney injury superimposed on CKD ([facility] [address] [N17.9, N18.9]    Met with:  Patient    PCP:  TAHIRA Serrano                                Insurance:  [employer]      Is patient alert and oriented at time of discussion:  Yes    Current Residence/ Living Arrangements:  independently at home             Current Services PTA:  No    Does patient go to outpatient dialysis: No  If yes, location and chair time: N/A    Is patient agreeable to VNS: No    Freedom of choice provided:  Yes    List of [address] provided: No    VNS chosen:  No    DME:  none    Home Oxygen: No    Nebulizer: No    CPAP/BIPAP: No    Supplier: N/A    Potential Assistance Needed: No    SNF needed: No    Freedom of choice and list provided: NA    Pharmacy:  [facility]       Does Patient want to use MEDS to BEDS? No    Is patient currently receiving oral anticoagulation therapy? No    Is the Patient an [facility] with Readmission Risk Score greater than 14%? No  If yes, pt needs a follow up appointment made within 7 days. Family Members/Caregivers that pt would like involved in their care:    Yes    If yes, list name here:  Fe Davis and Mother Bulmaro Burger Provider:  Patient             Discharge Plan:  6/20: Mariam Talbot - From 2-story home with livable 1st floor with kids. She is independent and drives. DME - None. Declines VNS or discharge needs. On IV rocephin, IV fluids. Cr 3.45 with nephro consult.  //JOAQUIM                 Electronically signed by: Eufemia Kapoor RN on 6/20/2021 at 4:49 PM

## 2021-06-20 NOTE — PROGRESS NOTES
[facility] Internal Medicine    Progress Note     6/20/2021    1:31 PM    Name:   Edna Dugan  MRN:     [id number]     Acct:      [de-identified]   Room:   2107/2107-01   Day:  1  Admit Date:  6/19/2021  2:40 AM    PCP:   Fareed Ghotra PA-C  Code Status:  Full Code    Subjective:   C/C:   Chief Complaint   Patient presents with    Flank Pain     Principal Problem:    Acute kidney injury superimposed on CKD ([address]  Active Problems:    Smoker    Polycystic kidney    CKD (chronic kidney disease) stage 4, GFR 15-29 ml/min ([address]    IUD (intrauterine device) in place    Urinary tract infection with hematuria  Resolved Problems:    * No resolved hospital problems. *       nephro input   1. Acute kidney injury superimposed on chronic kidney disease stage IIIb - differentials include progression of underlying polycystic kidney disease versus prerenal azotemia vs Obstructive nephropathy from kidney stone. She is currently not uremic but GFR is only 14 mL/min and I have advised patient that she may need to start dialysis sooner rather than later. Plan: IV fluid 0.45 normal saline at 100 mL/h. Bladder scan. Random urine electrolytes. Strict input and output documentation. Urinalysis and urine microscopy. Avoid nephrotoxic agents. Basic metabolic profile daily. 2.  Borderline hyperkalemia - will monitor response to hydration. 3.  Nephrolithiasis - patient has nonobstructing kidney stone. 4.  Macrocytic anemia - check vitamin L19 and folic acid. 5.  Systemic hypertension - slightly suboptimal blood pressure control related to flank pain. Continue current medications. Prognosis is guarded. Thank you very much for the courtesy and confidence of this consultation. The patient is a 35 y.o.  female, with a history of anemia, asthma, CKD stage IV, HTN, polycystic kidney disease, and renovascular hypertension, who presents with flank pain.   According results found for: LABURIN     CBC:  Lab Results   Component Value Date    WBC 7.4 2021    HGB 9.7 2021     2021     2012        BMP:    Lab Results   Component Value Date     2021    K 5.1 2021     2021    CO2 21 2021    BUN 28 2021    CREATININE 3.45 2021    GLUCOSE 97 2021      LIVER PROFILE:  Lab Results   Component Value Date    ALT <5 2021    AST 6 2021    PROT 6.1 2021    BILITOT <0.15 2021    BILIDIR <0.08 2020    LABALBU 3.5 2021               Radiology:  Medications: Allergies:      Current Meds:   Scheduled Meds:    amLODIPine  5 mg Oral Daily    DULoxetine  90 mg Oral Daily    hydrOXYzine  50 mg Oral Nightly    metoprolol succinate  50 mg Oral Daily    cefTRIAXone (ROCEPHIN) IV  1,000 mg Intravenous Q24H    sodium chloride flush  5-40 mL Intravenous 2 times per day    heparin (porcine)  5,000 Units Subcutaneous 3 times per day    nicotine  1 patch Transdermal Daily     Continuous Infusions:    sodium chloride      sodium chloride       PRN Meds: fentanNYL **OR** fentanNYL, sodium chloride flush, sodium chloride, ondansetron **OR** ondansetron, acetaminophen **OR** acetaminophen      Physical Examination:        BP (!) 141/83   Pulse 75   Temp 98.3 °F (36.8 °C) (Oral)   Resp 17   Ht 5' 4\" (1.626 m)   Wt 126 lb 8.7 oz (57.4 kg)   LMP 2021 (Approximate)   SpO2 97%   BMI 21.72 kg/m²   Temp (24hrs), Av.5 °F (36.9 °C), Min:98.2 °F (36.8 °C), Max:98.9 °F (37.2 °C)    No results for input(s): POCGLU in the last 72 hours. Intake/Output Summary (Last 24 hours) at 2021 1331  Last data filed at 2021 1128  Gross per 24 hour   Intake 736 ml   Output 1900 ml   Net -1164 ml       General Appearance:  alert, well appearing, and in no acute distress  Mental status:   Head:  normocephalic, atraumatic.   Eye: no icterus, redness, pupils equal and

## 2021-06-21 LAB
ALBUMIN SERPL-MCNC: 3 G/DL (ref 3.5–5.2)
ALBUMIN/GLOBULIN RATIO: ABNORMAL (ref 1–2.5)
ALP BLD-CCNC: 72 U/L (ref 35–104)
ALT SERPL-CCNC: <5 U/L (ref 5–33)
ANION GAP SERPL CALCULATED.3IONS-SCNC: 8 MMOL/L (ref 9–17)
AST SERPL-CCNC: 6 U/L
BILIRUB SERPL-MCNC: <0.15 MG/DL (ref 0.3–1.2)
BUN BLDV-MCNC: 27 MG/DL (ref 6–20)
BUN/CREAT BLD: ABNORMAL (ref 9–20)
CALCIUM SERPL-MCNC: 8 MG/DL (ref 8.6–10.4)
CHLORIDE BLD-SCNC: 110 MMOL/L (ref 98–107)
CHLORIDE, UR: 72 MMOL/L
CO2: 21 MMOL/L (ref 20–31)
CREAT SERPL-MCNC: 3.12 MG/DL (ref 0.5–0.9)
CREATININE URINE: 29.5 MG/DL (ref 28–217)
FERRITIN: 55 UG/L (ref 13–150)
FOLATE: 5.4 NG/ML
GFR AFRICAN AMERICAN: 20 ML/MIN
GFR NON-AFRICAN AMERICAN: 16 ML/MIN
GFR SERPL CREATININE-BSD FRML MDRD: ABNORMAL ML/MIN/{1.73_M2}
GFR SERPL CREATININE-BSD FRML MDRD: ABNORMAL ML/MIN/{1.73_M2}
GLUCOSE BLD-MCNC: 101 MG/DL (ref 70–99)
HCT VFR BLD CALC: 27.6 % (ref 36–46)
HEMOGLOBIN: 9 G/DL (ref 12–16)
HOURS COLLECTED: 24 H
IRON SATURATION: 47 % (ref 20–55)
IRON: 102 UG/DL (ref 37–145)
MCH RBC QN AUTO: 32.5 PG (ref 26–34)
MCHC RBC AUTO-ENTMCNC: 32.7 G/DL (ref 31–37)
MCV RBC AUTO: 99.4 FL (ref 80–100)
NRBC AUTOMATED: ABNORMAL PER 100 WBC
PDW BLD-RTO: 14.3 % (ref 11.5–14.9)
PLATELET # BLD: 215 K/UL (ref 150–450)
PMV BLD AUTO: 8.7 FL (ref 6–12)
POTASSIUM SERPL-SCNC: 5 MMOL/L (ref 3.7–5.3)
PROTEIN 24 HOUR URINE: 522 MG/24 H
PROTEIN,TOT TIMED UR: ABNORMAL MG/X H
RBC # BLD: 2.78 M/UL (ref 4–5.2)
SODIUM BLD-SCNC: 139 MMOL/L (ref 135–144)
SODIUM,UR: 86 MMOL/L
TOTAL IRON BINDING CAPACITY: 215 UG/DL (ref 250–450)
TOTAL PROTEIN, URINE: 16 MG/DL
TOTAL PROTEIN: 5.8 G/DL (ref 6.4–8.3)
UNSATURATED IRON BINDING CAPACITY: 113 UG/DL (ref 112–347)
URINE TOTAL PROTEIN CREATININE RATIO: 0.54 (ref 0–0.2)
URINE TOTAL PROTEIN: 18 MG/DL
VITAMIN B-12: 305 PG/ML (ref 232–1245)
VOLUME: 2900 ML
WBC # BLD: 6.2 K/UL (ref 3.5–11)

## 2021-06-21 PROCEDURE — 99232 SBSQ HOSP IP/OBS MODERATE 35: CPT | Performed by: INTERNAL MEDICINE

## 2021-06-21 PROCEDURE — 6370000000 HC RX 637 (ALT 250 FOR IP): Performed by: NURSE PRACTITIONER

## 2021-06-21 PROCEDURE — 36415 COLL VENOUS BLD VENIPUNCTURE: CPT

## 2021-06-21 PROCEDURE — 96376 TX/PRO/DX INJ SAME DRUG ADON: CPT

## 2021-06-21 PROCEDURE — 96366 THER/PROPH/DIAG IV INF ADDON: CPT

## 2021-06-21 PROCEDURE — 81050 URINALYSIS VOLUME MEASURE: CPT

## 2021-06-21 PROCEDURE — 84156 ASSAY OF PROTEIN URINE: CPT

## 2021-06-21 PROCEDURE — 82728 ASSAY OF FERRITIN: CPT

## 2021-06-21 PROCEDURE — 82570 ASSAY OF URINE CREATININE: CPT

## 2021-06-21 PROCEDURE — 80053 COMPREHEN METABOLIC PANEL: CPT

## 2021-06-21 PROCEDURE — 2580000003 HC RX 258: Performed by: NURSE PRACTITIONER

## 2021-06-21 PROCEDURE — 6370000000 HC RX 637 (ALT 250 FOR IP): Performed by: INTERNAL MEDICINE

## 2021-06-21 PROCEDURE — 83540 ASSAY OF IRON: CPT

## 2021-06-21 PROCEDURE — 82436 ASSAY OF URINE CHLORIDE: CPT

## 2021-06-21 PROCEDURE — 82607 VITAMIN B-12: CPT

## 2021-06-21 PROCEDURE — 6360000002 HC RX W HCPCS: Performed by: NURSE PRACTITIONER

## 2021-06-21 PROCEDURE — 83550 IRON BINDING TEST: CPT

## 2021-06-21 PROCEDURE — 96372 THER/PROPH/DIAG INJ SC/IM: CPT

## 2021-06-21 PROCEDURE — G0378 HOSPITAL OBSERVATION PER HR: HCPCS

## 2021-06-21 PROCEDURE — 2060000000 HC ICU INTERMEDIATE R&B

## 2021-06-21 PROCEDURE — 84300 ASSAY OF URINE SODIUM: CPT

## 2021-06-21 PROCEDURE — 85027 COMPLETE CBC AUTOMATED: CPT

## 2021-06-21 PROCEDURE — 82746 ASSAY OF FOLIC ACID SERUM: CPT

## 2021-06-21 RX ORDER — HYDROCODONE BITARTRATE AND ACETAMINOPHEN 5; 325 MG/1; MG/1
1 TABLET ORAL 3 TIMES DAILY PRN
Status: DISCONTINUED | OUTPATIENT
Start: 2021-06-21 | End: 2021-06-23 | Stop reason: HOSPADM

## 2021-06-21 RX ADMIN — HYDROCODONE BITARTRATE AND ACETAMINOPHEN 1 TABLET: 5; 325 TABLET ORAL at 13:53

## 2021-06-21 RX ADMIN — HEPARIN SODIUM 5000 UNITS: 5000 INJECTION INTRAVENOUS; SUBCUTANEOUS at 20:04

## 2021-06-21 RX ADMIN — FENTANYL CITRATE 100 MCG: 0.05 INJECTION, SOLUTION INTRAMUSCULAR; INTRAVENOUS at 09:44

## 2021-06-21 RX ADMIN — ACETAMINOPHEN 650 MG: 325 TABLET ORAL at 15:57

## 2021-06-21 RX ADMIN — FENTANYL CITRATE 100 MCG: 0.05 INJECTION, SOLUTION INTRAMUSCULAR; INTRAVENOUS at 07:44

## 2021-06-21 RX ADMIN — HYDROXYZINE HYDROCHLORIDE 50 MG: 25 TABLET, FILM COATED ORAL at 20:04

## 2021-06-21 RX ADMIN — FENTANYL CITRATE 50 MCG: 50 INJECTION, SOLUTION INTRAMUSCULAR; INTRAVENOUS at 03:10

## 2021-06-21 RX ADMIN — FENTANYL CITRATE 100 MCG: 0.05 INJECTION, SOLUTION INTRAMUSCULAR; INTRAVENOUS at 11:55

## 2021-06-21 RX ADMIN — HEPARIN SODIUM 5000 UNITS: 5000 INJECTION INTRAVENOUS; SUBCUTANEOUS at 06:01

## 2021-06-21 RX ADMIN — CEFTRIAXONE SODIUM 1000 MG: 1 INJECTION, POWDER, FOR SOLUTION INTRAMUSCULAR; INTRAVENOUS at 03:10

## 2021-06-21 RX ADMIN — DULOXETINE HYDROCHLORIDE 90 MG: 60 CAPSULE, DELAYED RELEASE ORAL at 07:44

## 2021-06-21 RX ADMIN — METOPROLOL SUCCINATE 50 MG: 50 TABLET, EXTENDED RELEASE ORAL at 07:44

## 2021-06-21 RX ADMIN — AMLODIPINE BESYLATE 5 MG: 5 TABLET ORAL at 07:44

## 2021-06-21 RX ADMIN — FENTANYL CITRATE 100 MCG: 0.05 INJECTION, SOLUTION INTRAMUSCULAR; INTRAVENOUS at 00:50

## 2021-06-21 RX ADMIN — HEPARIN SODIUM 5000 UNITS: 5000 INJECTION INTRAVENOUS; SUBCUTANEOUS at 15:57

## 2021-06-21 ASSESSMENT — PAIN SCALES - GENERAL
PAINLEVEL_OUTOF10: 7
PAINLEVEL_OUTOF10: 7
PAINLEVEL_OUTOF10: 8
PAINLEVEL_OUTOF10: 8
PAINLEVEL_OUTOF10: 4
PAINLEVEL_OUTOF10: 8
PAINLEVEL_OUTOF10: 0
PAINLEVEL_OUTOF10: 3
PAINLEVEL_OUTOF10: 7
PAINLEVEL_OUTOF10: 6
PAINLEVEL_OUTOF10: 7
PAINLEVEL_OUTOF10: 8
PAINLEVEL_OUTOF10: 7
PAINLEVEL_OUTOF10: 0

## 2021-06-21 ASSESSMENT — PAIN DESCRIPTION - PAIN TYPE
TYPE: ACUTE PAIN

## 2021-06-21 ASSESSMENT — PAIN - FUNCTIONAL ASSESSMENT
PAIN_FUNCTIONAL_ASSESSMENT: 0-10
PAIN_FUNCTIONAL_ASSESSMENT: 0-10

## 2021-06-21 ASSESSMENT — PAIN DESCRIPTION - LOCATION
LOCATION: FLANK
LOCATION: FLANK
LOCATION: HEAD
LOCATION: FLANK
LOCATION: FLANK
LOCATION: HEAD
LOCATION: BACK

## 2021-06-21 ASSESSMENT — PAIN DESCRIPTION - ORIENTATION
ORIENTATION: RIGHT;LEFT
ORIENTATION: MID;LOWER
ORIENTATION: RIGHT;LEFT

## 2021-06-21 ASSESSMENT — PAIN SCALES - WONG BAKER: WONGBAKER_NUMERICALRESPONSE: 0

## 2021-06-21 ASSESSMENT — PAIN DESCRIPTION - DESCRIPTORS: DESCRIPTORS: SHARP

## 2021-06-21 NOTE — PROGRESS NOTES
[facility] Internal Medicine    Progress Note     6/21/2021    12:38 PM    Name:   Felisha Mackey  MRN:     [id number]     Acct:      [de-identified]   Room:   2107/2107-01   Day:  2  Admit Date:  6/19/2021  2:40 AM    PCP:   Verónica Anderson PA-C  Code Status:  Full Code    Subjective:   C/C:   Chief Complaint   Patient presents with    Flank Pain     Principal Problem:    Acute kidney injury superimposed on CKD ([address]  Active Problems:    Smoker    Polycystic kidney    CKD (chronic kidney disease) stage 4, GFR 15-29 ml/min ([address]    IUD (intrauterine device) in place    Urinary tract infection with hematuria  Resolved Problems:    * No resolved hospital problems. *       nephro input   1. Acute kidney injury superimposed on chronic kidney disease stage IIIb - differentials include progression of underlying polycystic kidney disease versus prerenal azotemia vs Obstructive nephropathy from kidney stone. She is currently not uremic but GFR is only 14 mL/min and I have advised patient that she may need to start dialysis sooner rather than later. Plan: IV fluid 0.45 normal saline at 100 mL/h. Bladder scan. Random urine electrolytes. Strict input and output documentation. Urinalysis and urine microscopy. Avoid nephrotoxic agents. Basic metabolic profile daily. 2.  Borderline hyperkalemia - will monitor response to hydration. 3.  Nephrolithiasis - patient has nonobstructing kidney stone. 4.  Macrocytic anemia - check vitamin T40 and folic acid. 5.  Systemic hypertension - slightly suboptimal blood pressure control related to flank pain. Continue current medications. .        The patient is a 35 y.o.  female, with a history of anemia, asthma, CKD stage IV, HTN, polycystic kidney disease, and renovascular hypertension, who presents with flank pain. According to patient, she developed bilateral flank pain approximately 2 days ago.   Pain is described as constant, stabbing, and radiating into the groin at times. Symptoms are associated with nausea, dysuria and urinary urgency. Denies fever, chills, chest pain, cough, abdominal pain, nausea, vomiting, and diarrhea. There are no aggravating or alleviating factors. Symptoms are reported as constant and moderate to severe; progressively worsening.        Significant last 24 hr data reviewed ;   Vitals:    06/20/21 1230 06/20/21 1958 06/21/21 0000 06/21/21 0715   BP: (!) 141/83 (!) 141/89 122/69 135/86   Pulse: 75 78 61 94   Resp: 17 16 16 16   Temp: 98.3 °F (36.8 °C) 99.1 °F (37.3 °C) 98.3 °F (36.8 °C) 98.2 °F (36.8 °C)   TempSrc: Oral Oral Oral Oral   SpO2: 97% 98% 99% 95%   Weight:       Height:          Recent Results (from the past 24 hour(s))   DOUGLAS SCREEN WITH REFLEX    Collection Time: 06/20/21  1:26 PM   Result Value Ref Range    DOUGLAS NEGATIVE NEGATIVE    ROSI Antibodies Screen 0.1 <0.7 U/mL    Anti ds DNA 4.9 <10.0 IU/mL   PTH, INTACT WITH IONIZED CALCIUM    Collection Time: 06/20/21  1:26 PM   Result Value Ref Range    Pth Intact 427.0 (H) 15.0 - 65.0 pg/mL    Calcium, Ion 1.16 1.13 - 1.33 mmol/L   URIC ACID    Collection Time: 06/20/21  1:26 PM   Result Value Ref Range    Uric Acid 5.2 2.4 - 5.7 mg/dL   Comprehensive Metabolic Panel w/ Reflex to MG    Collection Time: 06/21/21  5:07 AM   Result Value Ref Range    Glucose 101 (H) 70 - 99 mg/dL    BUN 27 (H) 6 - 20 mg/dL    CREATININE 3.12 (H) 0.50 - 0.90 mg/dL    Bun/Cre Ratio NOT REPORTED 9 - 20    Calcium 8.0 (L) 8.6 - 10.4 mg/dL    Sodium 139 135 - 144 mmol/L    Potassium 5.0 3.7 - 5.3 mmol/L    Chloride 110 (H) 98 - 107 mmol/L    CO2 21 20 - 31 mmol/L    Anion Gap 8 (L) 9 - 17 mmol/L    Alkaline Phosphatase 72 35 - 104 U/L    ALT <5 (L) 5 - 33 U/L    AST 6 <32 U/L    Total Bilirubin <0.15 (L) 0.3 - 1.2 mg/dL    Total Protein 5.8 (L) 6.4 - 8.3 g/dL    Albumin 3.0 (L) 3.5 - 5.2 g/dL    Albumin/Globulin Ratio NOT REPORTED 1.0 - 2.5    GFR Non- 16 (L) >60 mL/min    GFR African American 20 (L) >60 mL/min    GFR Comment          GFR Staging NOT REPORTED    CBC    Collection Time: 06/21/21  5:07 AM   Result Value Ref Range    WBC 6.2 3.5 - 11.0 k/uL    RBC 2.78 (L) 4.0 - 5.2 m/uL    Hemoglobin 9.0 (L) 12.0 - 16.0 g/dL    Hematocrit 27.6 (L) 36 - 46 %    MCV 99.4 80 - 100 fL    MCH 32.5 26 - 34 pg    MCHC 32.7 31 - 37 g/dL    RDW 14.3 11.5 - 14.9 %    Platelets 779 035 - 068 k/uL    MPV 8.7 6.0 - 12.0 fL    NRBC Automated NOT REPORTED per 100 WBC   PROTEIN, URINE, TIMED    Collection Time: 06/21/21 12:04 PM   Result Value Ref Range    Volume 2900 mL    Hours Collected 24 h    Urine Total Protein PENDING mg/dL    Protein,Tot Timed Ur PENDING mg/X h    Protein, 24H Urine PENDING mg/24 h     No results for input(s): POCGLU in the last 72 hours. CT ABDOMEN PELVIS WO CONTRAST Additional Contrast? None    Result Date: 6/19/2021  EXAMINATION: CT OF THE ABDOMEN AND PELVIS WITHOUT CONTRAST 6/19/2021 3:56 am TECHNIQUE: CT of the abdomen and pelvis was performed without the administration of intravenous contrast. Multiplanar reformatted images are provided for review. Dose modulation, iterative reconstruction, and/or weight based adjustment of the mA/kV was utilized to reduce the radiation dose to as low as reasonably achievable. COMPARISON: None. HISTORY: ORDERING SYSTEM PROVIDED HISTORY: flank pain TECHNOLOGIST PROVIDED HISTORY: flank pain Decision Support Exception - unselect if not a suspected or confirmed emergency medical condition->Emergency Medical Condition (MA) Is the patient pregnant?->No Reason for Exam: Bilateral kidney pain Acuity: Acute Type of Exam: Initial FINDINGS: Abdomen/Pelvis: Lower chest: The lung bases are well aerated. Pleural surfaces are unremarkable and no evidence of pleural effusion is identified. Heart is mildly enlarged.  Organs: Redemonstration of polycystic kidney disease is noted with extensive parenchymal replacement seen with innumerable cysts, some of which demonstrate internal density consistent with hemorrhagic cysts. Mid right renal collecting system ovoid calculus measuring up to 6 mm in length is noted without associated urinary obstruction identified. The liver, gallbladder, spleen, pancreas, adrenal glands, kidneys, are otherwise unremarkable in appearance. GI/Bowel: The stomach is unremarkable without wall thickening or distention. Bowel loops are unremarkable in appearance without evidence of obstruction, distension or mucosal thickening. Pelvis: The urinary bladder is well distended and unremarkable in appearance. No evidence of pelvic free fluid is seen. Uterus is retroverted in position and unremarkable in appearance. Intrauterine contraceptive device appears low lying within the endometrial canal with distal arm near the cervical os. Peritoneum/Retroperitoneum: No evidence of retroperitoneal or intraperitoneal lymphadenopathy is identified. No evidence of intraperitoneal free fluid is seen. Bones/Soft Tissues: Partial visualization of bilateral breast augmentation is noted without evidence of complication identified. The bones, skeletal muscle bundles, fascial planes and subcutaneous soft tissues are unremarkable in appearance. 1. Mid right renal collecting system 6 mm diameter ovoid calculus without associated urinary obstruction. 2. Redemonstration severe parenchymal changes associated with polycystic kidney disease. 3. Suspected low lying intrauterine contraceptive device. Recommend clinical correlation.              HPI:         Review of Systems:   Chronic pain issues on Norco and seeing pain management  Constitutional:  negative for chills, fevers, sweats  Respiratory:  negative for cough, dyspnea on exertion, hemoptysis, shortness of breath, wheezing  Cardiovascular:  negative for chest pain, chest pressure/discomfort, lower extremity edema, palpitations  Gastrointestinal:  negative for abdominal pain, constipation, diarrhea, nausea, vomiting  Neurological:  negative for dizziness, headache  Data:     Past Medical History:  no change     Social History:  no change    Family History: @no change    Vitals:      I/O (24Hr): Intake/Output Summary (Last 24 hours) at 2021 1238  Last data filed at 2021 1207  Gross per 24 hour   Intake 1180 ml   Output 1950 ml   Net -770 ml       Labs:    URINE ANALYSIS: No results found for: LABURIN     CBC:  Lab Results   Component Value Date    WBC 6.2 2021    HGB 9.0 2021     2021     2012        BMP:    Lab Results   Component Value Date     2021    K 5.0 2021     2021    CO2 21 2021    BUN 27 2021    CREATININE 3.12 2021    GLUCOSE 101 2021      LIVER PROFILE:  Lab Results   Component Value Date    ALT <5 2021    AST 6 2021    PROT 5.8 2021    BILITOT <0.15 2021    BILIDIR <0.08 2020    LABALBU 3.0 2021               Radiology:  Medications:      Allergies:      Current Meds:   Scheduled Meds:    amLODIPine  5 mg Oral Daily    DULoxetine  90 mg Oral Daily    hydrOXYzine  50 mg Oral Nightly    metoprolol succinate  50 mg Oral Daily    sodium chloride flush  5-40 mL Intravenous 2 times per day    heparin (porcine)  5,000 Units Subcutaneous 3 times per day    nicotine  1 patch Transdermal Daily     Continuous Infusions:    sodium chloride 50 mL/hr at 21 0944    sodium chloride       PRN Meds: HYDROcodone 5 mg - acetaminophen, sodium chloride flush, sodium chloride, ondansetron **OR** ondansetron, acetaminophen **OR** acetaminophen      Physical Examination:        /86   Pulse 94   Temp 98.2 °F (36.8 °C) (Oral)   Resp 16   Ht 5' 4\" (1.626 m)   Wt 126 lb 8.7 oz (57.4 kg)   LMP 2021 (Approximate)   SpO2 95%   BMI 21.72 kg/m²   Temp (24hrs), Av.5 °F (36.9 °C), Min:98.2 °F (36.8 °C), Max:99.1 °F (37.3 °C)    No loss  Acute on chronic kidney disease hydration some improvement  Baseline ?  2.2   Dc iv ceftriaxone and dc iv fentanyl      Hardy Zhang MD  6/21/2021  12:38 PM

## 2021-06-21 NOTE — PLAN OF CARE
Problem: Tissue Perfusion - Renal, Altered:  Goal: Electrolytes within specified parameters  Description: Electrolytes within specified parameters  Outcome: Ongoing  Note: Pt 24 hr urine completed today, awaiting results. Pt BUN/CRE slightly improved, continue to monitor. Problem: Falls - Risk of:  Goal: Will remain free from falls  Description: Will remain free from falls  6/21/2021 1648 by Alina Francois RN  Note: Pt remains free from falls this shift. Bed is locked, in lowest position, and call light within reach. Problem: Pain:  Description: Pain management should include both nonpharmacologic and pharmacologic interventions. Goal: Pain level will decrease  Description: Pain level will decrease  6/21/2021 1648 by Alina Francois RN  Outcome: Ongoing  Note: Pt c/o flank pain and headache.  Pt was taking fentanyl this morning and switched to norco.

## 2021-06-21 NOTE — PROGRESS NOTES
Department of Internal Medicine  Nephrology Barbara Fuentes MD   Progress Note    Reason for consultation: management of acute kidney injury superimposed on chronic kidney disease. Consulting physician: Wilmar Wilkes MD    Interval history: Patient was seen and examined today and she continues to complain of bilateral flank pain. She does not have fever or chills and leg swelling is improved but she complains of bilateral hand swelling today. She does not have gross hematuria. She is currently on IV fluid 0.45 normal saline at 100 mL/h. History of presenting illness: This is a 35 y.o. female with a significant past medical history of Systemic hypertension, Bronchial asthma, depression, nephrolithiasis [required cystoscopy and placement of a right double-J ureteral stent with attached string on 6/12/2020] and chronic kidney disease stage IIIb secondary to autosomal dominant polycystic kidney disease diagnosed in 2009 [baseline serum creatinine 2.1 mg/dL], who presented to the emergency department on 6/18/2021 with complaints of worsening flank pain over several weeks associated with suprapubic pledge show and left leg swelling without pain. She has had 2 admissions in the recent month for similar complaints. Laboratory studies was remarkable for elevated serum creatinine above baseline at 3.67 mg/dL and hence nephrology consultation. CT scan of the abdomen and pelvis performed at presentation without contrast showed:  1. Mid right renal collecting system 6 mm diameter ovoid calculus without   associated urinary obstruction. 2. Redemonstration severe parenchymal changes associated with polycystic   kidney disease. 3. Suspected low lying intrauterine contraceptive device.  Recommend clinical   correlation.      Scheduled Meds:   amLODIPine  5 mg Oral Daily    DULoxetine  90 mg Oral Daily    hydrOXYzine  50 mg Oral Nightly    metoprolol succinate  50 mg Oral Daily    sodium chloride flush 5-40 mL Intravenous 2 times per day    heparin (porcine)  5,000 Units Subcutaneous 3 times per day    nicotine  1 patch Transdermal Daily     Continuous Infusions:   sodium chloride 50 mL/hr at 06/21/21 0944    sodium chloride       PRN Meds:. HYDROcodone 5 mg - acetaminophen, sodium chloride flush, sodium chloride, ondansetron **OR** ondansetron, acetaminophen **OR** acetaminophen    Physical Exam:    VITALS:  /75   Pulse 67   Temp 98.6 °F (37 °C) (Oral)   Resp 14   Ht 5' 4\" (1.626 m)   Wt 126 lb 8.7 oz (57.4 kg)   LMP 06/01/2021 (Approximate)   SpO2 97%   BMI 21.72 kg/m²   24HR INTAKE/OUTPUT:      Intake/Output Summary (Last 24 hours) at 6/21/2021 1411  Last data filed at 6/21/2021 1359  Gross per 24 hour   Intake 1300 ml   Output 2550 ml   Net -1250 ml     Constitutional: alert, appears stated age and cooperative    Skin: Skin color, texture, turgor normal. No rashes or lesions    Head: Normocephalic, without obvious abnormality, atraumatic     Cardiovascular/Edema: regular rate and rhythm, S1, S2 normal, no murmur, click, rub or gallop    Respiratory: clear to auscultation bilaterally    Abdomen: soft, non-tender; bowel sounds normal; no masses,  no organomegaly    Back: symmetric, no curvature. ROM normal. No CVA tenderness.     Extremities: edema Trace bilateral pedal edema    Neuro:  Grossly normal      CBC:   Recent Labs     06/19/21  0300 06/20/21  0503 06/21/21  0507   WBC 10.8 7.4 6.2   HGB 11.1* 9.7* 9.0*    215 215     BMP:    Recent Labs     06/19/21  0300 06/20/21  0503 06/21/21  0507    141 139   K 4.3 5.1 5.0    110* 110*   CO2 21 21 21   BUN 30* 28* 27*   CREATININE 3.67* 3.45* 3.12*   GLUCOSE 90 97 101*       Lab Results   Component Value Date    NITRU NEGATIVE 06/19/2021    COLORU YELLOW 06/19/2021    PHUR 6.5 06/19/2021    WBCUA 5 TO 10 06/19/2021    RBCUA 0 TO 2 06/19/2021    MUCUS NOT REPORTED 06/19/2021    TRICHOMONAS NOT REPORTED 06/19/2021    YEAST NOT REPORTED 06/19/2021    BACTERIA FEW 06/19/2021    CLARITYU Clear 04/26/2016    SPECGRAV 1.009 06/19/2021    LEUKOCYTESUR MOD 06/19/2021    UROBILINOGEN Normal 06/19/2021    BILIRUBINUR NEGATIVE 06/19/2021    BILIRUBINUR neg 09/30/2014    BLOODU Positive 04/26/2016    GLUCOSEU NEGATIVE 06/19/2021    KETUA NEGATIVE 06/19/2021    AMORPHOUS NOT REPORTED 06/19/2021     Urine Sodium:     Lab Results   Component Value Date    NOHELIA 51 06/19/2021     Urine Osmolarity:   Lab Results   Component Value Date    OSMOU 236 02/21/2021     Urine Protein:     Lab Results   Component Value Date    TPU 18 06/21/2021     Urine Creatinine:     Lab Results   Component Value Date    LABCREA 39.7 02/21/2021     IMPRESSION/RECOMMENDATIONS:      1. Acute kidney injury superimposed on chronic kidney disease stage IIIb - differentials include progression of underlying polycystic kidney disease versus prerenal azotemia vs Obstructive nephropathy from kidney stone. She is nonoliguric and renal function has improved slightly with hydration. 24-hour urine protein excretion is subnephrotic at 522 mg.  Plan: Decrease IV fluid 0.45 normal saline to 50 mL/h. Strict input and output documentation. Avoid nephrotoxic agents. Basic metabolic profile daily. 2.  Borderline hyperkalemia - Improving with hydration. 3.  Nephrolithiasis - patient has nonobstructing kidney stone. 4.  Macrocytic anemia - check vitamin G94 and folic acid. 5.  Systemic hypertension - Controlled. Prognosis is guarded. No indications for initiation of dialysis at this time.     Karolyn Ramirez MD FACP  Attending Nephrologist  6/21/2021 2:11 PM

## 2021-06-21 NOTE — FLOWSHEET NOTE
PT expressed her concerns on her possible dialysis that might happen and encouraged by prayer. 06/21/21 1149   Encounter Summary   Services provided to: Patient   Referral/Consult From: [place]   Continue Visiting   (6-21-21)   Complexity of Encounter Moderate   Length of Encounter 15 minutes   Spiritual Assessment Completed Yes   Spiritual/Hindu   Type Spiritual support   Assessment Calm; Approachable; Anxious; Fearful;Coping   Intervention Active listening;Explored feelings, thoughts, concerns;Prayer;Sustaining presence/ Ministry of presence   Outcome Comfort;Expressed gratitude;Engaged in conversation;Coping;Receptive

## 2021-06-21 NOTE — PLAN OF CARE
Problem: Falls - Risk of:  Goal: Will remain free from falls  Description: Will remain free from falls  6/21/2021 0326 by Herminio Reed RN  Outcome: Ongoing  Note: Pt is up per self. Gait is steady, will monitor      Problem: Physical Regulation:  Goal: Will remain free from infection  Description: Will remain free from infection  6/21/2021 0326 by Herminio Reed RN  Outcome: Ongoing  Note: Pt shows no signs or symptoms of infection at this time. VS stable, alert and oriented x4. Hand hygiene utilized upon entry and exit. Will continue to monitor. Problem: Pain:  Goal: Pain level will decrease  Description: Pain level will decrease  6/21/2021 0326 by Herminio Reed RN  Outcome: Ongoing  Note: Pt has complained of pain for this RN. Pt is receiving fentanyl routinely.  Will monitor

## 2021-06-22 LAB
ALBUMIN SERPL-MCNC: 3.5 G/DL (ref 3.5–5.2)
ALBUMIN/GLOBULIN RATIO: ABNORMAL (ref 1–2.5)
ALP BLD-CCNC: 77 U/L (ref 35–104)
ALT SERPL-CCNC: <5 U/L (ref 5–33)
ANION GAP SERPL CALCULATED.3IONS-SCNC: 9 MMOL/L (ref 9–17)
AST SERPL-CCNC: 7 U/L
BILIRUB SERPL-MCNC: 0.22 MG/DL (ref 0.3–1.2)
BUN BLDV-MCNC: 30 MG/DL (ref 6–20)
BUN/CREAT BLD: ABNORMAL (ref 9–20)
CALCIUM SERPL-MCNC: 8.4 MG/DL (ref 8.6–10.4)
CHLORIDE BLD-SCNC: 112 MMOL/L (ref 98–107)
CO2: 21 MMOL/L (ref 20–31)
CREAT SERPL-MCNC: 3.04 MG/DL (ref 0.5–0.9)
GFR AFRICAN AMERICAN: 20 ML/MIN
GFR NON-AFRICAN AMERICAN: 17 ML/MIN
GFR SERPL CREATININE-BSD FRML MDRD: ABNORMAL ML/MIN/{1.73_M2}
GFR SERPL CREATININE-BSD FRML MDRD: ABNORMAL ML/MIN/{1.73_M2}
GLUCOSE BLD-MCNC: 86 MG/DL (ref 70–99)
HCT VFR BLD CALC: 28.3 % (ref 36–46)
HEMOGLOBIN: 9.7 G/DL (ref 12–16)
MCH RBC QN AUTO: 33.6 PG (ref 26–34)
MCHC RBC AUTO-ENTMCNC: 34.4 G/DL (ref 31–37)
MCV RBC AUTO: 97.8 FL (ref 80–100)
NRBC AUTOMATED: ABNORMAL PER 100 WBC
PDW BLD-RTO: 14 % (ref 11.5–14.9)
PLATELET # BLD: 245 K/UL (ref 150–450)
PMV BLD AUTO: 9 FL (ref 6–12)
POTASSIUM SERPL-SCNC: 4.8 MMOL/L (ref 3.7–5.3)
POTASSIUM SERPL-SCNC: 5.6 MMOL/L (ref 3.7–5.3)
POTASSIUM SERPL-SCNC: 5.6 MMOL/L (ref 3.7–5.3)
RBC # BLD: 2.89 M/UL (ref 4–5.2)
SODIUM BLD-SCNC: 142 MMOL/L (ref 135–144)
TOTAL PROTEIN: 6.1 G/DL (ref 6.4–8.3)
WBC # BLD: 6.7 K/UL (ref 3.5–11)

## 2021-06-22 PROCEDURE — 99232 SBSQ HOSP IP/OBS MODERATE 35: CPT | Performed by: INTERNAL MEDICINE

## 2021-06-22 PROCEDURE — 96372 THER/PROPH/DIAG INJ SC/IM: CPT

## 2021-06-22 PROCEDURE — 6370000000 HC RX 637 (ALT 250 FOR IP): Performed by: INTERNAL MEDICINE

## 2021-06-22 PROCEDURE — 2580000003 HC RX 258: Performed by: NURSE PRACTITIONER

## 2021-06-22 PROCEDURE — 6360000002 HC RX W HCPCS: Performed by: NURSE PRACTITIONER

## 2021-06-22 PROCEDURE — 85027 COMPLETE CBC AUTOMATED: CPT

## 2021-06-22 PROCEDURE — 84132 ASSAY OF SERUM POTASSIUM: CPT

## 2021-06-22 PROCEDURE — 36415 COLL VENOUS BLD VENIPUNCTURE: CPT

## 2021-06-22 PROCEDURE — 96375 TX/PRO/DX INJ NEW DRUG ADDON: CPT

## 2021-06-22 PROCEDURE — 2500000003 HC RX 250 WO HCPCS: Performed by: INTERNAL MEDICINE

## 2021-06-22 PROCEDURE — G0378 HOSPITAL OBSERVATION PER HR: HCPCS

## 2021-06-22 PROCEDURE — 80053 COMPREHEN METABOLIC PANEL: CPT

## 2021-06-22 PROCEDURE — 96376 TX/PRO/DX INJ SAME DRUG ADON: CPT

## 2021-06-22 PROCEDURE — 2060000000 HC ICU INTERMEDIATE R&B

## 2021-06-22 PROCEDURE — 6370000000 HC RX 637 (ALT 250 FOR IP): Performed by: NURSE PRACTITIONER

## 2021-06-22 RX ORDER — DEXTROSE MONOHYDRATE 25 G/50ML
50 INJECTION, SOLUTION INTRAVENOUS ONCE
Status: COMPLETED | OUTPATIENT
Start: 2021-06-22 | End: 2021-06-22

## 2021-06-22 RX ORDER — SODIUM POLYSTYRENE SULFONATE 4.1 MEQ/G
15 POWDER, FOR SUSPENSION ORAL; RECTAL ONCE
Status: COMPLETED | OUTPATIENT
Start: 2021-06-22 | End: 2021-06-22

## 2021-06-22 RX ORDER — DEXTROSE MONOHYDRATE 25 G/50ML
25 INJECTION, SOLUTION INTRAVENOUS PRN
Status: DISCONTINUED | OUTPATIENT
Start: 2021-06-22 | End: 2021-06-23 | Stop reason: HOSPADM

## 2021-06-22 RX ORDER — SODIUM POLYSTYRENE SULFONATE 15 G/60ML
15 SUSPENSION ORAL; RECTAL ONCE
Status: COMPLETED | OUTPATIENT
Start: 2021-06-22 | End: 2021-06-22

## 2021-06-22 RX ORDER — SODIUM POLYSTYRENE SULFONATE 4.1 MEQ/G
30 POWDER, FOR SUSPENSION ORAL; RECTAL ONCE
Status: COMPLETED | OUTPATIENT
Start: 2021-06-22 | End: 2021-06-22

## 2021-06-22 RX ORDER — SODIUM POLYSTYRENE SULFONATE 4.1 MEQ/G
15 POWDER, FOR SUSPENSION ORAL; RECTAL ONCE
Status: DISCONTINUED | OUTPATIENT
Start: 2021-06-22 | End: 2021-06-22 | Stop reason: CLARIF

## 2021-06-22 RX ADMIN — HEPARIN SODIUM 5000 UNITS: 5000 INJECTION INTRAVENOUS; SUBCUTANEOUS at 14:14

## 2021-06-22 RX ADMIN — AMLODIPINE BESYLATE 5 MG: 5 TABLET ORAL at 08:13

## 2021-06-22 RX ADMIN — INSULIN HUMAN 10 UNITS: 100 INJECTION, SOLUTION PARENTERAL at 17:24

## 2021-06-22 RX ADMIN — SODIUM POLYSTYRENE SULFONATE 15 G: 1 POWDER ORAL; RECTAL at 17:24

## 2021-06-22 RX ADMIN — DEXTROSE MONOHYDRATE 25 G: 500 INJECTION PARENTERAL at 10:20

## 2021-06-22 RX ADMIN — HYDROCODONE BITARTRATE AND ACETAMINOPHEN 1 TABLET: 5; 325 TABLET ORAL at 08:13

## 2021-06-22 RX ADMIN — HEPARIN SODIUM 5000 UNITS: 5000 INJECTION INTRAVENOUS; SUBCUTANEOUS at 22:38

## 2021-06-22 RX ADMIN — HEPARIN SODIUM 5000 UNITS: 5000 INJECTION INTRAVENOUS; SUBCUTANEOUS at 05:58

## 2021-06-22 RX ADMIN — HYDROCODONE BITARTRATE AND ACETAMINOPHEN 1 TABLET: 5; 325 TABLET ORAL at 00:05

## 2021-06-22 RX ADMIN — METOPROLOL SUCCINATE 50 MG: 50 TABLET, EXTENDED RELEASE ORAL at 08:13

## 2021-06-22 RX ADMIN — INSULIN HUMAN 10 UNITS: 100 INJECTION, SOLUTION PARENTERAL at 10:20

## 2021-06-22 RX ADMIN — SODIUM POLYSTYRENE SULFONATE 30 G: 1 POWDER ORAL; RECTAL at 10:24

## 2021-06-22 RX ADMIN — HYDROXYZINE HYDROCHLORIDE 50 MG: 25 TABLET, FILM COATED ORAL at 22:38

## 2021-06-22 RX ADMIN — DEXTROSE MONOHYDRATE 50 G: 25 INJECTION, SOLUTION INTRAVENOUS at 17:23

## 2021-06-22 RX ADMIN — HYDROCODONE BITARTRATE AND ACETAMINOPHEN 1 TABLET: 5; 325 TABLET ORAL at 20:07

## 2021-06-22 RX ADMIN — SODIUM POLYSTYRENE SULFONATE 15 G: 15 SUSPENSION ORAL; RECTAL at 18:41

## 2021-06-22 RX ADMIN — HYDROCODONE BITARTRATE AND ACETAMINOPHEN 1 TABLET: 5; 325 TABLET ORAL at 16:27

## 2021-06-22 RX ADMIN — SODIUM CHLORIDE, PRESERVATIVE FREE 10 ML: 5 INJECTION INTRAVENOUS at 20:08

## 2021-06-22 RX ADMIN — DULOXETINE HYDROCHLORIDE 90 MG: 60 CAPSULE, DELAYED RELEASE ORAL at 08:13

## 2021-06-22 ASSESSMENT — PAIN DESCRIPTION - ORIENTATION
ORIENTATION: RIGHT;LEFT
ORIENTATION: RIGHT;LEFT
ORIENTATION: MID;LOWER

## 2021-06-22 ASSESSMENT — PAIN DESCRIPTION - PAIN TYPE
TYPE: ACUTE PAIN

## 2021-06-22 ASSESSMENT — PAIN DESCRIPTION - LOCATION
LOCATION: BACK
LOCATION: BACK;FLANK
LOCATION: FLANK
LOCATION: FLANK
LOCATION: BACK

## 2021-06-22 ASSESSMENT — PAIN SCALES - GENERAL
PAINLEVEL_OUTOF10: 6
PAINLEVEL_OUTOF10: 3
PAINLEVEL_OUTOF10: 0
PAINLEVEL_OUTOF10: 5
PAINLEVEL_OUTOF10: 1
PAINLEVEL_OUTOF10: 7
PAINLEVEL_OUTOF10: 6
PAINLEVEL_OUTOF10: 4

## 2021-06-22 ASSESSMENT — PAIN DESCRIPTION - DESCRIPTORS: DESCRIPTORS: SHARP

## 2021-06-22 NOTE — DISCHARGE SUMMARY
[facility]    Discharge Summary     Patient ID: Isaias Ram  :  1980   MRN: [id number]     ACCOUNT:  [de-identified]   Patient's PCP: Sonny Prince PA-C  Admit Date: 2021   Discharge Date: 2021    Length of Stay: 4  Code Status:  Full Code  Admitting Physician: Jennifer Peres MD  Discharge Physician: Vinny Palma MD     Active Discharge Diagnoses:     Primary Problem  Acute kidney injury superimposed on CKD [facility]      [facility] [place] Problems    Diagnosis Date Noted    Acute kidney injury superimposed on CKD ([facility] [address] [N17.9, N18.9] 2021    Urinary tract infection with hematuria [N39.0, R31.9] 2021    CKD (chronic kidney disease) stage 4, GFR 15-29 ml/min ([facility] [address] [N18.4] 2020    IUD (intrauterine device) in place [Z97.5] 2020    Polycystic kidney [Q61.3] 10/02/2011    Smoker [F17.200] 10/02/2011       Admission Condition:  fair     Discharged Condition: fair    Hospital Stay:     Hospital Course:  Isaias Ram is a 35 y.o. female who was admitted for the management of Acute kidney injury superimposed on CKD ([facility] [address] , presented to ER with Flank Pain  -year-old lady who is a moderate protein calorie malnutrition evidence of fat loss and muscle loss history of polycystic kidney disease and baseline chronic kidney disease stage IIIb admitted with acute on chronic kidney injury thought to be due to worsening polycystic kidney disease no obstruction noted none obstructing stone was noted  Baseline creatinine is 2.3 she was admitted with 3.6  With hydration improved to 3.04,avoid nsaids  Low k diet  Patient was seen in consultation with nephrologist        Significant therapeutic interventions:     Significant Diagnostic Studies:   Labs / Micro:        ,     Radiology:    CT ABDOMEN PELVIS WO CONTRAST Additional Contrast? None    Result Date: 2021  EXAMINATION: CT OF THE ABDOMEN AND PELVIS WITHOUT CONTRAST 6/19/2021 3:56 am TECHNIQUE: CT of the abdomen and pelvis was performed without the administration of intravenous contrast. Multiplanar reformatted images are provided for review. Dose modulation, iterative reconstruction, and/or weight based adjustment of the mA/kV was utilized to reduce the radiation dose to as low as reasonably achievable. COMPARISON: None. HISTORY: ORDERING SYSTEM PROVIDED HISTORY: flank pain TECHNOLOGIST PROVIDED HISTORY: flank pain Decision Support Exception - unselect if not a suspected or confirmed emergency medical condition->Emergency Medical Condition (MA) Is the patient pregnant?->No Reason for Exam: Bilateral kidney pain Acuity: Acute Type of Exam: Initial FINDINGS: Abdomen/Pelvis: Lower chest: The lung bases are well aerated. Pleural surfaces are unremarkable and no evidence of pleural effusion is identified. Heart is mildly enlarged. Organs: Redemonstration of polycystic kidney disease is noted with extensive parenchymal replacement seen with innumerable cysts, some of which demonstrate internal density consistent with hemorrhagic cysts. Mid right renal collecting system ovoid calculus measuring up to 6 mm in length is noted without associated urinary obstruction identified. The liver, gallbladder, spleen, pancreas, adrenal glands, kidneys, are otherwise unremarkable in appearance. GI/Bowel: The stomach is unremarkable without wall thickening or distention. Bowel loops are unremarkable in appearance without evidence of obstruction, distension or mucosal thickening. Pelvis: The urinary bladder is well distended and unremarkable in appearance. No evidence of pelvic free fluid is seen. Uterus is retroverted in position and unremarkable in appearance. Intrauterine contraceptive device appears low lying within the endometrial canal with distal arm near the cervical os.  Peritoneum/Retroperitoneum: No evidence of retroperitoneal or intraperitoneal lymphadenopathy is identified. No evidence of intraperitoneal free fluid is seen. Bones/Soft Tissues: Partial visualization of bilateral breast augmentation is noted without evidence of complication identified. The bones, skeletal muscle bundles, fascial planes and subcutaneous soft tissues are unremarkable in appearance. 1. Mid right renal collecting system 6 mm diameter ovoid calculus without associated urinary obstruction. 2. Redemonstration severe parenchymal changes associated with polycystic kidney disease. 3. Suspected low lying intrauterine contraceptive device. Recommend clinical correlation. Consultations:    Consults:     Final Specialist Recommendations/Findings:   IP CONSULT TO OB GYN  IP CONSULT TO NEPHROLOGY      The patient was seen and examined on day of discharge and this discharge summary is in conjunction with any daily progress note from day of discharge. Discharge plan:     Disposition: Home    Physician Follow Up:     [address]   [address] [place] [address]  [phone]  Schedule an appointment as soon as possible for a visit in 1 week  Hospital Follow Up for low lying IUD       Requiring Further Evaluation/Follow Up POST HOSPITALIZATION/Incidental Findings:    Diet: cardiac diet    Activity: As tolerated    Instructions to Patient:     Discharge Medications:      Medication List      CHANGE how you take these medications    DULoxetine 60 MG extended release capsule  Commonly known as: CYMBALTA  take 1 capsule by mouth once daily  What changed: See the new instructions. CONTINUE taking these medications    amLODIPine 5 MG tablet  Commonly known as: NORVASC     Blood Pressure Kit  Check BP once/day- goal is <140/90.      hydrOXYzine 50 MG tablet  Commonly known as: ATARAX     metoprolol succinate 50 MG extended release tablet  Commonly known as: TOPROL XL            Time Spent on discharge is  35 mins in patient examination, evaluation, counseling as well as medication reconciliation, prescriptions for required medications, discharge plan and follow up. Electronically signed by   Ewa Gibbs MD  6/23/2021  10:54 AM      Thank you Dr. Eileen Kirk PA-C for the opportunity to be involved in this patient's care.

## 2021-06-22 NOTE — CARE COORDINATION
ONGOING DISCHARGE PLAN:    Patient is alert and oriented x4. Spoke with patient regarding discharge plan and patient confirms that plan is still  to return to home w/ family.     Denies VNS.     CR today, 3.04 from 3.12. Nephro continues to follow.     Pt's K+ today, 5.6. Kayexalate given. ?DC today w/ no needs.        Will continue to follow for additional discharge needs.     Electronically signed by Ricki Gomez RN on 6/22/2021 at 10:40 AM

## 2021-06-22 NOTE — PROGRESS NOTES
NEGATIVE 06/19/2021    BILIRUBINUR neg 09/30/2014    BLOODU Positive 04/26/2016    GLUCOSEU NEGATIVE 06/19/2021    KETUA NEGATIVE 06/19/2021    AMORPHOUS NOT REPORTED 06/19/2021     Urine Sodium:     Lab Results   Component Value Date    NOHELIA 86 06/21/2021     Urine Osmolarity:   Lab Results   Component Value Date    OSMOU 236 02/21/2021     Urine Protein:     Lab Results   Component Value Date    TPU 18 06/21/2021     Urine Creatinine:     Lab Results   Component Value Date    LABCREA 29.5 06/21/2021     IMPRESSION/RECOMMENDATIONS:      1. Acute kidney injury superimposed on chronic kidney disease stage IIIb - Top differential is prerenal azotemia. She does not have obstructive uropathy radiologically. Renal function is improved with hydration. 24-hour urine protein excretion is subnephrotic at 522 mg.  Plan: Discontinue IV fluid. [place] oral fluid intake. Strict input and output documentation. Avoid nephrotoxic agents. Basic metabolic profile daily. 2.  Hyperkalemia [5.6 mmol/L] - Will treat medically with Kayexalate. 3.  Nephrolithiasis - patient has nonobstructing kidney stone. 4.  Macrocytic anemia - Ferritin, iron saturation, vitamin I23 and folic acid level are within normal.    5.  Systemic hypertension - Controlled. Prognosis is guarded. No indications for initiation of dialysis at this time. No renal objection to patient being discharged today if repeat serum potassium is less than 5.1 mmol/L. She can follow-up with me in the office in 2 to 4 weeks.     Thania Medrano MD FACP  Attending Nephrologist  6/22/2021 1:31 PM

## 2021-06-23 VITALS
SYSTOLIC BLOOD PRESSURE: 168 MMHG | HEIGHT: 65 IN | DIASTOLIC BLOOD PRESSURE: 89 MMHG | RESPIRATION RATE: 18 BRPM | HEART RATE: 59 BPM | OXYGEN SATURATION: 97 % | WEIGHT: 125.66 LBS | BODY MASS INDEX: 20.94 KG/M2 | TEMPERATURE: 98.3 F

## 2021-06-23 LAB
ALBUMIN SERPL-MCNC: 3.4 G/DL (ref 3.5–5.2)
ALBUMIN/GLOBULIN RATIO: ABNORMAL (ref 1–2.5)
ALP BLD-CCNC: 72 U/L (ref 35–104)
ALT SERPL-CCNC: <5 U/L (ref 5–33)
ANION GAP SERPL CALCULATED.3IONS-SCNC: 11 MMOL/L (ref 9–17)
AST SERPL-CCNC: 7 U/L
BILIRUB SERPL-MCNC: <0.15 MG/DL (ref 0.3–1.2)
BUN BLDV-MCNC: 32 MG/DL (ref 6–20)
BUN/CREAT BLD: ABNORMAL (ref 9–20)
CALCIUM SERPL-MCNC: 7.9 MG/DL (ref 8.6–10.4)
CHLORIDE BLD-SCNC: 108 MMOL/L (ref 98–107)
CO2: 21 MMOL/L (ref 20–31)
CREAT SERPL-MCNC: 2.94 MG/DL (ref 0.5–0.9)
GFR AFRICAN AMERICAN: 21 ML/MIN
GFR NON-AFRICAN AMERICAN: 17 ML/MIN
GFR SERPL CREATININE-BSD FRML MDRD: ABNORMAL ML/MIN/{1.73_M2}
GFR SERPL CREATININE-BSD FRML MDRD: ABNORMAL ML/MIN/{1.73_M2}
GLUCOSE BLD-MCNC: 90 MG/DL (ref 70–99)
HCT VFR BLD CALC: 28 % (ref 36–46)
HEMOGLOBIN: 9.6 G/DL (ref 12–16)
MCH RBC QN AUTO: 33.7 PG (ref 26–34)
MCHC RBC AUTO-ENTMCNC: 34.2 G/DL (ref 31–37)
MCV RBC AUTO: 98.6 FL (ref 80–100)
NRBC AUTOMATED: ABNORMAL PER 100 WBC
PDW BLD-RTO: 14.3 % (ref 11.5–14.9)
PLATELET # BLD: 248 K/UL (ref 150–450)
PMV BLD AUTO: 8.6 FL (ref 6–12)
POTASSIUM SERPL-SCNC: 4.5 MMOL/L (ref 3.7–5.3)
RBC # BLD: 2.84 M/UL (ref 4–5.2)
SODIUM BLD-SCNC: 140 MMOL/L (ref 135–144)
TOTAL PROTEIN: 6.1 G/DL (ref 6.4–8.3)
WBC # BLD: 6.3 K/UL (ref 3.5–11)

## 2021-06-23 PROCEDURE — 80053 COMPREHEN METABOLIC PANEL: CPT

## 2021-06-23 PROCEDURE — 99239 HOSP IP/OBS DSCHRG MGMT >30: CPT | Performed by: INTERNAL MEDICINE

## 2021-06-23 PROCEDURE — 6360000002 HC RX W HCPCS: Performed by: NURSE PRACTITIONER

## 2021-06-23 PROCEDURE — 85027 COMPLETE CBC AUTOMATED: CPT

## 2021-06-23 PROCEDURE — 96372 THER/PROPH/DIAG INJ SC/IM: CPT

## 2021-06-23 PROCEDURE — 2580000003 HC RX 258: Performed by: NURSE PRACTITIONER

## 2021-06-23 PROCEDURE — 36415 COLL VENOUS BLD VENIPUNCTURE: CPT

## 2021-06-23 PROCEDURE — 6370000000 HC RX 637 (ALT 250 FOR IP): Performed by: NURSE PRACTITIONER

## 2021-06-23 PROCEDURE — 6370000000 HC RX 637 (ALT 250 FOR IP): Performed by: INTERNAL MEDICINE

## 2021-06-23 PROCEDURE — G0378 HOSPITAL OBSERVATION PER HR: HCPCS

## 2021-06-23 RX ADMIN — HYDROCODONE BITARTRATE AND ACETAMINOPHEN 1 TABLET: 5; 325 TABLET ORAL at 08:38

## 2021-06-23 RX ADMIN — METOPROLOL SUCCINATE 50 MG: 50 TABLET, EXTENDED RELEASE ORAL at 08:35

## 2021-06-23 RX ADMIN — AMLODIPINE BESYLATE 5 MG: 5 TABLET ORAL at 08:35

## 2021-06-23 RX ADMIN — DULOXETINE HYDROCHLORIDE 90 MG: 60 CAPSULE, DELAYED RELEASE ORAL at 08:35

## 2021-06-23 RX ADMIN — HEPARIN SODIUM 5000 UNITS: 5000 INJECTION INTRAVENOUS; SUBCUTANEOUS at 06:12

## 2021-06-23 RX ADMIN — SODIUM CHLORIDE, PRESERVATIVE FREE 10 ML: 5 INJECTION INTRAVENOUS at 10:53

## 2021-06-23 ASSESSMENT — PAIN SCALES - GENERAL: PAINLEVEL_OUTOF10: 6

## 2021-06-23 NOTE — PLAN OF CARE
Problem: Tissue Perfusion - Renal, Altered:  Goal: Electrolytes within specified parameters  Description: Electrolytes within specified parameters  Outcome: Ongoing  Note: Potassium level decreased to 4.8 after treatment. I and o on chart. Lungs clear. No edema. Goal: Urine creatinine clearance will be within specified parameters  Description: Urine creatinine clearance will be within specified parameters  Outcome: Ongoing  Goal: Serum creatinine will be within specified parameters  Description: Serum creatinine will be within specified parameters  Outcome: Ongoing  Goal: Ability to achieve a balanced intake and output will improve  Description: Ability to achieve a balanced intake and output will improve  Outcome: Ongoing     Problem: Falls - Risk of:  Goal: Will remain free from falls  Description: Will remain free from falls  Outcome: Ongoing  Note: Patient up per self with steady gait. Onstructed to call nurse if help needed. Goal: Absence of physical injury  Description: Absence of physical injury  Outcome: Ongoing     Problem: Physical Regulation:  Goal: Will remain free from infection  Description: Will remain free from infection  Outcome: Ongoing     Problem: Pain:  Goal: Pain level will decrease  Description: Pain level will decrease  Outcome: Ongoing  Note: Patient with chronic back pain. Medicated with norco with relief of pain.   Goal: Control of acute pain  Description: Control of acute pain  Outcome: Ongoing  Goal: Control of chronic pain  Description: Control of chronic pain  Outcome: Ongoing

## 2021-06-23 NOTE — DISCHARGE INSTR - COC
Continuity of Care Form    Patient Name: Carlo Multani   :  1980  MRN:  [id number]    Admit date:  2021  Discharge date:  ***    Code Status Order: Full Code   Advance Directives:   Advance Care Flowsheet Documentation     Date/Time Healthcare Directive Type of Healthcare Directive Copy in [facility] [address] Agent's Name Healthcare Agent's Phone Number    21 1733  No, patient does not have an advance directive for healthcare treatment -- -- -- -- --          Admitting Physician:  Mariana Carlin MD  PCP: Cata Barajas PA-C    Discharging Nurse: [facility] Unit/Room#: 2107/2107-01  Discharging Unit Phone Number: ***    Emergency Contact:   Extended Emergency Contact Information  Primary Emergency Contact: Maralee Schlatter  Address: [address] Phone: [phone]  Work Phone: [phone]  Mobile Phone: [phone]  Relation: Parent  Secondary Emergency Contact: [address] [place] [address] Phone: [phone]  Work Phone: [phone]  Mobile Phone: [phone]  Relation: Other    Past Surgical History:  Past Surgical History:   Procedure Laterality Date    BREAST ENHANCEMENT SURGERY      BREAST LUMPECTOMY      left breast    CYSTO/URETERO/PYELOSCOPY, CALCULUS TX Right 2020    HOLMIUM - CYSTO, RIGHT RETROGRADE PYELOGRAM, RIGHT URETEROSCOPY, LASER LITHO ON STAND BY, RIGHT STENT PLACEMENT performed by Chapo Vaz MD at [address]  2020   [facility]      x2    LITHOTRIPSY Right 2020    ESWL EXTRACORPOREAL SHOCK WAVE LITHOTRIPSY performed by Chapo Vaz MD at [address] [facility] [address]  2013    nerve block(right vervical C3/TON/C4/C5    NERVE BLOCK  2013    nerve block(right vervical C3/TON/C4/C5    OTHER SURGICAL HISTORY  03/10/2014     botox inj     TOE SURGERY      removal of ingrown toe nail-2nd toe on left foot Conduit: {YES / DE:29523}       Date of Last BM: ***    Intake/Output Summary (Last 24 hours) at 2021 1145  Last data filed at 2021 0616  Gross per 24 hour   Intake 1540 ml   Output 1100 ml   Net 440 ml     I/O last 3 completed shifts:   In: Maxim Haynes [P.O.:1895]  Out: 80 [Urine:1100]    Safety Concerns:     [facility] Safety Concerns:531973593}    Impairments/Disabilities:      [facility] Impairments/Disabilities:499122612}    Nutrition Therapy:  Current Nutrition Therapy:   [facility] Diet List:212137534}    Routes of Feeding: {CHP DME Other Feedings:563030407}  Liquids: {Slp liquid thickness:49038}  Daily Fluid Restriction: {CHP DME Yes amt example:728464049}  Last Modified Barium Swallow with Video (Video Swallowing Test): {Done Not Done SMII:658836098}    Treatments at the Time of Hospital Discharge:   Respiratory Treatments: ***  Oxygen Therapy:  {Therapy; copd oxygen:12829}  Ventilator:    { CC Vent RCF}    Rehab Therapies: {THERAPEUTIC INTERVENTION:0356293592}  Weight Bearing Status/Restrictions: [facility] Weight Bearin}  Other Medical Equipment (for information only, NOT a DME order):  {EQUIPMENT:433814701}  Other Treatments: ***    Patient's personal belongings (please select all that are sent with patient):  {[facility] DME Belongings:474418679}    RN SIGNATURE:  {Esignature:096326561}    CASE MANAGEMENT/SOCIAL WORK SECTION    Inpatient Status Date: ***    Readmission Risk Assessment Score:  Readmission Risk              Risk of Unplanned Readmission:  21           Discharging to Facility/ Agency   · Name:   · Address:  · Phone:  · Fax:    Dialysis Facility (if applicable)   · Name:  · Address:  · Dialysis Schedule:  · Phone:  · Fax:    / signature: {Esignature:012435348}    PHYSICIAN SECTION    Prognosis: {Prognosis:6701969671}    Condition at Discharge: [facility] Patient Condition:909489150}    Rehab Potential (if transferring to Rehab):

## 2021-06-23 NOTE — PROGRESS NOTES
times per day    heparin (porcine)  5,000 Units Subcutaneous 3 times per day    nicotine  1 patch Transdermal Daily     Continuous Infusions:   sodium chloride       PRN Meds:.dextrose, HYDROcodone 5 mg - acetaminophen, sodium chloride flush, sodium chloride, ondansetron **OR** ondansetron, acetaminophen **OR** acetaminophen    Physical Exam:    VITALS:  BP (!) 168/89   Pulse 59   Temp 98.3 °F (36.8 °C) (Oral)   Resp 18   Ht 5' 5\" (1.651 m)   Wt 125 lb 10.6 oz (57 kg)   LMP 06/01/2021 (Approximate)   SpO2 97%   BMI 20.91 kg/m²   24HR INTAKE/OUTPUT:      Intake/Output Summary (Last 24 hours) at 6/23/2021 1019  Last data filed at 6/23/2021 6770  Gross per 24 hour   Intake 1540 ml   Output 1100 ml   Net 440 ml     Constitutional: alert, appears stated age and cooperative    Skin: Skin color, texture, turgor normal. No rashes or lesions    Head: Normocephalic, without obvious abnormality, atraumatic     Cardiovascular/Edema: regular rate and rhythm, S1, S2 normal, no murmur, click, rub or gallop    Respiratory: clear to auscultation bilaterally    Abdomen: soft, non-tender; bowel sounds normal; no masses,  no organomegaly    Back: symmetric, no curvature. ROM normal. No CVA tenderness.     Extremities: edema Trace bilateral pedal edema    Neuro:  Grossly normal      CBC:   Recent Labs     06/21/21  0507 06/22/21  0518 06/23/21  0534   WBC 6.2 6.7 6.3   HGB 9.0* 9.7* 9.6*    245 248     BMP:    Recent Labs     06/21/21  0507 06/22/21  0518 06/22/21  1459 06/22/21 2035 06/23/21  0534    142  --   --  140   K 5.0 5.6* 5.6* 4.8 4.5   * 112*  --   --  108*   CO2 21 21  --   --  21   BUN 27* 30*  --   --  32*   CREATININE 3.12* 3.04*  --   --  2.94*   GLUCOSE 101* 86  --   --  90     Lab Results   Component Value Date    NITRU NEGATIVE 06/19/2021    COLORU YELLOW 06/19/2021    PHUR 6.5 06/19/2021    WBCUA 5 TO 10 06/19/2021    RBCUA 0 TO 2 06/19/2021    MUCUS NOT REPORTED 06/19/2021 TRICHOMONAS NOT REPORTED 06/19/2021    YEAST NOT REPORTED 06/19/2021    BACTERIA FEW 06/19/2021    CLARITYU Clear 04/26/2016    SPECGRAV 1.009 06/19/2021    LEUKOCYTESUR MOD 06/19/2021    UROBILINOGEN Normal 06/19/2021    BILIRUBINUR NEGATIVE 06/19/2021    BILIRUBINUR neg 09/30/2014    BLOODU Positive 04/26/2016    GLUCOSEU NEGATIVE 06/19/2021    KETUA NEGATIVE 06/19/2021    AMORPHOUS NOT REPORTED 06/19/2021     Urine Sodium:     Lab Results   Component Value Date    NOHELIA 86 06/21/2021     Urine Osmolarity:   Lab Results   Component Value Date    OSMOU 236 02/21/2021     Urine Protein:     Lab Results   Component Value Date    TPU 18 06/21/2021     Urine Creatinine:     Lab Results   Component Value Date    LABCREA 29.5 06/21/2021     IMPRESSION/RECOMMENDATIONS:      1. Acute kidney injury superimposed on chronic kidney disease stage IIIb - Top differential is prerenal azotemia. She does not have obstructive uropathy radiologically. 24-hour urine protein excretion is subnephrotic at 522 mg. Renal function is improved with hydration with BUN/creatinine down to 32/0.94 mg/dL today. Plan: [place] oral fluid intake. Strict input and output documentation. Avoid nephrotoxic agents. Basic metabolic profile daily. 2.  Hyperkalemia [5.6 mmol/L] - Corrected with medical management. 2 g potassium diet emphasized. 3.  Nephrolithiasis - patient has nonobstructing kidney stone. 4.  Macrocytic anemia - Ferritin, iron saturation, vitamin N62 and folic acid level are within normal.    5.  Systemic hypertension - Continue current medications    Prognosis is guarded. Okay to discharge from renal standpoint.     Franny Espinoza MD FACP  Attending Nephrologist  6/23/2021 10:19 AM

## 2021-06-23 NOTE — DISCHARGE INSTR - DIET

## 2021-06-23 NOTE — PROGRESS NOTES
Patient received discharge instructions. All questions and concerns answered. IV removed. Cardiac monitor removed. Patient taken down to private vehicle via wheelchair.

## 2021-11-06 ENCOUNTER — APPOINTMENT (OUTPATIENT)
Dept: CT IMAGING | Age: 41
DRG: 463 | End: 2021-11-06
Payer: MEDICARE

## 2021-11-06 ENCOUNTER — HOSPITAL ENCOUNTER (INPATIENT)
Age: 41
LOS: 2 days | Discharge: HOME | DRG: 463 | End: 2021-11-08
Attending: EMERGENCY MEDICINE | Admitting: INTERNAL MEDICINE
Payer: MEDICARE

## 2021-11-06 DIAGNOSIS — N12 PYELONEPHRITIS: ICD-10-CM

## 2021-11-06 DIAGNOSIS — N20.0 RIGHT NEPHROLITHIASIS: ICD-10-CM

## 2021-11-06 DIAGNOSIS — Q61.3 POLYCYSTIC KIDNEY: ICD-10-CM

## 2021-11-06 DIAGNOSIS — R10.9 FLANK PAIN: ICD-10-CM

## 2021-11-06 DIAGNOSIS — N17.9 AKI (ACUTE KIDNEY INJURY) (HCC): Primary | ICD-10-CM

## 2021-11-06 PROBLEM — N39.0 UTI (URINARY TRACT INFECTION): Status: ACTIVE | Noted: 2021-11-06

## 2021-11-06 LAB
-: ABNORMAL
ABSOLUTE EOS #: 0.1 K/UL (ref 0–0.4)
ABSOLUTE IMMATURE GRANULOCYTE: ABNORMAL K/UL (ref 0–0.3)
ABSOLUTE LYMPH #: 1.3 K/UL (ref 1–4.8)
ABSOLUTE MONO #: 0.5 K/UL (ref 0.1–1.3)
ALBUMIN SERPL-MCNC: 4.1 G/DL (ref 3.5–5.2)
ALBUMIN/GLOBULIN RATIO: ABNORMAL (ref 1–2.5)
ALP BLD-CCNC: 93 U/L (ref 35–104)
ALT SERPL-CCNC: 8 U/L (ref 5–33)
AMORPHOUS: ABNORMAL
ANION GAP SERPL CALCULATED.3IONS-SCNC: 12 MMOL/L (ref 9–17)
AST SERPL-CCNC: 11 U/L
BACTERIA: ABNORMAL
BASOPHILS # BLD: 1 % (ref 0–2)
BASOPHILS ABSOLUTE: 0.1 K/UL (ref 0–0.2)
BILIRUB SERPL-MCNC: 0.22 MG/DL (ref 0.3–1.2)
BILIRUBIN URINE: NEGATIVE
BUN BLDV-MCNC: 35 MG/DL (ref 6–20)
BUN/CREAT BLD: ABNORMAL (ref 9–20)
CALCIUM SERPL-MCNC: 8.8 MG/DL (ref 8.6–10.4)
CASTS UA: ABNORMAL /LPF
CHLORIDE BLD-SCNC: 104 MMOL/L (ref 98–107)
CO2: 20 MMOL/L (ref 20–31)
COLOR: YELLOW
COMMENT UA: ABNORMAL
CREAT SERPL-MCNC: 4.36 MG/DL (ref 0.5–0.9)
CRYSTALS, UA: ABNORMAL /HPF
DIFFERENTIAL TYPE: ABNORMAL
EOSINOPHILS RELATIVE PERCENT: 2 % (ref 0–4)
EPITHELIAL CELLS UA: ABNORMAL /HPF
GFR AFRICAN AMERICAN: 13 ML/MIN
GFR NON-AFRICAN AMERICAN: 11 ML/MIN
GFR SERPL CREATININE-BSD FRML MDRD: ABNORMAL ML/MIN/{1.73_M2}
GFR SERPL CREATININE-BSD FRML MDRD: ABNORMAL ML/MIN/{1.73_M2}
GLUCOSE BLD-MCNC: 90 MG/DL (ref 70–99)
GLUCOSE URINE: NEGATIVE
HCG QUALITATIVE: NEGATIVE
HCG(URINE) PREGNANCY TEST: NEGATIVE
HCT VFR BLD CALC: 31.4 % (ref 36–46)
HEMOGLOBIN: 11 G/DL (ref 12–16)
IMMATURE GRANULOCYTES: ABNORMAL %
KETONES, URINE: NEGATIVE
LEUKOCYTE ESTERASE, URINE: ABNORMAL
LYMPHOCYTES # BLD: 18 % (ref 24–44)
MCH RBC QN AUTO: 33.8 PG (ref 26–34)
MCHC RBC AUTO-ENTMCNC: 35.1 G/DL (ref 31–37)
MCV RBC AUTO: 96.5 FL (ref 80–100)
MONOCYTES # BLD: 7 % (ref 1–7)
MUCUS: ABNORMAL
NITRITE, URINE: NEGATIVE
NRBC AUTOMATED: ABNORMAL PER 100 WBC
OTHER OBSERVATIONS UA: ABNORMAL
PDW BLD-RTO: 13 % (ref 11.5–14.9)
PH UA: 6.5 (ref 5–8)
PLATELET # BLD: 235 K/UL (ref 150–450)
PLATELET ESTIMATE: ABNORMAL
PMV BLD AUTO: 8.5 FL (ref 6–12)
POTASSIUM SERPL-SCNC: 4.8 MMOL/L (ref 3.7–5.3)
PROTEIN UA: ABNORMAL
RBC # BLD: 3.26 M/UL (ref 4–5.2)
RBC # BLD: ABNORMAL 10*6/UL
RBC UA: ABNORMAL /HPF
RENAL EPITHELIAL, UA: ABNORMAL /HPF
SEG NEUTROPHILS: 72 % (ref 36–66)
SEGMENTED NEUTROPHILS ABSOLUTE COUNT: 5 K/UL (ref 1.3–9.1)
SODIUM BLD-SCNC: 136 MMOL/L (ref 135–144)
SPECIFIC GRAVITY UA: 1.01 (ref 1–1.03)
TOTAL PROTEIN: 7 G/DL (ref 6.4–8.3)
TRICHOMONAS: ABNORMAL
TURBIDITY: CLEAR
URINE HGB: ABNORMAL
UROBILINOGEN, URINE: NORMAL
WBC # BLD: 6.9 K/UL (ref 3.5–11)
WBC # BLD: ABNORMAL 10*3/UL
WBC UA: ABNORMAL /HPF
YEAST: ABNORMAL

## 2021-11-06 PROCEDURE — 6360000002 HC RX W HCPCS

## 2021-11-06 PROCEDURE — 96375 TX/PRO/DX INJ NEW DRUG ADDON: CPT

## 2021-11-06 PROCEDURE — 85025 COMPLETE CBC W/AUTO DIFF WBC: CPT

## 2021-11-06 PROCEDURE — 6370000000 HC RX 637 (ALT 250 FOR IP)

## 2021-11-06 PROCEDURE — 2580000003 HC RX 258

## 2021-11-06 PROCEDURE — 36415 COLL VENOUS BLD VENIPUNCTURE: CPT

## 2021-11-06 PROCEDURE — 81001 URINALYSIS AUTO W/SCOPE: CPT

## 2021-11-06 PROCEDURE — 96365 THER/PROPH/DIAG IV INF INIT: CPT

## 2021-11-06 PROCEDURE — 51798 US URINE CAPACITY MEASURE: CPT

## 2021-11-06 PROCEDURE — 84703 CHORIONIC GONADOTROPIN ASSAY: CPT

## 2021-11-06 PROCEDURE — 2580000003 HC RX 258: Performed by: EMERGENCY MEDICINE

## 2021-11-06 PROCEDURE — 74176 CT ABD & PELVIS W/O CONTRAST: CPT

## 2021-11-06 PROCEDURE — 80053 COMPREHEN METABOLIC PANEL: CPT

## 2021-11-06 PROCEDURE — 1200000000 HC SEMI PRIVATE

## 2021-11-06 PROCEDURE — 99223 1ST HOSP IP/OBS HIGH 75: CPT | Performed by: INTERNAL MEDICINE

## 2021-11-06 PROCEDURE — 81025 URINE PREGNANCY TEST: CPT

## 2021-11-06 PROCEDURE — 99284 EMERGENCY DEPT VISIT MOD MDM: CPT

## 2021-11-06 PROCEDURE — 6360000002 HC RX W HCPCS: Performed by: EMERGENCY MEDICINE

## 2021-11-06 PROCEDURE — 87086 URINE CULTURE/COLONY COUNT: CPT

## 2021-11-06 RX ORDER — SODIUM CHLORIDE 9 MG/ML
INJECTION, SOLUTION INTRAVENOUS CONTINUOUS
Status: DISCONTINUED | OUTPATIENT
Start: 2021-11-06 | End: 2021-11-08 | Stop reason: HOSPADM

## 2021-11-06 RX ORDER — 0.9 % SODIUM CHLORIDE 0.9 %
1000 INTRAVENOUS SOLUTION INTRAVENOUS ONCE
Status: COMPLETED | OUTPATIENT
Start: 2021-11-06 | End: 2021-11-06

## 2021-11-06 RX ORDER — POLYETHYLENE GLYCOL 3350 17 G/17G
17 POWDER, FOR SOLUTION ORAL DAILY PRN
Status: DISCONTINUED | OUTPATIENT
Start: 2021-11-06 | End: 2021-11-08 | Stop reason: HOSPADM

## 2021-11-06 RX ORDER — SODIUM CHLORIDE 0.9 % (FLUSH) 0.9 %
5-40 SYRINGE (ML) INJECTION EVERY 12 HOURS SCHEDULED
Status: DISCONTINUED | OUTPATIENT
Start: 2021-11-06 | End: 2021-11-08 | Stop reason: HOSPADM

## 2021-11-06 RX ORDER — SODIUM CHLORIDE 0.9 % (FLUSH) 0.9 %
5-40 SYRINGE (ML) INJECTION PRN
Status: DISCONTINUED | OUTPATIENT
Start: 2021-11-06 | End: 2021-11-08 | Stop reason: HOSPADM

## 2021-11-06 RX ORDER — ACETAMINOPHEN 325 MG/1
650 TABLET ORAL EVERY 6 HOURS PRN
Status: DISCONTINUED | OUTPATIENT
Start: 2021-11-06 | End: 2021-11-08 | Stop reason: HOSPADM

## 2021-11-06 RX ORDER — ONDANSETRON 2 MG/ML
8 INJECTION INTRAMUSCULAR; INTRAVENOUS ONCE
Status: COMPLETED | OUTPATIENT
Start: 2021-11-06 | End: 2021-11-06

## 2021-11-06 RX ORDER — HYDROXYZINE 50 MG/1
50 TABLET, FILM COATED ORAL NIGHTLY PRN
Status: DISCONTINUED | OUTPATIENT
Start: 2021-11-06 | End: 2021-11-08 | Stop reason: HOSPADM

## 2021-11-06 RX ORDER — DULOXETIN HYDROCHLORIDE 60 MG/1
60 CAPSULE, DELAYED RELEASE ORAL DAILY
Status: DISCONTINUED | OUTPATIENT
Start: 2021-11-06 | End: 2021-11-08 | Stop reason: HOSPADM

## 2021-11-06 RX ORDER — METOPROLOL SUCCINATE 50 MG/1
50 TABLET, EXTENDED RELEASE ORAL DAILY
Status: DISCONTINUED | OUTPATIENT
Start: 2021-11-06 | End: 2021-11-08 | Stop reason: HOSPADM

## 2021-11-06 RX ORDER — ACETAMINOPHEN 650 MG/1
650 SUPPOSITORY RECTAL EVERY 6 HOURS PRN
Status: DISCONTINUED | OUTPATIENT
Start: 2021-11-06 | End: 2021-11-08

## 2021-11-06 RX ORDER — SODIUM CHLORIDE 9 MG/ML
25 INJECTION, SOLUTION INTRAVENOUS PRN
Status: DISCONTINUED | OUTPATIENT
Start: 2021-11-06 | End: 2021-11-08 | Stop reason: HOSPADM

## 2021-11-06 RX ORDER — AMLODIPINE BESYLATE 5 MG/1
5 TABLET ORAL DAILY
Status: DISCONTINUED | OUTPATIENT
Start: 2021-11-06 | End: 2021-11-07

## 2021-11-06 RX ORDER — ONDANSETRON 2 MG/ML
4 INJECTION INTRAMUSCULAR; INTRAVENOUS EVERY 6 HOURS PRN
Status: DISCONTINUED | OUTPATIENT
Start: 2021-11-06 | End: 2021-11-08 | Stop reason: HOSPADM

## 2021-11-06 RX ORDER — NICOTINE 21 MG/24HR
1 PATCH, TRANSDERMAL 24 HOURS TRANSDERMAL DAILY PRN
Status: DISCONTINUED | OUTPATIENT
Start: 2021-11-06 | End: 2021-11-08 | Stop reason: HOSPADM

## 2021-11-06 RX ORDER — ONDANSETRON 4 MG/1
4 TABLET, ORALLY DISINTEGRATING ORAL EVERY 8 HOURS PRN
Status: DISCONTINUED | OUTPATIENT
Start: 2021-11-06 | End: 2021-11-08 | Stop reason: HOSPADM

## 2021-11-06 RX ORDER — HEPARIN SODIUM 5000 [USP'U]/ML
5000 INJECTION, SOLUTION INTRAVENOUS; SUBCUTANEOUS EVERY 8 HOURS SCHEDULED
Status: DISCONTINUED | OUTPATIENT
Start: 2021-11-06 | End: 2021-11-08 | Stop reason: HOSPADM

## 2021-11-06 RX ADMIN — SODIUM CHLORIDE: 9 INJECTION, SOLUTION INTRAVENOUS at 10:17

## 2021-11-06 RX ADMIN — HYDROMORPHONE HYDROCHLORIDE 0.5 MG: 1 INJECTION, SOLUTION INTRAMUSCULAR; INTRAVENOUS; SUBCUTANEOUS at 16:28

## 2021-11-06 RX ADMIN — HYDROMORPHONE HYDROCHLORIDE 1 MG: 1 INJECTION, SOLUTION INTRAMUSCULAR; INTRAVENOUS; SUBCUTANEOUS at 08:48

## 2021-11-06 RX ADMIN — METOPROLOL SUCCINATE 50 MG: 50 TABLET, EXTENDED RELEASE ORAL at 12:37

## 2021-11-06 RX ADMIN — HEPARIN SODIUM 5000 UNITS: 5000 INJECTION INTRAVENOUS; SUBCUTANEOUS at 17:10

## 2021-11-06 RX ADMIN — ACETAMINOPHEN 650 MG: 325 TABLET ORAL at 21:12

## 2021-11-06 RX ADMIN — ONDANSETRON 8 MG: 2 INJECTION INTRAMUSCULAR; INTRAVENOUS at 08:48

## 2021-11-06 RX ADMIN — HYDROMORPHONE HYDROCHLORIDE 0.5 MG: 1 INJECTION, SOLUTION INTRAMUSCULAR; INTRAVENOUS; SUBCUTANEOUS at 22:31

## 2021-11-06 RX ADMIN — HYDROMORPHONE HYDROCHLORIDE 0.5 MG: 1 INJECTION, SOLUTION INTRAMUSCULAR; INTRAVENOUS; SUBCUTANEOUS at 19:28

## 2021-11-06 RX ADMIN — CEFTRIAXONE SODIUM 1000 MG: 1 INJECTION, POWDER, FOR SOLUTION INTRAMUSCULAR; INTRAVENOUS at 08:58

## 2021-11-06 RX ADMIN — HYDROMORPHONE HYDROCHLORIDE 1 MG: 1 INJECTION, SOLUTION INTRAMUSCULAR; INTRAVENOUS; SUBCUTANEOUS at 10:17

## 2021-11-06 RX ADMIN — HYDROMORPHONE HYDROCHLORIDE 0.5 MG: 1 INJECTION, SOLUTION INTRAMUSCULAR; INTRAVENOUS; SUBCUTANEOUS at 12:38

## 2021-11-06 RX ADMIN — SODIUM CHLORIDE 1000 ML: 9 INJECTION, SOLUTION INTRAVENOUS at 08:48

## 2021-11-06 RX ADMIN — DULOXETINE 60 MG: 60 CAPSULE, DELAYED RELEASE ORAL at 14:10

## 2021-11-06 RX ADMIN — Medication 10 ML: at 17:48

## 2021-11-06 RX ADMIN — SODIUM CHLORIDE: 9 INJECTION, SOLUTION INTRAVENOUS at 17:59

## 2021-11-06 RX ADMIN — HEPARIN SODIUM 5000 UNITS: 5000 INJECTION INTRAVENOUS; SUBCUTANEOUS at 21:12

## 2021-11-06 RX ADMIN — AMLODIPINE BESYLATE 5 MG: 5 TABLET ORAL at 12:37

## 2021-11-06 ASSESSMENT — PAIN SCALES - GENERAL
PAINLEVEL_OUTOF10: 8
PAINLEVEL_OUTOF10: 8
PAINLEVEL_OUTOF10: 7
PAINLEVEL_OUTOF10: 6
PAINLEVEL_OUTOF10: 5
PAINLEVEL_OUTOF10: 5
PAINLEVEL_OUTOF10: 7
PAINLEVEL_OUTOF10: 8

## 2021-11-06 ASSESSMENT — ENCOUNTER SYMPTOMS
SHORTNESS OF BREATH: 0
ABDOMINAL PAIN: 1

## 2021-11-06 NOTE — ED PROVIDER NOTES
EMERGENCY DEPARTMENT ENCOUNTER    Pt Name: Teetee Gross  MRN: [id number]  [place] 1980  Date of evaluation: 11/6/21  CHIEF COMPLAINT       Chief Complaint   Patient presents with    Flank Pain     Rt flank pain    Dysuria     HISTORY OF PRESENT ILLNESS     Abdominal Pain  Pain location:  R flank  Pain quality: aching    Pain radiates to:  Does not radiate  Pain severity:  Severe  Onset quality:  Gradual  Duration:  2 days  Timing:  Constant  Progression:  Worsening  Chronicity:  Recurrent  Context comment:  Has kidney stones, polycystic kidney  Relieved by:  Nothing  Worsened by:  Nothing  Ineffective treatments:  None tried          REVIEW OF SYSTEMS     Review of Systems   Gastrointestinal: Positive for abdominal pain. All other systems reviewed and are negative. PASTMEDICAL HISTORY     Past Medical History:   Diagnosis Date    Anxiety     Asthma     Chronic headaches 07/10/2013    CKD (chronic kidney disease) stage 3, GFR 30-59 ml/min ([facility])     Degenerative disc disease, cervical     Depression     Dysmenorrhea 09/30/2014    Eczema 11/08/2012    Hypertension     Hypocalcemia 05/28/2014    Kidney stone     Menorrhagia 09/30/2014    Neck pain, bilateral 05/28/2014    Pelvic pain in female 09/30/2014    Polycystic kidney     Smoker 10/02/2011    Tension vascular headache 01/20/2014     Past Problem List  Patient Active Problem List   Diagnosis Code    Asthma J45.909    Smoker F17.200    Polycystic kidney Q61.3    Eczema L30.9    Depression with anxiety F41.8    Chronic headaches R51.9, G89.29    Tension vascular headache G44.209    Irregular bleeding N92.6    Metrorrhagia N92.1    Menorrhagia N92.0    Dysmenorrhea N94.6    Pelvic pain in female R10.2    Kidney stone N20.0    Acute back pain M54.9    Anxiety F41.9    Hypertension I10    Headache R51.9    Depression F32. A    CKD (chronic kidney disease) stage 3, GFR 30-59 ml/min ([facility]) N18.30    Chronic neck pain M54.2, G89.29    Degenerative disc disease, cervical M50.30    Encounter for long-term (current) use of other medications Z79.899    Cervicalgia M54.2    Chronic intractable headache R51.9, G89.29    Hemorrhage of cyst of native kidney N28.89, N28.1    Abdominal pain R10.9    CKD (chronic kidney disease) stage 4, GFR 15-29 ml/min ([facility]) N18.4    Acute renal failure with acute tubular necrosis superimposed on stage 3 chronic kidney disease (HCC) N17.0, N18.30    Allergic rhinitis due to animal hair and dander J30.81    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    Gross hematuria R31.0    History of anemia due to chronic kidney disease N18.9, Z86.2    History of multiple allergies Z91.89    Hyperlipidemia E78.5    IUD (intrauterine device) in place Z97.5    Leukocytosis D72.829    Other specified disorders of kidney and ureter N28.89    Pain in joint M25.50    Recurrent major depression in full remission ([facility] F33.42    Renovascular hypertension I15.0    Right ureteral stone N20.1    Right nephrolithiasis N20.0    Flank pain R10.9    Acute kidney injury superimposed on chronic kidney disease (HCC) N17.9, N18.9    Urinary tract infection with hematuria N39.0, R31.9    History of major depression Z86.59    Iron deficiency anemia due to chronic blood loss D50.0    Hypernatremia E87.0    SILVANO (acute kidney injury) ([facility] N17.9    Dysuria R30.0    Acute kidney injury superimposed on CKD (HCC) N17.9, N18.9     SURGICAL HISTORY       Past Surgical History:   Procedure Laterality Date    BREAST ENHANCEMENT SURGERY      BREAST LUMPECTOMY      left breast    CYSTO/URETERO/PYELOSCOPY, CALCULUS TX Right 6/12/2020    HOLMIUM - CYSTO, RIGHT RETROGRADE PYELOGRAM, RIGHT URETEROSCOPY, LASER LITHO ON STAND BY, RIGHT STENT PLACEMENT performed by Bhupinder Green MD at [address] [place] [address]  06/12/2020    DILATION AND CURETTAGE OF UTERUS      x2    LITHOTRIPSY Right 7/23/2020    ESWL EXTRACORPOREAL SHOCK WAVE LITHOTRIPSY performed by Daryle Poet, MD at [address]  07/01/2013    nerve block(right vervical C3/TON/C4/C5    NERVE BLOCK  07/08/2013    nerve block(right vervical C3/TON/C4/C5    OTHER SURGICAL HISTORY  03/10/2014     botox inj     TOE SURGERY      removal of ingrown toe nail-2nd toe on left foot     URETEROSCOPY Right 06/12/2020    stent placement     CURRENT MEDICATIONS       Previous Medications    AMLODIPINE (NORVASC) 5 MG TABLET    Take 5 mg by mouth daily    BLOOD PRESSURE KIT    Check BP once/day- goal is <140/90. DULOXETINE (CYMBALTA) 60 MG EXTENDED RELEASE CAPSULE    take 1 capsule by mouth once daily    HYDROXYZINE (ATARAX) 50 MG TABLET    Take 50 mg by mouth nightly    METOPROLOL SUCCINATE (TOPROL XL) 50 MG EXTENDED RELEASE TABLET    Take 50 mg by mouth daily     ALLERGIES     is allergic to bee pollen, dust mite extract, pcn [penicillins], and pollen extract. FAMILY HISTORY     She indicated that her mother is alive. She indicated that her father is alive. She indicated that her sister is alive. She indicated that her brother is alive. She indicated that all of her four daughters are alive. She indicated that her son is alive. SOCIAL HISTORY       Social History     Tobacco Use    Smoking status: Current Every Day Smoker     Packs/day: 1.00     Years: 25.00     Pack years: 25.00     Types: Cigarettes    Smokeless tobacco: Never Used   Vaping Use    Vaping Use: Never used   Substance Use Topics    Alcohol use: No     Alcohol/week: 0.0 standard drinks    Drug use: No     PHYSICAL EXAM     INITIAL VITALS: BP (!) 168/95   Pulse 70   Temp 98.4 °F (36.9 °C) (Oral)   Resp 16   Ht 5' 3\" (1.6 m)   Wt 125 lb (56.7 kg)   LMP 10/23/2021   SpO2 100%   BMI 22.14 kg/m²    Physical Exam  Constitutional:       General: She is not in acute distress. Appearance: Normal appearance. She is well-developed. She is not diaphoretic.    HENT:      Head: Normocephalic and atraumatic. Right Ear: External ear normal.      Left Ear: External ear normal.      Nose: Nose normal. No congestion. Mouth/Throat:      Mouth: Mucous membranes are moist.      Pharynx: Oropharynx is clear. Eyes:      General:         Right eye: No discharge. Left eye: No discharge. Conjunctiva/sclera: Conjunctivae normal.      Pupils: Pupils are equal, round, and reactive to light. Neck:      Trachea: No tracheal deviation. Cardiovascular:      Rate and Rhythm: Normal rate and regular rhythm. Pulses: Normal pulses. Heart sounds: Normal heart sounds. Pulmonary:      Effort: Pulmonary effort is normal. No respiratory distress. Breath sounds: Normal breath sounds. No stridor. No wheezing or rales. Abdominal:      Palpations: Abdomen is soft. Tenderness: There is no abdominal tenderness. There is no guarding or rebound. Musculoskeletal:         General: No deformity. Normal range of motion. Cervical back: Normal range of motion and neck supple. Comments: Right cva tenderness  No midline ctl spine tenderness   Skin:     General: Skin is warm and dry. Capillary Refill: Capillary refill takes less than 2 seconds. Findings: No erythema or rash. Neurological:      General: No focal deficit present. Mental Status: She is alert and oriented to person, place, and time. Cranial Nerves: No cranial nerve deficit. Coordination: Coordination normal.   Psychiatric:         Mood and Affect: Mood normal.         Behavior: Behavior normal.         Thought Content:  Thought content normal.         Judgment: Judgment normal.         MEDICAL DECISION MAKING:       ED Course as of 11/06/21 1007   Sat Nov 06, 2021   1003 SILVANO on labs  Creatinine 4.36, up from baseline  Consulting nephrology  Starting iv fluids  Calling medicine for admit to med surg  Reviewed past med records [WM]   [address] Francie Guajardo accepts admit [WM]      ED Course User Index  [WM] Sabi Harley MD     Notifying residents for admit orders, also notifying nephro now    Procedures    DIAGNOSTIC RESULTS   RADIOLOGY:All plain film, CT, MRI, and formal ultrasound images (except ED bedside ultrasound) are read by the radiologist, see reports below, unless otherwisenoted in MDM or here. CT ABDOMEN PELVIS WO CONTRAST Additional Contrast? None   Preliminary Result   Polycystic kidney disease. 6 mm nonobstructing right intrarenal calculus. No evidence of obstructive uropathy. No acute findings are seen in the abdomen or pelvis. LABS: All lab results were reviewed by myself, and all abnormals are listed below.   Labs Reviewed   URINE RT REFLEX TO CULTURE - Abnormal; Notable for the following components:       Result Value    Urine Hgb TRACE (*)     Protein, UA 1+ (*)     Leukocyte Esterase, Urine TRACE (*)     All other components within normal limits   CBC WITH AUTO DIFFERENTIAL - Abnormal; Notable for the following components:    RBC 3.26 (*)     Hemoglobin 11.0 (*)     Hematocrit 31.4 (*)     Seg Neutrophils 72 (*)     Lymphocytes 18 (*)     All other components within normal limits   COMPREHENSIVE METABOLIC PANEL - Abnormal; Notable for the following components:    BUN 35 (*)     CREATININE 4.36 (*)     Total Bilirubin 0.22 (*)     GFR Non- 11 (*)     GFR  13 (*)     All other components within normal limits   MICROSCOPIC URINALYSIS - Abnormal; Notable for the following components:    Bacteria, UA FEW (*)     All other components within normal limits   CULTURE, URINE   PREGNANCY, URINE   HCG, SERUM, QUALITATIVE       EMERGENCY DEPARTMENTCOURSE:         Vitals:    Vitals:    11/06/21 0752   BP: (!) 168/95   Pulse: 70   Resp: 16   Temp: 98.4 °F (36.9 °C)   TempSrc: Oral   SpO2: 100%   Weight: 125 lb (56.7 kg)   Height: 5' 3\" (1.6 m)       The patient was given the following medications while in the emergency department:  Orders Placed This Encounter Medications    0.9 % sodium chloride bolus    HYDROmorphone (DILAUDID) injection 1 mg    ondansetron (ZOFRAN) injection 8 mg    cefTRIAXone (ROCEPHIN) 1000 mg IVPB in 50 mL D5W minibag     Order Specific Question:   Antimicrobial Indications     Answer:   Urinary Tract Infection    0.9 % sodium chloride infusion     CONSULTS:  IP CONSULT TO NEPHROLOGY  IP CONSULT TO INTERNAL MEDICINE    FINAL IMPRESSION      1. SILVANO (acute kidney injury) ([facility] [address]    2. Pyelonephritis          DISPOSITION/PLAN   DISPOSITION Admitted 11/06/2021 10:06:54 AM      PATIENT REFERRED TO:  No follow-up provider specified. DISCHARGE MEDICATIONS:  New Prescriptions    No medications on file     The care is provided during an unprecedented national emergency due to the novel coronavirus, COVID 19.   Maggy Sheikh MD  Attending Emergency Physician                    Maggy Sheikh MD  11/06/21 1007

## 2021-11-06 NOTE — PROGRESS NOTES
Patient to 2079 from ED per WC. Patient alert and oriented. States currently comfortable with pain level at a 5 out of 10. Patient lying on bed with legs curled under her, resting.

## 2021-11-06 NOTE — CONSULTS
NEPHROLOGY CONSULT       Reason for Consult:  Acute kidney injury      Chief Complaint:  Right flank pain. History Obtained From:Patient     History of Present Illness: This is a 35 y.o. female with t past medical history of Hypertension, Bronchial asthma, depression, nephrolithiasis [required cystoscopy and placement of a right double-J ureteral stent with attached string on 6/12/2020] and chronic kidney disease stage 4 secondary to autosomal dominant polycystic kidney disease diagnosed in 2009 [baseline serum creatinine 2.5 to 2.8  mg/dL], who presented to the emergency department on 11/6/21 with complaints of right flank pain associated with nausea, dysuria and  lower extremity swelling. Patient on presentation was found to have a creatinine of 4.36 mg/dL. Serum creatinine was 2.94 mg/dL on 6/23/2021 and previous 24-hour urine protein in June 2021  was 522 mg. She was admitted in June 2021 with SILVANO with prerenal azotemia with creatinine of 3.67 mg/dl responsive to IV hydration. Abdominal/pelvis CT scan without contrast showed polycystic kidney disease with 6 mm nonobstructing right intrarenal calculus with no evidence of obstructive uropathy or hydronephrosis.     Past Medical History:        Diagnosis Date    Anxiety     Asthma     Chronic headaches 07/10/2013    CKD (chronic kidney disease) stage 3, GFR 30-59 ml/min ([facility]     Degenerative disc disease, cervical     Depression     Dysmenorrhea 09/30/2014    Eczema 11/08/2012    Hypertension     Hypocalcemia 05/28/2014    Kidney stone     Menorrhagia 09/30/2014    Neck pain, bilateral 05/28/2014    Pelvic pain in female 09/30/2014    Polycystic kidney     Smoker 10/02/2011    Tension vascular headache 01/20/2014       Past Surgical History:        Procedure Laterality Date    BREAST ENHANCEMENT SURGERY      BREAST LUMPECTOMY      left breast    CYSTO/URETERO/PYELOSCOPY, CALCULUS TX Right 6/12/2020    HOLMIUM - CYSTO, RIGHT RETROGRADE PYELOGRAM, RIGHT URETEROSCOPY, LASER LITHO ON STAND BY, RIGHT STENT PLACEMENT performed by Daryle Poet, MD at [address] [place]  06/12/2020   [facility]      x2    LITHOTRIPSY Right 7/23/2020    ESWL EXTRACORPOREAL SHOCK WAVE LITHOTRIPSY performed by Daryle Poet, MD at [address]  07/01/2013    nerve block(right vervical C3/TON/C4/C5    NERVE BLOCK  07/08/2013    nerve block(right vervical C3/TON/C4/C5    OTHER SURGICAL HISTORY  03/10/2014     botox inj     TOE SURGERY      removal of ingrown toe nail-2nd toe on left foot     URETEROSCOPY Right 06/12/2020    stent placement       Current Medications:    0.9 % sodium chloride infusion, Continuous  amLODIPine (NORVASC) tablet 5 mg, Daily  DULoxetine (CYMBALTA) extended release capsule 60 mg, Daily  metoprolol succinate (TOPROL XL) extended release tablet 50 mg, Daily  sodium chloride flush 0.9 % injection 5-40 mL, 2 times per day  sodium chloride flush 0.9 % injection 5-40 mL, PRN  0.9 % sodium chloride infusion, PRN  heparin (porcine) injection 5,000 Units, 3 times per day  ondansetron (ZOFRAN-ODT) disintegrating tablet 4 mg, Q8H PRN   Or  ondansetron (ZOFRAN) injection 4 mg, Q6H PRN  polyethylene glycol (GLYCOLAX) packet 17 g, Daily PRN  acetaminophen (TYLENOL) tablet 650 mg, Q6H PRN   Or  acetaminophen (TYLENOL) suppository 650 mg, Q6H PRN  [START ON 11/7/2021] cefTRIAXone (ROCEPHIN) 1000 mg IVPB in 50 mL D5W minibag, Q24H  hydrOXYzine (ATARAX) tablet 50 mg, Nightly PRN  HYDROmorphone (DILAUDID) injection 0.25 mg, Q3H PRN   Or  HYDROmorphone (DILAUDID) injection 0.5 mg, Q3H PRN  nicotine (NICODERM CQ) 21 MG/24HR 1 patch, Daily PRN  influenza quadrivalent split vaccine (FLUZONE;FLUARIX;FLULAVAL;AFLURIA) injection 0.5 mL, Prior to discharge        Allergies:  Bee pollen, Dust mite extract, Pcn [penicillins], and Pollen extract    Social History:   Social History     Socioeconomic History    Marital status:      Spouse name: Not on file    Number of children: Not on file    Years of education: Not on file    Highest education level: Not on file   Occupational History    Occupation: stay at home mom   Tobacco Use    Smoking status: Current Every Day Smoker     Packs/day: 1.00     Years: 25.00     Pack years: 25.00     Types: Cigarettes    Smokeless tobacco: Never Used   Vaping Use    Vaping Use: Never used   Substance and Sexual Activity    Alcohol use: No     Alcohol/week: 0.0 standard drinks    Drug use: No    Sexual activity: Yes     Partners: Male     Comment: steady boyfriend   Other Topics Concern    Not on file   Social History Narrative    Not on file     Social Determinants of Health     Financial Resource Strain:     Difficulty of Paying Living Expenses: Not on file   Food Insecurity:     Worried About Running Out of Food in the Last Year: Not on file    Kiara of Food in the Last Year: Not on file   Transportation Needs:     Lack of Transportation (Medical): Not on file    Lack of Transportation (Non-Medical):  Not on file   Physical Activity:     Days of Exercise per Week: Not on file    Minutes of Exercise per Session: Not on file   Stress:     Feeling of Stress : Not on file   Social Connections:     Frequency of Communication with Friends and Family: Not on file    Frequency of Social Gatherings with Friends and Family: Not on file    Attends Hinduism Services: Not on file    Active Member of [address] [place] [address] or Organizations: Not on file    Attends Club or Organization Meetings: Not on file    Marital Status: Not on file   Intimate Partner Violence:     Fear of Current or Ex-Partner: Not on file    Emotionally Abused: Not on file    Physically Abused: Not on file    Sexually Abused: Not on file   Housing Stability:     Unable to Pay for Housing in the Last Year: Not on file    Number of [place] in the Last Year: Not on file    Unstable Housing in the Last Year: Not on file       Family History:   Family History   Problem Relation Age of Onset    High Blood Pressure Mother     Asthma Sister    [facility] Migra[facility] Sister     High Blood Pressure Father     Other Brother         bad knees, with recent total knee replacement        Review of Systems:    Constitutional: No fever, no chills, no night sweats, fatigue, generalized weakness, loss of appetite  HEENT:  No headache, otalgia, itchy eyes, epistaxis, nasal discharge or sore throat. Cardiac:  No chest pain, dyspnea, orthopnea or PND, palpitations  Chest:  No cough, hemoptysis, pleuritic chest pain, wheezing,SOB  Abdomen:  ++abdominal pain, +nausea, vomiting, diarrhea, melena, dysphagia hematemesis,constipation, abdominal bloating, flank pain  Neuro:  No CVA, TIA or seizure like activity. Skin:   No rashes, no itching. :   No hematuria, no pyuria, no dysuria, no flank pain. Extremities:  Edema + swelling or joint pains. Objective:  CURRENT TEMPERATURE:  Temp: 98.4 °F (36.9 °C)  MAXIMUM TEMPERATURE OVER 24HRS:  Temp (24hrs), Av.4 °F (36.9 °C), Min:98.3 °F (36.8 °C), Max:98.5 °F (36.9 °C)    CURRENT RESPIRATORY RATE:  Resp: 16  CURRENT PULSE:  Pulse: 68  CURRENT BLOOD PRESSURE:  BP: (!) 157/93  24HR BLOOD PRESSURE RANGE:  Systolic (70QPK), CMO:720 , Min:141 , ZVD:201   ; Diastolic (16KCP), GAW:12, Min:87, Max:95    24HR INTAKE/OUTPUT:  No intake or output data in the 24 hours ending 21 1838  Patient Vitals for the past 96 hrs (Last 3 readings):   Weight   21 0752 125 lb (56.7 kg)         Physical Exam:  GENERAL APPEARANCE: Alert and cooperative, and appears to be in no acute distress. HEAD: normocephalic  EYES: PERRL, EOMI. Not pale, anicteric   NOSE:  No nasal discharge. THROAT:  Oral cavity and pharynx normal. Moist  NECK: Neck supple, non-tender without lymphadenopathy, masses or thyromegaly. JVD-neg  CARDIAC: Normal S1 and S2. No S3, S4 or murmurs. Rhythm is regular.   LUNGS: Clear to auscultation and percussion without rales, rhonchi, wheezing or diminished breath sounds. ABD-Soft non distended, BS+ Non tender no organomegally  BACK: Examination of the spine reveals  no spinal deformity, without tenderness,   MUSKULOSKELETAL: Adequately aligned spine. No joint erythema or tenderness. EXTREMITIES: No edema. Peripheral pulses intact. NEURO:Alert oriented x 3 ,power 5/5 in all extremities      Labs:   CBC:  Recent Labs     11/06/21  0821   WBC 6.9   RBC 3.26*   HGB 11.0*   HCT 31.4*   MCV 96.5   MCH 33.8   MCHC 35.1   RDW 13.0      MPV 8.5      BMP:   Recent Labs     11/06/21  0821      K 4.8      CO2 20   BUN 35*   CREATININE 4.36*   GLUCOSE 90   CALCIUM 8.8        Phosphorus:  No results for input(s): PHOS in the last 72 hours. Magnesium: No results for input(s): MG in the last 72 hours. Albumin:   Recent Labs     11/06/21 0821   LABALBU 4.1       IRON:    Lab Results   Component Value Date    IRON 102 06/21/2021     Iron Saturation:  No components found for: PERCENTFE  TIBC:    Lab Results   Component Value Date    TIBC 215 06/21/2021     FERRITIN:    Lab Results   Component Value Date    FERRITIN 55 06/21/2021     SPEP:   Lab Results   Component Value Date    PROT 7.0 11/06/2021     UPEP:   Lab Results   Component Value Date    TPU 18 06/21/2021        C3: No results found for: C3  C4: No results found for: C4  MPO ANCA:  No results found for: MPO .   PR3 ANCA:  No results found for: PR3  Urine Sodium:    Lab Results   Component Value Date    NOHELIA 86 06/21/2021      Urine Potassium:  No results found for: KUR  Urine Chloride:  No components found for: CLU  Urine Ph:  No components found for: PO4U  Urine Osmolarity:    Lab Results   Component Value Date    OSMOU 236 02/21/2021     Urine Creatinine:    Lab Results   Component Value Date    LABCREA 29.5 06/21/2021     Urine Eosinophils: No components found for: EOSU  Urine Protein:    Lab Results   Component Value Date    TPU 18 06/21/2021 Urinalysis:  U/A:   Lab Results   Component Value Date    NITRU NEGATIVE 11/06/2021    COLORU Yellow 11/06/2021    PHUR 6.5 11/06/2021    WBCUA 5 TO 10 11/06/2021    RBCUA 2 TO 5 11/06/2021    MUCUS NOT REPORTED 11/06/2021    TRICHOMONAS NOT REPORTED 11/06/2021    YEAST NOT REPORTED 11/06/2021    BACTERIA FEW 11/06/2021    CLARITYU Clear 04/26/2016    SPECGRAV 1.007 11/06/2021    LEUKOCYTESUR TRACE 11/06/2021    UROBILINOGEN Normal 11/06/2021    BILIRUBINUR NEGATIVE 11/06/2021    BILIRUBINUR neg 09/30/2014    BLOODU Positive 04/26/2016    GLUCOSEU NEGATIVE 11/06/2021    [address] NEGATIVE 11/06/2021    AMORPHOUS NOT REPORTED 11/06/2021         Radiology:  Reviewed as available. Assessment:  1. Acute kidney injury superimposed on chronic kidney disease stage 4 associated with prerenal azotemia. No evidence of  obstructive uropathy radiologically. Plan:  IV fluids with 0.9 saline at 125 cc/hour   Strict input and output documentation. Avoid nephrotoxic agents. Basic metabolic profile daily.     2. Nephrolithiasis - patient has nonobstructing kidney stone.     3. Adult polycystic kidney disease associated with CKD stage IV     5.  Systemic hypertension - not optimal- amlodipine 5 mg daily    6. Dysuria-obtain urine culture-Patient has been started on IV Rocephin. If she has positive urine culture will switch to IV ciprofloxacin which has  better cyst penetration in patients with ADPKD. Thank you for the consultation.       Electronically signed by Dexter Ramírez MD on 11/6/2021 at 6:38 PM

## 2021-11-06 NOTE — H&P
[address]      [address] [facility]     HISTORY AND PHYSICAL EXAMINATION            Date:   11/6/2021  Patient name:  Arielle Andrade  Date of admission:  11/6/2021  7:55 AM  MRN:   [id number]  Account:  [de-identified]  YOB: 1980  PCP:    Анна Rodriguez PA-C  Room:   12/12  Code Status:    Full Code    Chief Complaint:     Chief Complaint   Patient presents with    Flank Pain     Rt flank pain    Dysuria       History Obtained From:     patient    History of Present Illness: The patient is a 35 y.o. Non- / non  female with past medical history of polycystic kidney disease with CKD stage III, depression and anxiety, 50-pack-year smoking history, and hypertension who presented to the emergency room with Flank Pain (Rt flank pain) and Dysuria. She states that for the past 2 days, she has been having significant right flank and abdominal pain and has not slept during this time. Additionally, she complains of increased urgency and burning with urination. She is currently sitting comfortably in bed and does not appear to be in significant amount of pain but has received 2 mg of Dilaudid total today since arrival to the emergency room. Is requesting to have something to eat. Denies fevers/chills, chest pain, shortness of breath, constipation, leg swelling. States she was aware that she had a kidney stone in the past and believes it has grown in size. In the emergency room, urinalysis showed trace hemoglobin, 1+ protein, trace leukocyte esterase, microscopic urinalysis showed few bacteria, 5-10 white blood cells, 2-5 RBC. Hemoglobin 11, no leukocytosis. Creatinine was 4.36, which is elevated from her baseline of 2.9-3. She was given 1 g of Rocephin and 1 L bolus of normal saline.      Abdominal CT scan showed polycystic kidney disease, 6 mm nonobstructing right intrarenal calculus, no evidence of obstructive uropathy, and no acute findings in the abdomen or pelvis. The intrarenal calculus is larger in size as compared to CT on 6/19/2021 where it was found to be 5 mm in size. She is being admitted to the hospital for the management of SILVANO on CKD, UTI. Past Medical History:     Past Medical History:   Diagnosis Date    Anxiety     Asthma     Chronic headaches 07/10/2013    CKD (chronic kidney disease) stage 3, GFR 30-59 ml/min ([facility]     Degenerative disc disease, cervical     Depression     Dysmenorrhea 09/30/2014    Eczema 11/08/2012    Hypertension     Hypocalcemia 05/28/2014    Kidney stone     Menorrhagia 09/30/2014    Neck pain, bilateral 05/28/2014    Pelvic pain in female 09/30/2014    Polycystic kidney     Smoker 10/02/2011    Tension vascular headache 01/20/2014        Past SurgicalHistory:     Past Surgical History:   Procedure Laterality Date    BREAST ENHANCEMENT SURGERY      BREAST LUMPECTOMY      left breast    CYSTO/URETERO/PYELOSCOPY, CALCULUS TX Right 6/12/2020    HOLMIUM - CYSTO, RIGHT RETROGRADE PYELOGRAM, RIGHT URETEROSCOPY, LASER LITHO ON STAND BY, RIGHT STENT PLACEMENT performed by Gus Reaves MD at [address]  06/12/2020    DILATION AND CURETTAGE OF UTERUS      x2    LITHOTRIPSY Right 7/23/2020    ESWL EXTRACORPOREAL SHOCK WAVE LITHOTRIPSY performed by Gus Reaves MD at [address]  07/01/2013    nerve block(right vervical C3/TON/C4/C5    NERVE BLOCK  07/08/2013    nerve block(right vervical C3/TON/C4/C5    OTHER SURGICAL HISTORY  03/10/2014     botox inj     TOE SURGERY      removal of ingrown toe nail-2nd toe on left foot     URETEROSCOPY Right 06/12/2020    stent placement        Medications Prior to Admission:        Prior to Admission medications    Medication Sig Start Date End Date Taking?  Authorizing Provider   metoprolol succinate (TOPROL XL) 50 MG extended release tablet Take 50 mg by mouth daily    Historical Provider, MD   hydrOXYzine (ATARAX) 50 MG tablet Take 50 mg by mouth nightly    Historical Provider, MD   amLODIPine (NORVASC) 5 MG tablet Take 5 mg by mouth daily    Historical Provider, MD   DULoxetine (CYMBALTA) 60 MG extended release capsule take 1 capsule by mouth once daily  Patient taking differently: 90 mg  11/10/17   George Sorto MD   Blood Pressure KIT Check BP once/day- goal is <140/90. 3/9/16   Elsa Bañuelos APRN - CNP        Allergies:     Bee pollen, Dust mite extract, Pcn [penicillins], and Pollen extract    Social History:     Tobacco:    reports that she has been smoking cigarettes. She has a 25.00 pack-year smoking history. She has never used smokeless tobacco.  Alcohol:      reports no history of alcohol use. Drug Use:  reports no history of drug use. Family History:     Family History   Problem Relation Age of Onset    High Blood Pressure Mother     Asthma Sister    Miguel Migraines Sister     High Blood Pressure Father     Other Brother         bad knees, with recent total knee replacement        Review of Systems:     Positive and Negative as described in HPI. Review of Systems   Constitutional: Negative for appetite change, chills and fever. Respiratory: Negative for shortness of breath. Cardiovascular: Negative for chest pain and leg swelling. Gastrointestinal: Positive for abdominal pain (Right-sided). Genitourinary: Positive for dysuria, flank pain (Right-sided) and urgency. Psychiatric/Behavioral: Positive for sleep disturbance (2/2 pain). All other systems reviewed and are negative. Physical Exam:   BP (!) 168/95   Pulse 70   Temp 98.4 °F (36.9 °C) (Oral)   Resp 16   Ht 5' 3\" (1.6 m)   Wt 125 lb (56.7 kg)   LMP 10/23/2021   SpO2 100%   BMI 22.14 kg/m²   Temp (24hrs), Av.4 °F (36.9 °C), Min:98.4 °F (36.9 °C), Max:98.4 °F (36.9 °C)    No results for input(s): POCGLU in the last 72 hours.   No intake or output data in the 24 hours ending 11/06/21 1213    Physical Exam  Constitutional:       General: She is not in acute distress. Appearance: Normal appearance. She is normal weight. She is not ill-appearing or diaphoretic. Eyes:      General: No scleral icterus. Conjunctiva/sclera: Conjunctivae normal.   Cardiovascular:      Rate and Rhythm: Normal rate and regular rhythm. Heart sounds: No murmur heard. No friction rub. No gallop. Pulmonary:      Effort: Pulmonary effort is normal. No respiratory distress. Breath sounds: Wheezing present. No rhonchi or rales. Abdominal:      General: Bowel sounds are normal. There is no distension. Palpations: Abdomen is soft. Tenderness: There is abdominal tenderness (Tenderness to palpation of right side of abdomen). There is right CVA tenderness. There is no left CVA tenderness. Musculoskeletal:      Cervical back: Normal range of motion and neck supple. Right lower leg: No edema. Left lower leg: No edema. Skin:     General: Skin is warm and dry. Neurological:      General: No focal deficit present. Mental Status: She is alert and oriented to person, place, and time.    Psychiatric:         Mood and Affect: Mood normal.         Behavior: Behavior normal.         Investigations:     Laboratory Testing:  Recent Results (from the past 24 hour(s))   Urinalysis Reflex to Culture    Collection Time: 11/06/21  8:02 AM    Specimen: Urine, clean catch   Result Value Ref Range    Color, UA Yellow Yellow    Turbidity UA Clear Clear    Glucose, Ur NEGATIVE NEGATIVE    Bilirubin Urine NEGATIVE NEGATIVE    Ketones, Urine NEGATIVE NEGATIVE    Specific Gravity, UA 1.007 1.000 - 1.030    Urine Hgb TRACE (A) NEGATIVE    pH, UA 6.5 5.0 - 8.0    Protein, UA 1+ (A) NEGATIVE    Urobilinogen, Urine Normal Normal    Nitrite, Urine NEGATIVE NEGATIVE    Leukocyte Esterase, Urine TRACE (A) NEGATIVE    Urinalysis Comments NOT REPORTED    HCG, Pregnancy,Urine    Collection Time: 11/06/21  8:02 AM   Result Value Ref Range    HCG(Urine) Pregnancy Test NEGATIVE NEGATIVE   Microscopic Urinalysis    Collection Time: 11/06/21  8:02 AM   Result Value Ref Range    -          WBC, UA 5 TO 10 /HPF    RBC, UA 2 TO 5 /HPF    Casts UA NOT REPORTED /LPF    Crystals, UA NOT REPORTED None /HPF    Epithelial Cells UA 2 TO 5 /HPF    Renal Epithelial, UA NOT REPORTED 0 /HPF    Bacteria, UA FEW (A) None    Mucus, UA NOT REPORTED None    Trichomonas, UA NOT REPORTED None    Amorphous, UA NOT REPORTED None    Other Observations UA NOT REPORTED NOT REQ.     Yeast, UA NOT REPORTED None   CBC Auto Differential    Collection Time: 11/06/21  8:21 AM   Result Value Ref Range    WBC 6.9 3.5 - 11.0 k/uL    RBC 3.26 (L) 4.0 - 5.2 m/uL    Hemoglobin 11.0 (L) 12.0 - 16.0 g/dL    Hematocrit 31.4 (L) 36 - 46 %    MCV 96.5 80 - 100 fL    MCH 33.8 26 - 34 pg    MCHC 35.1 31 - 37 g/dL    RDW 13.0 11.5 - 14.9 %    Platelets 586 067 - 845 k/uL    MPV 8.5 6.0 - 12.0 fL    NRBC Automated NOT REPORTED per 100 WBC    Differential Type NOT REPORTED     Seg Neutrophils 72 (H) 36 - 66 %    Lymphocytes 18 (L) 24 - 44 %    Monocytes 7 1 - 7 %    Eosinophils % 2 0 - 4 %    Basophils 1 0 - 2 %    Immature Granulocytes NOT REPORTED 0 %    Segs Absolute 5.00 1.3 - 9.1 k/uL    Absolute Lymph # 1.30 1.0 - 4.8 k/uL    Absolute Mono # 0.50 0.1 - 1.3 k/uL    Absolute Eos # 0.10 0.0 - 0.4 k/uL    Basophils Absolute 0.10 0.0 - 0.2 k/uL    Absolute Immature Granulocyte NOT REPORTED 0.00 - 0.30 k/uL    WBC Morphology NOT REPORTED     RBC Morphology NOT REPORTED     Platelet Estimate NOT REPORTED    Comprehensive Metabolic Panel    Collection Time: 11/06/21  8:21 AM   Result Value Ref Range    Glucose 90 70 - 99 mg/dL    BUN 35 (H) 6 - 20 mg/dL    CREATININE 4.36 (H) 0.50 - 0.90 mg/dL    Bun/Cre Ratio NOT REPORTED 9 - 20    Calcium 8.8 8.6 - 10.4 mg/dL    Sodium 136 135 - 144 mmol/L    Potassium 4.8 3.7 - 5.3 mmol/L Chloride 104 98 - 107 mmol/L    CO2 20 20 - 31 mmol/L    Anion Gap 12 9 - 17 mmol/L    Alkaline Phosphatase 93 35 - 104 U/L    ALT 8 5 - 33 U/L    AST 11 <32 U/L    Total Bilirubin 0.22 (L) 0.3 - 1.2 mg/dL    Total Protein 7.0 6.4 - 8.3 g/dL    Albumin 4.1 3.5 - 5.2 g/dL    Albumin/Globulin Ratio NOT REPORTED 1.0 - 2.5    GFR Non-African American 11 (L) >60 mL/min    GFR  13 (L) >60 mL/min    GFR Comment          GFR Staging NOT REPORTED    HCG Qualitative, Serum    Collection Time: 11/06/21  8:21 AM   Result Value Ref Range    hCG Qual NEGATIVE NEGATIVE       Imaging/Diagnostics:  CT ABDOMEN PELVIS WO CONTRAST Additional Contrast? None    Result Date: 11/6/2021  EXAMINATION: CT OF THE ABDOMEN AND PELVIS WITHOUT CONTRAST 11/6/2021 8:27 am TECHNIQUE: CT of the abdomen and pelvis was performed without the administration of intravenous contrast. Multiplanar reformatted images are provided for review. Dose modulation, iterative reconstruction, and/or weight based adjustment of the mA/kV was utilized to reduce the radiation dose to as low as reasonably achievable. COMPARISON: CT 06/19/2021. HISTORY: ORDERING SYSTEM PROVIDED HISTORY: right flank pain TECHNOLOGIST PROVIDED HISTORY: right flank pain Decision Support Exception - unselect if not a suspected or confirmed emergency medical condition->Emergency Medical Condition (MA) Is the patient pregnant?->No Reason for Exam: rt flank pain x 1 day, hx of kidney stones Acuity: Unknown Type of Exam: Unknown Relevant Medical/Surgical History: iud FINDINGS: Lower Chest: Visualized lung bases demonstrate no acute abnormality. Organs: Limited evaluation of the intra-abdominal organs given the lack of intravenous contrast.  Within this limitation, the liver, spleen, pancreas, and adrenal glands demonstrate no acute abnormality. Again seen are several cysts in the liver. Polycystic kidney disease again seen.   Appearance of the renal parenchyma is overall similar to the prior CT. Similar appearance of a 6 mm calculus in the right kidney without evidence of hydronephrosis. GI/Bowel: No bowel obstruction is seen. The appendix is not definitively identified. No obvious secondary signs are seen to suggest acute appendicitis. Pelvis: The urinary bladder is unremarkable. An IUD is noted in the uterus. Peritoneum/Retroperitoneum: No lymphadenopathy. No intraperitoneal free air or significant free fluid. Bones/Soft Tissues: No acute bony abnormality is seen. Polycystic kidney disease. 6 mm nonobstructing right intrarenal calculus. No evidence of obstructive uropathy. No acute findings are seen in the abdomen or pelvis.        Assessment :      Primary Problem  SILVANO (acute kidney injury) [facility])    Active Hospital Problems    Diagnosis Date Noted    UTI (urinary tract infection) [N39.0] 11/06/2021    SILVANO (acute kidney injury) ([facility] [N17.9] 05/16/2021    Kidney stone [N20.0]     CKD (chronic kidney disease) stage 3, GFR 30-59 ml/min ([facility] [N18.30]     Polycystic kidney [Q61.3] 10/02/2011    Smoker [F17.200] 10/02/2011       Plan:     Patient status Admit as inpatient in the  Med/Surge unit    Urinary tract infection with pyelonephritis  -urinalysis on admission trace leukocyte esterase, few bacteria, 5-10 WBCs, flank pain  -urine culture pending  -continue IV rocephin 1g daily, may change based on culture per nephrology recommendation    SILVANO on CKD stage 3 2/2 polycystic kidney disease  -Creatinine 4.36 on admission  -Baseline 2.9-3.0  -continue IVF hydration at 125/hr    Nephrolithiasis  -pain control with tylenol 650 mg Q6h for mild pain, dilaudid 0.25mg for moderate pain, and 0.5mg for severe pain  -IV fluid hydration NS 125cc/hr    Chronic normocytic anemia 2/2 CKD, stable  -hemoglobin 11.0 on admission, continue to monitor    Essential Hypertension, not well controlled  -continue home amlodipine 5mg and metoprolol 50mg daily  - BP elevated, possibly d/t pain, will continue to monitor and adjust regimen accordingly    Anxiety/depression  -continue home cymbalta 60mg dialy    Consultations:   IP CONSULT TO NEPHROLOGY  IP CONSULT TO INTERNAL MEDICINE  IP CONSULT TO SOCIAL WORK    Patient is admitted as inpatient status because of co-morbidities listed above, severity of signs and symptoms as outlined, requirement for current medical therapies and most importantly because of direct risk to patient if care not provided in a hospital setting. Marcellus Moore MD  11/6/2021  12:13 PM    Copy sent to Dr. Kenn Brewer PA-C    Attestation and add on       I have discussed the care of Hannah Mead , including pertinent history and exam findings,      11/6/21    with the resident. I have seen and examined the patient and the key elements of all parts of the encounter have been performed by me . I agree with the assessment, plan and orders as documented by the resident. Principal Problem:    UTI (urinary tract infection)  Active Problems:    Smoker    Polycystic kidney    Kidney stone    CKD (chronic kidney disease) stage 3, GFR 30-59 ml/min ([facility])    [facility]    Pyelonephritis  Resolved Problems:    * No resolved hospital problems. *            ---- ;        Medications: Allergies:     Allergies   Allergen Reactions    Bee Pollen     Dust Mite Extract      Chest congestion , sneezing    Pcn [Penicillins] Rash    Pollen Extract      Chest congesting , sneezing        Current Meds:   Scheduled Meds:    lidocaine  1 patch TransDERmal Daily    amitriptyline  25 mg Oral Nightly    amLODIPine  10 mg Oral Daily    DULoxetine  60 mg Oral Daily    [Held by provider] metoprolol succinate  50 mg Oral Daily    sodium chloride flush  5-40 mL IntraVENous 2 times per day    heparin (porcine)  5,000 Units SubCUTAneous 3 times per day    cefTRIAXone (ROCEPHIN) IV  1,000 mg IntraVENous Q24H    influenza virus vaccine  0.5 mL IntraMUSCular Prior to discharge     Continuous Infusions:    sodium chloride 125 mL/hr at 11/08/21 0344    sodium chloride       PRN Meds: HYDROcodone 5 mg - acetaminophen, sodium chloride flush, sodium chloride, ondansetron **OR** ondansetron, polyethylene glycol, acetaminophen **OR** [DISCONTINUED] acetaminophen, hydrOXYzine, nicotine        MD NEVAEH Hoffman[address], [address] [place] [address].    Phone [phone]   Fax: [phone]  Answering Service: [phone]

## 2021-11-07 ENCOUNTER — APPOINTMENT (OUTPATIENT)
Dept: GENERAL RADIOLOGY | Age: 41
DRG: 463 | End: 2021-11-07
Payer: MEDICARE

## 2021-11-07 PROBLEM — N12 PYELONEPHRITIS: Status: ACTIVE | Noted: 2021-11-07

## 2021-11-07 LAB
ABSOLUTE EOS #: 0.2 K/UL (ref 0–0.4)
ABSOLUTE IMMATURE GRANULOCYTE: ABNORMAL K/UL (ref 0–0.3)
ABSOLUTE LYMPH #: 1.9 K/UL (ref 1–4.8)
ABSOLUTE MONO #: 0.3 K/UL (ref 0.1–1.3)
ANION GAP SERPL CALCULATED.3IONS-SCNC: 9 MMOL/L (ref 9–17)
BASOPHILS # BLD: 1 % (ref 0–2)
BASOPHILS ABSOLUTE: 0.1 K/UL (ref 0–0.2)
BUN BLDV-MCNC: 33 MG/DL (ref 6–20)
BUN/CREAT BLD: ABNORMAL (ref 9–20)
C-REACTIVE PROTEIN: <3 MG/L (ref 0–5)
CALCIUM SERPL-MCNC: 8.3 MG/DL (ref 8.6–10.4)
CHLORIDE BLD-SCNC: 112 MMOL/L (ref 98–107)
CO2: 22 MMOL/L (ref 20–31)
CREAT SERPL-MCNC: 4.15 MG/DL (ref 0.5–0.9)
CULTURE: NO GROWTH
DIFFERENTIAL TYPE: ABNORMAL
EOSINOPHILS RELATIVE PERCENT: 3 % (ref 0–4)
GFR AFRICAN AMERICAN: 14 ML/MIN
GFR NON-AFRICAN AMERICAN: 12 ML/MIN
GFR SERPL CREATININE-BSD FRML MDRD: ABNORMAL ML/MIN/{1.73_M2}
GFR SERPL CREATININE-BSD FRML MDRD: ABNORMAL ML/MIN/{1.73_M2}
GLUCOSE BLD-MCNC: 80 MG/DL (ref 70–99)
HCT VFR BLD CALC: 30.3 % (ref 36–46)
HEMOGLOBIN: 10 G/DL (ref 12–16)
IMMATURE GRANULOCYTES: ABNORMAL %
LYMPHOCYTES # BLD: 33 % (ref 24–44)
Lab: NORMAL
MCH RBC QN AUTO: 32.3 PG (ref 26–34)
MCHC RBC AUTO-ENTMCNC: 33.1 G/DL (ref 31–37)
MCV RBC AUTO: 97.8 FL (ref 80–100)
MONOCYTES # BLD: 6 % (ref 1–7)
NRBC AUTOMATED: ABNORMAL PER 100 WBC
PDW BLD-RTO: 13.2 % (ref 11.5–14.9)
PLATELET # BLD: 218 K/UL (ref 150–450)
PLATELET ESTIMATE: ABNORMAL
PMV BLD AUTO: 8.8 FL (ref 6–12)
POTASSIUM SERPL-SCNC: 4.7 MMOL/L (ref 3.7–5.3)
RBC # BLD: 3.1 M/UL (ref 4–5.2)
RBC # BLD: ABNORMAL 10*6/UL
SEG NEUTROPHILS: 57 % (ref 36–66)
SEGMENTED NEUTROPHILS ABSOLUTE COUNT: 3.4 K/UL (ref 1.3–9.1)
SODIUM BLD-SCNC: 143 MMOL/L (ref 135–144)
SPECIMEN DESCRIPTION: NORMAL
WBC # BLD: 5.9 K/UL (ref 3.5–11)
WBC # BLD: ABNORMAL 10*3/UL

## 2021-11-07 PROCEDURE — 85025 COMPLETE CBC W/AUTO DIFF WBC: CPT

## 2021-11-07 PROCEDURE — 80048 BASIC METABOLIC PNL TOTAL CA: CPT

## 2021-11-07 PROCEDURE — 2580000003 HC RX 258: Performed by: INTERNAL MEDICINE

## 2021-11-07 PROCEDURE — 99232 SBSQ HOSP IP/OBS MODERATE 35: CPT | Performed by: INTERNAL MEDICINE

## 2021-11-07 PROCEDURE — 6360000002 HC RX W HCPCS

## 2021-11-07 PROCEDURE — 1200000000 HC SEMI PRIVATE

## 2021-11-07 PROCEDURE — 6370000000 HC RX 637 (ALT 250 FOR IP)

## 2021-11-07 PROCEDURE — 2580000003 HC RX 258

## 2021-11-07 PROCEDURE — 86140 C-REACTIVE PROTEIN: CPT

## 2021-11-07 PROCEDURE — 36415 COLL VENOUS BLD VENIPUNCTURE: CPT

## 2021-11-07 PROCEDURE — 72100 X-RAY EXAM L-S SPINE 2/3 VWS: CPT

## 2021-11-07 PROCEDURE — 6370000000 HC RX 637 (ALT 250 FOR IP): Performed by: STUDENT IN AN ORGANIZED HEALTH CARE EDUCATION/TRAINING PROGRAM

## 2021-11-07 PROCEDURE — 6360000002 HC RX W HCPCS: Performed by: NURSE PRACTITIONER

## 2021-11-07 RX ORDER — AMLODIPINE BESYLATE 5 MG/1
5 TABLET ORAL ONCE
Status: COMPLETED | OUTPATIENT
Start: 2021-11-07 | End: 2021-11-07

## 2021-11-07 RX ORDER — AMLODIPINE BESYLATE 10 MG/1
10 TABLET ORAL DAILY
Status: DISCONTINUED | OUTPATIENT
Start: 2021-11-08 | End: 2021-11-08 | Stop reason: HOSPADM

## 2021-11-07 RX ORDER — OXYCODONE HYDROCHLORIDE AND ACETAMINOPHEN 5; 325 MG/1; MG/1
1 TABLET ORAL EVERY 6 HOURS PRN
Status: DISCONTINUED | OUTPATIENT
Start: 2021-11-07 | End: 2021-11-08

## 2021-11-07 RX ADMIN — AMLODIPINE BESYLATE 5 MG: 5 TABLET ORAL at 08:32

## 2021-11-07 RX ADMIN — OXYCODONE HYDROCHLORIDE AND ACETAMINOPHEN 1 TABLET: 5; 325 TABLET ORAL at 16:41

## 2021-11-07 RX ADMIN — HYDROMORPHONE HYDROCHLORIDE 0.5 MG: 1 INJECTION, SOLUTION INTRAMUSCULAR; INTRAVENOUS; SUBCUTANEOUS at 21:37

## 2021-11-07 RX ADMIN — AMLODIPINE BESYLATE 5 MG: 5 TABLET ORAL at 09:39

## 2021-11-07 RX ADMIN — HYDROMORPHONE HYDROCHLORIDE 0.5 MG: 1 INJECTION, SOLUTION INTRAMUSCULAR; INTRAVENOUS; SUBCUTANEOUS at 13:48

## 2021-11-07 RX ADMIN — HYDROMORPHONE HYDROCHLORIDE 0.5 MG: 1 INJECTION, SOLUTION INTRAMUSCULAR; INTRAVENOUS; SUBCUTANEOUS at 08:32

## 2021-11-07 RX ADMIN — HYDROMORPHONE HYDROCHLORIDE 0.5 MG: 1 INJECTION, SOLUTION INTRAMUSCULAR; INTRAVENOUS; SUBCUTANEOUS at 05:09

## 2021-11-07 RX ADMIN — ONDANSETRON 4 MG: 4 TABLET, ORALLY DISINTEGRATING ORAL at 15:18

## 2021-11-07 RX ADMIN — SODIUM CHLORIDE: 9 INJECTION, SOLUTION INTRAVENOUS at 01:05

## 2021-11-07 RX ADMIN — OXYCODONE HYDROCHLORIDE AND ACETAMINOPHEN 1 TABLET: 5; 325 TABLET ORAL at 22:34

## 2021-11-07 RX ADMIN — SODIUM CHLORIDE: 9 INJECTION, SOLUTION INTRAVENOUS at 18:51

## 2021-11-07 RX ADMIN — DULOXETINE 60 MG: 60 CAPSULE, DELAYED RELEASE ORAL at 13:48

## 2021-11-07 RX ADMIN — HYDROXYZINE HYDROCHLORIDE 50 MG: 50 TABLET, FILM COATED ORAL at 01:05

## 2021-11-07 RX ADMIN — ONDANSETRON 4 MG: 4 TABLET, ORALLY DISINTEGRATING ORAL at 22:34

## 2021-11-07 RX ADMIN — HEPARIN SODIUM 5000 UNITS: 5000 INJECTION INTRAVENOUS; SUBCUTANEOUS at 21:13

## 2021-11-07 RX ADMIN — HYDROMORPHONE HYDROCHLORIDE 0.5 MG: 1 INJECTION, SOLUTION INTRAMUSCULAR; INTRAVENOUS; SUBCUTANEOUS at 01:38

## 2021-11-07 RX ADMIN — CEFTRIAXONE SODIUM 1000 MG: 1 INJECTION, POWDER, FOR SOLUTION INTRAMUSCULAR; INTRAVENOUS at 08:29

## 2021-11-07 RX ADMIN — HEPARIN SODIUM 5000 UNITS: 5000 INJECTION INTRAVENOUS; SUBCUTANEOUS at 14:30

## 2021-11-07 RX ADMIN — HEPARIN SODIUM 5000 UNITS: 5000 INJECTION INTRAVENOUS; SUBCUTANEOUS at 05:09

## 2021-11-07 ASSESSMENT — PAIN DESCRIPTION - PAIN TYPE
TYPE: ACUTE PAIN
TYPE: ACUTE PAIN

## 2021-11-07 ASSESSMENT — PAIN SCALES - GENERAL
PAINLEVEL_OUTOF10: 8
PAINLEVEL_OUTOF10: 7
PAINLEVEL_OUTOF10: 9
PAINLEVEL_OUTOF10: 9
PAINLEVEL_OUTOF10: 10
PAINLEVEL_OUTOF10: 6
PAINLEVEL_OUTOF10: 6
PAINLEVEL_OUTOF10: 10
PAINLEVEL_OUTOF10: 8
PAINLEVEL_OUTOF10: 10

## 2021-11-07 ASSESSMENT — PAIN DESCRIPTION - LOCATION
LOCATION: FLANK
LOCATION: FLANK

## 2021-11-07 ASSESSMENT — ENCOUNTER SYMPTOMS
SHORTNESS OF BREATH: 0
ABDOMINAL PAIN: 0

## 2021-11-07 NOTE — PROGRESS NOTES
[email or web address]    PROGRESS NOTE             11/7/2021    10:11 AM    Name:   Laura Duarte  MRN:     [id number]     Acct:      [de-identified]   Room:   2079/2079-01   Day:  1  Admit Date:  11/6/2021  7:55 AM    PCP:  Cy Haynes PA-C  Code Status:  Full Code    Subjective:     C/C:   Chief Complaint   Patient presents with    Flank Pain     Rt flank pain    Dysuria     Interval History Status: improved. Patient states she is doing well. Still having quite a bit of pain in the right flank. Left the floor to smoke yesterday, notified her that she is able to use a nicotine patch instead. Denies chest pain, shortness of breath, abdominal pain. Afebrile. Has not had a bowel movement in 2 days. Brief History:     The patient is a 35 y.o. Non- / non  female with past medical history of polycystic kidney disease with CKD stage III, depression and anxiety, 50-pack-year smoking history, and hypertension who presented to the emergency room with Flank Pain (Rt flank pain) and Dysuria.       In the emergency room, urinalysis showed trace hemoglobin, 1+ protein, trace leukocyte esterase, microscopic urinalysis showed few bacteria, 5-10 white blood cells, 2-5 RBC. Hemoglobin 11, no leukocytosis. Creatinine was 4.36, which is elevated from her baseline of 2.9-3. She was given 1 g of Rocephin and 1 L bolus of normal saline.      Abdominal CT scan showed polycystic kidney disease, 6 mm nonobstructing right intrarenal calculus, no evidence of obstructive uropathy, and no acute findings in the abdomen or pelvis. The intrarenal calculus is larger in size as compared to CT on 6/19/2021 where it was found to be 5 mm in size.     She was admitted for the management of SILVANO on CKD, UTI.       Review of Systems:     Review of Systems   Constitutional: Negative for appetite change, chills and fever.    Respiratory: Negative for shortness of Other Brother         bad knees, with recent total knee replacement        Vitals:  BP (!) 156/79   Pulse 57   Temp 98.1 °F (36.7 °C) (Oral)   Resp 16   Ht 5' 3\" (1.6 m)   Wt 125 lb (56.7 kg)   LMP 10/23/2021   SpO2 97%   BMI 22.14 kg/m²   Temp (24hrs), Av.4 °F (36.9 °C), Min:98.1 °F (36.7 °C), Max:98.5 °F (36.9 °C)    No results for input(s): POCGLU in the last 72 hours. I/O(24Hr):   No intake or output data in the 24 hours ending 21 1011    Labs:    CBC with Differential:    Lab Results   Component Value Date    WBC 5.9 2021    RBC 3.10 2021    RBC 3.71 2012    HGB 10.0 2021    HCT 30.3 2021     2021     2012    MCV 97.8 2021    MCH 32.3 2021    MCHC 33.1 2021    RDW 13.2 2021    LYMPHOPCT 33 2021    LYMPHOPCT 21.3 2020    MONOPCT 6 2021    MONOPCT 5.1 2020    EOSPCT 2.1 2020    BASOPCT 1 2021    BASOPCT 1.4 2020    MONOSABS 0.30 2021    MONOSABS 0.4 2020    LYMPHSABS 1.90 2021    LYMPHSABS 1.6 2020    EOSABS 0.20 2021    EOSABS 0.2 2020    BASOSABS 0.10 2021    DIFFTYPE NOT REPORTED 2021     BMP:    Lab Results   Component Value Date     2021    K 4.7 2021     2021    CO2 22 2021    BUN 33 2021    LABALBU 4.1 2021    CREATININE 4.15 2021    CALCIUM 8.3 2021    GFRAA 14 2021    LABGLOM 12 2021    GLUCOSE 80 2021       Lab Results   Component Value Date/Time    SPECIAL NOT REPORTED 2021 08:02 AM     Lab Results   Component Value Date/Time    CULTURE NO GROWTH 2021 08:02 AM         Radiology:    CT ABDOMEN PELVIS WO CONTRAST Additional Contrast? None    Result Date: 2021  EXAMINATION: CT OF THE ABDOMEN AND PELVIS WITHOUT CONTRAST 2021 8:27 am TECHNIQUE: CT of the abdomen and pelvis was performed without the administration of intravenous contrast. Multiplanar reformatted images are provided for review. Dose modulation, iterative reconstruction, and/or weight based adjustment of the mA/kV was utilized to reduce the radiation dose to as low as reasonably achievable. COMPARISON: CT 06/19/2021. HISTORY: ORDERING SYSTEM PROVIDED HISTORY: right flank pain TECHNOLOGIST PROVIDED HISTORY: right flank pain Decision Support Exception - unselect if not a suspected or confirmed emergency medical condition->Emergency Medical Condition (MA) Is the patient pregnant?->No Reason for Exam: rt flank pain x 1 day, hx of kidney stones Acuity: Unknown Type of Exam: Unknown Relevant Medical/Surgical History: iud FINDINGS: Lower Chest: Visualized lung bases demonstrate no acute abnormality. Organs: Limited evaluation of the intra-abdominal organs given the lack of intravenous contrast.  Within this limitation, the liver, spleen, pancreas, and adrenal glands demonstrate no acute abnormality. Again seen are several cysts in the liver. Polycystic kidney disease again seen. Appearance of the renal parenchyma is overall similar to the prior CT. Similar appearance of a 6 mm calculus in the right kidney without evidence of hydronephrosis. GI/Bowel: No bowel obstruction is seen. The appendix is not definitively identified. No obvious secondary signs are seen to suggest acute appendicitis. Pelvis: The urinary bladder is unremarkable. An IUD is noted in the uterus. Peritoneum/Retroperitoneum: No lymphadenopathy. No intraperitoneal free air or significant free fluid. Bones/Soft Tissues: No acute bony abnormality is seen. Polycystic kidney disease. 6 mm nonobstructing right intrarenal calculus. No evidence of obstructive uropathy. No acute findings are seen in the abdomen or pelvis. Physical Examination:        Physical Exam  Constitutional:       General: She is not in acute distress. Appearance: Normal appearance. She is normal weight.  She is not ill-appearing. Eyes:      General: No scleral icterus. Extraocular Movements: Extraocular movements intact. Conjunctiva/sclera: Conjunctivae normal.   Cardiovascular:      Rate and Rhythm: Normal rate and regular rhythm. Heart sounds: No murmur heard. No friction rub. No gallop. Pulmonary:      Effort: Pulmonary effort is normal.      Breath sounds: Wheezing (RLL) present. Abdominal:      General: Bowel sounds are normal. There is no distension. Palpations: Abdomen is soft. Tenderness: There is abdominal tenderness (flank). There is right CVA tenderness. Musculoskeletal:      Right lower leg: No edema. Left lower leg: No edema. Neurological:      General: No focal deficit present. Mental Status: She is oriented to person, place, and time.    Psychiatric:         Mood and Affect: Mood normal.         Behavior: Behavior normal.           Assessment:        Primary Problem  UTI (urinary tract infection)    Active Hospital Problems    Diagnosis Date Noted    Pyelonephritis [N12] 11/07/2021    UTI (urinary tract infection) [N39.0] 11/06/2021    SILVANO (acute kidney injury) ([facility] [address] [N17.9] 05/16/2021    Kidney stone [N20.0]     CKD (chronic kidney disease) stage 3, GFR 30-59 ml/min ([facility] [address] [N18.30]     Polycystic kidney [Q61.3] 10/02/2011    Smoker [F17.200] 10/02/2011       Plan:        Urinary tract infection, pyelonephritis less likely  -urinalysis on admission trace leukocyte esterase, few bacteria, 5-10 WBCs, flank pain  -urine culture no growth (preliminary result)  -continue IV rocephin 1g daily, may change based on culture per nephrology recommendation     SILVANO on CKD stage 3 2/2 polycystic kidney disease  -Creatinine 4.36 on admission, 4.15 this AM  -Baseline 2.9-3.0  -continue IVF hydration at 125/hr     Nephrolithiasis  -pain control with tylenol 650 mg Q6h for mild pain, dilaudid 0.25mg for moderate pain, and 0.5mg for severe pain  -IV fluid hydration NS 125cc/hr    Chronic normocytic anemia 2/2 CKD, stable  -hemoglobin 11.0 on admission, continue to monitor     Essential Hypertension, not well controlled  -continue home amlodipine 5mg and metoprolol 50mg daily  -BP elevated, bradycardia; held metoprolol, increased amlodipine to 10mg daily  -continue to monitor      Anxiety/depression  -continue home cymbalta 60mg daily    Daryl Jaramillo MD  11/7/2021  10:11 AM     Attestation and add on       I have discussed the care of Mark Aden , including pertinent history and exam findings,      11/7/21    with the resident. I have seen and examined the patient and the key elements of all parts of the encounter have been performed by me . I agree with the assessment, plan and orders as documented by the resident. Principal Problem:    UTI (urinary tract infection)  Active Problems:    Smoker    Polycystic kidney    Kidney stone    CKD (chronic kidney disease) stage 3, GFR 30-59 ml/min ([facility])    SILVANO (acute kidney injury) ([facility] [address]    Pyelonephritis  Resolved Problems:    * No resolved hospital problems. *            ---- ;        Medications: Allergies:     Allergies   Allergen Reactions    Bee Pollen     Dust Mite Extract      Chest congestion , sneezing    Pcn [Penicillins] Rash    Pollen Extract      Chest congesting , sneezing        Current Meds:   Scheduled Meds:    lidocaine  1 patch TransDERmal Daily    amitriptyline  25 mg Oral Nightly    amLODIPine  10 mg Oral Daily    DULoxetine  60 mg Oral Daily    [Held by provider] metoprolol succinate  50 mg Oral Daily    sodium chloride flush  5-40 mL IntraVENous 2 times per day    heparin (porcine)  5,000 Units SubCUTAneous 3 times per day    cefTRIAXone (ROCEPHIN) IV  1,000 mg IntraVENous Q24H    influenza virus vaccine  0.5 mL IntraMUSCular Prior to discharge     Continuous Infusions:    sodium chloride 125 mL/hr at 11/08/21 0344    sodium chloride       PRN Meds: HYDROcodone 5 mg - acetaminophen, sodium chloride flush, sodium chloride, ondansetron **OR** ondansetron, polyethylene glycol, acetaminophen **OR** [DISCONTINUED] acetaminophen, hydrOXYzine, nicotine        MD NEVAEH Cooley [address], [address] [place] [address].    Phone [phone]   Fax: [phone]  Answering Service: [phone]

## 2021-11-07 NOTE — PLAN OF CARE
Problem: Infection:  Goal: Will remain free from infection  Outcome: Ongoing  Note: Patient showed no s/s of infection throughout the shift. Vitals maintained stability. Hand washing and infections control performed during shift. Problem: Safety:  Goal: Free from accidental physical injury  Outcome: Ongoing  Note: Pt remained free of any injury or fall throughout the shift. Bed remained in lowest position, side rails up x2, call light within reach. Goal: Free from intentional harm  Outcome: Ongoing     Problem: Daily Care:  Goal: Daily care needs are met  Outcome: Ongoing     Problem: Pain:  Goal: Patient's pain/discomfort is manageable  Outcome: Ongoing  Note: Patient was given medication and non pharmacological care to help control pain and increase comfort. Patient tolerating well.         Problem: Discharge Planning:  Goal: Patients continuum of care needs are met  Outcome: Ongoing

## 2021-11-08 VITALS
OXYGEN SATURATION: 99 % | HEIGHT: 63 IN | WEIGHT: 125 LBS | TEMPERATURE: 99.3 F | HEART RATE: 78 BPM | BODY MASS INDEX: 22.15 KG/M2 | RESPIRATION RATE: 18 BRPM | DIASTOLIC BLOOD PRESSURE: 77 MMHG | SYSTOLIC BLOOD PRESSURE: 164 MMHG

## 2021-11-08 LAB
ABSOLUTE EOS #: 0.2 K/UL (ref 0–0.4)
ABSOLUTE IMMATURE GRANULOCYTE: ABNORMAL K/UL (ref 0–0.3)
ABSOLUTE LYMPH #: 1.9 K/UL (ref 1–4.8)
ABSOLUTE MONO #: 0.5 K/UL (ref 0.1–1.3)
ANION GAP SERPL CALCULATED.3IONS-SCNC: 10 MMOL/L (ref 9–17)
BASOPHILS # BLD: 1 % (ref 0–2)
BASOPHILS ABSOLUTE: 0.1 K/UL (ref 0–0.2)
BUN BLDV-MCNC: 32 MG/DL (ref 6–20)
BUN/CREAT BLD: ABNORMAL (ref 9–20)
CALCIUM SERPL-MCNC: 7.9 MG/DL (ref 8.6–10.4)
CHLORIDE BLD-SCNC: 113 MMOL/L (ref 98–107)
CO2: 19 MMOL/L (ref 20–31)
CREAT SERPL-MCNC: 3.66 MG/DL (ref 0.5–0.9)
DIFFERENTIAL TYPE: ABNORMAL
EOSINOPHILS RELATIVE PERCENT: 3 % (ref 0–4)
GFR AFRICAN AMERICAN: 16 ML/MIN
GFR NON-AFRICAN AMERICAN: 14 ML/MIN
GFR SERPL CREATININE-BSD FRML MDRD: ABNORMAL ML/MIN/{1.73_M2}
GFR SERPL CREATININE-BSD FRML MDRD: ABNORMAL ML/MIN/{1.73_M2}
GLUCOSE BLD-MCNC: 83 MG/DL (ref 70–99)
HCT VFR BLD CALC: 31.5 % (ref 36–46)
HEMOGLOBIN: 10.4 G/DL (ref 12–16)
IMMATURE GRANULOCYTES: ABNORMAL %
LYMPHOCYTES # BLD: 27 % (ref 24–44)
MCH RBC QN AUTO: 32.7 PG (ref 26–34)
MCHC RBC AUTO-ENTMCNC: 33 G/DL (ref 31–37)
MCV RBC AUTO: 99.2 FL (ref 80–100)
MONOCYTES # BLD: 7 % (ref 1–7)
NRBC AUTOMATED: ABNORMAL PER 100 WBC
PDW BLD-RTO: 13.3 % (ref 11.5–14.9)
PLATELET # BLD: 196 K/UL (ref 150–450)
PLATELET ESTIMATE: ABNORMAL
PMV BLD AUTO: 8.6 FL (ref 6–12)
POTASSIUM SERPL-SCNC: 4.6 MMOL/L (ref 3.7–5.3)
RBC # BLD: 3.18 M/UL (ref 4–5.2)
RBC # BLD: ABNORMAL 10*6/UL
SEG NEUTROPHILS: 62 % (ref 36–66)
SEGMENTED NEUTROPHILS ABSOLUTE COUNT: 4.4 K/UL (ref 1.3–9.1)
SODIUM BLD-SCNC: 142 MMOL/L (ref 135–144)
WBC # BLD: 7 K/UL (ref 3.5–11)
WBC # BLD: ABNORMAL 10*3/UL

## 2021-11-08 PROCEDURE — 80048 BASIC METABOLIC PNL TOTAL CA: CPT

## 2021-11-08 PROCEDURE — 36415 COLL VENOUS BLD VENIPUNCTURE: CPT

## 2021-11-08 PROCEDURE — 6360000002 HC RX W HCPCS: Performed by: INTERNAL MEDICINE

## 2021-11-08 PROCEDURE — 99239 HOSP IP/OBS DSCHRG MGMT >30: CPT | Performed by: INTERNAL MEDICINE

## 2021-11-08 PROCEDURE — 2580000003 HC RX 258: Performed by: INTERNAL MEDICINE

## 2021-11-08 PROCEDURE — 6370000000 HC RX 637 (ALT 250 FOR IP)

## 2021-11-08 PROCEDURE — 85025 COMPLETE CBC W/AUTO DIFF WBC: CPT

## 2021-11-08 PROCEDURE — 2580000003 HC RX 258

## 2021-11-08 PROCEDURE — G0008 ADMIN INFLUENZA VIRUS VAC: HCPCS | Performed by: INTERNAL MEDICINE

## 2021-11-08 PROCEDURE — 6360000002 HC RX W HCPCS

## 2021-11-08 PROCEDURE — 6360000002 HC RX W HCPCS: Performed by: NURSE PRACTITIONER

## 2021-11-08 PROCEDURE — 6370000000 HC RX 637 (ALT 250 FOR IP): Performed by: NURSE PRACTITIONER

## 2021-11-08 PROCEDURE — 90686 IIV4 VACC NO PRSV 0.5 ML IM: CPT | Performed by: INTERNAL MEDICINE

## 2021-11-08 RX ORDER — LIDOCAINE 4 G/G
1 PATCH TOPICAL DAILY
Qty: 3 PATCH | Refills: 0 | Status: SHIPPED | OUTPATIENT
Start: 2021-11-09

## 2021-11-08 RX ORDER — HYDROCODONE BITARTRATE AND ACETAMINOPHEN 5; 325 MG/1; MG/1
1 TABLET ORAL EVERY 6 HOURS PRN
Status: DISCONTINUED | OUTPATIENT
Start: 2021-11-08 | End: 2021-11-08

## 2021-11-08 RX ORDER — AMITRIPTYLINE HYDROCHLORIDE 25 MG/1
25 TABLET, FILM COATED ORAL NIGHTLY
Qty: 30 TABLET | Refills: 3 | Status: SHIPPED | OUTPATIENT
Start: 2021-11-08

## 2021-11-08 RX ORDER — OXYCODONE HYDROCHLORIDE AND ACETAMINOPHEN 5; 325 MG/1; MG/1
1 TABLET ORAL EVERY 4 HOURS PRN
Status: DISCONTINUED | OUTPATIENT
Start: 2021-11-08 | End: 2021-11-08

## 2021-11-08 RX ORDER — CEPHALEXIN 250 MG/1
250 CAPSULE ORAL 2 TIMES DAILY
Qty: 6 CAPSULE | Refills: 0 | Status: SHIPPED | OUTPATIENT
Start: 2021-11-08 | End: 2021-11-11

## 2021-11-08 RX ORDER — POLYETHYLENE GLYCOL 3350 17 G
2 POWDER IN PACKET (EA) ORAL
Status: DISCONTINUED | OUTPATIENT
Start: 2021-11-08 | End: 2021-11-08 | Stop reason: HOSPADM

## 2021-11-08 RX ORDER — TIZANIDINE 4 MG/1
4 TABLET ORAL EVERY 6 HOURS PRN
Status: DISCONTINUED | OUTPATIENT
Start: 2021-11-08 | End: 2021-11-08 | Stop reason: HOSPADM

## 2021-11-08 RX ORDER — LIDOCAINE 4 G/G
1 PATCH TOPICAL DAILY
Status: DISCONTINUED | OUTPATIENT
Start: 2021-11-08 | End: 2021-11-08 | Stop reason: HOSPADM

## 2021-11-08 RX ORDER — AMITRIPTYLINE HYDROCHLORIDE 25 MG/1
25 TABLET, FILM COATED ORAL NIGHTLY
Status: DISCONTINUED | OUTPATIENT
Start: 2021-11-08 | End: 2021-11-08 | Stop reason: HOSPADM

## 2021-11-08 RX ORDER — OXYCODONE HYDROCHLORIDE AND ACETAMINOPHEN 5; 325 MG/1; MG/1
1 TABLET ORAL EVERY 6 HOURS PRN
Qty: 8 TABLET | Refills: 0 | Status: SHIPPED | OUTPATIENT
Start: 2021-11-08 | End: 2021-11-10

## 2021-11-08 RX ORDER — HYDROCODONE BITARTRATE AND ACETAMINOPHEN 5; 325 MG/1; MG/1
2 TABLET ORAL EVERY 6 HOURS PRN
Status: DISCONTINUED | OUTPATIENT
Start: 2021-11-08 | End: 2021-11-08 | Stop reason: HOSPADM

## 2021-11-08 RX ADMIN — SODIUM CHLORIDE: 9 INJECTION, SOLUTION INTRAVENOUS at 03:44

## 2021-11-08 RX ADMIN — TIZANIDINE 4 MG: 4 TABLET ORAL at 16:25

## 2021-11-08 RX ADMIN — Medication 10 ML: at 08:53

## 2021-11-08 RX ADMIN — HEPARIN SODIUM 5000 UNITS: 5000 INJECTION INTRAVENOUS; SUBCUTANEOUS at 05:30

## 2021-11-08 RX ADMIN — CEFTRIAXONE SODIUM 1000 MG: 1 INJECTION, POWDER, FOR SOLUTION INTRAMUSCULAR; INTRAVENOUS at 08:50

## 2021-11-08 RX ADMIN — INFLUENZA A VIRUS A/VICTORIA/2570/2019 IVR-215 (H1N1) ANTIGEN (PROPIOLACTONE INACTIVATED), INFLUENZA A VIRUS A/CAMBODIA/E0826360/2020 IVR-224 (H3N2) ANTIGEN (PROPIOLACTONE INACTIVATED), INFLUENZA B VIRUS B/VICTORIA/705/2018 BVR-11 ANTIGEN (PROPIOLACTONE INACTIVATED), INFLUENZA B VIRUS B/PHUKET/3073/2013 BVR-1B ANTIGEN (PROPIOLACTONE INACTIVATED) 0.5 ML: 15; 15; 15; 15 INJECTION, SUSPENSION INTRAMUSCULAR at 18:30

## 2021-11-08 RX ADMIN — HYDROMORPHONE HYDROCHLORIDE 0.5 MG: 1 INJECTION, SOLUTION INTRAMUSCULAR; INTRAVENOUS; SUBCUTANEOUS at 05:31

## 2021-11-08 RX ADMIN — AMLODIPINE BESYLATE 10 MG: 10 TABLET ORAL at 08:51

## 2021-11-08 RX ADMIN — HYDROMORPHONE HYDROCHLORIDE 1 MG: 1 INJECTION, SOLUTION INTRAMUSCULAR; INTRAVENOUS; SUBCUTANEOUS at 00:40

## 2021-11-08 RX ADMIN — DULOXETINE 60 MG: 60 CAPSULE, DELAYED RELEASE ORAL at 12:44

## 2021-11-08 RX ADMIN — HYDROCODONE BITARTRATE AND ACETAMINOPHEN 1 TABLET: 5; 325 TABLET ORAL at 14:58

## 2021-11-08 RX ADMIN — HEPARIN SODIUM 5000 UNITS: 5000 INJECTION INTRAVENOUS; SUBCUTANEOUS at 14:58

## 2021-11-08 RX ADMIN — OXYCODONE HYDROCHLORIDE AND ACETAMINOPHEN 1 TABLET: 5; 325 TABLET ORAL at 08:51

## 2021-11-08 ASSESSMENT — PAIN SCALES - GENERAL
PAINLEVEL_OUTOF10: 9
PAINLEVEL_OUTOF10: 10
PAINLEVEL_OUTOF10: 10
PAINLEVEL_OUTOF10: 0
PAINLEVEL_OUTOF10: 7
PAINLEVEL_OUTOF10: 0
PAINLEVEL_OUTOF10: 8
PAINLEVEL_OUTOF10: 7

## 2021-11-08 ASSESSMENT — ENCOUNTER SYMPTOMS: SHORTNESS OF BREATH: 0

## 2021-11-08 NOTE — PROGRESS NOTES
Pt requesting to be discharged. Writer spoke with residents and they are going to put in discharge orders. Pt to go home on PO abx and pain medication. Awaiting orders at this time.

## 2021-11-08 NOTE — PROGRESS NOTES
Patient resting quietly in bed with eyes closed approximately 60 minutes after IV pain medication administered. Call light in reach. Will continue to monitor.

## 2021-11-08 NOTE — PROGRESS NOTES
the past 96 hrs (Last 3 readings):   Weight   11/06/21 0752 125 lb (56.7 kg)       Constitutional:  Alert, awake, no apparent distress  Cardiovascular:  S1, S2 without m/r/g  Respiratory:  CTA B without w/r/r  Abdomen: +bs, soft, nt  Ext:  LE edema    Data/  Recent Labs     11/06/21  0821 11/07/21  0735 11/08/21  0556   WBC 6.9 5.9 7.0   HGB 11.0* 10.0* 10.4*   HCT 31.4* 30.3* 31.5*   MCV 96.5 97.8 99.2    218 196     Recent Labs     11/06/21  0821 11/07/21  0735 11/08/21  0556    143 142   K 4.8 4.7 4.6    112* 113*   CO2 20 22 19*   GLUCOSE 90 80 83   BUN 35* 33* 32*   CREATININE 4.36* 4.15* 3.66*   LABGLOM 11* 12* 14*   GFRAA 13* 14* 16*         Assessment/     1.  Acute kidney injury superimposed on chronic kidney disease stage 4 associated with prerenal azotemia. No evidence of  obstructive uropathy radiologically-Renal function is slowly improving and responsive to IV isotonic hydration     Plan:  Continue IV fluids with 0.9 saline at 125 cc/hour   Strict input and output documentation. Avoid nephrotoxic agents. Basic metabolic profile daily.     2.  Nephrolithiasis - patient has nonobstructing kidney stone.     3. Adult polycystic kidney disease associated with CKD stage IV-Baseline creatinine 2.5 to 2.8 mg/dL     5.  Systemic hypertension - not optimal- amlodipine increased to 10 mg daily.     6. Dysuria-Patient has been started on IV Rocephin. If she has positive urine culture will switch to IV ciprofloxacin which has  better cyst penetration in patients with ADPKD. 7.Persistent right flank pain-Patient has nonobstructive stone with Absence of pyelonephritis on abdominal CT-Could be secondary to expanding cyst or cyst rupture-consider repeat  abdominal/pelvic  CT scan if pain not controlled with medication.          Consider discharge once her serum creatinine is 3.1 mg/dL or less.       Francis Ott MD

## 2021-11-08 NOTE — CONSULTS
Sony Jones, [place], [place], [facility] [place]   Urology Consultation      Patient:  Arielle Andrade  MRN: [id number]  YOB: 1980    CHIEF COMPLAINT: Flank pain, dysuria    HISTORY OF PRESENT ILLNESS:   The patient is a 35 y.o. female who presents with 2-day history of right-sided flank pain that is severe and sharp. This brought her to the emergency room. She has a history of polycystic kidney disease and chronic kidney disease stage III. She is a smoker. She reports increase urgency and dysuria as well. Urinalysis shows few bacteria. Creatinine is 4.3 and white count is normal.  CT scan shows nonobstructing kidney stone with no evidence of obstruction or hydronephrosis. CT reviewed as noted below. Patient's old records, notes and chart reviewed and summarized above.     Past Medical History:    Past Medical History:   Diagnosis Date    Anxiety     Asthma     Chronic headaches 07/10/2013    CKD (chronic kidney disease) stage 3, GFR 30-59 ml/min ([facility])     Degenerative disc disease, cervical     Depression     Dysmenorrhea 09/30/2014    Eczema 11/08/2012    Hypertension     Hypocalcemia 05/28/2014    Kidney stone     Menorrhagia 09/30/2014    Neck pain, bilateral 05/28/2014    Pelvic pain in female 09/30/2014    Polycystic kidney     Smoker 10/02/2011    Tension vascular headache 01/20/2014       Past Surgical History:    Past Surgical History:   Procedure Laterality Date    BREAST ENHANCEMENT SURGERY      BREAST LUMPECTOMY      left breast    CYSTO/URETERO/PYELOSCOPY, CALCULUS TX Right 6/12/2020    HOLMIUM - CYSTO, RIGHT RETROGRADE PYELOGRAM, RIGHT URETEROSCOPY, LASER LITHO ON STAND BY, RIGHT STENT PLACEMENT performed by Sami Cantu MD at [place] [id number]  06/12/2020    DILATION AND CURETTAGE OF UTERUS      x2    LITHOTRIPSY Right 7/23/2020    ESWL EXTRACORPOREAL SHOCK WAVE LITHOTRIPSY performed by Sami Cantu MD at [facility]  07/01/2013    nerve block(right vervical C3/TON/C4/C5    NERVE BLOCK  07/08/2013    nerve block(right vervical C3/TON/C4/C5    OTHER SURGICAL HISTORY  03/10/2014     botox inj     TOE SURGERY      removal of ingrown toe nail-2nd toe on left foot     URETEROSCOPY Right 06/12/2020    stent placement       Medications:      Current Facility-Administered Medications:     [START ON 11/8/2021] amLODIPine (NORVASC) tablet 10 mg, 10 mg, Oral, Daily, Luna Weinberg MD    oxyCODONE-acetaminophen (PERCOCET) 5-325 MG per tablet 1 tablet, 1 tablet, Oral, Q6H PRN, Fouzia Aiken MD, 1 tablet at 11/07/21 1641    0.9 % sodium chloride infusion, , IntraVENous, Continuous, Andres Agee MD, Last Rate: 125 mL/hr at 11/07/21 1851, New Bag at 11/07/21 1851    DULoxetine (CYMBALTA) extended release capsule 60 mg, 60 mg, Oral, Daily, Luna Weinberg MD, 60 mg at 11/07/21 1348    [Held by provider] metoprolol succinate (TOPROL XL) extended release tablet 50 mg, 50 mg, Oral, Daily, Luna Weinberg MD, 50 mg at 11/06/21 1237    sodium chloride flush 0.9 % injection 5-40 mL, 5-40 mL, IntraVENous, 2 times per day, Luna Weinberg MD, 10 mL at 11/06/21 1748    sodium chloride flush 0.9 % injection 5-40 mL, 5-40 mL, IntraVENous, PRN, Luna Weinberg MD    0.9 % sodium chloride infusion, 25 mL, IntraVENous, PRN, Luna Weinberg MD    heparin (porcine) injection 5,000 Units, 5,000 Units, SubCUTAneous, 3 times per day, Luna Weinberg MD, 5,000 Units at 11/07/21 1430    ondansetron (ZOFRAN-ODT) disintegrating tablet 4 mg, 4 mg, Oral, Q8H PRN, 4 mg at 11/07/21 1518 **OR** ondansetron (ZOFRAN) injection 4 mg, 4 mg, IntraVENous, Q6H PRN, Luna Weinberg MD    polyethylene glycol [facility]) packet 17 g, 17 g, Oral, Daily PRN, Luna Weinberg MD    acetaminophen (TYLENOL) tablet 650 mg, 650 mg, Oral, Q6H PRN, 650 mg at 11/06/21 2112 **OR** acetaminophen (TYLENOL) suppository 650 mg, 650 mg, Rectal, Q6H PRN, Luna Weinberg MD    cefTRIAXone (ROCEPHIN) 1000 mg IVPB in 50 mL D5W minibag, 1,000 mg, IntraVENous, Q24H, Altaf Pineda MD, Stopped at 11/07/21 0939    hydrOXYzine (ATARAX) tablet 50 mg, 50 mg, Oral, Nightly PRN, Altaf Pineda MD, 50 mg at 11/07/21 0105    nicotine (NICODERM CQ) 21 MG/24HR 1 patch, 1 patch, TransDERmal, Daily PRN, Altaf Pineda MD, 1 patch at 11/07/21 1011    influenza quadrivalent split vaccine (FLUZONE;FLUARIX;FLULAVAL;AFLURIA) injection 0.5 mL, 0.5 mL, IntraMUSCular, Prior to discharge, Ricky Sierra MD    Allergies: Allergies   Allergen Reactions    Bee Pollen     Dust Mite Extract      Chest congestion , sneezing    Pcn [Penicillins] Rash    Pollen Extract      Chest congesting , sneezing        Social History:   Social History     Socioeconomic History    Marital status:      Spouse name: Not on file    Number of children: Not on file    Years of education: Not on file    Highest education level: Not on file   Occupational History    Occupation: stay at home mom   Tobacco Use    Smoking status: Current Every Day Smoker     Packs/day: 1.00     Years: 25.00     Pack years: 25.00     Types: Cigarettes    Smokeless tobacco: Never Used   Vaping Use    Vaping Use: Never used   Substance and Sexual Activity    Alcohol use: No     Alcohol/week: 0.0 standard drinks    Drug use: No    Sexual activity: Yes     Partners: Male     Comment: steady boyfriend   Other Topics Concern    Not on file   Social History Narrative    Not on file     Social Determinants of Health     Financial Resource Strain:     Difficulty of Paying Living Expenses: Not on file   Food Insecurity:     Worried About Running Out of Food in the Last Year: Not on file    Kiara of Food in the Last Year: Not on file   Transportation Needs:     Lack of Transportation (Medical): Not on file    Lack of Transportation (Non-Medical):  Not on file   Physical Activity:     Days of Exercise per Week: Not on file    Minutes of Exercise per Session: Not on file   Stress:     Feeling of Stress : Not on file   Social Connections:     Frequency of Communication with Friends and Family: Not on file    Frequency of Social Gatherings with Friends and Family: Not on file    Attends Temple Services: Not on file    Active Member of Clubs or Organizations: Not on file    Attends Club or Organization Meetings: Not on file    Marital Status: Not on file   Intimate Partner Violence:     Fear of Current or Ex-Partner: Not on file    Emotionally Abused: Not on file    Physically Abused: Not on file    Sexually Abused: Not on file   Housing Stability:     Unable to Pay for Housing in the Last Year: Not on file    Number of [place] in the Last Year: Not on file    Unstable Housing in the Last Year: Not on file       Family History:    Family History   Problem Relation Age of Onset    High Blood Pressure Mother     Asthma Sister     Migraines Sister     High Blood Pressure Father     Other Brother         bad knees, with recent total knee replacement        REVIEW OF SYSTEMS:  A comprehensive 14 point review of systems was obtained. Constitutional: No fatigue  Eyes: No blurry vision  Ears, nose, mouth, throat, face: No ringing in the ears; no facial droop. Respiratory: No cough or cold. Cardiovascular: No palpitations  Gastrointestinal: No diarrhea or constipation. Genitourinary: No burning with urination  Integument/Skin: No rashes  Hematologic/Lymphatic: No easy bruising  Musculoskeletal: No muscle pains  Neurologic: No weakness in the extremities. Psychiatric: No depression or suicidal thoughts. Endocrine: No heat or cold intolerances. Allergic/Immunologic: No current seasonal allergies; no skin hives. Physical Exam:      This a 35 y.o. female   Vitals:    11/07/21 1818   BP: (!) 151/87   Pulse: 76   Resp: 16   Temp: 98.6 °F (37 °C)   SpO2: 99%     Constitutional: Patient in no acute distress.   Neuro: alert and oriented to person place and time. Head: Atraumatic and normocephalic. Neck: Trachea midline. Ext: 2+ radial pulses bilaterally. Psych: Mood and affect normal.  Skin: No rashes or bruising present. Lungs: Respiratory effort normal.  Cardiovascular:  Regular rhythm. Abdomen: Soft, non-tender, non-distended. Neither side has CVA tenderness on exam.  Bladder non-tender and not distended. Lymphatics: no palpable lymphadenopathy  Pelvic exam: deferred. Rectal exam not indicated. Labs:  Recent Labs     11/06/21 0821 11/07/21  0735   WBC 6.9 5.9   HGB 11.0* 10.0*   HCT 31.4* 30.3*   MCV 96.5 97.8    218     Recent Labs     11/06/21 0821 11/07/21 0735    143   K 4.8 4.7    112*   CO2 20 22   BUN 35* 33*   CREATININE 4.36* 4.15*       Recent Labs     11/06/21  0802   COLORU Yellow   PHUR 6.5   WBCUA 5 TO 10   RBCUA 2 TO 5   MUCUS NOT REPORTED   TRICHOMONAS NOT REPORTED   YEAST NOT REPORTED   BACTERIA FEW*   SPECGRAV 1.007   LEUKOCYTESUR TRACE*   UROBILINOGEN Normal   BILIRUBINUR NEGATIVE           -----------------------------------------------------------------  Imaging Results:  CT ABDOMEN PELVIS WO CONTRAST Additional Contrast? None    Result Date: 11/6/2021  EXAMINATION: CT OF THE ABDOMEN AND PELVIS WITHOUT CONTRAST 11/6/2021 8:27 am TECHNIQUE: CT of the abdomen and pelvis was performed without the administration of intravenous contrast. Multiplanar reformatted images are provided for review. Dose modulation, iterative reconstruction, and/or weight based adjustment of the mA/kV was utilized to reduce the radiation dose to as low as reasonably achievable. COMPARISON: CT 06/19/2021.  HISTORY: ORDERING SYSTEM PROVIDED HISTORY: right flank pain TECHNOLOGIST PROVIDED HISTORY: right flank pain Decision Support Exception - unselect if not a suspected or confirmed emergency medical condition->Emergency Medical Condition (MA) Is the patient pregnant?->No Reason for Exam: rt flank pain x 1 day, hx of kidney stones Acuity: Unknown Type of Exam: Unknown Relevant Medical/Surgical History: iud FINDINGS: Lower Chest: Visualized lung bases demonstrate no acute abnormality. Organs: Limited evaluation of the intra-abdominal organs given the lack of intravenous contrast.  Within this limitation, the liver, spleen, pancreas, and adrenal glands demonstrate no acute abnormality. Again seen are several cysts in the liver. Polycystic kidney disease again seen. Appearance of the renal parenchyma is overall similar to the prior CT. Similar appearance of a 6 mm calculus in the right kidney without evidence of hydronephrosis. GI/Bowel: No bowel obstruction is seen. The appendix is not definitively identified. No obvious secondary signs are seen to suggest acute appendicitis. Pelvis: The urinary bladder is unremarkable. An IUD is noted in the uterus. Peritoneum/Retroperitoneum: No lymphadenopathy. No intraperitoneal free air or significant free fluid. Bones/Soft Tissues: No acute bony abnormality is seen. Polycystic kidney disease. 6 mm nonobstructing right intrarenal calculus. No evidence of obstructive uropathy. No acute findings are seen in the abdomen or pelvis. XR LUMBAR SPINE (2-3 VIEWS)    Result Date: 11/7/2021  EXAMINATION: THREE XRAY VIEWS OF THE LUMBAR SPINE 11/7/2021 3:41 pm COMPARISON: CT abdomen and pelvis dated 11/06/2021. HISTORY: ORDERING SYSTEM PROVIDED HISTORY: flank pain TECHNOLOGIST PROVIDED HISTORY: flank pain Reason for Exam: Flank pain leading into the lower back Acuity: Acute Type of Exam: Initial FINDINGS: Lumbar vertebral body heights and alignment are within normal limits. There is mild disc space narrowing at L5-S1. Other disc spaces are relatively well preserved. There are scattered vascular calcifications. A 0.8 cm calcific density overlying the expected location of the lower right kidney. 1.  No acute compression fracture or malalignment in the lumbar spine.  2.  Minimal degenerative changes. 3.  Right renal calculus. Assessment and Plan   Impression:    Patient Active Problem List   Diagnosis    Asthma    Smoker    Polycystic kidney    Eczema    Depression with anxiety    Chronic headaches    Tension vascular headache    Irregular bleeding    Metrorrhagia    Menorrhagia    Dysmenorrhea    Pelvic pain in female    Kidney stone    Acute back pain    Anxiety    Hypertension    Headache    Depression    CKD (chronic kidney disease) stage 3, GFR 30-59 ml/min ([facility])    Chronic neck pain    Degenerative disc disease, cervical    Encounter for long-term (current) use of other medications    Cervicalgia    Chronic intractable headache    Hemorrhage of cyst of native kidney    Abdominal pain    CKD (chronic kidney disease) stage 4, GFR 15-29 ml/min ([facility])    Acute renal failure with acute tubular necrosis superimposed on stage 3 chronic kidney disease ([facility])    Allergic rhinitis due to animal hair and dander    Congenital multiple renal cysts    Gross hematuria    History of anemia due to chronic kidney disease    History of multiple allergies    Hyperlipidemia    IUD (intrauterine device) in place    Leukocytosis    Other specified disorders of kidney and ureter    Pain in joint    Recurrent major depression in full remission ([address]    Renovascular hypertension    Right ureteral stone    Right nephrolithiasis    Flank pain    Acute kidney injury superimposed on chronic kidney disease ([address]    Urinary tract infection with hematuria    History of major depression    Iron deficiency anemia due to chronic blood loss    Hypernatremia    SILVANO (acute kidney injury) ([address]    Dysuria    Acute kidney injury superimposed on CKD ([facility])    UTI (urinary tract infection)    Pyelonephritis           Plan: Recommend following up on the urine culture and discharging home with 7 to 10 days of oral antibiotics.   CT scans reviewed and does not show evidence of cyst rupture or evidence of obstruction. Therefore there is no acute intervention needed at this time. The patient may be discharged home at the discretion of the primary team and follow-up as an outpatient. Thank you and please do not hesitate to reach out if there is any questions.     Melia Gipson M.D, MD  7:25 PM 11/7/2021

## 2021-11-08 NOTE — PROGRESS NOTES
Writer reached out to Thuan Gordillo, [id number]  again regarding increased pain to right flank. Orders received.

## 2021-11-08 NOTE — DISCHARGE SUMMARY
[address]    Discharge Summary     Patient ID: Ingris Romano  :  1980   MRN: [id number]     ACCOUNT:  [de-identified]   Patient's PCP: Jolanta Boo PA-C  Admit Date: 2021   Discharge Date: 2021     Length of Stay: 2  Code Status:  Full Code  Admitting Physician: Yvon Nicholas MD  Discharge Physician: Maricel Headley MD     Active Discharge Diagnoses:       Primary Problem  UTI (urinary tract infection)      [facility] Problems    Diagnosis Date Noted    Pyelonephritis [N12] 2021    UTI (urinary tract infection) [N39.0] 2021    SILVANO (acute kidney injury) ([facility] [N17.9] 2021    Kidney stone [N20.0]     CKD (chronic kidney disease) stage 3, GFR 30-59 ml/min ([facility] [N18.30]     Polycystic kidney [Q61.3] 10/02/2011    Smoker [F17.200] 10/02/2011       Admission Condition:  fair     Discharged Condition: stable    Hospital Stay:       Hospital Course:  Ingris Romano is a 35 y.o. female who was admitted for the management of  UTI (urinary tract infection) and SILVANO, presented to ER with Flank Pain (Rt flank pain) and Dysuria    Prior to admission, she had been having right flank pain and dysuria for about 2 days. She had not eaten or slept much during this time due to the pain. Abdominal CT scan showed right sided intrarenal calculus which was visualized on prior exams. Urinalysis in the ED showed trace leukocyte esterase and few bacteria. She was started on IV Rocephin 1g daily. Urine culture showed no growth. Additionally, creatinine was elevated above baseline ~3 at 4.36. She was given IV fluid hydration at 125 cc/hr and her creatinine is trending down. Throughout her admission, her pain was not well controlled. She attempted to leave AMA as her pain was not well controlled.  However, after explanation regarding further workup for possible cyst expansion/rupture as well as ongoing adjustments to pain medications, she decided to stay. A few hours later, she determined she would like to leave and she was discharged with a 2 day supply of percocet and was recommended to follow up with nephrology and urology outpatient.        Significant therapeutic interventions: n/a    Significant Diagnostic Studies:   Labs / Micro:  CBC:   Lab Results   Component Value Date    WBC 7.0 11/08/2021    RBC 3.18 11/08/2021    RBC 3.71 01/13/2012    HGB 10.4 11/08/2021    HCT 31.5 11/08/2021    MCV 99.2 11/08/2021    MCH 32.7 11/08/2021    MCHC 33.0 11/08/2021    RDW 13.3 11/08/2021     11/08/2021     01/13/2012     BMP:    Lab Results   Component Value Date    GLUCOSE 83 11/08/2021     11/08/2021    K 4.6 11/08/2021     11/08/2021    CO2 19 11/08/2021    ANIONGAP 10 11/08/2021    BUN 32 11/08/2021    CREATININE 3.66 11/08/2021    BUNCRER NOT REPORTED 11/08/2021    CALCIUM 7.9 11/08/2021    LABGLOM 14 11/08/2021    GFRAA 16 11/08/2021    GFR      11/08/2021    GFR NOT REPORTED 11/08/2021     CMP:    Lab Results   Component Value Date    GLUCOSE 83 11/08/2021     11/08/2021    K 4.6 11/08/2021     11/08/2021    CO2 19 11/08/2021    BUN 32 11/08/2021    CREATININE 3.66 11/08/2021    ANIONGAP 10 11/08/2021    ALKPHOS 93 11/06/2021    ALT 8 11/06/2021    AST 11 11/06/2021    BILITOT 0.22 11/06/2021    LABALBU 4.1 11/06/2021    ALBUMIN NOT REPORTED 11/06/2021    LABGLOM 14 11/08/2021    GFRAA 16 11/08/2021    GFR      11/08/2021    GFR NOT REPORTED 11/08/2021    PROT 7.0 11/06/2021    CALCIUM 7.9 11/08/2021     U/A:    Lab Results   Component Value Date    COLORU Yellow 11/06/2021    TURBIDITY Clear 11/06/2021    SPECGRAV 1.007 11/06/2021    HGBUR TRACE 11/06/2021    PHUR 6.5 11/06/2021    PROTEINU 1+ 11/06/2021    GLUCOSEU NEGATIVE 11/06/2021    KETUA NEGATIVE 11/06/2021    BILIRUBINUR NEGATIVE 11/06/2021    BILIRUBINUR neg 09/30/2014    UROBILINOGEN Normal 11/06/2021 NITRU NEGATIVE 11/06/2021    LEUKOCYTESUR TRACE 11/06/2021       Culture, Urine [[id number]] Collected: 11/06/21 0802   Order Status: Completed Specimen: Urine, clean catch Updated: 11/07/21 1156    Specimen Description . CLEAN CATCH URINE    Special Requests NOT REPORTED    Culture NO GROWTH       Radiology:    CT ABDOMEN PELVIS WO CONTRAST Additional Contrast? None    Result Date: 11/7/2021  EXAMINATION: CT OF THE ABDOMEN AND PELVIS WITHOUT CONTRAST 11/6/2021 8:27 am TECHNIQUE: CT of the abdomen and pelvis was performed without the administration of intravenous contrast. Multiplanar reformatted images are provided for review. Dose modulation, iterative reconstruction, and/or weight based adjustment of the mA/kV was utilized to reduce the radiation dose to as low as reasonably achievable. COMPARISON: CT 06/19/2021. HISTORY: ORDERING SYSTEM PROVIDED HISTORY: right flank pain TECHNOLOGIST PROVIDED HISTORY: right flank pain Decision Support Exception - unselect if not a suspected or confirmed emergency medical condition->Emergency Medical Condition (MA) Is the patient pregnant?->No Reason for Exam: rt flank pain x 1 day, hx of kidney stones Acuity: Unknown Type of Exam: Unknown Relevant Medical/Surgical History: iud FINDINGS: Lower Chest: Visualized lung bases demonstrate no acute abnormality. Organs: Limited evaluation of the intra-abdominal organs given the lack of intravenous contrast.  Within this limitation, the liver, spleen, pancreas, and adrenal glands demonstrate no acute abnormality. Again seen are several cysts in the liver. Polycystic kidney disease again seen. Appearance of the renal parenchyma is overall similar to the prior CT. Similar appearance of a 6 mm calculus in the right kidney without evidence of hydronephrosis. GI/Bowel: No bowel obstruction is seen. The appendix is not definitively identified. No obvious secondary signs are seen to suggest acute appendicitis. Pelvis:  The urinary bladder is unremarkable. An IUD is noted in the uterus. Peritoneum/Retroperitoneum: No lymphadenopathy. No intraperitoneal free air or significant free fluid. Bones/Soft Tissues: No acute bony abnormality is seen. Polycystic kidney disease. 6 mm nonobstructing right intrarenal calculus. No evidence of obstructive uropathy. No acute findings are seen in the abdomen or pelvis. XR LUMBAR SPINE (2-3 VIEWS)    Result Date: 11/7/2021  EXAMINATION: THREE XRAY VIEWS OF THE LUMBAR SPINE 11/7/2021 3:41 pm COMPARISON: CT abdomen and pelvis dated 11/06/2021. HISTORY: ORDERING SYSTEM PROVIDED HISTORY: flank pain TECHNOLOGIST PROVIDED HISTORY: flank pain Reason for Exam: Flank pain leading into the lower back Acuity: Acute Type of Exam: Initial FINDINGS: Lumbar vertebral body heights and alignment are within normal limits. There is mild disc space narrowing at L5-S1. Other disc spaces are relatively well preserved. There are scattered vascular calcifications. A 0.8 cm calcific density overlying the expected location of the lower right kidney. 1.  No acute compression fracture or malalignment in the lumbar spine. 2.  Minimal degenerative changes. 3.  Right renal calculus. Consultations:    Consults:     Final Specialist Recommendations/Findings:   IP CONSULT TO NEPHROLOGY  IP CONSULT TO INTERNAL MEDICINE  IP CONSULT TO SOCIAL WORK  IP CONSULT TO UROLOGY      The patient was seen and examined on day of discharge and this discharge summary is in conjunction with any daily progress note from day of discharge.     Discharge plan:       Disposition: Home    Physician Follow Up:     Mary Alice Yung, [address] [address]  [phone]    Schedule an appointment as soon as possible for a visit in 1 week  hospital follow up    Aisha Carey MD  [facility] [address] [facility]  [phone]    Schedule an appointment as soon as possible for a visit in 2 weeks      Bear Gilmore, [address] [place] [address] Waqas Moody [facility] [address]  [phone]    Schedule an appointment as soon as possible for a visit in 1 week      Erin Lemons, [address] [facility] [address] Jey Sue Lacy Pace Michelle Moritz [address] [phone]      If symptoms worsen       Requiring Further Evaluation/Follow Up POST HOSPITALIZATION/Incidental Findings: n/a    Diet: regular diet    Activity: As tolerated    Instructions to Patient: Please call to make an appointment to follow-up with the providers listed above    Discharge Medications:      Medication List      START taking these medications    amitriptyline 25 MG tablet  Commonly known as: ELAVIL  Take 1 tablet by mouth nightly     cephALEXin 250 MG capsule  Commonly known as: KEFLEX  Take 1 capsule by mouth 2 times daily for 3 days     lidocaine 4 % external patch  Place 1 patch onto the skin daily  Start taking on: November 9, 2021     oxyCODONE-acetaminophen 5-325 MG per tablet  Commonly known as: Percocet  Take 1 tablet by mouth every 6 hours as needed for Pain for up to 2 days. Intended supply: 7 days. Take lowest dose possible to manage pain        CHANGE how you take these medications    DULoxetine 60 MG extended release capsule  Commonly known as: CYMBALTA  take 1 capsule by mouth once daily  What changed: See the new instructions.         CONTINUE taking these medications    amLODIPine 5 MG tablet  Commonly known as: NORVASC     hydrOXYzine 50 MG tablet  Commonly known as: ATARAX     metoprolol succinate 50 MG extended release tablet  Commonly known as: TOPROL XL        STOP taking these medications    Blood Pressure Kit           Where to Get Your Medications      These medications were sent to [facility], [address] [address]    Phone: [phone]   · amitriptyline 25 MG tablet  · cephALEXin 250 MG capsule  · lidocaine 4 % external patch  · oxyCODONE-acetaminophen 5-325 MG per tablet Electronically signed by   Desiree Hayden MD  11/8/2021  6:48 PM      Thank you Dr. Heladio Keating PA-C for the opportunity to be involved in this patient's care. Attending Physician Statement  I have reviewed and edited the discharge summary of  Juancarlos Castanon AS NEEDED  ,   including pertinent history and exam findings. I have personally seen the patient and participated in discharge planning and evaluation . I have reviewed the key elements of all parts of the discharge summary . I agree with the information and plans as documented by the resident. Time spent on discharge planning ;          [] less than 30 minutes . [x]   more  than 30 minutes .     Electronically signed by Aviva Cho MD  .

## 2021-11-08 NOTE — PROGRESS NOTES
[address] [employer]    PROGRESS NOTE             11/8/2021    3:33 PM    Name:   Eileen Martin  MRN:     [id number]     Acct:      [de-identified]   Room:   2079/2079-01   Day:  2  Admit Date:  11/6/2021  7:55 AM    PCP:  Gil Ayers PA-C  Code Status:  Full Code    Subjective:     C/C:   Chief Complaint   Patient presents with    Flank Pain     Rt flank pain    Dysuria     Interval History Status: not changed. Patient complaining of pain overnight. Received multiple doses of Dilaudid. Was switched to p.o. Percocet which reportedly did not help with her pain. Marija Easton as she had been seeing pain management for a while for low back pain and was receiving Norco from them. Added lidocaine patch as well. This afternoon, patient stated she would like to leave AMA as her pain is not well controlled and feels like she is being treated like an \"addict. \" After a long discussion with her which explained upcoming workup in order to find a source for her pain and to treat her kidney injury and UTI, she decided to stay. She stresses that her pain is not controlled but would like to figure out what is going on and is tearful throughout the discussion. Brief History:     The patient is a 43 y.o.  Non- / non  female with past medical history of polycystic kidney disease with CKD stage III, depression and anxiety, 50-pack-year smoking history, and hypertension who presented to the emergency room with Flank Pain (Rt flank pain) and Dysuria.        In the emergency room, urinalysis showed trace hemoglobin, 1+ protein, trace leukocyte esterase, microscopic urinalysis showed few bacteria, 5-10 white blood cells, 2-5 RBC. Hemoglobin 11, no leukocytosis. Creatinine was 4.36, which is elevated from her baseline of 2.9-3.  She was given 1 g of Rocephin and 1 L bolus of normal saline.      Abdominal CT scan showed polycystic kidney disease, 6 mm nonobstructing right intrarenal calculus, no evidence of obstructive uropathy, and no acute findings in the abdomen or pelvis. The intrarenal calculus is larger in size as compared to CT on 6/19/2021 where it was found to be 5 mm in size.     She was admitted for the management of SILVANO on CKD, UTI. Review of Systems:     Review of Systems   Constitutional: Positive for appetite change. Negative for chills and fever. Respiratory: Negative for shortness of breath. Cardiovascular: Negative for chest pain and leg swelling. Genitourinary: Positive for flank pain. All other systems reviewed and are negative. Medications: Allergies:     Allergies   Allergen Reactions    Bee Pollen     Dust Mite Extract      Chest congestion , sneezing    Pcn [Penicillins] Rash    Pollen Extract      Chest congesting , sneezing        Current Meds:   Scheduled Meds:    lidocaine  1 patch TransDERmal Daily    amitriptyline  25 mg Oral Nightly    amLODIPine  10 mg Oral Daily    DULoxetine  60 mg Oral Daily    [Held by provider] metoprolol succinate  50 mg Oral Daily    sodium chloride flush  5-40 mL IntraVENous 2 times per day    heparin (porcine)  5,000 Units SubCUTAneous 3 times per day    cefTRIAXone (ROCEPHIN) IV  1,000 mg IntraVENous Q24H    influenza virus vaccine  0.5 mL IntraMUSCular Prior to discharge     Continuous Infusions:    sodium chloride 125 mL/hr at 11/08/21 0344    sodium chloride       PRN Meds: HYDROcodone 5 mg - acetaminophen, tiZANidine, nicotine polacrilex, sodium chloride flush, sodium chloride, ondansetron **OR** ondansetron, polyethylene glycol, acetaminophen **OR** [DISCONTINUED] acetaminophen, hydrOXYzine, nicotine    Data:     Past Medical History:   has a past medical history of Anxiety, Asthma, Chronic headaches, CKD (chronic kidney disease) stage 3, GFR 30-59 ml/min ([facility]), Degenerative disc disease, cervical, Depression, Dysmenorrhea, Eczema, Hypertension, Hypocalcemia, Kidney stone, Menorrhagia, Neck pain, bilateral, Pelvic pain in female, Polycystic kidney, Smoker, and Tension vascular headache. Social History:   reports that she has been smoking cigarettes. She has a 25.00 pack-year smoking history. She has never used smokeless tobacco. She reports that she does not drink alcohol and does not use drugs. Family History:   Family History   Problem Relation Age of Onset    High Blood Pressure Mother     Asthma Sister    Miguel Migraines Sister     High Blood Pressure Father     Other Brother         bad knees, with recent total knee replacement        Vitals:  BP (!) 164/77   Pulse 78   Temp 99.3 °F (37.4 °C)   Resp 18   Ht 5' 3\" (1.6 m)   Wt 125 lb (56.7 kg)   LMP 10/23/2021   SpO2 99%   BMI 22.14 kg/m²   Temp (24hrs), Av.9 °F (37.2 °C), Min:98.6 °F (37 °C), Max:99.3 °F (37.4 °C)    No results for input(s): POCGLU in the last 72 hours. I/O(24Hr):     Intake/Output Summary (Last 24 hours) at 2021 1533  Last data filed at 2021 1614  Gross per 24 hour   Intake --   Output 800 ml   Net -800 ml       Labs:    CBC with Differential:    Lab Results   Component Value Date    WBC 7.0 2021    RBC 3.18 2021    RBC 3.71 2012    HGB 10.4 2021    HCT 31.5 2021     2021     2012    MCV 99.2 2021    MCH 32.7 2021    MCHC 33.0 2021    RDW 13.3 2021    LYMPHOPCT 27 2021    LYMPHOPCT 21.3 2020    MONOPCT 7 2021    MONOPCT 5.1 2020    EOSPCT 2.1 2020    BASOPCT 1 2021    BASOPCT 1.4 2020    MONOSABS 0.50 2021    MONOSABS 0.4 2020    LYMPHSABS 1.90 2021    LYMPHSABS 1.6 2020    EOSABS 0.20 2021    EOSABS 0.2 2020    BASOSABS 0.10 2021    DIFFTYPE NOT REPORTED 2021     CMP:    Lab Results   Component Value Date     2021    K 4.6 2021     2021    CO2 19 2021    BUN 32 11/08/2021    CREATININE 3.66 11/08/2021    GFRAA 16 11/08/2021    LABGLOM 14 11/08/2021    GLUCOSE 83 11/08/2021    PROT 7.0 11/06/2021    LABALBU 4.1 11/06/2021    CALCIUM 7.9 11/08/2021    BILITOT 0.22 11/06/2021    ALKPHOS 93 11/06/2021    AST 11 11/06/2021    ALT 8 11/06/2021     BMP:    Lab Results   Component Value Date     11/08/2021    K 4.6 11/08/2021     11/08/2021    CO2 19 11/08/2021    BUN 32 11/08/2021    LABALBU 4.1 11/06/2021    CREATININE 3.66 11/08/2021    CALCIUM 7.9 11/08/2021    GFRAA 16 11/08/2021    LABGLOM 14 11/08/2021    GLUCOSE 83 11/08/2021       Lab Results   Component Value Date/Time    SPECIAL NOT REPORTED 11/06/2021 08:02 AM     Lab Results   Component Value Date/Time    CULTURE NO GROWTH 11/06/2021 08:02 AM         Radiology:    CT ABDOMEN PELVIS WO CONTRAST Additional Contrast? None    Result Date: 11/7/2021  EXAMINATION: CT OF THE ABDOMEN AND PELVIS WITHOUT CONTRAST 11/6/2021 8:27 am TECHNIQUE: CT of the abdomen and pelvis was performed without the administration of intravenous contrast. Multiplanar reformatted images are provided for review. Dose modulation, iterative reconstruction, and/or weight based adjustment of the mA/kV was utilized to reduce the radiation dose to as low as reasonably achievable. COMPARISON: CT 06/19/2021. HISTORY: ORDERING SYSTEM PROVIDED HISTORY: right flank pain TECHNOLOGIST PROVIDED HISTORY: right flank pain Decision Support Exception - unselect if not a suspected or confirmed emergency medical condition->Emergency Medical Condition (MA) Is the patient pregnant?->No Reason for Exam: rt flank pain x 1 day, hx of kidney stones Acuity: Unknown Type of Exam: Unknown Relevant Medical/Surgical History: iud FINDINGS: Lower Chest: Visualized lung bases demonstrate no acute abnormality.  Organs: Limited evaluation of the intra-abdominal organs given the lack of intravenous contrast.  Within this limitation, the liver, spleen, pancreas, and adrenal glands demonstrate no acute abnormality. Again seen are several cysts in the liver. Polycystic kidney disease again seen. Appearance of the renal parenchyma is overall similar to the prior CT. Similar appearance of a 6 mm calculus in the right kidney without evidence of hydronephrosis. GI/Bowel: No bowel obstruction is seen. The appendix is not definitively identified. No obvious secondary signs are seen to suggest acute appendicitis. Pelvis: The urinary bladder is unremarkable. An IUD is noted in the uterus. Peritoneum/Retroperitoneum: No lymphadenopathy. No intraperitoneal free air or significant free fluid. Bones/Soft Tissues: No acute bony abnormality is seen. Polycystic kidney disease. 6 mm nonobstructing right intrarenal calculus. No evidence of obstructive uropathy. No acute findings are seen in the abdomen or pelvis. XR LUMBAR SPINE (2-3 VIEWS)    Result Date: 11/7/2021  EXAMINATION: THREE XRAY VIEWS OF THE LUMBAR SPINE 11/7/2021 3:41 pm COMPARISON: CT abdomen and pelvis dated 11/06/2021. HISTORY: ORDERING SYSTEM PROVIDED HISTORY: flank pain TECHNOLOGIST PROVIDED HISTORY: flank pain Reason for Exam: Flank pain leading into the lower back Acuity: Acute Type of Exam: Initial FINDINGS: Lumbar vertebral body heights and alignment are within normal limits. There is mild disc space narrowing at L5-S1. Other disc spaces are relatively well preserved. There are scattered vascular calcifications. A 0.8 cm calcific density overlying the expected location of the lower right kidney. 1.  No acute compression fracture or malalignment in the lumbar spine. 2.  Minimal degenerative changes. 3.  Right renal calculus. Physical Examination:        Physical Exam  Constitutional:       General: She is not in acute distress. Appearance: Normal appearance. She is not ill-appearing, toxic-appearing or diaphoretic. Eyes:      General: No scleral icterus.      Extraocular Movements: Extraocular

## 2021-11-08 NOTE — CARE COORDINATION
ONGOING DISCHARGE PLAN:    Patient is alert and oriented x4. Spoke with patient regarding discharge plan and patient confirms that plan is still to go home with no needs. Remains on Iv Rocephin, Iv fluids    Per urology notes -Recommend following up on the urine culture and discharging home with 7 to 10 days of oral antibiotics. CT scans reviewed and does not show evidence of cyst rupture or evidence of obstruction. Therefore there is no acute intervention needed at this time. The patient may be discharged home at the discretion of the primary team and follow-up as an outpatient. Patient goes outside to smoke    LAbs Cre 3.66 , BUN 32,     anticipate discharge today or tomorrow    Will continue to follow for additional discharge needs.     Electronically signed by Tanner Martino RN on 11/8/2021 at 11:45 AM

## 2021-11-08 NOTE — PLAN OF CARE
Problem: Safety:  Goal: Free from accidental physical injury  Description: Free from accidental physical injury  11/7/2021 2346 by Hair Garibay RN  Outcome: Ongoing  11/7/2021 2345 by Hair Garibay RN  Outcome: Ongoing  11/7/2021 1716 by Sandra Bach RN  Outcome: Ongoing  Patient up independently. Knows limitations. Remains free of injury. Problem: Pain:  Goal: Patient's pain/discomfort is manageable  Description: Patient's pain/discomfort is manageable  11/7/2021 2346 by Hair Garibay RN  Outcome: Ongoing  11/7/2021 1716 by Sandra Bach RN  Outcome: Ongoing  Pain management ongoing with IV and PO pain medications.

## 2021-11-09 NOTE — PROGRESS NOTES
Discharge instructions reviewed and given to patient. Patient discharged ambulatory with belongings and discharge instructions.

## 2021-12-07 PROBLEM — N39.0 UTI (URINARY TRACT INFECTION): Status: RESOLVED | Noted: 2021-11-06 | Resolved: 2021-12-07

## 2022-03-04 ENCOUNTER — HOSPITAL ENCOUNTER (INPATIENT)
Age: 42
LOS: 5 days | Discharge: HOME | DRG: 199 | End: 2022-03-09
Attending: EMERGENCY MEDICINE | Admitting: INTERNAL MEDICINE
Payer: MEDICARE

## 2022-03-04 ENCOUNTER — APPOINTMENT (OUTPATIENT)
Dept: CT IMAGING | Age: 42
DRG: 199 | End: 2022-03-04
Payer: MEDICARE

## 2022-03-04 ENCOUNTER — APPOINTMENT (OUTPATIENT)
Dept: GENERAL RADIOLOGY | Age: 42
DRG: 199 | End: 2022-03-04
Payer: MEDICARE

## 2022-03-04 DIAGNOSIS — I10 PRIMARY HYPERTENSION: Primary | ICD-10-CM

## 2022-03-04 DIAGNOSIS — I20.8 OTHER FORMS OF ANGINA PECTORIS (HCC): ICD-10-CM

## 2022-03-04 PROBLEM — I16.1 HYPERTENSIVE EMERGENCY: Status: ACTIVE | Noted: 2022-03-04

## 2022-03-04 LAB
ABSOLUTE EOS #: 0.1 K/UL (ref 0–0.4)
ABSOLUTE LYMPH #: 1.2 K/UL (ref 1–4.8)
ABSOLUTE MONO #: 0.5 K/UL (ref 0.1–1.3)
ALBUMIN SERPL-MCNC: 4.9 G/DL (ref 3.5–5.2)
ALP BLD-CCNC: 131 U/L (ref 35–104)
ALT SERPL-CCNC: 9 U/L (ref 5–33)
ANION GAP SERPL CALCULATED.3IONS-SCNC: 18 MMOL/L (ref 9–17)
AST SERPL-CCNC: 16 U/L
BACTERIA: ABNORMAL
BASOPHILS # BLD: 1 % (ref 0–2)
BASOPHILS ABSOLUTE: 0.1 K/UL (ref 0–0.2)
BILIRUB SERPL-MCNC: <0.15 MG/DL (ref 0.3–1.2)
BILIRUBIN DIRECT: <0.08 MG/DL
BILIRUBIN URINE: NEGATIVE
BILIRUBIN, INDIRECT: ABNORMAL MG/DL (ref 0–1)
BUN BLDV-MCNC: 43 MG/DL (ref 6–20)
CALCIUM SERPL-MCNC: 8.2 MG/DL (ref 8.6–10.4)
CASTS UA: ABNORMAL /LPF
CHLORIDE BLD-SCNC: 102 MMOL/L (ref 98–107)
CO2: 21 MMOL/L (ref 20–31)
COLOR: YELLOW
CREAT SERPL-MCNC: 4.52 MG/DL (ref 0.5–0.9)
EOSINOPHILS RELATIVE PERCENT: 2 % (ref 0–4)
EPITHELIAL CELLS UA: ABNORMAL /HPF
GFR AFRICAN AMERICAN: 13 ML/MIN
GFR NON-AFRICAN AMERICAN: 11 ML/MIN
GFR SERPL CREATININE-BSD FRML MDRD: ABNORMAL ML/MIN/{1.73_M2}
GLUCOSE BLD-MCNC: 91 MG/DL (ref 70–99)
GLUCOSE URINE: NEGATIVE
HCT VFR BLD CALC: 34.2 % (ref 36–46)
HEMOGLOBIN: 11.4 G/DL (ref 12–16)
KETONES, URINE: NEGATIVE
LEUKOCYTE ESTERASE, URINE: ABNORMAL
LIPASE: 99 U/L (ref 13–60)
LYMPHOCYTES # BLD: 19 % (ref 24–44)
MCH RBC QN AUTO: 32.4 PG (ref 26–34)
MCHC RBC AUTO-ENTMCNC: 33.4 G/DL (ref 31–37)
MCV RBC AUTO: 97.2 FL (ref 80–100)
MONOCYTES # BLD: 8 % (ref 1–7)
NITRITE, URINE: NEGATIVE
PDW BLD-RTO: 14.1 % (ref 11.5–14.9)
PH UA: 6 (ref 5–8)
PLATELET # BLD: 295 K/UL (ref 150–450)
PMV BLD AUTO: 8.6 FL (ref 6–12)
POTASSIUM SERPL-SCNC: 4.2 MMOL/L (ref 3.7–5.3)
PROTEIN UA: ABNORMAL
RBC # BLD: 3.52 M/UL (ref 4–5.2)
RBC UA: ABNORMAL /HPF
SEG NEUTROPHILS: 70 % (ref 36–66)
SEGMENTED NEUTROPHILS ABSOLUTE COUNT: 4.5 K/UL (ref 1.3–9.1)
SODIUM BLD-SCNC: 141 MMOL/L (ref 135–144)
SPECIFIC GRAVITY UA: 1.01 (ref 1–1.03)
TOTAL PROTEIN: 8.2 G/DL (ref 6.4–8.3)
TROPONIN, HIGH SENSITIVITY: 24 NG/L (ref 0–14)
TROPONIN, HIGH SENSITIVITY: 27 NG/L (ref 0–14)
TURBIDITY: CLEAR
URINE HGB: ABNORMAL
UROBILINOGEN, URINE: NORMAL
WBC # BLD: 6.4 K/UL (ref 3.5–11)
WBC UA: ABNORMAL /HPF

## 2022-03-04 PROCEDURE — 2060000000 HC ICU INTERMEDIATE R&B

## 2022-03-04 PROCEDURE — 2580000003 HC RX 258: Performed by: INTERNAL MEDICINE

## 2022-03-04 PROCEDURE — 80076 HEPATIC FUNCTION PANEL: CPT

## 2022-03-04 PROCEDURE — 83690 ASSAY OF LIPASE: CPT

## 2022-03-04 PROCEDURE — 2500000003 HC RX 250 WO HCPCS: Performed by: STUDENT IN AN ORGANIZED HEALTH CARE EDUCATION/TRAINING PROGRAM

## 2022-03-04 PROCEDURE — 81001 URINALYSIS AUTO W/SCOPE: CPT

## 2022-03-04 PROCEDURE — 84484 ASSAY OF TROPONIN QUANT: CPT

## 2022-03-04 PROCEDURE — 85025 COMPLETE CBC W/AUTO DIFF WBC: CPT

## 2022-03-04 PROCEDURE — 87086 URINE CULTURE/COLONY COUNT: CPT

## 2022-03-04 PROCEDURE — 93005 ELECTROCARDIOGRAM TRACING: CPT | Performed by: STUDENT IN AN ORGANIZED HEALTH CARE EDUCATION/TRAINING PROGRAM

## 2022-03-04 PROCEDURE — 71045 X-RAY EXAM CHEST 1 VIEW: CPT

## 2022-03-04 PROCEDURE — 74176 CT ABD & PELVIS W/O CONTRAST: CPT

## 2022-03-04 PROCEDURE — 99285 EMERGENCY DEPT VISIT HI MDM: CPT

## 2022-03-04 PROCEDURE — 36415 COLL VENOUS BLD VENIPUNCTURE: CPT

## 2022-03-04 PROCEDURE — 2580000003 HC RX 258: Performed by: STUDENT IN AN ORGANIZED HEALTH CARE EDUCATION/TRAINING PROGRAM

## 2022-03-04 PROCEDURE — 6360000002 HC RX W HCPCS: Performed by: INTERNAL MEDICINE

## 2022-03-04 PROCEDURE — 6370000000 HC RX 637 (ALT 250 FOR IP): Performed by: STUDENT IN AN ORGANIZED HEALTH CARE EDUCATION/TRAINING PROGRAM

## 2022-03-04 PROCEDURE — 96374 THER/PROPH/DIAG INJ IV PUSH: CPT

## 2022-03-04 PROCEDURE — 80048 BASIC METABOLIC PNL TOTAL CA: CPT

## 2022-03-04 PROCEDURE — 6360000002 HC RX W HCPCS: Performed by: STUDENT IN AN ORGANIZED HEALTH CARE EDUCATION/TRAINING PROGRAM

## 2022-03-04 RX ORDER — ONDANSETRON 2 MG/ML
4 INJECTION INTRAMUSCULAR; INTRAVENOUS EVERY 6 HOURS PRN
Status: DISCONTINUED | OUTPATIENT
Start: 2022-03-04 | End: 2022-03-09 | Stop reason: HOSPADM

## 2022-03-04 RX ORDER — LABETALOL HYDROCHLORIDE 5 MG/ML
10 INJECTION, SOLUTION INTRAVENOUS EVERY 4 HOURS PRN
Status: DISCONTINUED | OUTPATIENT
Start: 2022-03-04 | End: 2022-03-08

## 2022-03-04 RX ORDER — MORPHINE SULFATE 4 MG/ML
4 INJECTION, SOLUTION INTRAMUSCULAR; INTRAVENOUS ONCE
Status: COMPLETED | OUTPATIENT
Start: 2022-03-04 | End: 2022-03-04

## 2022-03-04 RX ORDER — SODIUM CHLORIDE 9 MG/ML
INJECTION, SOLUTION INTRAVENOUS CONTINUOUS
Status: DISCONTINUED | OUTPATIENT
Start: 2022-03-04 | End: 2022-03-06

## 2022-03-04 RX ORDER — METOPROLOL TARTRATE 50 MG/1
50 TABLET, FILM COATED ORAL ONCE
Status: COMPLETED | OUTPATIENT
Start: 2022-03-04 | End: 2022-03-04

## 2022-03-04 RX ORDER — 0.9 % SODIUM CHLORIDE 0.9 %
500 INTRAVENOUS SOLUTION INTRAVENOUS ONCE
Status: COMPLETED | OUTPATIENT
Start: 2022-03-04 | End: 2022-03-04

## 2022-03-04 RX ORDER — AMLODIPINE BESYLATE 5 MG/1
5 TABLET ORAL DAILY
Status: DISCONTINUED | OUTPATIENT
Start: 2022-03-04 | End: 2022-03-05 | Stop reason: SDUPTHER

## 2022-03-04 RX ORDER — SODIUM CHLORIDE 0.9 % (FLUSH) 0.9 %
5-40 SYRINGE (ML) INJECTION PRN
Status: DISCONTINUED | OUTPATIENT
Start: 2022-03-04 | End: 2022-03-09 | Stop reason: HOSPADM

## 2022-03-04 RX ORDER — HEPARIN SODIUM 5000 [USP'U]/ML
5000 INJECTION, SOLUTION INTRAVENOUS; SUBCUTANEOUS EVERY 8 HOURS SCHEDULED
Status: DISCONTINUED | OUTPATIENT
Start: 2022-03-04 | End: 2022-03-05

## 2022-03-04 RX ORDER — SODIUM CHLORIDE 9 MG/ML
25 INJECTION, SOLUTION INTRAVENOUS PRN
Status: DISCONTINUED | OUTPATIENT
Start: 2022-03-04 | End: 2022-03-09 | Stop reason: HOSPADM

## 2022-03-04 RX ORDER — ACETAMINOPHEN 325 MG/1
650 TABLET ORAL EVERY 4 HOURS PRN
Status: DISCONTINUED | OUTPATIENT
Start: 2022-03-04 | End: 2022-03-09 | Stop reason: HOSPADM

## 2022-03-04 RX ORDER — ONDANSETRON 4 MG/1
4 TABLET, ORALLY DISINTEGRATING ORAL EVERY 8 HOURS PRN
Status: DISCONTINUED | OUTPATIENT
Start: 2022-03-04 | End: 2022-03-09 | Stop reason: HOSPADM

## 2022-03-04 RX ORDER — SODIUM CHLORIDE 0.9 % (FLUSH) 0.9 %
5-40 SYRINGE (ML) INJECTION EVERY 12 HOURS SCHEDULED
Status: DISCONTINUED | OUTPATIENT
Start: 2022-03-04 | End: 2022-03-09 | Stop reason: HOSPADM

## 2022-03-04 RX ADMIN — SODIUM CHLORIDE: 9 INJECTION, SOLUTION INTRAVENOUS at 23:36

## 2022-03-04 RX ADMIN — SODIUM CHLORIDE, PRESERVATIVE FREE 10 ML: 5 INJECTION INTRAVENOUS at 23:54

## 2022-03-04 RX ADMIN — HEPARIN SODIUM 5000 UNITS: 5000 INJECTION INTRAVENOUS; SUBCUTANEOUS at 23:31

## 2022-03-04 RX ADMIN — SODIUM CHLORIDE 500 ML: 0.9 INJECTION, SOLUTION INTRAVENOUS at 23:13

## 2022-03-04 RX ADMIN — METOPROLOL TARTRATE 50 MG: 50 TABLET, FILM COATED ORAL at 21:40

## 2022-03-04 RX ADMIN — Medication 4 MG: at 21:40

## 2022-03-04 RX ADMIN — AMLODIPINE BESYLATE 5 MG: 5 TABLET ORAL at 21:39

## 2022-03-04 RX ADMIN — Medication 10 MG: at 23:55

## 2022-03-04 ASSESSMENT — PAIN SCALES - GENERAL
PAINLEVEL_OUTOF10: 8
PAINLEVEL_OUTOF10: 10

## 2022-03-04 ASSESSMENT — PAIN DESCRIPTION - LOCATION: LOCATION: CHEST;FLANK

## 2022-03-04 ASSESSMENT — PAIN DESCRIPTION - DESCRIPTORS: DESCRIPTORS: ACHING

## 2022-03-04 ASSESSMENT — HEART SCORE: ECG: 1

## 2022-03-04 ASSESSMENT — PAIN DESCRIPTION - FREQUENCY: FREQUENCY: CONTINUOUS

## 2022-03-04 ASSESSMENT — PAIN DESCRIPTION - ORIENTATION: ORIENTATION: RIGHT

## 2022-03-05 ENCOUNTER — APPOINTMENT (OUTPATIENT)
Dept: ULTRASOUND IMAGING | Age: 42
DRG: 199 | End: 2022-03-05
Payer: MEDICARE

## 2022-03-05 ENCOUNTER — APPOINTMENT (OUTPATIENT)
Dept: NUCLEAR MEDICINE | Age: 42
DRG: 199 | End: 2022-03-05
Payer: MEDICARE

## 2022-03-05 LAB
ANION GAP SERPL CALCULATED.3IONS-SCNC: 14 MMOL/L (ref 9–17)
ANION GAP SERPL CALCULATED.3IONS-SCNC: 16 MMOL/L (ref 9–17)
BUN BLDV-MCNC: 44 MG/DL (ref 6–20)
BUN BLDV-MCNC: 46 MG/DL (ref 6–20)
CALCIUM SERPL-MCNC: 7.5 MG/DL (ref 8.6–10.4)
CALCIUM SERPL-MCNC: 7.6 MG/DL (ref 8.6–10.4)
CHLORIDE BLD-SCNC: 106 MMOL/L (ref 98–107)
CHLORIDE BLD-SCNC: 109 MMOL/L (ref 98–107)
CO2: 18 MMOL/L (ref 20–31)
CO2: 20 MMOL/L (ref 20–31)
CREAT SERPL-MCNC: 4.45 MG/DL (ref 0.5–0.9)
CREAT SERPL-MCNC: 4.57 MG/DL (ref 0.5–0.9)
CULTURE: NORMAL
D-DIMER QUANTITATIVE: 0.66 MG/L FEU (ref 0–0.59)
EKG ATRIAL RATE: 97 BPM
EKG P AXIS: 78 DEGREES
EKG P-R INTERVAL: 130 MS
EKG Q-T INTERVAL: 406 MS
EKG QRS DURATION: 86 MS
EKG QTC CALCULATION (BAZETT): 515 MS
EKG R AXIS: -52 DEGREES
EKG T AXIS: 87 DEGREES
EKG VENTRICULAR RATE: 97 BPM
GFR AFRICAN AMERICAN: 13 ML/MIN
GFR AFRICAN AMERICAN: 13 ML/MIN
GFR NON-AFRICAN AMERICAN: 10 ML/MIN
GFR NON-AFRICAN AMERICAN: 11 ML/MIN
GFR SERPL CREATININE-BSD FRML MDRD: ABNORMAL ML/MIN/{1.73_M2}
GFR SERPL CREATININE-BSD FRML MDRD: ABNORMAL ML/MIN/{1.73_M2}
GLUCOSE BLD-MCNC: 92 MG/DL (ref 70–99)
GLUCOSE BLD-MCNC: 93 MG/DL (ref 70–99)
HCT VFR BLD CALC: 27.7 % (ref 36–46)
HCT VFR BLD CALC: 28.6 % (ref 36–46)
HEMOGLOBIN: 9.1 G/DL (ref 12–16)
HEMOGLOBIN: 9.5 G/DL (ref 12–16)
MCH RBC QN AUTO: 32.3 PG (ref 26–34)
MCH RBC QN AUTO: 32.6 PG (ref 26–34)
MCHC RBC AUTO-ENTMCNC: 32.9 G/DL (ref 31–37)
MCHC RBC AUTO-ENTMCNC: 33.1 G/DL (ref 31–37)
MCV RBC AUTO: 97.8 FL (ref 80–100)
MCV RBC AUTO: 99 FL (ref 80–100)
PDW BLD-RTO: 14.1 % (ref 11.5–14.9)
PDW BLD-RTO: 14.5 % (ref 11.5–14.9)
PLATELET # BLD: 254 K/UL (ref 150–450)
PLATELET # BLD: 276 K/UL (ref 150–450)
PMV BLD AUTO: 8.2 FL (ref 6–12)
PMV BLD AUTO: 8.2 FL (ref 6–12)
POTASSIUM SERPL-SCNC: 4.1 MMOL/L (ref 3.7–5.3)
POTASSIUM SERPL-SCNC: 4.1 MMOL/L (ref 3.7–5.3)
RBC # BLD: 2.8 M/UL (ref 4–5.2)
RBC # BLD: 2.93 M/UL (ref 4–5.2)
SODIUM BLD-SCNC: 141 MMOL/L (ref 135–144)
SODIUM BLD-SCNC: 142 MMOL/L (ref 135–144)
SPECIMEN DESCRIPTION: NORMAL
WBC # BLD: 5.5 K/UL (ref 3.5–11)
WBC # BLD: 6.1 K/UL (ref 3.5–11)

## 2022-03-05 PROCEDURE — 85379 FIBRIN DEGRADATION QUANT: CPT

## 2022-03-05 PROCEDURE — 93010 ELECTROCARDIOGRAM REPORT: CPT | Performed by: INTERNAL MEDICINE

## 2022-03-05 PROCEDURE — 99223 1ST HOSP IP/OBS HIGH 75: CPT | Performed by: INTERNAL MEDICINE

## 2022-03-05 PROCEDURE — 6360000002 HC RX W HCPCS: Performed by: INTERNAL MEDICINE

## 2022-03-05 PROCEDURE — 3430000000 HC RX DIAGNOSTIC RADIOPHARMACEUTICAL: Performed by: INTERNAL MEDICINE

## 2022-03-05 PROCEDURE — 78582 LUNG VENTILAT&PERFUS IMAGING: CPT

## 2022-03-05 PROCEDURE — 76705 ECHO EXAM OF ABDOMEN: CPT

## 2022-03-05 PROCEDURE — 6370000000 HC RX 637 (ALT 250 FOR IP): Performed by: STUDENT IN AN ORGANIZED HEALTH CARE EDUCATION/TRAINING PROGRAM

## 2022-03-05 PROCEDURE — 6370000000 HC RX 637 (ALT 250 FOR IP): Performed by: INTERNAL MEDICINE

## 2022-03-05 PROCEDURE — 36415 COLL VENOUS BLD VENIPUNCTURE: CPT

## 2022-03-05 PROCEDURE — A9538 TC99M PYROPHOSPHATE: HCPCS | Performed by: INTERNAL MEDICINE

## 2022-03-05 PROCEDURE — 85027 COMPLETE CBC AUTOMATED: CPT

## 2022-03-05 PROCEDURE — 2580000003 HC RX 258: Performed by: INTERNAL MEDICINE

## 2022-03-05 PROCEDURE — A9540 TC99M MAA: HCPCS | Performed by: INTERNAL MEDICINE

## 2022-03-05 PROCEDURE — 80048 BASIC METABOLIC PNL TOTAL CA: CPT

## 2022-03-05 PROCEDURE — 1200000000 HC SEMI PRIVATE

## 2022-03-05 RX ORDER — SODIUM CHLORIDE 0.9 % (FLUSH) 0.9 %
10 SYRINGE (ML) INJECTION PRN
Status: DISCONTINUED | OUTPATIENT
Start: 2022-03-05 | End: 2022-03-09 | Stop reason: HOSPADM

## 2022-03-05 RX ORDER — HYDROCODONE BITARTRATE AND ACETAMINOPHEN 5; 325 MG/1; MG/1
1 TABLET ORAL ONCE
Status: DISCONTINUED | OUTPATIENT
Start: 2022-03-05 | End: 2022-03-09 | Stop reason: HOSPADM

## 2022-03-05 RX ORDER — LIDOCAINE 4 G/G
1 PATCH TOPICAL DAILY
Status: DISCONTINUED | OUTPATIENT
Start: 2022-03-05 | End: 2022-03-09 | Stop reason: HOSPADM

## 2022-03-05 RX ORDER — AMITRIPTYLINE HYDROCHLORIDE 25 MG/1
25 TABLET, FILM COATED ORAL NIGHTLY
Status: DISCONTINUED | OUTPATIENT
Start: 2022-03-05 | End: 2022-03-09 | Stop reason: HOSPADM

## 2022-03-05 RX ORDER — METOPROLOL SUCCINATE 50 MG/1
50 TABLET, EXTENDED RELEASE ORAL DAILY
Status: DISCONTINUED | OUTPATIENT
Start: 2022-03-05 | End: 2022-03-09 | Stop reason: HOSPADM

## 2022-03-05 RX ORDER — HEPARIN SODIUM 5000 [USP'U]/ML
5000 INJECTION, SOLUTION INTRAVENOUS; SUBCUTANEOUS 2 TIMES DAILY
Status: DISCONTINUED | OUTPATIENT
Start: 2022-03-05 | End: 2022-03-09 | Stop reason: HOSPADM

## 2022-03-05 RX ORDER — AMLODIPINE BESYLATE 10 MG/1
10 TABLET ORAL DAILY
Status: DISCONTINUED | OUTPATIENT
Start: 2022-03-05 | End: 2022-03-09 | Stop reason: HOSPADM

## 2022-03-05 RX ADMIN — HYDROMORPHONE HYDROCHLORIDE 1 MG: 1 INJECTION, SOLUTION INTRAMUSCULAR; INTRAVENOUS; SUBCUTANEOUS at 06:05

## 2022-03-05 RX ADMIN — SODIUM CHLORIDE, PRESERVATIVE FREE 10 ML: 5 INJECTION INTRAVENOUS at 08:52

## 2022-03-05 RX ADMIN — METOPROLOL SUCCINATE 50 MG: 50 TABLET, EXTENDED RELEASE ORAL at 14:52

## 2022-03-05 RX ADMIN — Medication 45.7 MILLICURIE: at 08:27

## 2022-03-05 RX ADMIN — ENOXAPARIN SODIUM 60 MG: 100 INJECTION SUBCUTANEOUS at 02:51

## 2022-03-05 RX ADMIN — Medication 6.4 MILLICURIE: at 08:52

## 2022-03-05 RX ADMIN — HYDROMORPHONE HYDROCHLORIDE 1 MG: 1 INJECTION, SOLUTION INTRAMUSCULAR; INTRAVENOUS; SUBCUTANEOUS at 01:57

## 2022-03-05 RX ADMIN — AMLODIPINE BESYLATE 5 MG: 5 TABLET ORAL at 09:11

## 2022-03-05 RX ADMIN — HYDROMORPHONE HYDROCHLORIDE 1 MG: 1 INJECTION, SOLUTION INTRAMUSCULAR; INTRAVENOUS; SUBCUTANEOUS at 23:45

## 2022-03-05 RX ADMIN — ENOXAPARIN SODIUM 60 MG: 100 INJECTION SUBCUTANEOUS at 09:11

## 2022-03-05 RX ADMIN — HEPARIN SODIUM 5000 UNITS: 5000 INJECTION INTRAVENOUS; SUBCUTANEOUS at 19:36

## 2022-03-05 RX ADMIN — AMITRIPTYLINE HYDROCHLORIDE 25 MG: 25 TABLET, FILM COATED ORAL at 20:51

## 2022-03-05 RX ADMIN — DULOXETINE 90 MG: 60 CAPSULE, DELAYED RELEASE ORAL at 14:52

## 2022-03-05 RX ADMIN — HYDROMORPHONE HYDROCHLORIDE 1 MG: 1 INJECTION, SOLUTION INTRAMUSCULAR; INTRAVENOUS; SUBCUTANEOUS at 14:53

## 2022-03-05 RX ADMIN — HYDROMORPHONE HYDROCHLORIDE 1 MG: 1 INJECTION, SOLUTION INTRAMUSCULAR; INTRAVENOUS; SUBCUTANEOUS at 10:06

## 2022-03-05 RX ADMIN — HYDROMORPHONE HYDROCHLORIDE 1 MG: 1 INJECTION, SOLUTION INTRAMUSCULAR; INTRAVENOUS; SUBCUTANEOUS at 19:36

## 2022-03-05 ASSESSMENT — PAIN DESCRIPTION - ORIENTATION
ORIENTATION: MID
ORIENTATION: MID

## 2022-03-05 ASSESSMENT — PAIN DESCRIPTION - LOCATION
LOCATION: CHEST
LOCATION: CHEST

## 2022-03-05 ASSESSMENT — PAIN SCALES - GENERAL
PAINLEVEL_OUTOF10: 8
PAINLEVEL_OUTOF10: 8
PAINLEVEL_OUTOF10: 7
PAINLEVEL_OUTOF10: 8
PAINLEVEL_OUTOF10: 5
PAINLEVEL_OUTOF10: 5
PAINLEVEL_OUTOF10: 7
PAINLEVEL_OUTOF10: 4

## 2022-03-05 ASSESSMENT — ENCOUNTER SYMPTOMS
SINUS PAIN: 0
NAUSEA: 0
DIARRHEA: 0
EYE PAIN: 0
SORE THROAT: 0
SHORTNESS OF BREATH: 0
VOMITING: 0
ABDOMINAL PAIN: 0
COUGH: 0
BACK PAIN: 0

## 2022-03-05 ASSESSMENT — PAIN DESCRIPTION - FREQUENCY
FREQUENCY: CONTINUOUS
FREQUENCY: CONTINUOUS

## 2022-03-05 ASSESSMENT — PAIN DESCRIPTION - ONSET
ONSET: ON-GOING
ONSET: ON-GOING

## 2022-03-05 ASSESSMENT — PAIN DESCRIPTION - DESCRIPTORS
DESCRIPTORS: PRESSURE;TIGHTNESS
DESCRIPTORS: PRESSURE;TIGHTNESS

## 2022-03-05 ASSESSMENT — PAIN DESCRIPTION - PROGRESSION
CLINICAL_PROGRESSION: NOT CHANGED
CLINICAL_PROGRESSION: NOT CHANGED

## 2022-03-05 ASSESSMENT — PAIN DESCRIPTION - PAIN TYPE
TYPE: ACUTE PAIN
TYPE: ACUTE PAIN

## 2022-03-05 NOTE — ED PROVIDER NOTES
EMERGENCY DEPARTMENT ENCOUNTER   ATTENDING ATTESTATION     Pt Name: Ulices Nath  MRN: [id number]  [place] 1980  Date of evaluation: 3/4/22       Ulices Nath is a 35 y.o. female who presents with Chest Pain and Flank Pain      MDM:   Chest pressure and right flank pain 2 days  Also right side flank pain with history of kidney stones, UTI in past, polycystic kidney disease  Suspect kidney stone vs pyelonephritis vs acs  Low probability of aortic dissection, but is a consideration  She has uncontrolled htn, smoker  Cannot do cta due to GFR < 20, polycystic kidney disease  Will check noncontrast studies at this time and reevaluate  Checking labs      Reviewed imaging and labs  I do not suspect aortic dissection  Pain doesn't sound like dissection  Possible ACS  Admitting her for SILVANO and cp/acs  BP improves in ED  Will admit to medicine for further eval    Vitals:   Vitals:    03/04/22 2116 03/04/22 2250   BP: (!) 212/107 (!) 172/82   Pulse: 97 75   Resp: 16 15   Temp: 98.5 °F (36.9 °C)    SpO2: 100% 100%   Weight: 125 lb (56.7 kg)    Height: 5' 6\" (1.676 m)          I personally saw and examined the patient. I have reviewed and agree with the resident's findings, including all diagnostic interpretations and treatment plan as written. I was present for the key portions of any procedures performed and the inclusive time noted for any critical care statement. The care is provided during an unprecedented national emergency due to the novel coronavirus, COVID 19.   Sherman Brenner MD  Attending Emergency Physician            Sherman Brenner MD  03/04/22 8441

## 2022-03-05 NOTE — CARE COORDINATION
CASE MANAGEMENT NOTE:    Admission Date:  3/4/2022 Conception Folds is a 35 y.o.  female    Admitted for : Primary hypertension [I10]  Hypertensive emergency [I16.1]  Other forms of angina pectoris ([address] [I20.8]    Met with:  Patient    PCP:  Ilir Abbasi                                Insurance:  [employer]AGE      Is patient alert and oriented at time of discussion:  Yes    Current Residence/ Living Arrangements:  independently at home             Current Services PTA:  No    Does patient go to outpatient dialysis: No  If yes, location and chair time: NA    Is patient agreeable to VNS: No    Freedom of choice provided:  Yes    List of [address] provided: NA    VNS chosen:  NA    DME:  none    Home Oxygen: No    Nebulizer: No    CPAP/BIPAP: No    Supplier: N/A    Potential Assistance Needed: Will follow     SNF needed: No    Freedom of choice and list provided: NA    Pharmacy:  [facility]        Does Patient want to use MEDS to BEDS? No    Is patient currently receiving oral anticoagulation therapy? No    Is the Patient an [facility] with Readmission Risk Score greater than 14%? No  If yes, pt needs a follow up appointment made within 7 days. Family Members/Caregivers that pt would like involved in their care:    Yes    If yes, list name here: Mother Carolina Cantu    Transportation Provider:  Patient             Discharge Plan:  3/5/2022 [employer]; From 2 story home with livable first with children- stated independent and drives; DME- none; Declines VNS//LATANYA                 Electronically signed by:  Maron Buerger, RN on 3/5/2022 at 11:31 AM

## 2022-03-05 NOTE — H&P
[facility] Internal Medicine  Anu Gilbert MD; David Lamar MD; Zackary Blevins MD; MD Baron Mayra Rasmussen MD; MD NEVAEH Alexis [facility] Internal Medicine   [facility]    HISTORY AND PHYSICAL EXAMINATION            Date:   3/5/2022  Patient name:  Janet Garcia  Date of admission:  3/4/2022  9:15 PM  MRN:   [id number]  Account:  [de-identified]  YOB: 1980  PCP:    Keyona Liriano PA-C  Room:   73[facility]3998-53  Code Status:    Full Code    Chief Complaint:     Chief Complaint   Patient presents with    Chest Pain    Flank Pain       History Obtained From:     patient    History of Present Illness:     Janet Garcia is a 35 y.o. Non- / non  female who presents with Chest Pain and Flank Pain   and is admitted to the hospital for the management of Hypertensive emergency.   77-year-old lady with history of polycystic kidney disease chronic renal failure stage III not on dialysis chronic anemia secondary to renal disease uncontrolled hypertension noncompliant with medication chronic back pain takes NSAIDs every day 200 mg three times a day  COPD active smoker not in exacerbation  Admitted with multiple complaints including epigastric sternal chest pain worse with palpation not exacerbated by deep breathing also had right upper quadrant pain no radiation associated with nausea but no vomiting no fever or chills        Past Medical History:     Past Medical History:   Diagnosis Date    Anxiety     Asthma     Chronic headaches 07/10/2013    CKD (chronic kidney disease) stage 3, GFR 30-59 ml/min ([facility])     Degenerative disc disease, cervical     Depression     Dysmenorrhea 09/30/2014    Eczema 11/08/2012    Hypertension     Hypocalcemia 05/28/2014    Kidney stone     Menorrhagia 09/30/2014    Neck pain, bilateral 05/28/2014    Pelvic pain in female 09/30/2014    Polycystic kidney     Smoker 10/02/2011    Tension vascular headache 01/20/2014        Past Surgical History:     Past Surgical History:   Procedure Laterality Date    BREAST ENHANCEMENT SURGERY      BREAST LUMPECTOMY      left breast    CYSTO/URETERO/PYELOSCOPY, CALCULUS TX Right 6/12/2020    HOLMIUM - CYSTO, RIGHT RETROGRADE PYELOGRAM, RIGHT URETEROSCOPY, LASER LITHO ON STAND BY, RIGHT STENT PLACEMENT performed by Bonnie Villanueva MD at [address]  06/12/2020    DILATION AND CURETTAGE OF UTERUS      x2    LITHOTRIPSY Right 7/23/2020    ESWL EXTRACORPOREAL SHOCK WAVE LITHOTRIPSY performed by Bonnie Villanueva MD at [address]  07/01/2013    nerve block(right vervical C3/TON/C4/C5    NERVE BLOCK  07/08/2013    nerve block(right vervical C3/TON/C4/C5    OTHER SURGICAL HISTORY  03/10/2014     botox inj     TOE SURGERY      removal of ingrown toe nail-2nd toe on left foot     URETEROSCOPY Right 06/12/2020    stent placement        Medications Prior to Admission:     Prior to Admission medications    Medication Sig Start Date End Date Taking? Authorizing Provider   metoprolol succinate (TOPROL XL) 50 MG extended release tablet Take 50 mg by mouth daily   Yes Historical Provider, MD   hydrOXYzine (ATARAX) 50 MG tablet Take 50 mg by mouth nightly   Yes Historical Provider, MD   amLODIPine (NORVASC) 5 MG tablet Take 5 mg by mouth daily   Yes Historical Provider, MD   DULoxetine (CYMBALTA) 60 MG extended release capsule take 1 capsule by mouth once daily  Patient taking differently: 90 mg  11/10/17  Yes Santos Franco MD   amitriptyline (ELAVIL) 25 MG tablet Take 1 tablet by mouth nightly 11/8/21   Ora Wei MD   lidocaine 4 % external patch Place 1 patch onto the skin daily 11/9/21   Ora Wei MD        Allergies:     Bee pollen, Codeine, Dust mite extract, Pcn [penicillins], and Pollen extract    Social History:     Tobacco:    reports that she has been smoking cigarettes.  She has a 25.00 pack-year smoking history. She has never used smokeless tobacco.  Alcohol:      reports no history of alcohol use. Drug Use:  reports no history of drug use. Family History:     Family History   Problem Relation Age of Onset    High Blood Pressure Mother     Asthma Sister    [employer] Sister     High Blood Pressure Father     Other Brother         bad knees, with recent total knee replacement        Review of Systems:     Positive and Negative as described in HPI. CONSTITUTIONAL:  negative for fevers, chills, sweats, fatigue, weight loss  HEENT:  negative for vision, hearing changes, runny nose, throat pain  RESPIRATORY:  negative for shortness of breath, cough, congestion, wheezing  CARDIOVASCULAR: Positive for central chest pain worse with palpation and movements no exacerbation with deep breathing palpitations  GASTROINTESTINAL: Nausea no vomiting right upper quadrant pain  GENITOURINARY:  negative for difficulty of urination, burning with urination, frequency   INTEGUMENT:  negative for rash, skin lesions, easy bruising   HEMATOLOGIC/LYMPHATIC:  negative for swelling/edema   ALLERGIC/IMMUNOLOGIC:  negative for urticaria , itching  ENDOCRINE:  negative increase in drinking, increase in urination, hot or cold intolerance  MUSCULOSKELETAL: Chronic back pain takes NSAIDs NEUROLOGICAL:  negative for headaches, dizziness, lightheadedness, numbness, pain, tingling extremities  BEHAVIOR/PSYCH:  negative for depression, anxiety    Physical Exam:   /73   Pulse 66   Temp 97.5 °F (36.4 °C) (Oral)   Resp 18   Ht 5' 6\" (1.676 m)   Wt 121 lb 7.6 oz (55.1 kg)   LMP 2022   SpO2 98%   BMI 19.61 kg/m²   Temp (24hrs), Av °F (36.7 °C), Min:97.5 °F (36.4 °C), Max:98.5 °F (36.9 °C)    No results for input(s): POCGLU in the last 72 hours.   No intake or output data in the 24 hours ending 22 1032  Patient's bilateral breast examined in presence of nurse Vanessa Larios no inflammation no signs of leak from the implants noted tenderness ten started him noted reproducible  General Appearance: alert, well appearing, and in no acute distress  Mental status: oriented to person, place, and time  Head: normocephalic, atraumatic  Eye: no icterus, redness, pupils equal and reactive, extraocular eye movements intact, conjunctiva clear  Ear: normal external ear, no discharge, hearing intact  Nose: no drainage noted  Mouth: mucous membranes moist  Neck: supple, no carotid bruits, thyroid not palpable  Lungs: Bilateral equal air entry, clear to ausculation, no wheezing, rales or rhonchi, normal effort  Cardiovascular: normal rate, regular rhythm, no murmur, gallop, rub  Abdomen:  Moderate to severe tenderness right upper quadrant without guarding rigidity no rebound tenderness  Neurologic: There are no new focal motor or sensory deficits, normal muscle tone and bulk, no abnormal sensation, normal speech, cranial nerves II through XII grossly intact  Skin: No gross lesions, rashes, bruising or bleeding on exposed skin area  Extremities: peripheral pulses palpable, no pedal edema or calf pain with palpation  Psych: normal affect    Investigations:      Laboratory Testing:  Recent Results (from the past 24 hour(s))   EKG 12 Lead    Collection Time: 03/04/22  9:25 PM   Result Value Ref Range    Ventricular Rate 97 BPM    Atrial Rate 97 BPM    P-R Interval 130 ms    QRS Duration 86 ms    Q-T Interval 406 ms    QTc Calculation (Bazett) 515 ms    P Axis 78 degrees    R Axis -52 degrees    T Axis 87 degrees   CBC with Auto Differential    Collection Time: 03/04/22  9:39 PM   Result Value Ref Range    WBC 6.4 3.5 - 11.0 k/uL    RBC 3.52 (L) 4.0 - 5.2 m/uL    Hemoglobin 11.4 (L) 12.0 - 16.0 g/dL    Hematocrit 34.2 (L) 36 - 46 %    MCV 97.2 80 - 100 fL    MCH 32.4 26 - 34 pg    MCHC 33.4 31 - 37 g/dL    RDW 14.1 11.5 - 14.9 %    Platelets 836 784 - 110 k/uL    MPV 8.6 6.0 - 12.0 fL    Seg Neutrophils 70 (H) 36 - 66 %    Lymphocytes 19 (L) 24 - 44 % Monocytes 8 (H) 1 - 7 %    Eosinophils % 2 0 - 4 %    Basophils 1 0 - 2 %    Segs Absolute 4.50 1.3 - 9.1 k/uL    Absolute Lymph # 1.20 1.0 - 4.8 k/uL    Absolute Mono # 0.50 0.1 - 1.3 k/uL    Absolute Eos # 0.10 0.0 - 0.4 k/uL    Basophils Absolute 0.10 0.0 - 0.2 k/uL   Basic Metabolic Panel w/ Reflex to MG    Collection Time: 03/04/22  9:39 PM   Result Value Ref Range    Glucose 91 70 - 99 mg/dL    BUN 43 (H) 6 - 20 mg/dL    CREATININE 4.52 (H) 0.50 - 0.90 mg/dL    Calcium 8.2 (L) 8.6 - 10.4 mg/dL    Sodium 141 135 - 144 mmol/L    Potassium 4.2 3.7 - 5.3 mmol/L    Chloride 102 98 - 107 mmol/L    CO2 21 20 - 31 mmol/L    Anion Gap 18 (H) 9 - 17 mmol/L    GFR Non-African American 11 (L) >60 mL/min    GFR  13 (L) >60 mL/min    GFR Comment         Hepatic Function Panel    Collection Time: 03/04/22  9:39 PM   Result Value Ref Range    Albumin 4.9 3.5 - 5.2 g/dL    Alkaline Phosphatase 131 (H) 35 - 104 U/L    ALT 9 5 - 33 U/L    AST 16 <32 U/L    Total Bilirubin <0.15 (L) 0.3 - 1.2 mg/dL    Bilirubin, Direct <0.08 <0.31 mg/dL    Bilirubin, Indirect Can not be calculated 0.00 - 1.00 mg/dL    Total Protein 8.2 6.4 - 8.3 g/dL   Lipase    Collection Time: 03/04/22  9:39 PM   Result Value Ref Range    Lipase 99 (H) 13 - 60 U/L   Troponin    Collection Time: 03/04/22  9:39 PM   Result Value Ref Range    Troponin, High Sensitivity 27 (H) 0 - 14 ng/L   Urinalysis with Microscopic    Collection Time: 03/04/22 10:04 PM   Result Value Ref Range    Color, UA Yellow Yellow    Turbidity UA Clear Clear    Glucose, Ur NEGATIVE NEGATIVE    Bilirubin Urine NEGATIVE NEGATIVE    Ketones, Urine NEGATIVE NEGATIVE    Specific [place], UA 1.011 1.000 - 1.030    Urine Hgb MOD (A) NEGATIVE    pH, UA 6.0 5.0 - 8.0    Protein, UA 2+ (A) NEGATIVE    Urobilinogen, Urine Normal Normal    Nitrite, Urine NEGATIVE NEGATIVE    Leukocyte Esterase, Urine SMALL (A) NEGATIVE    WBC, UA 10 TO 20 /HPF    RBC, UA 0 TO 2 /HPF    Casts UA 0 TO 2 /LPF    Epithelial Cells UA 3 to 5 /HPF    Bacteria, UA None None   Troponin    Collection Time: 03/04/22 11:11 PM   Result Value Ref Range    Troponin, High Sensitivity 24 (H) 0 - 14 ng/L   CBC    Collection Time: 03/05/22  1:54 AM   Result Value Ref Range    WBC 6.1 3.5 - 11.0 k/uL    RBC 2.93 (L) 4.0 - 5.2 m/uL    Hemoglobin 9.5 (L) 12.0 - 16.0 g/dL    Hematocrit 28.6 (L) 36 - 46 %    MCV 97.8 80 - 100 fL    MCH 32.3 26 - 34 pg    MCHC 33.1 31 - 37 g/dL    RDW 14.5 11.5 - 14.9 %    Platelets 227 625 - 694 k/uL    MPV 8.2 6.0 - 12.0 fL   Basic Metabolic Panel w/ Reflex to MG    Collection Time: 03/05/22  1:54 AM   Result Value Ref Range    Glucose 92 70 - 99 mg/dL    BUN 44 (H) 6 - 20 mg/dL    CREATININE 4.45 (H) 0.50 - 0.90 mg/dL    Calcium 7.6 (L) 8.6 - 10.4 mg/dL    Sodium 142 135 - 144 mmol/L    Potassium 4.1 3.7 - 5.3 mmol/L    Chloride 106 98 - 107 mmol/L    CO2 20 20 - 31 mmol/L    Anion Gap 16 9 - 17 mmol/L    GFR Non-African American 11 (L) >60 mL/min    GFR  13 (L) >60 mL/min    GFR Comment         CBC    Collection Time: 03/05/22  5:46 AM   Result Value Ref Range    WBC 5.5 3.5 - 11.0 k/uL    RBC 2.80 (L) 4.0 - 5.2 m/uL    Hemoglobin 9.1 (L) 12.0 - 16.0 g/dL    Hematocrit 27.7 (L) 36 - 46 %    MCV 99.0 80 - 100 fL    MCH 32.6 26 - 34 pg    MCHC 32.9 31 - 37 g/dL    RDW 14.1 11.5 - 14.9 %    Platelets 698 889 - 690 k/uL    MPV 8.2 6.0 - 12.0 fL   Basic Metabolic Panel w/ Reflex to MG    Collection Time: 03/05/22  5:46 AM   Result Value Ref Range    Glucose 93 70 - 99 mg/dL    BUN 46 (H) 6 - 20 mg/dL    CREATININE 4.57 (H) 0.50 - 0.90 mg/dL    Calcium 7.5 (L) 8.6 - 10.4 mg/dL    Sodium 141 135 - 144 mmol/L    Potassium 4.1 3.7 - 5.3 mmol/L    Chloride 109 (H) 98 - 107 mmol/L    CO2 18 (L) 20 - 31 mmol/L    Anion Gap 14 9 - 17 mmol/L    GFR Non-African American 10 (L) >60 mL/min    GFR  13 (L) >60 mL/min    GFR Comment             Imaging/Diagnostics:  NM LUNG VENT/PERFUSION (VQ)    Result Date: 3/5/2022  A single large matched defect in the left lower lobe is considered low probability for acute pulmonary embolism. XR CHEST PORTABLE    Result Date: 3/4/2022  No acute process. CT CHEST ABDOMEN PELVIS WO CONTRAST    Result Date: 3/4/2022  1. Enlarged, polycystic kidneys again demonstrated, consistent with the patient's known history of polycystic kidney disease. Nonobstructing 6 mm right intrarenal calculus is again demonstrated, with multiple hemorrhagic cysts again demonstrated bilaterally. 2. Cardiomegaly 3. No evidence of acute cardiopulmonary disease 4. Coronary arterial calcifications 5. Unenhanced thoracic and abdominal aorta appears grossly normal, with mild atherosclerotic plaque formation. Intravenous contrast would be necessary to exclude dissection.        Assessment :      Hospital Problems           Last Modified POA    * (Principal) Hypertensive emergency 3/4/2022 Yes    Polycystic kidney 3/5/2022 Yes    Acute renal failure with acute tubular necrosis superimposed on stage 3 chronic kidney disease ([address] 3/5/2022 Yes    Congenital multiple renal cysts 3/5/2022 Yes    Hyperlipidemia 3/5/2022 Yes    Acute kidney injury superimposed on chronic kidney disease ([address] 3/5/2022 Yes          Plan:     Patient status inpatient in the Progressive Unit/Step down  77-year-old lady with history of polycystic kidney disease stage III CKD taking ibuprofen to three times a day to 1 mg each for chronic back pain  Admitted with the multiple complaints including hypertensive urgency systolic blood pressure over 200 with acute kidney injury Baseline creatinine is 3.4 admitted with more than four creatinine  Also had central chest pain atypical in nature slightly elevated troponin likely NSTEMI type II secondary to acute renal failure on chronic kidney disease  Right upper quadrant pain with elevated lipase ninety-nine rule out cholecystitis choledocholithiasis we will do ultrasound of the right upper quadrant  Hemorrhagic cyst in both kidneys could be contributing to some pain  Active smoker with COPD not in exacerbation  Moderate protein calorie malnutrition likely secondary to COPD and chronic renal failure with a BMI of 19.61  Pt going out for smoke  Refusing nicotine patch  Dc lovenox      Consultations:   IP CONSULT TO INTERNAL MEDICINE  IP CONSULT TO NEPHROLOGY     Patient is admitted as inpatient status because of co-morbidities listed above, severity of signs and symptoms as outlined, requirement for current medical therapies and most importantly because of direct risk to patient if care not provided in a hospital setting. Expected length of stay > 48 hours. Cintia Max MD  3/5/2022  10:32 AM    Copy sent to TY [facility]strada[facility]    Please note that this chart was generated using voice recognition Dragon dictation software. Although every effort was made to ensure the accuracy of this automated transcription, some errors in transcription may have occurred.

## 2022-03-05 NOTE — PROGRESS NOTES
Pt transferred to [facility] to room 2049.  All of the pt's belongings were taken with her

## 2022-03-05 NOTE — ED NOTES
Spoke with Dr Heide Bence regarding new consult.  Added onto Care Team per request.     Avinash Childs  03/04/22 [id number]

## 2022-03-05 NOTE — ED NOTES
Mode of arrival (squad #, walk in, police, etc) : Walked into triage        Chief complaint(s): Chest Pain and Right Flank Pain        Arrival Note (brief scenario, treatment PTA, etc). : Complaining of intermittent chest pain x 2 days. Complaining of Right flank pain x 3 days. Hx of polycystic kidneys and chronic kidney disease. A/O X 3.         C= \"Have you ever felt that you should Cut down on your drinking? \"  No  A= \"Have people Annoyed you by criticizing your drinking? \"  No  G= \"Have you ever felt bad or Guilty about your drinking? \"  No  E= \"Have you ever had a drink as an Eye-opener first thing in the morning to steady your nerves or to help a hangover? \"  No      Deferred []      Reason for deferring: N/A    *If yes to two or more: probable alcohol abuse. Leana Benz RN  03/04/22 [id number]

## 2022-03-05 NOTE — PROGRESS NOTES
Patient transferred from ER to room 2084. Patient oriented to call light system and bed controls. No acute distress noted. Patient did ask to run out to her car so she could lock it up.

## 2022-03-05 NOTE — PLAN OF CARE
Problem: Pain:  Goal: Pain level will decrease  Description: Pain level will decrease  Outcome: Ongoing     Problem: Pain:  Goal: Control of acute pain  Description: Control of acute pain  Outcome: Ongoing     Problem: Pain:  Goal: Patient's pain/discomfort is manageable  Description: Patient's pain/discomfort is manageable  Outcome: Ongoing     Problem: Infection:  Goal: Will remain free from infection  Description: Will remain free from infection  Outcome: Ongoing     Problem: Safety:  Goal: Free from accidental physical injury  Description: Free from accidental physical injury  Outcome: Ongoing     Problem: Daily Care:  Goal: Daily care needs are met  Description: Daily care needs are met  Outcome: Ongoing     Problem: Discharge Planning:  Goal: Patients continuum of care needs are met  Description: Patients continuum of care needs are met  Outcome: Ongoing

## 2022-03-05 NOTE — PLAN OF CARE
Problem: Pain:  Goal: Pain level will decrease  Description: Pain level will decrease  3/5/2022 1729 by Yaya Fry RN  Outcome: Ongoing     Problem: Pain:  Goal: Control of acute pain  Description: Control of acute pain  3/5/2022 1729 by Yaya Fry RN  Outcome: Ongoing     Problem: Pain:  Goal: Control of chronic pain  Description: Control of chronic pain  3/5/2022 1729 by Yaya Fry RN  Outcome: Ongoing     Problem: Pain:  Goal: Patient's pain/discomfort is manageable  Description: Patient's pain/discomfort is manageable  3/5/2022 1729 by Aline Burnett RN  Outcome: Ongoing     Problem: Infection:  Goal: Will remain free from infection  Description: Will remain free from infection  3/5/2022 1729 by Yaya Fry RN  Outcome: Ongoing     Problem: Safety:  Goal: Free from accidental physical injury  Description: Free from accidental physical injury  3/5/2022 1729 by Aline Burnett RN  Outcome: Ongoing     Problem: Safety:  Goal: Free from intentional harm  Description: Free from intentional harm  3/5/2022 1729 by Aline Burnett RN  Outcome: Ongoing     Problem: Daily Care:  Goal: Daily care needs are met  Description: Daily care needs are met  3/5/2022 1729 by Aline Burnett RN  Outcome: Ongoing     Problem: Skin Integrity:  Goal: Skin integrity will stabilize  Description: Skin integrity will stabilize  3/5/2022 1729 by Aline Burnett RN  Outcome: Ongoing     Problem: Discharge Planning:  Goal: Patients continuum of care needs are met  Description: Patients continuum of care needs are met  3/5/2022 1729 by Aline Burnett RN  Outcome: Ongoing

## 2022-03-05 NOTE — ED PROVIDER NOTES
[facility]  Emergency Department Encounter  EmergencyMedicineResident     This patient was seen during the COVID-19 crisis. There were limited resources and those resources we did have had to be conserved for the sickest of patients. Pt Name: Edna Dugan  MRN: [id number]  [place] 1980  Date of evaluation: 3/5/22  PCP: Fareed Ghotra PA-C    CHIEF COMPLAINT       Chief Complaint   Patient presents with    Chest Pain    Flank Pain       HISTORY OF PRESENT ILLNESS  (Location/Symptom, Timing/Onset, Context/Setting, Quality, Duration, Modifying Factors, Severity.)      Edna Dugan is a 35 y.o. female who presents for evaluation of chest pain, right flank pain and hypertension. Patient significant past medical history of polycystic kidney disease, CKD stage 3-4, smoking, anemia, nephrolithiasis and depression. .    Patient reports that her symptoms have been gradually worsening over the last few days. She states she has not taken her blood pressure medications today. She reports in fact that her chest pain that she describes an elephant sitting on her chest is been gradually worsening over the last 2 weeks. Brought in tonight was a sharp right flank pain. She is concerned that she may ruptured a cyst with a she has a kidney stone. She reports intermittent changes in urinary habits from frequency to decreased output. PAST MEDICAL / SURGICAL /SOCIAL / FAMILY HISTORY      has a past medical history of Anxiety, Asthma, Chronic headaches, CKD (chronic kidney disease) stage 3, GFR 30-59 ml/min ([facility]), Degenerative disc disease, cervical, Depression, Dysmenorrhea, Eczema, Hypertension, Hypocalcemia, Kidney stone, Menorrhagia, Neck pain, bilateral, Pelvic pain in female, Polycystic kidney, Smoker, and Tension vascular headache.       has a past surgical history that includes Dilation and curettage of uterus; Breast lumpectomy; Nerve Block (07/01/2013);  Nerve Block (07/08/2013); other surgical history (03/10/2014 ); Toe Surgery; Breast enhancement surgery; Cystoscopy (06/12/2020); Ureteroscopy (Right, 06/12/2020); cysto/uretero/pyeloscopy, calculus tx (Right, 6/12/2020); and Lithotripsy (Right, 7/23/2020). Social History     Socioeconomic History    Marital status:      Spouse name: Not on file    Number of children: Not on file    Years of education: Not on file    Highest education level: Not on file   Occupational History    Occupation: stay at home mom   Tobacco Use    Smoking status: Current Every Day Smoker     Packs/day: 1.00     Years: 25.00     Pack years: 25.00     Types: Cigarettes    Smokeless tobacco: Never Used   Vaping Use    Vaping Use: Never used   Substance and Sexual Activity    Alcohol use: No     Alcohol/week: 0.0 standard drinks    Drug use: No    Sexual activity: Yes     Partners: Male     Comment: steady boyfriend   Other Topics Concern    Not on file   Social History Narrative    Not on file     Social Determinants of Health     Financial Resource Strain:     Difficulty of Paying Living Expenses: Not on file   Food Insecurity:     Worried About Running Out of Food in the Last Year: Not on file    Kiara of Food in the Last Year: Not on file   Transportation Needs:     Lack of Transportation (Medical): Not on file    Lack of Transportation (Non-Medical):  Not on file   Physical Activity:     Days of Exercise per Week: Not on file    Minutes of Exercise per Session: Not on file   Stress:     Feeling of Stress : Not on file   Social Connections:     Frequency of Communication with Friends and Family: Not on file    Frequency of Social Gatherings with Friends and Family: Not on file    Attends Tenriism Services: Not on file    Active Member of Clubs or Organizations: Not on file    Attends Club or Organization Meetings: Not on file    Marital Status: Not on file   Intimate Partner Violence:     Fear of Current or Ex-Partner: Not on file   Luis Fernando Safe Emotionally Abused: Not on file    Physically Abused: Not on file    Sexually Abused: Not on file   Housing Stability:     Unable to Pay for Housing in the Last Year: Not on file    Number of Places Lived in the Last Year: Not on file    Unstable Housing in the Last Year: Not on file       Family History   Problem Relation Age of Onset    High Blood Pressure Mother     Asthma Sister     Migraines Sister     High Blood Pressure Father     Other Brother         bad knees, with recent total knee replacement        Allergies:  Bee pollen, Codeine, Dust mite extract, Pcn [penicillins], and Pollen extract    Home Medications:  Prior to Admission medications    Medication Sig Start Date End Date Taking? Authorizing Provider   metoprolol succinate (TOPROL XL) 50 MG extended release tablet Take 50 mg by mouth daily   Yes Historical Provider, MD   amLODIPine (NORVASC) 5 MG tablet Take 5 mg by mouth daily   Yes Historical Provider, MD   DULoxetine (CYMBALTA) 60 MG extended release capsule take 1 capsule by mouth once daily  Patient taking differently: 90 mg  11/10/17  Yes Edwar Jordan MD   amitriptyline (ELAVIL) 25 MG tablet Take 1 tablet by mouth nightly 11/8/21   Blanca Rossi MD   lidocaine 4 % external patch Place 1 patch onto the skin daily 11/9/21   Blanca Rossi MD   hydrOXYzine (ATARAX) 50 MG tablet Take 50 mg by mouth nightly    Historical Provider, MD       REVIEW OF SYSTEMS    (2-9 systems for level 4, 10 or more forlevel 5)      Review of Systems   Constitutional: Negative for activity change, chills and fever. HENT: Negative for congestion, sinus pain and sore throat. Eyes: Negative for pain and visual disturbance. Respiratory: Negative for cough and shortness of breath. Cardiovascular: Positive for chest pain. Gastrointestinal: Negative for abdominal pain, diarrhea, nausea and vomiting.    Genitourinary: Positive for decreased urine volume, difficulty urinating, dysuria, flank pain and frequency. Musculoskeletal: Negative for back pain and myalgias. Skin: Negative for rash and wound. Neurological: Negative for dizziness, light-headedness and headaches. Psychiatric/Behavioral: Negative for agitation and confusion. PHYSICAL EXAM   (up to 7 for level 4, 8 or more forlevel 5)      ED TRIAGE VITALS BP: (!) 212/107, Temp: 98.5 °F (36.9 °C), Pulse: 97, Resp: 16, SpO2: 100 %    Vitals:    03/04/22 2300 03/04/22 2330 03/05/22 0000 03/05/22 0058   BP: (!) 167/94 (!) 174/84 (!) 153/92 (!) 172/100   Pulse: 70 68 70 66   Resp: 14 18 15 16   Temp:    98.2 °F (36.8 °C)   TempSrc:    Oral   SpO2: 99% 99% 97% 100%   Weight:    121 lb 7.6 oz (55.1 kg)   Height:             Physical Exam  Vitals and nursing note reviewed. Constitutional:       Appearance: Normal appearance. She is ill-appearing. HENT:      Head: Normocephalic and atraumatic. Nose: Nose normal.      Mouth/Throat:      Mouth: Mucous membranes are moist.   Eyes:      Extraocular Movements: Extraocular movements intact. Pupils: Pupils are equal, round, and reactive to light. Cardiovascular:      Rate and Rhythm: Normal rate and regular rhythm. Pulses: Normal pulses. Heart sounds: Normal heart sounds. Pulmonary:      Effort: Pulmonary effort is normal. No respiratory distress. Breath sounds: No stridor. No wheezing. Abdominal:      General: Abdomen is flat. Palpations: Abdomen is soft. Tenderness: There is right CVA tenderness. There is no left CVA tenderness. Musculoskeletal:         General: Normal range of motion. Cervical back: Normal range of motion. Skin:     General: Skin is warm and dry. Capillary Refill: Capillary refill takes less than 2 seconds. Neurological:      General: No focal deficit present. Mental Status: She is alert and oriented to person, place, and time.    Psychiatric:         Mood and Affect: Mood normal.         Behavior: Behavior normal. DIFFERENTIAL  DIAGNOSIS     PLAN (LABS / IMAGING / EKG):  Orders Placed This Encounter   Procedures    Culture, Urine    XR CHEST PORTABLE    CT CHEST ABDOMEN PELVIS WO CONTRAST    CBC with Auto Differential    Basic Metabolic Panel w/ Reflex to MG    Hepatic Function Panel    Lipase    Troponin    Urinalysis with Microscopic    Troponin    Diet NPO    Vital signs per unit routine    Notify physician    Notify physician    Up as tolerated    Full Code    Inpatient consult to Internal Medicine    Inpatient consult to Nephrology    EKG 12 Lead    ADMIT TO INPATIENT       MEDICATIONS ORDERED:  ED Medication Orders (From admission, onward)    Start Ordered     Status Ordering Provider    03/05/22 0015 03/05/22 0011  HYDROcodone-acetaminophen ([address]) 5-325 MG per tablet 1 tablet  ONCE         Acknowledged JENNIFER DESAI    03/04/22 2330 03/04/22 2320  sodium chloride flush 0.9 % injection 5-40 mL  2 times per day         Last MAR action: Given - by Cleo Handley on 03/04/22 at [address] [facility]    03/04/22 2330 03/04/22 2320  heparin (porcine) injection 5,000 Units  3 times per day         Last MAR action: Given - by CALOS CORONADO on 03/04/22 at [address] [facility]    03/04/22 2330 03/04/22 2320  0.9 % sodium chloride infusion  CONTINUOUS         Last MAR action: New Bag - by CALOS CORONADO on 03/04/22 at [address] [facility]    03/04/22 2320 03/04/22 2320  labetalol (NORMODYNE;TRANDATE) injection 10 mg  EVERY 4 HOURS PRN         Last MAR action: Given - by Cleo Handley on 03/04/22 at [address]    03/04/22 2320 03/04/22 2320  sodium chloride flush 0.9 % injection 5-40 mL  PRN         Acknowledged CHARLOTTE RICKETTS    03/04/22 2320 03/04/22 2320  0.9 % sodium chloride infusion  PRN         Acknowledged CHARLOTTE RICKETTS    03/04/22 2320 03/04/22 2320  acetaminophen (TYLENOL) tablet 650 mg  EVERY 4 HOURS PRN         CHARLOTTE Dodson    03/04/22 2320 03/04/22 2320 ondansetron (ZOFRAN-ODT) disintegrating tablet 4 mg  EVERY 8 HOURS PRN        \"Or\" Linked Group Details    Acknowledged CHARLOTTE RICKETTS    03/04/22 2320 03/04/22 2320  ondansetron (ZOFRAN) injection 4 mg  EVERY 6 HOURS PRN        \"Or\" Linked Group Details    Acknowledged Reji Asencio    03/04/22 2315 03/04/22 2309  0.9 % sodium chloride bolus  ONCE         Last MAR action: Stopped - by Hair Voss on 03/04/22 at [address]    03/04/22 2145 03/04/22 2130  morphine sulfate (PF) injection 4 mg  ONCE         Last MAR action: Given - by Hair Voss on 03/04/22 at [address]    03/04/22 2130 03/04/22 2129  amLODIPine (NORVASC) tablet 5 mg  DAILY         Last MAR action: Given - by Hair Voss on 03/04/22 at [address]    03/04/22 2130 03/04/22 2129  metoprolol tartrate (LOPRESSOR) tablet 50 mg  ONCE         Last MAR action: Given - by Hair Voss on 03/04/22 at [address]          DIAGNOSTIC RESULTS / EMERGENCY DEPARTMENT COURSE / MDM     IMPRESSION & INITIAL PLAN:  This is a 78-year-old female presenting respiratory for evaluation of chest pain and abdominal pain. She reports it is a elephant sitting on her chest. She also notes she has history of polycystic kidney disease. There was a concern for ruptured hemorrhagic cyst versus nephrolithiasis versus aortic dissection as her initial blood pressure was greater than 411 systolically. Patient has history of renovascular hypertension on amlodipine and metoprolol which she has not taken today. Patient was initially provided her home medications and 4 mg of morphine for pain control. Her blood pressure came down to 172/100. We are unable to order a CTA chest abdomen pelvis due to her CKD stage III-4, so 8 Noncon CT chest and abdomen was performed and nondiagnostic or suggestive for aortic dissection. There is no evidence of a ruptured hemorrhagic kidney cyst. There is no evidence of nephrolithiasis.     There is evidence of endorgan damage secondary to her marked hypertension. Her heart score is 4. Plan to admit to the medicine service for management of hypertension. Nephrology consulted on the case.     LABS:  Results for orders placed or performed during the hospital encounter of 03/04/22   CBC with Auto Differential   Result Value Ref Range    WBC 6.4 3.5 - 11.0 k/uL    RBC 3.52 (L) 4.0 - 5.2 m/uL    Hemoglobin 11.4 (L) 12.0 - 16.0 g/dL    Hematocrit 34.2 (L) 36 - 46 %    MCV 97.2 80 - 100 fL    MCH 32.4 26 - 34 pg    MCHC 33.4 31 - 37 g/dL    RDW 14.1 11.5 - 14.9 %    Platelets 412 735 - 083 k/uL    MPV 8.6 6.0 - 12.0 fL    Seg Neutrophils 70 (H) 36 - 66 %    Lymphocytes 19 (L) 24 - 44 %    Monocytes 8 (H) 1 - 7 %    Eosinophils % 2 0 - 4 %    Basophils 1 0 - 2 %    Segs Absolute 4.50 1.3 - 9.1 k/uL    Absolute Lymph # 1.20 1.0 - 4.8 k/uL    Absolute Mono # 0.50 0.1 - 1.3 k/uL    Absolute Eos # 0.10 0.0 - 0.4 k/uL    Basophils Absolute 0.10 0.0 - 0.2 k/uL   Basic Metabolic Panel w/ Reflex to MG   Result Value Ref Range    Glucose 91 70 - 99 mg/dL    BUN 43 (H) 6 - 20 mg/dL    CREATININE 4.52 (H) 0.50 - 0.90 mg/dL    Calcium 8.2 (L) 8.6 - 10.4 mg/dL    Sodium 141 135 - 144 mmol/L    Potassium 4.2 3.7 - 5.3 mmol/L    Chloride 102 98 - 107 mmol/L    CO2 21 20 - 31 mmol/L    Anion Gap 18 (H) 9 - 17 mmol/L    GFR Non-African American 11 (L) >60 mL/min    GFR  13 (L) >60 mL/min    GFR Comment         Hepatic Function Panel   Result Value Ref Range    Albumin 4.9 3.5 - 5.2 g/dL    Alkaline Phosphatase 131 (H) 35 - 104 U/L    ALT 9 5 - 33 U/L    AST 16 <32 U/L    Total Bilirubin <0.15 (L) 0.3 - 1.2 mg/dL    Bilirubin, Direct <0.08 <0.31 mg/dL    Bilirubin, Indirect Can not be calculated 0.00 - 1.00 mg/dL    Total Protein 8.2 6.4 - 8.3 g/dL   Lipase   Result Value Ref Range    Lipase 99 (H) 13 - 60 U/L   Troponin   Result Value Ref Range    Troponin, High Sensitivity 27 (H) 0 - 14 ng/L   Urinalysis with Microscopic   Result Value Ref Range    Color, UA Yellow Yellow    Turbidity UA Clear Clear    Glucose, Ur NEGATIVE NEGATIVE    Bilirubin Urine NEGATIVE NEGATIVE    Ketones, Urine NEGATIVE NEGATIVE    Specific [place], UA 1.011 1.000 - 1.030    Urine Hgb MOD (A) NEGATIVE    pH, UA 6.0 5.0 - 8.0    Protein, UA 2+ (A) NEGATIVE    Urobilinogen, Urine Normal Normal    Nitrite, Urine NEGATIVE NEGATIVE    Leukocyte Esterase, Urine SMALL (A) NEGATIVE    WBC, UA 10 TO 20 /HPF    RBC, UA 0 TO 2 /HPF    Casts UA 0 TO 2 /LPF    Epithelial Cells UA 3 to 5 /HPF    Bacteria, UA None None   Troponin   Result Value Ref Range    Troponin, High Sensitivity 24 (H) 0 - 14 ng/L       RADIOLOGY:  XR CHEST PORTABLE   Final Result   No acute process. CT CHEST ABDOMEN PELVIS WO CONTRAST   Final Result   1. Enlarged, polycystic kidneys again demonstrated, consistent with the   patient's known history of polycystic kidney disease. Nonobstructing 6 mm   right intrarenal calculus is again demonstrated, with multiple hemorrhagic   cysts again demonstrated bilaterally. 2. Cardiomegaly   3. No evidence of acute cardiopulmonary disease   4. Coronary arterial calcifications   5. Unenhanced thoracic and abdominal aorta appears grossly normal, with mild   atherosclerotic plaque formation. Intravenous contrast would be necessary to   exclude dissection. EMERGENCY DEPARTMENT COURSE:  ED Course as of 03/05/22 0126   Fri Mar 04, 2022   2255 Heart score of 4 [TJ]      ED Course User Index  [TJ] Hipolito Norris MD      CONSULTS:  IP CONSULT TO INTERNAL MEDICINE  IP CONSULT TO NEPHROLOGY    CRITICAL CARE:  See attending physician note    FINAL IMPRESSION      1. Primary hypertension    2. Other forms of angina pectoris ([address]          DISPOSITION / [address] Admitted 03/04/2022 11:20:17 PM      PATIENT REFERRED TO:  No follow-up provider specified.     DISCHARGE MEDICATIONS:  Current Discharge Medication List        Current Discharge Medication List           Larry Olivia MD  Emergency Medicine Resident    (Please note that portions of this note were completed with a voice recognition program.  Efforts were made to edit the dictations but occasionally words are mis-transcribed.)       Larry Olivia MD  Resident  03/05/22 [id number]

## 2022-03-05 NOTE — CONSULTS
NEPHROLOGY CONSULT     Patient :  Joan Massey; 35 y.o. MRN# [id number]  Location:  [id number]/[id number]  Attending:  Harjeet Munguia MD  Admit Date:  3/4/2022   Hospital Day: 1      Reason for Consult: Acute kidney injury on CKD      Chief Complaint:  *Right flank pain right upper abdominal pain  History Obtained From: Patient    History of Present Illness: This is a 35 y.o. female with past medical history of essential hypertension, bronchial asthma, depression, nephrolithiasis requiring cystoscopy and double ureteral stent in 2020, history of autosomal dominant polycystic kidney disease chronic kidney disease progressing to stage V  Patient presented to the hospital with complains of right-sided flank pain and upper abdominal pain, chest pain  Patient denied nausea vomiting no tremors no fever chills  CT abdomen pelvis without contrast was done which showed enlarged polycystic kidney disease consistent with patient's known history of polycystic kidney disease. Nonobstructing 6 mm right intrarenal calculus noted with multiple hemorrhagic cysts. Cardiomegaly no evidence of acute cardiopulmonary disease coronary artery calcification, and enhanced thoracic and abdominal aorta appears grossly normal with mild atherosclerotic plaque formation  Troponins were mildly elevated 24  Labs showed serum creatinine of 4.5 mg/dL on admission Baseline serum creatinine was around 3.6 to 4.1 mg/dL in November    Pt denies any history of  prolonged NSAID use. Patient denies dysuria, gross hematuria, flank pain, nocturia, urgency, passing frothy urine or urinary incontinence. There has been no recent exposure to IV contrast. There is no history  of paraprotein disease. Medication review shows no use of ACE-inhibitor/diuretics.     Past Medical History:        Diagnosis Date    Anxiety     Asthma     Chronic headaches 07/10/2013    CKD (chronic kidney disease) stage 3, GFR 30-59 ml/min ([facility])     Degenerative disc disease, cervical     Depression     Dysmenorrhea 09/30/2014    Eczema 11/08/2012    Hypertension     Hypocalcemia 05/28/2014    Kidney stone     Menorrhagia 09/30/2014    Neck pain, bilateral 05/28/2014    Pelvic pain in female 09/30/2014    Polycystic kidney     Smoker 10/02/2011    Tension vascular headache 01/20/2014       Past Surgical History:        Procedure Laterality Date    BREAST ENHANCEMENT SURGERY      BREAST LUMPECTOMY      left breast    CYSTO/URETERO/PYELOSCOPY, CALCULUS TX Right 6/12/2020    HOLMIUM - CYSTO, RIGHT RETROGRADE PYELOGRAM, RIGHT URETEROSCOPY, LASER LITHO ON STAND BY, RIGHT STENT PLACEMENT performed by Simran Kay MD at [place] [address]  06/12/2020   [facility]      x2    LITHOTRIPSY Right 7/23/2020    ESWL EXTRACORPOREAL SHOCK WAVE LITHOTRIPSY performed by Simran Kay MD at [address]  07/01/2013    nerve block(right vervical C3/TON/C4/C5    NERVE BLOCK  07/08/2013    nerve block(right vervical C3/TON/C4/C5    OTHER SURGICAL HISTORY  03/10/2014     botox inj     TOE SURGERY      removal of ingrown toe nail-2nd toe on left foot     URETEROSCOPY Right 06/12/2020    stent placement       Current Medications:    HYDROcodone-acetaminophen (NORCO) 5-325 MG per tablet 1 tablet, Once  HYDROmorphone (DILAUDID) injection 1 mg, Q4H PRN  sodium chloride flush 0.9 % injection 10 mL, PRN  amLODIPine (NORVASC) tablet 10 mg, Daily  DULoxetine (CYMBALTA) extended release capsule 90 mg, Daily  lidocaine 4 % external patch 1 patch, Daily  metoprolol succinate (TOPROL XL) extended release tablet 50 mg, Daily  amitriptyline (ELAVIL) tablet 25 mg, Nightly  heparin (porcine) injection 5,000 Units, BID  labetalol (NORMODYNE;TRANDATE) injection 10 mg, Q4H PRN  sodium chloride flush 0.9 % injection 5-40 mL, 2 times per day  sodium chloride flush 0.9 % injection 5-40 mL, PRN  0.9 % sodium chloride infusion, PRN  acetaminophen (TYLENOL) tablet 650 mg, Violence:     Fear of Current or Ex-Partner: Not on file    Emotionally Abused: Not on file    Physically Abused: Not on file    Sexually Abused: Not on file   Housing Stability:     Unable to Pay for Housing in the Last Year: Not on file    Number of Places Lived in the Last Year: Not on file    Unstable Housing in the Last Year: Not on file       Family History:   Family History   Problem Relation Age of Onset    High Blood Pressure Mother     Asthma Sister     Migraines Sister     High Blood Pressure Father     Other Brother         bad knees, with recent total knee replacement        Review of Systems:    Constitutional: No fever, no chills, no night sweats, fatigue, generalized weakness, loss of appetite  HEENT:  No headache, otalgia, itchy eyes, epistaxis, nasal discharge or sore throat. Cardiac:  + chest pain, dyspnea, orthopnea or PND, palpitations  Chest:  No cough, hemoptysis, pleuritic chest pain, wheezing,SOB  Abdomen:  N right upper abdominal pain, no nausea, vomiting, diarrhea, melena, dysphagia hematemesis,constipation, abdominal bloating, right flank pain  Neuro:  No CVA, TIA or seizure like activity. Skin:   No rashes, no itching. :   No hematuria, no pyuria, no dysuria, no flank pain. Extremities:  No swelling or joint pains.       Objective:  CURRENT TEMPERATURE:  Temp: 98.2 °F (36.8 °C)  MAXIMUM TEMPERATURE OVER 24HRS:  Temp (24hrs), Av °F (36.7 °C), Min:97.5 °F (36.4 °C), Max:98.5 °F (36.9 °C)    CURRENT RESPIRATORY RATE:  Resp: 18  CURRENT PULSE:  Pulse: 72  CURRENT BLOOD PRESSURE:  BP: (!) 141/81  24HR BLOOD PRESSURE RANGE:  Systolic (90IZW), PDP:640 , Min:133 , IPO:309   ; Diastolic (44MER), ZYA:36, Min:73, Max:107    24HR INTAKE/OUTPUT:  No intake or output data in the 24 hours ending 22 1435  Patient Vitals for the past 96 hrs (Last 3 readings):   Weight   22 0058 121 lb 7.6 oz (55.1 kg)   22 2116 125 lb (56.7 kg)       Physical Exam:  GENERAL APPEARANCE: Alert and cooperative, and appears to be in no acute distress. HEAD: normocephalic  EYES:  EOMI. Not pale, anicteric   NOSE:  No nasal discharge. THROAT:  Oral cavity and pharynx normal. Moist  CARDIAC: Normal S1 and S2. No S3, S4 or murmurs. Rhythm is regular. LUNGS: Clear to auscultation and percussion without rales, rhonchi, wheezing or diminished breath sounds. GI, soft mild tenderness in the right upper quadrant  MUSKULOSKELETAL: Adequately aligned spine. No joint erythema or tenderness. EXTREMITIES: No edema. Peripheral pulses intact. NEURO: Nonfocal    Labs:   CBC:  Recent Labs     03/04/22 2139 03/05/22 0154 03/05/22 0546   WBC 6.4 6.1 5.5   RBC 3.52* 2.93* 2.80*   HGB 11.4* 9.5* 9.1*   HCT 34.2* 28.6* 27.7*   MCV 97.2 97.8 99.0   MCH 32.4 32.3 32.6   MCHC 33.4 33.1 32.9   RDW 14.1 14.5 14.1    276 254   MPV 8.6 8.2 8.2      BMP:   Recent Labs     03/04/22 2139 03/05/22 0154 03/05/22 0546    142 141   K 4.2 4.1 4.1    106 109*   CO2 21 20 18*   BUN 43* 44* 46*   CREATININE 4.52* 4.45* 4.57*   GLUCOSE 91 92 93   CALCIUM 8.2* 7.6* 7.5*      Phosphorus:  No results for input(s): PHOS in the last 72 hours. Magnesium: No results for input(s): MG in the last 72 hours. Albumin:   Recent Labs     03/04/22 2139   LABALBU 4.9       IRON:  No results for input(s): IRON in the last 72 hours. Iron Saturation:  Invalid input(s): PERCENTFE  TIBC:  No results for input(s): TIBC in the last 72 hours. FERRITIN:  No results for input(s): FERRITIN in the last 72 hours. SPEP: No results for input(s): SPEP in the last 72 hours. Recent Labs     03/04/22 2139   PROT 8.2     UPEP: No results for input(s): TPU in the last 72 hours. Urine Sodium:  No results for input(s): NOHELIA in the last 72 hours. Urine Potassium: No results for input(s): KUR in the last 72 hours. Urine Chloride:  No results for input(s): CLU in the last 72 hours.   Urine Ph:  Invalid input(s): PO4U  Urine Osmolarity: No results for input(s): OSMOU in the last 72 hours. Urine Creatinine:  No results for input(s): LABCREA in the last 72 hours. Urine Eosinophils: Invalid input(s): EOSU  Urine Protein:  No results for input(s): TPU in the last 72 hours. Urinalysis:    Recent Labs     03/04/22  2204   NITRU NEGATIVE   COLORU Yellow   PHUR 6.0   WBCUA 10 TO 20   RBCUA 0 TO 2   BACTERIA None   SPECGRAV 1.011   LEUKOCYTESUR SMALL*   UROBILINOGEN Normal   [facility] NEGATIVE         Radiology:  Reviewed as available. Assessment:  Presented with chest pain right upper abdominal pain uncontrolled hypertension    1. CKD 5 secondary to polycystic kidney disease, kidney function slowly worsening serum creatinine has increased to 4.5 mg/dL, does not have jake uremic symptoms    2. Essential hypertension, suboptimally controlled due to noncompliance patient was not using blood pressure medications  Blood pressure has improved systolic blood pressure is 141/81    3. Anemia of chronic kidney disease    4. Kidney hyperparathyroidism due to renal failure           Plan:  1. Continue IV fluids  2. Continue blood pressure medications, metoprolol, amlodipine  3. We will monitor kidney function and if kidney function continues to get worse patient might need dialysis  4. Discussed with the possibility of dialysis in reference to progressive chronic kidney disease stage V kidney disease. 5. Will follow kidney function closely  6. No emergent need for dialysis today      Thank you for the consultation.       Electronically signed by Abhay Caba MD on 3/5/2022 at 2:35 PM

## 2022-03-06 LAB
ANION GAP SERPL CALCULATED.3IONS-SCNC: 15 MMOL/L (ref 9–17)
BUN BLDV-MCNC: 44 MG/DL (ref 6–20)
CALCIUM SERPL-MCNC: 7.8 MG/DL (ref 8.6–10.4)
CHLORIDE BLD-SCNC: 109 MMOL/L (ref 98–107)
CO2: 17 MMOL/L (ref 20–31)
CREAT SERPL-MCNC: 4.28 MG/DL (ref 0.5–0.9)
GFR AFRICAN AMERICAN: 14 ML/MIN
GFR NON-AFRICAN AMERICAN: 11 ML/MIN
GFR SERPL CREATININE-BSD FRML MDRD: ABNORMAL ML/MIN/{1.73_M2}
GLUCOSE BLD-MCNC: 106 MG/DL (ref 70–99)
HCT VFR BLD CALC: 26.9 % (ref 36–46)
HEMOGLOBIN: 9 G/DL (ref 12–16)
MCH RBC QN AUTO: 32.5 PG (ref 26–34)
MCHC RBC AUTO-ENTMCNC: 33.3 G/DL (ref 31–37)
MCV RBC AUTO: 97.7 FL (ref 80–100)
PDW BLD-RTO: 14.1 % (ref 11.5–14.9)
PLATELET # BLD: 236 K/UL (ref 150–450)
PMV BLD AUTO: 8 FL (ref 6–12)
POTASSIUM SERPL-SCNC: 4.1 MMOL/L (ref 3.7–5.3)
RBC # BLD: 2.76 M/UL (ref 4–5.2)
REASON FOR REJECTION: NORMAL
SARS-COV-2, RAPID: NOT DETECTED
SODIUM BLD-SCNC: 141 MMOL/L (ref 135–144)
SPECIMEN DESCRIPTION: NORMAL
WBC # BLD: 5.7 K/UL (ref 3.5–11)
ZZ NTE CLEAN UP: ORDERED TEST: NORMAL
ZZ NTE WITH NAME CLEAN UP: SPECIMEN SOURCE: NORMAL

## 2022-03-06 PROCEDURE — 2580000003 HC RX 258: Performed by: INTERNAL MEDICINE

## 2022-03-06 PROCEDURE — 80048 BASIC METABOLIC PNL TOTAL CA: CPT

## 2022-03-06 PROCEDURE — 36415 COLL VENOUS BLD VENIPUNCTURE: CPT

## 2022-03-06 PROCEDURE — 85027 COMPLETE CBC AUTOMATED: CPT

## 2022-03-06 PROCEDURE — 6360000002 HC RX W HCPCS: Performed by: INTERNAL MEDICINE

## 2022-03-06 PROCEDURE — 87635 SARS-COV-2 COVID-19 AMP PRB: CPT

## 2022-03-06 PROCEDURE — 99233 SBSQ HOSP IP/OBS HIGH 50: CPT | Performed by: INTERNAL MEDICINE

## 2022-03-06 PROCEDURE — 1200000000 HC SEMI PRIVATE

## 2022-03-06 PROCEDURE — 2500000003 HC RX 250 WO HCPCS: Performed by: INTERNAL MEDICINE

## 2022-03-06 PROCEDURE — 99254 IP/OBS CNSLTJ NEW/EST MOD 60: CPT | Performed by: INTERNAL MEDICINE

## 2022-03-06 PROCEDURE — 6370000000 HC RX 637 (ALT 250 FOR IP): Performed by: INTERNAL MEDICINE

## 2022-03-06 RX ORDER — NICOTINE 21 MG/24HR
1 PATCH, TRANSDERMAL 24 HOURS TRANSDERMAL DAILY
Status: DISCONTINUED | OUTPATIENT
Start: 2022-03-06 | End: 2022-03-09 | Stop reason: HOSPADM

## 2022-03-06 RX ADMIN — SODIUM BICARBONATE: 84 INJECTION, SOLUTION INTRAVENOUS at 15:36

## 2022-03-06 RX ADMIN — HYDROMORPHONE HYDROCHLORIDE 1 MG: 1 INJECTION, SOLUTION INTRAMUSCULAR; INTRAVENOUS; SUBCUTANEOUS at 17:56

## 2022-03-06 RX ADMIN — HYDROMORPHONE HYDROCHLORIDE 1 MG: 1 INJECTION, SOLUTION INTRAMUSCULAR; INTRAVENOUS; SUBCUTANEOUS at 22:13

## 2022-03-06 RX ADMIN — AMLODIPINE BESYLATE 10 MG: 10 TABLET ORAL at 09:38

## 2022-03-06 RX ADMIN — AMITRIPTYLINE HYDROCHLORIDE 25 MG: 25 TABLET, FILM COATED ORAL at 20:27

## 2022-03-06 RX ADMIN — HYDROMORPHONE HYDROCHLORIDE 1 MG: 1 INJECTION, SOLUTION INTRAMUSCULAR; INTRAVENOUS; SUBCUTANEOUS at 09:39

## 2022-03-06 RX ADMIN — HYDROMORPHONE HYDROCHLORIDE 1 MG: 1 INJECTION, SOLUTION INTRAMUSCULAR; INTRAVENOUS; SUBCUTANEOUS at 13:54

## 2022-03-06 RX ADMIN — SODIUM CHLORIDE: 9 INJECTION, SOLUTION INTRAVENOUS at 00:47

## 2022-03-06 RX ADMIN — HYDROMORPHONE HYDROCHLORIDE 1 MG: 1 INJECTION, SOLUTION INTRAMUSCULAR; INTRAVENOUS; SUBCUTANEOUS at 04:58

## 2022-03-06 RX ADMIN — DULOXETINE 90 MG: 60 CAPSULE, DELAYED RELEASE ORAL at 09:38

## 2022-03-06 RX ADMIN — HEPARIN SODIUM 5000 UNITS: 5000 INJECTION INTRAVENOUS; SUBCUTANEOUS at 09:38

## 2022-03-06 RX ADMIN — SODIUM CHLORIDE, PRESERVATIVE FREE 10 ML: 5 INJECTION INTRAVENOUS at 09:39

## 2022-03-06 RX ADMIN — HEPARIN SODIUM 5000 UNITS: 5000 INJECTION INTRAVENOUS; SUBCUTANEOUS at 20:27

## 2022-03-06 RX ADMIN — METOPROLOL SUCCINATE 50 MG: 50 TABLET, EXTENDED RELEASE ORAL at 09:38

## 2022-03-06 ASSESSMENT — PAIN SCALES - GENERAL
PAINLEVEL_OUTOF10: 7
PAINLEVEL_OUTOF10: 4
PAINLEVEL_OUTOF10: 7
PAINLEVEL_OUTOF10: 7
PAINLEVEL_OUTOF10: 6
PAINLEVEL_OUTOF10: 7

## 2022-03-06 NOTE — CONSULTS
GI Consult Note:    Name: Young Scherer  MRN: [id number]     [place]: [de-identified]  Room: 2049/2049-01    Admit Date: 3/4/2022  PCP: Sanjana Lennon PA-C    Physician Requesting Consult: Meera Wilson MD     Reason for Consult:    Epigastric pain  Nausea  Gallbladder polyp  Mild anemia      Chief Complaint:     Chief Complaint   Patient presents with    Chest Pain    Flank Pain       History Obtained From:     Patient and EMR    History of Present Illness:      Young Scherer is a  35 y.o.  female who presents with Chest Pain and Flank Pain    This 78-year-old  female has history significant for chronic hypertension she is admitted to the hospital with hypertensive emergency    She has been complaining of nonspecific upper stomach right upper quadrant abdominal pain    Patient has some mild nausea associated with this pain    This pain gets worse after she eats meals    Has history from GERD symptoms    Takes over-the-counter medications denies any NSAID usage  No significant dysphagia  Denies any overt rectal bleeding or melanotic stools    Patient has been complaining of some abdominal pains, off and on cramping  Also complains of abdominal bloating and gas  Has off and on nausea without any sig vomiting  Has some alternating constipation and diarrhea  Has no weight loss  Has some anxiety issues    No known family history for colon cancer   she is a chronic smoker  Denies alcohol abuse illicit drug usage or marijuana  She works for a bar  Symptoms:  Onset:  Location:  abdomen  Duration:  day(s)  Severity:  moderate, severe  Quality:  intermittent      Past Medical History:     Past Medical History:   Diagnosis Date    Anxiety     Asthma     Chronic headaches 07/10/2013    CKD (chronic kidney disease) stage 3, GFR 30-59 ml/min ([facility]     Degenerative disc disease, cervical     Depression     Dysmenorrhea 09/30/2014    Eczema 11/08/2012    Hypertension     Hypocalcemia 05/28/2014    Kidney stone     Menorrhagia 09/30/2014    Neck pain, bilateral 05/28/2014    Pelvic pain in female 09/30/2014    Polycystic kidney     Smoker 10/02/2011    Tension vascular headache 01/20/2014        Past Surgical History:     Past Surgical History:   Procedure Laterality Date    BREAST ENHANCEMENT SURGERY      BREAST LUMPECTOMY      left breast    CYSTO/URETERO/PYELOSCOPY, CALCULUS TX Right 6/12/2020    HOLMIUM - CYSTO, RIGHT RETROGRADE PYELOGRAM, RIGHT URETEROSCOPY, LASER LITHO ON STAND BY, RIGHT STENT PLACEMENT performed by Carlos Saravia MD at [address] [place]  06/12/2020    DILATION AND CURETTAGE OF UTERUS      x2    LITHOTRIPSY Right 7/23/2020    ESWL EXTRACORPOREAL SHOCK WAVE LITHOTRIPSY performed by Carlos Saravia MD at [address] [facility] [address]  07/01/2013    nerve block(right vervical C3/TON/C4/C5    NERVE BLOCK  07/08/2013    nerve block(right vervical C3/TON/C4/C5    OTHER SURGICAL HISTORY  03/10/2014     botox inj     TOE SURGERY      removal of ingrown toe nail-2nd toe on left foot     URETEROSCOPY Right 06/12/2020    stent placement        Medications Prior to Admission:       Prior to Admission medications    Medication Sig Start Date End Date Taking?  Authorizing Provider   metoprolol succinate (TOPROL XL) 50 MG extended release tablet Take 50 mg by mouth daily   Yes Historical Provider, MD   hydrOXYzine (ATARAX) 50 MG tablet Take 50 mg by mouth nightly   Yes Historical Provider, MD   amLODIPine (NORVASC) 5 MG tablet Take 5 mg by mouth daily   Yes Historical Provider, MD   DULoxetine (CYMBALTA) 60 MG extended release capsule take 1 capsule by mouth once daily  Patient taking differently: 90 mg  11/10/17  Yes Joann Man MD   amitriptyline (ELAVIL) 25 MG tablet Take 1 tablet by mouth nightly 11/8/21   Lottie Damon MD   lidocaine 4 % external patch Place 1 patch onto the skin daily 11/9/21   Lottie Damon MD        Allergies:       Bee pollen, Codeine, Dust mite extract, Pcn [penicillins], and Pollen extract    Social History:     Tobacco:    reports that she has been smoking cigarettes. She has a 25.00 pack-year smoking history. She has never used smokeless tobacco.  Alcohol:      reports no history of alcohol use. Drug Use:  reports no history of drug use.     Family History:     Family History   Problem Relation Age of Onset    High Blood Pressure Mother     Asthma Sister    [facility] Migraines Sister     High Blood Pressure Father     Other Brother         bad knees, with recent total knee replacement        Review of Systems:     Positive and Negative as described in HPI    Constitutional:  negative for  fevers, chills, sweats, mild fatigue, and weight loss  HEENT:  negative for vision or hearing changes,   Respiratory:  negative for shortness of breath, cough, or congestion  Cardiovascular:  negative for  chest pain, palpitations  Gastrointestinal: Mild nausea, vomiting, diarrhea, constipation, positive abdominal pain  Genitourinary:  negative for frequency, dysuria  Integument:  negative for rash, skin lesions  Musculoskeletal: Positive or muscle aches or joint pain  Neurological: Positive headaches, dizziness, lightheadedness, numbness, pain and tingling extrimities  Behavior/Psych:  negative for depression and positive anxiety    Code Status:  Full Code    Physical Exam:     Vitals:  BP (!) 159/81   Pulse 67   Temp 98 °F (36.7 °C) (Oral)   Resp 16   Ht 5' 6\" (1.676 m)   Wt 121 lb 7.6 oz (55.1 kg)   LMP 2022   SpO2 100%   BMI 19.61 kg/m²   Temp (24hrs), Av.2 °F (36.8 °C), Min:98 °F (36.7 °C), Max:98.5 °F (36.9 °C)      General appearance - alert, well appearing, and in no acute distress  Mental status - oriented to person, place, and time with anxious affect  Head - normocephalic and atraumatic  Eyes - pupils equal and reactive, extraocular eye movements intact, conjunctiva clear  Ears - hearing appears to be intact  Nose - no drainage noted  Mouth - mucous membranes moist  Neck - supple, no carotid bruits, thyroid not palpable  Chest - clear to auscultation, normal effort  Heart - normal rate, regular rhythm, no murmurs  Abdomen - soft, epigastric right upper quadrant tenderness, nondistended, bowel sounds present all four quadrants, no masses, hepatomegaly or splenomegaly.  No hernias  Neurological - normal speech, no focal findings or movement disorder noted, cranial nerves II through XII grossly intact  Extremities -no pedal edema or calf pain with palpation  Skin - no gross lesions, rashes, or induration noted  Cranial Nerves : grossly intact  Lymph nodes: not done at this time    Data:   CBC:   Lab Results   Component Value Date    WBC 5.7 03/06/2022    RBC 2.76 03/06/2022    RBC 3.71 01/13/2012    HGB 9.0 03/06/2022    HCT 26.9 03/06/2022    MCV 97.7 03/06/2022    MCH 32.5 03/06/2022    MCHC 33.3 03/06/2022    RDW 14.1 03/06/2022     03/06/2022     01/13/2012    MPV 8.0 03/06/2022     CBC with Differential:    Lab Results   Component Value Date    WBC 5.7 03/06/2022    RBC 2.76 03/06/2022    RBC 3.71 01/13/2012    HGB 9.0 03/06/2022    HCT 26.9 03/06/2022     03/06/2022     01/13/2012    MCV 97.7 03/06/2022    MCH 32.5 03/06/2022    MCHC 33.3 03/06/2022    RDW 14.1 03/06/2022    LYMPHOPCT 19 03/04/2022    LYMPHOPCT 21.3 06/23/2020    MONOPCT 8 03/04/2022    MONOPCT 5.1 06/23/2020    EOSPCT 2.1 06/23/2020    BASOPCT 1 03/04/2022    BASOPCT 1.4 06/23/2020    MONOSABS 0.50 03/04/2022    MONOSABS 0.4 06/23/2020    LYMPHSABS 1.20 03/04/2022    LYMPHSABS 1.6 06/23/2020    EOSABS 0.10 03/04/2022    EOSABS 0.2 06/23/2020    BASOSABS 0.10 03/04/2022    DIFFTYPE NOT REPORTED 11/08/2021     Hemoglobin/Hematocrit:    Lab Results   Component Value Date    HGB 9.0 03/06/2022    HCT 26.9 03/06/2022     CMP:    Lab Results   Component Value Date     03/06/2022    K 4.1 03/06/2022     03/06/2022    CO2 17 03/06/2022    BUN 44 03/06/2022 CREATININE 4.28 03/06/2022    GFRAA 14 03/06/2022    LABGLOM 11 03/06/2022    GLUCOSE 106 03/06/2022    PROT 8.2 03/04/2022    LABALBU 4.9 03/04/2022    CALCIUM 7.8 03/06/2022    BILITOT <0.15 03/04/2022    ALKPHOS 131 03/04/2022    AST 16 03/04/2022    ALT 9 03/04/2022     BMP:    Lab Results   Component Value Date     03/06/2022    K 4.1 03/06/2022     03/06/2022    CO2 17 03/06/2022    BUN 44 03/06/2022    LABALBU 4.9 03/04/2022    CREATININE 4.28 03/06/2022    CALCIUM 7.8 03/06/2022    GFRAA 14 03/06/2022    LABGLOM 11 03/06/2022    GLUCOSE 106 03/06/2022     PT/INR:    Lab Results   Component Value Date    PROTIME 12.7 06/20/2021    INR 0.9 06/20/2021     PTT:    Lab Results   Component Value Date    APTT 36.3 05/16/2021   [APTT}    Assesment:     Primary Problem  Hypertensive emergency    Active Hospital Problems    Diagnosis Date Noted    Hypertensive emergency [I16.1] 03/04/2022    Acute kidney injury superimposed on chronic kidney disease ([facility] [N17.9, N18.9] 02/21/2021    Hyperlipidemia [E78.5] 06/18/2020    [facility] renal [facility] with acute tubular necrosis superimposed [facility] stage [facility] [N17.0, N18.30] 05/27/2020    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 May require HIDA scan  2. PPI  3. Plan upper endoscopy tomorrow to evaluate rule out any ulcer disease etc.  The Endoscopic procedure was explained to the patient in detail  The prep and NPO were explained  All the Risks, Benefits, and Alternatives were explained  Risk of Bleeding, Perforation and Cardio Respiratory risks were explained  her questions were answered  The patient has verbalized understanding and agreement to this plan. Some lifestyle dietary modifications discussed with her          Thank you for allowing me to participate in the care of your patient.   Please feel free to contact me with any questions or concerns.      Electronically signed by Will Contreras MD on 3/6/2022 at 11:52 AM     Copy sent to Dr. Jesus Gonzales PA-C

## 2022-03-06 NOTE — PLAN OF CARE
Problem: Pain:  Goal: Pain level will decrease  Description: Pain level will decrease  3/6/2022 0329 by Leora Johnson RN  Outcome: Ongoing  3/5/2022 1729 by Hyacinth Goyal RN  Outcome: Ongoing  Goal: Control of acute pain  Description: Control of acute pain  3/6/2022 0329 by Leora Johnson RN  Outcome: Ongoing  3/5/2022 1729 by Hyacinth Goyal RN  Outcome: Ongoing  Goal: Control of chronic pain  Description: Control of chronic pain  3/6/2022 0329 by Leora Johnson RN  Outcome: Ongoing  3/5/2022 1729 by Hyacinth Goyal RN  Outcome: Ongoing  Goal: Patient's pain/discomfort is manageable  Description: Patient's pain/discomfort is manageable  3/6/2022 0329 by Leora Johnson RN  Outcome: Ongoing  3/5/2022 1729 by Hyacinth Goyal RN  Outcome: Ongoing     Problem: Infection:  Goal: Will remain free from infection  Description: Will remain free from infection  3/6/2022 0329 by Leora Johnson RN  Outcome: Ongoing  3/5/2022 1729 by Hyacinth Goyal RN  Outcome: Ongoing     Problem: Safety:  Goal: Free from accidental physical injury  Description: Free from accidental physical injury  3/6/2022 0329 by Leora Johnson RN  Outcome: Ongoing  3/5/2022 1729 by Hyacinth Goyal RN  Outcome: Ongoing  Goal: Free from intentional harm  Description: Free from intentional harm  3/6/2022 0329 by Leora Johnson RN  Outcome: Ongoing  3/5/2022 1729 by Hyacinth Goyal RN  Outcome: Ongoing     Problem: Daily Care:  Goal: Daily care needs are met  Description: Daily care needs are met  3/6/2022 0329 by Leora Johnson RN  Outcome: Ongoing  3/5/2022 1729 by Hyacinth Goyal RN  Outcome: Ongoing     Problem: Skin Integrity:  Goal: Skin integrity will stabilize  Description: Skin integrity will stabilize  3/6/2022 0329 by Leora Johnson RN  Outcome: Ongoing  3/5/2022 1729 by Hyacinth Goyal RN  Outcome: Ongoing     Problem: Discharge Planning:  Goal: Patients continuum of care needs are met  Description: Patients

## 2022-03-06 NOTE — CARE COORDINATION
ONGOING DISCHARGE PLAN:    Discharge plan for the patient is home with children. Patient declines VNS needs. Nephrology on and BUN and Creat 44 and 4.28. Will continue to follow for additional discharge needs.     Electronically signed by Kristi Mueller RN on 3/6/2022 at 9:26 AM

## 2022-03-06 NOTE — PROGRESS NOTES
BETSY BERRIOS [facility] Internal Medicine  Ed Thomas MD; Donald Rodriguez MD; Lucero Louise MD; MD Nato Naik MD; MD NEVAEH James [facility] Internal Medicine   [facility]    HISTORY AND PHYSICAL EXAMINATION            Date:   3/6/2022  Patient name:  Mavis Duncan  Date of admission:  3/4/2022  9:15 PM  MRN:   [id number]  Account:  [de-identified]  YOB: 1980  PCP:    Essence Schulz PA-C  Room:   [facility]3771-  Code Status:    Full Code    Chief Complaint:     Chief Complaint   Patient presents with    Chest Pain    Flank Pain       History Obtained From:     patient    History of Present Illness:     Mavis Duncan is a 35 y.o. Non- / non  female who presents with Chest Pain and Flank Pain   and is admitted to the hospital for the management of Hypertensive emergency.   69-year-old lady with history of polycystic kidney disease chronic renal failure stage III not on dialysis chronic anemia secondary to renal disease uncontrolled hypertension noncompliant with medication chronic back pain takes NSAIDs every day 200 mg three times a day  COPD active smoker not in exacerbation  Admitted with multiple complaints including epigastric sternal chest pain worse with palpation not exacerbated by deep breathing also had right upper quadrant pain no radiation associated with nausea but no vomiting no fever or chills        Past Medical History:     Past Medical History:   Diagnosis Date    Anxiety     Asthma     Chronic headaches 07/10/2013    CKD (chronic kidney disease) stage 3, GFR 30-59 ml/min ([facility])     Degenerative disc disease, cervical     Depression     Dysmenorrhea 09/30/2014    Eczema 11/08/2012    Hypertension     Hypocalcemia 05/28/2014    Kidney stone     Menorrhagia 09/30/2014    Neck pain, bilateral 05/28/2014    Pelvic pain in female 09/30/2014    Polycystic kidney     Smoker 10/02/2011    Tension vascular headache 01/20/2014        Past Surgical History:     Past Surgical History:   Procedure Laterality Date    BREAST ENHANCEMENT SURGERY      BREAST LUMPECTOMY      left breast    CYSTO/URETERO/PYELOSCOPY, CALCULUS TX Right 6/12/2020    HOLMIUM - CYSTO, RIGHT RETROGRADE PYELOGRAM, RIGHT URETEROSCOPY, LASER LITHO ON STAND BY, RIGHT STENT PLACEMENT performed by Sweta Anna MD at [address]  06/12/2020    DILATION AND CURETTAGE OF UTERUS      x2    LITHOTRIPSY Right 7/23/2020    ESWL EXTRACORPOREAL SHOCK WAVE LITHOTRIPSY performed by Sweta Anna MD at [address]  07/01/2013    nerve block(right vervical C3/TON/C4/C5    NERVE BLOCK  07/08/2013    nerve block(right vervical C3/TON/C4/C5    OTHER SURGICAL HISTORY  03/10/2014     botox inj     TOE SURGERY      removal of ingrown toe nail-2nd toe on left foot     URETEROSCOPY Right 06/12/2020    stent placement        Medications Prior to Admission:     Prior to Admission medications    Medication Sig Start Date End Date Taking? Authorizing Provider   metoprolol succinate (TOPROL XL) 50 MG extended release tablet Take 50 mg by mouth daily   Yes Historical Provider, MD   hydrOXYzine (ATARAX) 50 MG tablet Take 50 mg by mouth nightly   Yes Historical Provider, MD   amLODIPine (NORVASC) 5 MG tablet Take 5 mg by mouth daily   Yes Historical Provider, MD   DULoxetine (CYMBALTA) 60 MG extended release capsule take 1 capsule by mouth once daily  Patient taking differently: 90 mg  11/10/17  Yes Sabrina Vazquez MD   amitriptyline (ELAVIL) 25 MG tablet Take 1 tablet by mouth nightly 11/8/21   Rebecca Rico MD   lidocaine 4 % external patch Place 1 patch onto the skin daily 11/9/21   Rebecca Rico MD        Allergies:     Bee pollen, Codeine, Dust mite extract, Pcn [penicillins], and Pollen extract    Social History:     Tobacco:    reports that she has been smoking cigarettes.  She has a 25.00 pack-year smoking history. She has never used smokeless tobacco.  Alcohol:      reports no history of alcohol use. Drug Use:  reports no history of drug use. Family History:     Family History   Problem Relation Age of Onset    High Blood Pressure Mother     Asthma Sister    [employer] Migraines Sister     High Blood Pressure Father     Other Brother         bad knees, with recent total knee replacement        Review of Systems:     Positive and Negative as described in HPI. CONSTITUTIONAL:  negative for fevers, chills, sweats, fatigue, weight loss  HEENT:  negative for vision, hearing changes, runny nose, throat pain  RESPIRATORY:  negative for shortness of breath, cough, congestion, wheezing  CARDIOVASCULAR: Positive for central chest pain worse with palpation and movements no exacerbation with deep breathing palpitations  GASTROINTESTINAL: Nausea no vomiting right upper quadrant pain  GENITOURINARY:  negative for difficulty of urination, burning with urination, frequency   INTEGUMENT:  negative for rash, skin lesions, easy bruising   HEMATOLOGIC/LYMPHATIC:  negative for swelling/edema   ALLERGIC/IMMUNOLOGIC:  negative for urticaria , itching  ENDOCRINE:  negative increase in drinking, increase in urination, hot or cold intolerance  MUSCULOSKELETAL: Chronic back pain takes NSAIDs NEUROLOGICAL:  negative for headaches, dizziness, lightheadedness, numbness, pain, tingling extremities  BEHAVIOR/PSYCH:  negative for depression, anxiety    Physical Exam:   BP (!) 159/81   Pulse 67   Temp 98 °F (36.7 °C) (Oral)   Resp 16   Ht 5' 6\" (1.676 m)   Wt 121 lb 7.6 oz (55.1 kg)   LMP 2022   SpO2 100%   BMI 19.61 kg/m²   Temp (24hrs), Av.2 °F (36.8 °C), Min:98 °F (36.7 °C), Max:98.5 °F (36.9 °C)    No results for input(s): POCGLU in the last 72 hours.     Intake/Output Summary (Last 24 hours) at 3/6/2022 1013  Last data filed at 3/6/2022 0938  Gross per 24 hour   Intake 674 ml   Output --   Net 674 ml     Patient's bilateral breast examined in presence of nurse Flori Ram no inflammation no signs of leak from the implants noted tenderness ten started him noted reproducible  General Appearance: alert, well appearing, and in no acute distress  Mental status: oriented to person, place, and time  Head: normocephalic, atraumatic  Eye: no icterus, redness, pupils equal and reactive, extraocular eye movements intact, conjunctiva clear  Ear: normal external ear, no discharge, hearing intact  Nose: no drainage noted  Mouth: mucous membranes moist  Neck: supple, no carotid bruits, thyroid not palpable  Lungs: Bilateral equal air entry, clear to ausculation, no wheezing, rales or rhonchi, normal effort  Cardiovascular: normal rate, regular rhythm, no murmur, gallop, rub  Abdomen:  Moderate to severe tenderness right upper quadrant without guarding rigidity no rebound tenderness  Neurologic: There are no new focal motor or sensory deficits, normal muscle tone and bulk, no abnormal sensation, normal speech, cranial nerves II through XII grossly intact  Skin: No gross lesions, rashes, bruising or bleeding on exposed skin area  Extremities: peripheral pulses palpable, no pedal edema or calf pain with palpation  Psych: normal affect    Investigations:      Laboratory Testing:  Recent Results (from the past 24 hour(s))   D-Dimer, Quantitative    Collection Time: 03/05/22 10:54 AM   Result Value Ref Range    D-Dimer, Quant 0.66 (H) 0.00 - 0.59 mg/L FEU   Basic Metabolic Panel w/ Reflex to MG    Collection Time: 03/06/22  5:59 AM   Result Value Ref Range    Glucose 106 (H) 70 - 99 mg/dL    BUN 44 (H) 6 - 20 mg/dL    CREATININE 4.28 (H) 0.50 - 0.90 mg/dL    Calcium 7.8 (L) 8.6 - 10.4 mg/dL    Sodium 141 135 - 144 mmol/L    Potassium 4.1 3.7 - 5.3 mmol/L    Chloride 109 (H) 98 - 107 mmol/L    CO2 17 (L) 20 - 31 mmol/L    Anion Gap 15 9 - 17 mmol/L    GFR Non-African American 11 (L) >60 mL/min    GFR  14 (L) >60 mL/min    GFR Comment SPECIMEN REJECTION    Collection Time: 03/06/22  5:59 AM   Result Value Ref Range    Specimen Source . BLOOD     Ordered Test CBC     Reason for Rejection       Unable to perform testing: Specimen quantity not sufficient. CBC    Collection Time: 03/06/22  7:10 AM   Result Value Ref Range    WBC 5.7 3.5 - 11.0 k/uL    RBC 2.76 (L) 4.0 - 5.2 m/uL    Hemoglobin 9.0 (L) 12.0 - 16.0 g/dL    Hematocrit 26.9 (L) 36 - 46 %    MCV 97.7 80 - 100 fL    MCH 32.5 26 - 34 pg    MCHC 33.3 31 - 37 g/dL    RDW 14.1 11.5 - 14.9 %    Platelets 644 277 - 117 k/uL    MPV 8.0 6.0 - 12.0 fL       Imaging/Diagnostics:  NM LUNG VENT/PERFUSION (VQ)    Result Date: 3/5/2022  A single large matched defect in the left lower lobe is considered low probability for acute pulmonary embolism. XR CHEST PORTABLE    Result Date: 3/4/2022  No acute process. CT CHEST ABDOMEN PELVIS WO CONTRAST    Result Date: 3/4/2022  1. Enlarged, polycystic kidneys again demonstrated, consistent with the patient's known history of polycystic kidney disease. Nonobstructing 6 mm right intrarenal calculus is again demonstrated, with multiple hemorrhagic cysts again demonstrated bilaterally. 2. Cardiomegaly 3. No evidence of acute cardiopulmonary disease 4. Coronary arterial calcifications 5. Unenhanced thoracic and abdominal aorta appears grossly normal, with mild atherosclerotic plaque formation. Intravenous contrast would be necessary to exclude dissection.        Assessment :      Hospital Problems           Last Modified POA    * (Principal) Hypertensive emergency 3/4/2022 Yes    Polycystic kidney 3/5/2022 Yes    Acute renal failure with acute tubular necrosis superimposed on stage 3 chronic kidney disease ([address] 3/5/2022 Yes    Congenital multiple renal cysts 3/5/2022 Yes    Hyperlipidemia 3/5/2022 Yes    Acute kidney injury superimposed on chronic kidney disease ([address] 3/5/2022 Yes          Plan:     Patient status inpatient in the Progressive Unit/Step down  61-year-old lady with history of polycystic kidney disease stage III CKD taking ibuprofen to three times a day to 1 mg each for chronic back pain  Admitted with the multiple complaints including hypertensive urgency systolic blood pressure over 200 with acute kidney injury Baseline creatinine is 3.4 admitted with more than four creatinine  Also had central chest pain atypical in nature slightly elevated troponin likely NSTEMI type II secondary to acute renal failure on chronic kidney disease  Right upper quadrant pain with elevated lipase ninety-nine rule out cholecystitis choledocholithiasis we will do ultrasound of the right upper quadrant  Hemorrhagic cyst in both kidneys could be contributing to some pain  Active smoker with COPD not in exacerbation  Moderate protein calorie malnutrition likely secondary to COPD and chronic renal failure with a BMI of 19.61  Pt going out for smoke  Refusing nicotine patch  Dc lovenox  March 6  Elevated d dimer  VQ scan neative  Gi input if pt need MRCP  saad not improving may need HD  saad on ckd due to htn and nsaids use  ty 2 NSTEMI  nictoine patch    Consultations:   IP CONSULT TO INTERNAL MEDICINE  IP CONSULT TO NEPHROLOGY     Patient is admitted as inpatient status because of co-morbidities listed above, severity of signs and symptoms as outlined, requirement for current medical therapies and most importantly because of direct risk to patient if care not provided in a hospital setting. Expected length of stay > 48 hours. Angélica Torres MD  3/6/2022  10:13 AM    Copy sent to Dr. Dion Overton PA-C    Please note that this chart was generated using voice recognition Dragon dictation software. Although every effort was made to ensure the accuracy of this automated transcription, some errors in transcription may have occurred.

## 2022-03-06 NOTE — PROGRESS NOTES
NEPHROLOGY PROGRESS NOTE     Patient :  Coy Bishop; 35 y.o. MRN# [id number]  Location:  [facility]/1834-60  Attending:  Feliz Vallejo MD  Admit Date:  3/4/2022   Hospital Day: 2      Reason for Consult: Acute kidney injury on CKD      Chief Complaint:  *Right flank pain right upper abdominal pain    Subjective/interval history. Patient seen and examined complains of abdominal pain pain is slightly better, complains of nausea no vomiting no diarrhea  Serum creatinine slightly improved to 4.2 mg/dL  Urine output not recorded    History of Present Illness: This is a 35 y.o. female with past medical history of essential hypertension, bronchial asthma, depression, nephrolithiasis requiring cystoscopy and double ureteral stent in 2020, history of autosomal dominant polycystic kidney disease chronic kidney disease progressing to stage V  Patient presented to the hospital with complains of right-sided flank pain and upper abdominal pain, chest pain  Patient denied nausea vomiting no tremors no fever chills  CT abdomen pelvis without contrast was done which showed enlarged polycystic kidney disease consistent with patient's known history of polycystic kidney disease. Nonobstructing 6 mm right intrarenal calculus noted with multiple hemorrhagic cysts. Cardiomegaly no evidence of acute cardiopulmonary disease coronary artery calcification, and enhanced thoracic and abdominal aorta appears grossly normal with mild atherosclerotic plaque formation  Troponins were mildly elevated 24  Labs showed serum creatinine of 4.5 mg/dL on admission Baseline serum creatinine was around 3.6 to 4.1 mg/dL in November    Pt denies any history of  prolonged NSAID use. Patient denies dysuria, gross hematuria, flank pain, nocturia, urgency, passing frothy urine or urinary incontinence. There has been no recent exposure to IV contrast. There is no history  of paraprotein disease.      Medication review shows no use of ACE-inhibitor/diuretics.     Past Medical History:        Diagnosis Date    Anxiety     Asthma     Chronic headaches 07/10/2013    CKD (chronic kidney disease) stage 3, GFR 30-59 ml/min ([facility])     Degenerative disc disease, cervical     Depression     Dysmenorrhea 09/30/2014    Eczema 11/08/2012    Hypertension     Hypocalcemia 05/28/2014    Kidney stone     Menorrhagia 09/30/2014    Neck pain, bilateral 05/28/2014    Pelvic pain in female 09/30/2014    Polycystic kidney     Smoker 10/02/2011    Tension vascular headache 01/20/2014       Past Surgical History:        Procedure Laterality Date    BREAST ENHANCEMENT SURGERY      BREAST LUMPECTOMY      left breast    CYSTO/URETERO/PYELOSCOPY, CALCULUS TX Right 6/12/2020    HOLMIUM - CYSTO, RIGHT RETROGRADE PYELOGRAM, RIGHT URETEROSCOPY, LASER LITHO ON STAND BY, RIGHT STENT PLACEMENT performed by Jyothi Becker MD at [address] [facility]  06/12/2020    DILATION AND CURETTAGE OF UTERUS      x2    LITHOTRIPSY Right 7/23/2020    ESWL EXTRACORPOREAL SHOCK WAVE LITHOTRIPSY performed by Jyothi Becker MD at [address]  07/01/2013    nerve block(right vervical C3/TON/C4/C5    NERVE BLOCK  07/08/2013    nerve block(right vervical C3/TON/C4/C5    OTHER SURGICAL HISTORY  03/10/2014     botox inj     TOE SURGERY      removal of ingrown toe nail-2nd toe on left foot     URETEROSCOPY Right 06/12/2020    stent placement       Current Medications:    nicotine (NICODERM CQ) 21 MG/24HR 1 patch, Daily  HYDROcodone-acetaminophen (NORCO) 5-325 MG per tablet 1 tablet, Once  HYDROmorphone (DILAUDID) injection 1 mg, Q4H PRN  sodium chloride flush 0.9 % injection 10 mL, PRN  amLODIPine (NORVASC) tablet 10 mg, Daily  DULoxetine (CYMBALTA) extended release capsule 90 mg, Daily  lidocaine 4 % external patch 1 patch, Daily  metoprolol succinate (TOPROL XL) extended release tablet 50 mg, Daily  amitriptyline (ELAVIL) tablet 25 mg, Nightly  heparin (porcine) injection 5,000 Units, BID  labetalol (NORMODYNE;TRANDATE) injection 10 mg, Q4H PRN  sodium chloride flush 0.9 % injection 5-40 mL, 2 times per day  sodium chloride flush 0.9 % injection 5-40 mL, PRN  0.9 % sodium chloride infusion, PRN  acetaminophen (TYLENOL) tablet 650 mg, Q4H PRN  ondansetron (ZOFRAN-ODT) disintegrating tablet 4 mg, Q8H PRN   Or  ondansetron (ZOFRAN) injection 4 mg, Q6H PRN  0.9 % sodium chloride infusion, Continuous        Allergies:  Bee pollen, Codeine, Dust mite extract, Pcn [penicillins], and Pollen extract    Social History:   Social History     Socioeconomic History    Marital status:      Spouse name: Not on file    Number of children: Not on file    Years of education: Not on file    Highest education level: Not on file   Occupational History    Occupation: stay at home mom   Tobacco Use    Smoking status: Current Every Day Smoker     Packs/day: 1.00     Years: 25.00     Pack years: 25.00     Types: Cigarettes    Smokeless tobacco: Never Used   Vaping Use    Vaping Use: Never used   Substance and Sexual Activity    Alcohol use: No     Alcohol/week: 0.0 standard drinks    Drug use: No    Sexual activity: Yes     Partners: Male     Comment: steady boyfriend   Other Topics Concern    Not on file   Social History Narrative    Not on file     Social Determinants of Health     Financial Resource Strain:     Difficulty of Paying Living Expenses: Not on file   Food Insecurity:     Worried About Running Out of Food in the Last Year: Not on file    Kiara of Food in the Last Year: Not on file   Transportation Needs:     Lack of Transportation (Medical): Not on file    Lack of Transportation (Non-Medical):  Not on file   Physical Activity:     Days of Exercise per Week: Not on file    Minutes of Exercise per Session: Not on file   Stress:     Feeling of Stress : Not on file   Social Connections:     Frequency of Communication with Friends and Family: Not on file    Frequency of Social Gatherings with Friends and Family: Not on file    Attends Yarsanism Services: Not on file    Active Member of Clubs or Organizations: Not on file    Attends Club or Organization Meetings: Not on file    Marital Status: Not on file   Intimate Partner Violence:     Fear of Current or Ex-Partner: Not on file    Emotionally Abused: Not on file    Physically Abused: Not on file    Sexually Abused: Not on file   Housing Stability:     Unable to Pay for Housing in the Last Year: Not on file    Number of [place] in the Last Year: Not on file    Unstable Housing in the Last Year: Not on file       Family History:   Family History   Problem Relation Age of Onset    High Blood Pressure Mother     Asthma Sister     Migraines Sister     High Blood Pressure Father     Other Brother         bad knees, with recent total knee replacement        Review of Systems:    Constitutional: No fever, no chills, no night sweats, fatigue, generalized weakness, loss of appetite  HEENT:  No headache, otalgia, itchy eyes, epistaxis, nasal discharge or sore throat. Cardiac:  + chest pain, dyspnea, orthopnea or PND, palpitations  Chest:  No cough, hemoptysis, pleuritic chest pain, wheezing,SOB  Abdomen:  N right upper abdominal pain, no nausea, vomiting, diarrhea, melena, dysphagia hematemesis,constipation, abdominal bloating, right flank pain  Neuro:  No CVA, TIA or seizure like activity. Skin:   No rashes, no itching. :   No hematuria, no pyuria, no dysuria, no flank pain. Extremities:  No swelling or joint pains.       Objective:  CURRENT TEMPERATURE:  Temp: 98 °F (36.7 °C)  MAXIMUM TEMPERATURE OVER 24HRS:  Temp (24hrs), Av.2 °F (36.8 °C), Min:98 °F (36.7 °C), Max:98.5 °F (36.9 °C)    CURRENT RESPIRATORY RATE:  Resp: 16  CURRENT PULSE:  Pulse: 67  CURRENT BLOOD PRESSURE:  BP: (!) 159/81  24HR BLOOD PRESSURE RANGE:  Systolic (92UYB), JPE:774 , Min:141 , IGT:592   ; Diastolic (39LTD), MUD:66, Min:81, Max:92    24HR INTAKE/OUTPUT:      Intake/Output Summary (Last 24 hours) at 3/6/2022 1235  Last data filed at 3/6/2022 6006  Gross per 24 hour   Intake 674 ml   Output --   Net 674 ml     Patient Vitals for the past 96 hrs (Last 3 readings):   Weight   03/05/22 0058 121 lb 7.6 oz (55.1 kg)   03/04/22 2116 125 lb (56.7 kg)       Physical Exam:  GENERAL APPEARANCE: Alert and cooperative, and appears to be in no acute distress. HEAD: normocephalic  EYES:  EOMI. Not pale, anicteric   NOSE:  No nasal discharge. THROAT:  Oral cavity and pharynx normal. Moist  CARDIAC: Normal S1 and S2. No S3, S4 or murmurs. Rhythm is regular. LUNGS: Clear to auscultation and percussion without rales, rhonchi, wheezing or diminished breath sounds. GI, soft mild tenderness in the right upper quadrant  MUSKULOSKELETAL: Adequately aligned spine. No joint erythema or tenderness. EXTREMITIES: No edema. Peripheral pulses intact. NEURO: Nonfocal    Labs:   CBC:  Recent Labs     03/05/22  0154 03/05/22  0546 03/06/22  0710   WBC 6.1 5.5 5.7   RBC 2.93* 2.80* 2.76*   HGB 9.5* 9.1* 9.0*   HCT 28.6* 27.7* 26.9*   MCV 97.8 99.0 97.7   MCH 32.3 32.6 32.5   MCHC 33.1 32.9 33.3   RDW 14.5 14.1 14.1    254 236   MPV 8.2 8.2 8.0      BMP:   Recent Labs     03/05/22  0154 03/05/22  0546 03/06/22  0559    141 141   K 4.1 4.1 4.1    109* 109*   CO2 20 18* 17*   BUN 44* 46* 44*   CREATININE 4.45* 4.57* 4.28*   GLUCOSE 92 93 106*   CALCIUM 7.6* 7.5* 7.8*      Phosphorus:  No results for input(s): PHOS in the last 72 hours. Magnesium: No results for input(s): MG in the last 72 hours. Albumin:   Recent Labs     03/04/22  2139   LABALBU 4.9       IRON:  No results for input(s): IRON in the last 72 hours. Iron Saturation:  Invalid input(s): PERCENTFE  TIBC:  No results for input(s): TIBC in the last 72 hours. FERRITIN:  No results for input(s): FERRITIN in the last 72 hours.   SPEP: No results for input(s): SPEP in the last 72 hours. Recent Labs     03/04/22  2139   PROT 8.2     UPEP: No results for input(s): TPU in the last 72 hours. Urine Sodium:  No results for input(s): NOHELIA in the last 72 hours. Urine Potassium: No results for input(s): KUR in the last 72 hours. Urine Chloride:  No results for input(s): CLU in the last 72 hours. Urine Ph:  Invalid input(s): PO4U  Urine Osmolarity: No results for input(s): OSMOU in the last 72 hours. Urine Creatinine:  No results for input(s): LABCREA in the last 72 hours. Urine Eosinophils: Invalid input(s): EOSU  Urine Protein:  No results for input(s): TPU in the last 72 hours. Urinalysis:    Recent Labs     03/04/22  2204   NITRU NEGATIVE   COLORU Yellow   PHUR 6.0   WBCUA 10 TO 20   RBCUA 0 TO 2   BACTERIA None   SPECGRAV 1.011   LEUKOCYTESUR SMALL*   UROBILINOGEN Normal   [address] NEGATIVE         Radiology:  Reviewed as available. Assessment:  Presented with chest pain right upper abdominal pain uncontrolled hypertension    1. CKD 5 secondary to polycystic kidney disease, kidney function slowly worsening serum creatinine, serum creatinine peaked to 4.5 mg/dL, serum creatinine improved to 4.2 mg/dL today    2. Essential hypertension, suboptimally controlled due to noncompliance patient was not using blood pressure medications  Blood pressure has improved systolic blood pressure is 141/81    3. Anemia of chronic kidney disease    4. Kidney hyperparathyroidism due to renal failure           Plan:  1. Increase IV fluids  2. Continue blood pressure medications, metoprolol, amlodipine  3. We will monitor kidney function and if kidney function continues to get worse patient might need dialysis  4. Discussed with the possibility of dialysis in reference to progressive chronic kidney disease stage V kidney disease. 5. Will follow kidney function closely  6.  Serum creatinine remains above 4, will consult IR for tunneled dialysis catheter placement  7. I discussed with the patient regarding possibilities of dialysis also discussed the risk and benefit of dialysis patient has no objections to proceed with dialysis if needed  8. Retacrit      Thank you for the consultation.       Electronically signed by Marlee Castro MD on 3/6/2022 at 12:35 PM

## 2022-03-06 NOTE — PLAN OF CARE
Problem: Pain:  Goal: Pain level will decrease  Description: Pain level will decrease  3/6/2022 1721 by Arabella Fatima RN  Outcome: Ongoing, Pt educated on non-pharmacological pain interventions. PRN pain medications administered. Problem: Infection:  Goal: Will remain free from infection  Description: Will remain free from infection  3/6/2022 1721 by Arabella Fatima RN  Outcome: Ongoing, Patient displays no new signs/ symptoms of infection during this shift. Problem: Safety:  Goal: Free from accidental physical injury  Description: Free from accidental physical injury  3/6/2022 1721 by Arabella Fatima RN  Outcome: Ongoing, Patient remains free of incidence/ injury. Bed remains in low position. Side rails up x2.

## 2022-03-07 ENCOUNTER — ANESTHESIA EVENT (OUTPATIENT)
Dept: ENDOSCOPY | Age: 42
DRG: 199 | End: 2022-03-07
Payer: MEDICARE

## 2022-03-07 ENCOUNTER — ANESTHESIA (OUTPATIENT)
Dept: ENDOSCOPY | Age: 42
DRG: 199 | End: 2022-03-07
Payer: MEDICARE

## 2022-03-07 VITALS
RESPIRATION RATE: 10 BRPM | SYSTOLIC BLOOD PRESSURE: 154 MMHG | DIASTOLIC BLOOD PRESSURE: 79 MMHG | OXYGEN SATURATION: 100 %

## 2022-03-07 LAB
ANION GAP SERPL CALCULATED.3IONS-SCNC: 11 MMOL/L (ref 9–17)
BUN BLDV-MCNC: 42 MG/DL (ref 6–20)
CALCIUM SERPL-MCNC: 7.7 MG/DL (ref 8.6–10.4)
CHLORIDE BLD-SCNC: 107 MMOL/L (ref 98–107)
CO2: 22 MMOL/L (ref 20–31)
CREAT SERPL-MCNC: 4.14 MG/DL (ref 0.5–0.9)
GFR AFRICAN AMERICAN: 14 ML/MIN
GFR NON-AFRICAN AMERICAN: 12 ML/MIN
GFR SERPL CREATININE-BSD FRML MDRD: ABNORMAL ML/MIN/{1.73_M2}
GLUCOSE BLD-MCNC: 89 MG/DL (ref 70–99)
HCG(URINE) PREGNANCY TEST: NEGATIVE
HCT VFR BLD CALC: 27.7 % (ref 36–46)
HEMOGLOBIN: 9.3 G/DL (ref 12–16)
MCH RBC QN AUTO: 32.9 PG (ref 26–34)
MCHC RBC AUTO-ENTMCNC: 33.7 G/DL (ref 31–37)
MCV RBC AUTO: 97.5 FL (ref 80–100)
PDW BLD-RTO: 14.3 % (ref 11.5–14.9)
PLATELET # BLD: 215 K/UL (ref 150–450)
PMV BLD AUTO: 8.6 FL (ref 6–12)
POTASSIUM SERPL-SCNC: 4.8 MMOL/L (ref 3.7–5.3)
RBC # BLD: 2.84 M/UL (ref 4–5.2)
SODIUM BLD-SCNC: 140 MMOL/L (ref 135–144)
WBC # BLD: 4.8 K/UL (ref 3.5–11)

## 2022-03-07 PROCEDURE — 6360000002 HC RX W HCPCS: Performed by: INTERNAL MEDICINE

## 2022-03-07 PROCEDURE — 1200000000 HC SEMI PRIVATE

## 2022-03-07 PROCEDURE — 2580000003 HC RX 258: Performed by: INTERNAL MEDICINE

## 2022-03-07 PROCEDURE — 36415 COLL VENOUS BLD VENIPUNCTURE: CPT

## 2022-03-07 PROCEDURE — 0DB68ZX EXCISION OF STOMACH, VIA NATURAL OR ARTIFICIAL OPENING ENDOSCOPIC, DIAGNOSTIC: ICD-10-PCS | Performed by: INTERNAL MEDICINE

## 2022-03-07 PROCEDURE — 80048 BASIC METABOLIC PNL TOTAL CA: CPT

## 2022-03-07 PROCEDURE — 88305 TISSUE EXAM BY PATHOLOGIST: CPT

## 2022-03-07 PROCEDURE — 2500000003 HC RX 250 WO HCPCS: Performed by: INTERNAL MEDICINE

## 2022-03-07 PROCEDURE — 2500000003 HC RX 250 WO HCPCS

## 2022-03-07 PROCEDURE — 3609012400 HC EGD TRANSORAL BIOPSY SINGLE/MULTIPLE: Performed by: INTERNAL MEDICINE

## 2022-03-07 PROCEDURE — 6370000000 HC RX 637 (ALT 250 FOR IP): Performed by: INTERNAL MEDICINE

## 2022-03-07 PROCEDURE — 2580000003 HC RX 258: Performed by: ANESTHESIOLOGY

## 2022-03-07 PROCEDURE — 7100000010 HC PHASE II RECOVERY - FIRST 15 MIN: Performed by: INTERNAL MEDICINE

## 2022-03-07 PROCEDURE — 3700000000 HC ANESTHESIA ATTENDED CARE: Performed by: INTERNAL MEDICINE

## 2022-03-07 PROCEDURE — 99233 SBSQ HOSP IP/OBS HIGH 50: CPT | Performed by: INTERNAL MEDICINE

## 2022-03-07 PROCEDURE — 43239 EGD BIOPSY SINGLE/MULTIPLE: CPT | Performed by: INTERNAL MEDICINE

## 2022-03-07 PROCEDURE — 81025 URINE PREGNANCY TEST: CPT

## 2022-03-07 PROCEDURE — 7100000011 HC PHASE II RECOVERY - ADDTL 15 MIN: Performed by: INTERNAL MEDICINE

## 2022-03-07 PROCEDURE — 6360000002 HC RX W HCPCS

## 2022-03-07 PROCEDURE — 2709999900 HC NON-CHARGEABLE SUPPLY: Performed by: INTERNAL MEDICINE

## 2022-03-07 PROCEDURE — 6370000000 HC RX 637 (ALT 250 FOR IP): Performed by: NURSE PRACTITIONER

## 2022-03-07 PROCEDURE — 85027 COMPLETE CBC AUTOMATED: CPT

## 2022-03-07 RX ORDER — LIDOCAINE HYDROCHLORIDE 10 MG/ML
INJECTION, SOLUTION INFILTRATION; PERINEURAL PRN
Status: DISCONTINUED | OUTPATIENT
Start: 2022-03-07 | End: 2022-03-07 | Stop reason: SDUPTHER

## 2022-03-07 RX ORDER — PANTOPRAZOLE SODIUM 40 MG/1
40 TABLET, DELAYED RELEASE ORAL
Status: DISCONTINUED | OUTPATIENT
Start: 2022-03-08 | End: 2022-03-09 | Stop reason: HOSPADM

## 2022-03-07 RX ORDER — DIPHENHYDRAMINE HCL 25 MG
50 TABLET ORAL NIGHTLY PRN
Status: DISCONTINUED | OUTPATIENT
Start: 2022-03-07 | End: 2022-03-09 | Stop reason: HOSPADM

## 2022-03-07 RX ORDER — PROPOFOL 10 MG/ML
INJECTION, EMULSION INTRAVENOUS PRN
Status: DISCONTINUED | OUTPATIENT
Start: 2022-03-07 | End: 2022-03-07 | Stop reason: SDUPTHER

## 2022-03-07 RX ORDER — SUCRALFATE 1 G/1
1 TABLET ORAL EVERY 6 HOURS SCHEDULED
Status: DISCONTINUED | OUTPATIENT
Start: 2022-03-07 | End: 2022-03-09 | Stop reason: HOSPADM

## 2022-03-07 RX ORDER — OXYCODONE HYDROCHLORIDE 5 MG/1
5 TABLET ORAL EVERY 4 HOURS PRN
Status: DISCONTINUED | OUTPATIENT
Start: 2022-03-07 | End: 2022-03-09 | Stop reason: HOSPADM

## 2022-03-07 RX ORDER — DIPHENHYDRAMINE HCL 25 MG
50 TABLET ORAL NIGHTLY PRN
Status: DISCONTINUED | OUTPATIENT
Start: 2022-03-08 | End: 2022-03-07

## 2022-03-07 RX ORDER — SODIUM CHLORIDE 9 MG/ML
INJECTION, SOLUTION INTRAVENOUS CONTINUOUS
Status: DISCONTINUED | OUTPATIENT
Start: 2022-03-07 | End: 2022-03-07

## 2022-03-07 RX ADMIN — SODIUM CHLORIDE: 9 INJECTION, SOLUTION INTRAVENOUS at 11:40

## 2022-03-07 RX ADMIN — EPOETIN ALFA-EPBX 3000 UNITS: 3000 INJECTION, SOLUTION INTRAVENOUS; SUBCUTANEOUS at 20:57

## 2022-03-07 RX ADMIN — HYDROMORPHONE HYDROCHLORIDE 1 MG: 1 INJECTION, SOLUTION INTRAMUSCULAR; INTRAVENOUS; SUBCUTANEOUS at 02:13

## 2022-03-07 RX ADMIN — SODIUM BICARBONATE: 84 INJECTION, SOLUTION INTRAVENOUS at 05:01

## 2022-03-07 RX ADMIN — DIPHENHYDRAMINE HCL 50 MG: 25 TABLET ORAL at 23:31

## 2022-03-07 RX ADMIN — SUCRALFATE 1 G: 1 TABLET ORAL at 23:31

## 2022-03-07 RX ADMIN — HYDROMORPHONE HYDROCHLORIDE 1 MG: 1 INJECTION, SOLUTION INTRAMUSCULAR; INTRAVENOUS; SUBCUTANEOUS at 14:09

## 2022-03-07 RX ADMIN — AMITRIPTYLINE HYDROCHLORIDE 25 MG: 25 TABLET, FILM COATED ORAL at 20:56

## 2022-03-07 RX ADMIN — PROPOFOL 150 MG: 10 INJECTION, EMULSION INTRAVENOUS at 12:28

## 2022-03-07 RX ADMIN — METOPROLOL SUCCINATE 50 MG: 50 TABLET, EXTENDED RELEASE ORAL at 09:06

## 2022-03-07 RX ADMIN — HEPARIN SODIUM 5000 UNITS: 5000 INJECTION INTRAVENOUS; SUBCUTANEOUS at 20:56

## 2022-03-07 RX ADMIN — SODIUM BICARBONATE: 84 INJECTION, SOLUTION INTRAVENOUS at 21:05

## 2022-03-07 RX ADMIN — SUCRALFATE 1 G: 1 TABLET ORAL at 17:59

## 2022-03-07 RX ADMIN — OXYCODONE HYDROCHLORIDE 5 MG: 5 TABLET ORAL at 17:59

## 2022-03-07 RX ADMIN — OXYCODONE HYDROCHLORIDE 5 MG: 5 TABLET ORAL at 21:59

## 2022-03-07 RX ADMIN — DULOXETINE 90 MG: 60 CAPSULE, DELAYED RELEASE ORAL at 14:11

## 2022-03-07 RX ADMIN — AMLODIPINE BESYLATE 10 MG: 10 TABLET ORAL at 14:12

## 2022-03-07 RX ADMIN — LIDOCAINE HYDROCHLORIDE 40 MG: 10 INJECTION, SOLUTION INFILTRATION; PERINEURAL at 12:28

## 2022-03-07 RX ADMIN — HYDROMORPHONE HYDROCHLORIDE 1 MG: 1 INJECTION, SOLUTION INTRAMUSCULAR; INTRAVENOUS; SUBCUTANEOUS at 06:43

## 2022-03-07 ASSESSMENT — PULMONARY FUNCTION TESTS
PIF_VALUE: 1

## 2022-03-07 ASSESSMENT — PAIN SCALES - GENERAL
PAINLEVEL_OUTOF10: 7
PAINLEVEL_OUTOF10: 4
PAINLEVEL_OUTOF10: 8
PAINLEVEL_OUTOF10: 8
PAINLEVEL_OUTOF10: 7
PAINLEVEL_OUTOF10: 2
PAINLEVEL_OUTOF10: 7

## 2022-03-07 ASSESSMENT — PAIN DESCRIPTION - DESCRIPTORS: DESCRIPTORS: CRAMPING;PRESSURE

## 2022-03-07 ASSESSMENT — PAIN DESCRIPTION - LOCATION
LOCATION: ABDOMEN
LOCATION: ABDOMEN

## 2022-03-07 ASSESSMENT — PAIN DESCRIPTION - PAIN TYPE
TYPE: CHRONIC PAIN
TYPE: ACUTE PAIN

## 2022-03-07 ASSESSMENT — ENCOUNTER SYMPTOMS: STRIDOR: 0

## 2022-03-07 ASSESSMENT — PAIN DESCRIPTION - ONSET: ONSET: GRADUAL

## 2022-03-07 ASSESSMENT — PAIN DESCRIPTION - FREQUENCY: FREQUENCY: INTERMITTENT

## 2022-03-07 ASSESSMENT — PAIN DESCRIPTION - ORIENTATION: ORIENTATION: MID

## 2022-03-07 NOTE — PROGRESS NOTES
[facility] Internal Medicine    Progress Note    3/7/2022    2:46 PM    Name:   Radha Ashraf  MRN:     [id number]     Acct:      [de-identified]   Room:   2049/2049-01  IP Day:  3  Admit Date:  3/4/2022  9:15 PM    PCP:   Elisa Rowell PA-C  Code Status:  Full Code    Subjective:     C/C:   Chief Complaint   Patient presents with    Chest Pain    Flank Pain         Interval History Status: Improving, abdominal pain better but not resolved continues to complain of severe pain soon after eating diet  Plan to start dialysis plan for tunnel catheter placement tomorrow  Status post EGD this morning that showed gastritis    HPI:     57-year-old lady with history of polycystic kidney disease chronic renal failure stage III not on dialysis chronic anemia secondary to renal disease uncontrolled hypertension noncompliant with medication chronic back pain takes NSAIDs every day 200 mg three times a day  COPD active smoker not in exacerbation  Admitted with multiple complaints including epigastric sternal chest pain worse with palpation not exacerbated by deep breathing also had right upper quadrant pain no radiation associated with nausea but no vomiting no fever or chills    Review of Systems:     Denies any shortness of breath or cough  Denies chest pain or palpitations  Complaining of abdominal pain, denies diarrhea vomiting,   Denies any new numbness tremors or weakness. Medications: Allergies:     Allergies   Allergen Reactions    Bee Pollen     Codeine Nausea And Vomiting    Dust Mite Extract      Chest congestion , sneezing    Pcn [Penicillins] Rash    Pollen Extract      Chest congesting , sneezing        Current Meds:   Scheduled Meds:    nicotine  1 patch TransDERmal Daily    epoetin oli-epbx  3,000 Units SubCUTAneous Once per day on Mon Wed Fri    HYDROcodone 5 mg - acetaminophen  1 tablet Oral Once    amLODIPine  10 mg Oral Daily    DULoxetine  90 mg Oral Daily    lidocaine  1 patch TransDERmal Daily    metoprolol succinate  50 mg Oral Daily    amitriptyline  25 mg Oral Nightly    heparin (porcine)  5,000 Units SubCUTAneous BID    sodium chloride flush  5-40 mL IntraVENous 2 times per day     Continuous Infusions:    IV infusion builder 100 mL/hr at 22 0501    sodium chloride       PRN Meds: HYDROmorphone, sodium chloride flush, labetalol, sodium chloride flush, sodium chloride, acetaminophen, ondansetron **OR** ondansetron    Data:     Past Medical History:   has a past medical history of Anxiety, Asthma, Chronic headaches, CKD (chronic kidney disease) stage 3, GFR 30-59 ml/min ([facility]), Degenerative disc disease, cervical, Depression, Dysmenorrhea, Eczema, Hypertension, Hypocalcemia, Kidney stone, Menorrhagia, Neck pain, bilateral, Pelvic pain in female, Polycystic kidney, Smoker, and Tension vascular headache. Social History:   reports that she has been smoking cigarettes. She has a 25.00 pack-year smoking history. She has never used smokeless tobacco. She reports that she does not drink alcohol and does not use drugs. Family History:   Family History   Problem Relation Age of Onset    High Blood Pressure Mother     Asthma Sister    [facility] Migraines Sister     High Blood Pressure Father     Other Brother         bad knees, with recent total knee replacement        Vitals:  BP (!) 164/87   Pulse 68   Temp 98.7 °F (37.1 °C) (Infrared)   Resp 12   Ht 5' 6\" (1.676 m)   Wt 121 lb 7.6 oz (55.1 kg)   LMP 2022   SpO2 98%   BMI 19.61 kg/m²   Temp (24hrs), Av.2 °F (36.8 °C), Min:96.9 °F (36.1 °C), Max:98.7 °F (37.1 °C)    No results for input(s): POCGLU in the last 72 hours. I/O (24Hr):     Intake/Output Summary (Last 24 hours) at 3/7/2022 1446  Last data filed at 3/7/2022 1310  Gross per 24 hour   Intake 325 ml   Output --   Net 325 ml       Labs:    Lab Results   Component Value Date    WBC 4.8 2022    HGB 9.3 (L) 2022 HCT 27.7 (L) 03/07/2022    MCV 97.5 03/07/2022     03/07/2022     Lab Results   Component Value Date     03/07/2022    K 4.8 03/07/2022     03/07/2022    CO2 22 03/07/2022    BUN 42 03/07/2022    CREATININE 4.14 03/07/2022    GLUCOSE 89 03/07/2022    CALCIUM 7.7 03/07/2022          Lab Results   Component Value Date/Time    SPECIAL NOT REPORTED 11/06/2021 08:02 AM     Lab Results   Component Value Date/Time    CULTURE NO SIGNIFICANT GROWTH 03/04/2022 10:05 PM         Radiology:    Recent data reviewed    Physical Examination:        General appearance:  alert, cooperative and no distress  Eyes: Anicteric sclera. Pupils are equally round and reactive to light. Extraocular movements are intact. Lungs:  clear to auscultation bilaterally, normal effort  Heart:  regular rate and rhythm, no murmur  Abdomen:  soft, distended, significant tenderness across upper and right lateral abdomen,  normal bowel sounds, no masses, hepatomegaly, splenomegaly  Extremities:  no edema, redness, tenderness in the calves  Skin:  no gross lesions, rashes, induration  Neuro:  Alert, oriented X 3, no new focal weakness  Assessment:        Primary Problem  Hypertensive emergency    Active Hospital Problems    Diagnosis Date Noted    Abdominal pain, epigastric [R10.13]     Abdominal pain, right upper quadrant [R10.11]     Nausea [R11.0]     Gastroesophageal reflux disease [K21.9]     Hypertensive emergency [I16.1] 03/04/2022    Acute kidney injury superimposed on chronic kidney disease ([facility] [address] [N17.9, N18.9] 02/21/2021    Hyperlipidemia [E78.5] 06/18/2020    Acute renal failure with acute tubular necrosis superimposed on stage 3 chronic kidney disease ([facility] [address] [N17.0, N18.30] 05/27/2020    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 Acute renal failure on CKD stage III-initiation of dialysis. Renal failure likely secondary to uncontrolled hypertension tension hypertensive nephrosclerosis, with NSAID overuse  2. Polycystic kidney disease  3. Right-sided abdominal pain, biliary ductal dilatation, postprandial pain-ultrasound no cholelithiasis-check MRCP, GI consulted  4. Epigastric pain-status post EGD 3/7-shows gastritis-start PPI, Carafate  5. Hemorrhagic cyst both kidneys  6. Elevated D-dimer VQ scan negative  7.  Chest pain-likely type II NSTEMI with renal failure, resolved        Richar Goldstein MD  3/7/2022  2:46 PM

## 2022-03-07 NOTE — PROGRESS NOTES
Pt has MRI Abd ordered. Please keep pt NPO after midnight and fill out the MRI Screening form and fax to dept @ [phone]. Any questions. .please call [facility] MRI @ [phone]. MRI exam will be scheduled after receiving the completed screening form.  Thank you!!

## 2022-03-07 NOTE — CARE COORDINATION
ONGOING DISCHARGE PLAN:    Patient is alert and oriented x4. Spoke with patient regarding discharge plan and patient confirms that plan is still to return to home w/ Family. Denies VNS. Cr today, 4.14, BUN, 42,  Remains on Bicarb GTT, Nephro following. EGD today, GI, following. May need Hida. Denies Needs. Will continue to follow for additional discharge needs.     Electronically signed by Vasu Sparks RN on 3/7/2022 at 9:06 AM

## 2022-03-07 NOTE — PLAN OF CARE
Problem: Pain:  Goal: Pain level will decrease  Description: Pain level will decrease  3/7/2022 0245 by Bakari Adams RN  Outcome: Ongoing  3/6/2022 1721 by Margie Moore RN  Outcome: Ongoing  Goal: Control of acute pain  Description: Control of acute pain  3/7/2022 0245 by Bakari Adams RN  Outcome: Ongoing  3/6/2022 1721 by Margie Moore RN  Outcome: Ongoing  Goal: Control of chronic pain  Description: Control of chronic pain  3/7/2022 0245 by Bakari Adams RN  Outcome: Ongoing  3/6/2022 1721 by Margie Moore RN  Outcome: Ongoing  Goal: Patient's pain/discomfort is manageable  Description: Patient's pain/discomfort is manageable  3/7/2022 0245 by Bakari Adams RN  Outcome: Ongoing  3/6/2022 1721 by Margie Moore RN  Outcome: Ongoing     Problem: Infection:  Goal: Will remain free from infection  Description: Will remain free from infection  3/7/2022 0245 by Bakari Adams RN  Outcome: Ongoing  3/6/2022 1721 by Margie Moore RN  Outcome: Ongoing     Problem: Safety:  Goal: Free from accidental physical injury  Description: Free from accidental physical injury  3/7/2022 0245 by Bakari Adams RN  Outcome: Ongoing  3/6/2022 1721 by Margie Moore RN  Outcome: Ongoing  Goal: Free from intentional harm  Description: Free from intentional harm  3/7/2022 0245 by Bakari Adams RN  Outcome: Ongoing  3/6/2022 1721 by Margie Moore RN  Outcome: Ongoing     Problem: Daily Care:  Goal: Daily care needs are met  Description: Daily care needs are met  3/7/2022 0245 by Bakari Adams RN  Outcome: Ongoing  3/6/2022 1721 by Margie Moore RN  Outcome: Ongoing     Problem: Skin Integrity:  Goal: Skin integrity will stabilize  Description: Skin integrity will stabilize  3/7/2022 0245 by Bakari Adams RN  Outcome: Ongoing  3/6/2022 1721 by Margie Moore RN  Outcome: Ongoing     Problem: Discharge Planning:  Goal: Patients continuum of care needs are met  Description: Patients continuum of care needs are met  3/7/2022 0245 by Fara Morocho RN  Outcome: Ongoing  3/6/2022 1721 by Corrina Kumar RN  Outcome: Ongoing     Problem: Falls - Risk of:  Goal: Will remain free from falls  Description: Will remain free from falls  3/7/2022 0245 by Fara Morocho RN  Outcome: Ongoing  3/6/2022 1721 by Corrina Kumar RN  Outcome: Ongoing  Goal: Absence of physical injury  Description: Absence of physical injury  3/7/2022 0245 by Fara Morocho RN  Outcome: Ongoing  3/6/2022 1721 by Corrina Kumar RN  Outcome: Ongoing

## 2022-03-07 NOTE — ANESTHESIA POSTPROCEDURE EVALUATION
POST- ANESTHESIA EVALUATION       Pt Name: Len Santamaria  MRN: [id number]  YOB: 1980  Date of evaluation: 3/7/2022  Time:  1:16 PM      BP (!) 164/87   Pulse 68   Temp 98.7 °F (37.1 °C) (Infrared)   Resp 12   Ht 5' 6\" (1.676 m)   Wt 121 lb 7.6 oz (55.1 kg)   LMP 03/01/2022   SpO2 98%   BMI 19.61 kg/m²      Consciousness Level  Awake  Cardiopulmonary Status  Stable  Pain Adequately Treated YES  Nausea / Vomiting  NO  Adequate Hydration  YES  Anesthesia Related Complications NONE      Electronically signed by Basil Pyle MD on 3/7/2022 at 1:16 PM       Department of Anesthesiology  Postprocedure Note    Patient: Len Santamaria  MRN: [id number]  YOB: 1980  Date of evaluation: 3/7/2022  Time:  1:16 PM     Procedure Summary     Date: 03/07/22 Room / Location: [place] [address] [address]    Anesthesia Start: 9858 Anesthesia Stop: 1930    Procedure: EGD BIOPSY (N/A Esophagus) Diagnosis:       ([facility] 9038)      (COVID (-))    Surgeons: Nakita Peralta MD Responsible Provider: Basil Pyle MD    Anesthesia Type: general ASA Status: 2          Anesthesia Type: general    Balbir Phase I:      Balbir Phase II: Balbir Score: 9    Last vitals: Reviewed and per EMR flowsheets.        Anesthesia Post Evaluation

## 2022-03-07 NOTE — OP NOTE
PROCEDURE NOTE    DATE OF PROCEDURE: 3/7/2022     SURGEON: Kwasi Antonio MD    ASSISTANT: None    PREOPERATIVE DIAGNOSIS: EPIGASTRIC PAINS  GERD  NAUSEA    POSTOPERATIVE DIAGNOSIS: As described below    OPERATION: Upper GI endoscopy with Biopsy    ANESTHESIA: MAC PER ANESTHESIA     ESTIMATED BLOOD LOSS: Less than 50 ml    COMPLICATIONS: None. SPECIMENS:  Was Obtained:     HISTORY: The patient is a 35y.o. year old female with history of above preop diagnosis. I recommended esophagogastroduodenoscopy with possible biopsy and I explained the risk, benefits, expected outcome, and alternatives to the procedure. Risks included but are not limited to bleeding, infection, respiratory distress, hypotension, and perforation of the esophagus, stomach, or duodenum. Patient understands and is in agreement. PROCEDURE: The patient was given IV conscious sedation. The patient's SPO2 remained above 90% throughout the procedure. The gastroscope was inserted orally and advanced under direct vision through the esophagus, through the stomach, through the pylorus, and into the descending duodenum. Findings:    Retropharyngeal area was grossly normal appearing    Esophagus: normal    Stomach:    Fundus: normal    Body: abnormal: MOD EROSIVE GASTRITIS WAS BIOPSIED    Antrum: normal    Duodenum:     Descending: normal    Bulb: normal    The scope was removed and the patient tolerated the procedure well. Recommendations/Plan:   1. F/U Biopsies  2. F/U In Office in 3-4 weeks  3. Discussed with the family  4.  Post sedation patient was stable with stable vital signs and stable O2 saturations    Electronically signed by Kwasi Antonio MD  on 3/7/2022 at 12:33 PM

## 2022-03-07 NOTE — PROGRESS NOTES
NEPHROLOGY PROGRESS NOTE     Patient :  Joan Siddiqui; 35 y.o. MRN# [id number]  Location:  [place] [address] Pool/NONE  Attending:  Vinnie Hahn MD  Admit Date:  3/4/2022   Hospital Day: 3      Reason for Consult: Acute kidney injury on CKD      Chief Complaint:  *Right flank pain right upper abdominal pain    Subjective/interval history. Patient seen and examined complains of abdominal pain pain , npo for EGD today complains of nausea no vomiting no diarrhea  Serum creatinine slightly improved to 4.1 mg/dL  Urine output not recorded    History of Present Illness: This is a 35 y.o. female with past medical history of essential hypertension, bronchial asthma, depression, nephrolithiasis requiring cystoscopy and double ureteral stent in 2020, history of autosomal dominant polycystic kidney disease chronic kidney disease progressing to stage V  Patient presented to the hospital with complains of right-sided flank pain and upper abdominal pain, chest pain  Patient denied nausea vomiting no tremors no fever chills  CT abdomen pelvis without contrast was done which showed enlarged polycystic kidney disease consistent with patient's known history of polycystic kidney disease. Nonobstructing 6 mm right intrarenal calculus noted with multiple hemorrhagic cysts. Cardiomegaly no evidence of acute cardiopulmonary disease coronary artery calcification, and enhanced thoracic and abdominal aorta appears grossly normal with mild atherosclerotic plaque formation  Troponins were mildly elevated 24  Labs showed serum creatinine of 4.5 mg/dL on admission Baseline serum creatinine was around 3.6 to 4.1 mg/dL in November    Pt denies any history of  prolonged NSAID use. Patient denies dysuria, gross hematuria, flank pain, nocturia, urgency, passing frothy urine or urinary incontinence. There has been no recent exposure to IV contrast. There is no history  of paraprotein disease.      Medication review shows no use of ACE-inhibitor/diuretics.     Past Medical History:        Diagnosis Date    Anxiety     Asthma     Chronic headaches 07/10/2013    CKD (chronic kidney disease) stage 3, GFR 30-59 ml/min ([facility])     Degenerative disc disease, cervical     Depression     Dysmenorrhea 09/30/2014    Eczema 11/08/2012    Hypertension     Hypocalcemia 05/28/2014    Kidney stone     Menorrhagia 09/30/2014    Neck pain, bilateral 05/28/2014    Pelvic pain in female 09/30/2014    Polycystic kidney     Smoker 10/02/2011    Tension vascular headache 01/20/2014       Past Surgical History:        Procedure Laterality Date    BREAST ENHANCEMENT SURGERY      BREAST LUMPECTOMY      left breast    CYSTO/URETERO/PYELOSCOPY, CALCULUS TX Right 6/12/2020    HOLMIUM - CYSTO, RIGHT RETROGRADE PYELOGRAM, RIGHT URETEROSCOPY, LASER LITHO ON STAND BY, RIGHT STENT PLACEMENT performed by Margy Mancilla MD at [address] [place] [address]  06/12/2020    DILATION AND CURETTAGE OF UTERUS      x2    LITHOTRIPSY Right 7/23/2020    ESWL EXTRACORPOREAL SHOCK WAVE LITHOTRIPSY performed by Margy Mancilla MD at [address] [place] [address]  07/01/2013    nerve block(right vervical C3/TON/C4/C5    NERVE BLOCK  07/08/2013    nerve block(right vervical C3/TON/C4/C5    OTHER SURGICAL HISTORY  03/10/2014     botox inj     TOE SURGERY      removal of ingrown toe nail-2nd toe on left foot     URETEROSCOPY Right 06/12/2020    stent placement       Current Medications:    0.9 % sodium chloride infusion, Continuous  [MAR Hold] nicotine (NICODERM CQ) 21 MG/24HR 1 patch, Daily  [MAR Hold] sodium bicarbonate 75 mEq in sodium chloride 0.45 % 1,000 mL infusion, Continuous  [MAR Hold] epoetin oli-epbx (RETACRIT) injection 3,000 Units, Once per day on Mon Wed Fri  [facility] Hold] HYDROcodone-acetaminophen (NORCO) 5-325 MG per tablet 1 tablet, Once  [MAR Hold] HYDROmorphone (DILAUDID) injection 1 mg, Q4H PRN  [MAR Hold] sodium chloride flush 0.9 % injection 10 mL, PRN  [facility] Hold] amLODIPine (NORVASC) tablet 10 mg, Daily  [MAR Hold] DULoxetine (CYMBALTA) extended release capsule 90 mg, Daily  [MAR Hold] lidocaine 4 % external patch 1 patch, Daily  [MAR Hold] metoprolol succinate (TOPROL XL) extended release tablet 50 mg, Daily  [MAR Hold] amitriptyline (ELAVIL) tablet 25 mg, Nightly  [MAR Hold] heparin (porcine) injection 5,000 Units, BID  [MAR Hold] labetalol (NORMODYNE;TRANDATE) injection 10 mg, Q4H PRN  [MAR Hold] sodium chloride flush 0.9 % injection 5-40 mL, 2 times per day  [MAR Hold] sodium chloride flush 0.9 % injection 5-40 mL, PRN  [MAR Hold] 0.9 % sodium chloride infusion, PRN  [MAR Hold] acetaminophen (TYLENOL) tablet 650 mg, Q4H PRN  [MAR Hold] ondansetron (ZOFRAN-ODT) disintegrating tablet 4 mg, Q8H PRN   Or  [MAR Hold] ondansetron (ZOFRAN) injection 4 mg, Q6H PRN        Allergies:  Bee pollen, Codeine, Dust mite extract, Pcn [penicillins], and Pollen extract          Objective:  CURRENT TEMPERATURE:  Temp: 96.9 °F (36.1 °C)  MAXIMUM TEMPERATURE OVER 24HRS:  Temp (24hrs), Av °F (36.7 °C), Min:96.9 °F (36.1 °C), Max:98.6 °F (37 °C)    CURRENT RESPIRATORY RATE:  Resp: 18  CURRENT PULSE:  Pulse: 72  CURRENT BLOOD PRESSURE:  BP: (!) 176/93  24HR BLOOD PRESSURE RANGE:  Systolic (47BPO), GALVAN:200 , Min:136 , JTA:974   ; Diastolic (36WEH), GPL:93, Min:85, Max:94    24HR INTAKE/OUTPUT:    No intake or output data in the 24 hours ending 22 1220  Patient Vitals for the past 96 hrs (Last 3 readings):   Weight   22 0058 121 lb 7.6 oz (55.1 kg)   22 2116 125 lb (56.7 kg)       Physical Exam:  GENERAL APPEARANCE: Alert and cooperative, and appears to be in no acute distress. HEAD: normocephalic  EYES:  EOMI. Not pale, anicteric   NOSE:  No nasal discharge. THROAT:  Oral cavity and pharynx normal. Moist  CARDIAC: Normal S1 and S2. No S3, S4 or murmurs. Rhythm is regular.   LUNGS: Clear to auscultation and percussion without rales, rhonchi, wheezing or diminished breath sounds. GI, soft mild tenderness in the right upper quadrant  MUSKULOSKELETAL: Adequately aligned spine. No joint erythema or tenderness. EXTREMITIES: No edema. Peripheral pulses intact. NEURO: Nonfocal    Labs:   CBC:  Recent Labs     03/05/22  0546 03/06/22  0710 03/07/22  0704   WBC 5.5 5.7 4.8   RBC 2.80* 2.76* 2.84*   HGB 9.1* 9.0* 9.3*   HCT 27.7* 26.9* 27.7*   MCV 99.0 97.7 97.5   MCH 32.6 32.5 32.9   MCHC 32.9 33.3 33.7   RDW 14.1 14.1 14.3    236 215   MPV 8.2 8.0 8.6      BMP:   Recent Labs     03/05/22  0546 03/06/22  0559 03/07/22  0704    141 140   K 4.1 4.1 4.8   * 109* 107   CO2 18* 17* 22   BUN 46* 44* 42*   CREATININE 4.57* 4.28* 4.14*   GLUCOSE 93 106* 89   CALCIUM 7.5* 7.8* 7.7*      Phosphorus:  No results for input(s): PHOS in the last 72 hours. Magnesium: No results for input(s): MG in the last 72 hours. Albumin:   Recent Labs     03/04/22  2139   LABALBU 4.9       Recent Labs     03/04/22  2139   PROT 8.2       Recent Labs     03/04/22  2204   NITRU NEGATIVE   COLORU Yellow   PHUR 6.0   WBCUA 10 TO 20   RBCUA 0 TO 2   BACTERIA None   SPECGRAV 1.011   LEUKOCYTESUR SMALL*   UROBILINOGEN Normal   BILIRUBINUR NEGATIVE   GLUCOSEU NEGATIVE   [address] NEGATIVE         Radiology:  Reviewed as available. Assessment:  Presented with chest pain right upper abdominal pain uncontrolled hypertension    1. CKD 5 secondary to polycystic kidney disease, kidney function slowly worsening serum creatinine, serum creatinine peaked to 4.5 mg/dL, serum creatinine improved to 4.2 mg/dL today    2. Essential hypertension, suboptimally controlled due to noncompliance patient was not using blood pressure medications  Blood pressure has improved systolic blood pressure is 141/81    3. Anemia of chronic kidney disease    4. Kidney hyperparathyroidism due to renal failure           Plan:  1. Decrease IV fluids  2.  Continue blood pressure medications, metoprolol, amlodipine  3.   4. Discussed with the patient regarding initiation of dialysis, also discussed the risk and benefit of dialysis patient has no objections to proceed with dialysis     We will consult IR for tunneled dialysis catheter placement today  Catheter is placed we will initiate dialysis most likely tomorrow    5. Retacrit      Thank you for the consultation.       Electronically signed by Jhoana Mcclain MD on 3/7/2022 at 12:20 PM

## 2022-03-07 NOTE — ANESTHESIA PRE PROCEDURE
Department of Anesthesiology  Preprocedure Note       Name:  Cynthia Love   Age:  35 y.o.  :  1980                                          MRN:  [id number]         Date:  3/7/2022      Surgeon: Farhat Chanel):  Francis Simons MD    Procedure: Procedure(s):  EGD    Medications prior to admission:   Prior to Admission medications    Medication Sig Start Date End Date Taking?  Authorizing Provider   metoprolol succinate (TOPROL XL) 50 MG extended release tablet Take 50 mg by mouth daily   Yes Historical Provider, MD   hydrOXYzine (ATARAX) 50 MG tablet Take 50 mg by mouth nightly   Yes Historical Provider, MD   amLODIPine (NORVASC) 5 MG tablet Take 5 mg by mouth daily   Yes Historical Provider, MD   DULoxetine (CYMBALTA) 60 MG extended release capsule take 1 capsule by mouth once daily  Patient taking differently: 90 mg  11/10/17  Yes Cecile Saleh MD   amitriptyline (ELAVIL) 25 MG tablet Take 1 tablet by mouth nightly 21   Angelina Palma MD   lidocaine 4 % external patch Place 1 patch onto the skin daily 21   Angelina Palma MD       Current medications:    Current Facility-Administered Medications   Medication Dose Route Frequency Provider Last Rate Last Admin    [MAR Hold] nicotine (NICODERM CQ) 21 MG/24HR 1 patch  1 patch TransDERmal Daily Tyshawn Smith MD        [facility] Hold] sodium bicarbonate 75 mEq in sodium chloride 0.45 % 1,000 mL infusion   IntraVENous Continuous Abhay Caba  mL/hr at 22 0501 New Bag at 22 0501    [MAR Hold] epoetin oli-epbx (RETACRIT) injection 3,000 Units  3,000 Units SubCUTAneous Once per day on  Abhay Caba MD        [facility] Hold] HYDROcodone-acetaminophen [facility]) 5-325 MG per tablet 1 tablet  1 tablet Oral Once Sage Barnes MD        [facility] Hold] HYDROmorphone (DILAUDID) injection 1 mg  1 mg IntraVENous Q4H PRN Gael Loya MD   1 mg at 22 0643    [MAR Hold] sodium chloride flush 0.9 % injection 10 mL  10 mL IntraVENous PRN Michelle Gilliam MD   10 mL at 03/05/22 0852    [MAR Hold] amLODIPine (NORVASC) tablet 10 mg  10 mg Oral Daily Regina Taylor MD   10 mg at 03/06/22 0938    [MAR Hold] DULoxetine (CYMBALTA) extended release capsule 90 mg  90 mg Oral Daily Regina Taylor MD   90 mg at 03/06/22 0938    [MAR Hold] lidocaine 4 % external patch 1 patch  1 patch TransDERmal Daily Regina Taylor MD   1 patch at 03/06/22 0939    [MAR Hold] metoprolol succinate (TOPROL XL) extended release tablet 50 mg  50 mg Oral Daily Regina Taylor MD   50 mg at 03/07/22 0906    [MAR Hold] amitriptyline (ELAVIL) tablet 25 mg  25 mg Oral Nightly Regina Taylor MD   25 mg at 03/06/22 2027    [MAR Hold] heparin (porcine) injection 5,000 Units  5,000 Units SubCUTAneous BID Regina Taylor MD   5,000 Units at 03/06/22 2027    [MAR Hold] labetalol (NORMODYNE;TRANDATE) injection 10 mg  10 mg IntraVENous Q4H PRN Hipolito Norris MD   10 mg at 03/04/22 2355    [MAR Hold] sodium chloride flush 0.9 % injection 5-40 mL  5-40 mL IntraVENous 2 times per day Michelle Gilliam MD   10 mL at 03/06/22 0939    [MAR Hold] sodium chloride flush 0.9 % injection 5-40 mL  5-40 mL IntraVENous PRN Michelle Gilliam MD        [facility] Hold] 0.9 % sodium chloride infusion  25 mL IntraVENous PRN Michelle Gilliam MD        [facility] Hold] acetaminophen (TYLENOL) tablet 650 mg  650 mg Oral Q4H PRN Michelle Gilliam MD        [facility] Hold] ondansetron (ZOFRAN-ODT) disintegrating tablet 4 mg  4 mg Oral Q8H PRN Michelle Gilliam MD        Or   Alice Verduzco [facility] Hold] ondansetron (ZOFRAN) injection 4 mg  4 mg IntraVENous Q6H PRN Michelle Gilliam MD           Allergies:     Allergies   Allergen Reactions    Bee Pollen     Codeine Nausea And Vomiting    Dust Mite Extract      Chest congestion , sneezing    Pcn [Penicillins] Rash    Pollen Extract      Chest congesting , sneezing        Problem List:    Patient Active Problem List   Diagnosis Code    Asthma J45.909    Smoker F17.200    Polycystic kidney Q61.3    Eczema L30.9    Depression with anxiety F41.8    Chronic headaches R51.9, G89.29    Tension vascular headache G44.209    Irregular bleeding N92.6    Metrorrhagia N92.1    Menorrhagia N92.0    Dysmenorrhea N94.6    Pelvic pain in female R10.2    Kidney stone N20.0    Acute back pain M54.9    Anxiety F41.9    Hypertension I10    Headache R51.9    Depression F32. A    CKD (chronic kidney disease) stage 3, GFR 30-59 ml/min (HCC) N18.30    Chronic neck pain M54.2, G89.29    Degenerative disc disease, cervical M50.30    Encounter for long-term (current) use of other medications Z79.899    Cervicalgia M54.2    Chronic intractable headache R51.9, G89.29    Hemorrhage of cyst of native kidney N28.89, N28.1    Abdominal pain R10.9    CKD (chronic kidney disease) stage 4, GFR 15-29 ml/min (HCC) N18.4    Acute renal failure with acute tubular necrosis superimposed on stage 3 chronic kidney disease (HCC) N17.0, N18.30    Allergic rhinitis due to animal hair and dander J30.81    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    Gross hematuria R31.0    History of anemia due to chronic kidney disease N18.9, Z86.2    History of multiple allergies Z91.89    Hyperlipidemia E78.5    IUD (intrauterine device) in place Z97.5    Leukocytosis D72.829    Other specified disorders of kidney and ureter N28.89    Pain in joint M25.50    Recurrent major depression in full remission ([address] F33.42    Renovascular hypertension I15.0    Right ureteral stone N20.1    Right nephrolithiasis N20.0    Flank pain R10.9    Acute kidney injury superimposed on chronic kidney disease (HCC) N17.9, N18.9    Urinary tract infection with hematuria N39.0, R31.9    History of major depression Z86.59    Iron deficiency anemia due to chronic blood loss D50.0    Hypernatremia E87.0    SILVANO (acute kidney injury) ([address] N17.9    Dysuria R30.0    Acute kidney injury superimposed on CKD ([address] N17.9, N18.9    Pyelonephritis N12    Hypertensive emergency I16.1    Abdominal pain, epigastric R10.13    Abdominal pain, right upper quadrant R10.11    Nausea R11.0    Gastroesophageal reflux disease K21.9       Past Medical History:        Diagnosis Date    Anxiety     Asthma     Chronic headaches 07/10/2013    CKD (chronic kidney disease) stage 3, GFR 30-59 ml/min ([facility])     Degenerative disc disease, cervical     Depression     Dysmenorrhea 09/30/2014    Eczema 11/08/2012    Hypertension     Hypocalcemia 05/28/2014    Kidney stone     Menorrhagia 09/30/2014    Neck pain, bilateral 05/28/2014    Pelvic pain in female 09/30/2014    Polycystic kidney     Smoker 10/02/2011    Tension vascular headache 01/20/2014       Past Surgical History:        Procedure Laterality Date    BREAST ENHANCEMENT SURGERY      BREAST LUMPECTOMY      left breast    CYSTO/URETERO/PYELOSCOPY, CALCULUS TX Right 6/12/2020    HOLMIUM - CYSTO, RIGHT RETROGRADE PYELOGRAM, RIGHT URETEROSCOPY, LASER LITHO ON STAND BY, RIGHT STENT PLACEMENT performed by Jyothi Becker MD at [place] [address]  06/12/2020    DILATION AND CURETTAGE OF UTERUS      x2    LITHOTRIPSY Right 7/23/2020    ESWL EXTRACORPOREAL SHOCK WAVE LITHOTRIPSY performed by Jyothi Becker MD at [place] [address]  07/01/2013    nerve block(right vervical C3/TON/C4/C5    NERVE BLOCK  07/08/2013    nerve block(right vervical C3/TON/C4/C5    OTHER SURGICAL HISTORY  03/10/2014     botox inj     TOE SURGERY      removal of ingrown toe nail-2nd toe on left foot     URETEROSCOPY Right 06/12/2020    stent placement       Social History:    Social History     Tobacco Use    Smoking status: Current Every Day Smoker     Packs/day: 1.00     Years: 25.00     Pack years: 25.00     Types: Cigarettes    Smokeless tobacco: Never Used   Substance Use Topics    Alcohol use: No     Alcohol/week: 0.0 standard drinks                                Ready to quit: Not Answered  Counseling given: Not Answered      Vital Signs (Current):   Vitals:    03/06/22 1257 03/06/22 1901 03/07/22 0628 03/07/22 1124   BP: (!) 143/91 136/85 (!) 169/94 (!) 176/93   Pulse: 80 74 77 72   Resp: 16 16 16 18   Temp: 98.4 °F (36.9 °C) 97.9 °F (36.6 °C) 98.6 °F (37 °C) 96.9 °F (36.1 °C)   TempSrc: Oral Oral  Infrared   SpO2: 99% 94% 95% 97%   Weight:       Height:                                                  BP Readings from Last 3 Encounters:   03/07/22 (!) 176/93   11/08/21 (!) 164/77   06/23/21 (!) 168/89       NPO Status:                                                                                 BMI:   Wt Readings from Last 3 Encounters:   03/05/22 121 lb 7.6 oz (55.1 kg)   11/06/21 125 lb (56.7 kg)   06/22/21 125 lb 10.6 oz (57 kg)     Body mass index is 19.61 kg/m². CBC:   Lab Results   Component Value Date    WBC 4.8 03/07/2022    RBC 2.84 03/07/2022    RBC 3.71 01/13/2012    HGB 9.3 03/07/2022    HCT 27.7 03/07/2022    MCV 97.5 03/07/2022    RDW 14.3 03/07/2022     03/07/2022     01/13/2012       CMP:   Lab Results   Component Value Date     03/07/2022    K 4.8 03/07/2022     03/07/2022    CO2 22 03/07/2022    BUN 42 03/07/2022    CREATININE 4.14 03/07/2022    GFRAA 14 03/07/2022    LABGLOM 12 03/07/2022    GLUCOSE 89 03/07/2022    PROT 8.2 03/04/2022    CALCIUM 7.7 03/07/2022    BILITOT <0.15 03/04/2022    ALKPHOS 131 03/04/2022    AST 16 03/04/2022    ALT 9 03/04/2022       POC Tests: No results for input(s): POCGLU, POCNA, POCK, POCCL, POCBUN, POCHEMO, POCHCT in the last 72 hours.     Coags:   Lab Results   Component Value Date    PROTIME 12.7 06/20/2021    INR 0.9 06/20/2021    APTT 36.3 05/16/2021       HCG (If Applicable):   Lab Results   Component Value Date    PREGTESTUR NEGATIVE 03/07/2022    HCG NEGATIVE 07/23/2020    HCGQUANT <1 09/30/2014        ABGs: No results found for: PHART, PO2ART, EEZ2IFQ, KWQ3HOT, BEART, S4FBUOVB     Type & Screen (If Applicable):  No results found for: LABABO, LABRH    Drug/Infectious Status (If Applicable):  No results found for: HIV, HEPCAB    COVID-19 Screening (If Applicable):   Lab Results   Component Value Date    COVID19 Not Detected 03/06/2022    COVID19 Not Detected 07/19/2020    COVID19 Not Detected 06/08/2020           Anesthesia Evaluation  Patient summary reviewed and Nursing notes reviewed  Airway: Mallampati: II  TM distance: >3 FB   Neck ROM: full  Mouth opening: > = 3 FB Dental:    (+) upper dentures and lower dentures      Pulmonary: breath sounds clear to auscultation  (+) asthma:     (-) rhonchi, wheezes, rales, stridor and no decreased breath sounds                           Cardiovascular:    (+) hypertension:,     (-) murmur, weak pulses,  friction rub, systolic click, carotid bruit,  JVD and peripheral edema    ECG reviewed  Rhythm: regular  Rate: normal                 ROS comment: EKG: Normal sinus rhythm  Possible Left atrial enlargement  Left anterior fascicular block  Prolonged QT     Neuro/Psych:   (+) headaches:, psychiatric history:            GI/Hepatic/Renal:   (+) GERD:,           Endo/Other:                     Abdominal:             Vascular: Other Findings:           Anesthesia Plan      general     ASA 2       Induction: intravenous. MIPS: Prophylactic antiemetics administered. Anesthetic plan and risks discussed with patient. Plan discussed with CRNA.                   Emmett Felipe MD   3/7/2022

## 2022-03-07 NOTE — PROGRESS NOTES
I spoke with George Mandujano CNP about a hida scan or mrcp. She replied MRCP.  Electronically signed by Pratibha Martínez RN on 3/7/2022 at 2:48 PM

## 2022-03-08 ENCOUNTER — APPOINTMENT (OUTPATIENT)
Dept: INTERVENTIONAL RADIOLOGY/VASCULAR | Age: 42
DRG: 199 | End: 2022-03-08
Payer: MEDICARE

## 2022-03-08 ENCOUNTER — APPOINTMENT (OUTPATIENT)
Dept: MRI IMAGING | Age: 42
DRG: 199 | End: 2022-03-08
Payer: MEDICARE

## 2022-03-08 LAB
ANION GAP SERPL CALCULATED.3IONS-SCNC: 12 MMOL/L (ref 9–17)
BUN BLDV-MCNC: 41 MG/DL (ref 6–20)
CALCIUM SERPL-MCNC: 7.6 MG/DL (ref 8.6–10.4)
CHLORIDE BLD-SCNC: 106 MMOL/L (ref 98–107)
CO2: 25 MMOL/L (ref 20–31)
CREAT SERPL-MCNC: 4.03 MG/DL (ref 0.5–0.9)
GFR AFRICAN AMERICAN: 15 ML/MIN
GFR NON-AFRICAN AMERICAN: 12 ML/MIN
GFR SERPL CREATININE-BSD FRML MDRD: ABNORMAL ML/MIN/{1.73_M2}
GLUCOSE BLD-MCNC: 79 MG/DL (ref 70–99)
HAV IGM SER IA-ACNC: NONREACTIVE
HCT VFR BLD CALC: 25.7 % (ref 36–46)
HEMOGLOBIN: 8.5 G/DL (ref 12–16)
HEPATITIS B CORE IGM ANTIBODY: NONREACTIVE
HEPATITIS B SURFACE ANTIGEN: NONREACTIVE
HEPATITIS C ANTIBODY: NONREACTIVE
INR BLD: 0.9
MCH RBC QN AUTO: 32.3 PG (ref 26–34)
MCHC RBC AUTO-ENTMCNC: 33.2 G/DL (ref 31–37)
MCV RBC AUTO: 97.2 FL (ref 80–100)
PARTIAL THROMBOPLASTIN TIME: 36.8 SEC (ref 24–36)
PDW BLD-RTO: 13.9 % (ref 11.5–14.9)
PLATELET # BLD: 183 K/UL (ref 150–450)
PMV BLD AUTO: 8.9 FL (ref 6–12)
POTASSIUM SERPL-SCNC: 5 MMOL/L (ref 3.7–5.3)
PROTHROMBIN TIME: 12.3 SEC (ref 11.8–14.6)
RBC # BLD: 2.64 M/UL (ref 4–5.2)
SODIUM BLD-SCNC: 143 MMOL/L (ref 135–144)
WBC # BLD: 5.5 K/UL (ref 3.5–11)

## 2022-03-08 PROCEDURE — 80048 BASIC METABOLIC PNL TOTAL CA: CPT

## 2022-03-08 PROCEDURE — 2580000003 HC RX 258: Performed by: INTERNAL MEDICINE

## 2022-03-08 PROCEDURE — 6360000002 HC RX W HCPCS: Performed by: INTERNAL MEDICINE

## 2022-03-08 PROCEDURE — 05HM33Z INSERTION OF INFUSION DEVICE INTO RIGHT INTERNAL JUGULAR VEIN, PERCUTANEOUS APPROACH: ICD-10-PCS | Performed by: RADIOLOGY

## 2022-03-08 PROCEDURE — 90935 HEMODIALYSIS ONE EVALUATION: CPT

## 2022-03-08 PROCEDURE — 1200000000 HC SEMI PRIVATE

## 2022-03-08 PROCEDURE — 85027 COMPLETE CBC AUTOMATED: CPT

## 2022-03-08 PROCEDURE — 36558 INSERT TUNNELED CV CATH: CPT

## 2022-03-08 PROCEDURE — 85730 THROMBOPLASTIN TIME PARTIAL: CPT

## 2022-03-08 PROCEDURE — 77001 FLUOROGUIDE FOR VEIN DEVICE: CPT

## 2022-03-08 PROCEDURE — 0JH63XZ INSERTION OF TUNNELED VASCULAR ACCESS DEVICE INTO CHEST SUBCUTANEOUS TISSUE AND FASCIA, PERCUTANEOUS APPROACH: ICD-10-PCS | Performed by: RADIOLOGY

## 2022-03-08 PROCEDURE — 74181 MRI ABDOMEN W/O CONTRAST: CPT

## 2022-03-08 PROCEDURE — 76937 US GUIDE VASCULAR ACCESS: CPT

## 2022-03-08 PROCEDURE — C1713 ANCHOR/SCREW BN/BN,TIS/BN: HCPCS

## 2022-03-08 PROCEDURE — B543ZZA ULTRASONOGRAPHY OF RIGHT JUGULAR VEINS, GUIDANCE: ICD-10-PCS | Performed by: RADIOLOGY

## 2022-03-08 PROCEDURE — 80074 ACUTE HEPATITIS PANEL: CPT

## 2022-03-08 PROCEDURE — 85610 PROTHROMBIN TIME: CPT

## 2022-03-08 PROCEDURE — 99232 SBSQ HOSP IP/OBS MODERATE 35: CPT | Performed by: INTERNAL MEDICINE

## 2022-03-08 PROCEDURE — 6370000000 HC RX 637 (ALT 250 FOR IP): Performed by: INTERNAL MEDICINE

## 2022-03-08 PROCEDURE — 6360000002 HC RX W HCPCS: Performed by: RADIOLOGY

## 2022-03-08 PROCEDURE — 2500000003 HC RX 250 WO HCPCS: Performed by: INTERNAL MEDICINE

## 2022-03-08 PROCEDURE — 2580000003 HC RX 258: Performed by: RADIOLOGY

## 2022-03-08 PROCEDURE — 6360000002 HC RX W HCPCS: Performed by: NURSE PRACTITIONER

## 2022-03-08 PROCEDURE — 86317 IMMUNOASSAY INFECTIOUS AGENT: CPT

## 2022-03-08 PROCEDURE — 36415 COLL VENOUS BLD VENIPUNCTURE: CPT

## 2022-03-08 RX ORDER — HEPARIN SODIUM 5000 [USP'U]/ML
INJECTION, SOLUTION INTRAVENOUS; SUBCUTANEOUS
Status: COMPLETED | OUTPATIENT
Start: 2022-03-08 | End: 2022-03-08

## 2022-03-08 RX ORDER — METOPROLOL TARTRATE 5 MG/5ML
5 INJECTION INTRAVENOUS EVERY 6 HOURS PRN
Status: DISCONTINUED | OUTPATIENT
Start: 2022-03-08 | End: 2022-03-09 | Stop reason: HOSPADM

## 2022-03-08 RX ORDER — FENTANYL CITRATE 50 UG/ML
INJECTION, SOLUTION INTRAMUSCULAR; INTRAVENOUS
Status: COMPLETED | OUTPATIENT
Start: 2022-03-08 | End: 2022-03-08

## 2022-03-08 RX ORDER — HYDRALAZINE HYDROCHLORIDE 50 MG/1
50 TABLET, FILM COATED ORAL EVERY 8 HOURS SCHEDULED
Status: DISCONTINUED | OUTPATIENT
Start: 2022-03-08 | End: 2022-03-09 | Stop reason: HOSPADM

## 2022-03-08 RX ORDER — MORPHINE SULFATE 2 MG/ML
2 INJECTION, SOLUTION INTRAMUSCULAR; INTRAVENOUS EVERY 4 HOURS PRN
Status: DISPENSED | OUTPATIENT
Start: 2022-03-08 | End: 2022-03-08

## 2022-03-08 RX ORDER — ACETAMINOPHEN 325 MG/1
650 TABLET ORAL EVERY 4 HOURS PRN
Status: DISCONTINUED | OUTPATIENT
Start: 2022-03-08 | End: 2022-03-08 | Stop reason: SDUPTHER

## 2022-03-08 RX ADMIN — FENTANYL CITRATE 25 MCG: 50 INJECTION, SOLUTION INTRAMUSCULAR; INTRAVENOUS at 14:24

## 2022-03-08 RX ADMIN — DULOXETINE 90 MG: 60 CAPSULE, DELAYED RELEASE ORAL at 07:46

## 2022-03-08 RX ADMIN — SUCRALFATE 1 G: 1 TABLET ORAL at 16:37

## 2022-03-08 RX ADMIN — CEFAZOLIN 1000 MG: 1 INJECTION, POWDER, FOR SOLUTION INTRAMUSCULAR; INTRAVENOUS at 13:45

## 2022-03-08 RX ADMIN — MORPHINE SULFATE 2 MG: 2 INJECTION, SOLUTION INTRAMUSCULAR; INTRAVENOUS at 10:44

## 2022-03-08 RX ADMIN — HEPARIN SODIUM 1.6 ML: 5000 INJECTION INTRAVENOUS; SUBCUTANEOUS at 14:40

## 2022-03-08 RX ADMIN — MORPHINE SULFATE 2 MG: 2 INJECTION, SOLUTION INTRAMUSCULAR; INTRAVENOUS at 06:44

## 2022-03-08 RX ADMIN — AMLODIPINE BESYLATE 10 MG: 10 TABLET ORAL at 07:46

## 2022-03-08 RX ADMIN — AMITRIPTYLINE HYDROCHLORIDE 25 MG: 25 TABLET, FILM COATED ORAL at 20:05

## 2022-03-08 RX ADMIN — SODIUM BICARBONATE: 84 INJECTION, SOLUTION INTRAVENOUS at 15:46

## 2022-03-08 RX ADMIN — FENTANYL CITRATE 25 MCG: 50 INJECTION, SOLUTION INTRAMUSCULAR; INTRAVENOUS at 14:31

## 2022-03-08 RX ADMIN — Medication 1.6 ML: at 19:27

## 2022-03-08 RX ADMIN — HYDRALAZINE HYDROCHLORIDE 50 MG: 50 TABLET, FILM COATED ORAL at 23:25

## 2022-03-08 RX ADMIN — HEPARIN SODIUM 5000 UNITS: 5000 INJECTION INTRAVENOUS; SUBCUTANEOUS at 20:04

## 2022-03-08 RX ADMIN — OXYCODONE HYDROCHLORIDE 5 MG: 5 TABLET ORAL at 20:37

## 2022-03-08 RX ADMIN — METOPROLOL TARTRATE 5 MG: 1 INJECTION, SOLUTION INTRAVENOUS at 15:13

## 2022-03-08 RX ADMIN — ACETAMINOPHEN 650 MG: 325 TABLET, FILM COATED ORAL at 20:40

## 2022-03-08 RX ADMIN — HEPARIN SODIUM 1.6 ML: 5000 INJECTION INTRAVENOUS; SUBCUTANEOUS at 14:41

## 2022-03-08 RX ADMIN — OXYCODONE HYDROCHLORIDE 5 MG: 5 TABLET ORAL at 16:37

## 2022-03-08 RX ADMIN — SUCRALFATE 1 G: 1 TABLET ORAL at 23:25

## 2022-03-08 RX ADMIN — FENTANYL CITRATE 50 MCG: 50 INJECTION, SOLUTION INTRAMUSCULAR; INTRAVENOUS at 14:15

## 2022-03-08 RX ADMIN — METOPROLOL SUCCINATE 50 MG: 50 TABLET, EXTENDED RELEASE ORAL at 07:46

## 2022-03-08 RX ADMIN — MORPHINE SULFATE 2 MG: 2 INJECTION, SOLUTION INTRAMUSCULAR; INTRAVENOUS at 02:44

## 2022-03-08 ASSESSMENT — PAIN SCALES - GENERAL
PAINLEVEL_OUTOF10: 7
PAINLEVEL_OUTOF10: 7
PAINLEVEL_OUTOF10: 6
PAINLEVEL_OUTOF10: 3
PAINLEVEL_OUTOF10: 5
PAINLEVEL_OUTOF10: 7
PAINLEVEL_OUTOF10: 0
PAINLEVEL_OUTOF10: 4
PAINLEVEL_OUTOF10: 6
PAINLEVEL_OUTOF10: 7

## 2022-03-08 ASSESSMENT — PAIN - FUNCTIONAL ASSESSMENT
PAIN_FUNCTIONAL_ASSESSMENT: 0-10

## 2022-03-08 ASSESSMENT — PAIN DESCRIPTION - ORIENTATION: ORIENTATION: RIGHT

## 2022-03-08 ASSESSMENT — PAIN DESCRIPTION - FREQUENCY: FREQUENCY: INTERMITTENT

## 2022-03-08 ASSESSMENT — PAIN DESCRIPTION - PROGRESSION: CLINICAL_PROGRESSION: GRADUALLY IMPROVING

## 2022-03-08 ASSESSMENT — PAIN DESCRIPTION - LOCATION: LOCATION: OTHER (COMMENT)

## 2022-03-08 NOTE — CARE COORDINATION
ONGOING DISCHARGE PLAN:    Patient is alert and oriented x4. Spoke with patient regarding discharge plan and patient confirms that plan is still to return to home w/ Family.     Denies VNS.     Cr today, 4.03, BUN, 41,  Remains on Bicarb GTT    Pt. Needs Tunneled Dialysis Cath, w/ Dialysis started. Nephro following. LSW to follow for Outpt Dialysis slot. Aamir Barajas following. Denies needs. Will continue to follow for additional discharge needs.     Electronically signed by Rancho Womack RN on 3/8/2022 at 9:52 AM

## 2022-03-08 NOTE — PLAN OF CARE
Problem: Pain:  Goal: Pain level will decrease  Description: Pain level will decrease  3/8/2022 1819 by Kenn Kay RN  Outcome: Ongoing     Problem: Pain:  Goal: Control of acute pain  Description: Control of acute pain  3/8/2022 1819 by Kenn Kay RN  Outcome: Ongoing     Problem: Pain:  Goal: Control of chronic pain  Description: Control of chronic pain  3/8/2022 1819 by Kenn Kay RN  Outcome: Ongoing     Problem: Pain:  Goal: Patient's pain/discomfort is manageable  Description: Patient's pain/discomfort is manageable  3/8/2022 1819 by Kenn Kay RN  Outcome: Ongoing     Problem: Infection:  Goal: Will remain free from infection  Description: Will remain free from infection  3/8/2022 1819 by Kenn Kay RN  Outcome: Ongoing     Problem: Safety:  Goal: Free from accidental physical injury  Description: Free from accidental physical injury  3/8/2022 1819 by Kenn Kay RN  Outcome: Ongoing     Problem: Safety:  Goal: Free from intentional harm  Description: Free from intentional harm  3/8/2022 1819 by Kenn Kay RN  Outcome: Ongoing     Problem: Daily Care:  Goal: Daily care needs are met  Description: Daily care needs are met  3/8/2022 1819 by Kenn Kay RN  Outcome: Ongoing     Problem: Skin Integrity:  Goal: Skin integrity will stabilize  Description: Skin integrity will stabilize  3/8/2022 1819 by Kenn Kay RN  Outcome: Ongoing     Problem: Discharge Planning:  Goal: Patients continuum of care needs are met  Description: Patients continuum of care needs are met  3/8/2022 1819 by Kenn Kay RN  Outcome: Ongoing     Problem: Falls - Risk of:  Goal: Will remain free from falls  Description: Will remain free from falls  3/8/2022 1819 by Kenn Kay RN  Outcome: Ongoing     Problem: Falls - Risk of:  Goal: Absence of physical injury  Description: Absence of physical injury  3/8/2022 1819 by Kenn Kay RN  Outcome: Ongoing

## 2022-03-08 NOTE — FLOWSHEET NOTE
03/08/22 1115   Encounter Summary   Services provided to: Patient   Referral/Consult From: Rounding   Continue Visiting   (3-8-22)   Complexity of Encounter Moderate   Length of Encounter 15 minutes   Spiritual Assessment Completed Yes   Spiritual/Mandaen   Type Spiritual support   Assessment Calm; Approachable;Coping   Intervention Active listening;Explored feelings, thoughts, concerns;Prayer;Sustaining presence/ Ministry of presence   Outcome Expressed gratitude;Engaged in conversation;Coping;Receptive

## 2022-03-08 NOTE — PROGRESS NOTES
Pt calls out to writer and states her frustration that she can not eat and she states to writer if they do not do the MRI soon she will refuse it. Writer told pt I will call down to MRI. Writer called MRI to see if they had a time for when they were doing the MRI, they state to writer they did not but when they have a time they will call up to us. I also let her know she is supposed to have a line placed in IR which she states she will coordinated with IR.

## 2022-03-08 NOTE — PROGRESS NOTES
PT has tentative dialysis slot at [facility], T-TH-S, third shift. SW awaiting Hep B panel to get a confirmed chair time. Addendum: SW faxed hep b panel to Adrianna Simeon.

## 2022-03-08 NOTE — BRIEF OP NOTE
Brief Postoperative Note    Mavis Duncan  YOB: 1980  [id number]    Pre-operative Diagnosis: PCKD   ESRD    Post-operative Diagnosis: Same    Procedure: Rt IJ permcath    Medications Given: fentanyl    Anesthesia: Local    Surgeons/Assistants: Mordecai Boast, MD    Estimated Blood Loss: minimal    Complications: none    Specimens: were not obtained    Findings: Rt IJ permcath inserted RT IJ with tip in RA. Good blood return demonstrated. Catheter is ready for use at this time.       Electronically signed by Mordecai Boast, MD on 3/8/2022 at 2:47 PM

## 2022-03-08 NOTE — PLAN OF CARE
Patient off unit for MRI MRCP  Will see in am  Electronically signed by ALBARO Saha NP on 3/8/2022 at 6:46 PM

## 2022-03-08 NOTE — PLAN OF CARE
Problem: Pain:  Goal: Pain level will decrease  Description: Pain level will decrease  3/8/2022 0517 by Kari Lozano RN  Outcome: Ongoing  3/7/2022 1817 by Keyanna Brunson RN  Outcome: Ongoing  Goal: Control of acute pain  Description: Control of acute pain  3/8/2022 0517 by Kari Lozano RN  Outcome: Ongoing  Note: Assess the location, characteristics, onset, duration, frequency, quality, and severity of pain. Encourage immediate report of pain. Use appropriate pain scale to rate pain. Manage pain using nonpharmacologic/pharmacologic interventions.    3/7/2022 1817 by Keyanna Brunson RN  Outcome: Ongoing  Goal: Control of chronic pain  Description: Control of chronic pain  3/8/2022 0517 by Kari Lozano RN  Outcome: Ongoing  3/7/2022 1817 by Keyanna Brunson RN  Outcome: Ongoing  Goal: Patient's pain/discomfort is manageable  Description: Patient's pain/discomfort is manageable  3/8/2022 0517 by Kari Lozano RN  Outcome: Ongoing  3/7/2022 1817 by Keyanna Brunson RN  Outcome: Ongoing     Problem: Infection:  Goal: Will remain free from infection  Description: Will remain free from infection  3/8/2022 0517 by Kari Lozano RN  Outcome: Ongoing  3/7/2022 1817 by Keyanna Brunson RN  Outcome: Ongoing     Problem: Safety:  Goal: Free from accidental physical injury  Description: Free from accidental physical injury  3/8/2022 0517 by Kari Lozano RN  Outcome: Ongoing  3/7/2022 1817 by Keyanna Brunson RN  Outcome: Ongoing  Goal: Free from intentional harm  Description: Free from intentional harm  3/8/2022 0517 by Kari Lozano RN  Outcome: Ongoing  3/7/2022 1817 by Keyanna Brunson RN  Outcome: Ongoing     Problem: Daily Care:  Goal: Daily care needs are met  Description: Daily care needs are met  3/8/2022 0517 by Kari Lozano RN  Outcome: Ongoing  3/7/2022 1817 by Keyanna Brunson RN  Outcome: Ongoing     Problem: Skin Integrity:  Goal: Skin integrity will stabilize  Description: Skin integrity will stabilize  3/8/2022 0517 by Kari Lozano RN  Outcome: Ongoing  3/7/2022 1817 by Olya Razo RN  Outcome: Ongoing     Problem: Discharge Planning:  Goal: Patients continuum of care needs are met  Description: Patients continuum of care needs are met  3/8/2022 0517 by Yassine Lucas RN  Outcome: Ongoing  3/7/2022 1817 by Olya Razo RN  Outcome: Ongoing     Problem: Falls - Risk of:  Goal: Will remain free from falls  Description: Will remain free from falls  3/8/2022 0517 by Yassine Lucas RN  Outcome: Ongoing  Note: Patient remains free of falls and injuries throughout shift. Bed remains in the lowest position, wheels locked, call light and bedside table are within reach.    3/7/2022 1817 by Olya Razo RN  Outcome: Ongoing  Goal: Absence of physical injury  Description: Absence of physical injury  3/8/2022 0517 by Yassine Lucas RN  Outcome: Ongoing  3/7/2022 1817 by Olya Razo RN  Outcome: Ongoing

## 2022-03-08 NOTE — PROGRESS NOTES
Pt only has labetalol PRN, we can not push this medication on this floor.  Writer perfect served Dr. Rachel Tucker

## 2022-03-08 NOTE — PROGRESS NOTES
Considering pt GFR, Cecilia Queen from MRI states she can not do WITH contrast the order has to be modified to WITHOUT contrast. Writer perfect served Dr. Michelle Dan.  Dr. Michelle Dan gave verbal orders to modify the MRCP WITHOUT contrast.

## 2022-03-08 NOTE — PROGRESS NOTES
Writer spoke with MRI, Corrina Colon. She state she has an appt at 1130 and then will call down for Pt. Writer also spoke to IR they plan on doing her cath around 1300. Writer let pt know.

## 2022-03-08 NOTE — PROGRESS NOTES
[facility]    Progress Note    3/8/2022    12:19 PM    Name:   Young Scherer  MRN:     [id number]     Acct:      [de-identified]   Room:   2049/2049-01  IP Day:  4  Admit Date:  3/4/2022  9:15 PM    PCP:   Sanjana Lennon PA-C  Code Status:  Full Code    Subjective:     C/C:   Chief Complaint   Patient presents with    Chest Pain    Flank Pain         Interval History Status: Improving, abdominal pain better received tunneled cath today   HPI:     47-year-old lady with history of polycystic kidney disease chronic renal failure stage III not on dialysis chronic anemia secondary to renal disease uncontrolled hypertension noncompliant with medication chronic back pain takes NSAIDs every day 200 mg three times a day  COPD active smoker not in exacerbation  Admitted with multiple complaints including epigastric sternal chest pain worse with palpation not exacerbated by deep breathing also had right upper quadrant pain no radiation associated with nausea but no vomiting no fever or chills    Review of Systems:     Denies any shortness of breath or cough  Denies chest pain or palpitations  Improvement in abdominal pain, no diarrhea vomiting denies any new numbness tremors or weakness. Medications: Allergies:     Allergies   Allergen Reactions    Bee Pollen     Codeine Nausea And Vomiting    Dust Mite Extract      Chest congestion , sneezing    Pcn [Penicillins] Rash    Pollen Extract      Chest congesting , sneezing        Current Meds:   Scheduled Meds:    pantoprazole  40 mg Oral QAM AC    sucralfate  1 g Oral 4 times per day    nicotine  1 patch TransDERmal Daily    epoetin oli-epbx  3,000 Units SubCUTAneous Once per day on Mon Wed Fri    HYDROcodone 5 mg - acetaminophen  1 tablet Oral Once    amLODIPine  10 mg Oral Daily    DULoxetine  90 mg Oral Daily    lidocaine  1 patch TransDERmal Daily    metoprolol succinate  50 mg Oral Daily    amitriptyline  25 mg Oral Nightly    heparin (porcine)  5,000 Units SubCUTAneous BID    sodium chloride flush  5-40 mL IntraVENous 2 times per day     Continuous Infusions:    IV infusion builder 100 mL/hr at 22    sodium chloride       PRN Meds: morphine, oxyCODONE, diphenhydrAMINE, sodium chloride flush, labetalol, sodium chloride flush, sodium chloride, acetaminophen, ondansetron **OR** ondansetron    Data:     Past Medical History:   has a past medical history of Anxiety, Asthma, Chronic headaches, CKD (chronic kidney disease) stage 3, GFR 30-59 ml/min ([facility]), Degenerative disc disease, cervical, Depression, Dysmenorrhea, Eczema, Hypertension, Hypocalcemia, Kidney stone, Menorrhagia, Neck pain, bilateral, Pelvic pain in female, Polycystic kidney, Smoker, and Tension vascular headache. Social History:   reports that she has been smoking cigarettes. She has a 25.00 pack-year smoking history. She has never used smokeless tobacco. She reports that she does not drink alcohol and does not use drugs. Family History:   Family History   Problem Relation Age of Onset    High Blood Pressure Mother     Asthma Sister    [employer] Sister     High Blood Pressure Father     Other Brother         bad knees, with recent total knee replacement        Vitals:  BP (!) 170/89   Pulse 75   Temp 99.2 °F (37.3 °C) (Oral)   Resp 16   Ht 5' 6\" (1.676 m)   Wt 117 lb 15.1 oz (53.5 kg)   LMP 2022   SpO2 94%   BMI 19.04 kg/m²   Temp (24hrs), Av.7 °F (37.1 °C), Min:98 °F (36.7 °C), Max:99.3 °F (37.4 °C)    No results for input(s): POCGLU in the last 72 hours. I/O (24Hr):     Intake/Output Summary (Last 24 hours) at 3/8/2022 1219  Last data filed at 3/7/2022 1310  Gross per 24 hour   Intake 325 ml   Output --   Net 325 ml       Labs:    Lab Results   Component Value Date    WBC 5.5 2022    HGB 8.5 (L) 2022    HCT 25.7 (L) 2022    MCV 97.2 2022     2022     Lab Results   Component Value Date     03/08/2022    K 5.0 03/08/2022     03/08/2022    CO2 25 03/08/2022    BUN 41 03/08/2022    CREATININE 4.03 03/08/2022    GLUCOSE 79 03/08/2022    CALCIUM 7.6 03/08/2022          Lab Results   Component Value Date/Time    SPECIAL NOT REPORTED 11/06/2021 08:02 AM     Lab Results   Component Value Date/Time    CULTURE NO SIGNIFICANT GROWTH 03/04/2022 10:05 PM         Radiology:    Recent data reviewed    Physical Examination:        General appearance:  alert, cooperative and no distress  Eyes: Anicteric sclera. Pupils are equally round and reactive to light. Extraocular movements are intact. Lungs:  clear to auscultation bilaterally, normal effort, no wheeze or crackles  Heart:  regular rate and rhythm, no murmur  Abdomen:  soft, nontender, no masses    Extremities:  no edema, redness, tenderness in the calves  Skin:  no gross lesions, rashes, induration  Neuro:  Alert, oriented X 3, no new focal weakness  Assessment:        Primary Problem  Hypertensive emergency    Active Hospital Problems    Diagnosis Date Noted    Abdominal pain, epigastric [R10.13]     Abdominal pain, right upper quadrant [R10.11]     Nausea [R11.0]     Gastroesophageal reflux disease [K21.9]     Hypertensive emergency [I16.1] 03/04/2022    Acute kidney injury superimposed on chronic kidney disease ([facility] [address] [N17.9, N18.9] 02/21/2021    Hyperlipidemia [E78.5] 06/18/2020    Acute renal failure with acute tubular necrosis superimposed on stage 3 chronic kidney disease ([facility] [address] [N17.0, N18.30] 05/27/2020    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 Acute renal failure on CKD stage III-initiation of dialysis. Renal failure likely secondary to uncontrolled hypertension tension hypertensive nephrosclerosis, with NSAID overuse  2. Polycystic kidney disease  3.  Right-sided

## 2022-03-09 VITALS
HEIGHT: 66 IN | BODY MASS INDEX: 18.21 KG/M2 | WEIGHT: 113.32 LBS | SYSTOLIC BLOOD PRESSURE: 160 MMHG | HEART RATE: 75 BPM | RESPIRATION RATE: 16 BRPM | OXYGEN SATURATION: 96 % | DIASTOLIC BLOOD PRESSURE: 85 MMHG | TEMPERATURE: 98.4 F

## 2022-03-09 LAB
ANION GAP SERPL CALCULATED.3IONS-SCNC: 13 MMOL/L (ref 9–17)
BUN BLDV-MCNC: 25 MG/DL (ref 6–20)
CALCIUM SERPL-MCNC: 8 MG/DL (ref 8.6–10.4)
CHLORIDE BLD-SCNC: 99 MMOL/L (ref 98–107)
CO2: 26 MMOL/L (ref 20–31)
CREAT SERPL-MCNC: 2.78 MG/DL (ref 0.5–0.9)
GFR AFRICAN AMERICAN: 23 ML/MIN
GFR NON-AFRICAN AMERICAN: 19 ML/MIN
GFR SERPL CREATININE-BSD FRML MDRD: ABNORMAL ML/MIN/{1.73_M2}
GLUCOSE BLD-MCNC: 87 MG/DL (ref 70–99)
HBV SURFACE AB TITR SER: <3.5 MIU/ML
HCT VFR BLD CALC: 29 % (ref 36–46)
HEMOGLOBIN: 9.9 G/DL (ref 12–16)
MCH RBC QN AUTO: 32.8 PG (ref 26–34)
MCHC RBC AUTO-ENTMCNC: 34.2 G/DL (ref 31–37)
MCV RBC AUTO: 95.9 FL (ref 80–100)
PDW BLD-RTO: 13.5 % (ref 11.5–14.9)
PHOSPHORUS: 2.3 MG/DL (ref 2.6–4.5)
PLATELET # BLD: 174 K/UL (ref 150–450)
PMV BLD AUTO: 8.7 FL (ref 6–12)
POTASSIUM SERPL-SCNC: 4.2 MMOL/L (ref 3.7–5.3)
RBC # BLD: 3.03 M/UL (ref 4–5.2)
SODIUM BLD-SCNC: 138 MMOL/L (ref 135–144)
SURGICAL PATHOLOGY REPORT: NORMAL
WBC # BLD: 5.3 K/UL (ref 3.5–11)

## 2022-03-09 PROCEDURE — 36415 COLL VENOUS BLD VENIPUNCTURE: CPT

## 2022-03-09 PROCEDURE — 90935 HEMODIALYSIS ONE EVALUATION: CPT

## 2022-03-09 PROCEDURE — 2580000003 HC RX 258: Performed by: INTERNAL MEDICINE

## 2022-03-09 PROCEDURE — 99239 HOSP IP/OBS DSCHRG MGMT >30: CPT | Performed by: INTERNAL MEDICINE

## 2022-03-09 PROCEDURE — 2500000003 HC RX 250 WO HCPCS: Performed by: INTERNAL MEDICINE

## 2022-03-09 PROCEDURE — 80048 BASIC METABOLIC PNL TOTAL CA: CPT

## 2022-03-09 PROCEDURE — 6370000000 HC RX 637 (ALT 250 FOR IP): Performed by: INTERNAL MEDICINE

## 2022-03-09 PROCEDURE — 6360000002 HC RX W HCPCS: Performed by: INTERNAL MEDICINE

## 2022-03-09 PROCEDURE — APPSS30 APP SPLIT SHARED TIME 16-30 MINUTES: Performed by: NURSE PRACTITIONER

## 2022-03-09 PROCEDURE — 99232 SBSQ HOSP IP/OBS MODERATE 35: CPT | Performed by: INTERNAL MEDICINE

## 2022-03-09 PROCEDURE — 85027 COMPLETE CBC AUTOMATED: CPT

## 2022-03-09 PROCEDURE — 5A1D70Z PERFORMANCE OF URINARY FILTRATION, INTERMITTENT, LESS THAN 6 HOURS PER DAY: ICD-10-PCS | Performed by: INTERNAL MEDICINE

## 2022-03-09 PROCEDURE — 84100 ASSAY OF PHOSPHORUS: CPT

## 2022-03-09 RX ORDER — AMLODIPINE BESYLATE 10 MG/1
10 TABLET ORAL DAILY
Qty: 30 TABLET | Refills: 3 | Status: SHIPPED | OUTPATIENT
Start: 2022-03-10

## 2022-03-09 RX ORDER — SUCRALFATE 1 G/1
1 TABLET ORAL 4 TIMES DAILY
Qty: 120 TABLET | Refills: 3 | Status: SHIPPED | OUTPATIENT
Start: 2022-03-09

## 2022-03-09 RX ORDER — PANTOPRAZOLE SODIUM 40 MG/1
40 TABLET, DELAYED RELEASE ORAL
Qty: 30 TABLET | Refills: 3 | Status: SHIPPED | OUTPATIENT
Start: 2022-03-10

## 2022-03-09 RX ORDER — HYDRALAZINE HYDROCHLORIDE 50 MG/1
50 TABLET, FILM COATED ORAL EVERY 8 HOURS SCHEDULED
Qty: 90 TABLET | Refills: 3 | Status: SHIPPED | OUTPATIENT
Start: 2022-03-09

## 2022-03-09 RX ADMIN — HYDRALAZINE HYDROCHLORIDE 50 MG: 50 TABLET, FILM COATED ORAL at 05:08

## 2022-03-09 RX ADMIN — Medication 1.6 ML: at 11:36

## 2022-03-09 RX ADMIN — ACETAMINOPHEN 650 MG: 325 TABLET, FILM COATED ORAL at 13:30

## 2022-03-09 RX ADMIN — OXYCODONE HYDROCHLORIDE 5 MG: 5 TABLET ORAL at 00:39

## 2022-03-09 RX ADMIN — HYDRALAZINE HYDROCHLORIDE 50 MG: 50 TABLET, FILM COATED ORAL at 13:30

## 2022-03-09 RX ADMIN — AMLODIPINE BESYLATE 10 MG: 10 TABLET ORAL at 08:01

## 2022-03-09 RX ADMIN — HEPARIN SODIUM 5000 UNITS: 5000 INJECTION INTRAVENOUS; SUBCUTANEOUS at 08:02

## 2022-03-09 RX ADMIN — SODIUM BICARBONATE: 84 INJECTION, SOLUTION INTRAVENOUS at 04:16

## 2022-03-09 RX ADMIN — SUCRALFATE 1 G: 1 TABLET ORAL at 12:00

## 2022-03-09 RX ADMIN — OXYCODONE HYDROCHLORIDE 5 MG: 5 TABLET ORAL at 05:06

## 2022-03-09 RX ADMIN — SUCRALFATE 1 G: 1 TABLET ORAL at 05:07

## 2022-03-09 RX ADMIN — PANTOPRAZOLE SODIUM 40 MG: 40 TABLET, DELAYED RELEASE ORAL at 05:07

## 2022-03-09 RX ADMIN — OXYCODONE HYDROCHLORIDE 5 MG: 5 TABLET ORAL at 12:00

## 2022-03-09 RX ADMIN — METOPROLOL SUCCINATE 50 MG: 50 TABLET, EXTENDED RELEASE ORAL at 08:01

## 2022-03-09 RX ADMIN — DULOXETINE 90 MG: 60 CAPSULE, DELAYED RELEASE ORAL at 08:01

## 2022-03-09 ASSESSMENT — PAIN DESCRIPTION - ORIENTATION: ORIENTATION: RIGHT

## 2022-03-09 ASSESSMENT — PAIN DESCRIPTION - LOCATION: LOCATION: CHEST

## 2022-03-09 ASSESSMENT — PAIN SCALES - GENERAL
PAINLEVEL_OUTOF10: 6
PAINLEVEL_OUTOF10: 7
PAINLEVEL_OUTOF10: 6
PAINLEVEL_OUTOF10: 5

## 2022-03-09 ASSESSMENT — PAIN DESCRIPTION - PAIN TYPE: TYPE: ACUTE PAIN

## 2022-03-09 NOTE — PLAN OF CARE
Problem: Pain:  Goal: Pain level will decrease  Description: Pain level will decrease  3/9/2022 0433 by Jade Clifton RN  Outcome: Ongoing  3/8/2022 1819 by Lydia Winn RN  Outcome: Ongoing  Goal: Control of acute pain  Description: Control of acute pain  3/9/2022 0433 by Jade Clifton RN  Outcome: Ongoing  Note: Assess the location, characteristics, onset, duration, frequency, quality, and severity of pain. Encourage immediate report of pain. Use appropriate pain scale to rate pain. Manage pain using nonpharmacologic/pharmacologic interventions.    3/8/2022 1819 by Lydia Winn RN  Outcome: Ongoing  Goal: Control of chronic pain  Description: Control of chronic pain  3/9/2022 0433 by Jade Clifton RN  Outcome: Ongoing  3/8/2022 1819 by Lydia Winn RN  Outcome: Ongoing  Goal: Patient's pain/discomfort is manageable  Description: Patient's pain/discomfort is manageable  3/9/2022 0433 by Jade Clifton RN  Outcome: Ongoing  3/8/2022 1819 by Lydia Winn RN  Outcome: Ongoing     Problem: Infection:  Goal: Will remain free from infection  Description: Will remain free from infection  3/9/2022 0433 by Jade Clifton RN  Outcome: Ongoing  3/8/2022 1819 by Lydia Winn RN  Outcome: Ongoing     Problem: Safety:  Goal: Free from accidental physical injury  Description: Free from accidental physical injury  3/9/2022 0433 by Jade Clifton RN  Outcome: Ongoing  3/8/2022 1819 by Lydia Winn RN  Outcome: Ongoing  Goal: Free from intentional harm  Description: Free from intentional harm  3/9/2022 0433 by Jade Clifton RN  Outcome: Ongoing  3/8/2022 1819 by Lydia Winn RN  Outcome: Ongoing     Problem: Daily Care:  Goal: Daily care needs are met  Description: Daily care needs are met  3/9/2022 0433 by Jade Clifton RN  Outcome: Ongoing  3/8/2022 1819 by Lydia Winn RN  Outcome: Ongoing     Problem: Skin Integrity:  Goal: Skin integrity will stabilize  Description: Skin integrity will stabilize  3/9/2022 0433 by Jade Clifton RN  Outcome: Ongoing  3/8/2022 1819 by Shelia Evans RN  Outcome: Ongoing     Problem: Discharge Planning:  Goal: Patients continuum of care needs are met  Description: Patients continuum of care needs are met  3/9/2022 0433 by Guerda Miller RN  Outcome: Ongoing  3/8/2022 1819 by Shelia Evans RN  Outcome: Ongoing     Problem: Falls - Risk of:  Goal: Will remain free from falls  Description: Will remain free from falls  3/9/2022 0433 by Guerda Miller RN  Outcome: Ongoing  Note: Patient remains free of falls and injuries throughout shift. Bed remains in the lowest position, wheels locked, call light and bedside table are within reach.    3/8/2022 1819 by Shelia Evans RN  Outcome: Ongoing  Goal: Absence of physical injury  Description: Absence of physical injury  3/9/2022 0433 by Guerda Miller RN  Outcome: Ongoing  3/8/2022 1819 by Shelia Evans RN  Outcome: Ongoing

## 2022-03-09 NOTE — DISCHARGE INSTR - DIET

## 2022-03-09 NOTE — PROGRESS NOTES
Patient education and discharge instructions given to patient. Per in hospital pharmacy patient will need to call [facility] or [facility] to have epoetin oli-epbx injections ordered. All questions were answered.

## 2022-03-09 NOTE — FLOWSHEET NOTE
03/08/22 1941   Vital Signs   BP (!) 183/103   Pulse 70   Resp 18   Weight 116 lb 13.5 oz (53 kg)   Weight Method Bed scale   Percent Weight Change -1.85   Post-Hemodialysis Assessment   Post-Treatment Procedures Blood returned;Catheter capped, clamped with Citrate x 2 ports   Machine Disinfection Process Acid/Vinegar Clean;Heat Disinfect; Exterior Machine Disinfection   Total Liters Processed (l/min) 36.4 l/min   Dialyzer Clearance Lightly streaked   Duration of Treatment (minutes) 150 minutes   NET Removed (ml) 1000 ml   Tolerated Treatment Good   Patient Response to Treatment Patient completed 2.5 hour treatment, patient did not require any medications for BP support, patient hypertensive, post tx BP: 183/103,  Right IJ HD CVC maintained good blood flow, 1000 ml net fluid removal, HD CVC sodium citrate locked post treatment.     Bilateral Breath Sounds Diminished   Edema None

## 2022-03-09 NOTE — PROGRESS NOTES
NEPHROLOGY PROGRESS NOTE     Patient :  Rajesh Russo; 35 y.o. MRN# [id number]  Location:  6230/9874-36  Attending:  Alf Hurtado MD  Admit Date:  3/4/2022   Hospital Day: 5    Reason for consultation: Management of acute kidney injury superimposed on chronic kidney disease stage IV. Consulting physician: Alf Hurtado MD.    Chief Complaint: Right flank pain and right upper abdominal pain    Interval history: Patient was seen and examined today and right upper quadrant abdominal pain is improved. She is s/p EGD [3/7/2022] showing moderate erosive gastritis. She does not have shortness of breath and has a tunneled hemodialysis catheter. She denies nausea, vomiting or diarrhea. History of Present Illness: This is a 35 y.o. female with past medical history of essential hypertension, bronchial asthma, depression, nephrolithiasis requiring cystoscopy and double ureteral stent in 2020, history of autosomal dominant polycystic kidney disease chronic kidney disease progressing to stage V  Patient presented to the hospital with complains of right-sided flank pain and upper abdominal pain, chest pain  Patient denied nausea vomiting no tremors no fever chills  CT abdomen pelvis without contrast was done which showed enlarged polycystic kidney disease consistent with patient's known history of polycystic kidney disease. Nonobstructing 6 mm right intrarenal calculus noted with multiple hemorrhagic cysts.   Cardiomegaly no evidence of acute cardiopulmonary disease coronary artery calcification, and enhanced thoracic and abdominal aorta appears grossly normal with mild atherosclerotic plaque formation  Troponins were mildly elevated 24  Labs showed serum creatinine of 4.5 mg/dL on admission Baseline serum creatinine was around 3.6 to 4.1 mg/dL in November    Current Medications:    metoprolol (LOPRESSOR) injection 5 mg, Q6H PRN  hydrALAZINE (APRESOLINE) tablet 50 mg, 3 times per day  anticoagulant sodium citrate 4 % injection 1.6 mL, PRN  anticoagulant sodium citrate 4 % injection 1.6 mL, PRN  pantoprazole (PROTONIX) tablet 40 mg, QAM AC  sucralfate (CARAFATE) tablet 1 g, 4 times per day  oxyCODONE (ROXICODONE) immediate release tablet 5 mg, Q4H PRN  diphenhydrAMINE (BENADRYL) tablet 50 mg, Nightly PRN  nicotine (NICODERM CQ) 21 MG/24HR 1 patch, Daily  sodium bicarbonate 75 mEq in sodium chloride 0.45 % 1,000 mL infusion, Continuous  epoetin oli-epbx (RETACRIT) injection 3,000 Units, Once per day on   HYDROcodone-acetaminophen (NORCO) 5-325 MG per tablet 1 tablet, Once  sodium chloride flush 0.9 % injection 10 mL, PRN  amLODIPine (NORVASC) tablet 10 mg, Daily  DULoxetine (CYMBALTA) extended release capsule 90 mg, Daily  lidocaine 4 % external patch 1 patch, Daily  metoprolol succinate (TOPROL XL) extended release tablet 50 mg, Daily  amitriptyline (ELAVIL) tablet 25 mg, Nightly  heparin (porcine) injection 5,000 Units, BID  sodium chloride flush 0.9 % injection 5-40 mL, 2 times per day  sodium chloride flush 0.9 % injection 5-40 mL, PRN  0.9 % sodium chloride infusion, PRN  acetaminophen (TYLENOL) tablet 650 mg, Q4H PRN  ondansetron (ZOFRAN-ODT) disintegrating tablet 4 mg, Q8H PRN   Or  ondansetron (ZOFRAN) injection 4 mg, Q6H PRN      Objective:  CURRENT TEMPERATURE:  Temp: 98.4 °F (36.9 °C)  MAXIMUM TEMPERATURE OVER 24HRS:  Temp (24hrs), Av.4 °F (36.9 °C), Min:98.2 °F (36.8 °C), Max:98.9 °F (37.2 °C)    CURRENT RESPIRATORY RATE:  Resp: 16  CURRENT PULSE:  Pulse: 75  CURRENT BLOOD PRESSURE:  BP: (!) 160/85  24HR BLOOD PRESSURE RANGE:  Systolic (09BTC), UWM:991 , Min:139 , VEC:792   ; Diastolic (01IQU), JEH:88, Min:63, Max:117    24HR INTAKE/OUTPUT:    No intake or output data in the 24 hours ending 22 1419  Patient Vitals for the past 96 hrs (Last 3 readings):   Weight   22 1127 113 lb 5.1 oz (51.4 kg)   22 0855 114 lb 10.2 oz (52 kg)   22 1941 116 lb 13.5 oz (53 kg)     Physical Exam:  GENERAL APPEARANCE: Alert and cooperative, and appears to be in no acute distress. HEAD: normocephalic  EYES:  EOMI. Not pale, anicteric   NOSE:  No nasal discharge. THROAT:  Oral cavity and pharynx normal. Moist  CARDIAC: Normal S1 and S2. No S3, S4 or murmurs. Rhythm is regular. LUNGS: Clear to auscultation and percussion without rales, rhonchi, wheezing or diminished breath sounds. GI, soft mild tenderness in the right upper quadrant  MUSKULOSKELETAL: Adequately aligned spine. No joint erythema or tenderness. EXTREMITIES: No edema. Peripheral pulses intact. NEURO: Awake and alert and oriented x3; no acute focal neurologic deficits. Labs:   CBC:  Recent Labs     03/07/22  0704 03/08/22  0527 03/09/22  0637   WBC 4.8 5.5 5.3   RBC 2.84* 2.64* 3.03*   HGB 9.3* 8.5* 9.9*   HCT 27.7* 25.7* 29.0*   MCV 97.5 97.2 95.9   MCH 32.9 32.3 32.8   MCHC 33.7 33.2 34.2   RDW 14.3 13.9 13.5    183 174   MPV 8.6 8.9 8.7      BMP:   Recent Labs     03/07/22  0704 03/08/22  0527 03/09/22  0637    143 138   K 4.8 5.0 4.2    106 99   CO2 22 25 26   BUN 42* 41* 25*   CREATININE 4.14* 4.03* 2.78*   GLUCOSE 89 79 87   CALCIUM 7.7* 7.6* 8.0*      Assessment/plan:    1. End-stage renal disease secondary to polycystic kidney disease. Patient will receive acute hemodialysis today and can be discharged after dialysis today to outpatient dialysis at [facility] dialysis unit [facility]. 2.  Systemic hypertension - blood pressure control is suboptimal at this time. We will continue current antihypertensive medications including hydralazine 50 mg p.o. every 8 hours and amlodipine 10 mg p.o. daily and monitor blood pressure response to ultrafiltration. 3.  Anemia of end-stage renal disease - hemoglobin 9.9 g/dL. Continue Retacrit 3000 units subcutaneously 3 times a week    Prognosis is guarded.     Electronically signed by Thania Medrano MD on 3/9/2022 at 2:19 PM

## 2022-03-09 NOTE — PROGRESS NOTES
CLINICAL PHARMACY NOTE: MEDS TO BEDS    Total # of Prescriptions Filled: 4   The following medications were delivered to the patient:  · Amlodipine 10mg  · Sucralfate 1gm  · Pantoprazole 40mg  · Hydralazine HCL 50mg    Additional Documentation:  Delivered medications to patients room

## 2022-03-09 NOTE — PLAN OF CARE
Problem: Pain:  Goal: Pain level will decrease  Description: Pain level will decrease  3/9/2022 1705 by Edin Cordova RN  Outcome: Completed  Note: Patient's pain decreased with PRN maia. 3/9/2022 0433 by Stephen Koo RN  Outcome: Ongoing  Goal: Control of acute pain  Description: Control of acute pain  3/9/2022 1705 by Edin Cordova RN  Outcome: Completed  3/9/2022 0433 by Stephen Koo RN  Outcome: Ongoing  Note: Assess the location, characteristics, onset, duration, frequency, quality, and severity of pain. Encourage immediate report of pain. Use appropriate pain scale to rate pain. Manage pain using nonpharmacologic/pharmacologic interventions. Goal: Control of chronic pain  Description: Control of chronic pain  3/9/2022 1705 by Edin Cordova RN  Outcome: Completed  3/9/2022 0433 by Stephen Koo RN  Outcome: Ongoing  Goal: Patient's pain/discomfort is manageable  Description: Patient's pain/discomfort is manageable  3/9/2022 1705 by Edin Cordova RN  Outcome: Completed  3/9/2022 0433 by Stephen Koo RN  Outcome: Ongoing     Problem: Infection:  Goal: Will remain free from infection  Description: Will remain free from infection  3/9/2022 1705 by Edin Cordova RN  Outcome: Completed  Note: No s/sx of infection this shift.   3/9/2022 0433 by Stephen Koo RN  Outcome: Ongoing     Problem: Safety:  Goal: Free from accidental physical injury  Description: Free from accidental physical injury  3/9/2022 1705 by Edin Cordova RN  Outcome: Completed  3/9/2022 0433 by Stephen Koo RN  Outcome: Ongoing  Goal: Free from intentional harm  Description: Free from intentional harm  3/9/2022 1705 by Edin Cordova RN  Outcome: Completed  3/9/2022 0433 by Stephen Koo RN  Outcome: Ongoing     Problem: Daily Care:  Goal: Daily care needs are met  Description: Daily care needs are met  3/9/2022 1705 by Edin Cordova RN  Outcome: Completed  3/9/2022 0433 by Stephen Koo RN  Outcome: Ongoing Problem: Skin Integrity:  Goal: Skin integrity will stabilize  Description: Skin integrity will stabilize  3/9/2022 1705 by Rock Chan RN  Outcome: Completed  3/9/2022 0433 by Bill Skinner RN  Outcome: Ongoing     Problem: Discharge Planning:  Goal: Patients continuum of care needs are met  Description: Patients continuum of care needs are met  3/9/2022 1705 by Rock Chan RN  Outcome: Completed  3/9/2022 0433 by Bill Skinner RN  Outcome: Ongoing     Problem: Falls - Risk of:  Goal: Will remain free from falls  Description: Will remain free from falls  3/9/2022 1705 by Rock Chan RN  Outcome: Completed  Note: Patient is alert and oriented and able to make needs known. 3/9/2022 0433 by Bill Skinner RN  Outcome: Ongoing  Note: Patient remains free of falls and injuries throughout shift. Bed remains in the lowest position, wheels locked, call light and bedside table are within reach.    Goal: Absence of physical injury  Description: Absence of physical injury  3/9/2022 1705 by Rock Chan RN  Outcome: Completed  3/9/2022 0433 by Bill Skinner RN  Outcome: Ongoing

## 2022-03-09 NOTE — PROGRESS NOTES
GI Progress notes    3/9/2022   2:36 PM    Name:  Stefanie Corrigan  MRN:    [id number]     Acct:     [de-identified]   Room:  2049/2049-01   Day: 5     Admit Date: 3/4/2022  9:15 PM  PCP: Paco Ott PA-C    Subjective:     C/C:   Chief Complaint   Patient presents with    Chest Pain    Flank Pain       Interval History: Status: improved. Patient seen and examined. No acute events overnight. No abdominal pain, nausea, vomiting  MRI MRCP reviewed - noted hepatic and renal cysts. Paula Mnotana to d/c  Awaiting dialysis chair placement    ROS:  Constitutional: negative for chills, fevers and sweats  Respiratory: negative for cough, dyspnea on exertion, hemoptysis, shortness of breath and wheezing  Cardiovascular: negative for chest pain, chest pressure/discomfort, dyspnea, lower extremity edema and palpitations  Gastrointestinal: negative for abdominal pain, constipation, diarrhea, nausea and vomiting  Neurological: negative for dizziness and headaches    Medications: Allergies:    Allergies   Allergen Reactions    Bee Pollen     Codeine Nausea And Vomiting    Dust Mite Extract      Chest congestion , sneezing    Pcn [Penicillins] Rash    Pollen Extract      Chest congesting , sneezing        Current Meds: metoprolol (LOPRESSOR) injection 5 mg, Q6H PRN  hydrALAZINE (APRESOLINE) tablet 50 mg, 3 times per day  anticoagulant sodium citrate 4 % injection 1.6 mL, PRN  anticoagulant sodium citrate 4 % injection 1.6 mL, PRN  pantoprazole (PROTONIX) tablet 40 mg, QAM AC  sucralfate (CARAFATE) tablet 1 g, 4 times per day  oxyCODONE (ROXICODONE) immediate release tablet 5 mg, Q4H PRN  diphenhydrAMINE (BENADRYL) tablet 50 mg, Nightly PRN  nicotine (NICODERM CQ) 21 MG/24HR 1 patch, Daily  sodium bicarbonate 75 mEq in sodium chloride 0.45 % 1,000 mL infusion, Continuous  epoetin oli-epbx (RETACRIT) injection 3,000 Units, Once per day on Mon Wed Fri  HYDROcodone-acetaminophen (NORCO) 5-325 MG per tablet 1 tablet, Once  sodium chloride flush 0.9 % injection 10 mL, PRN  amLODIPine (NORVASC) tablet 10 mg, Daily  DULoxetine (CYMBALTA) extended release capsule 90 mg, Daily  lidocaine 4 % external patch 1 patch, Daily  metoprolol succinate (TOPROL XL) extended release tablet 50 mg, Daily  amitriptyline (ELAVIL) tablet 25 mg, Nightly  heparin (porcine) injection 5,000 Units, BID  sodium chloride flush 0.9 % injection 5-40 mL, 2 times per day  sodium chloride flush 0.9 % injection 5-40 mL, PRN  0.9 % sodium chloride infusion, PRN  acetaminophen (TYLENOL) tablet 650 mg, Q4H PRN  ondansetron (ZOFRAN-ODT) disintegrating tablet 4 mg, Q8H PRN   Or  ondansetron (ZOFRAN) injection 4 mg, Q6H PRN        Data:     Code Status:  Full Code    Family History   Problem Relation Age of Onset    High Blood Pressure Mother     Asthma Sister     Migraines Sister     High Blood Pressure Father     Other Brother         bad knees, with recent total knee replacement        Social History     Socioeconomic History    Marital status:      Spouse name: Not on file    Number of children: Not on file    Years of education: Not on file    Highest education level: Not on file   Occupational History    Occupation: stay at home mom   Tobacco Use    Smoking status: Current Every Day Smoker     Packs/day: 1.00     Years: 25.00     Pack years: 25.00     Types: Cigarettes    Smokeless tobacco: Never Used   Vaping Use    Vaping Use: Never used   Substance and Sexual Activity    Alcohol use: No     Alcohol/week: 0.0 standard drinks    Drug use: No    Sexual activity: Yes     Partners: Male     Comment: steady boyfriend   Other Topics Concern    Not on file   Social History Narrative    Not on file     Social Determinants of Health     Financial Resource Strain:     Difficulty of Paying Living Expenses: Not on file   Food Insecurity:     Worried About Running Out of Food in the Last Year: Not on file    Kiara of Food in the Last Year: Not on file   Transportation Needs:     Lack of Transportation (Medical): Not on file    Lack of Transportation (Non-Medical): Not on file   Physical Activity:     Days of Exercise per Week: Not on file    Minutes of Exercise per Session: Not on file   Stress:     Feeling of Stress : Not on file   Social Connections:     Frequency of Communication with Friends and Family: Not on file    Frequency of Social Gatherings with Friends and Family: Not on file    Attends Bahai Services: Not on file    Active Member of [address] [place] [address] [employer] or Organizations: Not on file    Attends Club or Organization Meetings: Not on file    Marital Status: Not on file   Intimate Partner Violence:     Fear of Current or Ex-Partner: Not on file    Emotionally Abused: Not on file    Physically Abused: Not on file    Sexually Abused: Not on file   Housing Stability:     Unable to Pay for Housing in the Last Year: Not on file    Number of [place] in the Last Year: Not on file    Unstable Housing in the Last Year: Not on file       Vitals:  BP (!) 160/85   Pulse 75   Temp 98.4 °F (36.9 °C) (Oral)   Resp 16   Ht 5' 6\" (1.676 m)   Wt 113 lb 5.1 oz (51.4 kg)   LMP 2022   SpO2 96%   BMI 18.29 kg/m²   Temp (24hrs), Av.4 °F (36.9 °C), Min:98.2 °F (36.8 °C), Max:98.9 °F (37.2 °C)    No results for input(s): POCGLU in the last 72 hours. I/O (24Hr):   No intake or output data in the 24 hours ending 22 1436    Labs:      CBC:   Lab Results   Component Value Date    WBC 5.3 2022    RBC 3.03 2022    RBC 3.71 2012    HGB 9.9 2022    HCT 29.0 2022    MCV 95.9 2022    MCH 32.8 2022    MCHC 34.2 2022    RDW 13.5 2022     2022     2012    MPV 8.7 2022     CBC with Differential:    Lab Results   Component Value Date    WBC 5.3 2022    RBC 3.03 2022    RBC 3.71 2012    HGB 9.9 2022    HCT 29.0 2022     03/09/2022     01/13/2012    MCV 95.9 03/09/2022    MCH 32.8 03/09/2022    MCHC 34.2 03/09/2022    RDW 13.5 03/09/2022    LYMPHOPCT 19 03/04/2022    LYMPHOPCT 21.3 06/23/2020    MONOPCT 8 03/04/2022    MONOPCT 5.1 06/23/2020    EOSPCT 2.1 06/23/2020    BASOPCT 1 03/04/2022    BASOPCT 1.4 06/23/2020    MONOSABS 0.50 03/04/2022    MONOSABS 0.4 06/23/2020    LYMPHSABS 1.20 03/04/2022    LYMPHSABS 1.6 06/23/2020    EOSABS 0.10 03/04/2022    EOSABS 0.2 06/23/2020    BASOSABS 0.10 03/04/2022    DIFFTYPE NOT REPORTED 11/08/2021     Hemoglobin/Hematocrit:    Lab Results   Component Value Date    HGB 9.9 03/09/2022    HCT 29.0 03/09/2022     CMP:    Lab Results   Component Value Date     03/09/2022    K 4.2 03/09/2022    CL 99 03/09/2022    CO2 26 03/09/2022    BUN 25 03/09/2022    CREATININE 2.78 03/09/2022    GFRAA 23 03/09/2022    LABGLOM 19 03/09/2022    GLUCOSE 87 03/09/2022    PROT 8.2 03/04/2022    LABALBU 4.9 03/04/2022    CALCIUM 8.0 03/09/2022    BILITOT <0.15 03/04/2022    ALKPHOS 131 03/04/2022    AST 16 03/04/2022    ALT 9 03/04/2022     BMP:    Lab Results   Component Value Date     03/09/2022    K 4.2 03/09/2022    CL 99 03/09/2022    CO2 26 03/09/2022    BUN 25 03/09/2022    LABALBU 4.9 03/04/2022    CREATININE 2.78 03/09/2022    CALCIUM 8.0 03/09/2022    GFRAA 23 03/09/2022    LABGLOM 19 03/09/2022    GLUCOSE 87 03/09/2022     PT/INR:    Lab Results   Component Value Date    PROTIME 12.3 03/08/2022    INR 0.9 03/08/2022     PTT:    Lab Results   Component Value Date    APTT 36.8 03/08/2022   [APTT}    Physical Examination:        General appearance: alert, cooperative and no distress  Mental Status: oriented to person, place and time and normal affect  Abdomen: soft, nontender, nondistended, bowel sounds present  Extremities: no edema, redness or tenderness in the calves  Skin: no gross lesions, rashes, or induration    Assessment:        Primary Problem  Hypertensive emergency     Active Hospital Problems    Diagnosis Date Noted    Abdominal pain, epigastric [R10.13]     Abdominal pain, right upper quadrant [R10.11]     Nausea [R11.0]     Gastroesophageal reflux disease [K21.9]     Hypertensive emergency [I16.1] 03/04/2022    Acute kidney injury superimposed on chronic kidney disease ([facility] [N17.9, N18.9] 02/21/2021    Hyperlipidemia [E78.5] 06/18/2020    Acute renal failure with acute tubular necrosis superimposed on stage 3 chronic kidney disease ([facility] [N17.0, N18.30] 05/27/2020    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    Polycystic kidney [Q61.3] 10/02/2011     Past Medical History:   Diagnosis Date    Anxiety     Asthma     Chronic headaches 07/10/2013    CKD (chronic kidney disease) stage 3, GFR 30-59 ml/min ([facility])     Degenerative disc disease, cervical     Depression     Dysmenorrhea 09/30/2014    Eczema 11/08/2012    Hypertension     Hypocalcemia 05/28/2014    Kidney stone     Menorrhagia 09/30/2014    Neck pain, bilateral 05/28/2014    Pelvic pain in female 09/30/2014    Polycystic kidney     Smoker 10/02/2011    Tension vascular headache 01/20/2014        Plan:        1. Epigastric pain, nausea, vomiting s/p EGD revealing gastritis, negative H. Pylori. 1. PPI  2. Antiemetics as needed  2. Biliary ductal dilation, abdominal pain   1. MRI MRCP did not reveal any choledocholithiasis, no CBD dilation  2. Noted to have small hepatic cysts  3. Has polycystic kidneys  4. May d/c per GI  5.  Follow-up outpatient  6. GI signing off    Explained to the patient and d/W Nursing Staff  Will F/U with you  Please call or Page for any issues or change in status  Thanks    Electronically signed by ALBARO Pichardo NP on 3/9/2022 at 2:36 PM

## 2022-03-09 NOTE — PROGRESS NOTES
HEMODIALYSIS POST TREATMENT NOTE    Treatment time ordered: 150 minutes    Actual treatment time: 150 minutes    UltraFiltration Goal: 0-1000 ml  UltraFiltration Removed: 800 ml      Pre Treatment weight: 52 Kg  Post Treatment weight: 51.4 KG  Estimated Dry Weight:     Access used:     Central Venous Catheter:          Tunneled            Site: Rt. Chest          Access Flow: good            Sign and symptoms of infection: No          Medications or blood products given: No    [facility] outpatient [facility]: [facility]    Chronic outpatient unit: [facility]    Summary of response to treatment: Tolerated dialysis well    Explain if orders NOT met, was physician notified:N/A      ACES flowsheet faxed to patient unit/ placed in patient chart: N/A, Epic charting    Post assessment completed: Yes    Report given to: Meghann King RN      * Intra-treatment documented Safety Checks include the followin) Access and face visible at all times. 2) All connections and blood lines are secure with no kinks. 3) NVL alarm engaged. 4) Hemosafe device applied (if applicable). 5) No collapse of Arterial or Venous blood chambers. 6) All blood lines / pump segments in the air detectors.

## 2022-03-09 NOTE — CARE COORDINATION
ONGOING DISCHARGE PLAN:    Patient is alert and oriented x4. Spoke with patient regarding discharge plan and patient confirms that plan is still to discharge to home with no needs  Waiting on a chair time for dialysis   Possible discharge to home tomorrow       Will continue to follow for additional discharge needs.     Electronically signed by Kitty Ballard RN on 3/9/2022 at 12:52 PM

## 2022-03-09 NOTE — PROGRESS NOTES
Dialysis Safety Checks:    Patient ID Verified (Y/N) Y     -Hepatitis Test                   Date      Result  Hepatitis B Surface Antigen   3/8/22 Negative     Hepatitis B Surface Antibody 3/8/22 Negative     Hepatitis B Core Antibody        -Treatment Initiation  Blood Vol Processed Goal (Liters)  Pump Speed x Treatment Hours x 60 Minutes    Target Fluid Removal 0-1000 ml     * Intra-treatment documented Safety Checks include the followin) Access and face visible at all times. 2) All connections and blood lines are secure with no kinks. 3) NVL alarm engaged. 4) Hemosafe device applied (if applicable). 5) No collapse of Arterial or Venous blood chambers. 6) All blood lines / pump segments in the air detectors.   --------------------------------------------------------------------------------    Report received from [address] [place] [address]

## 2022-03-09 NOTE — PROGRESS NOTES
HEMODIALYSIS PRE-TREATMENT NOTE    Patient Identifiers prior to treatment: Name, , MRN    Isolation Required: No                      Isolation Type: N/A          Hepatitis status:                           Date Drawn                             Result  Hepatitis B Surface Antigen 3/8/22     negative                     Hepatitis B Surface Antibody          Hepatitis B Core Antibody 3/8/22 negative          How was Hepatitis Status verified: EMR       Hemodialysis orders verified: yes    Access Within normal limits: yes    Pre-Assessment completed: yes    Pre-dialysis report received from: Carlota Villalpando RN                  Time: 2805

## 2022-03-09 NOTE — PROGRESS NOTES
Pt has confirmed chair time of T-TH-S at [facility] at 7:00AM. Pt can begin tomorrow, (3/10/2022).

## 2022-03-09 NOTE — DISCHARGE SUMMARY
[place] [address] Internal Medicine    Discharge Summary     Patient ID: Len Santamaria  :  1980   MRN: [id number]     ACCOUNT:  [de-identified]   Patient's PCP: Hamzah Valero PA-C  Admit Date: 3/4/2022   Discharge Date: 3/9/2022  Length of Stay: 5  Code Status:  Full Code  Admitting Physician: Rishabh Blevins MD  Discharge Physician: Rishabh Blevins MD     Active Discharge Diagnoses:     Primary Problem  Hypertensive emergency      [place] Problems    Diagnosis Date Noted    Abdominal pain, epigastric [R10.13]     Abdominal pain, right upper quadrant [R10.11]     Nausea [R11.0]     Gastroesophageal reflux disease [K21.9]     Hypertensive emergency [I16.1] 2022    Acute kidney injury superimposed on chronic kidney disease ([facility] [address] [N17.9, N18.9] 2021    Hyperlipidemia [E78.5] 2020    Acute renal failure with acute tubular necrosis superimposed on stage 3 chronic kidney disease ([facility] [address] [N17.0, N18.30] 2020    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 female who was admitted for the management of Hypertensive emergency , presented to ER with Chest Pain and Flank Pain    47-year-old lady with history of polycystic kidney disease chronic renal failure stage III not on dialysis chronic anemia secondary to renal disease uncontrolled hypertension noncompliant with medication chronic back pain takes NSAIDs every day 200 mg three times a day  COPD active smoker not in exacerbation  Admitted with multiple complaints including epigastric and right upper quadrant pain   1. Acute renal failure on CKD stage III-initiation of dialysis.   Renal failure likely secondary to uncontrolled hypertension tension hypertensive nephrosclerosis, with NSAID overuse  2. Polycystic kidney disease  3. Right-sided abdominal pain, biliary ductal dilatation, postprandial pain-ultrasound no cholelithiasis-no ductal dilatation on MRCP, GI consulted  4. Epigastric pain-status post EGD 3/7-shows gastritis-start PPI, Carafate  5. Hemorrhagic cyst both kidneys  6. Elevated D-dimer VQ scan negative  Chest pain-likely type II NSTEMI with renal failure, resolved  Hypertension-medication adjusted  Tunneled cath was placed on 3/8  Dialysis started on 3/8  Patient tolerating diet by 3/9-discharged home    Significant therapeutic interventions: As above    Significant Diagnostic Studies:   Labs / Micro:    Lab Results   Component Value Date    WBC 5.3 03/09/2022    HGB 9.9 (L) 03/09/2022    HCT 29.0 (L) 03/09/2022    MCV 95.9 03/09/2022     03/09/2022          Lab Results   Component Value Date     03/09/2022    K 4.2 03/09/2022    CL 99 03/09/2022    CO2 26 03/09/2022    BUN 25 03/09/2022    CREATININE 2.78 03/09/2022    GLUCOSE 87 03/09/2022    CALCIUM 8.0 03/09/2022          Radiology:    NM LUNG VENT/PERFUSION (VQ)    Result Date: 3/5/2022  EXAMINATION: NUCLEAR MEDICINE VENTILATION PERFUSION SCAN 3/5/2022 TECHNIQUE: 60.5 millicuries aerosolized Tc-99m DTPA was administered via mask prior to planar imaging of the lungs in multiple projections. Then, 6.4 millicuries of IC-77A MAA was administered intravenously prior to planar imaging of the lungs in similar projections. COMPARISON: Chest radiograph from yesterday. HISTORY: Acute shortness of breath, cough, and chest pain. FINDINGS: PERFUSION: Best seen on the posterior images, there is a large matched defect within the left lower lobe. VENTILATION: Central deposition of radiotracer is compatible with COPD. CHEST RADIOGRAPH: No focal areas of consolidation or significant effusions on recent chest radiograph. A single large matched defect in the left lower lobe is considered low probability for acute pulmonary embolism. US LIVER    Result Date: 3/5/2022  EXAMINATION: RIGHT UPPER QUADRANT ULTRASOUND 3/5/2022 11:32 am COMPARISON: CT abdomen pelvis from 03/04/2022 HISTORY: ORDERING SYSTEM PROVIDED HISTORY: ruq pain TECHNOLOGIST PROVIDED HISTORY: ruq pain 43-year-old female with right upper quadrant abdominal pain FINDINGS: LIVER:  Coarse heterogeneous increased echogenicity throughout the hepatic parenchyma which can be seen with fatty infiltration of the liver and/or diffuse hepatocellular disease. No intrahepatic biliary ductal dilation. Small left hepatic lobe cyst measuring 4 mm on image 65-66. liver length measures 13.1 cm. Color flow projects over the main portal vein with a normal hepatopetal, monophasic waveform. BILIARY SYSTEM:  Gallbladder polyps are present measuring up to 1 mm. No shadowing gallstones. No pericholecystic fluid. Sonographic Alden Katina sign is negative. Gallbladder wall thickness is normal measuring 2 mm. Common bile duct is enlarged measuring 7 mm. RIGHT KIDNEY: Changes in the right kidney consistent with patient's history of polycystic kidney disease. No gross right-sided hydronephrosis. PANCREAS:  Visualized portions of the pancreas are unremarkable. OTHER: No evidence of right upper quadrant ascites. 1. Gallbladder polyps measuring up to 1 mm. 2. Enlarged common bile duct measuring 7 mm. Further evaluation with nonemergent MRCP recommended. 3. Tiny left hepatic lobe cyst measuring 4 mm. 4. Appearance of the hepatic parenchyma can be seen with a combination of diffuse hepatocellular disease and fatty liver. Correlate with LFTs. 5. Findings in the right kidney consistent with patient's history of polycystic kidney disease. XR CHEST PORTABLE    Result Date: 3/4/2022  EXAMINATION: ONE XRAY VIEW OF THE CHEST 3/4/2022 9:41 pm COMPARISON: October 7, 2014 HISTORY: ORDERING SYSTEM PROVIDED HISTORY: chest pain TECHNOLOGIST PROVIDED HISTORY: chest pain Reason for Exam: L sided CP X several days. FINDINGS: The lungs are without acute focal process. There is no effusion or pneumothorax. The cardiomediastinal silhouette is without acute process. The osseous structures are without acute process. No acute process. IR TUNNELED CVC PLACE WO SQ PORT/PUMP > 5 YEARS    Result Date: 3/9/2022  PROCEDURE: ULTRASOUND GUIDED VASCULAR ACCESS. FLUOROSCOPY GUIDED PLACEMENT OF A TUNNELED CATHETER. MODERATE CONSCIOUS SEDATION 3/8/2022. HISTORY: ORDERING SYSTEM PROVIDED HISTORY: dialysis TECHNOLOGIST PROVIDED HISTORY: dialysis How many lumens are being requested?->2 What side should this line be placed? ->Either What site is the preferred site? ->No preference Is the patient pregnant?->No Polycystic kidney disease SEDATION: Moderate intravenous conscious sedation, with 100 mcg of fentanyl was given under constant physiologic monitoring by a dedicated nurse, which included EKG, O2 Saturation, End-tidal CO2, Pulse and Arterial Pressure, under my supervision. Total intra-service time of sedation was 20 minutes. FLUOROSCOPY DOSE AND TYPE OR TIME AND EXPOSURES: 39 seconds; D AP 43 cGy cm2 TECHNIQUE: This procedure was performed by Dr. Kaci Cameron. Informed consent was obtained after a detailed explanation of the procedure including risks, benefits, and alternatives. Universal protocol was observed. The right neck and chest were prepped and draped in sterile fashion using maximum sterile barrier technique. Local anesthesia was achieved with lidocaine. A micropuncture needle was used to access the right internal jugular vein using ultrasound guidance. An ultrasound image demonstrating patency of the vein with needle tip located within it. An image was obtained and stored in PACs. A 0.035 guidewire was used to place a peel-away sheath.   A subcutaneous tunnel was created to the infraclavicular region and a tunneled 14.5 Pashto 19 cm palindrome catheter was pulled through the subcutaneous tunnel to the venotomy site and advanced through the peel-away sheath under fluoroscopic guidance to the proximal right atrium. The catheter flushed easily and there was a good blood return. The catheter was sutured to the skin. The catheter was locked with heparinized saline. The patient tolerated the procedure well and there were no immediate complications. EBL: Less than 5 mL FINDINGS: Fluoroscopic image demonstrates the tip of the catheter in the proximal right atrium. Successful ultrasound and fluoroscopy guided tunneled hemodialysis catheter placement . MRI ABDOMEN WO CONTRAST MRCP    Result Date: 3/8/2022  EXAMINATION: MRI OF THE ABDOMEN WITHOUT CONTRAST AND MRCP 3/8/2022 12:51 pm TECHNIQUE: Multiplanar multisequence MRI of the abdomen was performed without the administration of intravenous contrast.  After initial T2 axial and coronal images, thick slab, thin slab and 3D coronal MRCP sequences were obtained without the administration of intravenous contrast.  MIP images are provided for review. COMPARISON: Ultrasound 03/05/2022, CT 03/04/2022 HISTORY: ORDERING SYSTEM PROVIDED HISTORY: Biliary ductal dilatation TECHNOLOGIST PROVIDED HISTORY: Biliary ductal dilatation Is the patient pregnant?->No Reason for Exam: Biliary ductal dilatation Additional signs and symptoms: GFR12 FINDINGS: Several scattered T2 hyperintense and T1 hypointense hepatic lesions consistent with cysts. Largest about 1.5 cm in the left lobe. Bilateral marked renal enlargement with innumerable variable size cysts of variable signal intensity. Most are T1 hypointense and T2 hyperintense however significant amount also demonstrates T1 hyperintensity and T2 hypointensity some with fluid fluid level. This would represent hemorrhagic/proteinaceous cysts. Findings compatible with polycystic kidney disease Spleen, pancreas a and adrenal glands show no significant abnormalities. Visualization of the adrenal glands is limited. Gallbladder: No cholelithiasis.   Gallbladder polyps reported on prior ultrasound too small to resolve on current study and not visualized. Bile Ducts: No intra or extrahepatic biliary ductal dilatation. CBD measures up to 5 mm diameter. No choledocholithiasis. Pancreatic Duct: Normal caliber. No pancreas divisum. Other:  No ascites. Limited views of the bowel unremarkable. Bone marrow signal age appropriate. 1. No biliary ductal dilatation or choledocholithiasis. 2. Redemonstration of polycystic kidney disease with innumerable cysts and enlarged kidneys as discussed above. 3. Several scattered hepatic cysts up to 1.5 cm. CT CHEST ABDOMEN PELVIS WO CONTRAST    Result Date: 3/4/2022  EXAMINATION: CT OF THE CHEST, ABDOMEN, AND PELVIS WITHOUT CONTRAST 3/4/2022 6:55 pm TECHNIQUE: CT of the chest, abdomen and pelvis was performed without the administration of intravenous contrast. Multiplanar reformatted images are provided for review. Dose modulation, iterative reconstruction, and/or weight based adjustment of the mA/kV was utilized to reduce the radiation dose to as low as reasonably achievable. COMPARISON: Chest x-ray dated March 4, 2022, prior CT scan abdomen pelvis dated November 6, 2021 HISTORY: ORDERING SYSTEM PROVIDED HISTORY: dissection TECHNOLOGIST PROVIDED HISTORY: dissection Is the patient pregnant?->No Reason for Exam: dissection Additional signs and symptoms: Pt c/o chest pain and right flank pain for 3 days. Relevant Medical/Surgical History: H/O polycystic kidney. FINDINGS: Chest: Mediastinum: Coronary arterial calcifications are present. Cardiomegaly is noted. Small pericardial effusion is present. The unenhanced thoracic aorta appears grossly normal.  No mediastinal hemorrhage is present. Trachea mainstem bronchi appear normal.  The esophagus is normal. Lungs/pleura: Apical fibrotic and nodular changes are present bilaterally. Calcified granuloma is present within the right middle lobe.   No focal area of consolidation, pleural INTERNAL MEDICINE  IP CONSULT TO NEPHROLOGY  IP CONSULT TO GI  IP CONSULT TO INTERVENTIONAL RADIOLOGY      The patient was seen and examined on day of discharge and this discharge summary is in conjunction with any daily progress note from day of discharge. Discharge plan:     Disposition: Home      Instructions to Patient: Keep follow-up appointments      Requiring Further Evaluation/Follow Up POST HOSPITALIZATION/Incidental Findings:     Physician Follow Up:     No follow-up provider specified. Activity: Resume as directed    Discharge Medications:      Medication List      START taking these medications    epoetin oli-epbx 3000 UNIT/ML Soln injection  Commonly known as: RETACRIT  Inject 1 mL into the skin three times a week     hydrALAZINE 50 MG tablet  Commonly known as: APRESOLINE  Take 1 tablet by mouth every 8 hours     pantoprazole 40 MG tablet  Commonly known as: PROTONIX  Take 1 tablet by mouth every morning (before breakfast)  Start taking on: March 10, 2022     sucralfate 1 GM tablet  Commonly known as: CARAFATE  Take 1 tablet by mouth 4 times daily        CHANGE how you take these medications    amLODIPine 10 MG tablet  Commonly known as: NORVASC  Take 1 tablet by mouth daily  Start taking on: March 10, 2022  What changed:   · medication strength  · how much to take     DULoxetine 60 MG extended release capsule  Commonly known as: CYMBALTA  take 1 capsule by mouth once daily  What changed: See the new instructions.         CONTINUE taking these medications    amitriptyline 25 MG tablet  Commonly known as: ELAVIL  Take 1 tablet by mouth nightly     hydrOXYzine 50 MG tablet  Commonly known as: ATARAX     lidocaine 4 % external patch  Place 1 patch onto the skin daily     metoprolol succinate 50 MG extended release tablet  Commonly known as: TOPROL XL           Where to Get Your Medications      These medications were sent to [address] [place] [address]  [address], [address] [phone] Paola Weber [phone]  [address], [address] [facility] [address]    Phone: [phone]   · amLODIPine 10 MG tablet  · epoetin oli-epbx 3000 UNIT/ML Soln injection  · hydrALAZINE 50 MG tablet  · pantoprazole 40 MG tablet  · sucralfate 1 GM tablet         Time Spent on discharge is  35 mins in patient examination, evaluation, counseling as well as medication reconciliation, prescriptions for required medications, discharge plan and follow up. Electronically signed by   Miryam Dasilva MD  3/9/2022  3:31 PM      Thank you Dr. Agnieszka Hall PA-C for the opportunity to be involved in this patient's care.

## 2022-03-16 NOTE — PROGRESS NOTES
Physician Progress Note      Felicia Aggarwal  [facility] #:                  [id number]  :                       1980  ADMIT DATE:       3/4/2022 9:15 PM  [address] DATE:        3/9/2022 4:55 PM  RESPONDING  PROVIDER #:        Chelle Ignacio MD          QUERY TEXT:    Patient admitted with HTN emergency. Noted documentation of SILVANO on CKD stage 3   by IM in H/P and progress notes and progression to CKD stage 5 by renal in   consult note and progress notes. If possible, please document in progress   notes and discharge summary if you are evaluating and /or treating any of the   following: The medical record reflects the following:  Risk Factors: Hx CKD, polycystic kidney disease, NSAID use  Clinical Indicators: Creat 4.52-->4.45-->4.57-->4.28-->4.14-->4.03; per Renal   consult note-->serum creatinine of 4.5 mg/dL on admission Baseline serum   creatinine was around 3.6 to 4.1 mg/dL in November, CKD 5 secondary to   polycystic kidney disease, kidney function slowly worsening  Treatment: Renal consult, IR for tunneled HD cath, Initiation of HD,   monitoring, BMP daily  Options provided:  -- Progression to CKD stage 5 only, SILVANO ruled out  -- SILVANO on CKD stage 3 confirmed with progression to CKD stage 5 ruled out  -- Other - I will add my own diagnosis  -- Disagree - Not applicable / Not valid  -- Disagree - Clinically unable to determine / Unknown  -- Refer to Clinical Documentation Reviewer    PROVIDER RESPONSE TEXT:    After study, progression to CKD stage 5 only with SILVANO ruled out.     Query created by: Adan Arce on 3/9/2022 1:59 PM      Electronically signed by:  Chelle Ignacio MD 3/16/2022 8:55 AM

## 2022-07-14 RX ORDER — HYDRALAZINE HYDROCHLORIDE 50 MG/1
50 TABLET, FILM COATED ORAL EVERY 8 HOURS SCHEDULED
Qty: 90 TABLET | Refills: 3 | OUTPATIENT
Start: 2022-07-14

## 2022-07-14 RX ORDER — PANTOPRAZOLE SODIUM 40 MG/1
TABLET, DELAYED RELEASE ORAL
Qty: 30 TABLET | Refills: 3 | OUTPATIENT
Start: 2022-07-14

## 2023-02-16 ENCOUNTER — APPOINTMENT (OUTPATIENT)
Dept: GENERAL RADIOLOGY | Age: 43
DRG: 198 | End: 2023-02-16
Payer: MEDICAID

## 2023-02-16 ENCOUNTER — HOSPITAL ENCOUNTER (INPATIENT)
Age: 43
LOS: 1 days | Discharge: HOME | DRG: 198 | End: 2023-02-17
Attending: STUDENT IN AN ORGANIZED HEALTH CARE EDUCATION/TRAINING PROGRAM | Admitting: INTERNAL MEDICINE
Payer: MEDICAID

## 2023-02-16 DIAGNOSIS — R07.9 CHEST PAIN, UNSPECIFIED TYPE: Primary | ICD-10-CM

## 2023-02-16 LAB
ABSOLUTE EOS #: 0.5 K/UL (ref 0–0.4)
ABSOLUTE LYMPH #: 2.8 K/UL (ref 1–4.8)
ABSOLUTE MONO #: 0.9 K/UL (ref 0.1–1.3)
ALBUMIN SERPL-MCNC: 3.5 G/DL (ref 3.5–5.2)
ALP SERPL-CCNC: 56 U/L (ref 35–104)
ALT SERPL-CCNC: 7 U/L (ref 5–33)
ANION GAP SERPL CALCULATED.3IONS-SCNC: 17 MMOL/L (ref 9–17)
AST SERPL-CCNC: 11 U/L
BASOPHILS # BLD: 1 % (ref 0–2)
BASOPHILS ABSOLUTE: 0.1 K/UL (ref 0–0.2)
BILIRUB SERPL-MCNC: 0.2 MG/DL (ref 0.3–1.2)
BUN SERPL-MCNC: 41 MG/DL (ref 6–20)
CALCIUM SERPL-MCNC: 8.3 MG/DL (ref 8.6–10.4)
CHLORIDE SERPL-SCNC: 100 MMOL/L (ref 98–107)
CO2 SERPL-SCNC: 21 MMOL/L (ref 20–31)
CREAT SERPL-MCNC: 7.5 MG/DL (ref 0.5–0.9)
EOSINOPHILS RELATIVE PERCENT: 4 % (ref 0–4)
GFR SERPL CREATININE-BSD FRML MDRD: 6 ML/MIN/1.73M2
GLUCOSE SERPL-MCNC: 102 MG/DL (ref 70–99)
HCT VFR BLD AUTO: 30.2 % (ref 36–46)
HGB BLD-MCNC: 10.2 G/DL (ref 12–16)
INR PPP: 0.9
LYMPHOCYTES # BLD: 25 % (ref 24–44)
MAGNESIUM SERPL-MCNC: 1.9 MG/DL (ref 1.6–2.6)
MCH RBC QN AUTO: 32.3 PG (ref 26–34)
MCHC RBC AUTO-ENTMCNC: 33.9 G/DL (ref 31–37)
MCV RBC AUTO: 95.3 FL (ref 80–100)
MONOCYTES # BLD: 8 % (ref 1–7)
PDW BLD-RTO: 12.7 % (ref 11.5–14.9)
PLATELET # BLD AUTO: 407 K/UL (ref 150–450)
PMV BLD AUTO: 7.5 FL (ref 6–12)
POTASSIUM SERPL-SCNC: 4.1 MMOL/L (ref 3.7–5.3)
PROT SERPL-MCNC: 6.1 G/DL (ref 6.4–8.3)
PROTHROMBIN TIME: 12.1 SEC (ref 11.8–14.6)
RBC # BLD: 3.17 M/UL (ref 4–5.2)
SEG NEUTROPHILS: 62 % (ref 36–66)
SEGMENTED NEUTROPHILS ABSOLUTE COUNT: 7.1 K/UL (ref 1.3–9.1)
SODIUM SERPL-SCNC: 138 MMOL/L (ref 135–144)
TROPONIN I SERPL DL<=0.01 NG/ML-MCNC: 20 NG/L (ref 0–14)
WBC # BLD AUTO: 11.4 K/UL (ref 3.5–11)

## 2023-02-16 PROCEDURE — 6370000000 HC RX 637 (ALT 250 FOR IP): Performed by: STUDENT IN AN ORGANIZED HEALTH CARE EDUCATION/TRAINING PROGRAM

## 2023-02-16 PROCEDURE — 80053 COMPREHEN METABOLIC PANEL: CPT

## 2023-02-16 PROCEDURE — 36415 COLL VENOUS BLD VENIPUNCTURE: CPT

## 2023-02-16 PROCEDURE — 96374 THER/PROPH/DIAG INJ IV PUSH: CPT

## 2023-02-16 PROCEDURE — 85025 COMPLETE CBC W/AUTO DIFF WBC: CPT

## 2023-02-16 PROCEDURE — 85610 PROTHROMBIN TIME: CPT

## 2023-02-16 PROCEDURE — 93005 ELECTROCARDIOGRAM TRACING: CPT

## 2023-02-16 PROCEDURE — 83735 ASSAY OF MAGNESIUM: CPT

## 2023-02-16 PROCEDURE — 3E1M39Z IRRIGATION OF PERITONEAL CAVITY USING DIALYSATE, PERCUTANEOUS APPROACH: ICD-10-PCS | Performed by: INTERNAL MEDICINE

## 2023-02-16 PROCEDURE — 6360000002 HC RX W HCPCS: Performed by: STUDENT IN AN ORGANIZED HEALTH CARE EDUCATION/TRAINING PROGRAM

## 2023-02-16 PROCEDURE — 84484 ASSAY OF TROPONIN QUANT: CPT

## 2023-02-16 PROCEDURE — 71045 X-RAY EXAM CHEST 1 VIEW: CPT

## 2023-02-16 PROCEDURE — 99285 EMERGENCY DEPT VISIT HI MDM: CPT

## 2023-02-16 RX ORDER — NITROGLYCERIN 0.4 MG/1
0.4 TABLET SUBLINGUAL EVERY 5 MIN PRN
Status: DISCONTINUED | OUTPATIENT
Start: 2023-02-16 | End: 2023-02-17 | Stop reason: HOSPADM

## 2023-02-16 RX ORDER — ASPIRIN 81 MG/1
324 TABLET, CHEWABLE ORAL ONCE
Status: COMPLETED | OUTPATIENT
Start: 2023-02-17 | End: 2023-02-17

## 2023-02-16 RX ORDER — MORPHINE SULFATE 4 MG/ML
4 INJECTION, SOLUTION INTRAMUSCULAR; INTRAVENOUS ONCE
Status: COMPLETED | OUTPATIENT
Start: 2023-02-16 | End: 2023-02-16

## 2023-02-16 RX ADMIN — MORPHINE SULFATE 4 MG: 4 INJECTION, SOLUTION INTRAMUSCULAR; INTRAVENOUS at 23:12

## 2023-02-16 RX ADMIN — NITROGLYCERIN 0.4 MG: 0.4 TABLET, ORALLY DISINTEGRATING SUBLINGUAL at 23:11

## 2023-02-16 ASSESSMENT — HEART SCORE
ECG: 0
ECG: 0

## 2023-02-16 ASSESSMENT — ENCOUNTER SYMPTOMS
SHORTNESS OF BREATH: 0
VOMITING: 0
ABDOMINAL PAIN: 0
EYE DISCHARGE: 0
NAUSEA: 0
EYE REDNESS: 0
SORE THROAT: 0
RHINORRHEA: 0
DIARRHEA: 0

## 2023-02-16 ASSESSMENT — PAIN DESCRIPTION - ORIENTATION
ORIENTATION: LEFT
ORIENTATION: LEFT

## 2023-02-16 ASSESSMENT — PAIN DESCRIPTION - LOCATION
LOCATION: CHEST
LOCATION: CHEST

## 2023-02-16 ASSESSMENT — PAIN SCALES - GENERAL
PAINLEVEL_OUTOF10: 7
PAINLEVEL_OUTOF10: 7

## 2023-02-16 ASSESSMENT — PAIN - FUNCTIONAL ASSESSMENT: PAIN_FUNCTIONAL_ASSESSMENT: 0-10

## 2023-02-16 ASSESSMENT — PAIN DESCRIPTION - DESCRIPTORS
DESCRIPTORS: ACHING
DESCRIPTORS: PRESSURE;CRUSHING

## 2023-02-17 ENCOUNTER — APPOINTMENT (OUTPATIENT)
Dept: NON INVASIVE DIAGNOSTICS | Age: 43
DRG: 198 | End: 2023-02-17
Payer: MEDICAID

## 2023-02-17 ENCOUNTER — APPOINTMENT (OUTPATIENT)
Dept: NUCLEAR MEDICINE | Age: 43
DRG: 198 | End: 2023-02-17
Payer: MEDICAID

## 2023-02-17 VITALS
DIASTOLIC BLOOD PRESSURE: 71 MMHG | BODY MASS INDEX: 21.39 KG/M2 | TEMPERATURE: 98.2 F | SYSTOLIC BLOOD PRESSURE: 118 MMHG | RESPIRATION RATE: 18 BRPM | HEART RATE: 87 BPM | OXYGEN SATURATION: 97 % | WEIGHT: 132.5 LBS

## 2023-02-17 PROBLEM — I20.9 ANGINA PECTORIS (HCC): Status: ACTIVE | Noted: 2023-02-17

## 2023-02-17 LAB
ANION GAP SERPL CALCULATED.3IONS-SCNC: 14 MMOL/L (ref 9–17)
BUN SERPL-MCNC: 42 MG/DL (ref 6–20)
CALCIUM SERPL-MCNC: 8.1 MG/DL (ref 8.6–10.4)
CHLORIDE SERPL-SCNC: 103 MMOL/L (ref 98–107)
CO2 SERPL-SCNC: 23 MMOL/L (ref 20–31)
CREAT SERPL-MCNC: 7.11 MG/DL (ref 0.5–0.9)
GFR SERPL CREATININE-BSD FRML MDRD: 7 ML/MIN/1.73M2
GLUCOSE SERPL-MCNC: 94 MG/DL (ref 70–99)
HCG QUALITATIVE: NEGATIVE
LV EF: 55 %
LV EF: 61 %
LVEF MODALITY: NORMAL
LVEF MODALITY: NORMAL
POTASSIUM SERPL-SCNC: 4.3 MMOL/L (ref 3.7–5.3)
SODIUM SERPL-SCNC: 140 MMOL/L (ref 135–144)
TROPONIN I SERPL DL<=0.01 NG/ML-MCNC: 24 NG/L (ref 0–14)

## 2023-02-17 PROCEDURE — 96375 TX/PRO/DX INJ NEW DRUG ADDON: CPT

## 2023-02-17 PROCEDURE — 2580000003 HC RX 258: Performed by: INTERNAL MEDICINE

## 2023-02-17 PROCEDURE — 6370000000 HC RX 637 (ALT 250 FOR IP): Performed by: STUDENT IN AN ORGANIZED HEALTH CARE EDUCATION/TRAINING PROGRAM

## 2023-02-17 PROCEDURE — 6360000002 HC RX W HCPCS: Performed by: INTERNAL MEDICINE

## 2023-02-17 PROCEDURE — 6370000000 HC RX 637 (ALT 250 FOR IP): Performed by: NURSE PRACTITIONER

## 2023-02-17 PROCEDURE — 6360000002 HC RX W HCPCS: Performed by: STUDENT IN AN ORGANIZED HEALTH CARE EDUCATION/TRAINING PROGRAM

## 2023-02-17 PROCEDURE — 2060000000 HC ICU INTERMEDIATE R&B

## 2023-02-17 PROCEDURE — 78452 HT MUSCLE IMAGE SPECT MULT: CPT

## 2023-02-17 PROCEDURE — 36415 COLL VENOUS BLD VENIPUNCTURE: CPT

## 2023-02-17 PROCEDURE — 93017 CV STRESS TEST TRACING ONLY: CPT

## 2023-02-17 PROCEDURE — 99223 1ST HOSP IP/OBS HIGH 75: CPT | Performed by: INTERNAL MEDICINE

## 2023-02-17 PROCEDURE — 2580000003 HC RX 258: Performed by: NURSE PRACTITIONER

## 2023-02-17 PROCEDURE — 84703 CHORIONIC GONADOTROPIN ASSAY: CPT

## 2023-02-17 PROCEDURE — 80048 BASIC METABOLIC PNL TOTAL CA: CPT

## 2023-02-17 PROCEDURE — A9500 TC99M SESTAMIBI: HCPCS | Performed by: NURSE PRACTITIONER

## 2023-02-17 PROCEDURE — 96374 THER/PROPH/DIAG INJ IV PUSH: CPT

## 2023-02-17 PROCEDURE — 3430000000 HC RX DIAGNOSTIC RADIOPHARMACEUTICAL: Performed by: NURSE PRACTITIONER

## 2023-02-17 PROCEDURE — 93306 TTE W/DOPPLER COMPLETE: CPT

## 2023-02-17 PROCEDURE — 6360000002 HC RX W HCPCS: Performed by: NURSE PRACTITIONER

## 2023-02-17 PROCEDURE — 84484 ASSAY OF TROPONIN QUANT: CPT

## 2023-02-17 RX ORDER — TECHNETIUM TC-99M SESTAMIBI 1 MG/10ML
15.6 INJECTION INTRAVENOUS
Status: COMPLETED | OUTPATIENT
Start: 2023-02-17 | End: 2023-02-17

## 2023-02-17 RX ORDER — AMINOPHYLLINE DIHYDRATE 25 MG/ML
50 INJECTION, SOLUTION INTRAVENOUS PRN
Status: ACTIVE | OUTPATIENT
Start: 2023-02-17 | End: 2023-02-17

## 2023-02-17 RX ORDER — NITROGLYCERIN 0.4 MG/1
0.4 TABLET SUBLINGUAL EVERY 5 MIN PRN
Status: ACTIVE | OUTPATIENT
Start: 2023-02-17 | End: 2023-02-17

## 2023-02-17 RX ORDER — SODIUM CHLORIDE 0.9 % (FLUSH) 0.9 %
5-40 SYRINGE (ML) INJECTION PRN
Status: DISCONTINUED | OUTPATIENT
Start: 2023-02-17 | End: 2023-02-17 | Stop reason: HOSPADM

## 2023-02-17 RX ORDER — ONDANSETRON 4 MG/1
4 TABLET, ORALLY DISINTEGRATING ORAL EVERY 8 HOURS PRN
Status: DISCONTINUED | OUTPATIENT
Start: 2023-02-17 | End: 2023-02-17 | Stop reason: HOSPADM

## 2023-02-17 RX ORDER — METOPROLOL TARTRATE 5 MG/5ML
5 INJECTION INTRAVENOUS EVERY 5 MIN PRN
Status: ACTIVE | OUTPATIENT
Start: 2023-02-17 | End: 2023-02-17

## 2023-02-17 RX ORDER — ATROPINE SULFATE 0.1 MG/ML
0.5 INJECTION INTRAVENOUS EVERY 5 MIN PRN
Status: ACTIVE | OUTPATIENT
Start: 2023-02-17 | End: 2023-02-17

## 2023-02-17 RX ORDER — SODIUM CHLORIDE 0.9 % (FLUSH) 0.9 %
5-40 SYRINGE (ML) INJECTION PRN
Status: ACTIVE | OUTPATIENT
Start: 2023-02-17 | End: 2023-02-17

## 2023-02-17 RX ORDER — POLYETHYLENE GLYCOL 3350 17 G/17G
17 POWDER, FOR SOLUTION ORAL DAILY PRN
Status: DISCONTINUED | OUTPATIENT
Start: 2023-02-17 | End: 2023-02-17 | Stop reason: HOSPADM

## 2023-02-17 RX ORDER — TECHNETIUM TC-99M SESTAMIBI 1 MG/10ML
35 INJECTION INTRAVENOUS
Status: COMPLETED | OUTPATIENT
Start: 2023-02-17 | End: 2023-02-17

## 2023-02-17 RX ORDER — HYDRALAZINE HYDROCHLORIDE 20 MG/ML
10 INJECTION INTRAMUSCULAR; INTRAVENOUS ONCE
Status: COMPLETED | OUTPATIENT
Start: 2023-02-17 | End: 2023-02-17

## 2023-02-17 RX ORDER — SODIUM CHLORIDE 9 MG/ML
500 INJECTION, SOLUTION INTRAVENOUS CONTINUOUS PRN
Status: ACTIVE | OUTPATIENT
Start: 2023-02-17 | End: 2023-02-17

## 2023-02-17 RX ORDER — HYDROXYZINE HYDROCHLORIDE 25 MG/1
50 TABLET, FILM COATED ORAL NIGHTLY
Status: DISCONTINUED | OUTPATIENT
Start: 2023-02-17 | End: 2023-02-17 | Stop reason: HOSPADM

## 2023-02-17 RX ORDER — ATORVASTATIN CALCIUM 40 MG/1
40 TABLET, FILM COATED ORAL NIGHTLY
Status: DISCONTINUED | OUTPATIENT
Start: 2023-02-18 | End: 2023-02-17 | Stop reason: HOSPADM

## 2023-02-17 RX ORDER — MORPHINE SULFATE 4 MG/ML
4 INJECTION, SOLUTION INTRAMUSCULAR; INTRAVENOUS ONCE
Status: COMPLETED | OUTPATIENT
Start: 2023-02-17 | End: 2023-02-17

## 2023-02-17 RX ORDER — MORPHINE SULFATE 2 MG/ML
2 INJECTION, SOLUTION INTRAMUSCULAR; INTRAVENOUS EVERY 4 HOURS PRN
Status: DISCONTINUED | OUTPATIENT
Start: 2023-02-17 | End: 2023-02-17 | Stop reason: HOSPADM

## 2023-02-17 RX ORDER — SODIUM CHLORIDE, SODIUM LACTATE, CALCIUM CHLORIDE, MAGNESIUM CHLORIDE AND DEXTROSE 2.5; 538; 448; 18.3; 5.08 G/100ML; MG/100ML; MG/100ML; MG/100ML; MG/100ML
2000 INJECTION, SOLUTION INTRAPERITONEAL EVERY 6 HOURS
Status: DISCONTINUED | OUTPATIENT
Start: 2023-02-17 | End: 2023-02-17 | Stop reason: HOSPADM

## 2023-02-17 RX ORDER — SODIUM CHLORIDE 0.9 % (FLUSH) 0.9 %
5-40 SYRINGE (ML) INJECTION EVERY 12 HOURS SCHEDULED
Status: DISCONTINUED | OUTPATIENT
Start: 2023-02-17 | End: 2023-02-17 | Stop reason: HOSPADM

## 2023-02-17 RX ORDER — ASPIRIN 81 MG/1
81 TABLET, CHEWABLE ORAL DAILY
Qty: 30 TABLET | Refills: 3 | Status: SHIPPED | OUTPATIENT
Start: 2023-02-18

## 2023-02-17 RX ORDER — AMITRIPTYLINE HYDROCHLORIDE 25 MG/1
25 TABLET, FILM COATED ORAL NIGHTLY
Status: DISCONTINUED | OUTPATIENT
Start: 2023-02-17 | End: 2023-02-17 | Stop reason: HOSPADM

## 2023-02-17 RX ORDER — ENOXAPARIN SODIUM 100 MG/ML
40 INJECTION SUBCUTANEOUS DAILY
Status: DISCONTINUED | OUTPATIENT
Start: 2023-02-17 | End: 2023-02-17

## 2023-02-17 RX ORDER — HYDRALAZINE HYDROCHLORIDE 20 MG/ML
10 INJECTION INTRAMUSCULAR; INTRAVENOUS EVERY 6 HOURS PRN
Status: DISCONTINUED | OUTPATIENT
Start: 2023-02-17 | End: 2023-02-17 | Stop reason: HOSPADM

## 2023-02-17 RX ORDER — ATORVASTATIN CALCIUM 40 MG/1
40 TABLET, FILM COATED ORAL NIGHTLY
Qty: 30 TABLET | Refills: 3 | Status: SHIPPED | OUTPATIENT
Start: 2023-02-18

## 2023-02-17 RX ORDER — HYDRALAZINE HYDROCHLORIDE 50 MG/1
50 TABLET, FILM COATED ORAL EVERY 8 HOURS SCHEDULED
Status: DISCONTINUED | OUTPATIENT
Start: 2023-02-17 | End: 2023-02-17 | Stop reason: HOSPADM

## 2023-02-17 RX ORDER — METOPROLOL SUCCINATE 50 MG/1
50 TABLET, EXTENDED RELEASE ORAL DAILY
Status: DISCONTINUED | OUTPATIENT
Start: 2023-02-17 | End: 2023-02-17 | Stop reason: HOSPADM

## 2023-02-17 RX ORDER — SODIUM CHLORIDE 9 MG/ML
INJECTION, SOLUTION INTRAVENOUS PRN
Status: DISCONTINUED | OUTPATIENT
Start: 2023-02-17 | End: 2023-02-17 | Stop reason: HOSPADM

## 2023-02-17 RX ORDER — ASPIRIN 81 MG/1
81 TABLET, CHEWABLE ORAL DAILY
Status: DISCONTINUED | OUTPATIENT
Start: 2023-02-18 | End: 2023-02-17 | Stop reason: HOSPADM

## 2023-02-17 RX ORDER — ACETAMINOPHEN 650 MG/1
650 SUPPOSITORY RECTAL EVERY 6 HOURS PRN
Status: DISCONTINUED | OUTPATIENT
Start: 2023-02-17 | End: 2023-02-17 | Stop reason: HOSPADM

## 2023-02-17 RX ORDER — SODIUM CHLORIDE 0.9 % (FLUSH) 0.9 %
10 SYRINGE (ML) INJECTION PRN
Status: DISCONTINUED | OUTPATIENT
Start: 2023-02-17 | End: 2023-02-17 | Stop reason: HOSPADM

## 2023-02-17 RX ORDER — AMLODIPINE BESYLATE 10 MG/1
10 TABLET ORAL DAILY
Status: DISCONTINUED | OUTPATIENT
Start: 2023-02-17 | End: 2023-02-17 | Stop reason: HOSPADM

## 2023-02-17 RX ORDER — PANTOPRAZOLE SODIUM 40 MG/1
40 TABLET, DELAYED RELEASE ORAL
Status: DISCONTINUED | OUTPATIENT
Start: 2023-02-17 | End: 2023-02-17 | Stop reason: HOSPADM

## 2023-02-17 RX ORDER — ACETAMINOPHEN 325 MG/1
650 TABLET ORAL EVERY 6 HOURS PRN
Status: DISCONTINUED | OUTPATIENT
Start: 2023-02-17 | End: 2023-02-17 | Stop reason: HOSPADM

## 2023-02-17 RX ORDER — ALBUTEROL SULFATE 90 UG/1
2 AEROSOL, METERED RESPIRATORY (INHALATION) PRN
Status: ACTIVE | OUTPATIENT
Start: 2023-02-17 | End: 2023-02-17

## 2023-02-17 RX ORDER — ONDANSETRON 2 MG/ML
4 INJECTION INTRAMUSCULAR; INTRAVENOUS EVERY 6 HOURS PRN
Status: DISCONTINUED | OUTPATIENT
Start: 2023-02-17 | End: 2023-02-17 | Stop reason: HOSPADM

## 2023-02-17 RX ORDER — HEPARIN SODIUM 5000 [USP'U]/ML
5000 INJECTION, SOLUTION INTRAVENOUS; SUBCUTANEOUS EVERY 8 HOURS SCHEDULED
Status: DISCONTINUED | OUTPATIENT
Start: 2023-02-17 | End: 2023-02-17 | Stop reason: HOSPADM

## 2023-02-17 RX ADMIN — MORPHINE SULFATE 4 MG: 4 INJECTION, SOLUTION INTRAMUSCULAR; INTRAVENOUS at 01:19

## 2023-02-17 RX ADMIN — ACETAMINOPHEN 650 MG: 325 TABLET ORAL at 06:46

## 2023-02-17 RX ADMIN — HEPARIN SODIUM 5000 UNITS: 5000 INJECTION INTRAVENOUS; SUBCUTANEOUS at 14:40

## 2023-02-17 RX ADMIN — REGADENOSON 0.4 MG: 0.08 INJECTION, SOLUTION INTRAVENOUS at 11:40

## 2023-02-17 RX ADMIN — AMITRIPTYLINE HYDROCHLORIDE 25 MG: 25 TABLET, FILM COATED ORAL at 03:57

## 2023-02-17 RX ADMIN — HYDRALAZINE HYDROCHLORIDE 50 MG: 50 TABLET, FILM COATED ORAL at 14:41

## 2023-02-17 RX ADMIN — MORPHINE SULFATE 2 MG: 2 INJECTION, SOLUTION INTRAMUSCULAR; INTRAVENOUS at 06:53

## 2023-02-17 RX ADMIN — HYDROXYZINE HYDROCHLORIDE 50 MG: 25 TABLET, FILM COATED ORAL at 02:48

## 2023-02-17 RX ADMIN — HYDRALAZINE HYDROCHLORIDE 50 MG: 50 TABLET, FILM COATED ORAL at 05:50

## 2023-02-17 RX ADMIN — HEPARIN SODIUM 5000 UNITS: 5000 INJECTION INTRAVENOUS; SUBCUTANEOUS at 05:50

## 2023-02-17 RX ADMIN — HYDRALAZINE HYDROCHLORIDE 10 MG: 20 INJECTION INTRAMUSCULAR; INTRAVENOUS at 01:16

## 2023-02-17 RX ADMIN — Medication 15.6 MILLICURIE: at 11:40

## 2023-02-17 RX ADMIN — SODIUM CHLORIDE, PRESERVATIVE FREE 10 ML: 5 INJECTION INTRAVENOUS at 13:38

## 2023-02-17 RX ADMIN — SODIUM CHLORIDE, SODIUM LACTATE, CALCIUM CHLORIDE, MAGNESIUM CHLORIDE AND DEXTROSE 2000 ML: 2.5; 538; 448; 18.3; 5.08 INJECTION, SOLUTION INTRAPERITONEAL at 17:00

## 2023-02-17 RX ADMIN — SODIUM CHLORIDE, SODIUM LACTATE, CALCIUM CHLORIDE, MAGNESIUM CHLORIDE AND DEXTROSE 2000 ML: 2.5; 538; 448; 18.3; 5.08 INJECTION, SOLUTION INTRAPERITONEAL at 12:55

## 2023-02-17 RX ADMIN — MORPHINE SULFATE 2 MG: 2 INJECTION, SOLUTION INTRAMUSCULAR; INTRAVENOUS at 12:46

## 2023-02-17 RX ADMIN — MORPHINE SULFATE 2 MG: 2 INJECTION, SOLUTION INTRAMUSCULAR; INTRAVENOUS at 16:47

## 2023-02-17 RX ADMIN — SODIUM CHLORIDE, PRESERVATIVE FREE 10 ML: 5 INJECTION INTRAVENOUS at 12:46

## 2023-02-17 RX ADMIN — PANTOPRAZOLE SODIUM 40 MG: 40 TABLET, DELAYED RELEASE ORAL at 05:50

## 2023-02-17 RX ADMIN — SODIUM CHLORIDE, PRESERVATIVE FREE 10 ML: 5 INJECTION INTRAVENOUS at 11:13

## 2023-02-17 RX ADMIN — MORPHINE SULFATE 2 MG: 2 INJECTION, SOLUTION INTRAMUSCULAR; INTRAVENOUS at 02:53

## 2023-02-17 RX ADMIN — AMLODIPINE BESYLATE 10 MG: 10 TABLET ORAL at 07:30

## 2023-02-17 RX ADMIN — METOPROLOL SUCCINATE 50 MG: 50 TABLET, EXTENDED RELEASE ORAL at 07:30

## 2023-02-17 RX ADMIN — ASPIRIN 324 MG: 81 TABLET, CHEWABLE ORAL at 00:05

## 2023-02-17 RX ADMIN — NITROGLYCERIN 0.4 MG: 0.4 TABLET, ORALLY DISINTEGRATING SUBLINGUAL at 14:46

## 2023-02-17 RX ADMIN — DULOXETINE HYDROCHLORIDE 90 MG: 60 CAPSULE, DELAYED RELEASE ORAL at 07:30

## 2023-02-17 RX ADMIN — Medication 35 MILLICURIE: at 13:39

## 2023-02-17 ASSESSMENT — PAIN SCALES - GENERAL
PAINLEVEL_OUTOF10: 5
PAINLEVEL_OUTOF10: 7
PAINLEVEL_OUTOF10: 6
PAINLEVEL_OUTOF10: 7
PAINLEVEL_OUTOF10: 6
PAINLEVEL_OUTOF10: 5
PAINLEVEL_OUTOF10: 0
PAINLEVEL_OUTOF10: 7
PAINLEVEL_OUTOF10: 7

## 2023-02-17 ASSESSMENT — PAIN DESCRIPTION - LOCATION
LOCATION: CHEST

## 2023-02-17 ASSESSMENT — PAIN DESCRIPTION - ORIENTATION
ORIENTATION: MID

## 2023-02-17 ASSESSMENT — PAIN DESCRIPTION - DESCRIPTORS
DESCRIPTORS: ACHING;PRESSURE
DESCRIPTORS: ACHING
DESCRIPTORS: ACHING

## 2023-02-17 ASSESSMENT — PAIN - FUNCTIONAL ASSESSMENT
PAIN_FUNCTIONAL_ASSESSMENT: 0-10
PAIN_FUNCTIONAL_ASSESSMENT: 0-10

## 2023-02-17 NOTE — ED NOTES
TRANSFER - OUT REPORT:    Verbal report given to [address] [place] [address] on Luh Davies  being transferred to Room 2101 for routine progression of patient care       Report consisted of patient's Situation, Background, Assessment and   Recommendations(SBAR). Information from the following report(s) ED Encounter Summary was reviewed with the receiving nurse. [place] Assessment: Presents to emergency department  because of falls (Syncope, seizure, or loss of consciousness): No, Age > 79: No, Altered Mental Status, Intoxication with alcohol or substance confusion (Disorientation, impaired judgment, poor safety awaremess, or inability to follow instructions): No, Impaired Mobility: Ambulates or transfers with assistive devices or assistance; Unable to ambulate or transer.: No, Nursing Judgement: No  Lines:   Peripheral IV 02/16/23 Left Antecubital (Active)   Site Assessment Clean, dry & intact 02/16/23 2247   Line Status Blood return noted 02/16/23 2247   Phlebitis Assessment No symptoms 02/16/23 2247   Infiltration Assessment 0 02/16/23 2247   Dressing Status Clean, dry & intact 02/16/23 2247   Dressing Type Transparent 02/16/23 2247   Dressing Intervention New 02/16/23 2247        Opportunity for questions and clarification was provided.       Patient transported with:  Js Osorio, RN  46/43/05 [id number]

## 2023-02-17 NOTE — CONSULTS
Date:   2/17/2023  Patient name: Patricia Moya  Date of admission:  2/16/2023 10:14 PM  MRN:   [id number]  YOB: 1980  PCP: Linda Plunkett PA-C    Reason for Admission: Angina pectoris [facility]) [I20.9]  Chest pain, unspecified type [R07.9]    Cardiology consult: Chest pain       Referring physician: Dr Blackman Flatter  Chest pain like a pressure lasting for more than 1 hour shortness of breath no palpitation on and off for the last 4 days  No obvious ischemic ECG changes, negative Lexiscan Myoview stress test, ejection fraction 60%  Normal LV size, wall thickness and wall motion, 2D echo 2/17/2023  Moderate aortic regurgitation, no tricuspid aortic valve  Hypertension  Hyperlipidemia  Anemia of chronic renal disease    Renal failure polycystic kidney disease on peritoneal dialysis    No previous history of coronary intervention    CT abdomen 3/4/2022  Enlarged polycystic kidney disease, nonobstructing 6 mm right intrarenal calculus  Cardiomegaly  No evidence of acute cardiopulmonary disease  Coronary artery calcification  Unenhanced thoracic and abdominal aorta appears grossly normal with the mild atherosclerotic plaque formation    ECG 3/4/2022  Sinus rhythm heart rate 97, left atrial abnormality, left anterior fascicular block, prolonged QT    Chest x-ray 2/16/2023  No acute process    2D echo 2/17/2023  Normal LV size, wall thickness, wall motion, ejection fraction more than 55%  Aortic valve is trileaflet, moderate aortic regurgitation  Normal IVC size and inspiratory collapse    Lexiscan Myoview stress test 2/17/2023  No ischemic ECG changes  No ischemia no scar, ejection fraction 60%, normal wall motion    History of present illness  53-year-old female who lives with the boyfriend and mother of 5 kids with a past medical history of renal failure secondary to polycystic kidney disease on dialysis since September 2022, hypertension, smoker got admitted on 2/16/2023 with recurrent left upper chest pain like heaviness like elephant foot on the chest, left arm heaviness lasting for hours not associated with the shortness of breath or palpitation. No relieving or aggravating factors, no paroxysmal nocturnal dyspnea. She is on peritoneal dialysis and she gets abdominal discomfort when she had fluid for peritoneal dialysis. No previous history of coronary intervention.   ECG did not show any ischemic changes, borderline abnormal hypersensitivity significance not clear in presence of renal failure  Normal LV function and wall motion as per echo and negative Lexiscan Myoview stress test    Blood pressure on admission 158/94, heart rate 102, oxygen saturation 97%, temperature 98.7    Lab work on admission  High-sensitivity troponin 20 and 24  Sodium 138, potassium 4.1, BUN 41, creatinine 7.50, calcium 8.3, albumin 3.5, glucose 102  WBC 11.4, hemoglobin 10.2, platelets 297, MCV 95    Current evaluation  Patient seen and examined  Present no chest pain  Sign of cardiopulmonary decompensation  No carotid bruit no abdominal bruit normal peripheral pulse      Medications:   Scheduled Meds:   amitriptyline  25 mg Oral Nightly    amLODIPine  10 mg Oral Daily    DULoxetine  90 mg Oral Daily    hydrALAZINE  50 mg Oral 3 times per day    hydrOXYzine HCl  50 mg Oral Nightly    metoprolol succinate  50 mg Oral Daily    pantoprazole  40 mg Oral QAM AC    sodium chloride flush  5-40 mL IntraVENous 2 times per day    [START ON 2/18/2023] aspirin  81 mg Oral Daily    [START ON 2/18/2023] atorvastatin  40 mg Oral Nightly    heparin (porcine)  5,000 Units SubCUTAneous 3 times per day    dianeal lo-raoul (ULTRABAG) 2.5%  2,000 mL IntraPERitoneal Q6H     Continuous Infusions:   sodium chloride       CBC:   Recent Labs     02/16/23 2245   WBC 11.4*   HGB 10.2*        BMP:    Recent Labs     02/16/23 2245 02/17/23  0523    140   K 4.1 4.3    103   CO2 21 23   BUN 41* 42*   CREATININE 7.50* 7.11*   GLUCOSE 102* 94     Hepatic:   Recent Labs     02/16/23  2245   AST 11   ALT 7   BILITOT 0.2*   ALKPHOS 56     Troponin: No results for input(s): TROPONINI in the last 72 hours. BNP: No results for input(s): BNP in the last 72 hours. Lipids: No results for input(s): CHOL, HDL in the last 72 hours. Invalid input(s): LDLCALCU  INR:   Recent Labs     02/16/23  2245   INR 0.9       Objective:   Vitals: /82   Pulse 86   Temp 98 °F (36.7 °C)   Resp 15   Wt 132 lb 7.9 oz (60.1 kg)   SpO2 99%   BMI 21.39 kg/m²   General appearance: alert and cooperative with exam  HEENT: Head: Normal, normocephalic, atraumatic. Neck: no carotid bruit, no JVD, and supple, symmetrical, trachea midline  Lungs: diminished breath sounds bibasilar  Heart: regular rate and rhythm  Abdomen:  Soft bowel sounds present  Extremities: Homans sign is negative, no sign of DVT  Neurologic: Mental status: Alert, oriented, thought content appropriate    EKG: normal sinus rhythm, unchanged from previous tracings. ECHO: reviewed. Ejection fraction: 55%, normal wall motion, moderate aortic regurgitation  Stress Test: No ischemia no scar normal wall motion ejection fraction 60%. Cardiac Angiography: not obtained.         Assessment / Acute Cardiac Problems:   Atypical chest pain, negative Lexiscan Myoview stress test  Normal LV systolic function, normal wall motion  Moderate aortic regurgitation  Coronary calcification  Hypertension  Renal failure on peritoneal dialysis  Smoker    Patient Active Problem List:     Asthma     Smoker     Polycystic kidney     Eczema     Depression with anxiety     Chronic headaches     Tension vascular headache     Irregular bleeding     Metrorrhagia     Menorrhagia     Dysmenorrhea     Pelvic pain in female     Kidney stone     Acute back pain     Anxiety     Hypertension     Headache     Depression     CKD (chronic kidney disease) stage 3, GFR 30-59 ml/min ([facility])     Chronic neck pain     Degenerative disc disease, cervical     Encounter for long-term (current) use of other medications     Cervicalgia     Chronic intractable headache     Hemorrhage of cyst of native kidney     Abdominal pain     CKD (chronic kidney disease) stage 4, GFR 15-29 ml/min ([facility])     Acute renal failure with acute tubular necrosis superimposed on stage 3 chronic kidney disease ([facility])     Allergic rhinitis due to animal hair and dander     Congenital multiple renal cysts     Gross hematuria     History of anemia due to chronic kidney disease     History of multiple allergies     Hyperlipidemia     IUD (intrauterine device) in place     Leukocytosis     Other specified disorders of kidney and ureter     Pain in joint     Recurrent major depression in full remission ([address]     Renovascular hypertension     Right ureteral stone     Right nephrolithiasis     Flank pain     Acute kidney injury superimposed on chronic kidney disease ([facility])     Urinary tract infection with hematuria     History of major depression     Iron deficiency anemia due to chronic blood loss     Hypernatremia     SILVANO (acute kidney injury) ([address]     Dysuria     Acute kidney injury superimposed on CKD ([address]     Pyelonephritis     Hypertensive emergency     Abdominal pain, epigastric     Abdominal pain, right upper quadrant     Nausea     Gastroesophageal reflux disease     Angina pectoris ([address]      Plan of Treatment:   Medications reviewed  Continue current dose of Lipitor, aspirin, amlodipine, hydralazine, metoprolol  Patient is also on Cymbalta and amitriptyline  Patient should be advised to stop smoking  At present  indication to do any further cardiac work-up  Okay to discharge home from cardiac point of view  Thank you for letting me to participate in health care of your patient    Electronically signed by Minesh Moore MD on 2/17/2023 at 6:19 PM

## 2023-02-17 NOTE — ED PROVIDER NOTES
EMERGENCY DEPARTMENT ENCOUNTER    Pt Name: Teetee Gross  MRN: [id number]  [place] 1980  Date of evaluation: 2/16/23  CHIEF COMPLAINT       Chief Complaint   Patient presents with    Chest Pain    Shortness of Breath     HISTORY OF PRESENT ILLNESS   This is a 64-year-old woman she has a history of hypertension, smoker, ESRD on peritoneal dialysis presenting with chest pain    States for the last few days is having chest pain. Left-sided. Squeezing. Goes to the left arm    Cannot sleep because of this    No nausea vomiting diaphoresis. No abdominal pain constipation diarrhea    No unilateral leg swelling recent travel or surgery or history of blood clots                REVIEW OF SYSTEMS     Review of Systems   Constitutional:  Negative for chills and fever. HENT:  Negative for rhinorrhea and sore throat. Eyes:  Negative for discharge and redness. Respiratory:  Negative for shortness of breath. Cardiovascular:  Positive for chest pain. Gastrointestinal:  Negative for abdominal pain, diarrhea, nausea and vomiting. Genitourinary:  Negative for dysuria, frequency and urgency. Musculoskeletal:  Negative for arthralgias and myalgias. Skin:  Negative for rash. Neurological:  Negative for weakness and numbness. Psychiatric/Behavioral:  Negative for suicidal ideas. All other systems reviewed and are negative.   PASTMEDICAL HISTORY     Past Medical History:   Diagnosis Date    Anxiety     Asthma     Chronic headaches 07/10/2013    CKD (chronic kidney disease) stage 3, GFR 30-59 ml/min ([facility])     Degenerative disc disease, cervical     Depression     Dysmenorrhea 09/30/2014    Eczema 11/08/2012    Hypertension     Hypocalcemia 05/28/2014    Kidney stone     Menorrhagia 09/30/2014    Neck pain, bilateral 05/28/2014    Pelvic pain in female 09/30/2014    Polycystic kidney     Smoker 10/02/2011    Tension vascular headache 01/20/2014     Past Problem List  Patient Active Problem List   Diagnosis Code    Asthma J45.909    Smoker F17.200    Polycystic kidney Q61.3    Eczema L30.9    Depression with anxiety F41.8    Chronic headaches R51.9, G89.29    Tension vascular headache G44.209    Irregular bleeding N92.6    Metrorrhagia N92.1    Menorrhagia N92.0    Dysmenorrhea N94.6    Pelvic pain in female R10.2    Kidney stone N20.0    Acute back pain M54.9    Anxiety F41.9    Hypertension I10    Headache R51.9    Depression F32. A    CKD (chronic kidney disease) stage 3, GFR 30-59 ml/min ([facility]) N18.30    Chronic neck pain M54.2, G89.29    Degenerative disc disease, cervical M50.30    Encounter for long-term (current) use of other medications Z79.899    Cervicalgia M54.2    Chronic intractable headache R51.9, G89.29    Hemorrhage of cyst of native kidney N28.89, N28.1    Abdominal pain R10.9    CKD (chronic kidney disease) stage 4, GFR 15-29 ml/min ([facility]) N18.4    Acute renal failure with acute tubular necrosis superimposed on stage 3 chronic kidney disease ([facility]) N17.0, N18.30    Allergic rhinitis due to animal hair and dander J30.81    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    Gross hematuria R31.0    History of anemia due to chronic kidney disease N18.9, Z86.2    History of multiple allergies Z88.9    Hyperlipidemia E78.5    IUD (intrauterine device) in place Z97.5    Leukocytosis D72.829    Other specified disorders of kidney and ureter N28.89    Pain in joint M25.50    Recurrent major depression in full remission ([address] F33.42    Renovascular hypertension I15.0    Right ureteral stone N20.1    Right nephrolithiasis N20.0    Flank pain R10.9    Acute kidney injury superimposed on chronic kidney disease ([facility]) N17.9, N18.9    Urinary tract infection with hematuria N39.0, R31.9    History of major depression Z86.59    Iron deficiency anemia due to chronic blood loss D50.0    Hypernatremia E87.0    SILVANO (acute kidney injury) ([address] N17.9    Dysuria R30.0    Acute kidney injury superimposed on CKD ([address] N17.9, N18.9 Pyelonephritis N12    Hypertensive emergency I16.1    Abdominal pain, epigastric R10.13    Abdominal pain, right upper quadrant R10.11    Nausea R11.0    Gastroesophageal reflux disease K21.9     SURGICAL HISTORY       Past Surgical History:   Procedure Laterality Date    BREAST ENHANCEMENT SURGERY      BREAST LUMPECTOMY      left breast    CYSTO/URETERO/PYELOSCOPY, CALCULUS TX Right 6/12/2020    HOLMIUM - CYSTO, RIGHT RETROGRADE PYELOGRAM, RIGHT URETEROSCOPY, LASER LITHO ON STAND BY, RIGHT STENT PLACEMENT performed by Porfirio Martinez MD at [address]  06/12/2020    [facility]      x2    IR TUNNELED CATHETER PLACEMENT GREATER THAN 5 YEARS  3/8/2022    IR TUNNELED CATHETER PLACEMENT GREATER THAN 5 YEARS 3/8/2022 [facility] PROCEDURES    LITHOTRIPSY Right 7/23/2020    ESWL EXTRACORPOREAL SHOCK WAVE LITHOTRIPSY performed by Porfirio Martinez MD at [facility]  07/01/2013    nerve block(right vervical C3/TON/C4/C5    NERVE BLOCK  07/08/2013    nerve block(right vervical C3/TON/C4/C5    OTHER SURGICAL HISTORY  03/10/2014     botox inj     TOE SURGERY      removal of ingrown toe nail-2nd toe on left foot     UPPER GASTROINTESTINAL ENDOSCOPY N/A 3/7/2022    EGD BIOPSY performed by Cortney Crespo MD at [facility]    URETEROSCOPY Right 06/12/2020    stent placement     CURRENT MEDICATIONS       Previous Medications    AMITRIPTYLINE (ELAVIL) 25 MG TABLET    Take 1 tablet by mouth nightly    AMLODIPINE (NORVASC) 10 MG TABLET    Take 1 tablet by mouth daily    DULOXETINE (CYMBALTA) 60 MG EXTENDED RELEASE CAPSULE    take 1 capsule by mouth once daily    EPOETIN JASON-EPBX (RETACRIT) 3000 UNIT/ML SOLN INJECTION    Inject 1 mL into the skin three times a week    HYDRALAZINE (APRESOLINE) 50 MG TABLET    Take 1 tablet by mouth every 8 hours    HYDROXYZINE (ATARAX) 50 MG TABLET    Take 50 mg by mouth nightly    LIDOCAINE 4 % EXTERNAL PATCH    Place 1 patch onto the skin daily    METOPROLOL SUCCINATE (TOPROL XL) 50 MG EXTENDED RELEASE TABLET    Take 50 mg by mouth daily    PANTOPRAZOLE (PROTONIX) 40 MG TABLET    Take 1 tablet by mouth every morning (before breakfast)    SUCRALFATE (CARAFATE) 1 GM TABLET    Take 1 tablet by mouth 4 times daily     ALLERGIES     is allergic to bee pollen, diphenhydramine, codeine, dust mite extract, pcn [penicillins], and pollen extract. FAMILY HISTORY     She indicated that her mother is alive. She indicated that her father is alive. She indicated that her sister is alive. She indicated that her brother is alive. She indicated that all of her four daughters are alive. She indicated that her son is alive. SOCIAL HISTORY       Social History     Tobacco Use    Smoking status: Every Day     Packs/day: 1.00     Years: 25.00     Pack years: 25.00     Types: Cigarettes    Smokeless tobacco: Never   Vaping Use    Vaping Use: Never used   Substance Use Topics    Alcohol use: No     Alcohol/week: 0.0 standard drinks    Drug use: No     PHYSICAL EXAM     INITIAL VITALS: BP (!) 170/93   Pulse 99   Temp 98.4 °F (36.9 °C) (Oral)   Resp 19   Wt 116 lb (52.6 kg)   SpO2 97%   BMI 18.72 kg/m²    Physical Exam  Vitals and nursing note reviewed. Constitutional:       Appearance: Normal appearance. HENT:      Head: Normocephalic and atraumatic. Nose: Nose normal.      Mouth/Throat:      Mouth: Mucous membranes are moist.   Eyes:      Conjunctiva/sclera: Conjunctivae normal.      Pupils: Pupils are equal, round, and reactive to light. Cardiovascular:      Rate and Rhythm: Normal rate and regular rhythm. Pulses: Normal pulses. Heart sounds: Normal heart sounds. Pulmonary:      Effort: Pulmonary effort is normal.      Breath sounds: Normal breath sounds. Abdominal:      Palpations: Abdomen is soft. Tenderness: There is no abdominal tenderness.       Comments: Peritoneal dialysis catheter in place   Musculoskeletal:         General: No swelling or deformity. Cervical back: Normal range of motion. Skin:     General: Skin is warm. Findings: No rash. Neurological:      General: No focal deficit present. Mental Status: She is alert and oriented to person, place, and time. Psychiatric:         Mood and Affect: Mood normal.       MEDICAL DECISION MAKING / ED COURSE:     68-year-old with chest pain    We will obtain cardiac work-up    1)  Number and Complexity of Problems Addressed at this Encounter  Problem List This Visit: Chest pain    Differential Diagnosis: ACS, pneumonia, pneumothorax, musculoskeletal chest pain, GERD    Diagnoses Considered but Do Not Suspect: PE, dissection    Pertinent Comorbid Conditions: Hypertension, ESRD, smoker    2)  Data Reviewed and Analyzed  (Lab and radiology tests/orders below in next section)    External Documents Reviewed: Recent admission where she had chest pain    My EKG interpretation: See procedures    Decision Rules/Scores utilized: Heart score 4    Imaging that is independently reviewed and interpreted by me as:  CXR unremarkable    3)  Treatment and Disposition    ED Course as of 02/17/23 0047   u Feb 16, 2023   2310 CBC with Auto Differential(!):    WBC 11.4(!)   RBC 3.17(!)   Hemoglobin Quant 10. 2(!)   Hematocrit 30.2(!)   MCV 95.3   MCH 32.3   MCHC 33.9   RDW 12.7   Platelet Count 270   MPV 7.5   Seg Neutrophils 62   Lymphocytes 25   Monocytes 8(!)   Eosinophils % 4   Basophils 1   Segs Absolute 7.10   Absolute Lymph # 2.80   Absolute Mono # 0.90   Absolute Eos # 0.50(!)   Basophils Absolute 0.10  Mild leukcotysois and chronic anemia [CHAPIS]   Fri Feb 17, 2023   0008 CMP(!!):    Glucose, Random 102(!)   BUN,BUNPL 41(!)   Creatinine 7.50(!!)   Est, Glom Filt Rate 6(!)   CALCIUM, SERUM, 810067 8.3(!)   Sodium 138   Potassium 4.1   Chloride 100   CO2 21   Anion Gap 17   Alk Phos 56   ALT 7   AST 11   BILIRUBIN TOTAL 0.2(!)   Total Protein 6.1(!)   Albumin 3.5  Known ESRD but normal potassium and bicarb [CHAPIS]   0008 Magnesium:    Magnesium 1.9  normal [CHAPIS]   0008 Troponin Now and Q 1 Hour(!):    Troponin, High Sensitivity 20(!)  Slight elevation but typically around this range we will trend [CHAPIS]   0008 XR CHEST PORTABLE  No acute process [CHAPIS]   0043 Troponin Now and Q 1 Hour(!):    Troponin, High Sensitivity 24(!)  Slightly elevated [CHAPIS]   0045 Reassessed still ahving chest pain will admit [CHAPIS]      ED Course User Index  [CHAPIS] Andres Hernandez MD       Patient repeat assessment:  0045 persistent chest pain    Disposition discussion with patient/family, Shared Decision Making: Discussed with patient admission for work-up of her chest pain    She is agreeable with this plan    Case discussed with consulting clinician: Discussed with nurse practitioner Gaby Davis for [facility] she agrees with admission    Discussed work-up to this point    In summary 47-year-old with chest pain intermittent heart score 4 will admit        CRITICAL CARE:       PROCEDURES:    EKG 12 Lead    Date/Time: 2/16/2023 10:51 PM  Performed by: Andres Hernandez MD  Authorized by: Andres Hernandez MD     ECG reviewed by ED Physician in the absence of a cardiologist: yes    Interpretation:     Interpretation: normal    Rate:     ECG rate:  99    ECG rate assessment: normal    Rhythm:     Rhythm: sinus rhythm    Ectopy:     Ectopy: none    QRS:     QRS axis:  Normal    QRS intervals:  Normal    QRS conduction: normal    ST segments:     ST segments:  Normal  T waves:     T waves: normal    Q waves:     Abnormal Q-waves: not present        DATA FOR LAB AND RADIOLOGY TESTS ORDERED BELOW ARE REVIEWED BY THE ED CLINICIAN:    RADIOLOGY: All x-rays, CT, MRI, and formal ultrasound images (except ED bedside ultrasound) are read by the radiologist, see reports below, unless otherwise noted in MDM or here. Reports below are reviewed by myself. XR CHEST PORTABLE   Final Result   No acute process. Small airways disease.              LABS: Lab orders shown below, the results are reviewed by myself, and all abnormals are listed below. Labs Reviewed   CBC WITH AUTO DIFFERENTIAL - Abnormal; Notable for the following components:       Result Value    WBC 11.4 (*)     RBC 3.17 (*)     Hemoglobin 10.2 (*)     Hematocrit 30.2 (*)     Monocytes 8 (*)     Absolute Eos # 0.50 (*)     All other components within normal limits   COMPREHENSIVE METABOLIC PANEL - Abnormal; Notable for the following components:    Glucose 102 (*)     BUN 41 (*)     Creatinine 7.50 (*)     Est, Glom Filt Rate 6 (*)     Calcium 8.3 (*)     Total Bilirubin 0.2 (*)     Total Protein 6.1 (*)     All other components within normal limits   TROPONIN - Abnormal; Notable for the following components:    Troponin, High Sensitivity 20 (*)     All other components within normal limits   TROPONIN - Abnormal; Notable for the following components:    Troponin, High Sensitivity 24 (*)     All other components within normal limits   MAGNESIUM   PROTIME-INR       Vitals Reviewed:    Vitals:    02/16/23 2237 02/16/23 2311 02/16/23 2312 02/17/23 0008   BP: (!) 160/97 (!) 160/97  (!) 170/93   Pulse: 99 96  99   Resp: 17 17 19   Temp: 98.7 °F (37.1 °C)   98.4 °F (36.9 °C)   TempSrc: Oral   Oral   SpO2: 99%   97%   Weight:         MEDICATIONS GIVEN TO PATIENT THIS ENCOUNTER:  Orders Placed This Encounter   Medications    nitroGLYCERIN (NITROSTAT) SL tablet 0.4 mg    morphine sulfate (PF) injection 4 mg    aspirin chewable tablet 324 mg    hydrALAZINE (APRESOLINE) injection 10 mg    morphine sulfate (PF) injection 4 mg     DISCHARGE PRESCRIPTIONS:  New Prescriptions    No medications on file     PHYSICIAN CONSULTS ORDERED THIS ENCOUNTER:  IP CONSULT TO INTERNAL MEDICINE  FINAL IMPRESSION      1. Chest pain, unspecified type          DISPOSITION/PLAN   DISPOSITION Decision To Admit 02/17/2023 12:43:29 AM      OUTPATIENT FOLLOW UP THE PATIENT:  No follow-up provider specified.     Jen Talley MD Noel Andrwes MD  02/17/23 5325

## 2023-02-17 NOTE — CONSULTS
NEPHROLOGY CONSULT     Patient :  Reece Bhatti; 38 y.o. MRN# [id number]  Location:  2101/2101-01  Attending:  Pasquale Burns MD  Admit Date:  2/16/2023   Hospital Day: 0      Reason for Consult:  ESRD       Chief Complaint:  Chest pain  History Obtained From:  patient    History of Present Illness: This is a 38 y.o. female who presented with Chest pain that has been going for few days. Patient said that the pain started while she was sitting at home, 5/10 increases with activities and does not radiate. Denies any shortness of breath, leg swellings, palpitations, bowel or urinary habit changes     Patient is vitally stable. Workup showed Cr of 7.5. EKC and CXR were negative. Troponin are 20,24    Pt denies any history of  prolonged NSAID use. Patient denies dysuria, gross hematuria, flank pain, nocturia, urgency, passing frothy urine or urinary incontinence. There has been no recent exposure to IV contrast. There is no history  of paraprotein disease. Pt denies any history of recurrent UTI or kidney stones.     Past Medical History:        Diagnosis Date    Anxiety     Asthma     Chronic headaches 07/10/2013    CKD (chronic kidney disease) stage 3, GFR 30-59 ml/min ([facility])     Degenerative disc disease, cervical     Depression     Dysmenorrhea 09/30/2014    Eczema 11/08/2012    Hypertension     Hypocalcemia 05/28/2014    Kidney stone     Menorrhagia 09/30/2014    Neck pain, bilateral 05/28/2014    Pelvic pain in female 09/30/2014    Polycystic kidney     Smoker 10/02/2011    Tension vascular headache 01/20/2014       Past Surgical History:        Procedure Laterality Date    BREAST ENHANCEMENT SURGERY      BREAST LUMPECTOMY      left breast    CYSTO/URETERO/PYELOSCOPY, CALCULUS TX Right 6/12/2020    HOLMIUM - CYSTO, RIGHT RETROGRADE PYELOGRAM, RIGHT URETEROSCOPY, LASER LITHO ON STAND BY, RIGHT STENT PLACEMENT performed by David Ramesh MD at . [facility] [address]  06/12/2020    DILATION AND CURETTAGE OF UTERUS x2    IR TUNNELED CATHETER PLACEMENT GREATER THAN 5 YEARS  3/8/2022    IR TUNNELED CATHETER PLACEMENT GREATER THAN 5 YEARS 3/8/2022 [facility] SPECIAL PROCEDURES    LITHOTRIPSY Right 7/23/2020    ESWL EXTRACORPOREAL SHOCK WAVE LITHOTRIPSY performed by James Villegas MD at [place] [address]  07/01/2013    nerve block(right vervical C3/TON/C4/C5    NERVE BLOCK  07/08/2013    nerve block(right vervical C3/TON/C4/C5    OTHER SURGICAL HISTORY  03/10/2014     botox inj     TOE SURGERY      removal of ingrown toe nail-2nd toe on left foot     UPPER GASTROINTESTINAL ENDOSCOPY N/A 3/7/2022    EGD BIOPSY performed by Sherlyn Huang MD at [facility]    URETEROSCOPY Right 06/12/2020    stent placement       Current Medications:    hydrALAZINE (APRESOLINE) injection 10 mg, Q6H PRN  morphine (PF) injection 2 mg, Q4H PRN  amitriptyline (ELAVIL) tablet 25 mg, Nightly  amLODIPine (NORVASC) tablet 10 mg, Daily  DULoxetine (CYMBALTA) extended release capsule 90 mg, Daily  hydrALAZINE (APRESOLINE) tablet 50 mg, 3 times per day  hydrOXYzine HCl (ATARAX) tablet 50 mg, Nightly  metoprolol succinate (TOPROL XL) extended release tablet 50 mg, Daily  pantoprazole (PROTONIX) tablet 40 mg, QAM AC  sodium chloride flush 0.9 % injection 5-40 mL, 2 times per day  sodium chloride flush 0.9 % injection 5-40 mL, PRN  0.9 % sodium chloride infusion, PRN  ondansetron (ZOFRAN-ODT) disintegrating tablet 4 mg, Q8H PRN   Or  ondansetron (ZOFRAN) injection 4 mg, Q6H PRN  acetaminophen (TYLENOL) tablet 650 mg, Q6H PRN   Or  acetaminophen (TYLENOL) suppository 650 mg, Q6H PRN  polyethylene glycol (GLYCOLAX) packet 17 g, Daily PRN  [START ON 2/18/2023] aspirin chewable tablet 81 mg, Daily  [START ON 2/18/2023] atorvastatin (LIPITOR) tablet 40 mg, Nightly  regadenoson (LEXISCAN) injection 0.4 mg, ONCE PRN  heparin (porcine) injection 5,000 Units, 3 times per day  perflutren lipid microspheres (DEFINITY) injection 1.5 mL, ONCE PRN  nitroGLYCERIN (NITROSTAT) SL tablet 0.4 mg, Q5 Min PRN        Allergies:  Bee pollen, Diphenhydramine, Codeine, Dust mite extract, Pcn [penicillins], and Pollen extract    Social History:   Social History     Socioeconomic History    Marital status:      Spouse name: Not on file    Number of children: Not on file    Years of education: Not on file    Highest education level: Not on file   Occupational History    Occupation: stay at home mom   Tobacco Use    Smoking status: Every Day     Packs/day: 1.00     Years: 25.00     Pack years: 25.00     Types: Cigarettes    Smokeless tobacco: Never   Vaping Use    Vaping Use: Never used   Substance and Sexual Activity    Alcohol use: No     Alcohol/week: 0.0 standard drinks    Drug use: No    Sexual activity: Yes     Partners: Male     Comment: steady boyfriend   Other Topics Concern    Not on file   Social History Narrative    Not on file     Social Determinants of Health     Financial Resource Strain: Not on file   Food Insecurity: Not on file   Transportation Needs: Not on file   Physical Activity: Not on file   Stress: Not on file   Social Connections: Not on file   Intimate Partner Violence: Not on file   Housing Stability: Not on file       Family History:   Family History   Problem Relation Age of Onset    High Blood Pressure Mother     Asthma Sister     Migraines Sister     High Blood Pressure Father     Other Brother         bad knees, with recent total knee replacement        Review of Systems:    Constitutional: No fever, no chills, no night sweats, fatigue, generalized weakness, loss of appetite  HEENT:  No headache, otalgia, itchy eyes, epistaxis, nasal discharge or sore throat.   Cardiac: chest pain,  no dyspnea, orthopnea or PND, palpitations  Chest:  No cough, hemoptysis, pleuritic chest pain, wheezing,SOB  Abdomen: No abdominal pain, nausea, vomiting, diarrhea, melena, dysphagia hematemesis,constipation, abdominal bloating, flank pain  Neuro:  No CVA, TIA or seizure like activity. Skin:  No rashes, no itching. : No hematuria, no pyuria, no dysuria, no flank pain. Extremities: No swelling or joint pains. Objective:  CURRENT TEMPERATURE:  Temp: 97.9 °F (36.6 °C)  MAXIMUM TEMPERATURE OVER 24HRS:  Temp (24hrs), Av.4 °F (36.9 °C), Min:97.9 °F (36.6 °C), Max:98.7 °F (37.1 °C)    CURRENT RESPIRATORY RATE:  Resp: 17  CURRENT PULSE:  Heart Rate: 85  CURRENT BLOOD PRESSURE:  BP: (!) 139/90  24HR BLOOD PRESSURE RANGE:  Systolic (92ATP), RNM:918 , Min:139 , BDL:251   ; Diastolic (88OBH), QXQ:23, Min:87, Max:97    24HR INTAKE/OUTPUT:  No intake or output data in the 24 hours ending 23 1021  Patient Vitals for the past 96 hrs (Last 3 readings):   Weight   23 0600 132 lb 7.9 oz (60.1 kg)   23 2221 116 lb (52.6 kg)       Physical Exam:  GENERAL APPEARANCE: Alert and cooperative, and appears to be in no acute distress. HEAD: normocephalic  CARDIAC: Normal S1 and S2. No S3, S4 or murmurs. Rhythm is regular. LUNGS: Clear to auscultation and percussion without rales, rhonchi, wheezing or diminished breath sounds. MUSKULOSKELETAL: Adequately aligned spine. No joint erythema or tenderness. EXTREMITIES: No edema. Peripheral pulses intact. NEURO:      Labs:   CBC:  Recent Labs     23  2245   WBC 11.4*   RBC 3.17*   HGB 10.2*   HCT 30.2*   MCV 95.3   MCH 32.3   MCHC 33.9   RDW 12.7      MPV 7.5      BMP:   Recent Labs     23  2245 23  0523    140   K 4.1 4.3    103   CO2 21 23   BUN 41* 42*   CREATININE 7.50* 7.11*   GLUCOSE 102* 94   CALCIUM 8.3* 8.1*        Phosphorus:  No results for input(s): PHOS in the last 72 hours.   Magnesium:   Recent Labs     23  2245   MG 1.9     Albumin:   Recent Labs     23  2245   LABALBU 3.5       IRON:    Lab Results   Component Value Date/Time    IRON 102 2021 02:47 PM     Iron Saturation:  No components found for: PERCENTFE  TIBC:    Lab Results   Component Value Date/Time    TIBC 215 06/21/2021 02:47 PM     FERRITIN:    Lab Results   Component Value Date/Time    FERRITIN 55 06/21/2021 02:47 PM     SPEP:   Lab Results   Component Value Date/Time    PROT 6.1 02/16/2023 10:45 PM     UPEP:   Lab Results   Component Value Date/Time    TPU 18 06/21/2021 12:04 PM        C3: No results found for: C3  C4: No results found for: C4  MPO ANCA:  No results found for: MPO . PR3 ANCA:  No results found for: PR3  Urine Sodium:    Lab Results   Component Value Date/Time    NOHELIA 86 06/21/2021 01:58 PM      Urine Potassium:  No results found for: KUR  Urine Chloride:  No results found for: CLU  Urine Ph:  No components found for: PO4U  Urine Osmolarity:    Lab Results   Component Value Date/Time    OSMOU 236 02/21/2021 09:07 PM     Urine Creatinine:    Lab Results   Component Value Date/Time    LABCREA 29.5 06/21/2021 01:58 PM     Urine Eosinophils: No components found for: EOSU  Urine Protein:    Lab Results   Component Value Date/Time    TPU 18 06/21/2021 12:04 PM     Urinalysis:  U/A:   Lab Results   Component Value Date/Time    NITRU NEGATIVE 03/04/2022 10:04 PM    COLORU Yellow 03/04/2022 10:04 PM    PHUR 6.0 03/04/2022 10:04 PM    WBCUA 10 TO 20 03/04/2022 10:04 PM    RBCUA 0 TO 2 03/04/2022 10:04 PM    MUCUS NOT REPORTED 11/06/2021 08:02 AM    TRICHOMONAS NOT REPORTED 11/06/2021 08:02 AM    YEAST NOT REPORTED 11/06/2021 08:02 AM    BACTERIA None 03/04/2022 10:04 PM    CLARITYU Clear 04/26/2016 12:00 AM    SPECGRAV 1.011 03/04/2022 10:04 PM    LEUKOCYTESUR SMALL 03/04/2022 10:04 PM    UROBILINOGEN Normal 03/04/2022 10:04 PM    BILIRUBINUR NEGATIVE 03/04/2022 10:04 PM    BILIRUBINUR neg 09/30/2014 11:31 AM    BLOODU Positive 04/26/2016 12:00 AM    GLUCOSEU NEGATIVE 03/04/2022 10:04 PM    KETUA NEGATIVE 03/04/2022 10:04 PM    AMORPHOUS NOT REPORTED 11/06/2021 08:02 AM         Radiology:  Reviewed as available. Assessment:  1. ESRD on CAPD 4 times daily. Follows up with   2. Chest pain   3. Essential Hypertension          Plan:  1. Continue exchange therapy 4 times daily  2. Avoid nephrotoxic agents  3. Renal diet     I would like to thank you for Allowing me to participate in [facility]. Electronically signed by Zoey Parry MD on 2/17/2023 at 10:21 AM  Addendum to Resident Minh note:   Pt seen,examined and evaluated. I have reviewed the current history, physical findings, labs and assessment and plan and agree with note as documented by resident physician. Patient with PMH of ESRD secondary to ADPKD on Peritoneal dialysis CAPD at home. Patient presented to the hospital with c/o chest pain    Topnonin 24    A/P    ESRD on PD CAPD  Chest pain  HTN    Plan  Continue PD  Cardiac evaluation in progress.       Ainsley Pisano MD

## 2023-02-17 NOTE — PROGRESS NOTES
Dr Radha Valdes notified pt received PD 4x/day with green bag. 2L each time during the day.  Reviewed pt creatine is 7.11 today

## 2023-02-17 NOTE — PROGRESS NOTES
Adolfo Kothari. Stress Tech performs patient preparation of physical comfort, review test procedures, pre-stress EKG. Lung Sounds clear in all fields. Pt had breakfast and H2O this AM at 0730, also was given ToprolXL. Dr Song Bbain advised and ok for test.Consent verified. Educated patient on test procedure and possible side effects of Lexiscan. Cardiologist reviewed pre-test EKG and is present for test. Patient tolerated test well with minor SOB and CP which resolved to baseline after test with caffeine. Pt originally scheduled for CST but the order was changed to [address] [place] [address] per Dr Song Babin after it was determined that pt is a dialysis pt. EKG portion of test complete, Nuc Med portion pending.   Pretest VS: /74 HR 82  Post test VS: /70 HR 95

## 2023-02-17 NOTE — PROCEDURES
[address] [facility]                    [address] [facility]. [address]                              CARDIAC STRESS TEST    PATIENT NAME: Bree Woody                   :        1980  MED REC NO:   [id number]                              ROOM:       2101  ACCOUNT NO:   [de-identified]                           ADMIT DATE: 2023  PROVIDER:     Madhav Georges    DATE OF STUDY:  2023    TEST TYPE: LEXISCAN CARDIOLYTE STRESS TEST  INDICATION: CHEST PAIN  REFERRING PHYSICIAN: Cresencio Bolivar    RESTING HEART RATE: 82 BEATS PER MINUTE  RESTING BLOOD PRESSURE: 116/74    MEDICATION(S) GIVEN: 0.4MG IV LEXISCAN  REASON FOR TERMINATION: MEDICATION INFUSION COMPLETE    RESTING EKG: ABNORMAL, SINUS RHYTHM, HEART RATE 82 BEATS PER MINUTE,  PROBABLE OLD SEPTAL MYOCARDIAL INFARCTION  BLOOD PRESSURE RESPONSE: NORMAL  STRESS EKGs: NO CHANGES SEEN  CHEST DISCOMFORT: CHEST PAIN 7/10 BEFORE TEST, NO CHANGE  ISCHEMIC EKG CHANGES: NONE    EKG IMPRESSION: ELECTROCARDIOGRAPHICALLY NEGATIVE LEXISCAN STRESS TEST. RADIOISOTOPE RESULTS TO FOLLOW FROM THE DEPARTMENT OF NUCLEAR MEDICINE.     COMMENTS: NO ARRHYTHMIA      TONI DOWNS    D: 2023 16:33:54       T: 2023 16:34:45     AS/AYDEN  Job#: [id number]     Doc#: Unknown    CC:    (Retain this field even if not dictated or not decipherable)

## 2023-02-17 NOTE — H&P
[facility] [address]    HISTORY AND PHYSICAL EXAMINATION            Date:   2/17/2023  Patient name:  Trevon Spangler  Date of admission:  2/16/2023 10:14 PM  MRN:   [id number]  Account:  [de-identified]  YOB: 1980  PCP:    Maxi Piper PA-C  Room:   2101/2101-01  Code Status:    Full Code    Chief Complaint:     Chief Complaint   Patient presents with    Chest Pain    Shortness of Breath       History Obtained From:     patient, electronic medical record    History of Present Illness: The patient is a 38 y.o. Non- / non  female who presents with Chest Pain and Shortness of Breath   and she is admitted to the hospital for the management of chest pain  Patient has multiple medical problems include hypertension, history of polycystic kidney disease, ESRD on dialysis dependent, patient is a peritoneal dialysis  She noticed that her chest pain started 3 days ago, central, in location, intermittent, dull aching, radiating to her left arm, no injury, trauma, fall  No aggravating or relieving factors  Patient is a chronic smoker  Never had cardiac problems in the past  Patient at the time my evaluation feels that her chest pain is improving  In the emergency room underwent EKG, which was negative.   Troponins are slightly elevated but flat due to CKD          Past Medical History:     Past Medical History:   Diagnosis Date    Anxiety     Asthma     Chronic headaches 07/10/2013    CKD (chronic kidney disease) stage 3, GFR 30-59 ml/min ([facility])     Degenerative disc disease, cervical     Depression     Dysmenorrhea 09/30/2014    Eczema 11/08/2012    Hypertension     Hypocalcemia 05/28/2014    Kidney stone     Menorrhagia 09/30/2014    Neck pain, bilateral 05/28/2014    Pelvic pain in female 09/30/2014    Polycystic kidney     Smoker 10/02/2011    Tension vascular headache 01/20/2014        Past Surgical History:     Past Surgical History:   Procedure Laterality Date    BREAST ENHANCEMENT SURGERY      BREAST LUMPECTOMY      left breast    CYSTO/URETERO/PYELOSCOPY, CALCULUS TX Right 6/12/2020    HOLMIUM - CYSTO, RIGHT RETROGRADE PYELOGRAM, RIGHT URETEROSCOPY, LASER LITHO ON STAND BY, RIGHT STENT PLACEMENT performed by Elise Banks MD at [facility]  06/12/2020    DILATION AND CURETTAGE OF UTERUS      x2    IR TUNNELED CATHETER PLACEMENT GREATER THAN 5 YEARS  3/8/2022    IR TUNNELED CATHETER PLACEMENT GREATER THAN 5 YEARS 3/8/2022 [facility] SPECIAL PROCEDURES    LITHOTRIPSY Right 7/23/2020    ESWL EXTRACORPOREAL SHOCK WAVE LITHOTRIPSY performed by Elise Banks MD at [address]  07/01/2013    nerve block(right vervical C3/TON/C4/C5    NERVE BLOCK  07/08/2013    nerve block(right vervical C3/TON/C4/C5    OTHER SURGICAL HISTORY  03/10/2014     botox inj     TOE SURGERY      removal of ingrown toe nail-2nd toe on left foot     UPPER GASTROINTESTINAL ENDOSCOPY N/A 3/7/2022    EGD BIOPSY performed by Americo Ace MD at [facility] ENDO    URETEROSCOPY Right 06/12/2020    stent placement        Medications Prior to Admission:     Prior to Admission medications    Medication Sig Start Date End Date Taking?  Authorizing Provider   Lactobacillus (PROBIOTIC ACIDOPHILUS PO) Take 1 tablet by mouth   Yes Historical Provider, MD   amLODIPine (NORVASC) 10 MG tablet Take 1 tablet by mouth daily 3/10/22  Yes Ava Palencia MD   hydrALAZINE (APRESOLINE) 50 MG tablet Take 1 tablet by mouth every 8 hours 3/9/22  Yes Ava Palencia MD   pantoprazole (PROTONIX) 40 MG tablet Take 1 tablet by mouth every morning (before breakfast) 3/10/22  Yes Ava Palencia MD   amitriptyline (ELAVIL) 25 MG tablet Take 1 tablet by mouth nightly 11/8/21  Yes Milo Michel MD   metoprolol succinate (TOPROL XL) 50 MG extended release tablet Take 50 mg by mouth daily   Yes Historical Provider, MD   hydrOXYzine (ATARAX) 50 MG tablet Take 50 mg by mouth nightly   Yes Historical Provider, MD DULoxetine (CYMBALTA) 60 MG extended release capsule take 1 capsule by mouth once daily  Patient taking differently: 90 mg 11/10/17  Yes Abelardo Wood MD   lidocaine 4 % external patch Place 1 patch onto the skin daily  Patient not taking: Reported on 2/17/2023 11/9/21   Mary Tsang MD        Allergies:     Bee pollen, Diphenhydramine, Codeine, Dust mite extract, Pcn [penicillins], and Pollen extract    Social History:     Tobacco:    reports that she has been smoking cigarettes. She has a 25.00 pack-year smoking history. She has never used smokeless tobacco.  Alcohol:      reports no history of alcohol use. Drug Use:  reports no history of drug use. Family History:     Family History   Problem Relation Age of Onset    High Blood Pressure Mother     Asthma Sister     Migraines Sister     High Blood Pressure Father     Other Brother         bad knees, with recent total knee replacement        Review of Systems:     Positive and Negative as described in HPI. CONSTITUTIONAL:  negative for fevers, chills, sweats, fatigue, weight loss  HEENT:  negative for vision, hearing changes, runny nose, throat pain  RESPIRATORY:  negative for shortness of breath, cough, congestion, wheezing.   CARDIOVASCULAR:  chest pain   GASTROINTESTINAL:  negative for nausea, vomiting, diarrhea, constipation, change in bowel habits, abdominal pain   GENITOURINARY:  negative for difficulty of urination, burning with urination, frequency   INTEGUMENT:  negative for rash, skin lesions, easy bruising   HEMATOLOGIC/LYMPHATIC:  negative for swelling/edema   ALLERGIC/IMMUNOLOGIC:  negative for urticaria , itching  ENDOCRINE:  negative increase in drinking, increase in urination, hot or cold intolerance  MUSCULOSKELETAL:  negative joint pains, muscle aches, swelling of joints  NEUROLOGICAL:  negative for headaches, dizziness, lightheadedness, numbness, pain, tingling extremities  BEHAVIOR/PSYCH:  negative for depression, anxiety    Physical Exam:   /82   Pulse 86   Temp 98 °F (36.7 °C)   Resp 18   Wt 132 lb 7.9 oz (60.1 kg)   SpO2 99%   BMI 21.39 kg/m²   Temp (24hrs), Av.3 °F (36.8 °C), Min:97.9 °F (36.6 °C), Max:98.7 °F (37.1 °C)    No results for input(s): POCGLU in the last 72 hours. Intake/Output Summary (Last 24 hours) at 2023 1553  Last data filed at 2023 1319  Gross per 24 hour   Intake 700 ml   Output --   Net 700 ml       General Appearance:  alert, well appearing, and in no acute distress  Mental status: oriented to person, place, and time with normal affect  Head:  normocephalic, atraumatic. Eye: no icterus, redness, pupils equal and reactive, extraocular eye movements intact, conjunctiva clear  Ear: normal external ear, no discharge, hearing intact  Nose:  no drainage noted  Mouth: mucous membranes moist  Neck: supple, no carotid bruits, thyroid not palpable  Lungs: Bilateral equal air entry, clear to ausculation, no wheezing, rales or rhonchi, normal effort  Cardiovascular: normal rate, regular rhythm, no murmur, gallop, rub.   Abdomen: Soft, nontender, nondistended, normal bowel sounds, no hepatomegaly or splenomegaly, PD catheter present   Neurologic: There are no new focal motor or sensory deficits, normal muscle tone and bulk, no abnormal sensation, normal speech, cranial nerves II through XII grossly intact  Skin: No gross lesions, rashes, bruising or bleeding on exposed skin area  Extremities:  peripheral pulses palpable, no pedal edema or calf pain with palpation  Psych: normal affect     Investigations:      Laboratory Testing:  Recent Results (from the past 24 hour(s))   CBC with Auto Differential    Collection Time: 23 10:45 PM   Result Value Ref Range    WBC 11.4 (H) 3.5 - 11.0 k/uL    RBC 3.17 (L) 4.0 - 5.2 m/uL    Hemoglobin 10.2 (L) 12.0 - 16.0 g/dL    Hematocrit 30.2 (L) 36 - 46 %    MCV 95.3 80 - 100 fL    MCH 32.3 26 - 34 pg    MCHC 33.9 31 - 37 g/dL    RDW 12.7 11.5 - 14.9 %    Platelets 407 150 - 450 k/uL    MPV 7.5 6.0 - 12.0 fL    Seg Neutrophils 62 36 - 66 %    Lymphocytes 25 24 - 44 %    Monocytes 8 (H) 1 - 7 %    Eosinophils % 4 0 - 4 %    Basophils 1 0 - 2 %    Segs Absolute 7.10 1.3 - 9.1 k/uL    Absolute Lymph # 2.80 1.0 - 4.8 k/uL    Absolute Mono # 0.90 0.1 - 1.3 k/uL    Absolute Eos # 0.50 (H) 0.0 - 0.4 k/uL    Basophils Absolute 0.10 0.0 - 0.2 k/uL   CMP    Collection Time: 02/16/23 10:45 PM   Result Value Ref Range    Glucose 102 (H) 70 - 99 mg/dL    BUN 41 (H) 6 - 20 mg/dL    Creatinine 7.50 (HH) 0.50 - 0.90 mg/dL    Est, Glom Filt Rate 6 (L) >60 mL/min/1.73m2    Calcium 8.3 (L) 8.6 - 10.4 mg/dL    Sodium 138 135 - 144 mmol/L    Potassium 4.1 3.7 - 5.3 mmol/L    Chloride 100 98 - 107 mmol/L    CO2 21 20 - 31 mmol/L    Anion Gap 17 9 - 17 mmol/L    Alkaline Phosphatase 56 35 - 104 U/L    ALT 7 5 - 33 U/L    AST 11 <32 U/L    Total Bilirubin 0.2 (L) 0.3 - 1.2 mg/dL    Total Protein 6.1 (L) 6.4 - 8.3 g/dL    Albumin 3.5 3.5 - 5.2 g/dL   Magnesium    Collection Time: 02/16/23 10:45 PM   Result Value Ref Range    Magnesium 1.9 1.6 - 2.6 mg/dL   Troponin Now and Q 1 Hour    Collection Time: 02/16/23 10:45 PM   Result Value Ref Range    Troponin, High Sensitivity 20 (H) 0 - 14 ng/L   Protime-INR    Collection Time: 02/16/23 10:45 PM   Result Value Ref Range    Protime 12.1 11.8 - 14.6 sec    INR 0.9    Troponin Now and Q 1 Hour    Collection Time: 02/17/23 12:10 AM   Result Value Ref Range    Troponin, High Sensitivity 24 (H) 0 - 14 ng/L   Basic Metabolic Panel w/ Reflex to MG    Collection Time: 02/17/23  5:23 AM   Result Value Ref Range    Glucose 94 70 - 99 mg/dL    BUN 42 (H) 6 - 20 mg/dL    Creatinine 7.11 (HH) 0.50 - 0.90 mg/dL    Est, Glom Filt Rate 7 (L) >60 mL/min/1.73m2    Calcium 8.1 (L) 8.6 - 10.4 mg/dL    Sodium 140 135 - 144 mmol/L    Potassium 4.3 3.7 - 5.3 mmol/L    Chloride 103 98 - 107 mmol/L    CO2 23 20 - 31 mmol/L    Anion Gap 14 9 - 17 mmol/L   HCG Qualitative, Serum Collection Time: 02/17/23  5:23 AM   Result Value Ref Range    hCG Qual NEGATIVE NEGATIVE       Imaging/Diagnostics:      Assessment :      Primary Problem  Angina pectoris [facility])    Active Hospital Problems    Diagnosis Date Noted    Angina pectoris ([facility] [address] [I20.9] 02/17/2023     Priority: Medium       Plan:     Patient status Admit as inpatient in the  Progressive Unit/Step down    Chest Pain -with history of ESRD, will rule out ischemia, troponins are flat, EKG negative, patient undergoing stress test and echo  If negative, will DC  ESRD, on peritoneal dialysis, nephrology consulted  Hypertension, controlled  On DVT prophylaxis with Lovenox    Consultations:   IP CONSULT TO INTERNAL MEDICINE  IP CONSULT TO NEPHROLOGY    Patient is admitted as inpatient status because of co-morbidities listed above, severity of signs and symptoms as outlined, requirement for current medical therapies and most importantly because of direct risk to patient if care not provided in a hospital setting. Fritz Odonnell MD  2/17/2023  3:53 PM    Copy sent to TY [facility]    Please note that this chart was generated using voice recognition Dragon dictation software. Although every effort was made to ensure the accuracy of this automated transcription, some errors in transcription may have occurred.

## 2023-02-17 NOTE — PROGRESS NOTES
Patient is a 25-year-old female with history of HTN, tobacco use and ESRD on peritoneal dialysis 4 times daily. Presented to the ED with chest pain. Described as left-sided chest pain that is intermittently sharp and squeezing. Reports pain does radiate to the left arm. Admits to mild nausea but no vomiting. WBC 11.4 no obvious source of infection. EKG normal sinus rhythm. Jessica 20 and 24 which is near patient's baseline. Admitted for current further cardiac work-up. Stress test and echo ordered. Nephrology Dr. Frank Lofton consulted to manage peritoneal dialysis orders.

## 2023-02-17 NOTE — PROGRESS NOTES
Stress lab was notified pt was not kept NPO overnight.  They will reschedule her stress test    1120 am - per stress lab, doctor wanted to continue and do stress test despite not being NPO

## 2023-02-17 NOTE — PLAN OF CARE
Problem: Discharge Planning  Goal: Discharge to home or other facility with appropriate resources  2/17/2023 1545 by Raya Dunaway RN  Outcome: Progressing   Tbd home     Problem: Pain  Goal: Verbalizes/displays adequate comfort level or baseline comfort level  2/17/2023 1545 by Raya Dunaway RN  Outcome: Progressing   Non pharm and meds utilized. Dr Tanika Chowdary aware ongoing stable  chest pain this shift.  Achy discomfort    Problem: ABCDS Injury Assessment  Goal: Absence of physical injury  2/17/2023 1545 by Raya Dunaway RN  Outcome: Progressing   met

## 2023-02-17 NOTE — CARE COORDINATION
Case Management Assessment  Initial Evaluation    Date/Time of Evaluation: 2/17/2023 10:11 AM  Assessment Completed by: Jazz Quintanilla RN    If patient is discharged prior to next notation, then this note serves as note for discharge by case management. Patient Name: Victor Manuel Hernandez                   YOB: 1980  Diagnosis: Angina pectoris ([facility] [I20.9]  Chest pain, unspecified type [R07.9]                   Date / Time: 2/16/2023 10:14 PM    Patient Admission Status: Inpatient   Readmission Risk (Low < 19, Mod (19-27), High > 27): Readmission Risk Score: 16.1    Current PCP: Marty Tobar PA-C  PCP verified by CM? Yes    Chart Reviewed: Yes      History Provided by: Patient  Patient Orientation: Alert and Oriented, Person, Place, Situation, Self    Patient Cognition: Alert    Hospitalization in the last 30 days (Readmission):  No    If yes, Readmission Assessment in CM Navigator will be completed. Advance Directives:      Code Status: Full Code   Patient's Primary Decision Maker is: Patient Declined (Legal Next of Kin Remains as Decision Maker)      Discharge Planning:    Patient lives with: Children Type of Home: House  Primary Care Giver: Self  Patient Support Systems include: Children, Family Members   Current Financial resources: Food [place], Medicaid  Current community resources: None  Current services prior to admission: Other (Comment) (peritoneal dialysis ([facility]))            Current DME:              Type of Home Care services:  None    ADLS  Prior functional level: Independent in ADLs/IADLs  Current functional level: Independent in ADLs/IADLs    PT AM-PAC:   /24  OT AM-PAC:   /24    Family can provide assistance at DC:  yes  Would you like Case Management to discuss the discharge plan with any other family members/significant others, and if so, who?  No  Plans to Return to Present Housing: Yes  Other Identified Issues/Barriers to RETURNING to current housing: none  Potential Assistance needed at discharge: N/A            Potential DME: none   Patient expects to discharge to: [facility] for transportation at discharge: Self    Financial    Payor: [employer] / Plan: Stephanie Ramirez / Product Type: *No Product type* /     Does insurance require precert for SNF: No    Potential assistance Purchasing Medications: No  Meds-to-Beds request: no       [facility]  [address]  [address]  [address] [facility] [place] [address]  Phone: [phone] Fax: [phone]    [facility] [address] [facility], [place] [address] [place] Officer [phone] Lisa Chowdary [phone]  [address] [place] [address]  Phone: [phone] Fax: [phone]      Notes:    Factors facilitating achievement of predicted outcomes: Family support and Good insight into deficits    Barriers to discharge: Pain and Medical complications    Additional Case Management Notes: 2/17/2023 [place] Medicaid; pt from home with children; DME IV pole for peritoneal dialysis; VNS denies needs; Peritoneal Dialysis 4x daily (green bags from [facility]); d/c home no needs//KR    The Plan for Transition of Care is related to the following treatment goals of Angina pectoris ([address] [I20.9]  Chest pain, unspecified type [A80.4]    IF APPLICABLE: The Patient and/or patient representative Mike Bernabe and her family were provided with a choice of provider and agrees with the discharge plan.  Freedom of choice list with basic dialogue that supports the patient's individualized plan of care/goals and shares the quality data associated with the providers was provided to: Patient      The Patient and/or Patient Representative Agree with the Discharge Plan? yes     Sanna Sutherland RN  Case Management Department  Ph: [phone] Fax: [phone]

## 2023-02-17 NOTE — ED NOTES
Notified Dr. Jewels Rubio regarding the critical report of IPBW=0.7     Noris Mendez RN  35/10/62 9634

## 2023-02-18 NOTE — DISCHARGE INSTR - COC
Continuity of Care Form    Patient Name: Derrick Ross   :  1980  MRN:  [id number]    Admit date:  2023  Discharge date:  ***    Code Status Order: Full Code   Advance Directives:     Admitting Physician:  Siddharth Gipson MD  PCP: Beulah Castrejon PA-C    Discharging Nurse: [facility] Unit/Room#: 2101/2101-01  Discharging Unit Phone Number: ***    Emergency Contact:   Extended Emergency Contact Information  Primary Emergency Contact: Mitchael Kawasaki  Address: Brian Chaudhry, [address] [place] [address] [address] [facility] Phone: [phone]  Work Phone: [phone]  Mobile Phone: [phone]  Relation: Parent    Past Surgical History:  Past Surgical History:   Procedure Laterality Date    BREAST ENHANCEMENT SURGERY      BREAST LUMPECTOMY      left breast    CYSTO/URETERO/PYELOSCOPY, CALCULUS TX Right 2020    HOLMIUM - CYSTO, RIGHT RETROGRADE PYELOGRAM, RIGHT URETEROSCOPY, LASER LITHO ON STAND BY, RIGHT STENT PLACEMENT performed by Rhona Snellen, MD at [address]  2020    DILATION AND CURETTAGE OF UTERUS      x2    IR TUNNELED CATHETER PLACEMENT GREATER THAN 5 YEARS  3/8/2022    IR TUNNELED CATHETER PLACEMENT GREATER THAN 5 YEARS 3/8/2022 [facility] SPECIAL PROCEDURES    LITHOTRIPSY Right 2020    ESWL EXTRACORPOREAL SHOCK WAVE LITHOTRIPSY performed by Rhona Snellen, MD at [address]  2013    nerve block(right vervical C3/TON/C4/C5    NERVE BLOCK  2013    nerve block(right vervical C3/TON/C4/C5    OTHER SURGICAL HISTORY  03/10/2014     botox inj     TOE SURGERY      removal of ingrown toe nail-2nd toe on left foot     UPPER GASTROINTESTINAL ENDOSCOPY N/A 3/7/2022    EGD BIOPSY performed by Santiago Quinn MD at [facility] ENDO    URETEROSCOPY Right 2020    stent placement       Immunization History:   Immunization History   Administered Date(s) Administered    COVID-19, MODERNA BLUE border, Primary or Immunocompromised, (age 12y+), IM, 100 mcg/0.5mL 04/29/2021, 05/27/2021, 07/22/2022    Influenza Vaccine, unspecified formulation 10/26/2016    Influenza Virus Vaccine 11/24/2014, 10/30/2015, 10/31/2018    Influenza, FLUARIX, FLULAVAL, Luh Fall (age 10 mo+) AND AFLURIA, (age 1 y+), PF, 0.5mL 02/24/2021, 11/08/2021    Influenza, High Dose (Fluzone 65 yrs and older) 12/06/2011, 10/02/2012    Pneumococcal Polysaccharide (Abffhmbts19) 10/30/2015       Active Problems:  Patient Active Problem List   Diagnosis Code    Asthma J45.909    Smoker F17.200    Polycystic kidney Q61.3    Eczema L30.9    Depression with anxiety F41.8    Chronic headaches R51.9, G89.29    Tension vascular headache G44.209    Irregular bleeding N92.6    Metrorrhagia N92.1    Menorrhagia N92.0    Dysmenorrhea N94.6    Pelvic pain in female R10.2    Kidney stone N20.0    Acute back pain M54.9    Anxiety F41.9    Hypertension I10    Headache R51.9    Depression F32. A    CKD (chronic kidney disease) stage 3, GFR 30-59 ml/min (HCC) N18.30    Chronic neck pain M54.2, G89.29    Degenerative disc disease, cervical M50.30    Encounter for long-term (current) use of other medications Z79.899    Cervicalgia M54.2    Chronic intractable headache R51.9, G89.29    Hemorrhage of cyst of native kidney N28.89, N28.1    Abdominal pain R10.9    CKD (chronic kidney disease) stage 4, GFR 15-29 ml/min (HCC) N18.4    Acute renal failure with acute tubular necrosis superimposed on stage 3 chronic kidney disease (HCC) N17.0, N18.30    Allergic rhinitis due to animal hair and dander J30.81    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    Gross hematuria R31.0    History of anemia due to chronic kidney disease N18.9, Z86.2    History of multiple allergies Z88.9    Hyperlipidemia E78.5    IUD (intrauterine device) in place Z97.5    Leukocytosis D72.829    Other specified disorders of kidney and ureter N28.89    Pain in joint M25.50    Recurrent major depression in full remission ([facility] F33.42    Renovascular hypertension I15.0 Right ureteral stone N20.1    Right nephrolithiasis N20.0    Flank pain R10.9    Acute kidney injury superimposed on chronic kidney disease (HCC) N17.9, N18.9    Urinary tract infection with hematuria N39.0, R31.9    History of major depression Z86.59    Iron deficiency anemia due to chronic blood loss D50.0    Hypernatremia E87.0    SILVANO (acute kidney injury) ([facility] N17.9    Dysuria R30.0    Acute kidney injury superimposed on CKD (HCC) N17.9, N18.9    Pyelonephritis N12    Hypertensive emergency I16.1    Abdominal pain, epigastric R10.13    Abdominal pain, right upper quadrant R10.11    Nausea R11.0    Gastroesophageal reflux disease K21.9    Angina pectoris (HCC) I20.9       Isolation/Infection:   Isolation            No Isolation          Patient Infection Status       Infection Onset Added Last Indicated Last Indicated By Review Planned Expiration Resolved Resolved By    None active    Resolved    COVID-19 (Rule Out) 07/19/20 07/19/20 07/19/20 COVID-19 (Ordered)   07/19/20 Rule-Out Test Resulted            Nurse Assessment:  Last Vital Signs: /71   Pulse 87   Temp 98.2 °F (36.8 °C)   Resp 18   Wt 132 lb 7.9 oz (60.1 kg)   SpO2 97%   BMI 21.39 kg/m²     Last documented pain score (0-10 scale): Pain Level: 0  Last Weight:   Wt Readings from Last 1 Encounters:   02/17/23 132 lb 7.9 oz (60.1 kg)     Mental Status:  {IP PT MENTAL STATUS:43874}    IV Access:  { ALETHA IV ACCESS:050358419}    Nursing Mobility/ADLs:  Walking   {[facility] DME VWEK:432719300}  Transfer  {P DME WRCB:053623640}  Bathing  {[facility] DME FCTA:910874197}  Dressing  {P DME YNWW:110945524}  Toileting  {P DME OSPS:173276614}  Feeding  {[facility] DME TKKC:995053042}  Med Admin  {[facility] DME CUFZ:092778905}  Med Delivery   { ALETHA MED Delivery:642365339}    Wound Care Documentation and Therapy:  Incision 02/16/23 Abdomen Lower; Left (Active)   Number of days: 0        Elimination:  Continence:    Bowel: {YES / AK:13544}  Bladder: {YES / GP:13113}  Urinary Catheter: {Urinary Catheter:682642929}   Colostomy/Ileostomy/Ileal Conduit: {YES / XJ:13234}       Date of Last BM: ***    Intake/Output Summary (Last 24 hours) at 2023 1858  Last data filed at 2023 1805  Gross per 24 hour   Intake 3820 ml   Output 2804 ml   Net 1016 ml     No intake/output data recorded.     Safety Concerns:     [facility] Safety Concerns:995064277}    Impairments/Disabilities:      [facility] Impairments/Disabilities:283645910}    Nutrition Therapy:  Current Nutrition Therapy:   [facility] Diet List:767380294}    Routes of Feeding: {CHP DME Other Feedings:648687189}  Liquids: {Slp liquid thickness:88552}  Daily Fluid Restriction: {CHP DME Yes amt example:693337898}  Last Modified Barium Swallow with Video (Video Swallowing Test): {Done Not Done RZPC:192961146}    Treatments at the Time of Hospital Discharge:   Respiratory Treatments: ***  Oxygen Therapy:  {Therapy; copd oxygen:01089}  Ventilator:    { CC Vent ERI:705685559}    Rehab Therapies: {THERAPEUTIC INTERVENTION:5661139472}  Weight Bearing Status/Restrictions: [facility] Weight Bearin}  Other Medical Equipment (for information only, NOT a DME order):  {EQUIPMENT:008258641}  Other Treatments: ***    Patient's personal belongings (please select all that are sent with patient):  {[facility] DME Belongings:280532482}    RN SIGNATURE:  {Esignature:981837876}    CASE MANAGEMENT/SOCIAL WORK SECTION    Inpatient Status Date: ***    Readmission Risk Assessment Score:  Readmission Risk              Risk of Unplanned Readmission:  20           Discharging to Facility/ Agency   Name:   Address:  Phone:  Fax:    Dialysis Facility (if applicable)   Name:  Address:  Dialysis Schedule:  Phone:  Fax:    / signature: {Esignature:188644428}    PHYSICIAN SECTION    Prognosis: {Prognosis:4431052201}    Condition at Discharge: [facility] Patient Condition:763892053}    Rehab Potential (if transferring to Rehab): {Prognosis:1583559996}    Recommended Labs or Other Treatments After Discharge: ***    Physician Certification: I certify the above information and transfer of Juancarlos Stagers  is necessary for the continuing treatment of the diagnosis listed and that she requires {Admit to Appropriate Level of Care:11987} for {GREATER/LESS:457125808} 30 days.      Update Admission H&P: {CHP DME Changes in QWCWP:169502252}    PHYSICIAN SIGNATURE:  Electronically signed by Luisana Bassett MD on 2/17/23 at 6:58 PM EST

## 2023-02-18 NOTE — PROGRESS NOTES
Writer informs Pt on plan to discharge. Discharge paperwork printed out and gone over with Pt. IV removed and Pt leaves with all personal belongings.

## 2023-02-18 NOTE — PROGRESS NOTES
Dr. Song Babin in to see Pt. Per Dr. Snog Babin Pt ok to discharge.  Writer updated Dr. Silvino Arellano. Pt also ok to discharge per Dr. Silvino Arellano. Discharge order placed by Dr. Sol Carolina

## 2023-02-22 ENCOUNTER — APPOINTMENT (OUTPATIENT)
Dept: GENERAL RADIOLOGY | Age: 43
DRG: 191 | End: 2023-02-22
Payer: MEDICAID

## 2023-02-22 ENCOUNTER — HOSPITAL ENCOUNTER (INPATIENT)
Age: 43
LOS: 2 days | Discharge: HOME | DRG: 191 | End: 2023-02-24
Attending: EMERGENCY MEDICINE | Admitting: INTERNAL MEDICINE
Payer: MEDICAID

## 2023-02-22 DIAGNOSIS — R07.9 CHEST PAIN, UNSPECIFIED TYPE: Primary | ICD-10-CM

## 2023-02-22 LAB
ABSOLUTE EOS #: 0.2 K/UL (ref 0–0.4)
ABSOLUTE LYMPH #: 2.2 K/UL (ref 1–4.8)
ABSOLUTE MONO #: 0.7 K/UL (ref 0.1–1.3)
ANION GAP SERPL CALCULATED.3IONS-SCNC: 15 MMOL/L (ref 9–17)
BASOPHILS # BLD: 1 % (ref 0–2)
BASOPHILS ABSOLUTE: 0.1 K/UL (ref 0–0.2)
BUN SERPL-MCNC: 41 MG/DL (ref 6–20)
CALCIUM SERPL-MCNC: 8.7 MG/DL (ref 8.6–10.4)
CHLORIDE SERPL-SCNC: 98 MMOL/L (ref 98–107)
CO2 SERPL-SCNC: 25 MMOL/L (ref 20–31)
CREAT SERPL-MCNC: 8.12 MG/DL (ref 0.5–0.9)
EOSINOPHILS RELATIVE PERCENT: 2 % (ref 0–4)
GFR SERPL CREATININE-BSD FRML MDRD: 6 ML/MIN/1.73M2
GLUCOSE SERPL-MCNC: 110 MG/DL (ref 70–99)
HCT VFR BLD AUTO: 35.2 % (ref 36–46)
HGB BLD-MCNC: 11.2 G/DL (ref 12–16)
LYMPHOCYTES # BLD: 18 % (ref 24–44)
MCH RBC QN AUTO: 30.1 PG (ref 26–34)
MCHC RBC AUTO-ENTMCNC: 31.9 G/DL (ref 31–37)
MCV RBC AUTO: 94.4 FL (ref 80–100)
MONOCYTES # BLD: 6 % (ref 1–7)
PDW BLD-RTO: 12.7 % (ref 11.5–14.9)
PLATELET # BLD AUTO: 504 K/UL (ref 150–450)
PMV BLD AUTO: 7.6 FL (ref 6–12)
POTASSIUM SERPL-SCNC: 4 MMOL/L (ref 3.7–5.3)
RBC # BLD: 3.72 M/UL (ref 4–5.2)
SEG NEUTROPHILS: 73 % (ref 36–66)
SEGMENTED NEUTROPHILS ABSOLUTE COUNT: 9.2 K/UL (ref 1.3–9.1)
SODIUM SERPL-SCNC: 138 MMOL/L (ref 135–144)
TROPONIN I SERPL DL<=0.01 NG/ML-MCNC: 21 NG/L (ref 0–14)
TROPONIN I SERPL DL<=0.01 NG/ML-MCNC: 21 NG/L (ref 0–14)
WBC # BLD AUTO: 12.4 K/UL (ref 3.5–11)

## 2023-02-22 PROCEDURE — 93005 ELECTROCARDIOGRAM TRACING: CPT | Performed by: EMERGENCY MEDICINE

## 2023-02-22 PROCEDURE — 71045 X-RAY EXAM CHEST 1 VIEW: CPT

## 2023-02-22 PROCEDURE — 6370000000 HC RX 637 (ALT 250 FOR IP): Performed by: INTERNAL MEDICINE

## 2023-02-22 PROCEDURE — 99223 1ST HOSP IP/OBS HIGH 75: CPT | Performed by: INTERNAL MEDICINE

## 2023-02-22 PROCEDURE — 6370000000 HC RX 637 (ALT 250 FOR IP): Performed by: STUDENT IN AN ORGANIZED HEALTH CARE EDUCATION/TRAINING PROGRAM

## 2023-02-22 PROCEDURE — 99285 EMERGENCY DEPT VISIT HI MDM: CPT

## 2023-02-22 PROCEDURE — 85025 COMPLETE CBC W/AUTO DIFF WBC: CPT

## 2023-02-22 PROCEDURE — 84484 ASSAY OF TROPONIN QUANT: CPT

## 2023-02-22 PROCEDURE — 80048 BASIC METABOLIC PNL TOTAL CA: CPT

## 2023-02-22 PROCEDURE — 36415 COLL VENOUS BLD VENIPUNCTURE: CPT

## 2023-02-22 PROCEDURE — 2060000000 HC ICU INTERMEDIATE R&B

## 2023-02-22 PROCEDURE — 6360000002 HC RX W HCPCS: Performed by: STUDENT IN AN ORGANIZED HEALTH CARE EDUCATION/TRAINING PROGRAM

## 2023-02-22 PROCEDURE — 3E1M39Z IRRIGATION OF PERITONEAL CAVITY USING DIALYSATE, PERCUTANEOUS APPROACH: ICD-10-PCS | Performed by: INTERNAL MEDICINE

## 2023-02-22 PROCEDURE — 6360000002 HC RX W HCPCS: Performed by: INTERNAL MEDICINE

## 2023-02-22 PROCEDURE — 96374 THER/PROPH/DIAG INJ IV PUSH: CPT

## 2023-02-22 PROCEDURE — 96376 TX/PRO/DX INJ SAME DRUG ADON: CPT

## 2023-02-22 RX ORDER — ASPIRIN 81 MG/1
324 TABLET, CHEWABLE ORAL ONCE
Status: COMPLETED | OUTPATIENT
Start: 2023-02-22 | End: 2023-02-22

## 2023-02-22 RX ORDER — HEPARIN SODIUM 5000 [USP'U]/ML
5000 INJECTION, SOLUTION INTRAVENOUS; SUBCUTANEOUS EVERY 8 HOURS SCHEDULED
Status: DISCONTINUED | OUTPATIENT
Start: 2023-02-22 | End: 2023-02-24 | Stop reason: HOSPADM

## 2023-02-22 RX ORDER — HYDRALAZINE HYDROCHLORIDE 50 MG/1
50 TABLET, FILM COATED ORAL EVERY 8 HOURS SCHEDULED
Status: DISCONTINUED | OUTPATIENT
Start: 2023-02-22 | End: 2023-02-24 | Stop reason: HOSPADM

## 2023-02-22 RX ORDER — AMITRIPTYLINE HYDROCHLORIDE 25 MG/1
25 TABLET, FILM COATED ORAL NIGHTLY
Status: DISCONTINUED | OUTPATIENT
Start: 2023-02-22 | End: 2023-02-24 | Stop reason: HOSPADM

## 2023-02-22 RX ORDER — PHENOL 1.4 %
10 AEROSOL, SPRAY (ML) MUCOUS MEMBRANE NIGHTLY
COMMUNITY

## 2023-02-22 RX ORDER — HYDROXYZINE HYDROCHLORIDE 25 MG/1
50 TABLET, FILM COATED ORAL NIGHTLY
Status: DISCONTINUED | OUTPATIENT
Start: 2023-02-22 | End: 2023-02-24 | Stop reason: HOSPADM

## 2023-02-22 RX ORDER — METOPROLOL SUCCINATE 50 MG/1
50 TABLET, EXTENDED RELEASE ORAL DAILY
Status: DISCONTINUED | OUTPATIENT
Start: 2023-02-22 | End: 2023-02-24 | Stop reason: HOSPADM

## 2023-02-22 RX ORDER — DULOXETIN HYDROCHLORIDE 60 MG/1
60 CAPSULE, DELAYED RELEASE ORAL DAILY
Status: DISCONTINUED | OUTPATIENT
Start: 2023-02-22 | End: 2023-02-24 | Stop reason: HOSPADM

## 2023-02-22 RX ORDER — AMLODIPINE BESYLATE 10 MG/1
10 TABLET ORAL DAILY
Status: DISCONTINUED | OUTPATIENT
Start: 2023-02-22 | End: 2023-02-24 | Stop reason: HOSPADM

## 2023-02-22 RX ORDER — FENTANYL CITRATE 0.05 MG/ML
50 INJECTION, SOLUTION INTRAMUSCULAR; INTRAVENOUS ONCE
Status: COMPLETED | OUTPATIENT
Start: 2023-02-22 | End: 2023-02-22

## 2023-02-22 RX ORDER — ATORVASTATIN CALCIUM 40 MG/1
40 TABLET, FILM COATED ORAL NIGHTLY
Status: DISCONTINUED | OUTPATIENT
Start: 2023-02-22 | End: 2023-02-24 | Stop reason: HOSPADM

## 2023-02-22 RX ORDER — SODIUM CHLORIDE, SODIUM LACTATE, CALCIUM CHLORIDE, MAGNESIUM CHLORIDE AND DEXTROSE 2.5; 538; 448; 18.3; 5.08 G/100ML; MG/100ML; MG/100ML; MG/100ML; MG/100ML
2000 INJECTION, SOLUTION INTRAPERITONEAL 4 TIMES DAILY
Status: DISCONTINUED | OUTPATIENT
Start: 2023-02-22 | End: 2023-02-24 | Stop reason: HOSPADM

## 2023-02-22 RX ORDER — LANOLIN ALCOHOL/MO/W.PET/CERES
9 CREAM (GRAM) TOPICAL NIGHTLY
Status: DISCONTINUED | OUTPATIENT
Start: 2023-02-22 | End: 2023-02-24 | Stop reason: HOSPADM

## 2023-02-22 RX ORDER — ASPIRIN 81 MG/1
81 TABLET, CHEWABLE ORAL DAILY
Status: DISCONTINUED | OUTPATIENT
Start: 2023-02-23 | End: 2023-02-24 | Stop reason: HOSPADM

## 2023-02-22 RX ORDER — ACETAMINOPHEN 500 MG
1000 TABLET ORAL ONCE
Status: COMPLETED | OUTPATIENT
Start: 2023-02-22 | End: 2023-02-22

## 2023-02-22 RX ORDER — FENTANYL CITRATE 0.05 MG/ML
50 INJECTION, SOLUTION INTRAMUSCULAR; INTRAVENOUS EVERY 4 HOURS PRN
Status: DISCONTINUED | OUTPATIENT
Start: 2023-02-22 | End: 2023-02-23

## 2023-02-22 RX ADMIN — HEPARIN SODIUM 5000 UNITS: 5000 INJECTION INTRAVENOUS; SUBCUTANEOUS at 21:35

## 2023-02-22 RX ADMIN — HYDRALAZINE HYDROCHLORIDE 50 MG: 50 TABLET, FILM COATED ORAL at 21:35

## 2023-02-22 RX ADMIN — ASPIRIN 81 MG CHEWABLE TABLET 324 MG: 81 TABLET CHEWABLE at 16:52

## 2023-02-22 RX ADMIN — METOPROLOL SUCCINATE 50 MG: 50 TABLET, EXTENDED RELEASE ORAL at 23:13

## 2023-02-22 RX ADMIN — AMLODIPINE BESYLATE 10 MG: 10 TABLET ORAL at 21:40

## 2023-02-22 RX ADMIN — HYDROXYZINE HYDROCHLORIDE 50 MG: 25 TABLET, FILM COATED ORAL at 21:36

## 2023-02-22 RX ADMIN — ACETAMINOPHEN 1000 MG: 500 TABLET ORAL at 18:41

## 2023-02-22 RX ADMIN — FENTANYL CITRATE 50 MCG: 0.05 INJECTION, SOLUTION INTRAMUSCULAR; INTRAVENOUS at 18:41

## 2023-02-22 RX ADMIN — FENTANYL CITRATE 50 MCG: 0.05 INJECTION, SOLUTION INTRAMUSCULAR; INTRAVENOUS at 16:52

## 2023-02-22 RX ADMIN — Medication 9 MG: at 21:35

## 2023-02-22 RX ADMIN — AMITRIPTYLINE HYDROCHLORIDE 25 MG: 25 TABLET, FILM COATED ORAL at 21:40

## 2023-02-22 RX ADMIN — ATORVASTATIN CALCIUM 40 MG: 40 TABLET, FILM COATED ORAL at 21:35

## 2023-02-22 RX ADMIN — FENTANYL CITRATE 50 MCG: 0.05 INJECTION, SOLUTION INTRAMUSCULAR; INTRAVENOUS at 22:21

## 2023-02-22 ASSESSMENT — PAIN SCALES - GENERAL
PAINLEVEL_OUTOF10: 8
PAINLEVEL_OUTOF10: 7
PAINLEVEL_OUTOF10: 5
PAINLEVEL_OUTOF10: 8
PAINLEVEL_OUTOF10: 7

## 2023-02-22 ASSESSMENT — ENCOUNTER SYMPTOMS
SHORTNESS OF BREATH: 1
ABDOMINAL PAIN: 0
BACK PAIN: 0

## 2023-02-22 ASSESSMENT — PAIN DESCRIPTION - LOCATION
LOCATION: CHEST

## 2023-02-22 ASSESSMENT — PAIN DESCRIPTION - DESCRIPTORS
DESCRIPTORS: CRUSHING
DESCRIPTORS: CRUSHING

## 2023-02-22 ASSESSMENT — PAIN - FUNCTIONAL ASSESSMENT
PAIN_FUNCTIONAL_ASSESSMENT: 0-10
PAIN_FUNCTIONAL_ASSESSMENT: 0-10

## 2023-02-22 NOTE — ED PROVIDER NOTES
[address]  Emergency Department Encounter  Emergency Medicine Resident     Pt Name:Elo Bagley  MRN: [id number]  [place] 1980  Date of evaluation: 2/22/23  PCP:  Obdulia Son PA-C  Note Started: 4:26 PM EST      CHIEF COMPLAINT       Chief Complaint   Patient presents with    Chest Pain    Shortness of Breath       HISTORY OF PRESENT ILLNESS  (Location/Symptom, Timing/Onset, Context/Setting, Quality, Duration, Modifying Factors, Severity.)      Fito Nayak is a 38 y.o. female who presents with chest pain shortness of breath. Patient states she recently was admitted to the hospital for similar symptoms, had a negative stress test and was discharged. Patient states she was initially feeling better however pain came back today and feels worse. Radiating into her left arm and left side of her jaw. Denies any prior cardiac history. Patient is a peritoneal dialysis patient, having no issues doing this at home. Patient states she is a smoker. No known diagnosis of COPD. PAST MEDICAL / SURGICAL / SOCIAL / FAMILY HISTORY      has a past medical history of Anxiety, Asthma, Chronic headaches, CKD (chronic kidney disease) stage 3, GFR 30-59 ml/min (HCC), Degenerative disc disease, cervical, Depression, Dysmenorrhea, Eczema, Hypertension, Hypocalcemia, Kidney stone, Menorrhagia, Neck pain, bilateral, Pelvic pain in female, Polycystic kidney, Smoker, and Tension vascular headache.       has a past surgical history that includes Dilation and curettage of uterus; Breast lumpectomy; Nerve Block (07/01/2013); Nerve Block (07/08/2013); other surgical history (03/10/2014 ); Toe Surgery; Breast enhancement surgery; Cystoscopy (06/12/2020); Ureteroscopy (Right, 06/12/2020); cysto/uretero/pyeloscopy, calculus tx (Right, 6/12/2020); Lithotripsy (Right, 7/23/2020); Upper gastrointestinal endoscopy (N/A, 3/7/2022); and IR TUNNELED CVC PLACE WO SQ PORT/PUMP > 5 YEARS (3/8/2022).       Social History Socioeconomic History    Marital status:      Spouse name: Not on file    Number of children: Not on file    Years of education: Not on file    Highest education level: Not on file   Occupational History    Occupation: stay at home mom   Tobacco Use    Smoking status: Every Day     Packs/day: 0.50     Years: 25.00     Pack years: 12.50     Types: Cigarettes    Smokeless tobacco: Never   Vaping Use    Vaping Use: Never used   Substance and Sexual Activity    Alcohol use: No     Alcohol/week: 0.0 standard drinks    Drug use: No    Sexual activity: Yes     Partners: Male     Comment: steady boyfriend   Other Topics Concern    Not on file   Social History Narrative    Not on file     Social Determinants of Health     Financial Resource Strain: Not on file   Food Insecurity: Not on file   Transportation Needs: Not on file   Physical Activity: Not on file   Stress: Not on file   Social Connections: Not on file   Intimate Partner Violence: Not on file   Housing Stability: Not on file       Family History   Problem Relation Age of Onset    High Blood Pressure Mother     Asthma Sister     Migraines Sister     High Blood Pressure Father     Other Brother         bad knees, with recent total knee replacement        Allergies:  Bee pollen, Diphenhydramine, Codeine, Dust mite extract, Pcn [penicillins], and Pollen extract    Home Medications:  Prior to Admission medications    Medication Sig Start Date End Date Taking?  Authorizing Provider   Melatonin 10 MG TABS Take 10 mg by mouth at bedtime   Yes Historical Provider, MD   Lactobacillus (PROBIOTIC ACIDOPHILUS PO) Take 1 tablet by mouth  Patient not taking: No sig reported    Historical Provider, MD   aspirin 81 MG chewable tablet Take 1 tablet by mouth daily 2/18/23   Aaron Lua MD   atorvastatin (LIPITOR) 40 MG tablet Take 1 tablet by mouth nightly 2/18/23   Aaron Lua MD   amLODIPine (NORVASC) 10 MG tablet Take 1 tablet by mouth daily 3/10/22   Isabela Steiner Dion Tamez MD   hydrALAZINE (APRESOLINE) 50 MG tablet Take 1 tablet by mouth every 8 hours 3/9/22   Luh Harmon MD   pantoprazole (PROTONIX) 40 MG tablet Take 1 tablet by mouth every morning (before breakfast)  Patient not taking: No sig reported 3/10/22   Luh Harmon MD   amitriptyline (ELAVIL) 25 MG tablet Take 1 tablet by mouth nightly 11/8/21   Modesto Pardo MD   metoprolol succinate (TOPROL XL) 50 MG extended release tablet Take 50 mg by mouth daily    Historical Provider, MD   hydrOXYzine (ATARAX) 50 MG tablet Take 50 mg by mouth nightly    Historical Provider, MD   DULoxetine (CYMBALTA) 60 MG extended release capsule take 1 capsule by mouth once daily 11/10/17   Marianne Balderrama MD       REVIEW OF SYSTEMS       Review of Systems   Constitutional:  Negative for chills and fever. HENT:  Negative for congestion. Eyes:  Negative for visual disturbance. Respiratory:  Positive for shortness of breath. Cardiovascular:  Positive for chest pain. Gastrointestinal:  Negative for abdominal pain. Genitourinary:  Negative for dysuria. Musculoskeletal:  Negative for back pain. Skin:  Negative for rash. Neurological:  Negative for headaches. Psychiatric/Behavioral:  Negative for confusion. PHYSICAL EXAM      INITIAL VITALS:   /80   Pulse (!) 101   Temp 98.3 °F (36.8 °C) (Oral)   Resp 18   Ht 5' 5.75\" (1.67 m)   Wt 130 lb (59 kg)   LMP 10/31/2022 (Approximate)   SpO2 99%   BMI 21.14 kg/m²     Physical Exam  Vitals reviewed. Constitutional:       Comments: Appears uncomfortable, in pain   HENT:      Head: Normocephalic and atraumatic. Right Ear: External ear normal.      Left Ear: External ear normal.      Nose: Nose normal.      Mouth/Throat:      Mouth: Mucous membranes are moist.   Eyes:      General:         Right eye: No discharge. Left eye: No discharge. Cardiovascular:      Rate and Rhythm: Regular rhythm. Tachycardia present. Pulses: Normal pulses. Comments: Chest wall nontender to palpation  Pulmonary:      Effort: Pulmonary effort is normal. No respiratory distress. Breath sounds: Normal breath sounds. Abdominal:      General: There is no distension. Palpations: Abdomen is soft. Tenderness: There is no abdominal tenderness. Musculoskeletal:      Cervical back: Normal range of motion. Comments: Moving all 4 extremities   Skin:     General: Skin is warm. Capillary Refill: Capillary refill takes less than 2 seconds. Neurological:      General: No focal deficit present. Mental Status: She is alert and oriented to person, place, and time. Cranial Nerves: No cranial nerve deficit. Psychiatric:         Mood and Affect: Mood normal.         DDX/DIAGNOSTIC RESULTS / EMERGENCY DEPARTMENT COURSE / MDM     Medical Decision Making  DDx: ACS, pneumonia, pneumothorax, musculoskeletal pain, anxiety    56-year-old female presents with chest pain with associated shortness of breath. Radiating to left arm and jaw. Recent admission for similar complaints, discharged after negative stress test.  Patient appears uncomfortable initial evaluation, afebrile, tachycardic, otherwise stable vital signs. We will plan to repeat cardiac labs here. We will plan to discuss with cardiology. Will provide analgesia. Will reassess. Amount and/or Complexity of Data Reviewed  Labs: ordered. Decision-making details documented in ED Course. Radiology: ordered. Decision-making details documented in ED Course. ECG/medicine tests: ordered. Risk  OTC drugs. Prescription drug management. Decision regarding hospitalization.         EKG  EKG Interpretation    Interpreted by me    Rhythm: Sinus tachycardia  Rate: 119  Axis: normal  Ectopy: none  Conduction: normal  ST Segments: Nonspecific changes  T Waves: Nonspecific changes  Q Waves: none    Clinical Impression: Nonspecific EKG    All EKG's are interpreted by the Emergency Department Physician who either signs or Co-signs this chart in the absence of a cardiologist.    EMERGENCY DEPARTMENT COURSE:      ED Course as of 02/22/23 2336 Wed Feb 22, 2023   1656 WBC(!): 12.4 [AB]   1656 Hemoglobin Quant(!): 11.2 [AB]   1703 XR CHEST PORTABLE  IMPRESSION:  No acute process. [AB]   1709 Troponin, High Sensitivity(!): 21  Mildly elevated, near baseline, will trend [AB]   1709 Creatinine(!!): 8.12  Peritoneal dialysis patient [AB]   1762 Patient reevaluated. No improvement. Will discuss with cardiology as patient recently had negative stress test a few days ago and now having intractable chest pain. [AB]   [address] Spoke with Dr. Naima Bhakta with cardiology. Recommending readmission for probable cardiac cath. Recommending trending troponins, would like repeat EKG at 10 PM tonight [AB]   1752 Spoke with Dr. Fiordaliza Powers. Agree to admit patient [AB]      ED Course User Index  [AB] Eric Brown DO       PROCEDURES:  None    CONSULTS:  IP CONSULT TO CARDIOLOGY  IP CONSULT TO INTERNAL MEDICINE  IP CONSULT TO NEPHROLOGY    CRITICAL CARE:  There was significant risk of life threatening deterioration of patient's condition requiring my direct management. Critical care time 0 minutes, excluding any documented procedures. FINAL IMPRESSION      1. Chest pain, unspecified type          DISPOSITION / PLAN     DISPOSITION Admitted 02/22/2023 06:51:07 PM      PATIENT REFERRED TO:  No follow-up provider specified.     DISCHARGE MEDICATIONS:  Current Discharge Medication List          John Granados DO  Emergency Medicine Resident    (Please note that portions of thisnote were completed with a voice recognition program.  Efforts were made to edit the dictations but occasionally words are mis-transcribed.)        Eric Brown DO  Resident  02/22/23 0050

## 2023-02-22 NOTE — ED TRIAGE NOTES
Mode of arrival (squad #, walk in, police, etc) : walk in        Chief complaint(s): chest pain        Arrival Note (brief scenario, treatment PTA, etc). : pt states she was recently admitted for CP and was told to come back if she had more CP. Pt states she has had CP since yesterday and it is not getting better. Pt also SB        C= \"Have you ever felt that you should Cut down on your drinking? \"  No  A= \"Have people Annoyed you by criticizing your drinking? \"  No  G= \"Have you ever felt bad or Guilty about your drinking? \"  No  E= \"Have you ever had a drink as an Eye-opener first thing in the morning to steady your nerves or to help a hangover? \"  No      Deferred []      Reason for deferring: N/A    *If yes to two or more: probable alcohol abuse. *

## 2023-02-22 NOTE — PROGRESS NOTES
Medication History completed:    New medications:   Melatonin 10 mg nightly     Medications discontinued: none    Medications flagged for review:  Lactobacillus (patient not taking)   Pantoprazole (patient not taking)     Changes to dosing: none    Stated allergies:  Diphenhydramine: tremor  Codeine: n/v   Penicillins: rash    Other pertinent information: patient's home medications verified with [facility] pharmacy. Of note, patient has not started taking aspirin or atorvastatin but they are filled and ready at her pharmacy.      Phillip Wilkins, PharmD candidate [id number]

## 2023-02-22 NOTE — ED PROVIDER NOTES
[address] ED  eMERGENCY dEPARTMENT eNCOUnter   Attending Attestation     Pt Name: Howie Ortiz  MRN: [id number]  [place] 1980  Date of evaluation: 2/22/23       Howie Ortiz is a 38 y.o. female who presents with Chest Pain and Shortness of Breath      History:   Patient is here complaining of chest pain and shortness of breath. Patient was evaluated and admitted about a week ago for this had a negative stress test and echo. Patient states she was doing well until yesterday when the pain started back pressure substernal with shortness of breath. Exam: Vitals:   Vitals:    02/22/23 1626 02/22/23 1631 02/22/23 1652   BP: (!) 174/104     Pulse: (!) 123     Resp: 16  20   Temp: 97.8 °F (36.6 °C)     TempSrc: Oral     SpO2: 100%     Weight:  130 lb (59 kg)    Height:  5' 5.75\" (1.67 m)      Heart tachycardic regular no murmurs. Lungs clear to auscultation bilaterally. I performed a history and physical examination of the patient and discussed management with the resident. I reviewed the residents note and agree with the documented findings and plan of care. Any areas of disagreement are noted on the chart. I was personally present for the key portions of any procedures. I have documented in the chart those procedures where I was not present during the key portions. I have personally reviewed all images and agree with the resident's interpretation. I have reviewed the emergency nurses triage note. I agree with the chief complaint, past medical history, past surgical history, allergies, medications, social and family history as documented unless otherwise noted below. Documentation of the HPI, Physical Exam and Medical Decision Making performed by medical students or scribes is based on my personal performance of the HPI, PE and MDM.  I personally evaluated and examined the patient in conjunction with the APC and agree with the assessment, treatment plan, and disposition of the patient as recorded by the APC. Additional findings are as noted.     Arcelia Taylor MD  Attending Emergency  Physician              Melody Mejia MD  02/22/23 1701

## 2023-02-22 NOTE — DISCHARGE SUMMARY
[place] [address] Internal Medicine    Discharge Summary     Patient ID: Jimbo Freeman  :  1980   MRN: [id number]     ACCOUNT:  [de-identified]   Patient's PCP: Caleb Alarcon PA-C  Admit Date: 2023   Discharge Date: 2023    Length of Stay: 1  Code Status:  Prior  Admitting Physician: Daryll Gilford, MD  Discharge Physician: Holden Hargrove MD     Active Discharge Diagnoses:     Primary Problem  Angina pectoris [facility])      [facility] Problems    Diagnosis Date Noted    Angina pectoris ([facility] [address] [I20.9] 2023     Priority: Medium       Admission Condition: poor     Discharged Condition: fair    Hospital Stay:     Hospital Course:  Jimbo Freeman is a 38 y.o. female who was admitted for the management of Angina pectoris ([facility] [address] , presented to ER with Chest Pain and Shortness of Breath  The patient is a 38 y.o. Non- / non  female who presents with Chest Pain and Shortness of Breath   and she is admitted to the hospital for the management of chest pain  Patient has multiple medical problems include hypertension, history of polycystic kidney disease, ESRD on dialysis dependent, patient is a peritoneal dialysis  She noticed that her chest pain started 3 days ago, central, in location, intermittent, dull aching, radiating to her left arm, no injury, trauma, fall  No aggravating or relieving factors  Patient is a chronic smoker  Patient was evaluated by cardiologist, her stress test was negative, no further work-up is planned  Patient getting discharged home       Significant therapeutic interventions:     Significant Diagnostic Studies:   Labs / Micro:        ,     Radiology:    XR CHEST PORTABLE    Result Date: 2023  EXAMINATION: ONE XRAY VIEW OF THE CHEST 2023 11:00 pm COMPARISON: None.  HISTORY: ORDERING SYSTEM PROVIDED HISTORY: chest pain TECHNOLOGIST PROVIDED HISTORY: chest pain Reason for Exam: Chest pain Additional signs and symptoms: Chest pain Relevant Medical/Surgical History: Chest pain FINDINGS: Lungs are hyperaerated. The lungs are without acute focal process. There is no effusion or pneumothorax. The cardiomediastinal silhouette is without acute process. The osseous structures are without acute process. No acute process. Small airways disease. NM Cardiac Stress Test Nuclear Imaging    Result Date: 2/17/2023  EXAMINATION: MYOCARDIAL PERFUSION IMAGING 2/17/2023 1:06 pm TECHNIQUE: For the rest study, 38 mCi of Tc-99m labeled sestamibi were injected. SPECT images were acquired. Under cardiology supervision, 0.4 mg Lexiscan was infused. After pharmacologic stress, 15.6 mCi of Tc-99m labeled sestamibi were injected. SPECT images with ECG gating were acquired. COMPARISON: None Available. HISTORY: ORDERING SYSTEM PROVIDED HISTORY: Chest pain TECHNOLOGIST PROVIDED HISTORY: Reason for Exam: Chest pain Procedure Type->Exercise Is the patient pregnant?->No Reason for Exam: Chest pain FINDINGS: There is no evidence for a significant reversible or fixed perfusion defect. The gated images show no wall motion abnormalities. Normal myocardial thickening. Perfusion scores are visually adjusted to account for artifact. Summed stress score:  0 Summed rest score:  0 Summed difference score: 0 Function: End diastolic volume:  11HS Left ventricular ejection fraction:  61% TID score:  0.93 (scores greater than 1.39 are considered elevated for Lexiscan stress with Tc99m) Notes concerning risk stratification: Risk stratification incorporates both clinical history and some testing results. Final risk determination is the responsibility of the ordering provider as other patient information and test results may increase or decrease the risk assessment reported for this examination. Risk stratification criteria are adapted from \"Noninvasive Risk Stratification\" criteria from Pulesa Hunter.   Al, ACC/AATS/AHA/ASE/ASNC/SCAI/SCCT/STS 2017 Appropriate Use Criteria For Coronary Revascularization in Patients With Stable Ischemic Heart Disease [facility] Volume 69, Issue 17, May 2017 High risk (>3% annual death or MI) 1. Severe resting LV dysfunction (LVEF <35%) not readily explained by non coronary causes 2. Resting perfusion abnormalities greater than 10% of the myocardium in patients without prior history or evidence of MI 3. Stress-induced perfusion abnormalities encumbering greater than or equal to 10% myocardium or stress segmental scores indicating multiple vascular territories with abnormalities 4. Stress-induced LV dilatation (TID ratio greater than 1.19 for exercise and greater than 1.39 for regadenoson) Intermediate risk (1% to 3% annual death or MI) 1. Mild/moderate resting LV dysfunction (LVEF 35% to 49%) not readily explained by non coronary causes. 2. Resting perfusion abnormalities in 5%-9.9% of the myocardium in patients without a history or prior evidence of MI 3. Stress-induced perfusion abnormality encumbering 5%-9.9% of the myocardium or stress segmental scores indicating 1 vascular territory with abnormalities but without LV dilation 4. Small wall motion abnormality involving 1-2 segments and only 1 coronary bed. Low Risk (Less than 1% annual death or MI) 1. Normal or small myocardial perfusion defect at rest or with stress encumbering less than 5% of the myocardium. No stress-induced ischemia. No infarct. Normal LVEF. Risk stratification: Low         Consultations:    Consults:     Final Specialist Recommendations/Findings:   IP CONSULT TO INTERNAL MEDICINE  IP CONSULT TO NEPHROLOGY  IP CONSULT TO CARDIOLOGY      The patient was seen and examined on day of discharge and this discharge summary is in conjunction with any daily progress note from day of discharge. Discharge plan:     Disposition: Home    Physician Follow Up:     No follow-up provider specified.      Requiring Further Evaluation/Follow Up POST HOSPITALIZATION/Incidental Findings:    Diet: cardiac diet    Activity: As tolerated    Instructions to Patient:     Discharge Medications:      Medication List        START taking these medications      aspirin 81 MG chewable tablet  Take 1 tablet by mouth daily     atorvastatin 40 MG tablet  Commonly known as: LIPITOR  Take 1 tablet by mouth nightly            CHANGE how you take these medications      DULoxetine 60 MG extended release capsule  Commonly known as: CYMBALTA  take 1 capsule by mouth once daily  What changed: See the new instructions. CONTINUE taking these medications      amitriptyline 25 MG tablet  Commonly known as: ELAVIL  Take 1 tablet by mouth nightly     amLODIPine 10 MG tablet  Commonly known as: NORVASC  Take 1 tablet by mouth daily     hydrALAZINE 50 MG tablet  Commonly known as: APRESOLINE  Take 1 tablet by mouth every 8 hours     hydrOXYzine HCl 50 MG tablet  Commonly known as: ATARAX     metoprolol succinate 50 MG extended release tablet  Commonly known as: TOPROL XL     pantoprazole 40 MG tablet  Commonly known as: PROTONIX  Take 1 tablet by mouth every morning (before breakfast)     PROBIOTIC ACIDOPHILUS PO            STOP taking these medications      lidocaine 4 % external patch               Where to Get Your Medications        These medications were sent to [facility], [address] [address]      Phone: [phone]   aspirin 81 MG chewable tablet  atorvastatin 40 MG tablet         Time Spent on discharge is  35 mins in patient examination, evaluation, counseling as well as medication reconciliation, prescriptions for required medications, discharge plan and follow up. Electronically signed by   Tasneem Ghotra MD  2/22/2023  4:46 PM      Thank you Dr. Justice Boswell PA-C for the opportunity to be involved in this patient's care.

## 2023-02-22 NOTE — H&P
[facility] [address]    HISTORY AND PHYSICAL EXAMINATION            Date:   2/22/2023  Patient name:  Jimbo Freeman  Date of admission:  2/22/2023  4:24 PM  MRN:   [id number]  Account:  [de-identified]  YOB: 1980  PCP:    Caleb Alarcon PA-C  Room:   01/01  Code Status:    Prior    Chief Complaint:     Chief Complaint   Patient presents with    Chest Pain    Shortness of Breath       History Obtained From:     patient, electronic medical record    History of Present Illness: The patient is a 38 y.o. Non- / non  female who presents with Chest Pain and Shortness of Breath   and she is admitted to the hospital for the management of chest pain  Patient, has past medical history multiple medical problem which include chronic smoker, history of polycystic disease s/p peritoneal dialysis, generalized artery disorder, depression, patient complaining of chest pain, which started yesterday, chest pain is constant in nature, radiating to left arm, jaw, no aggravating or relieving factor  No injury, trauma, fall  Patient, was recently admitted at [facility], with chest pain, underwent stress test which was negative.   Echocardiogram was done which was suggestive of a trileaflet aortic valve, with mild aortic insufficiency  Patient in the emergency room, had EKG which was read as normal sinus rhythm no ST-T changes, 2 sets of troponins were done which was flat    Past Medical History:     Past Medical History:   Diagnosis Date    Anxiety     Asthma     Chronic headaches 07/10/2013    CKD (chronic kidney disease) stage 3, GFR 30-59 ml/min ([facility])     Degenerative disc disease, cervical     Depression     Dysmenorrhea 09/30/2014    Eczema 11/08/2012    Hypertension     Hypocalcemia 05/28/2014    Kidney stone     Menorrhagia 09/30/2014    Neck pain, bilateral 05/28/2014    Pelvic pain in female 09/30/2014    Polycystic kidney     Smoker 10/02/2011    Tension vascular headache 01/20/2014        Past Surgical History:     Past Surgical History:   Procedure Laterality Date    BREAST ENHANCEMENT SURGERY      BREAST LUMPECTOMY      left breast    CYSTO/URETERO/PYELOSCOPY, CALCULUS TX Right 6/12/2020    HOLMIUM - CYSTO, RIGHT RETROGRADE PYELOGRAM, RIGHT URETEROSCOPY, LASER LITHO ON STAND BY, RIGHT STENT PLACEMENT performed by Nori Gotti MD at [address]  06/12/2020    DILATION AND CURETTAGE OF UTERUS      x2    IR TUNNELED CATHETER PLACEMENT GREATER THAN 5 YEARS  3/8/2022    IR TUNNELED CATHETER PLACEMENT GREATER THAN 5 YEARS 3/8/2022 [facility] SPECIAL PROCEDURES    LITHOTRIPSY Right 7/23/2020    ESWL EXTRACORPOREAL SHOCK WAVE LITHOTRIPSY performed by Nori Gotti MD at [facility]  07/01/2013    nerve block(right vervical C3/TON/C4/C5    NERVE BLOCK  07/08/2013    nerve block(right vervical C3/TON/C4/C5    OTHER SURGICAL HISTORY  03/10/2014     botox inj     TOE SURGERY      removal of ingrown toe nail-2nd toe on left foot     UPPER GASTROINTESTINAL ENDOSCOPY N/A 3/7/2022    EGD BIOPSY performed by Akira Alexandre MD at [facility] ENDO    URETEROSCOPY Right 06/12/2020    stent placement        Medications Prior to Admission:     Prior to Admission medications    Medication Sig Start Date End Date Taking?  Authorizing Provider   Melatonin 10 MG TABS Take 10 mg by mouth at bedtime   Yes Historical Provider, MD   Lactobacillus (PROBIOTIC ACIDOPHILUS PO) Take 1 tablet by mouth  Patient not taking: No sig reported    Historical Provider, MD   aspirin 81 MG chewable tablet Take 1 tablet by mouth daily 2/18/23   Holden Hargrove MD   atorvastatin (LIPITOR) 40 MG tablet Take 1 tablet by mouth nightly 2/18/23   Holden Hargrove MD   amLODIPine (NORVASC) 10 MG tablet Take 1 tablet by mouth daily 3/10/22   Isai Xie MD   hydrALAZINE (APRESOLINE) 50 MG tablet Take 1 tablet by mouth every 8 hours 3/9/22   Isai Xie MD   pantoprazole (PROTONIX) 40 MG tablet Take 1 tablet by mouth every morning (before breakfast)  Patient not taking: No sig reported 3/10/22   Margarita Tinoco MD   amitriptyline (ELAVIL) 25 MG tablet Take 1 tablet by mouth nightly 11/8/21   Belinda Lagos MD   metoprolol succinate (TOPROL XL) 50 MG extended release tablet Take 50 mg by mouth daily    Historical Provider, MD   hydrOXYzine (ATARAX) 50 MG tablet Take 50 mg by mouth nightly    Historical Provider, MD   DULoxetine (CYMBALTA) 60 MG extended release capsule take 1 capsule by mouth once daily 11/10/17   Marlow Babinski, MD        Allergies:     Bee pollen, Diphenhydramine, Codeine, Dust mite extract, Pcn [penicillins], and Pollen extract    Social History:     Tobacco:    reports that she has been smoking cigarettes. She has a 12.50 pack-year smoking history. She has never used smokeless tobacco.  Alcohol:      reports no history of alcohol use. Drug Use:  reports no history of drug use. Family History:     Family History   Problem Relation Age of Onset    High Blood Pressure Mother     Asthma Sister     Migraines Sister     High Blood Pressure Father     Other Brother         bad knees, with recent total knee replacement        Review of Systems:     Positive and Negative as described in HPI. CONSTITUTIONAL:  negative for fevers, chills, sweats, fatigue, weight loss  HEENT:  negative for vision, hearing changes, runny nose, throat pain  RESPIRATORY:  negative for shortness of breath, cough, congestion, wheezing.   CARDIOVASCULAR: Chest pain, constant in nature,  GASTROINTESTINAL:  negative for nausea, vomiting, diarrhea, constipation, change in bowel habits, abdominal pain   GENITOURINARY:  negative for difficulty of urination, burning with urination, frequency   INTEGUMENT:  negative for rash, skin lesions, easy bruising   HEMATOLOGIC/LYMPHATIC:  negative for swelling/edema   ALLERGIC/IMMUNOLOGIC:  negative for urticaria , itching  ENDOCRINE:  negative increase in drinking, increase in urination, hot or cold intolerance  MUSCULOSKELETAL:  negative joint pains, muscle aches, swelling of joints  NEUROLOGICAL:  negative for headaches, dizziness, lightheadedness, numbness, pain, tingling extremities  BEHAVIOR/PSYCH: Depression  Physical Exam:   BP (!) 145/99   Pulse 97   Temp 97.8 °F (36.6 °C) (Oral)   Resp 13   Ht 5' 5.75\" (1.67 m)   Wt 130 lb (59 kg)   LMP 10/31/2022 (Approximate)   SpO2 98%   BMI 21.14 kg/m²   Temp (24hrs), Av.8 °F (36.6 °C), Min:97.8 °F (36.6 °C), Max:97.8 °F (36.6 °C)    No results for input(s): POCGLU in the last 72 hours. No intake or output data in the 24 hours ending 23 1855    General Appearance:  alert, well appearing, and in no acute distress  Mental status: oriented to person, place, and time with normal affect  Head:  normocephalic, atraumatic. Eye: no icterus, redness, pupils equal and reactive, extraocular eye movements intact, conjunctiva clear  Ear: normal external ear, no discharge, hearing intact  Nose:  no drainage noted  Mouth: mucous membranes moist  Neck: supple, no carotid bruits, thyroid not palpable  Lungs: Bilateral equal air entry, clear to ausculation, no wheezing, rales or rhonchi, normal effort  Cardiovascular: normal rate, regular rhythm, no murmur, gallop, rub.   Abdomen: Soft, nontender, nondistended, normal bowel sounds, no hepatomegaly or splenomegaly, peritoneal dialysis catheter in situ  Neurologic: There are no new focal motor or sensory deficits, normal muscle tone and bulk, no abnormal sensation, normal speech, cranial nerves II through XII grossly intact  Skin: No gross lesions, rashes, bruising or bleeding on exposed skin area  Extremities:  peripheral pulses palpable, no pedal edema or calf pain with palpation  Psych: normal affect     Investigations:      Laboratory Testing:  Recent Results (from the past 24 hour(s))   CBC with Auto Differential    Collection Time: 23  4:49 PM   Result Value Ref Range    WBC 12.4 (H) 3.5 - 11.0 k/uL    RBC 3.72 (L) 4.0 - 5.2 m/uL    Hemoglobin 11.2 (L) 12.0 - 16.0 g/dL    Hematocrit 35.2 (L) 36 - 46 %    MCV 94.4 80 - 100 fL    MCH 30.1 26 - 34 pg    MCHC 31.9 31 - 37 g/dL    RDW 12.7 11.5 - 14.9 %    Platelets 518 (H) 195 - 450 k/uL    MPV 7.6 6.0 - 12.0 fL    Seg Neutrophils 73 (H) 36 - 66 %    Lymphocytes 18 (L) 24 - 44 %    Monocytes 6 1 - 7 %    Eosinophils % 2 0 - 4 %    Basophils 1 0 - 2 %    Segs Absolute 9.20 (H) 1.3 - 9.1 k/uL    Absolute Lymph # 2.20 1.0 - 4.8 k/uL    Absolute Mono # 0.70 0.1 - 1.3 k/uL    Absolute Eos # 0.20 0.0 - 0.4 k/uL    Basophils Absolute 0.10 0.0 - 0.2 k/uL   Troponin    Collection Time: 02/22/23  4:49 PM   Result Value Ref Range    Troponin, High Sensitivity 21 (H) 0 - 14 ng/L   BMP    Collection Time: 02/22/23  4:49 PM   Result Value Ref Range    Glucose 110 (H) 70 - 99 mg/dL    BUN 41 (H) 6 - 20 mg/dL    Creatinine 8.12 (HH) 0.50 - 0.90 mg/dL    Est, Glom Filt Rate 6 (L) >60 mL/min/1.73m2    Calcium 8.7 8.6 - 10.4 mg/dL    Sodium 138 135 - 144 mmol/L    Potassium 4.0 3.7 - 5.3 mmol/L    Chloride 98 98 - 107 mmol/L    CO2 25 20 - 31 mmol/L    Anion Gap 15 9 - 17 mmol/L   Troponin    Collection Time: 02/22/23  5:57 PM   Result Value Ref Range    Troponin, High Sensitivity 21 (H) 0 - 14 ng/L       Imaging/Diagnostics:      Assessment :      Primary Problem  Chest pain    Active Hospital Problems    Diagnosis Date Noted    Chest pain [R07.9] 02/22/2023     Priority: Medium       Plan:     Patient status Admit as inpatient in the  Progressive Unit/Step down    Admitted with chest pain, EKG is okay, 2 sets of troponins are flat, had recent stress test which was negative, patient was loaded with aspirin 324 in the emergency room, patient is already on statins and beta-blocker case discussed with cardiologist Dr. Mark Enriquez, plan noted for cardiac cath tomorrow patient will kept n.p.o. midnight  Patient has peritoneal dialysis catheter, discussed with Dr. Corina Mack regarding need for heparin drip, since patient troponins are flat, recent stress test is negative, plan to continue with subcutaneous heparin 3 times a day since patient is a bleeding risk, having ESRD and peritoneal dialysis catheter,  ESRD on peritoneal dialysis, requesting nephrology consult  Hypertension, restarted home medications, blood pressure is running high  On DVT prophylaxis with heparin  Patient need to quit smoking, counseling provided, she appears to be receptive    Consultations:   [address]    Patient is admitted as inpatient status because of co-morbidities listed above, severity of signs and symptoms as outlined, requirement for current medical therapies and most importantly because of direct risk to patient if care not provided in a hospital setting. Megan Red MD  2/22/2023  6:55 PM    Copy sent to Dr. Jj Bazzi PA-C    Please note that this chart was generated using voice recognition Dragon dictation software. Although every effort was made to ensure the accuracy of this automated transcription, some errors in transcription may have occurred.

## 2023-02-23 ENCOUNTER — HOSPITAL ENCOUNTER (OUTPATIENT)
Dept: CARDIAC CATH/INVASIVE PROCEDURES | Age: 43
Discharge: HOME | End: 2023-02-23
Payer: MEDICAID

## 2023-02-23 VITALS
SYSTOLIC BLOOD PRESSURE: 106 MMHG | RESPIRATION RATE: 16 BRPM | OXYGEN SATURATION: 96 % | HEART RATE: 81 BPM | DIASTOLIC BLOOD PRESSURE: 71 MMHG

## 2023-02-23 LAB
EKG ATRIAL RATE: 99 BPM
EKG P AXIS: 79 DEGREES
EKG P-R INTERVAL: 140 MS
EKG Q-T INTERVAL: 370 MS
EKG QRS DURATION: 80 MS
EKG QTC CALCULATION (BAZETT): 474 MS
EKG R AXIS: -8 DEGREES
EKG T AXIS: 86 DEGREES
EKG VENTRICULAR RATE: 99 BPM

## 2023-02-23 PROCEDURE — 4A023N7 MEASUREMENT OF CARDIAC SAMPLING AND PRESSURE, LEFT HEART, PERCUTANEOUS APPROACH: ICD-10-PCS | Performed by: INTERNAL MEDICINE

## 2023-02-23 PROCEDURE — 6370000000 HC RX 637 (ALT 250 FOR IP): Performed by: NURSE PRACTITIONER

## 2023-02-23 PROCEDURE — 93010 ELECTROCARDIOGRAM REPORT: CPT | Performed by: INTERNAL MEDICINE

## 2023-02-23 PROCEDURE — 7100000010 HC PHASE II RECOVERY - FIRST 15 MIN

## 2023-02-23 PROCEDURE — 2580000003 HC RX 258: Performed by: INTERNAL MEDICINE

## 2023-02-23 PROCEDURE — 6360000002 HC RX W HCPCS: Performed by: INTERNAL MEDICINE

## 2023-02-23 PROCEDURE — 99152 MOD SED SAME PHYS/QHP 5/>YRS: CPT

## 2023-02-23 PROCEDURE — C1892 INTRO/SHEATH,FIXED,PEEL-AWAY: HCPCS

## 2023-02-23 PROCEDURE — 7100000011 HC PHASE II RECOVERY - ADDTL 15 MIN

## 2023-02-23 PROCEDURE — 2500000003 HC RX 250 WO HCPCS

## 2023-02-23 PROCEDURE — 93005 ELECTROCARDIOGRAM TRACING: CPT | Performed by: STUDENT IN AN ORGANIZED HEALTH CARE EDUCATION/TRAINING PROGRAM

## 2023-02-23 PROCEDURE — 2060000000 HC ICU INTERMEDIATE R&B

## 2023-02-23 PROCEDURE — 99239 HOSP IP/OBS DSCHRG MGMT >30: CPT | Performed by: INTERNAL MEDICINE

## 2023-02-23 PROCEDURE — 6370000000 HC RX 637 (ALT 250 FOR IP): Performed by: INTERNAL MEDICINE

## 2023-02-23 PROCEDURE — 2709999900 HC NON-CHARGEABLE SUPPLY

## 2023-02-23 PROCEDURE — C1760 CLOSURE DEV, VASC: HCPCS

## 2023-02-23 PROCEDURE — C1894 INTRO/SHEATH, NON-LASER: HCPCS

## 2023-02-23 PROCEDURE — C1769 GUIDE WIRE: HCPCS

## 2023-02-23 PROCEDURE — B2111ZZ FLUOROSCOPY OF MULTIPLE CORONARY ARTERIES USING LOW OSMOLAR CONTRAST: ICD-10-PCS | Performed by: INTERNAL MEDICINE

## 2023-02-23 PROCEDURE — 6360000002 HC RX W HCPCS

## 2023-02-23 PROCEDURE — 93458 L HRT ARTERY/VENTRICLE ANGIO: CPT

## 2023-02-23 PROCEDURE — 6360000004 HC RX CONTRAST MEDICATION

## 2023-02-23 PROCEDURE — B2151ZZ FLUOROSCOPY OF LEFT HEART USING LOW OSMOLAR CONTRAST: ICD-10-PCS | Performed by: INTERNAL MEDICINE

## 2023-02-23 RX ORDER — SODIUM CHLORIDE 0.9 % (FLUSH) 0.9 %
5-40 SYRINGE (ML) INJECTION EVERY 12 HOURS SCHEDULED
Status: DISCONTINUED | OUTPATIENT
Start: 2023-02-23 | End: 2023-02-24 | Stop reason: HOSPADM

## 2023-02-23 RX ORDER — OXYCODONE HYDROCHLORIDE AND ACETAMINOPHEN 5; 325 MG/1; MG/1
1 TABLET ORAL EVERY 4 HOURS PRN
Status: DISCONTINUED | OUTPATIENT
Start: 2023-02-23 | End: 2023-02-24 | Stop reason: HOSPADM

## 2023-02-23 RX ORDER — SODIUM CHLORIDE 0.9 % (FLUSH) 0.9 %
5-40 SYRINGE (ML) INJECTION PRN
Status: DISCONTINUED | OUTPATIENT
Start: 2023-02-23 | End: 2023-02-24 | Stop reason: HOSPADM

## 2023-02-23 RX ORDER — SODIUM CHLORIDE 9 MG/ML
INJECTION, SOLUTION INTRAVENOUS PRN
Status: DISCONTINUED | OUTPATIENT
Start: 2023-02-23 | End: 2023-02-24 | Stop reason: HOSPADM

## 2023-02-23 RX ADMIN — DULOXETINE HYDROCHLORIDE 60 MG: 60 CAPSULE, DELAYED RELEASE ORAL at 09:47

## 2023-02-23 RX ADMIN — SODIUM CHLORIDE, SODIUM LACTATE, CALCIUM CHLORIDE, MAGNESIUM CHLORIDE AND DEXTROSE 2000 ML: 2.5; 538; 448; 18.3; 5.08 INJECTION, SOLUTION INTRAPERITONEAL at 22:20

## 2023-02-23 RX ADMIN — OXYCODONE HYDROCHLORIDE AND ACETAMINOPHEN 1 TABLET: 5; 325 TABLET ORAL at 22:16

## 2023-02-23 RX ADMIN — SODIUM CHLORIDE, SODIUM LACTATE, CALCIUM CHLORIDE, MAGNESIUM CHLORIDE AND DEXTROSE 2000 ML: 2.5; 538; 448; 18.3; 5.08 INJECTION, SOLUTION INTRAPERITONEAL at 00:54

## 2023-02-23 RX ADMIN — SODIUM CHLORIDE, SODIUM LACTATE, CALCIUM CHLORIDE, MAGNESIUM CHLORIDE AND DEXTROSE 2000 ML: 2.5; 538; 448; 18.3; 5.08 INJECTION, SOLUTION INTRAPERITONEAL at 13:00

## 2023-02-23 RX ADMIN — SODIUM CHLORIDE, SODIUM LACTATE, CALCIUM CHLORIDE, MAGNESIUM CHLORIDE AND DEXTROSE 2000 ML: 2.5; 538; 448; 18.3; 5.08 INJECTION, SOLUTION INTRAPERITONEAL at 09:50

## 2023-02-23 RX ADMIN — FENTANYL CITRATE 50 MCG: 0.05 INJECTION, SOLUTION INTRAMUSCULAR; INTRAVENOUS at 06:20

## 2023-02-23 RX ADMIN — FENTANYL CITRATE 50 MCG: 0.05 INJECTION, SOLUTION INTRAMUSCULAR; INTRAVENOUS at 10:28

## 2023-02-23 RX ADMIN — SODIUM CHLORIDE, SODIUM LACTATE, CALCIUM CHLORIDE, MAGNESIUM CHLORIDE AND DEXTROSE 2000 ML: 2.5; 538; 448; 18.3; 5.08 INJECTION, SOLUTION INTRAPERITONEAL at 18:57

## 2023-02-23 RX ADMIN — FENTANYL CITRATE 50 MCG: 0.05 INJECTION, SOLUTION INTRAMUSCULAR; INTRAVENOUS at 18:56

## 2023-02-23 RX ADMIN — HYDRALAZINE HYDROCHLORIDE 50 MG: 50 TABLET, FILM COATED ORAL at 21:31

## 2023-02-23 RX ADMIN — AMITRIPTYLINE HYDROCHLORIDE 25 MG: 25 TABLET, FILM COATED ORAL at 21:31

## 2023-02-23 RX ADMIN — HYDROXYZINE HYDROCHLORIDE 50 MG: 25 TABLET, FILM COATED ORAL at 21:31

## 2023-02-23 RX ADMIN — METOPROLOL SUCCINATE 50 MG: 50 TABLET, EXTENDED RELEASE ORAL at 09:47

## 2023-02-23 RX ADMIN — HEPARIN SODIUM 5000 UNITS: 5000 INJECTION INTRAVENOUS; SUBCUTANEOUS at 21:32

## 2023-02-23 RX ADMIN — ASPIRIN 81 MG: 81 TABLET, CHEWABLE ORAL at 09:46

## 2023-02-23 RX ADMIN — ATORVASTATIN CALCIUM 40 MG: 40 TABLET, FILM COATED ORAL at 21:31

## 2023-02-23 RX ADMIN — Medication 9 MG: at 21:32

## 2023-02-23 RX ADMIN — FENTANYL CITRATE 50 MCG: 0.05 INJECTION, SOLUTION INTRAMUSCULAR; INTRAVENOUS at 01:58

## 2023-02-23 RX ADMIN — AMLODIPINE BESYLATE 10 MG: 10 TABLET ORAL at 09:46

## 2023-02-23 ASSESSMENT — PAIN SCALES - GENERAL
PAINLEVEL_OUTOF10: 10
PAINLEVEL_OUTOF10: 7
PAINLEVEL_OUTOF10: 8
PAINLEVEL_OUTOF10: 7
PAINLEVEL_OUTOF10: 9
PAINLEVEL_OUTOF10: 8

## 2023-02-23 ASSESSMENT — PAIN DESCRIPTION - LOCATION
LOCATION: GROIN;LEG
LOCATION: CHEST
LOCATION: GROIN
LOCATION: CHEST
LOCATION: GROIN;LEG

## 2023-02-23 ASSESSMENT — PAIN DESCRIPTION - DIRECTION: RADIATING_TOWARDS: DOWN L LEG

## 2023-02-23 ASSESSMENT — PAIN DESCRIPTION - ORIENTATION
ORIENTATION: RIGHT
ORIENTATION: RIGHT
ORIENTATION: MID
ORIENTATION: RIGHT

## 2023-02-23 ASSESSMENT — PAIN DESCRIPTION - ONSET: ONSET: ON-GOING

## 2023-02-23 ASSESSMENT — PAIN DESCRIPTION - DESCRIPTORS
DESCRIPTORS: SHARP;SHOOTING
DESCRIPTORS: SHARP
DESCRIPTORS: SHARP;SHOOTING

## 2023-02-23 ASSESSMENT — PAIN DESCRIPTION - FREQUENCY: FREQUENCY: CONTINUOUS

## 2023-02-23 NOTE — PROGRESS NOTES
Patient admitted, consent signed and questions answered. Patient ready for procedure. Call light to reach with side rails up 2 of 2. Bilaeral groins clipped with writer and Alejandra Never present. No family at bedside with patient. History and physical updated.

## 2023-02-23 NOTE — PROGRESS NOTES
MARIAM [address] 51    HISTORY AND PHYSICAL EXAMINATION            Date:   2/23/2023  Patient name:  Antwon Villareal  Date of admission:  2/22/2023  4:24 PM  MRN:   [id number]  Account:  [de-identified]  YOB: 1980  PCP:    Justice Boswell PA-C  Room:   2085/2085-01  Code Status:    Full Code    Chief Complaint:     Chief Complaint   Patient presents with    Chest Pain    Shortness of Breath       History Obtained From:     patient, electronic medical record    History of Present Illness: The patient is a 38 y.o. Non- / non  female who presents with Chest Pain and Shortness of Breath   and she is admitted to the hospital for the management of chest pain  Patient, has past medical history multiple medical problem which include chronic smoker, history of polycystic disease s/p peritoneal dialysis, generalized artery disorder, depression, patient complaining of chest pain, which started yesterday, chest pain is constant in nature, radiating to left arm, jaw, no aggravating or relieving factor  No injury, trauma, fall  Patient, was recently admitted at [facility], with chest pain, underwent stress test which was negative.   Echocardiogram was done which was suggestive of a trileaflet aortic valve, with mild aortic insufficiency  Patient in the emergency room, had EKG which was read as normal sinus rhythm no ST-T changes, 2 sets of troponins were done which was flat  2/23   Patient, clinically doing better  Still having chest pain  EKG done in the morning ,  normal sinus rhythm no ST-T changes  Past Medical History:     Past Medical History:   Diagnosis Date    Anxiety     Asthma     Chronic headaches 07/10/2013    CKD (chronic kidney disease) stage 3, GFR 30-59 ml/min ([facility])     Degenerative disc disease, cervical     Depression     Dysmenorrhea 09/30/2014    Eczema 11/08/2012    Hypertension     Hypocalcemia 05/28/2014    Kidney stone Menorrhagia 09/30/2014    Neck pain, bilateral 05/28/2014    Pelvic pain in female 09/30/2014    Polycystic kidney     Smoker 10/02/2011    Tension vascular headache 01/20/2014        Past Surgical History:     Past Surgical History:   Procedure Laterality Date    BREAST ENHANCEMENT SURGERY      BREAST LUMPECTOMY      left breast    CYSTO/URETERO/PYELOSCOPY, CALCULUS TX Right 6/12/2020    HOLMIUM - CYSTO, RIGHT RETROGRADE PYELOGRAM, RIGHT URETEROSCOPY, LASER LITHO ON STAND BY, RIGHT STENT PLACEMENT performed by Vania Pandya MD at [facility]  06/12/2020    DILATION AND CURETTAGE OF UTERUS      x2    IR TUNNELED CATHETER PLACEMENT GREATER THAN 5 YEARS  3/8/2022    IR TUNNELED CATHETER PLACEMENT GREATER THAN 5 YEARS 3/8/2022 [facility] PROCEDURES    LITHOTRIPSY Right 7/23/2020    ESWL EXTRACORPOREAL SHOCK WAVE LITHOTRIPSY performed by Vania Pandya MD at [place] [address]  07/01/2013    nerve block(right vervical C3/TON/C4/C5    NERVE BLOCK  07/08/2013    nerve block(right vervical C3/TON/C4/C5    OTHER SURGICAL HISTORY  03/10/2014     botox inj     TOE SURGERY      removal of ingrown toe nail-2nd toe on left foot     UPPER GASTROINTESTINAL ENDOSCOPY N/A 3/7/2022    EGD BIOPSY performed by Jarret Jean MD at [facility]    URETEROSCOPY Right 06/12/2020    stent placement        Medications Prior to Admission:     Prior to Admission medications    Medication Sig Start Date End Date Taking?  Authorizing Provider   Melatonin 10 MG TABS Take 10 mg by mouth at bedtime   Yes Historical Provider, MD   Lactobacillus (PROBIOTIC ACIDOPHILUS PO) Take 1 tablet by mouth  Patient not taking: No sig reported    Historical Provider, MD   aspirin 81 MG chewable tablet Take 1 tablet by mouth daily 2/18/23   Liya Lutz MD   atorvastatin (LIPITOR) 40 MG tablet Take 1 tablet by mouth nightly 2/18/23   Liya Lutz MD   amLODIPine (NORVASC) 10 MG tablet Take 1 tablet by mouth daily 3/10/22   Chas Omer MD   hydrALAZINE (APRESOLINE) 50 MG tablet Take 1 tablet by mouth every 8 hours 3/9/22   Be Castillo MD   pantoprazole (PROTONIX) 40 MG tablet Take 1 tablet by mouth every morning (before breakfast)  Patient not taking: No sig reported 3/10/22   Be Castillo MD   amitriptyline (ELAVIL) 25 MG tablet Take 1 tablet by mouth nightly 11/8/21   Rashaun Gibbons MD   metoprolol succinate (TOPROL XL) 50 MG extended release tablet Take 50 mg by mouth daily    Historical Provider, MD   hydrOXYzine (ATARAX) 50 MG tablet Take 50 mg by mouth nightly    Historical Provider, MD   DULoxetine (CYMBALTA) 60 MG extended release capsule take 1 capsule by mouth once daily 11/10/17   Janet Matamoros MD        Allergies:     Bee pollen, Diphenhydramine, Codeine, Dust mite extract, Pcn [penicillins], and Pollen extract    Social History:     Tobacco:    reports that she has been smoking cigarettes. She has a 12.50 pack-year smoking history. She has never used smokeless tobacco.  Alcohol:      reports no history of alcohol use. Drug Use:  reports no history of drug use. Family History:     Family History   Problem Relation Age of Onset    High Blood Pressure Mother     Asthma Sister     Migraines Sister     High Blood Pressure Father     Other Brother         bad knees, with recent total knee replacement        Review of Systems:     Positive and Negative as described in HPI. CONSTITUTIONAL:  negative for fevers, chills, sweats, fatigue, weight loss  HEENT:  negative for vision, hearing changes, runny nose, throat pain  RESPIRATORY:  negative for shortness of breath, cough, congestion, wheezing.   CARDIOVASCULAR: Chest pain, constant in nature,  GASTROINTESTINAL:  negative for nausea, vomiting, diarrhea, constipation, change in bowel habits, abdominal pain   GENITOURINARY:  negative for difficulty of urination, burning with urination, frequency   INTEGUMENT:  negative for rash, skin lesions, easy bruising   HEMATOLOGIC/LYMPHATIC: negative for swelling/edema   ALLERGIC/IMMUNOLOGIC:  negative for urticaria , itching  ENDOCRINE:  negative increase in drinking, increase in urination, hot or cold intolerance  MUSCULOSKELETAL:  negative joint pains, muscle aches, swelling of joints  NEUROLOGICAL:  negative for headaches, dizziness, lightheadedness, numbness, pain, tingling extremities  BEHAVIOR/PSYCH: Depression  Physical Exam:   /80   Pulse 94   Temp 98.7 °F (37.1 °C) (Oral)   Resp 15   Ht 5' 5\" (1.651 m)   Wt 130 lb (59 kg)   LMP 10/31/2022 (Approximate)   SpO2 97%   BMI 21.63 kg/m²   Temp (24hrs), Av.4 °F (36.9 °C), Min:97.8 °F (36.6 °C), Max:98.7 °F (37.1 °C)    No results for input(s): POCGLU in the last 72 hours. Intake/Output Summary (Last 24 hours) at 2023 1131  Last data filed at 2023 1007  Gross per 24 hour   Intake 4000 ml   Output 3665 ml   Net 335 ml       General Appearance:  alert, well appearing, and in no acute distress  Mental status: oriented to person, place, and time with normal affect  Head:  normocephalic, atraumatic. Eye: no icterus, redness, pupils equal and reactive, extraocular eye movements intact, conjunctiva clear  Ear: normal external ear, no discharge, hearing intact  Nose:  no drainage noted  Mouth: mucous membranes moist  Neck: supple, no carotid bruits, thyroid not palpable  Lungs: Bilateral equal air entry, clear to ausculation, no wheezing, rales or rhonchi, normal effort  Cardiovascular: normal rate, regular rhythm, no murmur, gallop, rub.   Abdomen: Soft, nontender, nondistended, normal bowel sounds, no hepatomegaly or splenomegaly, peritoneal dialysis catheter in situ  Neurologic: There are no new focal motor or sensory deficits, normal muscle tone and bulk, no abnormal sensation, normal speech, cranial nerves II through XII grossly intact  Skin: No gross lesions, rashes, bruising or bleeding on exposed skin area  Extremities:  peripheral pulses palpable, no pedal edema or calf pain with palpation  Psych: normal affect     Investigations:      Laboratory Testing:  Recent Results (from the past 24 hour(s))   CBC with Auto Differential    Collection Time: 02/22/23  4:49 PM   Result Value Ref Range    WBC 12.4 (H) 3.5 - 11.0 k/uL    RBC 3.72 (L) 4.0 - 5.2 m/uL    Hemoglobin 11.2 (L) 12.0 - 16.0 g/dL    Hematocrit 35.2 (L) 36 - 46 %    MCV 94.4 80 - 100 fL    MCH 30.1 26 - 34 pg    MCHC 31.9 31 - 37 g/dL    RDW 12.7 11.5 - 14.9 %    Platelets 879 (H) 678 - 450 k/uL    MPV 7.6 6.0 - 12.0 fL    Seg Neutrophils 73 (H) 36 - 66 %    Lymphocytes 18 (L) 24 - 44 %    Monocytes 6 1 - 7 %    Eosinophils % 2 0 - 4 %    Basophils 1 0 - 2 %    Segs Absolute 9.20 (H) 1.3 - 9.1 k/uL    Absolute Lymph # 2.20 1.0 - 4.8 k/uL    Absolute Mono # 0.70 0.1 - 1.3 k/uL    Absolute Eos # 0.20 0.0 - 0.4 k/uL    Basophils Absolute 0.10 0.0 - 0.2 k/uL   Troponin    Collection Time: 02/22/23  4:49 PM   Result Value Ref Range    Troponin, High Sensitivity 21 (H) 0 - 14 ng/L   BMP    Collection Time: 02/22/23  4:49 PM   Result Value Ref Range    Glucose 110 (H) 70 - 99 mg/dL    BUN 41 (H) 6 - 20 mg/dL    Creatinine 8.12 (HH) 0.50 - 0.90 mg/dL    Est, Glom Filt Rate 6 (L) >60 mL/min/1.73m2    Calcium 8.7 8.6 - 10.4 mg/dL    Sodium 138 135 - 144 mmol/L    Potassium 4.0 3.7 - 5.3 mmol/L    Chloride 98 98 - 107 mmol/L    CO2 25 20 - 31 mmol/L    Anion Gap 15 9 - 17 mmol/L   Troponin    Collection Time: 02/22/23  5:57 PM   Result Value Ref Range    Troponin, High Sensitivity 21 (H) 0 - 14 ng/L   EKG 12 Lead    Collection Time: 02/23/23 10:11 AM   Result Value Ref Range    Ventricular Rate 76 BPM    Atrial Rate 76 BPM    P-R Interval 154 ms    QRS Duration 86 ms    Q-T Interval 462 ms    QTc Calculation (Bazett) 519 ms    P Axis 64 degrees    R Axis 4 degrees    T Axis 73 degrees       Imaging/Diagnostics:      Assessment :      Primary Problem  Chest pain    Active Hospital Problems    Diagnosis Date Noted    Chest pain [R07.9] 02/22/2023     Priority: Medium       Plan:     Patient status Admit as inpatient in the  Progressive Unit/Step down    Admitted with chest pain, EKG is okay, 2 sets of troponins are flat, had recent stress test which was negative, patient was loaded with aspirin 324 in the emergency room, patient is already on statins and beta-blocker case discussed with cardiologist Dr. Jodi Boyd, plan noted for cardiac cath tomorrow patient will kept n.p.o. midnight  Patient has peritoneal dialysis catheter, discussed with Dr. Jodi Boyd regarding need for heparin drip, since patient troponins are flat, recent stress test is negative, plan to continue with subcutaneous heparin 3 times a day since patient is a bleeding risk, having ESRD and peritoneal dialysis catheter,  ESRD on peritoneal dialysis, requesting nephrology consult  Hypertension, restarted home medications, blood pressure is running high  On DVT prophylaxis with heparin  Patient need to quit smoking, counseling provided, she appears to be receptive  2/23  Plan noted for cardiac catheter today  Patient is still symptomatic  EKG is negative  Evaluated by nephro and cardio  DC plan after cardiac cath  Consultations:   IP CONSULT TO CARDIOLOGY  IP CONSULT TO INTERNAL MEDICINE  IP CONSULT TO NEPHROLOGY    Patient is admitted as inpatient status because of co-morbidities listed above, severity of signs and symptoms as outlined, requirement for current medical therapies and most importantly because of direct risk to patient if care not provided in a hospital setting. Ariella Baez MD  2/23/2023  11:31 AM    Copy sent to Dr. Burton Eisenmenger, PA-C    Please note that this chart was generated using voice recognition Dragon dictation software. Although every effort was made to ensure the accuracy of this automated transcription, some errors in transcription may have occurred.

## 2023-02-23 NOTE — H&P
Attestation signed by      Attending Physician Statement:    I have discussed the care of  Sunshine Rivera , including pertinent history and exam findings, with the Cardiology fellow/resident. I have seen and examined the patient and the key elements of all parts of the encounter have been performed by me. I agree with the assessment, plan and orders as documented by the fellow/resident, after I modified exam findings and plan of treatments, and the final version is my approved version of the assessment. Additional Comments: [facility]    Consult / H&P               Today's Date: 2/23/2023  Patient Name: Sunshine Rivera  Date of admission: No admission date for patient encounter. Patient's age: 38 y.o., 1980  Admission Dx: No admission diagnoses are documented for this encounter. Reason for Consult:  Cardiac evaluation    Requesting Physician: No admitting provider for patient encounter.     CHIEF COMPLAINT:  Chest pain     History Obtained From:  patient    HISTORY OF PRESENT ILLNESS:      Admission 2/22/2023 left-sided chest pain like heaviness radiating to the neck, left arm, troponin high-sensitivity 21, borderline T wave abnormalities lead I aVL  Admission 2/16/2023 chest pain like a pressure lasting for more than 1 hour shortness of breath no palpitation on and off for the last 4 days  No obvious ischemic ECG changes, negative Lexiscan Myoview stress test, ejection fraction 60%  Normal LV size, wall thickness and wall motion, 2D echo 2/17/2023  Moderate aortic regurgitation, no tricuspid aortic valve  Hypertension  Hyperlipidemia  Anemia of chronic renal disease       Past Medical History:   has a past medical history of Anxiety, Asthma, Chronic headaches, CKD (chronic kidney disease) stage 3, GFR 30-59 ml/min ([facility]), Degenerative disc disease, cervical, Depression, Dysmenorrhea, Eczema, Hypertension, Hypocalcemia, Kidney stone, Menorrhagia, Neck pain, bilateral, Pelvic pain in female, Polycystic kidney, Smoker, and Tension vascular headache. Past Surgical History:   has a past surgical history that includes Dilation and curettage of uterus; Breast lumpectomy; Nerve Block (07/01/2013); Nerve Block (07/08/2013); other surgical history (03/10/2014 ); Toe Surgery; Breast enhancement surgery; Cystoscopy (06/12/2020); Ureteroscopy (Right, 06/12/2020); cysto/uretero/pyeloscopy, calculus tx (Right, 6/12/2020); Lithotripsy (Right, 7/23/2020); Upper gastrointestinal endoscopy (N/A, 3/7/2022); and IR TUNNELED CVC PLACE WO SQ PORT/PUMP > 5 YEARS (3/8/2022). Home Medications:    Prior to Admission medications    Medication Sig Start Date End Date Taking?  Authorizing Provider   Melatonin 10 MG TABS Take 10 mg by mouth at bedtime    Historical Provider, MD   Lactobacillus (PROBIOTIC ACIDOPHILUS PO) Take 1 tablet by mouth  Patient not taking: No sig reported    Historical Provider, MD   aspirin 81 MG chewable tablet Take 1 tablet by mouth daily 2/18/23   Jamil Hanks MD   atorvastatin (LIPITOR) 40 MG tablet Take 1 tablet by mouth nightly 2/18/23   Jamil Hanks MD   amLODIPine (NORVASC) 10 MG tablet Take 1 tablet by mouth daily 3/10/22   Brendolyn Rinne, MD   hydrALAZINE (APRESOLINE) 50 MG tablet Take 1 tablet by mouth every 8 hours 3/9/22   Brendolyn Rinne, MD   pantoprazole (PROTONIX) 40 MG tablet Take 1 tablet by mouth every morning (before breakfast)  Patient not taking: No sig reported 3/10/22   Brendolyn Rinne, MD   amitriptyline (ELAVIL) 25 MG tablet Take 1 tablet by mouth nightly 11/8/21   Margo Blizzard, MD   metoprolol succinate (TOPROL XL) 50 MG extended release tablet Take 50 mg by mouth daily    Historical Provider, MD   hydrOXYzine (ATARAX) 50 MG tablet Take 50 mg by mouth nightly    Historical Provider, MD   DULoxetine (CYMBALTA) 60 MG extended release capsule take 1 capsule by mouth once daily 11/10/17   José Miguel Cavazos MD      No current facility-administered medications for this encounter. Facility-Administered Medications Ordered in Other Encounters: fentaNYL (SUBLIMAZE) injection 50 mcg, 50 mcg, IntraVENous, Q4H PRN  amitriptyline (ELAVIL) tablet 25 mg, 25 mg, Oral, Nightly  amLODIPine (NORVASC) tablet 10 mg, 10 mg, Oral, Daily  aspirin chewable tablet 81 mg, 81 mg, Oral, Daily  atorvastatin (LIPITOR) tablet 40 mg, 40 mg, Oral, Nightly  DULoxetine (CYMBALTA) extended release capsule 60 mg, 60 mg, Oral, Daily  hydrALAZINE (APRESOLINE) tablet 50 mg, 50 mg, Oral, 3 times per day  hydrOXYzine HCl (ATARAX) tablet 50 mg, 50 mg, Oral, Nightly  melatonin tablet 9 mg, 9 mg, Oral, Nightly  metoprolol succinate (TOPROL XL) extended release tablet 50 mg, 50 mg, Oral, Daily  heparin (porcine) injection 5,000 Units, 5,000 Units, SubCUTAneous, 3 times per day  dianeal lo-raoul (ULTRABAG) 2.5% 2,000 mL, 2,000 mL, IntraPERitoneal, 4x daily    Allergies:  Bee pollen, Diphenhydramine, Codeine, Dust mite extract, Pcn [penicillins], and Pollen extract    Social History:   reports that she has been smoking cigarettes. She has a 12.50 pack-year smoking history. She has never used smokeless tobacco. She reports that she does not drink alcohol and does not use drugs. Family History: family history includes Asthma in her sister; High Blood Pressure in her father and mother; Migraines in her sister; Other in her brother. No h/o sudden cardiac death. No for premature CAD    REVIEW OF SYSTEMS:    Constitutional: there has been no unanticipated weight loss. There's been No change in energy level, No change in activity level. Eyes: No visual changes or diplopia. No scleral icterus. ENT: No Headaches  Cardiovascular: No cardiac history  Respiratory: No previous pulmonary problems, No cough  Gastrointestinal: No abdominal pain. No change in bowel or bladder habits. Genitourinary: No dysuria, trouble voiding, or hematuria.   Musculoskeletal:  No gait disturbance, No weakness or joint complaints. Integumentary: No rash or pruritis. Neurological: No headache, diplopia, change in muscle strength, numbness or tingling. No change in gait, balance, coordination, mood, affect, memory, mentation, behavior. Psychiatric: No anxiety, or depression. Endocrine: No temperature intolerance. No excessive thirst, fluid intake, or urination. No tremor. Hematologic/Lymphatic: No abnormal bruising or bleeding, blood clots or swollen lymph nodes. Allergic/Immunologic: No nasal congestion or hives. PHYSICAL EXAM:      There were no vitals taken for this visit. Constitutional and General Appearance: alert, cooperative, no distress and appears stated age  [de-identified]: PERRL, no cervical lymphadenopathy. No masses palpable. Normal oral mucosa  Respiratory:  Normal excursion and expansion without use of accessory muscles  Resp Auscultation: Good respiratory effort. No for increased work of breathing. On auscultation: clear to auscultation bilaterally  Cardiovascular: The apical impulse is not displaced  Heart tones are crisp and normal. regular S1 and S2.  Jugular venous pulsation Normal  The carotid upstroke is normal in amplitude and contour without delay or bruit  Peripheral pulses are symmetrical and full   Abdomen:   No masses or tenderness  Bowel sounds present  Extremities:   No Cyanosis or Clubbing   Lower extremity edema: No   Skin: Warm and dry  Neurological:  Alert and oriented. Moves all extremities well  No abnormalities of mood, affect, memory, mentation, or behavior are noted      Labs:     CBC:   Recent Labs     02/22/23  1649   WBC 12.4*   HGB 11.2*   HCT 35.2*   *     BMP:   Recent Labs     02/22/23  1649      K 4.0   CO2 25   BUN 41*   CREATININE 8.12*   LABGLOM 6*   GLUCOSE 110*     BNP: No results for input(s): BNP in the last 72 hours. PT/INR: No results for input(s): PROTIME, INR in the last 72 hours. APTT:No results for input(s): APTT in the last 72 hours.   CARDIAC ENZYMES:No results for input(s): CKTOTAL, CKMB, CKMBINDEX, TROPONINI in the last 72 hours. FASTING LIPID PANEL:  Lab Results   Component Value Date/Time    HDL 38 02/20/2017 12:00 AM    LDLCALC 121 02/20/2017 12:00 AM    TRIG 214 02/20/2017 12:00 AM     LIVER PROFILE:No results for input(s): AST, ALT, LABALBU in the last 72 hours.     IMPRESSION:    Patient Active Problem List   Diagnosis    Asthma    Smoker    Polycystic kidney    Eczema    Depression with anxiety    Chronic headaches    Tension vascular headache    Irregular bleeding    Metrorrhagia    Menorrhagia    Dysmenorrhea    Pelvic pain in female    Kidney stone    Acute back pain    Anxiety    Hypertension    Headache    Depression    CKD (chronic kidney disease) stage 3, GFR 30-59 ml/min ([facility])    Chronic neck pain    Degenerative disc disease, cervical    Encounter for long-term (current) use of other medications    Cervicalgia    Chronic intractable headache    Hemorrhage of cyst of native kidney    Abdominal pain    CKD (chronic kidney disease) stage 4, GFR 15-29 ml/min ([facility])    Acute renal failure with acute tubular necrosis superimposed on stage 3 chronic kidney disease (HCC)    Allergic rhinitis due to animal hair and dander    Congenital multiple renal cysts    Gross hematuria    History of anemia due to chronic kidney disease    History of multiple allergies    Hyperlipidemia    IUD (intrauterine device) in place    Leukocytosis    Other specified disorders of kidney and ureter    Pain in joint    Recurrent major depression in full remission ([address]    Renovascular hypertension    Right ureteral stone    Right nephrolithiasis    Flank pain    Acute kidney injury superimposed on chronic kidney disease (HCC)    Urinary tract infection with hematuria    History of major depression    Iron deficiency anemia due to chronic blood loss    Hypernatremia    SILVANO (acute kidney injury) ([address]    Dysuria    Acute kidney injury superimposed on CKD ([address] Pyelonephritis    Hypertensive emergency    Abdominal pain, epigastric    Abdominal pain, right upper quadrant    Nausea    Gastroesophageal reflux disease    Angina pectoris (HCC)    Chest pain   Admission with this severe left upper chest pain radiating to the neck and left arm  High-sensitivity troponin 21  ECG showed sinus rhythm  Hypertension, renal failure, history of smoking  Multiple cardiac risk factors       RECOMMENDATIONS:  Proceed with C   Follow post cath orders     Pre Procedure Conscious Sedation Data:  ASA Class:    [] I [x] II [] III [] IV    Mallampati Class:  [x] I [] II [] III [] IV        Discussed with patient and Nurse.     Electronically signed by Marleen Chase MD on 2/23/2023 at 1:29 PM    [facility]      [phone]

## 2023-02-23 NOTE — PLAN OF CARE
Problem: Discharge Planning  Goal: Discharge to home or other facility with appropriate resources  2/23/2023 0535 by Janet Hampton RN  Outcome: Progressing  Flowsheets (Taken 2/23/2023 8216)  Discharge to home or other facility with appropriate resources:   Identify barriers to discharge with patient and caregiver   Arrange for needed discharge resources and transportation as appropriate   Arrange for interpreters to assist at discharge as needed   Refer to discharge planning if patient needs post-hospital services based on physician order or complex needs related to functional status, cognitive ability or social support system  2/23/2023 0535 by Janet Hampton RN  Outcome: Progressing  Flowsheets (Taken 2/23/2023 0535)  Discharge to home or other facility with appropriate resources:   Identify barriers to discharge with patient and caregiver   Arrange for needed discharge resources and transportation as appropriate   Arrange for interpreters to assist at discharge as needed   Refer to discharge planning if patient needs post-hospital services based on physician order or complex needs related to functional status, cognitive ability or social support system     Problem: Pain  Goal: Verbalizes/displays adequate comfort level or baseline comfort level  2/23/2023 0535 by Janet Hampton RN  Outcome: Progressing  Flowsheets (Taken 2/23/2023 0535)  Verbalizes/displays adequate comfort level or baseline comfort level:   Encourage patient to monitor pain and request assistance   Assess pain using appropriate pain scale   Administer analgesics based on type and severity of pain and evaluate response   Implement non-pharmacological measures as appropriate and evaluate response   Consider cultural and social influences on pain and pain management   Notify Licensed Independent Practitioner if interventions unsuccessful or patient reports new pain  2/23/2023 0535 by Janet Hampton RN  Outcome: Progressing  Flowsheets (Taken 2/23/2023 0535)  Verbalizes/displays adequate comfort level or baseline comfort level:   Encourage patient to monitor pain and request assistance   Assess pain using appropriate pain scale   Administer analgesics based on type and severity of pain and evaluate response   Implement non-pharmacological measures as appropriate and evaluate response   Consider cultural and social influences on pain and pain management   Notify Licensed Independent Practitioner if interventions unsuccessful or patient reports new pain     Problem: Safety - Adult  Goal: Free from fall injury  2/23/2023 0535 by Ángel Verduzco RN  Outcome: Progressing  Note: Pt free from falls or injury this shift. Up ad adrian per self. Steady gait. Instructed to call staff if need assistance.  Will cont to monitor  2/23/2023 0535 by Ángel Verduzco RN  Outcome: Progressing     Problem: ABCDS Injury Assessment  Goal: Absence of physical injury  2/23/2023 0535 by Ángel Verduzco RN  Outcome: Progressing  Flowsheets (Taken 2/23/2023 0140)  Absence of Physical Injury: Implement safety measures based on patient assessment  2/23/2023 0535 by Ángel Verduzco RN  Outcome: Progressing  Flowsheets (Taken 2/23/2023 0140)  Absence of Physical Injury: Implement safety measures based on patient assessment

## 2023-02-23 NOTE — PROGRESS NOTES
Dr. Aminata Mclaughlin updated that pt does not have any am lab orders. Updated on pt status. No new orders at this time.

## 2023-02-23 NOTE — CONSULTS
Date:   2/23/2023  Patient name: Sunshine Rivera  Date of admission:  2/22/2023  4:24 PM  MRN:   [id number]  YOB: 1980  PCP: Meng Son PA-C    Reason for Admission: Chest pain [R07.9]  Chest pain, unspecified type [R07.9]    Cardiology consult: Chest pain       Referring physician: Dr Julien Solid    Admission 2/22/2023 left-sided chest pain like heaviness radiating to the neck, left arm, troponin high-sensitivity 21, borderline T wave abnormalities lead I aVL  Admission 2/16/2023 chest pain like a pressure lasting for more than 1 hour shortness of breath no palpitation on and off for the last 4 days  No obvious ischemic ECG changes, negative Lexiscan Myoview stress test, ejection fraction 60%  Normal LV size, wall thickness and wall motion, 2D echo 2/17/2023  Moderate aortic regurgitation, no tricuspid aortic valve  Hypertension  Hyperlipidemia  Anemia of chronic renal disease     Renal failure polycystic kidney disease on peritoneal dialysis     No previous history of coronary intervention     CT abdomen 3/4/2022  Enlarged polycystic kidney disease, nonobstructing 6 mm right intrarenal calculus  Cardiomegaly  No evidence of acute cardiopulmonary disease  Coronary artery calcification  Unenhanced thoracic and abdominal aorta appears grossly normal with the mild atherosclerotic plaque formation    ECG 3/4/2022  Sinus rhythm heart rate 97, left atrial abnormality, left anterior fascicular block, prolonged QT     Chest x-ray 2/16/2023  No acute process     2D echo 2/17/2023  Normal LV size, wall thickness, wall motion, ejection fraction more than 55%  Aortic valve is trileaflet, moderate aortic regurgitation  Normal IVC size and inspiratory collapse     Lexiscan Myoview stress test 2/17/2023  No ischemic ECG changes  No ischemia no scar, ejection fraction 60%, normal wall motion     History of present illness  26-year-old female who lives with the boyfriend and mother of 5 kids with a past medical history of renal failure secondary to polycystic kidney disease on dialysis since September 2022, hypertension, smoker got admitted on 2/16/2023 with left upper chest pain like heavy pressure/tightness radiating to the left side of the neck and arm it was associated with mild shortness of breath. She came to the emergency room she was also having nausea. She was hospitalized on 2/16/2023 with a similar chest pain and had a stress test and echo examination were negative. She is worried about her recurrent chest pain. Blood pressure on admission 174/104, heart rate 123, temperature 97.8, oxygen saturation 100%  Chest x-ray on admission no acute process    Lab work on admission  High-sensitivity troponin 21  Sodium 138, potassium 4.0, BUN 41, creatinine 8.12, glucose 110, calcium 8.7  WBC 12.4, hemoglobin 11.2, platelets 028    Current evaluation  Patient seen and examined  53-year-old slim female she was getting peritoneal dialysis  Her chest pain is very mild at present  No aggravation factor  No pericardial rub  No sign of cardiac decompensation  ECG monitor sinus rhythm    Medications:   Scheduled Meds:   amitriptyline  25 mg Oral Nightly    amLODIPine  10 mg Oral Daily    aspirin  81 mg Oral Daily    atorvastatin  40 mg Oral Nightly    DULoxetine  60 mg Oral Daily    hydrALAZINE  50 mg Oral 3 times per day    hydrOXYzine HCl  50 mg Oral Nightly    melatonin  9 mg Oral Nightly    metoprolol succinate  50 mg Oral Daily    heparin (porcine)  5,000 Units SubCUTAneous 3 times per day    dianeal lo-raoul (ULTRABAG) 2.5%  2,000 mL IntraPERitoneal 4x daily     Continuous Infusions:  CBC:   Recent Labs     02/22/23  1649   WBC 12.4*   HGB 11.2*   *     BMP:    Recent Labs     02/22/23  1649      K 4.0   CL 98   CO2 25   BUN 41*   CREATININE 8.12*   GLUCOSE 110*     Hepatic: No results for input(s): AST, ALT, ALB, BILITOT, ALKPHOS in the last 72 hours.   Troponin: No results for input(s): TROPONINI in the last 72 hours. BNP: No results for input(s): BNP in the last 72 hours. Lipids: No results for input(s): CHOL, HDL in the last 72 hours. Invalid input(s): LDLCALCU  INR: No results for input(s): INR in the last 72 hours. Objective:   Vitals: /80   Pulse 94   Temp 98.6 °F (37 °C) (Oral)   Resp 16   Ht 5' 5.75\" (1.67 m)   Wt 130 lb (59 kg)   LMP 10/31/2022 (Approximate)   SpO2 97%   BMI 21.14 kg/m²   General appearance: alert and cooperative with exam  HEENT: Head: Normal, normocephalic, atraumatic.   Neck: supple, symmetrical, trachea midline  Lungs: clear to auscultation bilaterally  Heart: regular rate and rhythm, S1, S2 normal, no murmur, click, rub or gallop  Abdomen:  Soft peritoneal dialysis catheter noted bowel sounds present  Extremities: Homans sign is negative, no sign of DVT  Neurologic: Mental status: Alert, oriented, thought content appropriate      Assessment / Acute Cardiac Problems:   Admission with this severe left upper chest pain radiating to the neck and left arm  High-sensitivity troponin 21  ECG showed sinus rhythm  Hypertension, renal failure, history of smoking  Multiple cardiac risk factors    Patient Active Problem List:     Asthma     Smoker     Polycystic kidney     Eczema     Depression with anxiety     Chronic headaches     Tension vascular headache     Irregular bleeding     Metrorrhagia     Menorrhagia     Dysmenorrhea     Pelvic pain in female     Kidney stone     Acute back pain     Anxiety     Hypertension     Headache     Depression     CKD (chronic kidney disease) stage 3, GFR 30-59 ml/min (HCC)     Chronic neck pain     Degenerative disc disease, cervical     Encounter for long-term (current) use of other medications     Cervicalgia     Chronic intractable headache     Hemorrhage of cyst of native kidney     Abdominal pain     CKD (chronic kidney disease) stage 4, GFR 15-29 ml/min (HCC)     Acute renal failure with acute tubular necrosis superimposed on stage 3 chronic kidney disease (HCC)     Allergic rhinitis due to animal hair and dander     Congenital multiple renal cysts     Gross hematuria     History of anemia due to chronic kidney disease     History of multiple allergies     Hyperlipidemia     IUD (intrauterine device) in place     Leukocytosis     Other specified disorders of kidney and ureter     Pain in joint     Recurrent major depression in full remission ([address]     Renovascular hypertension     Right ureteral stone     Right nephrolithiasis     Flank pain     Acute kidney injury superimposed on chronic kidney disease ([address]     Urinary tract infection with hematuria     History of major depression     Iron deficiency anemia due to chronic blood loss     Hypernatremia     SILVANO (acute kidney injury) ([address]     Dysuria     Acute kidney injury superimposed on CKD ([address]     Pyelonephritis     Hypertensive emergency     Abdominal pain, epigastric     Abdominal pain, right upper quadrant     Nausea     Gastroesophageal reflux disease     Angina pectoris ([address]     Chest pain      Plan of Treatment:   I will call the cardiac Cath Lab at [place]  Keep patient n.p.o.   Patient has multiple cardiac risk factors  Discussed with the patient about cardiac cath she wants cardiac cath to be done  Cardiac Cath Lab called  Transfer patient to [place] [facility]      Electronically signed by Mark Arango MD on 2/23/2023 at 9:50 AM

## 2023-02-23 NOTE — ED NOTES
Report given to [facility], RN from [facility]. Report method by phone   The following was reviewed with receiving RN:   Current vital signs:  BP (!) 145/101   Pulse 99   Temp 97.8 °F (36.6 °C) (Oral)   Resp 16   Ht 5' 5.75\" (1.67 m)   Wt 130 lb (59 kg)   LMP 10/31/2022 (Approximate)   SpO2 98%   BMI 21.14 kg/m²                MEWS Score: 3     Any medication or safety alerts were reviewed. Any pending diagnostics and notifications were also reviewed, as well as any safety concerns or issues, abnormal labs, abnormal imaging, and abnormal assessment findings. Questions were answered.           Alexandra Jeronimo RN  02/22/23 2004

## 2023-02-23 NOTE — PROGRESS NOTES
Dr. Sven Balderrama updated on admission, plan of care, pt status and home PD. Orders given. House supervisor notified of need for PD equipment.

## 2023-02-23 NOTE — PLAN OF CARE
Problem: Discharge Planning  Goal: Discharge to home or other facility with appropriate resources  2/23/2023 1825 by Donna Campbell RN  Outcome: Progressing  Flowsheets (Taken 2/23/2023 0535 by Maggy Pratt RN)  Discharge to home or other facility with appropriate resources:   Identify barriers to discharge with patient and caregiver   Arrange for needed discharge resources and transportation as appropriate   Arrange for interpreters to assist at discharge as needed   Refer to discharge planning if patient needs post-hospital services based on physician order or complex needs related to functional status, cognitive ability or social support system  2/23/2023 0535 by Maggy Pratt RN  Outcome: Progressing  Flowsheets (Taken 2/23/2023 0535)  Discharge to home or other facility with appropriate resources:   Identify barriers to discharge with patient and caregiver   Arrange for needed discharge resources and transportation as appropriate   Arrange for interpreters to assist at discharge as needed   Refer to discharge planning if patient needs post-hospital services based on physician order or complex needs related to functional status, cognitive ability or social support system  2/23/2023 0535 by Maggy Pratt RN  Outcome: Progressing  Flowsheets (Taken 2/23/2023 0535)  Discharge to home or other facility with appropriate resources:   Identify barriers to discharge with patient and caregiver   Arrange for needed discharge resources and transportation as appropriate   Arrange for interpreters to assist at discharge as needed   Refer to discharge planning if patient needs post-hospital services based on physician order or complex needs related to functional status, cognitive ability or social support system     Problem: Pain  Goal: Verbalizes/displays adequate comfort level or baseline comfort level  2/23/2023 1825 by Donna Campbell, RN  Outcome: Progressing  Flowsheets (Taken 2/23/2023 0535 by Asuncion Bales Jeff Hall  Verbalizes/displays adequate comfort level or baseline comfort level:   Encourage patient to monitor pain and request assistance   Assess pain using appropriate pain scale   Administer analgesics based on type and severity of pain and evaluate response   Implement non-pharmacological measures as appropriate and evaluate response   Consider cultural and social influences on pain and pain management   Notify Licensed Independent Practitioner if interventions unsuccessful or patient reports new pain  2/23/2023 0535 by Ángel Verduzco RN  Outcome: Progressing  Flowsheets (Taken 2/23/2023 0535)  Verbalizes/displays adequate comfort level or baseline comfort level:   Encourage patient to monitor pain and request assistance   Assess pain using appropriate pain scale   Administer analgesics based on type and severity of pain and evaluate response   Implement non-pharmacological measures as appropriate and evaluate response   Consider cultural and social influences on pain and pain management   Notify Licensed Independent Practitioner if interventions unsuccessful or patient reports new pain  2/23/2023 0535 by Ángel Verduzco RN  Outcome: Progressing  Flowsheets (Taken 2/23/2023 0535)  Verbalizes/displays adequate comfort level or baseline comfort level:   Encourage patient to monitor pain and request assistance   Assess pain using appropriate pain scale   Administer analgesics based on type and severity of pain and evaluate response   Implement non-pharmacological measures as appropriate and evaluate response   Consider cultural and social influences on pain and pain management   Notify Licensed Independent Practitioner if interventions unsuccessful or patient reports new pain     Problem: Safety - Adult  Goal: Free from fall injury  2/23/2023 1825 by Gaby Salas RN  Outcome: Progressing  Flowsheets (Taken 2/23/2023 1825)  Free From Fall Injury: Instruct family/caregiver on patient safety  2/23/2023 9929 by Kai Lopez RN  Outcome: Progressing  Note: Pt free from falls or injury this shift. Up ad adrian per self. Steady gait. Instructed to call staff if need assistance.  Will cont to monitor  2/23/2023 0535 by Kai Lopez RN  Outcome: Progressing     Problem: ABCDS Injury Assessment  Goal: Absence of physical injury  2/23/2023 1825 by Nancy Galarza RN  Outcome: Progressing  Flowsheets (Taken 2/23/2023 0140 by Kai Lopez RN)  Absence of Physical Injury: Implement safety measures based on patient assessment  2/23/2023 0535 by Kai Lopez RN  Outcome: Progressing  Flowsheets (Taken 2/23/2023 0140)  Absence of Physical Injury: Implement safety measures based on patient assessment  2/23/2023 0535 by Kai Lopez RN  Outcome: Progressing  Flowsheets (Taken 2/23/2023 0140)  Absence of Physical Injury: Implement safety measures based on patient assessment

## 2023-02-23 NOTE — CONSULTS
Department of Internal Medicine  Nephrology Yanna Riojas MD   Consult Note      SUBJECTIVE: This is a 38 y.o. female with a significant past medical history of Nephrolithiasis [s/p multiple lithotripsies and ESWL performed by Dr. Bard Flores in late July 2020], systemic hypertension and end-stage renal disease secondary to autosomal dominant polycystic kidney disease [on continuous ambulatory peritoneal dialysis], who presented to the hospital for further evaluation of chest pain. She had a similar presentation 1 week ago and at that time stress test was negative. She has been scheduled for cardiac catheterization today. At time of this encounter, she continues to have chest pain rated 3-4/10 in the precordial area. She does not have shortness of breath or fever.     Bee pollen, Diphenhydramine, Codeine, Dust mite extract, Pcn [penicillins], and Pollen extract    Past Medical History:   Diagnosis Date    Anxiety     Asthma     Chronic headaches 07/10/2013    CKD (chronic kidney disease) stage 3, GFR 30-59 ml/min ([facility])     Degenerative disc disease, cervical     Depression     Dysmenorrhea 09/30/2014    Eczema 11/08/2012    Hypertension     Hypocalcemia 05/28/2014    Kidney stone     Menorrhagia 09/30/2014    Neck pain, bilateral 05/28/2014    Pelvic pain in female 09/30/2014    Polycystic kidney     Smoker 10/02/2011    Tension vascular headache 01/20/2014       Scheduled Meds:   amitriptyline  25 mg Oral Nightly    amLODIPine  10 mg Oral Daily    aspirin  81 mg Oral Daily    atorvastatin  40 mg Oral Nightly    DULoxetine  60 mg Oral Daily    hydrALAZINE  50 mg Oral 3 times per day    hydrOXYzine HCl  50 mg Oral Nightly    melatonin  9 mg Oral Nightly    metoprolol succinate  50 mg Oral Daily    heparin (porcine)  5,000 Units SubCUTAneous 3 times per day    dianeal lo-raoul (ULTRABAG) 2.5%  2,000 mL IntraPERitoneal 4x daily     Continuous Infusions:  PRN Meds:.Abelardo    Family History   Problem Relation Age of Onset    High Blood Pressure Mother     Asthma Sister     Migraines Sister     High Blood Pressure Father     Other Brother         bad knees, with recent total knee replacement      Social History     Socioeconomic History    Marital status:      Spouse name: None    Number of children: None    Years of education: None    Highest education level: None   Occupational History    Occupation: stay at home mom   Tobacco Use    Smoking status: Every Day     Packs/day: 0.50     Years: 25.00     Pack years: 12.50     Types: Cigarettes    Smokeless tobacco: Never   Vaping Use    Vaping Use: Never used   Substance and Sexual Activity    Alcohol use: No     Alcohol/week: 0.0 standard drinks    Drug use: No    Sexual activity: Yes     Partners: Male     Comment: steady boyfriend     Review of systems: CNS - no headache or dizziness; Cardiac - +ve chest pain; Respiratory - no shortness of breath; Gastrointestinal - No nausea, vomiting or diarrhea; Musculoskeletal - general body aches; Skin/Integument - no rashes. Physical Exam:    VITALS:  /80   Pulse 94   Temp 98.7 °F (37.1 °C) (Oral)   Resp 15   Ht 5' 5\" (1.651 m)   Wt 130 lb (59 kg)   LMP 10/31/2022 (Approximate)   SpO2 97%   BMI 21.63 kg/m²   TEMPERATURE:  Current - Temp: 98.7 °F (37.1 °C);  Max - Temp  Av.4 °F (36.9 °C)  Min: 97.8 °F (36.6 °C)  Max: 98.7 °F (37.1 °C)  RESPIRATIONS RANGE: Resp  Av.2  Min: 13  Max: 22  PULSE RANGE: Pulse  Av.5  Min: 80  Max: 123  BLOOD PRESSURE RANGE:  Systolic (32XXX), HWV:721 , Min:120 , JXV:160  ; Diastolic (64TMO), TTN:04, Min:80, Max:104   PULSE OXIMETRY RANGE: SpO2  Av.3 %  Min: 96 %  Max: 100 %  24HR INTAKE/OUTPUT:    Intake/Output Summary (Last 24 hours) at 2023 1058  Last data filed at 2023 1007  Gross per 24 hour   Intake 2000 ml   Output 3665 ml   Net -1665 ml       Constitutional: alert, appears stated age, and cooperative    Skin: Skin color, texture, turgor normal. No rashes or lesions    Head: Normocephalic, without obvious abnormality, atraumatic     Cardiovascular/Edema: regular rate and rhythm, S1, S2 normal, no murmur, click, rub or gallop    Respiratory: Lungs: clear to auscultation bilaterally    Abdomen: soft, non-tender; bowel sounds normal; no masses,  no organomegaly    Back: symmetric, no curvature. ROM normal. No CVA tenderness.     Extremities: extremities normal, atraumatic, no cyanosis or edema    Neuro:  Grossly normal      CBC:   Recent Labs     02/22/23  1649   WBC 12.4*   HGB 11.2*   *     BMP:    Recent Labs     02/22/23  1649      K 4.0   CL 98   CO2 25   BUN 41*   CREATININE 8.12*   GLUCOSE 110*       Lab Results   Component Value Date/Time    NITRU NEGATIVE 03/04/2022 10:04 PM    COLORU Yellow 03/04/2022 10:04 PM    PHUR 6.0 03/04/2022 10:04 PM    WBCUA 10 TO 20 03/04/2022 10:04 PM    RBCUA 0 TO 2 03/04/2022 10:04 PM    MUCUS NOT REPORTED 11/06/2021 08:02 AM    TRICHOMONAS NOT REPORTED 11/06/2021 08:02 AM    YEAST NOT REPORTED 11/06/2021 08:02 AM    BACTERIA None 03/04/2022 10:04 PM    CLARITYU Clear 04/26/2016 12:00 AM    SPECGRAV 1.011 03/04/2022 10:04 PM    LEUKOCYTESUR SMALL 03/04/2022 10:04 PM    UROBILINOGEN Normal 03/04/2022 10:04 PM    BILIRUBINUR NEGATIVE 03/04/2022 10:04 PM    BILIRUBINUR neg 09/30/2014 11:31 AM    BLOODU Positive 04/26/2016 12:00 AM    GLUCOSEU NEGATIVE 03/04/2022 10:04 PM    KETUA NEGATIVE 03/04/2022 10:04 PM    AMORPHOUS NOT REPORTED 11/06/2021 08:02 AM     Urine Sodium:     Lab Results   Component Value Date/Time    NOHELIA 86 06/21/2021 01:58 PM     Urine Chloride:    Lab Results   Component Value Date/Time    CLUR 72 06/21/2021 01:58 PM     Urine Osmolarity:   Lab Results   Component Value Date/Time    OSMOU 236 02/21/2021 09:07 PM     Urine Protein:     Lab Results   Component Value Date/Time    TPU 18 06/21/2021 12:04 PM     Urine Creatinine:     Lab Results   Component Value Date/Time    LABCREA 29.5 06/21/2021 01:58 PM     IMPRESSION/RECOMMENDATIONS:      1.  End-stage renal disease - secondary to ADPKD. Continue CAPD using 2.5% Dianeal, 2 L by exchange and 4 exchanges per day. Renal diet,i.e 2-gram sodium,2-gram potassium,1500 ml fluid restriction,1-gram phosphorus, 1800 KCal and 1.2 gram/kg protein per day. 2.  Chest pain - okay to proceed with cardiac catheterization from renal standpoint. 3.  Systemic hypertension - Continue current antihypertensive medications. Prognosis is guarded. Thank you very much for the courtesy and confidence of this consultation.     Marita Mauricio MD FACP  Attending Nephrologist  2/23/2023 10:55 AM

## 2023-02-23 NOTE — PROGRESS NOTES
Received post cardiac cath procedure to [facility] room 10. Assessment obtained. Restrictions reviewed with patient. Post procedure pathway initiated. Right groin site soft , dressing dry and intact. No hematoma noted. Family at side. Patient without complaints. Head of bed flat with right leg straight.

## 2023-02-23 NOTE — PROGRESS NOTES
Pt transported to [address] [place] [address] cath lab via [address]. Pt is alert and oriented, vitals are stable, no concerns noted. Report called to [address] [place] [address], all questions asked and answered.  Pt belongings left with

## 2023-02-23 NOTE — PROGRESS NOTES
Pt admitted to the unit. Vitals obtained, admission complete, tele applied. Pt does not appear to be in distress at time of admit. Handoff was given to YAIMA Mendiola. Transfer of care occurred at 2045.

## 2023-02-23 NOTE — CARE COORDINATION
Case Management Assessment  Initial Evaluation    Date/Time of Evaluation: 2/23/2023 10:09 AM  Assessment Completed by: Shruthi Mccollum RN    If patient is discharged prior to next notation, then this note serves as note for discharge by case management. Patient Name: Sunshine Rivera                   YOB: 1980  Diagnosis: Chest pain [R07.9]  Chest pain, unspecified type [R07.9]                   Date / Time: 2/22/2023  4:24 PM    Patient Admission Status: Inpatient   Readmission Risk (Low < 19, Mod (19-27), High > 27): Readmission Risk Score: 18    Current PCP: Meng Son PA-C  PCP verified by CM? Yes    Chart Reviewed: Yes      History Provided by: Patient  Patient Orientation: Alert and Oriented    Patient Cognition: Alert    Hospitalization in the last 30 days (Readmission):  Yes    If yes, Readmission Assessment in CM Navigator will be completed. Advance Directives:      Code Status: Prior   Patient's Primary Decision Maker is:  (SELF)      Discharge Planning:    Patient lives with: Children Type of Home: House  Primary Care Giver: Self  Patient Support Systems include: Children   Current Financial resources: Medicaid, [employer]  Current community resources: None  Current services prior to admission:  (Does PD at home, 4 X a day, has IV Pole, Uses [employer] for supplies/equipment)            Current DME:              Type of Home Care services:  None    ADLS  Prior functional level: Independent in ADLs/IADLs  Current functional level: Independent in ADLs/IADLs    PT AM-PAC:   /24  OT AM-PAC:   /24    Family can provide assistance at DC: Yes  Would you like Case Management to discuss the discharge plan with any other family members/significant others, and if so, who?  No  Plans to Return to Present Housing: Yes  Other Identified Issues/Barriers to RETURNING to current housing: None  Potential Assistance needed at discharge: N/A            Potential DME:    Patient expects to discharge to: House  Plan for transportation at discharge:      Financial    Payor: SAEED Young / Plan: Ebony Numbers / Product Type: *No Product type* /     Does insurance require precert for SNF: Yes    Potential assistance Purchasing Medications: No  Meds-to-Beds request:        [address] [facility]  [address]  [address] [place] [address]  Phone: [phone] Fax: [phone]    Roxie Romero [address] [facility], [facility] [address] Toribio Proper [phone] Reshma Ping [phone]  [address] [facility] [address]  Phone: [phone] Fax: [phone]      Notes:    Factors facilitating achievement of predicted outcomes: Family support, Motivated, Cooperative, Pleasant, and Has needed Durable Medical Equipment at home    Barriers to discharge: None    Additional Case Management Notes: 2/23/23, [address] [place] [address], [place] Medicaid Pt. Lives in 2 [place] home w/ Children. DME- PD supplies, Uses [employer], Denies VNS. Had Stress/Echo, last admit, Cr 8.12, [address], Nephro, Cardio. Plan is for Cardiac Cath//KB    The Plan for Transition of Care is related to the following treatment goals of Chest pain [R07.9]  Chest pain, unspecified type [F14.9]    IF APPLICABLE: The Patient and/or patient representative Blain Duane and her family were provided with a choice of provider and agrees with the discharge plan. Freedom of choice list with basic dialogue that supports the patient's individualized plan of care/goals and shares the quality data associated with the providers was provided to:     Patient Representative Name:       The Patient and/or Patient Representative Agree with the Discharge Plan?       Elvira Frausto RN  Case Management Department  Ph: [phone] Fax: [phone]

## 2023-02-24 VITALS
WEIGHT: 136.69 LBS | HEIGHT: 65 IN | BODY MASS INDEX: 22.77 KG/M2 | HEART RATE: 86 BPM | SYSTOLIC BLOOD PRESSURE: 115 MMHG | OXYGEN SATURATION: 98 % | DIASTOLIC BLOOD PRESSURE: 73 MMHG | TEMPERATURE: 97.5 F | RESPIRATION RATE: 18 BRPM

## 2023-02-24 LAB
EKG ATRIAL RATE: 76 BPM
EKG P AXIS: 64 DEGREES
EKG P-R INTERVAL: 154 MS
EKG Q-T INTERVAL: 462 MS
EKG QRS DURATION: 86 MS
EKG QTC CALCULATION (BAZETT): 519 MS
EKG R AXIS: 4 DEGREES
EKG T AXIS: 73 DEGREES
EKG VENTRICULAR RATE: 76 BPM

## 2023-02-24 PROCEDURE — 6360000002 HC RX W HCPCS: Performed by: INTERNAL MEDICINE

## 2023-02-24 PROCEDURE — 6370000000 HC RX 637 (ALT 250 FOR IP): Performed by: NURSE PRACTITIONER

## 2023-02-24 PROCEDURE — 99239 HOSP IP/OBS DSCHRG MGMT >30: CPT | Performed by: INTERNAL MEDICINE

## 2023-02-24 PROCEDURE — 2580000003 HC RX 258: Performed by: INTERNAL MEDICINE

## 2023-02-24 PROCEDURE — 6370000000 HC RX 637 (ALT 250 FOR IP): Performed by: INTERNAL MEDICINE

## 2023-02-24 PROCEDURE — 93010 ELECTROCARDIOGRAM REPORT: CPT | Performed by: INTERNAL MEDICINE

## 2023-02-24 RX ADMIN — HEPARIN SODIUM 5000 UNITS: 5000 INJECTION INTRAVENOUS; SUBCUTANEOUS at 07:02

## 2023-02-24 RX ADMIN — METOPROLOL SUCCINATE 50 MG: 50 TABLET, EXTENDED RELEASE ORAL at 09:44

## 2023-02-24 RX ADMIN — HYDRALAZINE HYDROCHLORIDE 50 MG: 50 TABLET, FILM COATED ORAL at 07:02

## 2023-02-24 RX ADMIN — ASPIRIN 81 MG: 81 TABLET, CHEWABLE ORAL at 09:44

## 2023-02-24 RX ADMIN — DULOXETINE HYDROCHLORIDE 60 MG: 60 CAPSULE, DELAYED RELEASE ORAL at 09:44

## 2023-02-24 RX ADMIN — SODIUM CHLORIDE, SODIUM LACTATE, CALCIUM CHLORIDE, MAGNESIUM CHLORIDE AND DEXTROSE 2000 ML: 2.5; 538; 448; 18.3; 5.08 INJECTION, SOLUTION INTRAPERITONEAL at 13:41

## 2023-02-24 RX ADMIN — AMLODIPINE BESYLATE 10 MG: 10 TABLET ORAL at 09:44

## 2023-02-24 RX ADMIN — SODIUM CHLORIDE, SODIUM LACTATE, CALCIUM CHLORIDE, MAGNESIUM CHLORIDE AND DEXTROSE 2000 ML: 2.5; 538; 448; 18.3; 5.08 INJECTION, SOLUTION INTRAPERITONEAL at 09:43

## 2023-02-24 RX ADMIN — OXYCODONE HYDROCHLORIDE AND ACETAMINOPHEN 1 TABLET: 5; 325 TABLET ORAL at 11:05

## 2023-02-24 RX ADMIN — OXYCODONE HYDROCHLORIDE AND ACETAMINOPHEN 1 TABLET: 5; 325 TABLET ORAL at 07:04

## 2023-02-24 RX ADMIN — HYDRALAZINE HYDROCHLORIDE 50 MG: 50 TABLET, FILM COATED ORAL at 17:09

## 2023-02-24 RX ADMIN — OXYCODONE HYDROCHLORIDE AND ACETAMINOPHEN 1 TABLET: 5; 325 TABLET ORAL at 17:09

## 2023-02-24 ASSESSMENT — PAIN SCALES - GENERAL
PAINLEVEL_OUTOF10: 8
PAINLEVEL_OUTOF10: 8
PAINLEVEL_OUTOF10: 7

## 2023-02-24 ASSESSMENT — PAIN DESCRIPTION - LOCATION: LOCATION: GROIN

## 2023-02-24 ASSESSMENT — PAIN DESCRIPTION - ORIENTATION: ORIENTATION: RIGHT

## 2023-02-24 ASSESSMENT — PAIN DESCRIPTION - DESCRIPTORS: DESCRIPTORS: SHARP;ACHING

## 2023-02-24 NOTE — DISCHARGE SUMMARY
[place] [address] Internal Medicine    Discharge Summary     Patient ID: Amirah Goel  :  1980   MRN: [id number]     ACCOUNT:  [de-identified]   Patient's PCP: Scott Favre, PA-C  Admit Date: 2023   Discharge Date: 2023    Length of Stay: 2  Code Status:  Full Code  Admitting Physician: Randa Diego MD  Discharge Physician: Linnea Garvey MD     Active Discharge Diagnoses:     Primary Problem  Chest pain      [place] Problems    Diagnosis Date Noted    Chest pain [R07.9] 2023     Priority: Medium       Admission Condition:  fair     Discharged Condition: fair    Hospital Stay:     Hospital Course:  Amirah Goel is a 38 y.o. female who was admitted for the management of Chest pain , presented to ER with Chest Pain and Shortness of Breath    The patient is a 38 y.o. Non- / non  female who presents with Chest Pain and Shortness of Breath   and she is admitted to the hospital for the management of chest pain  Patient, has past medical history multiple medical problem which include chronic smoker, history of polycystic disease s/p peritoneal dialysis, generalized artery disorder, depression, patient complaining of chest pain, which started yesterday, chest pain is constant in nature, radiating to left arm, jaw, no aggravating or relieving factor  No injury, trauma, fall  Patient, was recently admitted at [facility], with chest pain, underwent stress test which was negative. Echocardiogram was done which was suggestive of a trileaflet aortic valve, with mild aortic insufficiency  Patient in the emergency room, had EKG which was read as normal sinus rhythm no ST-T changes, 2 sets of troponins were done which was flat  Cardiology nephrology was consulted, patient had cardiac cath  Findings:   Angiographic Findings        Cardiac Arteries and Lesion Findings       LMCA: Normal 0% stenosis.      LAD: Diffuse irregularities 20-30%. LCx: Diffuse irregularities 20-30%. RCA: Diffuse irregularities 30-40%. Minimal nonobstructive CAD and normal LV systolic function    Patient was seen by cardiology and was cleared for discharge. PHYSICAL   General Appearance:  alert, well appearing, and in no acute distress  Mental status: oriented to person, place, and time with normal affect  Head:  normocephalic, atraumatic. Eye: no icterus, redness, pupils equal and reactive, extraocular eye movements intact, conjunctiva clear  Ear: normal external ear, no discharge, hearing intact  Nose:  no drainage noted  Mouth: mucous membranes moist  Neck: supple, no carotid bruits, thyroid not palpable  Lungs: Bilateral equal air entry, clear to ausculation, no wheezing, rales or rhonchi, normal effort  Cardiovascular: normal rate, regular rhythm, no murmur, gallop, rub. Abdomen: Soft, nontender, nondistended, normal bowel sounds, no hepatomegaly or splenomegaly, peritoneal dialysis catheter in situ  Neurologic: There are no new focal motor or sensory deficits, normal muscle tone and bulk, no abnormal sensation, normal speech, cranial nerves II through XII grossly intact  Skin: No gross lesions, rashes, bruising or bleeding on exposed skin area  Extremities:  peripheral pulses palpable, no pedal edema or calf pain with palpation  Psych: normal affect  Significant therapeutic interventions:     Significant Diagnostic Studies:   Labs / Micro:        ,     Radiology:    XR CHEST PORTABLE    Result Date: 2/22/2023  EXAMINATION: ONE XRAY VIEW OF THE CHEST 2/22/2023 1:39 pm COMPARISON: 02/16/2023 HISTORY: ORDERING SYSTEM PROVIDED HISTORY: chest pain TECHNOLOGIST PROVIDED HISTORY: chest pain Reason for Exam: chest pain FINDINGS: The lungs are without acute focal process. There is no effusion or pneumothorax. The cardiomediastinal silhouette is stable. Old right rib fracture. The osseous structures are stable. No acute process. XR CHEST PORTABLE    Result Date: 2/16/2023  EXAMINATION: ONE XRAY VIEW OF THE CHEST 2/16/2023 11:00 pm COMPARISON: None. HISTORY: ORDERING SYSTEM PROVIDED HISTORY: chest pain TECHNOLOGIST PROVIDED HISTORY: chest pain Reason for Exam: Chest pain Additional signs and symptoms: Chest pain Relevant Medical/Surgical History: Chest pain FINDINGS: Lungs are hyperaerated. The lungs are without acute focal process. There is no effusion or pneumothorax. The cardiomediastinal silhouette is without acute process. The osseous structures are without acute process. No acute process. Small airways disease. NM Cardiac Stress Test Nuclear Imaging    Result Date: 2/17/2023  EXAMINATION: MYOCARDIAL PERFUSION IMAGING 2/17/2023 1:06 pm TECHNIQUE: For the rest study, 38 mCi of Tc-99m labeled sestamibi were injected. SPECT images were acquired. Under cardiology supervision, 0.4 mg Lexiscan was infused. After pharmacologic stress, 15.6 mCi of Tc-99m labeled sestamibi were injected. SPECT images with ECG gating were acquired. COMPARISON: None Available. HISTORY: ORDERING SYSTEM PROVIDED HISTORY: Chest pain TECHNOLOGIST PROVIDED HISTORY: Reason for Exam: Chest pain Procedure Type->Exercise Is the patient pregnant?->No Reason for Exam: Chest pain FINDINGS: There is no evidence for a significant reversible or fixed perfusion defect. The gated images show no wall motion abnormalities. Normal myocardial thickening. Perfusion scores are visually adjusted to account for artifact. Summed stress score:  0 Summed rest score:  0 Summed difference score: 0 Function: End diastolic volume:  45XI Left ventricular ejection fraction:  61% TID score:  0.93 (scores greater than 1.39 are considered elevated for Lexiscan stress with Tc99m) Notes concerning risk stratification: Risk stratification incorporates both clinical history and some testing results.   Final risk determination is the responsibility of the ordering provider as other patient information and test results may increase or decrease the risk assessment reported for this examination. Risk stratification criteria are adapted from \"Noninvasive Risk Stratification\" criteria from [employer]. Al, ACC/AATS/AHA/ASE/ASNC/SCAI/SCCT/STS 2017 Appropriate Use Criteria For Coronary Revascularization in Patients With Stable Ischemic Heart Disease [facility] Volume 69, Issue 17, May 2017 High risk (>3% annual death or MI) 1. Severe resting LV dysfunction (LVEF <35%) not readily explained by non coronary causes 2. Resting perfusion abnormalities greater than 10% of the myocardium in patients without prior history or evidence of MI 3. Stress-induced perfusion abnormalities encumbering greater than or equal to 10% myocardium or stress segmental scores indicating multiple vascular territories with abnormalities 4. Stress-induced LV dilatation (TID ratio greater than 1.19 for exercise and greater than 1.39 for regadenoson) Intermediate risk (1% to 3% annual death or MI) 1. Mild/moderate resting LV dysfunction (LVEF 35% to 49%) not readily explained by non coronary causes. 2. Resting perfusion abnormalities in 5%-9.9% of the myocardium in patients without a history or prior evidence of MI 3. Stress-induced perfusion abnormality encumbering 5%-9.9% of the myocardium or stress segmental scores indicating 1 vascular territory with abnormalities but without LV dilation 4. Small wall motion abnormality involving 1-2 segments and only 1 coronary bed. Low Risk (Less than 1% annual death or MI) 1. Normal or small myocardial perfusion defect at rest or with stress encumbering less than 5% of the myocardium. No stress-induced ischemia. No infarct. Normal LVEF.  Risk stratification: Low         Consultations:    Consults:     Final Specialist Recommendations/Findings:   IP CONSULT TO CARDIOLOGY  IP CONSULT TO INTERNAL MEDICINE  IP CONSULT TO NEPHROLOGY      The patient was seen and examined on day of discharge and this discharge summary is in conjunction with any daily progress note from day of discharge. Discharge plan:     Disposition: Home    Physician Follow Up:     Matthew Chavez MD  [address]  [address] [facility]  [address] [facility] [address]  [phone]    Follow up      Eda Venice, [address], [facility]  [address] [facility] [address]  [phone]    Follow up      Obdulia Son, [address] [facility] [address]  [phone]    Follow up         Requiring Further Evaluation/Follow Up POST HOSPITALIZATION/Incidental Findings:    Diet: cardiac diet    Activity: As tolerated    Instructions to Patient:     Discharge Medications:      Medication List        CONTINUE taking these medications      amitriptyline 25 MG tablet  Commonly known as: ELAVIL  Take 1 tablet by mouth nightly     amLODIPine 10 MG tablet  Commonly known as: NORVASC  Take 1 tablet by mouth daily     aspirin 81 MG chewable tablet  Take 1 tablet by mouth daily     atorvastatin 40 MG tablet  Commonly known as: LIPITOR  Take 1 tablet by mouth nightly     DULoxetine 60 MG extended release capsule  Commonly known as: CYMBALTA  take 1 capsule by mouth once daily     hydrALAZINE 50 MG tablet  Commonly known as: APRESOLINE  Take 1 tablet by mouth every 8 hours     hydrOXYzine HCl 50 MG tablet  Commonly known as: ATARAX     Melatonin 10 MG Tabs     metoprolol succinate 50 MG extended release tablet  Commonly known as: TOPROL XL            STOP taking these medications      pantoprazole 40 MG tablet  Commonly known as: PROTONIX     PROBIOTIC ACIDOPHILUS PO              Time Spent on discharge is  35 mins in patient examination, evaluation, counseling as well as medication reconciliation, prescriptions for required medications, discharge plan and follow up. Electronically signed by   Agnieszka Bruce MD  2/24/2023  5:28 PM      Thank you Dr. Obdulia Son PA-C for the opportunity to be involved in this patient's care.

## 2023-02-24 NOTE — PLAN OF CARE
Problem: Discharge Planning  Goal: Discharge to home or other facility with appropriate resources  2/24/2023 1647 by Vasu Suarez RN  Outcome: Adequate for Discharge  Flowsheets (Taken 2/24/2023 0900)  Discharge to home or other facility with appropriate resources:   Identify barriers to discharge with patient and caregiver   Arrange for needed discharge resources and transportation as appropriate   Identify discharge learning needs (meds, wound care, etc)   Refer to discharge planning if patient needs post-hospital services based on physician order or complex needs related to functional status, cognitive ability or social support system     Problem: Pain  Goal: Verbalizes/displays adequate comfort level or baseline comfort level  2/24/2023 1647 by Vasu Suarez RN  Outcome: Adequate for Discharge     Problem: Safety - Adult  Goal: Free from fall injury  2/24/2023 1647 by Vasu Suarez RN  Outcome: Adequate for Discharge  Flowsheets (Taken 2/24/2023 0900)  Free From Fall Injury:   Instruct family/caregiver on patient safety   Based on caregiver fall risk screen, instruct family/caregiver to ask for assistance with transferring infant if caregiver noted to have fall risk factors     Problem: ABCDS Injury Assessment  Goal: Absence of physical injury  2/24/2023 1647 by Vasu Suarez RN  Outcome: Adequate for Discharge

## 2023-02-24 NOTE — PLAN OF CARE
Problem: Discharge Planning  Goal: Discharge to home or other facility with appropriate resources  2/24/2023 0444 by Evgeny Sherman RN  Outcome: Progressing  Flowsheets (Taken 2/24/2023 0444)  Discharge to home or other facility with appropriate resources:   Identify barriers to discharge with patient and caregiver   Arrange for needed discharge resources and transportation as appropriate   Identify discharge learning needs (meds, wound care, etc)     Problem: Pain  Goal: Verbalizes/displays adequate comfort level or baseline comfort level  2/24/2023 0444 by Evgeny Sherman RN  Outcome: Progressing  Flowsheets (Taken 2/24/2023 0444)  Verbalizes/displays adequate comfort level or baseline comfort level:   Encourage patient to monitor pain and request assistance   Assess pain using appropriate pain scale   Administer analgesics based on type and severity of pain and evaluate response     Problem: Safety - Adult  Goal: Free from fall injury  2/24/2023 0444 by Evgeny Sherman RN  Outcome: Progressing  Flowsheets (Taken 2/24/2023 0444)  Free From Fall Injury: Instruct family/caregiver on patient safety     Problem: ABCDS Injury Assessment  Goal: Absence of physical injury  2/24/2023 0444 by Evgeny Sherman RN  Outcome: Progressing  Flowsheets (Taken 2/23/2023 0140 by Radha Smiley RN)  Absence of Physical Injury: Implement safety measures based on patient assessment

## 2023-02-24 NOTE — DISCHARGE INSTR - DIET
Good nutrition is important when healing from an illness, injury, or surgery. Follow any nutrition recommendations given to you during your hospital stay. If you were given an oral nutrition supplement while in the hospital, continue to take this supplement at home. You can take it with meals, in-between meals, and/or before bedtime. These supplements can be purchased at most local grocery stores, pharmacies, and chain [employer]-stores. If you have any questions about your diet or nutrition, call the hospital and ask for the dietitian.       Regular Diet low potassium

## 2023-02-24 NOTE — CARE COORDINATION
DISCHARGE PLANNING NOTE:    Attempted to speak with patient regarding discharge plan, however she was out of the room. Cardiac cath done yesterday. Ok to d/c home per cardiology. Pt does peritoneal dialysis at home. Will continue to follow for additional discharge needs.     Electronically signed by Arlington Dance, RN on 2/24/2023 at 1:06 PM

## 2023-02-24 NOTE — PROGRESS NOTES
Date:   2/24/2023  Patient name: Aidan Calix  Date of admission:  2/22/2023  4:24 PM  MRN:   [id number]  YOB: 1980  PCP: Vasu Suarez PA-C    Reason for Admission: Chest pain [R07.9]  Chest pain, unspecified type [R07.9]    Cardiology follow-up: Chest pain       Referring physician: Dr Heidi Rowland     Admission 2/22/2023 left-sided chest pain like heaviness radiating to the neck, left arm, troponin high-sensitivity 21, borderline T wave abnormalities lead I aVL  Cardiac cath 2/23/2023 left main normal, LAD, circumflex, RCA 20 to 40% diffuse irregularities, nonobstructive CAD  Admission 2/16/2023 chest pain like a pressure lasting for more than 1 hour shortness of breath no palpitation on and off for the last 4 days  No obvious ischemic ECG changes, negative Lexiscan Myoview stress test, ejection fraction 60%  Normal LV size, wall thickness and wall motion, 2D echo 2/17/2023  Moderate aortic regurgitation, no tricuspid aortic valve  Hypertension  Hyperlipidemia  Anemia of chronic renal disease     Renal failure polycystic kidney disease on peritoneal dialysis     No previous history of coronary intervention     CT abdomen 3/4/2022  Enlarged polycystic kidney disease, nonobstructing 6 mm right intrarenal calculus  Cardiomegaly  No evidence of acute cardiopulmonary disease  Coronary artery calcification  Unenhanced thoracic and abdominal aorta appears grossly normal with the mild atherosclerotic plaque formation    ECG 3/4/2022  Sinus rhythm heart rate 97, left atrial abnormality, left anterior fascicular block, prolonged QT    ECG 2/23/2023  Sinus rhythm, borderline T wave abnormalities lead I and aVL otherwise normal ECG     Chest x-ray 2/16/2023  No acute process     2D echo 2/17/2023  Normal LV size, wall thickness, wall motion, ejection fraction more than 55%  Aortic valve is trileaflet, moderate aortic regurgitation  Normal IVC size and inspiratory collapse     Lexiscan Myoview stress test 2/17/2023  No ischemic ECG changes  No ischemia no scar, ejection fraction 60%, normal wall motion     History of present illness    47-year-old female who lives with the boyfriend and mother of 5 kids with a past medical history of renal failure secondary to polycystic kidney disease on dialysis since September 2022, hypertension, smoker got admitted on 2/22/2023 with left upper chest pain like heavy pressure/tightness radiating to the left side of the neck and arm it was associated with mild shortness of breath. She came to the emergency room she was also having nausea. ECG showed sinus rhythm, borderline T wave abnormalities lead I and aVL otherwise normal ECG. She was hospitalized on 2/16/2023 with a similar chest pain and had a stress test and echo examination were negative. She is worried about her recurrent chest pain.      Blood pressure on admission 174/104, heart rate 123, temperature 97.8, oxygen saturation 100%  Chest x-ray on admission no acute process     Lab work on admission  High-sensitivity troponin 21  Sodium 138, potassium 4.0, BUN 41, creatinine 8.12, glucose 110, calcium 8.7  WBC 12.4, hemoglobin 11.2, platelets 334     Current evaluation  Patient seen and examined  She was resting on bed she was busy with her cell phone  She said her chest pain is almost gone  Her main complaint was right groin discomfort  There was no obvious hematoma, normal peripheral pulse  She is hemodynamically stable    She had a cardiac cath yesterday 2/23/2023 and it showed nonobstructive coronary artery disease    Blood pressure 115/70, heart rate 80, temperature 97.5, oxygen saturation 98% room air    Medications:   Scheduled Meds:   amitriptyline  25 mg Oral Nightly    amLODIPine  10 mg Oral Daily    aspirin  81 mg Oral Daily    atorvastatin  40 mg Oral Nightly    DULoxetine  60 mg Oral Daily    hydrALAZINE  50 mg Oral 3 times per day    hydrOXYzine HCl  50 mg Oral Nightly    melatonin  9 mg Oral Nightly    metoprolol succinate  50 mg Oral Daily    heparin (porcine)  5,000 Units SubCUTAneous 3 times per day    dianeal lo-raoul (ULTRABAG) 2.5%  2,000 mL IntraPERitoneal 4x daily     Continuous Infusions:  CBC:   Recent Labs     02/22/23  1649   WBC 12.4*   HGB 11.2*   *     BMP:    Recent Labs     02/22/23  1649      K 4.0   CL 98   CO2 25   BUN 41*   CREATININE 8.12*   GLUCOSE 110*     Hepatic: No results for input(s): AST, ALT, ALB, BILITOT, ALKPHOS in the last 72 hours. Troponin: No results for input(s): TROPONINI in the last 72 hours. BNP: No results for input(s): BNP in the last 72 hours. Lipids: No results for input(s): CHOL, HDL in the last 72 hours. Invalid input(s): LDLCALCU  INR: No results for input(s): INR in the last 72 hours. Objective:   Vitals: /73   Pulse 86   Temp 97.5 °F (36.4 °C)   Resp 18   Ht 5' 5\" (1.651 m)   Wt 136 lb 11 oz (62 kg)   LMP 10/31/2022 (Approximate)   SpO2 98%   BMI 22.75 kg/m²   General appearance: alert and cooperative with exam  HEENT: Head: Normal, normocephalic, atraumatic. Neck: no JVD and supple, symmetrical, trachea midline  Lungs: dullness to percussion bibasilar  Heart: regular rate and rhythm  Abdomen:  Soft bowel sounds present  Extremities: Homans sign is negative, no sign of DVT  Neurologic: Mental status: Alert, oriented, thought content appropriate    EKG: normal sinus rhythm.,  Borderline nonspecific T wave changes lead I aVL  ECHO: reviewed. Ejection fraction: >55%, normal LV size and wall thickness, no obvious valvular abnormality  Normal IVC size and inspiratory collapse no pericardial effusion  Stress Test: reviewed. No ischemia  Cardiac Angiography: reviewed.   Nonobstructive coronary artery disease LAD, circumflex and RCA cardiac cath 2/23/2023        Assessment / Acute Cardiac Problems:   Admission with this severe left upper chest pain radiating to the neck and left arm  High-sensitivity troponin 21  ECG showed sinus rhythm  Hypertension, renal failure, history of smoking  Multiple cardiac risk factors  Cardiac cath 2/23/2023 nonobstructive CAD no coronary intervention    2/24/2023 hemodynamically stable mild right groin pain no hematoma normal peripheral pulse    Patient Active Problem List:     Asthma     Smoker     Polycystic kidney     Eczema     Depression with anxiety     Chronic headaches     Tension vascular headache     Irregular bleeding     Metrorrhagia     Menorrhagia     Dysmenorrhea     Pelvic pain in female     Kidney stone     Acute back pain     Anxiety     Hypertension     Headache     Depression     CKD (chronic kidney disease) stage 3, GFR 30-59 ml/min ([facility])     Chronic neck pain     Degenerative disc disease, cervical     Encounter for long-term (current) use of other medications     Cervicalgia     Chronic intractable headache     Hemorrhage of cyst of native kidney     Abdominal pain     CKD (chronic kidney disease) stage 4, GFR 15-29 ml/min ([facility])     Acute renal failure with acute tubular necrosis superimposed on stage 3 chronic kidney disease ([facility])     Allergic rhinitis due to animal hair and dander     Congenital multiple renal cysts     Gross hematuria     History of anemia due to chronic kidney disease     History of multiple allergies     Hyperlipidemia     IUD (intrauterine device) in place     Leukocytosis     Other specified disorders of kidney and ureter     Pain in joint     Recurrent major depression in full remission ([address]     Renovascular hypertension     Right ureteral stone     Right nephrolithiasis     Flank pain     Acute kidney injury superimposed on chronic kidney disease ([address]     Urinary tract infection with hematuria     History of major depression     Iron deficiency anemia due to chronic blood loss     Hypernatremia     SILVANO (acute kidney injury) ([address]     Dysuria     Acute kidney injury superimposed on CKD [facility])     Pyelonephritis     Hypertensive emergency Abdominal pain, epigastric     Abdominal pain, right upper quadrant     Nausea     Gastroesophageal reflux disease     Angina pectoris (HCC)     Chest pain      Plan of Treatment:   Medications reviewed  Continue current dose of amlodipine 10 mg a day, aspirin 81 mg a day, hydralazine 50 mg 3 times a day, Lipitor 40 mg a day, metoprolol succinate 50 mg a day  Patient is also on Cymbalta and Atarax  Discussed with the patient to apply ice pack to right groin for couple of days  Okay to discharge from cardiac point of view    Electronically signed by Laz Max MD on 2/24/2023 at 12:39 PM

## 2023-02-24 NOTE — DISCHARGE INSTR - COC
Continuity of Care Form    Patient Name: Praveen Dick   :  1980  MRN:  [id number]    Admit date:  2023  Discharge date:  ***    Code Status Order: Full Code   Advance Directives:     Admitting Physician:  Talisha Mcmillan MD  PCP: Lucien Diaz PA-C    Discharging Nurse: [facility] Unit/Room#: 2085/2085-01  Discharging Unit Phone Number: ***    Emergency Contact:   Extended Emergency Contact Information  Primary Emergency Contact: Izzy Gomez  Address: Vesna Agarwal, [address] [place] [address] Phone: [phone]  Work Phone: [phone]  Mobile Phone: [phone]  Relation: Parent    Past Surgical History:  Past Surgical History:   Procedure Laterality Date    BREAST ENHANCEMENT SURGERY      BREAST LUMPECTOMY      left breast    CYSTO/URETERO/PYELOSCOPY, CALCULUS TX Right 2020    HOLMIUM - CYSTO, RIGHT RETROGRADE PYELOGRAM, RIGHT URETEROSCOPY, LASER LITHO ON STAND BY, RIGHT STENT PLACEMENT performed by Ana Landaverde MD at [facility] 27  2020    DILATION AND CURETTAGE OF UTERUS      x2    IR TUNNELED CATHETER PLACEMENT GREATER THAN 5 YEARS  3/8/2022    IR TUNNELED CATHETER PLACEMENT GREATER THAN 5 YEARS 3/8/2022 [facility] SPECIAL PROCEDURES    LITHOTRIPSY Right 2020    ESWL EXTRACORPOREAL SHOCK WAVE LITHOTRIPSY performed by Ana Landaverde MD at \[facility]"" 82  2013    nerve block(right vervical C3/TON/C4/C5    NERVE BLOCK  2013    nerve block(right vervical C3/TON/C4/C5    OTHER SURGICAL HISTORY  03/10/2014     botox inj     TOE SURGERY      removal of ingrown toe nail-2nd toe on left foot     UPPER GASTROINTESTINAL ENDOSCOPY N/A 3/7/2022    EGD BIOPSY performed by Victoria Mayer MD at [facility]    URETEROSCOPY Right 2020    stent placement       Immunization History:   Immunization History   Administered Date(s) Administered    COVID-19, MODERNA BLUE border, Primary or Immunocompromised, (age 12y+), IM, 100 mcg/0.5mL 04/29/2021, 05/27/2021, 07/22/2022    Influenza Vaccine, unspecified formulation 10/26/2016    Influenza Virus Vaccine 11/24/2014, 10/30/2015, 10/31/2018    Influenza, FLUARIX, FLULAVAL, [place] (age 10 mo+) AND AFLURIA, (age 1 y+), PF, 0.5mL 02/24/2021, 11/08/2021    Influenza, High Dose (Fluzone 65 yrs and older) 12/06/2011, 10/02/2012    Pneumococcal Polysaccharide ([id number]) 10/30/2015       Active Problems:  Patient Active Problem List   Diagnosis Code    Asthma J45.909    Smoker F17.200    Polycystic kidney Q61.3    Eczema L30.9    Depression with anxiety F41.8    Chronic headaches R51.9, G89.29    Tension vascular headache G44.209    Irregular bleeding N92.6    Metrorrhagia N92.1    Menorrhagia N92.0    Dysmenorrhea N94.6    Pelvic pain in female R10.2    Kidney stone N20.0    Acute back pain M54.9    Anxiety F41.9    Hypertension I10    Headache R51.9    Depression F32. A    CKD (chronic kidney disease) stage 3, GFR 30-59 ml/min (HCC) N18.30    Chronic neck pain M54.2, G89.29    Degenerative disc disease, cervical M50.30    Encounter for long-term (current) use of other medications Z79.899    Cervicalgia M54.2    Chronic intractable headache R51.9, G89.29    Hemorrhage of cyst of native kidney N28.89, N28.1    Abdominal pain R10.9    CKD (chronic kidney disease) stage 4, GFR 15-29 ml/min (HCC) N18.4    Acute renal failure with acute tubular necrosis superimposed on stage 3 chronic kidney disease (HCC) N17.0, N18.30    Allergic rhinitis due to animal hair and dander J30.81    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    Gross hematuria R31.0    History of anemia due to chronic kidney disease N18.9, Z86.2    History of multiple allergies Z88.9    Hyperlipidemia E78.5    IUD (intrauterine device) in place Z97.5    Leukocytosis D72.829    Other specified disorders of kidney and ureter N28.89    Pain in joint M25.50    Recurrent major depression in full remission ([facility] F33.42    Renovascular hypertension I15.0 Right ureteral stone N20.1    Right nephrolithiasis N20.0    Flank pain R10.9    Acute kidney injury superimposed on chronic kidney disease (HCC) N17.9, N18.9    Urinary tract infection with hematuria N39.0, R31.9    History of major depression Z86.59    Iron deficiency anemia due to chronic blood loss D50.0    Hypernatremia E87.0    SILVANO (acute kidney injury) ([facility] N17.9    Dysuria R30.0    Acute kidney injury superimposed on CKD (HCC) N17.9, N18.9    Pyelonephritis N12    Hypertensive emergency I16.1    Abdominal pain, epigastric R10.13    Abdominal pain, right upper quadrant R10.11    Nausea R11.0    Gastroesophageal reflux disease K21.9    Angina pectoris (HCC) I20.9    Chest pain R07.9       Isolation/Infection:   Isolation            No Isolation          Patient Infection Status       Infection Onset Added Last Indicated Last Indicated By Review Planned Expiration Resolved Resolved By    None active    Resolved    COVID-19 (Rule Out) 07/19/20 07/19/20 07/19/20 COVID-19 (Ordered)   07/19/20 Rule-Out Test Resulted            Nurse Assessment:  Last Vital Signs: /73   Pulse 86   Temp 97.5 °F (36.4 °C)   Resp 18   Ht 5' 5\" (1.651 m)   Wt 136 lb 11 oz (62 kg)   LMP 10/31/2022 (Approximate)   SpO2 98%   BMI 22.75 kg/m²     Last documented pain score (0-10 scale): Pain Level: 7  Last Weight:   Wt Readings from Last 1 Encounters:   02/24/23 136 lb 11 oz (62 kg)     Mental Status:  {IP PT MENTAL STATUS:08228}    IV Access:  { ALETHA IV ACCESS:913212933}    Nursing Mobility/ADLs:  Walking   {[facility] DME RUXS:819547921}  Transfer  {[facility] DME ZGUO:949212609}  Bathing  {[facility] DME VEJI:415784235}  Dressing  {P DME MZNC:459534064}  Toileting  {[facility] DME ZWBR:962271736}  Feeding  {[facility] DME LBGA:228003589}  Med Admin  {[facility] DME YASN:173800669}  Med Delivery   { ALETHA MED Delivery:145431789}    Wound Care Documentation and Therapy:        Elimination:  Continence:    Bowel: {YES / SK:55520}  Bladder: {YES / RN:74191}  Urinary Catheter: {Urinary Catheter:676617557}   Colostomy/Ileostomy/Ileal Conduit: {YES / EB:98954}       Date of Last BM: ***    Intake/Output Summary (Last 24 hours) at 2023 1651  Last data filed at 2023 0747  Gross per 24 hour   Intake 6400 ml   Output 9900 ml   Net -3500 ml     I/O last 3 completed shifts: In: 07475 [P.O.:300; Other:42480]  Out: [address] [Urine:400;  ORRHJ:54583]    Safety Concerns:     [address] ALETHA Safety Concerns:609306043}    Impairments/Disabilities:      [address] [facility] Impairments/Disabilities:137138261}    Nutrition Therapy:  Current Nutrition Therapy:   [address] [facility] Diet List:693098828}    Routes of Feeding: {CHP DME Other Feedings:373413588}  Liquids: {Slp liquid thickness:25999}  Daily Fluid Restriction: {CHP DME Yes amt example:993671514}  Last Modified Barium Swallow with Video (Video Swallowing Test): {Done Not Done QRFU:952939419}    Treatments at the Time of Hospital Discharge:   Respiratory Treatments: ***  Oxygen Therapy:  {Therapy; copd oxygen:82375}  Ventilator:    {[facility] Vent HJOT:781693986}    Rehab Therapies: {THERAPEUTIC INTERVENTION:1770554922}  Weight Bearing Status/Restrictions: [address] [facility] Weight Bearin}  Other Medical Equipment (for information only, NOT a DME order):  {EQUIPMENT:395685719}  Other Treatments: ***    Patient's personal belongings (please select all that are sent with patient):  {CHP DME Belongings:967592403}    RN SIGNATURE:  {Esignature:778213326}    CASE MANAGEMENT/SOCIAL WORK SECTION    Inpatient Status Date: ***    Readmission Risk Assessment Score:  Readmission Risk              Risk of Unplanned Readmission:  18           Discharging to Facility/ Agency   Name:   Address:  Phone:  Fax:    Dialysis Facility (if applicable)   Name:  Address:  Dialysis Schedule:  Phone:  Fax:    / signature: {Esignature:753608557}    PHYSICIAN SECTION    Prognosis: {Prognosis:0692155520}    Condition at Discharge: [address] Patient Condition:870901559}    Rehab Potential (if transferring to Rehab): {Prognosis:7290355640}    Recommended Labs or Other Treatments After Discharge: ***    Physician Certification: I certify the above information and transfer of Howie Ortiz  is necessary for the continuing treatment of the diagnosis listed and that she requires {Admit to Appropriate Level of Care:85789} for {GREATER/LESS:993708883} 30 days.      Update Admission H&P: {CHP DME Changes in OCNUT:589433448}    PHYSICIAN SIGNATURE:  {Esignature:671489435}

## 2023-02-24 NOTE — PROGRESS NOTES
Department of Internal Medicine  Nephrology Landry Arciniega MD Progress Note    Reason for consultation: Management of hemodialysis dependent end-stage renal disease. Consulting physician: Megan Red MD.    Interval history: Patient was seen and examined today and chest pain is resolved. She had cardiac catheterization yesterday which did not show any significant coronary artery disease. History of present illness: This is a 38 y.o. female with a significant past medical history of Nephrolithiasis [s/p multiple lithotripsies and ESWL performed by Dr. 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 She had a similar presentation 1 week ago and at that time stress test was negative. She has been scheduled for cardiac catheterization today. At time of this encounter, she continues to have chest pain rated 3-4/10 in the precordial area. She does not have shortness of breath or fever. Scheduled Meds:   amitriptyline  25 mg Oral Nightly    amLODIPine  10 mg Oral Daily    aspirin  81 mg Oral Daily    atorvastatin  40 mg Oral Nightly    DULoxetine  60 mg Oral Daily    hydrALAZINE  50 mg Oral 3 times per day    hydrOXYzine HCl  50 mg Oral Nightly    melatonin  9 mg Oral Nightly    metoprolol succinate  50 mg Oral Daily    heparin (porcine)  5,000 Units SubCUTAneous 3 times per day    dianeal lo-raoul (ULTRABAG) 2.5%  2,000 mL IntraPERitoneal 4x daily     Continuous Infusions:  PRN Meds:.oxyCODONE-acetaminophen    Physical Exam:    VITALS:  /75   Pulse 74   Temp 97.9 °F (36.6 °C) (Oral)   Resp 17   Ht 5' 5\" (1.651 m)   Wt 136 lb 11 oz (62 kg)   LMP 10/31/2022 (Approximate)   SpO2 95%   BMI 22.75 kg/m²   TEMPERATURE:  Current - Temp: 97.9 °F (36.6 °C);  Max - Temp  Av.3 °F (36.8 °C) Min: 97.9 °F (36.6 °C)  Max: 98.5 °F (36.9 °C)  RESPIRATIONS RANGE: Resp  Avg: 15.3  Min: 11  Max: 18  PULSE RANGE: Pulse  Av  Min: 72  Max: 84  BLOOD PRESSURE RANGE:  Systolic (19MPB), OUU:699 , Min:106 , GZP:406 ; Diastolic (61NKQ), LFN:39, Min:69, Max:85  PULSE OXIMETRY RANGE: SpO2  Av.9 %  Min: 94 %  Max: 100 %  24HR INTAKE/OUTPUT:    Intake/Output Summary (Last 24 hours) at 2023 1009  Last data filed at 2023 0730  Gross per 24 hour   Intake 6300 ml   Output 24044 ml   Net -3750 ml       Constitutional: alert, appears stated age, and cooperative    Skin: Skin color, texture, turgor normal. No rashes or lesions    Head: Normocephalic, without obvious abnormality, atraumatic     Cardiovascular/Edema: regular rate and rhythm, S1, S2 normal, no murmur, click, rub or gallop    Respiratory: Lungs: clear to auscultation bilaterally    Abdomen: soft, non-tender; bowel sounds normal; no masses,  no organomegaly    Back: symmetric, no curvature. ROM normal. No CVA tenderness.     Extremities: extremities normal, atraumatic, no cyanosis or edema    Neuro:  Grossly normal      CBC:   Recent Labs     23  1649   WBC 12.4*   HGB 11.2*   *       BMP:    Recent Labs     23  1649      K 4.0   CL 98   CO2 25   BUN 41*   CREATININE 8.12*   GLUCOSE 110*       Lab Results   Component Value Date/Time    NITRU NEGATIVE 2022 10:04 PM    COLORU Yellow 2022 10:04 PM    PHUR 6.0 2022 10:04 PM    WBCUA 10 TO 20 2022 10:04 PM    RBCUA 0 TO 2 2022 10:04 PM    MUCUS NOT REPORTED 2021 08:02 AM    TRICHOMONAS NOT REPORTED 2021 08:02 AM    YEAST NOT REPORTED 2021 08:02 AM    BACTERIA None 2022 10:04 PM    CLARITYU Clear 2016 12:00 AM    SPECGRAV 1.011 2022 10:04 PM    LEUKOCYTESUR SMALL 2022 10:04 PM    UROBILINOGEN Normal 2022 10:04 PM    BILIRUBINUR NEGATIVE 2022 10:04 PM    BILIRUBINUR neg 2014 11:31 AM BLOODU Positive 04/26/2016 12:00 AM    GLUCOSEU NEGATIVE 03/04/2022 10:04 PM    KETUA NEGATIVE 03/04/2022 10:04 PM    AMORPHOUS NOT REPORTED 11/06/2021 08:02 AM     Urine Sodium:     Lab Results   Component Value Date/Time    NOHELIA 86 06/21/2021 01:58 PM     Urine Chloride:    Lab Results   Component Value Date/Time    CLUR 72 06/21/2021 01:58 PM     Urine Osmolarity:   Lab Results   Component Value Date/Time    OSMOU 236 02/21/2021 09:07 PM     Urine Protein:     Lab Results   Component Value Date/Time    TPU 18 06/21/2021 12:04 PM     Urine Creatinine:     Lab Results   Component Value Date/Time    LABCREA 29.5 06/21/2021 01:58 PM     IMPRESSION/RECOMMENDATIONS:      1.  End-stage renal disease - secondary to ADPKD. Continue CAPD using 2.5% Dianeal, 2 L by exchange and 4 exchanges per day. Renal diet,i.e 2-gram sodium,2-gram potassium,1500 ml fluid restriction,1-gram phosphorus, 1800 KCal and 1.2 gram/kg protein per day. 2.  Chest pain - Cardiac catheterization [2/23/2023] showed minimal nonobstructive CAD with normal left ventricular systolic function. 3.  Systemic hypertension - Blood pressure is adequately controlled. Prognosis is guarded. No renal objection to discharge.     Karuna Geiger MD FACP  Attending Nephrologist  2/24/2023 10:09 AM

## 2023-02-24 NOTE — OP NOTE
[facility]    CARDIAC CATHETERIZATION    Date:   2/24/2023  Patient name:  Micha Browning  Date of admission:  2/23/2023  1:30 PM  MRN:   [id number]  YOB: 1980    Operators:  Primary:   Taya Greco MD (Attending Physician)    Assistant/CV fellow:  Katie Rodriguez MD      Procedure performed:       [x] Left Heart Catheterization. [] Graft Angiography. [x] Left Ventriculography. [] Right Heart Catheterization. [x] Coronary Angiography. [] Aortic Valve Studies. [] PCI:      [] Other:       Pre Procedure Conscious Sedation Data:  ASA Class:    [] I [] II [x] III [] IV    Mallampati Class:  [x] I [] II [] III [] IV      Indication:  [] STEMI      [] + Stress test  [] ACS      [] + EKG Changes  [] Non Q MI       [] Significant Risk Factors  [x] Recurrent Angina             [] Diabetes Mellitus    [] New LBBB      [] Uncontrolled HTN. [] CHF / Low EF changes     [] Abnormal CTA / Ca Score      Procedure:  Access:  [x] Femoral  [] Radial  artery       [x] Right  [] Left    Procedure: After informed consent was obtained with explanation of the risks and benefits, patient was brought to the cath lab. The access area was prepped and draped in sterile fashion. 1% lidocaine was used for local block. The artery was cannulated with 6  Fr sheath with brisk arterial blood return. The side port was frequently flushed and aspirated with normal saline. Findings:   Angiographic Findings        Cardiac Arteries and Lesion Findings       LMCA: Normal 0% stenosis. LAD: Diffuse irregularities 20-30%. LCx: Diffuse irregularities 20-30%. RCA: Diffuse irregularities 30-40%. Coronary Tree        Dominance: Right       LV Analysis   LV function assessed as:Normal.   Ejection Fraction   +----------------------------------------------------------------------+---+   ! Method                                                                ! EF%! +----------------------------------------------------------------------+---+   ! LV gram                                                               !60 !   +----------------------------------------------------------------------+---+    Procedure Summary        Minimal non-obstructive CAD. Normal LV systolic function. Recommendations        Medical therapy as needed. Risk factor modification. Estimated Blood Loss: 10 mL    ____________________________________________________________________    History and Risk Factors    [x] Hypertension     [] Family history of CAD  [x] Hyperlipidemia     [] Cerebrovascular Disease   [] Prior MI       [] Peripheral Vascular disease   [] Prior PCI              [] Diabetes Mellitus    [] Left Main PCI. [] Currently on Dialysis. [] Prior CABG. [] Currently smoker. [] Cardiac Arrest outside of healthcare facility. [] Yes    [x] No        Witnessed     [] Yes   [] No     Arrest after arrival of EMS  [] Yes   [] No     [] Cardiac Arrest at other Facility. [] Yes    No  []  Pre-Procedure Information. Heart Failure       [] Yes    [x] No        Class  [] I      [] II  [] III    [] IV. New Diagnosis    [] Yes  [] No    HF Type      [] Systolic   [] Diastolic          [] Unknown. Diagnostic Test:   EKG       [] Normal   [x] Abnormal    New antiarrhythmia medications:    [] Yes   [] No   New onset atrial fibrillation / Flutter     [] Yes   [] No   ECG Abnormalities:      [] V. Fib   [] Michelle V. Tach           [] NS V. T   [] New LBBB           [] T.  Inv  []  ST dev > 0.5 mm         [] PVC's freq  [] PVC's infrequent    Stress Test Performed:      [] Yes    [x] No     Type:     [] Stress Echo   [] Exercise Stress Test (no imaging)      [] Stress Nuclear  [] Stress Imaging     Results   [] Negative   [] Positive        [] Indeterminate  [] Unavailable     If Positive/ Risk / Extent of Ischemia:       [] Low  [] Intermediate         [] High  [] Unavailable Cardiac CTA Performed:     [] Yes    [x] No      Results   [] CAD   [] Non obstructive CAD      [] No CAD   [] Uncertain      [] Unknown   [] Structural Disease.      Pre Procedure Medications:   [x] Yes    [] No         [x] ASA   [] Beta Blockers      [] Nitrate   [] Ca Channel Blockers      [] Ranolazine   [] Statin       [] Plavix/Others antiplatelets      Electronically signed on 2/24/2023 at 10:17 AM by:    Eufemia Fernandez MD  Fellow, [address]

## 2023-03-01 LAB
EKG ATRIAL RATE: 119 BPM
EKG P AXIS: 98 DEGREES
EKG P-R INTERVAL: 124 MS
EKG Q-T INTERVAL: 340 MS
EKG QRS DURATION: 74 MS
EKG QTC CALCULATION (BAZETT): 478 MS
EKG R AXIS: 11 DEGREES
EKG T AXIS: 76 DEGREES
EKG VENTRICULAR RATE: 119 BPM

## 2023-03-15 ENCOUNTER — HOSPITAL ENCOUNTER (OUTPATIENT)
Age: 43
Discharge: HOME | End: 2023-03-15
Payer: MEDICAID

## 2023-03-15 LAB — VANCOMYCIN RANDOM: 11.7 UG/ML

## 2023-03-15 PROCEDURE — 80202 ASSAY OF VANCOMYCIN: CPT

## 2023-03-15 PROCEDURE — 36415 COLL VENOUS BLD VENIPUNCTURE: CPT

## 2023-03-20 ENCOUNTER — HOSPITAL ENCOUNTER (OUTPATIENT)
Age: 43
Discharge: HOME | End: 2023-03-20
Payer: MEDICAID

## 2023-03-20 LAB
VANCOMYCIN RANDOM DATE LAST DOSE: NORMAL
VANCOMYCIN RANDOM DOSE AMOUNT: NORMAL
VANCOMYCIN RANDOM TIME LAST DOSE: NORMAL
VANCOMYCIN RANDOM: 18.1 UG/ML

## 2023-03-20 PROCEDURE — 36415 COLL VENOUS BLD VENIPUNCTURE: CPT

## 2023-03-20 PROCEDURE — 80202 ASSAY OF VANCOMYCIN: CPT

## 2023-03-22 ENCOUNTER — HOSPITAL ENCOUNTER (OUTPATIENT)
Age: 43
Discharge: HOME | End: 2023-03-22
Payer: MEDICAID

## 2023-03-22 LAB
VANCOMYCIN RANDOM DATE LAST DOSE: NORMAL
VANCOMYCIN RANDOM DOSE AMOUNT: 1000
VANCOMYCIN RANDOM TIME LAST DOSE: 1415
VANCOMYCIN RANDOM: 19.3 UG/ML

## 2023-03-22 PROCEDURE — 80202 ASSAY OF VANCOMYCIN: CPT

## 2023-03-22 PROCEDURE — 36415 COLL VENOUS BLD VENIPUNCTURE: CPT

## 2023-03-24 ENCOUNTER — HOSPITAL ENCOUNTER (INPATIENT)
Age: 43
LOS: 1 days | Discharge: HOME | DRG: 721 | End: 2023-03-26
Attending: STUDENT IN AN ORGANIZED HEALTH CARE EDUCATION/TRAINING PROGRAM | Admitting: INTERNAL MEDICINE
Payer: MEDICAID

## 2023-03-24 ENCOUNTER — HOSPITAL ENCOUNTER (OUTPATIENT)
Age: 43
Discharge: HOME | End: 2023-03-24
Payer: MEDICAID

## 2023-03-24 ENCOUNTER — APPOINTMENT (OUTPATIENT)
Dept: CT IMAGING | Age: 43
DRG: 721 | End: 2023-03-24
Payer: MEDICAID

## 2023-03-24 DIAGNOSIS — A49.9 BACTERIAL INFECTION ASSOCIATED WITH PERITONEAL DIALYSIS CATHETER, INITIAL ENCOUNTER (HCC): ICD-10-CM

## 2023-03-24 DIAGNOSIS — T85.71XA BACTERIAL INFECTION ASSOCIATED WITH PERITONEAL DIALYSIS CATHETER, INITIAL ENCOUNTER (HCC): ICD-10-CM

## 2023-03-24 DIAGNOSIS — R10.84 GENERALIZED ABDOMINAL PAIN: Primary | ICD-10-CM

## 2023-03-24 LAB
ABSOLUTE EOS #: 0.4 K/UL (ref 0–0.4)
ABSOLUTE LYMPH #: 2.3 K/UL (ref 1–4.8)
ABSOLUTE MONO #: 0.9 K/UL (ref 0.1–1.3)
ALBUMIN SERPL-MCNC: 3.7 G/DL (ref 3.5–5.2)
ALP SERPL-CCNC: 77 U/L (ref 35–104)
ALT SERPL-CCNC: 10 U/L (ref 5–33)
ANION GAP SERPL CALCULATED.3IONS-SCNC: 13 MMOL/L (ref 9–17)
AST SERPL-CCNC: 14 U/L
BACTERIA: NORMAL
BASOPHILS # BLD: 1 % (ref 0–2)
BASOPHILS ABSOLUTE: 0.1 K/UL (ref 0–0.2)
BILIRUB SERPL-MCNC: 0.2 MG/DL (ref 0.3–1.2)
BILIRUBIN URINE: NEGATIVE
BUN SERPL-MCNC: 42 MG/DL (ref 6–20)
CALCIUM SERPL-MCNC: 8.1 MG/DL (ref 8.6–10.4)
CASTS UA: NORMAL /LPF
CHLORIDE SERPL-SCNC: 97 MMOL/L (ref 98–107)
CO2 SERPL-SCNC: 29 MMOL/L (ref 20–31)
COLOR: YELLOW
CREAT SERPL-MCNC: 7.12 MG/DL (ref 0.5–0.9)
CRP SERPL HS-MCNC: 5.1 MG/L (ref 0–5)
EOSINOPHILS RELATIVE PERCENT: 3 % (ref 0–4)
EPITHELIAL CELLS UA: NORMAL /HPF
ERYTHROCYTE [SEDIMENTATION RATE] IN BLOOD BY WESTERGREN METHOD: 34 MM/HR (ref 0–20)
GFR SERPL CREATININE-BSD FRML MDRD: 7 ML/MIN/1.73M2
GLUCOSE SERPL-MCNC: 108 MG/DL (ref 70–99)
GLUCOSE UR STRIP.AUTO-MCNC: ABNORMAL MG/DL
HCG QUALITATIVE: NEGATIVE
HCT VFR BLD AUTO: 29.5 % (ref 36–46)
HGB BLD-MCNC: 10.4 G/DL (ref 12–16)
KETONES UR STRIP.AUTO-MCNC: NEGATIVE MG/DL
LACTATE PLASV-SCNC: 1 MMOL/L (ref 0.5–2.2)
LEUKOCYTE ESTERASE UR QL STRIP.AUTO: NEGATIVE
LIPASE SERPL-CCNC: 49 U/L (ref 13–60)
LYMPHOCYTES # BLD: 20 % (ref 24–44)
MAGNESIUM SERPL-MCNC: 1.7 MG/DL (ref 1.6–2.6)
MCH RBC QN AUTO: 32.6 PG (ref 26–34)
MCHC RBC AUTO-ENTMCNC: 35.2 G/DL (ref 31–37)
MCV RBC AUTO: 92.7 FL (ref 80–100)
MONOCYTES # BLD: 7 % (ref 1–7)
NITRITE UR QL STRIP.AUTO: NEGATIVE
PDW BLD-RTO: 13.2 % (ref 11.5–14.9)
PLATELET # BLD AUTO: 403 K/UL (ref 150–450)
PMV BLD AUTO: 7.6 FL (ref 6–12)
POTASSIUM SERPL-SCNC: 4.2 MMOL/L (ref 3.7–5.3)
PROT SERPL-MCNC: 6.5 G/DL (ref 6.4–8.3)
PROT UR STRIP.AUTO-MCNC: 8 MG/DL (ref 5–8)
PROT UR STRIP.AUTO-MCNC: ABNORMAL MG/DL
RBC # BLD: 3.18 M/UL (ref 4–5.2)
RBC CLUMPS #/AREA URNS AUTO: NORMAL /HPF
SEG NEUTROPHILS: 69 % (ref 36–66)
SEGMENTED NEUTROPHILS ABSOLUTE COUNT: 8.1 K/UL (ref 1.3–9.1)
SODIUM SERPL-SCNC: 139 MMOL/L (ref 135–144)
SPECIFIC GRAVITY UA: 1.01 (ref 1–1.03)
TURBIDITY: CLEAR
URINE HGB: NEGATIVE
UROBILINOGEN, URINE: NORMAL
VANCOMYCIN RANDOM DATE LAST DOSE: NORMAL
VANCOMYCIN RANDOM DOSE AMOUNT: NORMAL
VANCOMYCIN RANDOM TIME LAST DOSE: 1400
VANCOMYCIN RANDOM: 19.4 UG/ML
WBC # BLD AUTO: 11.8 K/UL (ref 3.5–11)
WBC UA: NORMAL /HPF

## 2023-03-24 PROCEDURE — 87075 CULTR BACTERIA EXCEPT BLOOD: CPT

## 2023-03-24 PROCEDURE — 84703 CHORIONIC GONADOTROPIN ASSAY: CPT

## 2023-03-24 PROCEDURE — 6360000002 HC RX W HCPCS: Performed by: STUDENT IN AN ORGANIZED HEALTH CARE EDUCATION/TRAINING PROGRAM

## 2023-03-24 PROCEDURE — 96374 THER/PROPH/DIAG INJ IV PUSH: CPT

## 2023-03-24 PROCEDURE — 85652 RBC SED RATE AUTOMATED: CPT

## 2023-03-24 PROCEDURE — 36415 COLL VENOUS BLD VENIPUNCTURE: CPT

## 2023-03-24 PROCEDURE — 85025 COMPLETE CBC W/AUTO DIFF WBC: CPT

## 2023-03-24 PROCEDURE — 74176 CT ABD & PELVIS W/O CONTRAST: CPT

## 2023-03-24 PROCEDURE — 87070 CULTURE OTHR SPECIMN AEROBIC: CPT

## 2023-03-24 PROCEDURE — 80202 ASSAY OF VANCOMYCIN: CPT

## 2023-03-24 PROCEDURE — 2580000003 HC RX 258: Performed by: STUDENT IN AN ORGANIZED HEALTH CARE EDUCATION/TRAINING PROGRAM

## 2023-03-24 PROCEDURE — 83605 ASSAY OF LACTIC ACID: CPT

## 2023-03-24 PROCEDURE — 83690 ASSAY OF LIPASE: CPT

## 2023-03-24 PROCEDURE — 80053 COMPREHEN METABOLIC PANEL: CPT

## 2023-03-24 PROCEDURE — 87205 SMEAR GRAM STAIN: CPT

## 2023-03-24 PROCEDURE — 89051 BODY FLUID CELL COUNT: CPT

## 2023-03-24 PROCEDURE — 96375 TX/PRO/DX INJ NEW DRUG ADDON: CPT

## 2023-03-24 PROCEDURE — 83735 ASSAY OF MAGNESIUM: CPT

## 2023-03-24 PROCEDURE — 81001 URINALYSIS AUTO W/SCOPE: CPT

## 2023-03-24 PROCEDURE — 86140 C-REACTIVE PROTEIN: CPT

## 2023-03-24 PROCEDURE — 96376 TX/PRO/DX INJ SAME DRUG ADON: CPT

## 2023-03-24 PROCEDURE — 99285 EMERGENCY DEPT VISIT HI MDM: CPT

## 2023-03-24 RX ORDER — ONDANSETRON 2 MG/ML
4 INJECTION INTRAMUSCULAR; INTRAVENOUS ONCE
Status: COMPLETED | OUTPATIENT
Start: 2023-03-24 | End: 2023-03-24

## 2023-03-24 RX ORDER — SODIUM CHLORIDE 9 MG/ML
INJECTION, SOLUTION INTRAVENOUS PRN
Status: DISCONTINUED | OUTPATIENT
Start: 2023-03-24 | End: 2023-03-26 | Stop reason: HOSPADM

## 2023-03-24 RX ORDER — SODIUM CHLORIDE 0.9 % (FLUSH) 0.9 %
5-40 SYRINGE (ML) INJECTION PRN
Status: DISCONTINUED | OUTPATIENT
Start: 2023-03-24 | End: 2023-03-26 | Stop reason: HOSPADM

## 2023-03-24 RX ORDER — SODIUM CHLORIDE 0.9 % (FLUSH) 0.9 %
5-40 SYRINGE (ML) INJECTION EVERY 12 HOURS SCHEDULED
Status: DISCONTINUED | OUTPATIENT
Start: 2023-03-24 | End: 2023-03-26 | Stop reason: HOSPADM

## 2023-03-24 RX ORDER — MORPHINE SULFATE 4 MG/ML
4 INJECTION, SOLUTION INTRAMUSCULAR; INTRAVENOUS
Status: COMPLETED | OUTPATIENT
Start: 2023-03-24 | End: 2023-03-24

## 2023-03-24 RX ORDER — MORPHINE SULFATE 4 MG/ML
4 INJECTION, SOLUTION INTRAMUSCULAR; INTRAVENOUS ONCE
Status: COMPLETED | OUTPATIENT
Start: 2023-03-24 | End: 2023-03-24

## 2023-03-24 RX ADMIN — MORPHINE SULFATE 4 MG: 4 INJECTION, SOLUTION INTRAMUSCULAR; INTRAVENOUS at 23:39

## 2023-03-24 RX ADMIN — MORPHINE SULFATE 4 MG: 4 INJECTION, SOLUTION INTRAMUSCULAR; INTRAVENOUS at 21:59

## 2023-03-24 RX ADMIN — SODIUM CHLORIDE, PRESERVATIVE FREE 10 ML: 5 INJECTION INTRAVENOUS at 23:39

## 2023-03-24 RX ADMIN — ONDANSETRON 4 MG: 2 INJECTION INTRAMUSCULAR; INTRAVENOUS at 22:08

## 2023-03-24 ASSESSMENT — ENCOUNTER SYMPTOMS
DIARRHEA: 0
SHORTNESS OF BREATH: 0
EYE REDNESS: 0
VOMITING: 0
NAUSEA: 1
SORE THROAT: 0
RHINORRHEA: 0
ABDOMINAL PAIN: 1
EYE DISCHARGE: 0

## 2023-03-24 ASSESSMENT — PAIN DESCRIPTION - LOCATION
LOCATION: ABDOMEN

## 2023-03-24 ASSESSMENT — PAIN SCALES - GENERAL
PAINLEVEL_OUTOF10: 8
PAINLEVEL_OUTOF10: 8
PAINLEVEL_OUTOF10: 7

## 2023-03-24 ASSESSMENT — LIFESTYLE VARIABLES
HOW OFTEN DO YOU HAVE A DRINK CONTAINING ALCOHOL: NEVER
HOW MANY STANDARD DRINKS CONTAINING ALCOHOL DO YOU HAVE ON A TYPICAL DAY: PATIENT DOES NOT DRINK

## 2023-03-24 ASSESSMENT — PAIN - FUNCTIONAL ASSESSMENT: PAIN_FUNCTIONAL_ASSESSMENT: 0-10

## 2023-03-24 ASSESSMENT — PAIN DESCRIPTION - ORIENTATION: ORIENTATION: LOWER;LEFT;MID

## 2023-03-25 PROBLEM — N18.6 ESRD ON PERITONEAL DIALYSIS (HCC): Status: ACTIVE | Noted: 2023-03-25

## 2023-03-25 PROBLEM — Z99.2 ESRD ON PERITONEAL DIALYSIS (HCC): Status: ACTIVE | Noted: 2023-03-25

## 2023-03-25 PROBLEM — A49.9 BACTERIAL INFECTION ASSOCIATED WITH PERITONEAL DIALYSIS CATHETER (HCC): Status: ACTIVE | Noted: 2023-03-25

## 2023-03-25 PROBLEM — Z88.0 ALLERGY TO PENICILLIN: Status: ACTIVE | Noted: 2023-03-25

## 2023-03-25 PROBLEM — K65.9 PERITONITIS (HCC): Status: ACTIVE | Noted: 2023-03-25

## 2023-03-25 PROBLEM — T85.71XA BACTERIAL INFECTION ASSOCIATED WITH PERITONEAL DIALYSIS CATHETER (HCC): Status: ACTIVE | Noted: 2023-03-25

## 2023-03-25 LAB
ALBUMIN SERPL-MCNC: 3.2 G/DL (ref 3.5–5.2)
ALP SERPL-CCNC: 63 U/L (ref 35–104)
ALT SERPL-CCNC: 8 U/L (ref 5–33)
ANION GAP SERPL CALCULATED.3IONS-SCNC: 12 MMOL/L (ref 9–17)
AST SERPL-CCNC: 11 U/L
BILIRUB SERPL-MCNC: 0.2 MG/DL (ref 0.3–1.2)
BUN SERPL-MCNC: 43 MG/DL (ref 6–20)
CALCIUM SERPL-MCNC: 8 MG/DL (ref 8.6–10.4)
CHLORIDE SERPL-SCNC: 99 MMOL/L (ref 98–107)
CO2 SERPL-SCNC: 29 MMOL/L (ref 20–31)
CREAT SERPL-MCNC: 7.22 MG/DL (ref 0.5–0.9)
GFR SERPL CREATININE-BSD FRML MDRD: 7 ML/MIN/1.73M2
GLUCOSE SERPL-MCNC: 114 MG/DL (ref 70–99)
MICROORGANISM/AGENT SPEC: NORMAL
POTASSIUM SERPL-SCNC: 3.7 MMOL/L (ref 3.7–5.3)
PROCALCITONIN SERPL-MCNC: 0.32 NG/ML
PROT SERPL-MCNC: 5.6 G/DL (ref 6.4–8.3)
SODIUM SERPL-SCNC: 140 MMOL/L (ref 135–144)
SPECIMEN DESCRIPTION: NORMAL

## 2023-03-25 PROCEDURE — 6360000002 HC RX W HCPCS: Performed by: NURSE PRACTITIONER

## 2023-03-25 PROCEDURE — 99223 1ST HOSP IP/OBS HIGH 75: CPT | Performed by: INTERNAL MEDICINE

## 2023-03-25 PROCEDURE — 2060000000 HC ICU INTERMEDIATE R&B

## 2023-03-25 PROCEDURE — 99254 IP/OBS CNSLTJ NEW/EST MOD 60: CPT | Performed by: NURSE PRACTITIONER

## 2023-03-25 PROCEDURE — 6370000000 HC RX 637 (ALT 250 FOR IP): Performed by: NURSE PRACTITIONER

## 2023-03-25 PROCEDURE — 87040 BLOOD CULTURE FOR BACTERIA: CPT

## 2023-03-25 PROCEDURE — 2580000003 HC RX 258: Performed by: NURSE PRACTITIONER

## 2023-03-25 PROCEDURE — 36415 COLL VENOUS BLD VENIPUNCTURE: CPT

## 2023-03-25 PROCEDURE — 2580000003 HC RX 258: Performed by: STUDENT IN AN ORGANIZED HEALTH CARE EDUCATION/TRAINING PROGRAM

## 2023-03-25 PROCEDURE — 6360000002 HC RX W HCPCS: Performed by: INTERNAL MEDICINE

## 2023-03-25 PROCEDURE — 84145 PROCALCITONIN (PCT): CPT

## 2023-03-25 PROCEDURE — 2580000003 HC RX 258: Performed by: INTERNAL MEDICINE

## 2023-03-25 PROCEDURE — 80053 COMPREHEN METABOLIC PANEL: CPT

## 2023-03-25 RX ORDER — SODIUM CHLORIDE 0.9 % (FLUSH) 0.9 %
5-40 SYRINGE (ML) INJECTION PRN
Status: DISCONTINUED | OUTPATIENT
Start: 2023-03-25 | End: 2023-03-26 | Stop reason: HOSPADM

## 2023-03-25 RX ORDER — SODIUM CHLORIDE, SODIUM LACTATE, CALCIUM CHLORIDE, MAGNESIUM CHLORIDE AND DEXTROSE 2.5; 538; 448; 18.3; 5.08 G/100ML; MG/100ML; MG/100ML; MG/100ML; MG/100ML
2000 INJECTION, SOLUTION INTRAPERITONEAL
Status: DISCONTINUED | OUTPATIENT
Start: 2023-03-25 | End: 2023-03-25

## 2023-03-25 RX ORDER — ONDANSETRON 4 MG/1
4 TABLET, ORALLY DISINTEGRATING ORAL EVERY 8 HOURS PRN
Status: DISCONTINUED | OUTPATIENT
Start: 2023-03-25 | End: 2023-03-25

## 2023-03-25 RX ORDER — ACETAMINOPHEN 650 MG/1
650 SUPPOSITORY RECTAL EVERY 6 HOURS PRN
Status: DISCONTINUED | OUTPATIENT
Start: 2023-03-25 | End: 2023-03-26 | Stop reason: HOSPADM

## 2023-03-25 RX ORDER — HYDRALAZINE HYDROCHLORIDE 50 MG/1
50 TABLET, FILM COATED ORAL EVERY 8 HOURS SCHEDULED
Status: DISCONTINUED | OUTPATIENT
Start: 2023-03-25 | End: 2023-03-26 | Stop reason: HOSPADM

## 2023-03-25 RX ORDER — PROCHLORPERAZINE EDISYLATE 5 MG/ML
10 INJECTION INTRAMUSCULAR; INTRAVENOUS EVERY 6 HOURS PRN
Status: DISCONTINUED | OUTPATIENT
Start: 2023-03-25 | End: 2023-03-26 | Stop reason: HOSPADM

## 2023-03-25 RX ORDER — ACETAMINOPHEN 325 MG/1
650 TABLET ORAL EVERY 6 HOURS PRN
Status: DISCONTINUED | OUTPATIENT
Start: 2023-03-25 | End: 2023-03-26 | Stop reason: HOSPADM

## 2023-03-25 RX ORDER — ATORVASTATIN CALCIUM 40 MG/1
40 TABLET, FILM COATED ORAL NIGHTLY
Status: DISCONTINUED | OUTPATIENT
Start: 2023-03-25 | End: 2023-03-26 | Stop reason: HOSPADM

## 2023-03-25 RX ORDER — MORPHINE SULFATE 4 MG/ML
4 INJECTION, SOLUTION INTRAMUSCULAR; INTRAVENOUS
Status: DISCONTINUED | OUTPATIENT
Start: 2023-03-25 | End: 2023-03-25

## 2023-03-25 RX ORDER — AMLODIPINE BESYLATE 10 MG/1
10 TABLET ORAL DAILY
Status: DISCONTINUED | OUTPATIENT
Start: 2023-03-25 | End: 2023-03-26 | Stop reason: HOSPADM

## 2023-03-25 RX ORDER — METOPROLOL SUCCINATE 50 MG/1
50 TABLET, EXTENDED RELEASE ORAL DAILY
Status: DISCONTINUED | OUTPATIENT
Start: 2023-03-25 | End: 2023-03-26 | Stop reason: HOSPADM

## 2023-03-25 RX ORDER — SODIUM CHLORIDE 0.9 % (FLUSH) 0.9 %
5-40 SYRINGE (ML) INJECTION EVERY 12 HOURS SCHEDULED
Status: DISCONTINUED | OUTPATIENT
Start: 2023-03-25 | End: 2023-03-26 | Stop reason: HOSPADM

## 2023-03-25 RX ORDER — AMITRIPTYLINE HYDROCHLORIDE 25 MG/1
25 TABLET, FILM COATED ORAL NIGHTLY
Status: DISCONTINUED | OUTPATIENT
Start: 2023-03-25 | End: 2023-03-26 | Stop reason: HOSPADM

## 2023-03-25 RX ORDER — ASPIRIN 81 MG/1
81 TABLET, CHEWABLE ORAL DAILY
Status: DISCONTINUED | OUTPATIENT
Start: 2023-03-25 | End: 2023-03-26 | Stop reason: HOSPADM

## 2023-03-25 RX ORDER — LANOLIN ALCOHOL/MO/W.PET/CERES
6 CREAM (GRAM) TOPICAL NIGHTLY
Status: DISCONTINUED | OUTPATIENT
Start: 2023-03-25 | End: 2023-03-26 | Stop reason: HOSPADM

## 2023-03-25 RX ORDER — PHENOL 1.4 %
10 AEROSOL, SPRAY (ML) MUCOUS MEMBRANE NIGHTLY
Status: DISCONTINUED | OUTPATIENT
Start: 2023-03-25 | End: 2023-03-25 | Stop reason: CLARIF

## 2023-03-25 RX ORDER — SODIUM CHLORIDE, SODIUM LACTATE, CALCIUM CHLORIDE, MAGNESIUM CHLORIDE AND DEXTROSE 2.5; 538; 448; 18.3; 5.08 G/100ML; MG/100ML; MG/100ML; MG/100ML; MG/100ML
2000 INJECTION, SOLUTION INTRAPERITONEAL EVERY 6 HOURS
Status: DISCONTINUED | OUTPATIENT
Start: 2023-03-25 | End: 2023-03-26 | Stop reason: HOSPADM

## 2023-03-25 RX ORDER — SODIUM CHLORIDE 9 MG/ML
INJECTION, SOLUTION INTRAVENOUS PRN
Status: DISCONTINUED | OUTPATIENT
Start: 2023-03-25 | End: 2023-03-26 | Stop reason: HOSPADM

## 2023-03-25 RX ORDER — HEPARIN SODIUM 5000 [USP'U]/ML
5000 INJECTION, SOLUTION INTRAVENOUS; SUBCUTANEOUS EVERY 8 HOURS SCHEDULED
Status: DISCONTINUED | OUTPATIENT
Start: 2023-03-25 | End: 2023-03-26 | Stop reason: HOSPADM

## 2023-03-25 RX ORDER — MORPHINE SULFATE 2 MG/ML
2 INJECTION, SOLUTION INTRAMUSCULAR; INTRAVENOUS EVERY 6 HOURS PRN
Status: DISCONTINUED | OUTPATIENT
Start: 2023-03-25 | End: 2023-03-26 | Stop reason: HOSPADM

## 2023-03-25 RX ORDER — ONDANSETRON 2 MG/ML
4 INJECTION INTRAMUSCULAR; INTRAVENOUS EVERY 6 HOURS PRN
Status: DISCONTINUED | OUTPATIENT
Start: 2023-03-25 | End: 2023-03-25

## 2023-03-25 RX ADMIN — DULOXETINE HYDROCHLORIDE 90 MG: 60 CAPSULE, DELAYED RELEASE ORAL at 09:07

## 2023-03-25 RX ADMIN — MORPHINE SULFATE 2 MG: 2 INJECTION, SOLUTION INTRAMUSCULAR; INTRAVENOUS at 21:24

## 2023-03-25 RX ADMIN — AMITRIPTYLINE HYDROCHLORIDE 25 MG: 25 TABLET, FILM COATED ORAL at 21:23

## 2023-03-25 RX ADMIN — METOPROLOL SUCCINATE 50 MG: 50 TABLET, EXTENDED RELEASE ORAL at 09:07

## 2023-03-25 RX ADMIN — SODIUM CHLORIDE, SODIUM LACTATE, CALCIUM CHLORIDE, MAGNESIUM CHLORIDE AND DEXTROSE 2000 ML: 2.5; 538; 448; 18.3; 5.08 INJECTION, SOLUTION INTRAPERITONEAL at 09:00

## 2023-03-25 RX ADMIN — SODIUM CHLORIDE, SODIUM LACTATE, CALCIUM CHLORIDE, MAGNESIUM CHLORIDE AND DEXTROSE 2000 ML: 2.5; 538; 448; 18.3; 5.08 INJECTION, SOLUTION INTRAPERITONEAL at 03:31

## 2023-03-25 RX ADMIN — HYDRALAZINE HYDROCHLORIDE 50 MG: 50 TABLET, FILM COATED ORAL at 04:28

## 2023-03-25 RX ADMIN — HEPARIN SODIUM 5000 UNITS: 5000 INJECTION INTRAVENOUS; SUBCUTANEOUS at 14:51

## 2023-03-25 RX ADMIN — ASPIRIN 81 MG 81 MG: 81 TABLET ORAL at 09:07

## 2023-03-25 RX ADMIN — SODIUM CHLORIDE, PRESERVATIVE FREE 10 ML: 5 INJECTION INTRAVENOUS at 21:12

## 2023-03-25 RX ADMIN — SODIUM CHLORIDE, PRESERVATIVE FREE 10 ML: 5 INJECTION INTRAVENOUS at 09:00

## 2023-03-25 RX ADMIN — ATORVASTATIN CALCIUM 40 MG: 40 TABLET, FILM COATED ORAL at 03:31

## 2023-03-25 RX ADMIN — PROCHLORPERAZINE EDISYLATE 10 MG: 5 INJECTION INTRAMUSCULAR; INTRAVENOUS at 04:28

## 2023-03-25 RX ADMIN — Medication 6 MG: at 02:46

## 2023-03-25 RX ADMIN — HYDRALAZINE HYDROCHLORIDE 50 MG: 50 TABLET, FILM COATED ORAL at 21:13

## 2023-03-25 RX ADMIN — SODIUM CHLORIDE, PRESERVATIVE FREE 10 ML: 5 INJECTION INTRAVENOUS at 21:23

## 2023-03-25 RX ADMIN — Medication 6 MG: at 21:13

## 2023-03-25 RX ADMIN — MORPHINE SULFATE 4 MG: 4 INJECTION INTRAVENOUS at 09:53

## 2023-03-25 RX ADMIN — AMLODIPINE BESYLATE 10 MG: 10 TABLET ORAL at 09:07

## 2023-03-25 RX ADMIN — MORPHINE SULFATE 4 MG: 4 INJECTION INTRAVENOUS at 02:46

## 2023-03-25 RX ADMIN — HYDRALAZINE HYDROCHLORIDE 50 MG: 50 TABLET, FILM COATED ORAL at 14:51

## 2023-03-25 RX ADMIN — AMITRIPTYLINE HYDROCHLORIDE 25 MG: 25 TABLET, FILM COATED ORAL at 02:46

## 2023-03-25 RX ADMIN — MORPHINE SULFATE 4 MG: 4 INJECTION INTRAVENOUS at 06:49

## 2023-03-25 RX ADMIN — SODIUM CHLORIDE, PRESERVATIVE FREE 10 ML: 5 INJECTION INTRAVENOUS at 09:07

## 2023-03-25 RX ADMIN — HEPARIN SODIUM 5000 UNITS: 5000 INJECTION INTRAVENOUS; SUBCUTANEOUS at 04:28

## 2023-03-25 RX ADMIN — MORPHINE SULFATE 2 MG: 2 INJECTION, SOLUTION INTRAMUSCULAR; INTRAVENOUS at 14:51

## 2023-03-25 RX ADMIN — SODIUM CHLORIDE, SODIUM LACTATE, CALCIUM CHLORIDE, MAGNESIUM CHLORIDE AND DEXTROSE 2000 ML: 2.5; 538; 448; 18.3; 5.08 INJECTION, SOLUTION INTRAPERITONEAL at 15:00

## 2023-03-25 RX ADMIN — SODIUM CHLORIDE, SODIUM LACTATE, CALCIUM CHLORIDE, MAGNESIUM CHLORIDE AND DEXTROSE 2000 ML: 2.5; 538; 448; 18.3; 5.08 INJECTION, SOLUTION INTRAPERITONEAL at 21:56

## 2023-03-25 RX ADMIN — ATORVASTATIN CALCIUM 40 MG: 40 TABLET, FILM COATED ORAL at 21:13

## 2023-03-25 RX ADMIN — HEPARIN SODIUM 5000 UNITS: 5000 INJECTION INTRAVENOUS; SUBCUTANEOUS at 21:12

## 2023-03-25 ASSESSMENT — PAIN DESCRIPTION - ORIENTATION
ORIENTATION: MID
ORIENTATION: MID

## 2023-03-25 ASSESSMENT — PAIN DESCRIPTION - LOCATION
LOCATION: ABDOMEN

## 2023-03-25 ASSESSMENT — PAIN SCALES - GENERAL
PAINLEVEL_OUTOF10: 6
PAINLEVEL_OUTOF10: 7
PAINLEVEL_OUTOF10: 6
PAINLEVEL_OUTOF10: 6
PAINLEVEL_OUTOF10: 4
PAINLEVEL_OUTOF10: 6
PAINLEVEL_OUTOF10: 7

## 2023-03-25 ASSESSMENT — PAIN DESCRIPTION - DESCRIPTORS
DESCRIPTORS: CRAMPING
DESCRIPTORS: TENDER

## 2023-03-25 ASSESSMENT — ENCOUNTER SYMPTOMS: ABDOMINAL PAIN: 1

## 2023-03-25 ASSESSMENT — PAIN - FUNCTIONAL ASSESSMENT
PAIN_FUNCTIONAL_ASSESSMENT: ACTIVITIES ARE NOT PREVENTED
PAIN_FUNCTIONAL_ASSESSMENT: PREVENTS OR INTERFERES SOME ACTIVE ACTIVITIES AND ADLS

## 2023-03-25 NOTE — H&P
Vomiting    Dust Mite Extract      Chest congestion , sneezing    Pcn [Penicillins] Rash    Pollen Extract      Chest congesting , sneezing        Current Meds:   Scheduled Meds:    amitriptyline  25 mg Oral Nightly    amLODIPine  10 mg Oral Daily    aspirin  81 mg Oral Daily    atorvastatin  40 mg Oral Nightly    DULoxetine  90 mg Oral Daily    hydrALAZINE  50 mg Oral 3 times per day    metoprolol succinate  50 mg Oral Daily    sodium chloride flush  5-40 mL IntraVENous 2 times per day    heparin (porcine)  5,000 Units SubCUTAneous 3 times per day    melatonin  6 mg Oral Nightly    dianeal lo-raoul 2.5%  2,000 mL IntraPERitoneal Q6H    vancomycin (VANCOCIN) intermittent dosing (placeholder)   Other RX Placeholder    sodium chloride flush  5-40 mL IntraVENous 2 times per day     Continuous Infusions:    sodium chloride      sodium chloride       PRN Meds: morphine, sodium chloride flush, sodium chloride, acetaminophen **OR** acetaminophen, prochlorperazine, sodium chloride flush, sodium chloride    Patient status inpatient in the Progressive Unit/Step down    1.  Patient admitted with suspected peritoneal dialysis catheter associated abdominal distention  Bowel sounds are present  Febrile  Patient was given vancomycin in ER  Pharmacy to dose    Patient has history of depression were at present she is not depressed  Nephrology consulted  We will consult infectious disease  Patient only received 1 dose of vancomycin in ER    IN ER      Peritonitis  -Peritoneal dialysis with 4 exchanges daily  -Reports draining cloudy discolored fluid  -Abdominal cramping, pain and nausea  -Completed outpatient oral ciprofloxacin and receiving peritoneal vancomycin  -ID and nephrology consulted in ED  -Agree with admission with continued use of peritoneal vancomycin  -Mild leukocytosis  -Pain and nausea control     2. Disposition Home on discharge      Consultations:   IP CONSULT TO NEPHROLOGY  IP CONSULT TO INFECTIOUS DISEASES  IP  CONSULT TO INTERNAL MEDICINE  IP CONSULT TO PHARMACY     Patient is admitted as inpatient status because of co-morbidities listed above, severity of signs and symptoms as outlined, requirement for current medical therapies and most importantly because of direct risk to patient if care not provided in a hospital setting. Expected length of stay > 48 hours. Staci Barton MD  3/25/2023  1:05 PM    Copy sent to Dr. Deysi Granados PA-C    Please note that this chart was generated using voice recognition Dragon dictation software. Although every effort was made to ensure the accuracy of this automated transcription, some errors in transcription may have occurred.

## 2023-03-25 NOTE — CONSULTS
[facility] -   Infectious diseases evaluation  admission date 3/24/2023    reason for consultation:   Peritonitis    Impression :   Current:  Peritonitis related to Peritoneal dialysis. Currently on Vancomycin per PD. Leukocytosis  Worsening diffuse abdominal pain. Allergy to Penicillin. Other:    Discussion / summary of stay / plan of care     Recommendations   Vancomycin per PD x 7  days total .  Started per Nephrology. Uncertain of start date. No signs of peritonitis per ascitic fluid counts. BC x 2 today, Procalcitonin. Follow CBC, BC, Ascitic fluid cx. Adjust antibiotics. Discussed with patient, RN, Dr. Bertha Avendaño, Dr. Chang Goldstein. Infection Control Recommendations   [place] Precautions    Antimicrobial Stewardship Recommendations   Simplification of therapy  Targeted therapy    History of Present Illness:   Initial history:  Shu Portillo is a 38y.o.-year-old female with a complicated medical history including hypertension, CAD, ESRD on peritoneal dialysis presented to the ED with abdominal pain. Patient reports being told she has peritonitis associated with her peritoneal dialysis and has been treated by Dr. Felicia Lamar. Patient reports being diagnosed on 3/11/23 and completed a 7-day course of oral Clindamycin and is currently receiving peritoneal Vancomycin. She presents with a less than 24 hour c/o worsening diffuse, aching, abdominal pain with associated nausea, no vomiting. No associated symptoms of fever, chills, diarrhea, dysuria, chest pain, shortness of breath. Initial labs: CRP: 5.1, WBC: 11.8, UA: Unremarkable. Ascitic fluid: Few neutrophils to date. Ascitic fluid cell counts:  Color, Fluid  COLORLESS    Appearance, Fluid  CLEAR    WBC, Fluid cells/uL 19    RBC, Fluid cells/uL 156    Specimen Type  . PERITONEAL FLUID    Fluid Diff Comment  PENDING    Neutrophil Count, Fluid 0 % 13 High     Lymphocytes, Body Fluid 0 % 19 High     Monocyte Count, Fluid 0 % 61 High     Eos, Fluid 0 %         Basos, Fluid 0 %         Other Cells, Fluid 0 % 7 High     Cells Counted         Interval changes  3/25/2023   Afebrile. PD fluid outflowing, clear. Denies any fever, chills, n/v/d. Feeling better. Patient Vitals for the past 8 hrs:   BP Temp Temp src Pulse Resp SpO2   03/25/23 1451 -- -- -- -- 20 --   03/25/23 1242 116/85 98.3 °F (36.8 °C) Oral 80 16 94 %   03/25/23 0953 -- -- -- -- 16 --       Summary of relevant labs:  Labs:    Micro:    Imaging:  3/25 CT abd/pelvis  FINDINGS:   Lower Chest: Partially imaged coronary artery calcification. Organs: Similar hepatic cysts. Gallbladder is unremarkable. No biliary   ductal dilatation. Pancreas, adrenals, spleen are unremarkable. Moderate   calcific atherosclerosis. Innumerable cysts and indeterminate renal lesions. No hydronephrosis. GI/Bowel: Bowel is nondilated without wall thickening. Appendix is not seen   though there no secondary signs of appendicitis. Pelvis: Unremarkable. Peritoneum/Retroperitoneum: Moderate ascites. No free air, organized fluid   collection, lymphadenopathy. Peritoneal dialysis catheter in the pelvis. Bones/Soft Tissues: No acute bone or soft tissue abnormality. Impression   1. No acute process. 2. Moderate ascites, potentially related to peritoneal dialysis. 3. Changes of ADPKD with numerous indeterminate renal lesions. These could   be better evaluated with nonemergent renal mass protocol CT. I have personally reviewed the past medical history, past surgical history, medications, social history, and family history, and I haveupdated the database accordingly. Allergies:   Bee pollen, Diphenhydramine, Codeine, Dust mite extract, Pcn [penicillins], and Pollen extract     Review of Systems:     Review of Systems   Gastrointestinal:  Positive for abdominal pain. Abdominal tenderness. PD catheter. Site clean.

## 2023-03-25 NOTE — ED TRIAGE NOTES
Mode of arrival (squad #, walk in, police, etc) : walk in        Chief complaint(s): Abd pain, peritonitis        Arrival Note (brief scenario, treatment PTA, etc). : Patient reports peritonitis from PD about 2 weeks ago. Patient is on vanco injections into peritoneal dialysis. Patient reports increased cloudiness from the PD drainage. C= \"Have you ever felt that you should Cut down on your drinking? \"  No  A= \"Have people Annoyed you by criticizing your drinking? \"  No  G= \"Have you ever felt bad or Guilty about your drinking? \"  No  E= \"Have you ever had a drink as an Eye-opener first thing in the morning to steady your nerves or to help a hangover? \"  No      Deferred []      Reason for deferring: N/A    *If yes to two or more: probable alcohol abuse. *

## 2023-03-25 NOTE — PROGRESS NOTES
Urinalysis    Collection Time: 03/24/23  9:57 PM   Result Value Ref Range    WBC, UA 0 TO 2 /HPF    RBC, UA 0 TO 2 /HPF    Casts UA 0 TO 2 /LPF    Epithelial Cells UA 3 to 5 /HPF    Bacteria, UA None None   Gram stain    Collection Time: 03/24/23 10:13 PM    Specimen: Ascitic Fluid   Result Value Ref Range    Specimen Description . ASCITIC FLUID     Direct Exam NO BACTERIA SEEN    Cell Count with Differential, Body Fluid    Collection Time: 03/24/23 10:17 PM   Result Value Ref Range    Color, Fluid COLORLESS     Appearance, Fluid CLEAR     WBC, Fluid 19 cells/uL    RBC, Fluid 156 cells/uL    Specimen Type . PERITONEAL FLUID     Fluid Diff Comment PENDING     Neutrophil Count, Fluid 13 (H) 0 %    Lymphocytes, Body Fluid 19 (H) 0 %    Monocyte Count, Fluid 61 (H) 0 %    Eos, Fluid      0 %    Basos, Fluid      0 %    Other Cells, Fluid 7 (H) 0 %    Cells Counted 75        Imaging/Diagnostics:  CT ABDOMEN PELVIS WO CONTRAST Additional Contrast? None    Result Date: 3/24/2023  1. No acute process. 2. Moderate ascites, potentially related to peritoneal dialysis. 3. Changes of ADPKD with numerous indeterminate renal lesions. These could be better evaluated with nonemergent renal mass protocol CT. Plan:     Patient status inpatient in the Progressive Unit/Step down    Peritonitis  -Peritoneal dialysis with 4 exchanges daily  -Reports draining cloudy discolored fluid  -Abdominal cramping, pain and nausea  -Completed outpatient oral ciprofloxacin and receiving peritoneal vancomycin  -ID and nephrology consulted in ED  -Agree with admission with continued use of peritoneal vancomycin  -Mild leukocytosis  -Pain and nausea control    Full code    Heparin for DVT prophylaxis    ALBARO Alcala - NP  3/25/2023  3:47 AM      Please note that this chart was generated using voice recognition Dragon dictation software.   Although every effort was made to ensure the accuracy of this automated transcription, some errors in transcription may have occurred. [id number]  [place], [address] [place] [address].    Phone [phone]

## 2023-03-25 NOTE — PROGRESS NOTES
Pharmacy Note  Vancomycin Consult (Dialysis patient) - Initial Note       TODAY'S DATE:  3/25/2023  Patient name, age:  Solo Smith, 38 y.o. Vancomycin order/consult received from: Dr. Sandip Johnson . Indication: intra-abdominal infection - peritonitis   Additional antimicrobials: Allergies:  Bee pollen, Diphenhydramine, Codeine, Dust mite extract, Pcn [penicillins], and Pollen extract   Actual Weight:    Wt Readings from Last 1 Encounters:   03/24/23 140 lb (63.5 kg)     Labs/Ancillary Data  Recent Labs     03/24/23 2157   BUN 42*     Estimated Creatinine Clearance: 9 mL/min (A) (based on SCr of 7.12 mg/dL [facility])). Recent Labs     03/24/23 2157   CREATININE 7.12*     Recent Labs     03/24/23 2157   WBC 11.8*     No results found for: PROCAL    Intake/Output Summary (Last 24 hours) at 3/25/2023 0301  Last data filed at 3/24/2023 2339  Gross per 24 hour   Intake 10 ml   Output --   Net 10 ml     Temp: 98.4      Recent vancomycin administrations        No vancomycin IV orders with administrations found. Orders not given:            vancomycin (VANCOCIN) intermittent dosing (placeholder)                  Culture Date / Source  /  Results  See micro     PLAN      Patient has been receiving vancomycin 1 gram intraperitoneal in 1 exchange every 48 hours at home     Vancomycin trough on 03/24/2023 at 1046 - result 19.4     Last received vancomycin 1 gram intraperitoneal on 03/24/2023 at 1400     Will order vancomycin trough on 03/26/2023 at 0600.   Further dosing pending results of vancomycin level     John Hanson RP     -3/25/2023 at 3:27 AM

## 2023-03-25 NOTE — PROGRESS NOTES
Dr. Angel Govea paged for orders for PD. Awaiting return call. Patient outside to smoke with partner. Nurse informed her she will be updated as soon as orders are received.

## 2023-03-25 NOTE — ED PROVIDER NOTES
05/28/2014    Kidney stone     Menorrhagia 09/30/2014    Neck pain, bilateral 05/28/2014    Pelvic pain in female 09/30/2014    Polycystic kidney     Smoker 10/02/2011    Tension vascular headache 01/20/2014     Past Problem List  Patient Active Problem List   Diagnosis Code    Asthma J45.909    Smoker F17.200    Polycystic kidney Q61.3    Eczema L30.9    Depression with anxiety F41.8    Chronic headaches R51.9, G89.29    Tension vascular headache G44.209    Irregular bleeding N92.6    Metrorrhagia N92.1    Menorrhagia N92.0    Dysmenorrhea N94.6    Pelvic pain in female R10.2    Kidney stone N20.0    Acute back pain M54.9    Anxiety F41.9    Hypertension I10    Headache R51.9    Depression F32. A    CKD (chronic kidney disease) stage 3, GFR 30-59 ml/min (HCC) N18.30    Chronic neck pain M54.2, G89.29    Degenerative disc disease, cervical M50.30    Encounter for long-term (current) use of other medications Z79.899    Cervicalgia M54.2    Chronic intractable headache R51.9, G89.29    Hemorrhage of cyst of native kidney N28.89, N28.1    Abdominal pain R10.9    CKD (chronic kidney disease) stage 4, GFR 15-29 ml/min (HCC) N18.4    Acute renal failure with acute tubular necrosis superimposed on stage 3 chronic kidney disease (HCC) N17.0, N18.30    Allergic rhinitis due to animal hair and dander J30.81    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    Gross hematuria R31.0    History of anemia due to chronic kidney disease N18.9, Z86.2    History of multiple allergies Z88.9    Hyperlipidemia E78.5    IUD (intrauterine device) in place Z97.5    Leukocytosis D72.829    Other specified disorders of kidney and ureter N28.89    Pain in joint M25.50    Recurrent major depression in full remission ([facility] [address] F33.42    Renovascular hypertension I15.0    Right ureteral stone N20.1    Right nephrolithiasis N20.0    Flank pain R10.9    Acute kidney injury superimposed on chronic kidney disease ([facility] [address] N17.9, N18.9    Urinary tract infection (LIPITOR) 40 MG TABLET    Take 1 tablet by mouth nightly    DULOXETINE (CYMBALTA) 60 MG EXTENDED RELEASE CAPSULE    take 1 capsule by mouth once daily    HYDRALAZINE (APRESOLINE) 50 MG TABLET    Take 1 tablet by mouth every 8 hours    MELATONIN 10 MG TABS    Take 10 mg by mouth at bedtime    METOPROLOL SUCCINATE (TOPROL XL) 50 MG EXTENDED RELEASE TABLET    Take 50 mg by mouth daily     ALLERGIES     is allergic to bee pollen, diphenhydramine, codeine, dust mite extract, pcn [penicillins], and pollen extract. FAMILY HISTORY     She indicated that her mother is alive. She indicated that her father is alive. She indicated that her sister is alive. She indicated that her brother is alive. She indicated that all of her four daughters are alive. She indicated that her son is alive. SOCIAL HISTORY       Social History     Tobacco Use    Smoking status: Every Day     Packs/day: 0.50     Years: 25.00     Pack years: 12.50     Types: Cigarettes    Smokeless tobacco: Never   Vaping Use    Vaping Use: Never used   Substance Use Topics    Alcohol use: No     Alcohol/week: 0.0 standard drinks    Drug use: No     PHYSICAL EXAM     INITIAL VITALS: BP (!) 153/91   Pulse (!) 108   Temp 98.4 °F (36.9 °C) (Oral)   Resp 16   Ht 5' 5\" (1.651 m)   Wt 140 lb (63.5 kg)   SpO2 96%   BMI 23.30 kg/m²    Physical Exam  Vitals and nursing note reviewed. Constitutional:       Appearance: Normal appearance. HENT:      Head: Normocephalic and atraumatic. Nose: Nose normal.      Mouth/Throat:      Mouth: Mucous membranes are moist.   Eyes:      Conjunctiva/sclera: Conjunctivae normal.      Pupils: Pupils are equal, round, and reactive to light. Cardiovascular:      Rate and Rhythm: Normal rate and regular rhythm. Pulses: Normal pulses. Heart sounds: Normal heart sounds. Pulmonary:      Effort: Pulmonary effort is normal.      Breath sounds: Normal breath sounds. Abdominal:      Palpations: Abdomen is soft.

## 2023-03-25 NOTE — PLAN OF CARE
Problem: Discharge Planning  Goal: Discharge to home or other facility with appropriate resources  3/25/2023 1727 by Jenny Khan RN  Outcome: Progressing  3/25/2023 0722 by David Hill RN  Outcome: Progressing     Problem: Pain  Goal: Verbalizes/displays adequate comfort level or baseline comfort level  3/25/2023 1727 by Jenny Khan RN  Outcome: Progressing  3/25/2023 0722 by David Hill RN  Outcome: Progressing     Problem: Safety - Adult  Goal: Free from fall injury  3/25/2023 1727 by Jenny Khan RN  Outcome: Progressing  3/25/2023 0722 by David Hill RN  Outcome: Progressing     Problem: ABCDS Injury Assessment  Goal: Absence of physical injury  3/25/2023 1727 by Jenny Khan RN  Outcome: Progressing  3/25/2023 0722 by David Hill RN  Outcome: Progressing

## 2023-03-25 NOTE — CONSULTS
Nephrology ESRD Consult Note    Reason for Consult:  End stage renal disease  Requesting Physician:  Dr Peter Meneses      History of Present Illness: This is a 38 y.o. female with significant past medical history of autosomal dominant polycystic kidney disease [ADPKD], Nephrolithiasis,end stage renal disease 2nd to ADPKD, on continuous ambulatory peritoneal dialysis under the care of Dr Logan Gonzalez who presents to the ER for evaluation of abdominal pain. She reports being treated for PTSD after she developed symptoms on 3/11/2023 and completed a 7-day course of oral clindamycin and was currently receiving 3 presented to the ER due to increased abdominal pain in the right abdominal quadrant described as cramping associated with nausea but no vomiting. Her PD fluid drain appears cloudy. On evaluation in the ER she had mild leukocytosis of 11, lactic acid of 1.0 and CT abdomen pelvis showed no acute intra abdominal abnormality. Patient denies any diarrhea, chest pain, cough, dysuria or urgency. She had PD fluid cell count, with differential drawn with neutrophils. Cell culture and Gram stain were also drawn. Urinalysis was negative for pyuria or nitrite.   Past Medical History:        Diagnosis Date    Anxiety     Asthma     Chronic headaches 07/10/2013    CKD (chronic kidney disease) stage 3, GFR 30-59 ml/min ([facility])     Degenerative disc disease, cervical     Depression     Dysmenorrhea 09/30/2014    Eczema 11/08/2012    Hypertension     Hypocalcemia 05/28/2014    Kidney stone     Menorrhagia 09/30/2014    Neck pain, bilateral 05/28/2014    Pelvic pain in female 09/30/2014    Polycystic kidney     Smoker 10/02/2011    Tension vascular headache 01/20/2014           Past Surgical History:        Procedure Laterality Date    BREAST ENHANCEMENT SURGERY      BREAST LUMPECTOMY      left breast    CYSTO/URETERO/PYELOSCOPY, CALCULUS TX Right 6/12/2020    HOLMIUM - CYSTO, RIGHT RETROGRADE PYELOGRAM, RIGHT URETEROSCOPY,

## 2023-03-25 NOTE — ED NOTES
Report given to Gerda Epley, RN from [facility]. Report method by phone   The following was reviewed with receiving RN:   Current vital signs:  BP (!) 153/91   Pulse (!) 108   Temp 98.4 °F (36.9 °C) (Oral)   Resp 16   Ht 5' 5\" (1.651 m)   Wt 140 lb (63.5 kg)   SpO2 96%   BMI 23.30 kg/m²                MEWS Score: 2     Any medication or safety alerts were reviewed. Any pending diagnostics and notifications were also reviewed, as well as any safety concerns or issues, abnormal labs, abnormal imaging, and abnormal assessment findings. Questions were answered.           Ingrid Booker RN  03/25/23 0030

## 2023-03-26 VITALS
OXYGEN SATURATION: 94 % | RESPIRATION RATE: 18 BRPM | DIASTOLIC BLOOD PRESSURE: 73 MMHG | WEIGHT: 141.09 LBS | SYSTOLIC BLOOD PRESSURE: 121 MMHG | TEMPERATURE: 98.9 F | HEIGHT: 65 IN | BODY MASS INDEX: 23.51 KG/M2 | HEART RATE: 93 BPM

## 2023-03-26 LAB
ABSOLUTE EOS #: 0.3 K/UL (ref 0–0.4)
ABSOLUTE LYMPH #: 2 K/UL (ref 1–4.8)
ABSOLUTE MONO #: 0.7 K/UL (ref 0.1–1.3)
ALBUMIN SERPL-MCNC: 3 G/DL (ref 3.5–5.2)
ALP SERPL-CCNC: 65 U/L (ref 35–104)
ALT SERPL-CCNC: 7 U/L (ref 5–33)
ANION GAP SERPL CALCULATED.3IONS-SCNC: 12 MMOL/L (ref 9–17)
AST SERPL-CCNC: 11 U/L
BASOPHILS # BLD: 1 % (ref 0–2)
BASOPHILS ABSOLUTE: 0.1 K/UL (ref 0–0.2)
BILIRUB SERPL-MCNC: 0.2 MG/DL (ref 0.3–1.2)
BUN SERPL-MCNC: 39 MG/DL (ref 6–20)
CALCIUM SERPL-MCNC: 8.5 MG/DL (ref 8.6–10.4)
CHLORIDE SERPL-SCNC: 98 MMOL/L (ref 98–107)
CO2 SERPL-SCNC: 29 MMOL/L (ref 20–31)
CREAT SERPL-MCNC: 7.94 MG/DL (ref 0.5–0.9)
EOSINOPHILS RELATIVE PERCENT: 3 % (ref 0–4)
GFR SERPL CREATININE-BSD FRML MDRD: 6 ML/MIN/1.73M2
GLUCOSE SERPL-MCNC: 104 MG/DL (ref 70–99)
HCT VFR BLD AUTO: 27.5 % (ref 36–46)
HGB BLD-MCNC: 9.5 G/DL (ref 12–16)
LYMPHOCYTES # BLD: 20 % (ref 24–44)
MCH RBC QN AUTO: 32.1 PG (ref 26–34)
MCHC RBC AUTO-ENTMCNC: 34.6 G/DL (ref 31–37)
MCV RBC AUTO: 92.9 FL (ref 80–100)
MONOCYTES # BLD: 7 % (ref 1–7)
PDW BLD-RTO: 12.8 % (ref 11.5–14.9)
PLATELET # BLD AUTO: 361 K/UL (ref 150–450)
PMV BLD AUTO: 7.7 FL (ref 6–12)
POTASSIUM SERPL-SCNC: 3.7 MMOL/L (ref 3.7–5.3)
PROT SERPL-MCNC: 5.9 G/DL (ref 6.4–8.3)
RBC # BLD: 2.96 M/UL (ref 4–5.2)
SEG NEUTROPHILS: 69 % (ref 36–66)
SEGMENTED NEUTROPHILS ABSOLUTE COUNT: 6.8 K/UL (ref 1.3–9.1)
SODIUM SERPL-SCNC: 139 MMOL/L (ref 135–144)
VANCOMYCIN TROUGH DATE LAST DOSE: ABNORMAL
VANCOMYCIN TROUGH DOSE AMOUNT: ABNORMAL
VANCOMYCIN TROUGH TIME LAST DOSE: ABNORMAL
VANCOMYCIN TROUGH: 21.3 UG/ML (ref 10–20)
WBC # BLD AUTO: 9.9 K/UL (ref 3.5–11)

## 2023-03-26 PROCEDURE — 85025 COMPLETE CBC W/AUTO DIFF WBC: CPT

## 2023-03-26 PROCEDURE — 99239 HOSP IP/OBS DSCHRG MGMT >30: CPT | Performed by: INTERNAL MEDICINE

## 2023-03-26 PROCEDURE — 80053 COMPREHEN METABOLIC PANEL: CPT

## 2023-03-26 PROCEDURE — 6360000002 HC RX W HCPCS: Performed by: INTERNAL MEDICINE

## 2023-03-26 PROCEDURE — 6360000002 HC RX W HCPCS: Performed by: NURSE PRACTITIONER

## 2023-03-26 PROCEDURE — 2580000003 HC RX 258: Performed by: INTERNAL MEDICINE

## 2023-03-26 PROCEDURE — 36415 COLL VENOUS BLD VENIPUNCTURE: CPT

## 2023-03-26 PROCEDURE — 6370000000 HC RX 637 (ALT 250 FOR IP): Performed by: NURSE PRACTITIONER

## 2023-03-26 PROCEDURE — 80202 ASSAY OF VANCOMYCIN: CPT

## 2023-03-26 PROCEDURE — 2580000003 HC RX 258: Performed by: NURSE PRACTITIONER

## 2023-03-26 RX ORDER — OXYCODONE HYDROCHLORIDE AND ACETAMINOPHEN 5; 325 MG/1; MG/1
1 TABLET ORAL EVERY 6 HOURS PRN
Qty: 20 TABLET | Refills: 0 | Status: SHIPPED | OUTPATIENT
Start: 2023-03-26 | End: 2023-03-31

## 2023-03-26 RX ORDER — DULOXETIN HYDROCHLORIDE 30 MG/1
90 CAPSULE, DELAYED RELEASE ORAL DAILY
Qty: 30 CAPSULE | Refills: 3 | Status: ON HOLD | OUTPATIENT
Start: 2023-03-27

## 2023-03-26 RX ADMIN — HYDRALAZINE HYDROCHLORIDE 50 MG: 50 TABLET, FILM COATED ORAL at 05:49

## 2023-03-26 RX ADMIN — METOPROLOL SUCCINATE 50 MG: 50 TABLET, EXTENDED RELEASE ORAL at 09:47

## 2023-03-26 RX ADMIN — HYDRALAZINE HYDROCHLORIDE 50 MG: 50 TABLET, FILM COATED ORAL at 14:33

## 2023-03-26 RX ADMIN — SODIUM CHLORIDE, SODIUM LACTATE, CALCIUM CHLORIDE, MAGNESIUM CHLORIDE AND DEXTROSE 2000 ML: 2.5; 538; 448; 18.3; 5.08 INJECTION, SOLUTION INTRAPERITONEAL at 03:16

## 2023-03-26 RX ADMIN — ASPIRIN 81 MG 81 MG: 81 TABLET ORAL at 09:45

## 2023-03-26 RX ADMIN — SODIUM CHLORIDE, PRESERVATIVE FREE 10 ML: 5 INJECTION INTRAVENOUS at 09:47

## 2023-03-26 RX ADMIN — HEPARIN SODIUM 5000 UNITS: 5000 INJECTION INTRAVENOUS; SUBCUTANEOUS at 14:33

## 2023-03-26 RX ADMIN — HEPARIN SODIUM 5000 UNITS: 5000 INJECTION INTRAVENOUS; SUBCUTANEOUS at 05:49

## 2023-03-26 RX ADMIN — AMLODIPINE BESYLATE 10 MG: 10 TABLET ORAL at 09:46

## 2023-03-26 RX ADMIN — MORPHINE SULFATE 2 MG: 2 INJECTION, SOLUTION INTRAMUSCULAR; INTRAVENOUS at 03:00

## 2023-03-26 RX ADMIN — MORPHINE SULFATE 2 MG: 2 INJECTION, SOLUTION INTRAMUSCULAR; INTRAVENOUS at 09:47

## 2023-03-26 RX ADMIN — SODIUM CHLORIDE, SODIUM LACTATE, CALCIUM CHLORIDE, MAGNESIUM CHLORIDE AND DEXTROSE 2000 ML: 2.5; 538; 448; 18.3; 5.08 INJECTION, SOLUTION INTRAPERITONEAL at 10:08

## 2023-03-26 RX ADMIN — DULOXETINE HYDROCHLORIDE 90 MG: 60 CAPSULE, DELAYED RELEASE ORAL at 09:45

## 2023-03-26 RX ADMIN — ACETAMINOPHEN 650 MG: 325 TABLET ORAL at 14:33

## 2023-03-26 ASSESSMENT — PAIN DESCRIPTION - LOCATION
LOCATION: ABDOMEN
LOCATION: ABDOMEN

## 2023-03-26 ASSESSMENT — PAIN SCALES - GENERAL
PAINLEVEL_OUTOF10: 7
PAINLEVEL_OUTOF10: 6

## 2023-03-26 NOTE — DISCHARGE SUMMARY
[facility]  Frankie Barrera MD; Faith Wang MD; Eunice Petty MD; MD Viv Santos Res, MD; Adeline Goldmann, MD [facility]  [facility]    Discharge Summary     Patient ID: Nayana Gifford  :  1980   MRN: [id number]     ACCOUNT:  [de-identified]   Patient's PCP: Devorah Pelaez PA-C  Admit Date: 3/24/2023   Discharge Date: 3/26/2023     Length of Stay: 1  Code Status:  Full Code  Admitting Physician: John Jones MD  Discharge Physician: John Jones MD     Active Discharge Diagnoses:     Hospital Problem Lists:  Principal Problem:    Peritonitis [facility])  Active Problems:    History of anemia due to chronic kidney disease    Bacterial infection associated with peritoneal dialysis catheter ([facility]    ESRD on peritoneal dialysis ([facility]    Allergy to penicillin  Resolved Problems:    * No resolved hospital problems.  *      Admission Condition:  fair     Discharged Condition: good    Hospital Stay:     Hospital Course:  Nayana Gifford is a 38 y.o. female who was admitted for the management of   Peritonitis [facility]) , presented to ER with Abdominal Pain    Patient has end-stage renal disease for about 2 years was on hemodialysis for a year and for the last 1 year she has been doing peritoneal dialysis at home  Patient presented to the emergency room with severe abdominal pain distention  It was felt that her peritonitis is worse  Patient has been taking vancomycin with peritoneal dialysis every other day and has completed 5 doses at home    ID commendations she should complete 7 doses  Patient is abdomen became soft  Has mild discomfort  Bowels moving  Bowel sounds present        Significant therapeutic interventions:     Significant Diagnostic Studies:   Labs / Micro:  CBC:   Lab Results   Component Value Date/Time    WBC 9.9 2023 05:54 AM    RBC 2.96 2023 05:54 AM    RBC 3.71 2012 CONSULT TO INFECTIOUS DISEASES  IP CONSULT TO INTERNAL MEDICINE  IP CONSULT TO PHARMACY  IP CONSULT TO INFECTIOUS DISEASES      The patient was seen and examined on day of discharge and this discharge summary is in conjunction with any daily progress note from day of discharge. Discharge plan:     Disposition: Home    Physician Follow Up:   Mitul Kirkland PA-C  Nephrology   Mitul Kirkland, [address] [facility] [address] [place] [address]  [phone]    Follow up      136 Alyssa Iverson MD  [facility], [address]  [address] [facility] [address]  [phone]    Follow up      Emmanuel Valero MD  [address] [place] [address], [address]  [place] [address] [facility] [address] [facility]  [phone]    Follow up       Requiring Further Evaluation/Follow Up POST HOSPITALIZATION/Incidental Findings:   She needs to complete 7 doses vancomycin . 2 left   Diet: renal diet    Activity: As tolerated    Instructions to Patient:     Discharge Medications:      Medication List        START taking these medications      oxyCODONE-acetaminophen 5-325 MG per tablet  Commonly known as: Percocet  Take 1 tablet by mouth every 6 hours as needed for Pain for up to 5 days. Intended supply: 5 days.  Take lowest dose possible to manage pain Max Daily Amount: 4 tablets            CHANGE how you take these medications      DULoxetine 30 MG extended release capsule  Commonly known as: CYMBALTA  Take 3 capsules by mouth daily  Start taking on: March 27, 2023  What changed:   medication strength  how much to take            CONTINUE taking these medications      amitriptyline 25 MG tablet  Commonly known as: ELAVIL  Take 1 tablet by mouth nightly     amLODIPine 10 MG tablet  Commonly known as: NORVASC  Take 1 tablet by mouth daily     aspirin 81 MG chewable tablet  Take 1 tablet by mouth daily     atorvastatin 40 MG tablet  Commonly known as: LIPITOR  Take 1 tablet by mouth nightly     hydrALAZINE 50 MG tablet  Commonly known as: APRESOLINE  Take 1 tablet by mouth every 8 hours     Melatonin 10 MG

## 2023-03-26 NOTE — DISCHARGE INSTR - DIET

## 2023-03-26 NOTE — DISCHARGE INSTR - COC
Continuity of Care Form    Patient Name: Abdelrahman Lima   :  1980  MRN:  [id number]    Admit date:  3/24/2023  Discharge date:  ***    Code Status Order: Full Code   Advance Directives:     Admitting Physician:  Isabella Perez MD  PCP: Laurie Andrews PA-C    Discharging Nurse: [facility] Unit/Room#: 2977/8860-80  Discharging Unit Phone Number: ***    Emergency Contact:   Extended Emergency Contact Information  Primary Emergency Contact: Jessi Tilley  Address: Sydnee Guzman, [address] [place] [address] Phone: [phone]  Work Phone: [phone]  Mobile Phone: [phone]  Relation: Parent    Past Surgical History:  Past Surgical History:   Procedure Laterality Date    BREAST ENHANCEMENT SURGERY      BREAST LUMPECTOMY      left breast    CYSTO/URETERO/PYELOSCOPY, CALCULUS TX Right 2020    HOLMIUM - CYSTO, RIGHT RETROGRADE PYELOGRAM, RIGHT URETEROSCOPY, LASER LITHO ON STAND BY, RIGHT STENT PLACEMENT performed by Miguel A Lofton MD at [address]  2020    DILATION AND CURETTAGE OF UTERUS      x2    IR TUNNELED CATHETER PLACEMENT GREATER THAN 5 YEARS  3/8/2022    IR TUNNELED CATHETER PLACEMENT GREATER THAN 5 YEARS 3/8/2022 [facility] SPECIAL PROCEDURES    LITHOTRIPSY Right 2020    ESWL EXTRACORPOREAL SHOCK WAVE LITHOTRIPSY performed by Miguel A Lofton MD at [place] [address]  2013    nerve block(right vervical C3/TON/C4/C5    NERVE BLOCK  2013    nerve block(right vervical C3/TON/C4/C5    OTHER SURGICAL HISTORY  03/10/2014     botox inj     TOE SURGERY      removal of ingrown toe nail-2nd toe on left foot     UPPER GASTROINTESTINAL ENDOSCOPY N/A 3/7/2022    EGD BIOPSY performed by Libra Benson MD at [facility]    URETEROSCOPY Right 2020    stent placement       Immunization History:   Immunization History   Administered Date(s) Administered    COVID-19, MODERNA BLUE border, Primary or Immunocompromised, (age 12y+), IM, 100 mcg/0.5mL hypertension I15.0    Right ureteral stone N20.1    Right nephrolithiasis N20.0    Flank pain R10.9    Acute kidney injury superimposed on chronic kidney disease (HCC) N17.9, N18.9    Urinary tract infection with hematuria N39.0, R31.9    History of major depression Z86.59    Iron deficiency anemia due to chronic blood loss D50.0    Hypernatremia E87.0    SILVANO (acute kidney injury) ([facility] N17.9    Dysuria R30.0    Acute kidney injury superimposed on CKD ([facility]) N17.9, N18.9    Pyelonephritis N12    Hypertensive emergency I16.1    Abdominal pain, epigastric R10.13    Abdominal pain, right upper quadrant R10.11    Nausea R11.0    Gastroesophageal reflux disease K21.9    Angina pectoris ([facility]) I20.9    Chest pain R07.9    Peritonitis ([facility]) K65.9    Bacterial infection associated with peritoneal dialysis catheter ([facility] [address] T85.71XA, A49.9    ESRD on [facility] dialysis ([facility] N18.6, Z99.2    Allergy to penicillin Z88.0       Isolation/Infection:   Isolation            No Isolation          Patient Infection Status       Infection Onset Added Last Indicated Last Indicated By Review Planned Expiration Resolved Resolved By    None active    Resolved    COVID-19 (Rule Out) 07/19/20 07/19/20 07/19/20 COVID-19 (Ordered)   07/19/20 Rule-Out Test Resulted            Nurse Assessment:  Last Vital Signs: /79   Pulse 89   Temp 98.3 °F (36.8 °C) (Oral)   Resp 17   Ht 5' 5\" (1.651 m)   Wt 141 lb 1.5 oz (64 kg)   SpO2 92%   BMI 23.48 kg/m²     Last documented pain score (0-10 scale): Pain Level: 6  Last Weight:   Wt Readings from Last 1 Encounters:   03/26/23 141 lb 1.5 oz (64 kg)     Mental Status:  {IP PT MENTAL STATUS:20030}    IV Access:  { ALETHA IV ACCESS:027214405}    Nursing Mobility/ADLs:  Walking   {CHP DME TIGI:824784918}  Transfer  {CHP DME QZQT:535290125}  Bathing  {CHP DME CRSQ:764812757}  Dressing  {CHP DME IGMC:641782219}  Toileting  {ALISA WOODWARD OEFR:984697779}  Feeding  {ALISA WOODWARD BDWB:995039027}  Med Admin  {ALISA WOODWARD

## 2023-03-26 NOTE — CARE COORDINATION
Case Management Assessment  Initial Evaluation    Date/Time of Evaluation: 3/26/2023 10:17 AM  Assessment Completed by: Sherman Ramos RN    If patient is discharged prior to next notation, then this note serves as note for discharge by case management. Patient Name: Nayana Gifford                   YOB: 1980  Diagnosis: Peritonitis ([address] [K65.9]  Generalized abdominal pain [R10.84]                   Date / Time: 3/24/2023  9:18 PM    Patient Admission Status: Inpatient   Readmission Risk (Low < 19, Mod (19-27), High > 27): Readmission Risk Score: 22.4    Current PCP: Devorah Pelaez PA-C  PCP verified by CM? No    Chart Reviewed: Yes      History Provided by: Patient  Patient Orientation: Alert and Oriented    Patient Cognition: Alert    Hospitalization in the last 30 days (Readmission):  No    If yes, Readmission Assessment in CM Navigator will be completed. Advance Directives:      Code Status: Full Code   Patient's Primary Decision Maker is: Legal Next of Kin      Discharge Planning:    Patient lives with: Children Type of Home: House  Primary Care Giver: Self  Patient Support Systems include: Family Members   Current Financial resources: Medicaid, [employer] [place]  Current community resources: None  Current services prior to admission: None            Current DME:              Type of Home Care services:  None    ADLS  Prior functional level: Independent in ADLs/IADLs  Current functional level: Independent in ADLs/IADLs    PT AM-PAC:   /24  OT AM-PAC:   /24    Family can provide assistance at DC: Yes  Would you like Case Management to discuss the discharge plan with any other family members/significant others, and if so, who?  Yes  Plans to Return to Present Housing: Yes  Other Identified Issues/Barriers to RETURNING to current housing: no  Potential Assistance needed at discharge: N/A            Potential DME:    Patient expects to discharge to: [address] [place] [address] for transportation at discharge:

## 2023-03-26 NOTE — PLAN OF CARE
Problem: Pain  Goal: Verbalizes/displays adequate comfort level or baseline comfort level  3/26/2023 0355 by Narda Paulson RN  Outcome: Progressing  Note: Pt medicated with pain medication prn. Assessed all pain characteristics including level, type, location, frequency, and onset. Non-pharmacologic interventions offered to pt as well. Pt states pain is tolerable at this time. Will continue to monitor.        Problem: Discharge Planning  Goal: Discharge to home or other facility with appropriate resources  3/26/2023 0355 by Narda Paulson RN  Outcome: Progressing     Problem: Safety - Adult  Goal: Free from fall injury  3/26/2023 0355 by Narda Paulson RN  Outcome: Progressing     Problem: ABCDS Injury Assessment  Goal: Absence of physical injury  3/26/2023 0355 by Narda Paulson RN  Outcome: Progressing

## 2023-03-26 NOTE — PROGRESS NOTES
Pharmacy Note  Vancomycin Consult - Daily note   Vancomycin Therapy Day: 2  Current Dosing: PD dosing  Current diagnosis for which MRSA is suspected/confirmed: Peritonitis  ONLY for suspected pneumonia or COPD: MRSA nasal swab   N/A: Non respiratory infection. .        Last Temp: 98.3 F  Actual Weight:   Wt Readings from Last 1 Encounters:   03/26/23 141 lb 1.5 oz (64 kg)     Recent Labs     03/25/23  0550 03/26/23  0554   CREATININE 7.22* 7.94*     CrCl:  Patient is on dialysis. Recent Labs     03/24/23  2157 03/26/23  0554   WBC 11.8* 9.9       Intake/Output Summary (Last 24 hours) at 3/26/2023 1058  Last data filed at 3/26/2023 1028  Gross per 24 hour   Intake 63597 ml   Output 9300 ml   Net 5260 ml       Recent vancomycin administrations        No vancomycin IV orders with administrations found. Orders not given:            vancomycin (VANCOCIN) intermittent dosing (placeholder)                    Vancomycin Concentrations:   TROUGH:    Recent Labs     03/26/23  0554   VANCOTROUGH 21.3*     RANDOM:    Recent Labs     03/24/23  1046   VANCORANDOM 19.4       ASSESSMENT/PLAN    Therapeutic target for PD 15-20 mg/L. Level today slightly out of range. Will draw another level tomorrow expecting level to be within range and we can redose then. Pharmacy will Continue to follow. Thank you. [facility],  3/26/2023  10:58 AM    Intermittent Hemodialysis: Not using bayesian software for dosing  Maintenance Dosing = 10-20 mg/kg after HD sessions (max 2,000 mg per dose).   Timing of concentration monitoring:  Critically ill - pre-HD level after the loading dose       Stable/stable dialysis:  pre-HD concentration after 1st or 2nd MD on dialysis 3 times/week  Pre-dialysis level Invasive MRSA Infection or Sepsis Non-Invasive Infection or Non-MRSA Infection   ? 25 mg/L Hold vancomycin dose, ? subsequent dose by 250 mg Hold vancomycin dose, ? subsequent dose by 250-500 mg   21-24 mg/L Continue current dose Decrease dose by 250 mg   15-20 mg/L Increase dose by 250 mg Continue current dose   ? 14 mg/L Increase dose by 250-500 mg Increase dose by 250 mg   Note: Pre-HD concentration monitoring is preferred, if concentrations are drawn after HD, the level must be collected no sooner than 2-4 hours after the end of the session to allow for maximum redistribution and plasma rebound.

## 2023-03-26 NOTE — CONSULTS
peritoneal dialysis under the care of Dr Felicia Lamar     2. History of autosomal dominant polycystic kidney disease [ADPKD]    3. Recent treatment for peritoneal dialysis associated peritonitis completed 7 days of clindamycin and was on  intraperitoneal vancomycin in the outpatient -PD cell count on 3/24/23 shows no evidence of peritonitis WBC 19 with neutrophil count 13 clear-colored PD effluent    4. History of nephrolithiasis    5. Anemia of ESRD-Hb 10.4 gm/dl. 6.2nd hyperparathyroidism. Plan:  1. Continue ambulatory peritoneal dialysis with 2.5% dianeal  solution 2 L exchange 4 times daily with the last exchange to dwell overnight  2. Continue Intra Peritoneal vancomycin intermittent dosing. ID following   3. We will obtain results of recent PD cell count and cultures from the outpatient  4. Renal diet 2 gm Na, 2 gm potassium, 1 gm po4 and 1.2 gm/kg/bw protein per day and 1500 mls fluid restriction   5. Basic metabolic panel and CBC AM.  6.Analgesia. 7.No indication for erythropoietin hemoglobin greater than 10. No objections to discharge from renal standpoint. Per ID recommendations pt to complete total of 7 doses of oral vancomycin    Elana Rutherford M.D, [facility]  Nephrologist    Thank you for the consultation.

## 2023-03-26 NOTE — PLAN OF CARE
Problem: Discharge Planning  Goal: Discharge to home or other facility with appropriate resources  3/26/2023 1546 by Marty Tobar RN  Outcome: Adequate for Discharge  Flowsheets (Taken 3/26/2023 1008)  Discharge to home or other facility with appropriate resources:   Identify barriers to discharge with patient and caregiver   Arrange for needed discharge resources and transportation as appropriate   Identify discharge learning needs (meds, wound care, etc)   Refer to discharge planning if patient needs post-hospital services based on physician order or complex needs related to functional status, cognitive ability or social support system     Problem: Pain  Goal: Verbalizes/displays adequate comfort level or baseline comfort level  3/26/2023 1546 by Marty Tobar RN  Outcome: Adequate for Discharge     Problem: Safety - Adult  Goal: Free from fall injury  3/26/2023 1546 by Marty Tobar RN  Outcome: Adequate for Discharge  Flowsheets (Taken 3/26/2023 1008)  Free From Fall Injury:   Instruct family/caregiver on patient safety   Based on caregiver fall risk screen, instruct family/caregiver to ask for assistance with transferring infant if caregiver noted to have fall risk factors     Problem: ABCDS Injury Assessment  Goal: Absence of physical injury  3/26/2023 1546 by Marty Tobar RN  Outcome: Adequate for Discharge

## 2023-03-28 LAB
APPEARANCE FLUID: CLEAR
BASO FLUID: ABNORMAL %
CELLS COUNTED: 75
COLOR FLUID: COLORLESS
EOSINOPHIL FLUID: ABNORMAL %
FLUID DIFF COMMENT: ABNORMAL
LYMPHOCYTES, BODY FLUID: 19 %
MONOCYTE, FLUID: 61 %
NEUTROPHIL, FLUID: 13 %
OTHER CELLS FLUID: 7 %
RBC FLUID: 156 CELLS/UL
SPECIMEN TYPE: ABNORMAL
WBC FLUID: 19 CELLS/UL

## 2023-03-30 LAB
MICROORGANISM SPEC CULT: NORMAL
MICROORGANISM SPEC CULT: NORMAL
SPECIMEN DESCRIPTION: NORMAL
SPECIMEN DESCRIPTION: NORMAL

## 2023-03-31 LAB
MICROORGANISM SPEC CULT: ABNORMAL
MICROORGANISM/AGENT SPEC: ABNORMAL
SPECIMEN DESCRIPTION: ABNORMAL

## 2023-04-08 PROBLEM — R10.9 INTRACTABLE ABDOMINAL PAIN: Status: ACTIVE | Noted: 2023-04-08

## 2023-04-16 ENCOUNTER — HOSPITAL ENCOUNTER (EMERGENCY)
Age: 43
Discharge: HOME | End: 2023-04-17
Attending: EMERGENCY MEDICINE
Payer: MEDICAID

## 2023-04-16 VITALS
WEIGHT: 136 LBS | OXYGEN SATURATION: 98 % | RESPIRATION RATE: 18 BRPM | BODY MASS INDEX: 22.66 KG/M2 | SYSTOLIC BLOOD PRESSURE: 156 MMHG | HEIGHT: 65 IN | TEMPERATURE: 98.4 F | HEART RATE: 116 BPM | DIASTOLIC BLOOD PRESSURE: 91 MMHG

## 2023-04-16 DIAGNOSIS — Z91.158: ICD-10-CM

## 2023-04-16 DIAGNOSIS — R10.9 ABDOMINAL PAIN, UNSPECIFIED ABDOMINAL LOCATION: Primary | ICD-10-CM

## 2023-04-16 PROCEDURE — 99284 EMERGENCY DEPT VISIT MOD MDM: CPT

## 2023-04-16 PROCEDURE — 96374 THER/PROPH/DIAG INJ IV PUSH: CPT

## 2023-04-16 PROCEDURE — 96375 TX/PRO/DX INJ NEW DRUG ADDON: CPT

## 2023-04-16 ASSESSMENT — PAIN SCALES - GENERAL: PAINLEVEL_OUTOF10: 7

## 2023-04-16 ASSESSMENT — PAIN - FUNCTIONAL ASSESSMENT: PAIN_FUNCTIONAL_ASSESSMENT: 0-10

## 2023-04-16 ASSESSMENT — LIFESTYLE VARIABLES: HOW OFTEN DO YOU HAVE A DRINK CONTAINING ALCOHOL: NEVER

## 2023-04-17 LAB
ABSOLUTE EOS #: 0.4 K/UL (ref 0–0.4)
ABSOLUTE LYMPH #: 2.6 K/UL (ref 1–4.8)
ABSOLUTE MONO #: 0.8 K/UL (ref 0.1–1.3)
ALBUMIN SERPL-MCNC: 3.7 G/DL (ref 3.5–5.2)
ALP SERPL-CCNC: 81 U/L (ref 35–104)
ALT SERPL-CCNC: 10 U/L (ref 5–33)
ANION GAP SERPL CALCULATED.3IONS-SCNC: 14 MMOL/L (ref 9–17)
APPEARANCE FLUID: CLEAR
AST SERPL-CCNC: 13 U/L
BASO FLUID: ABNORMAL %
BASOPHILS # BLD: 1 % (ref 0–2)
BASOPHILS ABSOLUTE: 0.1 K/UL (ref 0–0.2)
BILIRUB SERPL-MCNC: <0.2 MG/DL (ref 0.3–1.2)
BUN SERPL-MCNC: 37 MG/DL (ref 6–20)
CALCIUM SERPL-MCNC: 8.8 MG/DL (ref 8.6–10.4)
CELLS COUNTED: 85
CHLORIDE SERPL-SCNC: 98 MMOL/L (ref 98–107)
CO2 SERPL-SCNC: 27 MMOL/L (ref 20–31)
COLOR FLUID: COLORLESS
CREAT SERPL-MCNC: 8.2 MG/DL (ref 0.5–0.9)
CRP SERPL HS-MCNC: 3.4 MG/L (ref 0–5)
EOSINOPHIL FLUID: ABNORMAL %
EOSINOPHILS RELATIVE PERCENT: 4 % (ref 0–4)
ERYTHROCYTE [SEDIMENTATION RATE] IN BLOOD BY WESTERGREN METHOD: 20 MM/HR (ref 0–20)
FLUID DIFF COMMENT: ABNORMAL
GFR SERPL CREATININE-BSD FRML MDRD: 6 ML/MIN/1.73M2
GLUCOSE SERPL-MCNC: 106 MG/DL (ref 70–99)
HCG QUALITATIVE: NEGATIVE
HCT VFR BLD AUTO: 31.4 % (ref 36–46)
HGB BLD-MCNC: 10.7 G/DL (ref 12–16)
LYMPHOCYTES # BLD: 24 % (ref 24–44)
LYMPHOCYTES, BODY FLUID: 21 %
MCH RBC QN AUTO: 31.2 PG (ref 26–34)
MCHC RBC AUTO-ENTMCNC: 34.1 G/DL (ref 31–37)
MCV RBC AUTO: 91.6 FL (ref 80–100)
MONOCYTE, FLUID: 59 %
MONOCYTES # BLD: 8 % (ref 1–7)
NEUTROPHIL, FLUID: 20 %
OTHER CELLS FLUID: ABNORMAL %
PDW BLD-RTO: 12.6 % (ref 11.5–14.9)
PLATELET # BLD AUTO: 427 K/UL (ref 150–450)
PMV BLD AUTO: 7.4 FL (ref 6–12)
POTASSIUM SERPL-SCNC: 4.3 MMOL/L (ref 3.7–5.3)
PROT SERPL-MCNC: 6.6 G/DL (ref 6.4–8.3)
RBC # BLD: 3.43 M/UL (ref 4–5.2)
RBC FLUID: 101 CELLS/UL
SEG NEUTROPHILS: 63 % (ref 36–66)
SEGMENTED NEUTROPHILS ABSOLUTE COUNT: 6.8 K/UL (ref 1.3–9.1)
SODIUM SERPL-SCNC: 139 MMOL/L (ref 135–144)
SPECIMEN TYPE: NORMAL
WBC # BLD AUTO: 10.7 K/UL (ref 3.5–11)
WBC FLUID: 15 CELLS/UL

## 2023-04-17 PROCEDURE — 6370000000 HC RX 637 (ALT 250 FOR IP): Performed by: EMERGENCY MEDICINE

## 2023-04-17 PROCEDURE — 85652 RBC SED RATE AUTOMATED: CPT

## 2023-04-17 PROCEDURE — 85025 COMPLETE CBC W/AUTO DIFF WBC: CPT

## 2023-04-17 PROCEDURE — 80053 COMPREHEN METABOLIC PANEL: CPT

## 2023-04-17 PROCEDURE — 96374 THER/PROPH/DIAG INJ IV PUSH: CPT

## 2023-04-17 PROCEDURE — 36415 COLL VENOUS BLD VENIPUNCTURE: CPT

## 2023-04-17 PROCEDURE — 87075 CULTR BACTERIA EXCEPT BLOOD: CPT

## 2023-04-17 PROCEDURE — 6360000002 HC RX W HCPCS: Performed by: EMERGENCY MEDICINE

## 2023-04-17 PROCEDURE — 87070 CULTURE OTHR SPECIMN AEROBIC: CPT

## 2023-04-17 PROCEDURE — 86140 C-REACTIVE PROTEIN: CPT

## 2023-04-17 PROCEDURE — 84703 CHORIONIC GONADOTROPIN ASSAY: CPT

## 2023-04-17 PROCEDURE — 96375 TX/PRO/DX INJ NEW DRUG ADDON: CPT

## 2023-04-17 PROCEDURE — 87205 SMEAR GRAM STAIN: CPT

## 2023-04-17 RX ORDER — OXYCODONE HYDROCHLORIDE 5 MG/1
5 TABLET ORAL ONCE
Status: COMPLETED | OUTPATIENT
Start: 2023-04-17 | End: 2023-04-17

## 2023-04-17 RX ORDER — ONDANSETRON 2 MG/ML
8 INJECTION INTRAMUSCULAR; INTRAVENOUS ONCE
Status: COMPLETED | OUTPATIENT
Start: 2023-04-17 | End: 2023-04-17

## 2023-04-17 RX ADMIN — ONDANSETRON 8 MG: 2 INJECTION INTRAMUSCULAR; INTRAVENOUS at 00:43

## 2023-04-17 RX ADMIN — HYDROMORPHONE HYDROCHLORIDE 1 MG: 1 INJECTION, SOLUTION INTRAMUSCULAR; INTRAVENOUS; SUBCUTANEOUS at 00:43

## 2023-04-17 RX ADMIN — OXYCODONE HYDROCHLORIDE 5 MG: 5 TABLET ORAL at 03:49

## 2023-04-17 ASSESSMENT — PAIN SCALES - GENERAL: PAINLEVEL_OUTOF10: 9

## 2023-04-17 ASSESSMENT — PAIN DESCRIPTION - DESCRIPTORS: DESCRIPTORS: ACHING

## 2023-04-17 ASSESSMENT — PAIN DESCRIPTION - LOCATION: LOCATION: ABDOMEN

## 2023-04-17 ASSESSMENT — ENCOUNTER SYMPTOMS: ABDOMINAL PAIN: 1

## 2023-04-17 NOTE — ED PROVIDER NOTES
Pulmonary:      Effort: Pulmonary effort is normal. No respiratory distress. Breath sounds: Normal breath sounds. No stridor. No wheezing or rales. Abdominal:      General: There is distension. Palpations: Abdomen is soft. Tenderness: There is abdominal tenderness. There is no guarding or rebound. Comments: Ascities  LLQ peritoneal dialysis catheter, no purulence or erythema, no hematomas   Musculoskeletal:         General: No tenderness or deformity. Normal range of motion. Cervical back: Normal range of motion and neck supple. Skin:     General: Skin is warm and dry. Capillary Refill: Capillary refill takes less than 2 seconds. Findings: No erythema or rash. Neurological:      General: No focal deficit present. Mental Status: She is alert and oriented to person, place, and time. Coordination: Coordination normal.   Psychiatric:         Mood and Affect: Mood normal.         Behavior: Behavior normal.         Thought Content: Thought content normal.         Judgment: Judgment normal.       MEDICAL DECISION MAKING / ED COURSE:         1)  Number and Complexity of Problems Addressed at this Encounter  Problem List This Visit:  abd pain, recurrent, on peritoneal dialysis    Differential Diagnosis: infection, recurrent pain    Diagnoses Considered but Do Not Suspect:  sepsis    Pertinent Comorbid Conditions:  ESRD on peritoneal dialysis    2)  Data Reviewed and Analyzed  (Lab and radiology tests/orders below in next section)    External Documents Reviewed: reviewed recent ID consultation, did not suspect peritonitis, recent ct abd pelvis, recent labs    Had recent culture done showing micrococcus, suspect contaminant    WBC on cell count low today, 15 WBC. It is lower that past two results.     3)  Treatment and Disposition    ED Course as of 04/17/23 0520   Mon Apr 17, 2023   0329 DW lab, 30 more min for cell count [WM]   0357 Peritoneal fluid WBC 15, with only 20%

## 2023-04-17 NOTE — ED TRIAGE NOTES
Mode of arrival (squad #, walk in, police, etc) : Walk-in        Chief complaint(s): Abd w/peritoneal port. Back right flank pain        Arrival Note (brief scenario, treatment PTA, etc). : Pt states that she has been having consitant abd pain around her peritoneal port. The pain will come and go but recently has increased. Pt does not notice any drainage around port. Pt is still keep up with dialysis. C= \"Have you ever felt that you should Cut down on your drinking? \"  No  A= \"Have people Annoyed you by criticizing your drinking? \"  No  G= \"Have you ever felt bad or Guilty about your drinking? \"  No  E= \"Have you ever had a drink as an Eye-opener first thing in the morning to steady your nerves or to help a hangover? \"  No      Deferred []      Reason for deferring: N/A    *If yes to two or more: probable alcohol abuse. *

## 2023-05-03 ENCOUNTER — APPOINTMENT (OUTPATIENT)
Dept: CT IMAGING | Age: 43
End: 2023-05-03
Payer: MEDICAID

## 2023-05-03 ENCOUNTER — HOSPITAL ENCOUNTER (OUTPATIENT)
Age: 43
Setting detail: OBSERVATION
Discharge: HOME HEALTH | End: 2023-05-06
Attending: EMERGENCY MEDICINE | Admitting: INTERNAL MEDICINE
Payer: MEDICAID

## 2023-05-03 DIAGNOSIS — K65.2 SBP (SPONTANEOUS BACTERIAL PERITONITIS) (HCC): Primary | ICD-10-CM

## 2023-05-03 LAB
ABSOLUTE EOS #: 0.6 K/UL (ref 0–0.4)
ABSOLUTE LYMPH #: 2.5 K/UL (ref 1–4.8)
ABSOLUTE MONO #: 0.7 K/UL (ref 0.1–1.3)
ALBUMIN SERPL-MCNC: 3.9 G/DL (ref 3.5–5.2)
ALP SERPL-CCNC: 102 U/L (ref 35–104)
ALT SERPL-CCNC: 13 U/L (ref 5–33)
ANION GAP SERPL CALCULATED.3IONS-SCNC: 16 MMOL/L (ref 9–17)
AST SERPL-CCNC: 15 U/L
BASOPHILS # BLD: 1 % (ref 0–2)
BASOPHILS ABSOLUTE: 0.1 K/UL (ref 0–0.2)
BILIRUB SERPL-MCNC: 0.2 MG/DL (ref 0.3–1.2)
BUN SERPL-MCNC: 37 MG/DL (ref 6–20)
CALCIUM SERPL-MCNC: 8.8 MG/DL (ref 8.6–10.4)
CHLORIDE SERPL-SCNC: 97 MMOL/L (ref 98–107)
CO2 SERPL-SCNC: 25 MMOL/L (ref 20–31)
CREAT SERPL-MCNC: 8.6 MG/DL (ref 0.5–0.9)
EOSINOPHILS RELATIVE PERCENT: 5 % (ref 0–4)
GFR SERPL CREATININE-BSD FRML MDRD: 5 ML/MIN/1.73M2
GLUCOSE SERPL-MCNC: 92 MG/DL (ref 70–99)
HCG QUALITATIVE: NEGATIVE
HCT VFR BLD AUTO: 36.8 % (ref 36–46)
HGB BLD-MCNC: 12 G/DL (ref 12–16)
LYMPHOCYTES # BLD: 21 % (ref 24–44)
MCH RBC QN AUTO: 30.5 PG (ref 26–34)
MCHC RBC AUTO-ENTMCNC: 32.7 G/DL (ref 31–37)
MCV RBC AUTO: 93.2 FL (ref 80–100)
MONOCYTES # BLD: 6 % (ref 1–7)
PDW BLD-RTO: 13.2 % (ref 11.5–14.9)
PLATELET # BLD AUTO: 520 K/UL (ref 150–450)
PMV BLD AUTO: 7.8 FL (ref 6–12)
POTASSIUM SERPL-SCNC: 3.7 MMOL/L (ref 3.7–5.3)
PROT SERPL-MCNC: 7.1 G/DL (ref 6.4–8.3)
RBC # BLD: 3.95 M/UL (ref 4–5.2)
SEG NEUTROPHILS: 67 % (ref 36–66)
SEGMENTED NEUTROPHILS ABSOLUTE COUNT: 8.4 K/UL (ref 1.3–9.1)
SODIUM SERPL-SCNC: 138 MMOL/L (ref 135–144)
WBC # BLD AUTO: 12.2 K/UL (ref 3.5–11)

## 2023-05-03 PROCEDURE — 84703 CHORIONIC GONADOTROPIN ASSAY: CPT

## 2023-05-03 PROCEDURE — 85025 COMPLETE CBC W/AUTO DIFF WBC: CPT

## 2023-05-03 PROCEDURE — 74176 CT ABD & PELVIS W/O CONTRAST: CPT

## 2023-05-03 PROCEDURE — 99285 EMERGENCY DEPT VISIT HI MDM: CPT

## 2023-05-03 PROCEDURE — 80053 COMPREHEN METABOLIC PANEL: CPT

## 2023-05-03 PROCEDURE — G0378 HOSPITAL OBSERVATION PER HR: HCPCS

## 2023-05-03 PROCEDURE — 87040 BLOOD CULTURE FOR BACTERIA: CPT

## 2023-05-03 PROCEDURE — 2580000003 HC RX 258: Performed by: PEDIATRICS

## 2023-05-03 PROCEDURE — 36415 COLL VENOUS BLD VENIPUNCTURE: CPT

## 2023-05-03 PROCEDURE — 81001 URINALYSIS AUTO W/SCOPE: CPT

## 2023-05-03 PROCEDURE — 6360000002 HC RX W HCPCS: Performed by: PEDIATRICS

## 2023-05-03 PROCEDURE — 96365 THER/PROPH/DIAG IV INF INIT: CPT

## 2023-05-03 PROCEDURE — 96375 TX/PRO/DX INJ NEW DRUG ADDON: CPT

## 2023-05-03 RX ORDER — ONDANSETRON 2 MG/ML
4 INJECTION INTRAMUSCULAR; INTRAVENOUS ONCE
Status: COMPLETED | OUTPATIENT
Start: 2023-05-03 | End: 2023-05-03

## 2023-05-03 RX ORDER — MORPHINE SULFATE 4 MG/ML
4 INJECTION, SOLUTION INTRAMUSCULAR; INTRAVENOUS ONCE
Status: COMPLETED | OUTPATIENT
Start: 2023-05-03 | End: 2023-05-03

## 2023-05-03 RX ADMIN — MORPHINE SULFATE 4 MG: 4 INJECTION, SOLUTION INTRAMUSCULAR; INTRAVENOUS at 19:29

## 2023-05-03 RX ADMIN — HYDROMORPHONE HYDROCHLORIDE 1 MG: 1 INJECTION, SOLUTION INTRAMUSCULAR; INTRAVENOUS; SUBCUTANEOUS at 21:17

## 2023-05-03 RX ADMIN — ONDANSETRON 4 MG: 2 INJECTION INTRAMUSCULAR; INTRAVENOUS at 21:14

## 2023-05-03 RX ADMIN — CEFTRIAXONE SODIUM 2000 MG: 2 INJECTION, POWDER, FOR SOLUTION INTRAMUSCULAR; INTRAVENOUS at 21:19

## 2023-05-03 ASSESSMENT — PAIN SCALES - GENERAL
PAINLEVEL_OUTOF10: 7
PAINLEVEL_OUTOF10: 8

## 2023-05-03 ASSESSMENT — PAIN DESCRIPTION - LOCATION
LOCATION: ABDOMEN
LOCATION: ABDOMEN

## 2023-05-03 ASSESSMENT — ENCOUNTER SYMPTOMS
VOMITING: 1
NAUSEA: 1
ABDOMINAL PAIN: 1

## 2023-05-03 NOTE — ED PROVIDER NOTES
[facility]  Emergency Department Encounter  Emergency Medicine Resident     Pt Name:Elo Booth  MRN: [id number]  [place] 1980  Date of evaluation: 5/3/23  PCP:  Holly Nicole PA-C    7:04 PM EDT     CHIEF COMPLAINT       Chief Complaint   Patient presents with    Nausea    Abdominal Pain       HISTORY OF PRESENT ILLNESS  (Location/Symptom, Timing/Onset, Context/Setting, Quality, Duration, Modifying Factors, Severity.)      Erica Gregorio is a 37 y.o. female who presents with abdominal pain. Patient has peritoneal dialysis, states he does this 4 times a day. Has had worsening abdominal pain and bloating, decreased p.o. intake. Patient states he has had peritonitis in the past and concerned she has this again. She denies fever chest pain or shortness of breath. States she was dropped off by her mother today. Is not currently on any antibiotics. Endorses emesis, containing stomach acid she has nausea associated with her abdominal pain has chronic flank pain due to her polycystic kidney disease that is severe, states that her last time she had flank pain she had hemorrhaging cyst in her kidney. She has a appointment with palliative care next week, and presents today due to worsening abdominal pain and is requesting relief of this pain at this time. Has a nexplanon. PAST MEDICAL / SURGICAL / SOCIAL / FAMILY HISTORY      has a past medical history of Anxiety, Asthma, Chronic headaches, CKD (chronic kidney disease) stage 3, GFR 30-59 ml/min ([facility]), Degenerative disc disease, cervical, Depression, Dysmenorrhea, Eczema, Hypertension, Hypocalcemia, Kidney stone, Menorrhagia, Neck pain, bilateral, Pelvic pain in female, Polycystic kidney, Smoker, and Tension vascular headache.  reviewd     has a past surgical history that includes Dilation and curettage of uterus; Breast lumpectomy; Nerve Block (07/01/2013); Nerve Block (07/08/2013); other surgical history (03/10/2014 );  Toe Surgery;
Yes

## 2023-05-04 LAB
ABSOLUTE EOS #: 0.6 K/UL (ref 0–0.4)
ABSOLUTE LYMPH #: 2.1 K/UL (ref 1–4.8)
ABSOLUTE MONO #: 0.7 K/UL (ref 0.1–1.3)
ANION GAP SERPL CALCULATED.3IONS-SCNC: 14 MMOL/L (ref 9–17)
BACTERIA: NORMAL
BASOPHILS # BLD: 1 % (ref 0–2)
BASOPHILS ABSOLUTE: 0.1 K/UL (ref 0–0.2)
BILIRUBIN URINE: NEGATIVE
BUN SERPL-MCNC: 40 MG/DL (ref 6–20)
CALCIUM SERPL-MCNC: 8.6 MG/DL (ref 8.6–10.4)
CASTS UA: NORMAL /LPF
CHLORIDE SERPL-SCNC: 97 MMOL/L (ref 98–107)
CO2 SERPL-SCNC: 28 MMOL/L (ref 20–31)
COLOR: YELLOW
CREAT SERPL-MCNC: 8.75 MG/DL (ref 0.5–0.9)
EOSINOPHILS RELATIVE PERCENT: 6 % (ref 0–4)
EPITHELIAL CELLS UA: NORMAL /HPF
GFR SERPL CREATININE-BSD FRML MDRD: 5 ML/MIN/1.73M2
GLUCOSE SERPL-MCNC: 109 MG/DL (ref 70–99)
GLUCOSE UR STRIP.AUTO-MCNC: ABNORMAL MG/DL
HCT VFR BLD AUTO: 33.6 % (ref 36–46)
HGB BLD-MCNC: 11.6 G/DL (ref 12–16)
KETONES UR STRIP.AUTO-MCNC: NEGATIVE MG/DL
LEUKOCYTE ESTERASE UR QL STRIP.AUTO: NEGATIVE
LYMPHOCYTES # BLD: 22 % (ref 24–44)
MCH RBC QN AUTO: 32 PG (ref 26–34)
MCHC RBC AUTO-ENTMCNC: 34.4 G/DL (ref 31–37)
MCV RBC AUTO: 93 FL (ref 80–100)
MONOCYTES # BLD: 7 % (ref 1–7)
NITRITE UR QL STRIP.AUTO: NEGATIVE
PDW BLD-RTO: 13.5 % (ref 11.5–14.9)
PLATELET # BLD AUTO: 446 K/UL (ref 150–450)
PMV BLD AUTO: 7.6 FL (ref 6–12)
POTASSIUM SERPL-SCNC: 3.7 MMOL/L (ref 3.7–5.3)
PROT UR STRIP.AUTO-MCNC: 7 MG/DL (ref 5–8)
PROT UR STRIP.AUTO-MCNC: ABNORMAL MG/DL
RBC # BLD: 3.61 M/UL (ref 4–5.2)
RBC CLUMPS #/AREA URNS AUTO: NORMAL /HPF
SEG NEUTROPHILS: 64 % (ref 36–66)
SEGMENTED NEUTROPHILS ABSOLUTE COUNT: 6.1 K/UL (ref 1.3–9.1)
SODIUM SERPL-SCNC: 139 MMOL/L (ref 135–144)
SPECIFIC GRAVITY UA: 1.01 (ref 1–1.03)
TURBIDITY: CLEAR
URINE HGB: NEGATIVE
UROBILINOGEN, URINE: NORMAL
WBC # BLD AUTO: 9.6 K/UL (ref 3.5–11)
WBC UA: NORMAL /HPF

## 2023-05-04 PROCEDURE — G0378 HOSPITAL OBSERVATION PER HR: HCPCS

## 2023-05-04 PROCEDURE — 85025 COMPLETE CBC W/AUTO DIFF WBC: CPT

## 2023-05-04 PROCEDURE — 96366 THER/PROPH/DIAG IV INF ADDON: CPT

## 2023-05-04 PROCEDURE — 88305 TISSUE EXAM BY PATHOLOGIST: CPT

## 2023-05-04 PROCEDURE — 99223 1ST HOSP IP/OBS HIGH 75: CPT | Performed by: INTERNAL MEDICINE

## 2023-05-04 PROCEDURE — 80048 BASIC METABOLIC PNL TOTAL CA: CPT

## 2023-05-04 PROCEDURE — 6370000000 HC RX 637 (ALT 250 FOR IP): Performed by: NURSE PRACTITIONER

## 2023-05-04 PROCEDURE — 36415 COLL VENOUS BLD VENIPUNCTURE: CPT

## 2023-05-04 PROCEDURE — 96376 TX/PRO/DX INJ SAME DRUG ADON: CPT

## 2023-05-04 PROCEDURE — 6370000000 HC RX 637 (ALT 250 FOR IP): Performed by: INTERNAL MEDICINE

## 2023-05-04 PROCEDURE — 2580000003 HC RX 258: Performed by: NURSE PRACTITIONER

## 2023-05-04 PROCEDURE — 6360000002 HC RX W HCPCS: Performed by: NURSE PRACTITIONER

## 2023-05-04 PROCEDURE — 96372 THER/PROPH/DIAG INJ SC/IM: CPT

## 2023-05-04 PROCEDURE — 88112 CYTOPATH CELL ENHANCE TECH: CPT

## 2023-05-04 RX ORDER — ONDANSETRON 2 MG/ML
4 INJECTION INTRAMUSCULAR; INTRAVENOUS EVERY 6 HOURS PRN
Status: DISCONTINUED | OUTPATIENT
Start: 2023-05-04 | End: 2023-05-04

## 2023-05-04 RX ORDER — PANTOPRAZOLE SODIUM 40 MG/1
40 TABLET, DELAYED RELEASE ORAL
Status: DISCONTINUED | OUTPATIENT
Start: 2023-05-05 | End: 2023-05-06 | Stop reason: HOSPADM

## 2023-05-04 RX ORDER — ATORVASTATIN CALCIUM 40 MG/1
40 TABLET, FILM COATED ORAL NIGHTLY
Status: DISCONTINUED | OUTPATIENT
Start: 2023-05-04 | End: 2023-05-06 | Stop reason: HOSPADM

## 2023-05-04 RX ORDER — SODIUM CHLORIDE 0.9 % (FLUSH) 0.9 %
5-40 SYRINGE (ML) INJECTION EVERY 12 HOURS SCHEDULED
Status: DISCONTINUED | OUTPATIENT
Start: 2023-05-04 | End: 2023-05-06 | Stop reason: HOSPADM

## 2023-05-04 RX ORDER — HEPARIN SODIUM 5000 [USP'U]/ML
5000 INJECTION, SOLUTION INTRAVENOUS; SUBCUTANEOUS EVERY 8 HOURS SCHEDULED
Status: DISCONTINUED | OUTPATIENT
Start: 2023-05-04 | End: 2023-05-06 | Stop reason: HOSPADM

## 2023-05-04 RX ORDER — ONDANSETRON 4 MG/1
4 TABLET, ORALLY DISINTEGRATING ORAL EVERY 8 HOURS PRN
Status: DISCONTINUED | OUTPATIENT
Start: 2023-05-04 | End: 2023-05-04

## 2023-05-04 RX ORDER — ASPIRIN 81 MG/1
81 TABLET, CHEWABLE ORAL DAILY
Status: DISCONTINUED | OUTPATIENT
Start: 2023-05-04 | End: 2023-05-06 | Stop reason: HOSPADM

## 2023-05-04 RX ORDER — AMITRIPTYLINE HYDROCHLORIDE 25 MG/1
25 TABLET, FILM COATED ORAL NIGHTLY
Status: DISCONTINUED | OUTPATIENT
Start: 2023-05-04 | End: 2023-05-06 | Stop reason: HOSPADM

## 2023-05-04 RX ORDER — AMLODIPINE BESYLATE 10 MG/1
10 TABLET ORAL DAILY
Status: DISCONTINUED | OUTPATIENT
Start: 2023-05-04 | End: 2023-05-06 | Stop reason: HOSPADM

## 2023-05-04 RX ORDER — PROMETHAZINE HYDROCHLORIDE 25 MG/1
25 TABLET ORAL EVERY 6 HOURS PRN
Status: DISCONTINUED | OUTPATIENT
Start: 2023-05-04 | End: 2023-05-06 | Stop reason: HOSPADM

## 2023-05-04 RX ORDER — METOPROLOL SUCCINATE 50 MG/1
50 TABLET, EXTENDED RELEASE ORAL DAILY
Status: DISCONTINUED | OUTPATIENT
Start: 2023-05-04 | End: 2023-05-06 | Stop reason: HOSPADM

## 2023-05-04 RX ORDER — ACETAMINOPHEN 650 MG/1
650 SUPPOSITORY RECTAL EVERY 6 HOURS PRN
Status: DISCONTINUED | OUTPATIENT
Start: 2023-05-04 | End: 2023-05-06 | Stop reason: HOSPADM

## 2023-05-04 RX ORDER — ACETAMINOPHEN 325 MG/1
650 TABLET ORAL EVERY 6 HOURS PRN
Status: DISCONTINUED | OUTPATIENT
Start: 2023-05-04 | End: 2023-05-06 | Stop reason: HOSPADM

## 2023-05-04 RX ORDER — LANOLIN ALCOHOL/MO/W.PET/CERES
9 CREAM (GRAM) TOPICAL NIGHTLY
Status: DISCONTINUED | OUTPATIENT
Start: 2023-05-04 | End: 2023-05-06 | Stop reason: HOSPADM

## 2023-05-04 RX ORDER — SODIUM CHLORIDE 0.9 % (FLUSH) 0.9 %
10 SYRINGE (ML) INJECTION PRN
Status: DISCONTINUED | OUTPATIENT
Start: 2023-05-04 | End: 2023-05-06 | Stop reason: HOSPADM

## 2023-05-04 RX ORDER — SODIUM CHLORIDE 9 MG/ML
INJECTION, SOLUTION INTRAVENOUS PRN
Status: DISCONTINUED | OUTPATIENT
Start: 2023-05-04 | End: 2023-05-06

## 2023-05-04 RX ORDER — GENTAMICIN SULFATE 1 MG/G
OINTMENT TOPICAL EVERY OTHER DAY
Status: DISCONTINUED | OUTPATIENT
Start: 2023-05-04 | End: 2023-05-06 | Stop reason: HOSPADM

## 2023-05-04 RX ADMIN — HYDROMORPHONE HYDROCHLORIDE 0.5 MG: 1 INJECTION, SOLUTION INTRAMUSCULAR; INTRAVENOUS; SUBCUTANEOUS at 13:51

## 2023-05-04 RX ADMIN — HYDROMORPHONE HYDROCHLORIDE 0.5 MG: 1 INJECTION, SOLUTION INTRAMUSCULAR; INTRAVENOUS; SUBCUTANEOUS at 22:07

## 2023-05-04 RX ADMIN — CEFTRIAXONE SODIUM 2000 MG: 2 INJECTION, POWDER, FOR SOLUTION INTRAMUSCULAR; INTRAVENOUS at 19:25

## 2023-05-04 RX ADMIN — PROMETHAZINE HYDROCHLORIDE 25 MG: 25 TABLET ORAL at 19:16

## 2023-05-04 RX ADMIN — HYDROMORPHONE HYDROCHLORIDE 0.5 MG: 1 INJECTION, SOLUTION INTRAMUSCULAR; INTRAVENOUS; SUBCUTANEOUS at 17:56

## 2023-05-04 RX ADMIN — DULOXETINE HYDROCHLORIDE 90 MG: 30 CAPSULE, DELAYED RELEASE ORAL at 09:40

## 2023-05-04 RX ADMIN — HEPARIN SODIUM 5000 UNITS: 5000 INJECTION INTRAVENOUS; SUBCUTANEOUS at 01:07

## 2023-05-04 RX ADMIN — HEPARIN SODIUM 5000 UNITS: 5000 INJECTION INTRAVENOUS; SUBCUTANEOUS at 13:51

## 2023-05-04 RX ADMIN — HEPARIN SODIUM 5000 UNITS: 5000 INJECTION INTRAVENOUS; SUBCUTANEOUS at 05:29

## 2023-05-04 RX ADMIN — METOPROLOL SUCCINATE 50 MG: 50 TABLET, EXTENDED RELEASE ORAL at 09:40

## 2023-05-04 RX ADMIN — HYDROMORPHONE HYDROCHLORIDE 0.5 MG: 1 INJECTION, SOLUTION INTRAMUSCULAR; INTRAVENOUS; SUBCUTANEOUS at 05:29

## 2023-05-04 RX ADMIN — PROMETHAZINE HYDROCHLORIDE 25 MG: 25 TABLET ORAL at 07:11

## 2023-05-04 RX ADMIN — GENTAMICIN SULFATE: 1 OINTMENT TOPICAL at 18:10

## 2023-05-04 RX ADMIN — Medication 9 MG: at 19:19

## 2023-05-04 RX ADMIN — Medication 9 MG: at 01:06

## 2023-05-04 RX ADMIN — AMITRIPTYLINE HYDROCHLORIDE 25 MG: 25 TABLET, FILM COATED ORAL at 19:19

## 2023-05-04 RX ADMIN — ATORVASTATIN CALCIUM 40 MG: 40 TABLET, FILM COATED ORAL at 01:06

## 2023-05-04 RX ADMIN — HYDROMORPHONE HYDROCHLORIDE 0.5 MG: 1 INJECTION, SOLUTION INTRAMUSCULAR; INTRAVENOUS; SUBCUTANEOUS at 01:05

## 2023-05-04 RX ADMIN — HEPARIN SODIUM 5000 UNITS: 5000 INJECTION INTRAVENOUS; SUBCUTANEOUS at 22:15

## 2023-05-04 RX ADMIN — SODIUM CHLORIDE, PRESERVATIVE FREE 10 ML: 5 INJECTION INTRAVENOUS at 09:47

## 2023-05-04 RX ADMIN — AMLODIPINE BESYLATE 10 MG: 10 TABLET ORAL at 09:40

## 2023-05-04 RX ADMIN — SODIUM CHLORIDE, PRESERVATIVE FREE 10 ML: 5 INJECTION INTRAVENOUS at 19:20

## 2023-05-04 RX ADMIN — HYDROMORPHONE HYDROCHLORIDE 0.5 MG: 1 INJECTION, SOLUTION INTRAMUSCULAR; INTRAVENOUS; SUBCUTANEOUS at 09:47

## 2023-05-04 RX ADMIN — ATORVASTATIN CALCIUM 40 MG: 40 TABLET, FILM COATED ORAL at 19:16

## 2023-05-04 ASSESSMENT — PAIN DESCRIPTION - LOCATION
LOCATION: ABDOMEN;BACK
LOCATION: ABDOMEN;FLANK
LOCATION: ABDOMEN
LOCATION: BACK;ABDOMEN
LOCATION: ABDOMEN;FLANK
LOCATION: FLANK
LOCATION: ABDOMEN
LOCATION: ABDOMEN;FLANK

## 2023-05-04 ASSESSMENT — PAIN DESCRIPTION - DESCRIPTORS
DESCRIPTORS: CRAMPING;STABBING
DESCRIPTORS: SHARP;CRAMPING
DESCRIPTORS: CRAMPING
DESCRIPTORS: CRAMPING
DESCRIPTORS: ACHING;DISCOMFORT;STABBING;SPASM
DESCRIPTORS: ACHING;CRAMPING;DISCOMFORT
DESCRIPTORS: CRAMPING

## 2023-05-04 ASSESSMENT — PAIN SCALES - GENERAL
PAINLEVEL_OUTOF10: 7
PAINLEVEL_OUTOF10: 7
PAINLEVEL_OUTOF10: 6
PAINLEVEL_OUTOF10: 7
PAINLEVEL_OUTOF10: 7
PAINLEVEL_OUTOF10: 5
PAINLEVEL_OUTOF10: 8
PAINLEVEL_OUTOF10: 7
PAINLEVEL_OUTOF10: 5

## 2023-05-04 ASSESSMENT — PAIN DESCRIPTION - ORIENTATION
ORIENTATION: RIGHT
ORIENTATION: LOWER
ORIENTATION: RIGHT
ORIENTATION: RIGHT;LEFT;LOWER;UPPER
ORIENTATION: RIGHT
ORIENTATION: RIGHT;LEFT;LOWER;UPPER
ORIENTATION: RIGHT

## 2023-05-04 ASSESSMENT — LIFESTYLE VARIABLES
HOW MANY STANDARD DRINKS CONTAINING ALCOHOL DO YOU HAVE ON A TYPICAL DAY: PATIENT DOES NOT DRINK
HOW OFTEN DO YOU HAVE A DRINK CONTAINING ALCOHOL: NEVER

## 2023-05-04 NOTE — PLAN OF CARE
Problem: Discharge Planning  Goal: Discharge to home or other facility with appropriate resources  Outcome: Progressing  Flowsheets (Taken 5/4/2023 0059)  Discharge to home or other facility with appropriate resources:   Identify barriers to discharge with patient and caregiver   Identify discharge learning needs (meds, wound care, etc)   Refer to discharge planning if patient needs post-hospital services based on physician order or complex needs related to functional status, cognitive ability or social support system   Arrange for needed discharge resources and transportation as appropriate     Problem: ABCDS Injury Assessment  Goal: Absence of physical injury  Outcome: Progressing

## 2023-05-04 NOTE — ED NOTES
Report given to [facility], YAIMA from PCU. Report method by phone   The following was reviewed with receiving RN:   Current vital signs:  BP (!) 156/104   Pulse (!) 101   Temp 97.8 °F (36.6 °C)   Resp 18   Wt 138 lb (62.6 kg)   LMP 11/28/2022 (Approximate)   SpO2 97%   BMI 22.96 kg/m²                MEWS Score: 2     Any medication or safety alerts were reviewed. Any pending diagnostics and notifications were also reviewed, as well as any safety concerns or issues, abnormal labs, abnormal imaging, and abnormal assessment findings. Questions were answered.             Angel Hunt RN  05/03/23 6798

## 2023-05-04 NOTE — H&P
MARIAM [address]    HISTORY AND PHYSICAL EXAMINATION            Date:   5/4/2023  Patient name:  Christina Young  Date of admission:  5/3/2023  6:43 PM  MRN:   [id number]  Account:  [de-identified]  YOB: 1980  PCP:    Umair Quigley PA-C  Room:   2092/2092-01  Code Status:    Full Code    Chief Complaint:     Chief Complaint   Patient presents with    Nausea    Abdominal Pain       History Obtained From:     patient, electronic medical record    History of Present Illness: The patient is a 37 y.o. Non- / non  female who presents with Nausea and Abdominal Pain   and she is admitted to the hospital for the management of abdominal pain, nausea  and is admitted to the hospital for the management of Abdominal pain. According to patient, she has been having constant, achy abdominal pain across her entire abdomen since she had peritonitis in March. Pain becomes stabbing in nature when eating and during peritoneal exchanges and radiates into back at times. Symptoms are associated with intermittent episodes of chills and hot flashes, poor appetite, nausea, and overall feeling \"lousy. \"  Denies fever, chest pain, cough, vomiting, diarrhea, and urinary symptoms. There are no aggravating or alleviating factors. Symptoms are reported as constant and moderate to severe. Patient reports that she normally does her dialysis exchanges every 4 hours for 4 treatments, starting when she wakes up. States that she does not dwell dialysate solution overnight because it causes edema.     In the emergency room, patient underwent CT abdomen pelvis, patient was found to have peritoneal dialysis catheter in left lower quadrant, with a moderate amount of ascites  Polycystic kidney disease, blood culture sent  Of note, patient was [facility] with similar complaints, had extensive work-up, which includes peritoneal fluid culture,

## 2023-05-05 PROBLEM — K65.2 SBP (SPONTANEOUS BACTERIAL PERITONITIS) (HCC): Status: ACTIVE | Noted: 2023-05-05

## 2023-05-05 LAB
ABSOLUTE EOS #: 0.7 K/UL (ref 0–0.4)
ABSOLUTE LYMPH #: 2.1 K/UL (ref 1–4.8)
ABSOLUTE MONO #: 0.7 K/UL (ref 0.1–1.3)
ALBUMIN FLUID: <0.2 G/DL
AMYLASE FLUID: <3 U/L
ANION GAP SERPL CALCULATED.3IONS-SCNC: 15 MMOL/L (ref 9–17)
APPEARANCE FLUID: CLEAR
BASOPHILS # BLD: 1 % (ref 0–2)
BASOPHILS ABSOLUTE: 0.1 K/UL (ref 0–0.2)
BUN SERPL-MCNC: 42 MG/DL (ref 6–20)
CALCIUM SERPL-MCNC: 8.5 MG/DL (ref 8.6–10.4)
CHLORIDE SERPL-SCNC: 96 MMOL/L (ref 98–107)
CO2 SERPL-SCNC: 26 MMOL/L (ref 20–31)
COLOR FLUID: COLORLESS
CREAT SERPL-MCNC: 8.61 MG/DL (ref 0.5–0.9)
EOSINOPHILS RELATIVE PERCENT: 7 % (ref 0–4)
GFR SERPL CREATININE-BSD FRML MDRD: 5 ML/MIN/1.73M2
GLUCOSE SERPL-MCNC: 93 MG/DL (ref 70–99)
HCT VFR BLD AUTO: 30.6 % (ref 36–46)
HGB BLD-MCNC: 10.2 G/DL (ref 12–16)
LYMPHOCYTES # BLD: 22 % (ref 24–44)
MAGNESIUM SERPL-MCNC: 1.8 MG/DL (ref 1.6–2.6)
MCH RBC QN AUTO: 31.4 PG (ref 26–34)
MCHC RBC AUTO-ENTMCNC: 33.4 G/DL (ref 31–37)
MCV RBC AUTO: 94 FL (ref 80–100)
MONOCYTES # BLD: 7 % (ref 1–7)
PDW BLD-RTO: 13 % (ref 11.5–14.9)
PLATELET # BLD AUTO: 390 K/UL (ref 150–450)
PMV BLD AUTO: 7.9 FL (ref 6–12)
POTASSIUM SERPL-SCNC: 3.7 MMOL/L (ref 3.7–5.3)
RBC # BLD: 3.26 M/UL (ref 4–5.2)
RBC FLUID: 4 CELLS/UL
SEG NEUTROPHILS: 63 % (ref 36–66)
SEGMENTED NEUTROPHILS ABSOLUTE COUNT: 6.3 K/UL (ref 1.3–9.1)
SODIUM SERPL-SCNC: 137 MMOL/L (ref 135–144)
SPECIMEN TYPE: NORMAL
WBC # BLD AUTO: 10 K/UL (ref 3.5–11)
WBC FLUID: 1 CELLS/UL

## 2023-05-05 PROCEDURE — 85025 COMPLETE CBC W/AUTO DIFF WBC: CPT

## 2023-05-05 PROCEDURE — 87075 CULTR BACTERIA EXCEPT BLOOD: CPT

## 2023-05-05 PROCEDURE — 96366 THER/PROPH/DIAG IV INF ADDON: CPT

## 2023-05-05 PROCEDURE — 2580000003 HC RX 258: Performed by: INTERNAL MEDICINE

## 2023-05-05 PROCEDURE — 6370000000 HC RX 637 (ALT 250 FOR IP): Performed by: INTERNAL MEDICINE

## 2023-05-05 PROCEDURE — 96372 THER/PROPH/DIAG INJ SC/IM: CPT

## 2023-05-05 PROCEDURE — 87070 CULTURE OTHR SPECIMN AEROBIC: CPT

## 2023-05-05 PROCEDURE — 87205 SMEAR GRAM STAIN: CPT

## 2023-05-05 PROCEDURE — 82042 OTHER SOURCE ALBUMIN QUAN EA: CPT

## 2023-05-05 PROCEDURE — 99222 1ST HOSP IP/OBS MODERATE 55: CPT | Performed by: INTERNAL MEDICINE

## 2023-05-05 PROCEDURE — 6370000000 HC RX 637 (ALT 250 FOR IP): Performed by: NURSE PRACTITIONER

## 2023-05-05 PROCEDURE — 96376 TX/PRO/DX INJ SAME DRUG ADON: CPT

## 2023-05-05 PROCEDURE — 83735 ASSAY OF MAGNESIUM: CPT

## 2023-05-05 PROCEDURE — 99232 SBSQ HOSP IP/OBS MODERATE 35: CPT | Performed by: INTERNAL MEDICINE

## 2023-05-05 PROCEDURE — 6360000002 HC RX W HCPCS: Performed by: NURSE PRACTITIONER

## 2023-05-05 PROCEDURE — 2580000003 HC RX 258: Performed by: NURSE PRACTITIONER

## 2023-05-05 PROCEDURE — G0378 HOSPITAL OBSERVATION PER HR: HCPCS

## 2023-05-05 PROCEDURE — 82150 ASSAY OF AMYLASE: CPT

## 2023-05-05 PROCEDURE — 36415 COLL VENOUS BLD VENIPUNCTURE: CPT

## 2023-05-05 PROCEDURE — 80048 BASIC METABOLIC PNL TOTAL CA: CPT

## 2023-05-05 RX ORDER — SODIUM CHLORIDE, SODIUM LACTATE, CALCIUM CHLORIDE, MAGNESIUM CHLORIDE AND DEXTROSE 2.5; 538; 448; 18.3; 5.08 G/100ML; MG/100ML; MG/100ML; MG/100ML; MG/100ML
2000 INJECTION, SOLUTION INTRAPERITONEAL
Status: DISCONTINUED | OUTPATIENT
Start: 2023-05-05 | End: 2023-05-06 | Stop reason: HOSPADM

## 2023-05-05 RX ORDER — HYDROCODONE BITARTRATE AND ACETAMINOPHEN 5; 325 MG/1; MG/1
1 TABLET ORAL EVERY 6 HOURS PRN
Status: DISCONTINUED | OUTPATIENT
Start: 2023-05-05 | End: 2023-05-06 | Stop reason: HOSPADM

## 2023-05-05 RX ADMIN — AMITRIPTYLINE HYDROCHLORIDE 25 MG: 25 TABLET, FILM COATED ORAL at 20:11

## 2023-05-05 RX ADMIN — HYDROMORPHONE HYDROCHLORIDE 0.5 MG: 1 INJECTION, SOLUTION INTRAMUSCULAR; INTRAVENOUS; SUBCUTANEOUS at 09:01

## 2023-05-05 RX ADMIN — METOPROLOL SUCCINATE 50 MG: 50 TABLET, EXTENDED RELEASE ORAL at 09:02

## 2023-05-05 RX ADMIN — SODIUM CHLORIDE, PRESERVATIVE FREE 10 ML: 5 INJECTION INTRAVENOUS at 09:02

## 2023-05-05 RX ADMIN — Medication 9 MG: at 20:03

## 2023-05-05 RX ADMIN — AMLODIPINE BESYLATE 10 MG: 10 TABLET ORAL at 09:02

## 2023-05-05 RX ADMIN — HEPARIN SODIUM 5000 UNITS: 5000 INJECTION INTRAVENOUS; SUBCUTANEOUS at 14:06

## 2023-05-05 RX ADMIN — HYDROCODONE BITARTRATE AND ACETAMINOPHEN 1 TABLET: 5; 325 TABLET ORAL at 11:43

## 2023-05-05 RX ADMIN — HYDROMORPHONE HYDROCHLORIDE 0.5 MG: 1 INJECTION, SOLUTION INTRAMUSCULAR; INTRAVENOUS; SUBCUTANEOUS at 20:04

## 2023-05-05 RX ADMIN — SODIUM CHLORIDE, PRESERVATIVE FREE 10 ML: 5 INJECTION INTRAVENOUS at 20:04

## 2023-05-05 RX ADMIN — SODIUM CHLORIDE, SODIUM LACTATE, CALCIUM CHLORIDE, MAGNESIUM CHLORIDE AND DEXTROSE 2000 ML: 2.5; 538; 448; 18.3; 5.08 INJECTION, SOLUTION INTRAPERITONEAL at 17:51

## 2023-05-05 RX ADMIN — SODIUM CHLORIDE, SODIUM LACTATE, CALCIUM CHLORIDE, MAGNESIUM CHLORIDE AND DEXTROSE 2000 ML: 2.5; 538; 448; 18.3; 5.08 INJECTION, SOLUTION INTRAPERITONEAL at 22:29

## 2023-05-05 RX ADMIN — CEFTRIAXONE SODIUM 2000 MG: 2 INJECTION, POWDER, FOR SOLUTION INTRAMUSCULAR; INTRAVENOUS at 20:06

## 2023-05-05 RX ADMIN — HYDROCODONE BITARTRATE AND ACETAMINOPHEN 1 TABLET: 5; 325 TABLET ORAL at 18:12

## 2023-05-05 RX ADMIN — ATORVASTATIN CALCIUM 40 MG: 40 TABLET, FILM COATED ORAL at 20:03

## 2023-05-05 RX ADMIN — SODIUM CHLORIDE, SODIUM LACTATE, CALCIUM CHLORIDE, MAGNESIUM CHLORIDE AND DEXTROSE 2000 ML: 2.5; 538; 448; 18.3; 5.08 INJECTION, SOLUTION INTRAPERITONEAL at 14:02

## 2023-05-05 RX ADMIN — DULOXETINE HYDROCHLORIDE 90 MG: 30 CAPSULE, DELAYED RELEASE ORAL at 09:02

## 2023-05-05 RX ADMIN — ASPIRIN 81 MG: 81 TABLET, CHEWABLE ORAL at 09:02

## 2023-05-05 RX ADMIN — PROMETHAZINE HYDROCHLORIDE 25 MG: 25 TABLET ORAL at 02:59

## 2023-05-05 RX ADMIN — HEPARIN SODIUM 5000 UNITS: 5000 INJECTION INTRAVENOUS; SUBCUTANEOUS at 22:28

## 2023-05-05 RX ADMIN — HYDROMORPHONE HYDROCHLORIDE 0.5 MG: 1 INJECTION, SOLUTION INTRAMUSCULAR; INTRAVENOUS; SUBCUTANEOUS at 02:54

## 2023-05-05 ASSESSMENT — PAIN SCALES - GENERAL
PAINLEVEL_OUTOF10: 5
PAINLEVEL_OUTOF10: 7
PAINLEVEL_OUTOF10: 7
PAINLEVEL_OUTOF10: 9
PAINLEVEL_OUTOF10: 4
PAINLEVEL_OUTOF10: 7

## 2023-05-05 ASSESSMENT — PAIN - FUNCTIONAL ASSESSMENT: PAIN_FUNCTIONAL_ASSESSMENT: ACTIVITIES ARE NOT PREVENTED

## 2023-05-05 ASSESSMENT — PAIN DESCRIPTION - ORIENTATION: ORIENTATION: RIGHT;LOWER

## 2023-05-05 ASSESSMENT — PAIN DESCRIPTION - LOCATION
LOCATION: ABDOMEN;BACK
LOCATION: ABDOMEN

## 2023-05-05 ASSESSMENT — PAIN DESCRIPTION - DESCRIPTORS
DESCRIPTORS: ACHING
DESCRIPTORS: ACHING

## 2023-05-05 NOTE — CONSULTS
[facility] -   Infectious diseases evaluation  admission date 5/3/2023    reason for consultation:   Peritonitis    Impression :   Current:  Abdominal pain, rule out peritonitis  Polycystic kidney disease  End-stage renal disease on peritoneal dialysis  Allergy to penicillin        Recommendations   IV ceftriaxone   Follow peritoneal fluid analysis and culture  Follow CBC and renal function  Supportive care    Infection Control Recommendations   [place] Precautions      Antimicrobial Stewardship Recommendations   Simplification of therapy  Targeted therapy      History of Present Illness:   Initial history:  Paola Centeno is a 37y.o.-year-old female presented to the hospital with diffuse abdominal pain associated with nausea, chills and decreased appetite, constant, achy radiates to the back . She denied fever, denied cough or shortness of breath, no other complaints. The patient states that her peritoneal fluid is clear. CT abdomen pelvis, patient was found to have peritoneal dialysis catheter in left lower quadrant, with a moderate amount of ascites  Symptoms moderate to severe, no alleviating or aggravating factors. Initial WBC was 12.2  Urinalysis not suggestive of UTI  The patient had end-stage renal disease on peritoneal dialysis  The patient was hospitalized to [facility] in March and April 2023 for similar symptoms.   Peritoneal fluid culture on 3/24/2023 was negative  Peritoneal fluid culture on 4/8/2023 was positive for micrococcus peritoneal fluid analysis at that time showed 26 WBC  Peritoneal fluid culture on 4/17 /23 was negative, abdominal fluid analysis at that time showed 15 WBC  The patient was treated with intraperitoneal vancomycin through 3/26/2020  Interval changes  5/5/2023   Patient Vitals for the past 8 hrs:   BP Temp Pulse Resp SpO2   05/05/23 1230 99/64 98.4 °F (36.9 °C) 79 18 96 %   05/05/23 1143 -- -- -- 16 --
93.0   MCH 30.5 32.0   MCHC 32.7 34.4   RDW 13.2 13.5   * 446   MPV 7.8 7.6      BMP:   Recent Labs     05/03/23 1930 05/04/23  0547    139   K 3.7 3.7   CL 97* 97*   CO2 25 28   BUN 37* 40*   CREATININE 8.60* 8.75*   GLUCOSE 92 109*   CALCIUM 8.8 8.6        Phosphorus:  No results for input(s): PHOS in the last 72 hours. Magnesium: No results for input(s): MG in the last 72 hours. Albumin:   Recent Labs     05/03/23 1930   LABALBU 3.9       IRON:    Lab Results   Component Value Date/Time    IRON 102 06/21/2021 02:47 PM     Iron Saturation:  No components found for: PERCENTFE  TIBC:    Lab Results   Component Value Date/Time    TIBC 215 06/21/2021 02:47 PM     FERRITIN:    Lab Results   Component Value Date/Time    FERRITIN 55 06/21/2021 02:47 PM     SPEP:   Lab Results   Component Value Date/Time    PROT 7.1 05/03/2023 07:30 PM     UPEP:   Lab Results   Component Value Date/Time    TPU 18 06/21/2021 12:04 PM        C3: No results found for: C3  C4: No results found for: C4  MPO ANCA:  No results found for: MPO .   PR3 ANCA:  No results found for: PR3  Urine Sodium:    Lab Results   Component Value Date/Time    NOHELIA 86 06/21/2021 01:58 PM      Urine Potassium:  No results found for: KUR  Urine Chloride:  No results found for: CLU  Urine Ph:  No components found for: PO4U  Urine Osmolarity:    Lab Results   Component Value Date/Time    OSMOU 236 02/21/2021 09:07 PM     Urine Creatinine:    Lab Results   Component Value Date/Time    LABCREA 29.5 06/21/2021 01:58 PM     Urine Eosinophils: No components found for: EOSU  Urine Protein:    Lab Results   Component Value Date/Time    TPU 18 06/21/2021 12:04 PM     Urinalysis:  U/A:   Lab Results   Component Value Date/Time    NITRU NEGATIVE 05/03/2023 03:45 AM    COLORU Yellow 05/03/2023 03:45 AM    PHUR 7.0 05/03/2023 03:45 AM    WBCUA 0 TO 2 05/03/2023 03:45 AM    RBCUA 0 TO 2 05/03/2023 03:45 AM    MUCUS NOT REPORTED 11/06/2021 08:02 AM    TRICHOMONAS NOT

## 2023-05-05 NOTE — CARE COORDINATION
ONGOING DISCHARGE PLAN:    Patient is alert and oriented x4. Spoke with patient regarding discharge plan and patient confirms that plan is still home without needs. Declined VNS. Pt does peritoneal dialysis at home 4x/day. Active order for IV Rocephin. Will continue to follow for additional discharge needs. If patient is discharged prior to next notation, then this note serves as note for discharge by case management.     Electronically signed by Vishal Noonan RN on 5/5/2023 at 11:45 AM
N/A            Potential DME:    Patient expects to discharge to: House  Plan for transportation at discharge: Self    Financial    Payor: DARNELL Shipley / Plan: Robert Gil / Product Type: *No Product type* /     Does insurance require precert for SNF: No    Potential assistance Purchasing Medications: No  Meds-to-Beds request:        [facility]  [address]  [address]  [address] [facility] [address]  Phone: [phone] Fax: [phone]    [address]ren[address] [facility], [facility] [address] [facility] [phone] [facility] [phone]  [address] [place] [id number]  Phone: [phone] Fax: [phone]      Notes:    Factors facilitating achievement of predicted outcomes: Family support, Cooperative, Pleasant, and Has needed Durable Medical Equipment at home    Barriers to discharge: None    Additional Case Management Notes: 5/4/23, Darnell Parkinson [place] Medicaid Pt. Lives in 2 story home w/ Kids. DME- PD Dialysis 4 X a Day, Uses [facility] for supplies, Follows w/ Dr. Landa Prior. CR 8.75, IV Rocephin, Nephro. Denies VNS, Has apt w/ [facility] next Wed, for Pain Management. Denies needs//KB    The Plan for Transition of Care is related to the following treatment goals of Abdominal pain [S92.5]    IF APPLICABLE: The Patient and/or patient representative Livia Fox and her family were provided with a choice of provider and agrees with the discharge plan. Freedom of choice list with basic dialogue that supports the patient's individualized plan of care/goals and shares the quality data associated with the providers was provided to: Patient (Pt. denies VNS/Needs)   Patient Representative Name:       The Patient and/or Patient Representative Agree with the Discharge Plan?  Yes    Sharon Woods RN  Case Management Department  Ph:  Fax:

## 2023-05-06 VITALS
SYSTOLIC BLOOD PRESSURE: 113 MMHG | WEIGHT: 128.97 LBS | RESPIRATION RATE: 18 BRPM | DIASTOLIC BLOOD PRESSURE: 80 MMHG | HEART RATE: 84 BPM | HEIGHT: 65 IN | BODY MASS INDEX: 21.49 KG/M2 | TEMPERATURE: 99.1 F | OXYGEN SATURATION: 99 %

## 2023-05-06 LAB
ABSOLUTE EOS #: 0.5 K/UL (ref 0–0.4)
ABSOLUTE LYMPH #: 2.5 K/UL (ref 1–4.8)
ABSOLUTE MONO #: 0.6 K/UL (ref 0.1–1.3)
ANION GAP SERPL CALCULATED.3IONS-SCNC: 14 MMOL/L (ref 9–17)
BASOPHILS # BLD: 1 % (ref 0–2)
BASOPHILS ABSOLUTE: 0.1 K/UL (ref 0–0.2)
BUN SERPL-MCNC: 38 MG/DL (ref 6–20)
CALCIUM SERPL-MCNC: 8.3 MG/DL (ref 8.6–10.4)
CHLORIDE SERPL-SCNC: 97 MMOL/L (ref 98–107)
CO2 SERPL-SCNC: 29 MMOL/L (ref 20–31)
CREAT SERPL-MCNC: 7.04 MG/DL (ref 0.5–0.9)
EOSINOPHILS RELATIVE PERCENT: 6 % (ref 0–4)
GFR SERPL CREATININE-BSD FRML MDRD: 7 ML/MIN/1.73M2
GLUCOSE SERPL-MCNC: 112 MG/DL (ref 70–99)
HCT VFR BLD AUTO: 30.6 % (ref 36–46)
HGB BLD-MCNC: 10.6 G/DL (ref 12–16)
LYMPHOCYTES # BLD: 29 % (ref 24–44)
MAGNESIUM SERPL-MCNC: 1.7 MG/DL (ref 1.6–2.6)
MCH RBC QN AUTO: 32.2 PG (ref 26–34)
MCHC RBC AUTO-ENTMCNC: 34.5 G/DL (ref 31–37)
MCV RBC AUTO: 93.2 FL (ref 80–100)
MONOCYTES # BLD: 7 % (ref 1–7)
PDW BLD-RTO: 13.3 % (ref 11.5–14.9)
PLATELET # BLD AUTO: 424 K/UL (ref 150–450)
PMV BLD AUTO: 8.3 FL (ref 6–12)
POTASSIUM SERPL-SCNC: 3.2 MMOL/L (ref 3.7–5.3)
RBC # BLD: 3.28 M/UL (ref 4–5.2)
REASON FOR REJECTION: NORMAL
SEG NEUTROPHILS: 57 % (ref 36–66)
SEGMENTED NEUTROPHILS ABSOLUTE COUNT: 4.9 K/UL (ref 1.3–9.1)
SODIUM SERPL-SCNC: 140 MMOL/L (ref 135–144)
WBC # BLD AUTO: 8.6 K/UL (ref 3.5–11)
ZZ NTE CLEAN UP: ORDERED TEST: NORMAL
ZZ NTE WITH NAME CLEAN UP: SPECIMEN SOURCE: NORMAL

## 2023-05-06 PROCEDURE — 6360000002 HC RX W HCPCS: Performed by: NURSE PRACTITIONER

## 2023-05-06 PROCEDURE — 80048 BASIC METABOLIC PNL TOTAL CA: CPT

## 2023-05-06 PROCEDURE — 99239 HOSP IP/OBS DSCHRG MGMT >30: CPT | Performed by: INTERNAL MEDICINE

## 2023-05-06 PROCEDURE — 83735 ASSAY OF MAGNESIUM: CPT

## 2023-05-06 PROCEDURE — 6370000000 HC RX 637 (ALT 250 FOR IP): Performed by: INTERNAL MEDICINE

## 2023-05-06 PROCEDURE — 2580000003 HC RX 258: Performed by: NURSE PRACTITIONER

## 2023-05-06 PROCEDURE — G0378 HOSPITAL OBSERVATION PER HR: HCPCS

## 2023-05-06 PROCEDURE — 99232 SBSQ HOSP IP/OBS MODERATE 35: CPT | Performed by: NURSE PRACTITIONER

## 2023-05-06 PROCEDURE — 36415 COLL VENOUS BLD VENIPUNCTURE: CPT

## 2023-05-06 PROCEDURE — 96372 THER/PROPH/DIAG INJ SC/IM: CPT

## 2023-05-06 PROCEDURE — 2580000003 HC RX 258: Performed by: INTERNAL MEDICINE

## 2023-05-06 PROCEDURE — 85025 COMPLETE CBC W/AUTO DIFF WBC: CPT

## 2023-05-06 PROCEDURE — 6370000000 HC RX 637 (ALT 250 FOR IP): Performed by: NURSE PRACTITIONER

## 2023-05-06 RX ORDER — POTASSIUM CHLORIDE 20 MEQ/1
40 TABLET, EXTENDED RELEASE ORAL ONCE
Status: DISCONTINUED | OUTPATIENT
Start: 2023-05-06 | End: 2023-05-06 | Stop reason: HOSPADM

## 2023-05-06 RX ORDER — DULOXETIN HYDROCHLORIDE 30 MG/1
90 CAPSULE, DELAYED RELEASE ORAL DAILY
Qty: 30 CAPSULE | Refills: 3 | Status: SHIPPED | OUTPATIENT
Start: 2023-05-07

## 2023-05-06 RX ORDER — PANTOPRAZOLE SODIUM 40 MG/1
40 TABLET, DELAYED RELEASE ORAL
Qty: 30 TABLET | Refills: 0 | Status: SHIPPED | OUTPATIENT
Start: 2023-05-06

## 2023-05-06 RX ADMIN — SODIUM CHLORIDE, SODIUM LACTATE, CALCIUM CHLORIDE, MAGNESIUM CHLORIDE AND DEXTROSE 2000 ML: 2.5; 538; 448; 18.3; 5.08 INJECTION, SOLUTION INTRAPERITONEAL at 10:25

## 2023-05-06 RX ADMIN — DULOXETINE HYDROCHLORIDE 90 MG: 30 CAPSULE, DELAYED RELEASE ORAL at 08:47

## 2023-05-06 RX ADMIN — METOPROLOL SUCCINATE 50 MG: 50 TABLET, EXTENDED RELEASE ORAL at 08:48

## 2023-05-06 RX ADMIN — GENTAMICIN SULFATE: 1 OINTMENT TOPICAL at 08:48

## 2023-05-06 RX ADMIN — PANTOPRAZOLE SODIUM 40 MG: 40 TABLET, DELAYED RELEASE ORAL at 08:51

## 2023-05-06 RX ADMIN — ASPIRIN 81 MG: 81 TABLET, CHEWABLE ORAL at 08:47

## 2023-05-06 RX ADMIN — HEPARIN SODIUM 5000 UNITS: 5000 INJECTION INTRAVENOUS; SUBCUTANEOUS at 06:45

## 2023-05-06 RX ADMIN — SODIUM CHLORIDE, PRESERVATIVE FREE 10 ML: 5 INJECTION INTRAVENOUS at 08:48

## 2023-05-06 RX ADMIN — SODIUM CHLORIDE, SODIUM LACTATE, CALCIUM CHLORIDE, MAGNESIUM CHLORIDE AND DEXTROSE 2000 ML: 2.5; 538; 448; 18.3; 5.08 INJECTION, SOLUTION INTRAPERITONEAL at 06:42

## 2023-05-06 RX ADMIN — HYDROCODONE BITARTRATE AND ACETAMINOPHEN 1 TABLET: 5; 325 TABLET ORAL at 08:48

## 2023-05-06 RX ADMIN — AMLODIPINE BESYLATE 10 MG: 10 TABLET ORAL at 08:47

## 2023-05-06 RX ADMIN — HYDROCODONE BITARTRATE AND ACETAMINOPHEN 1 TABLET: 5; 325 TABLET ORAL at 14:31

## 2023-05-06 ASSESSMENT — ENCOUNTER SYMPTOMS: ABDOMINAL PAIN: 1

## 2023-05-06 NOTE — DISCHARGE SUMMARY
[place] [address] Internal Medicine    Discharge Summary     Patient ID: Gaudencio Ramesh  :  1980   MRN: [id number]     ACCOUNT:  [de-identified]   Patient's PCP: Aura Wiggins PA-C  Admit Date: 5/3/2023   Discharge Date: 2023    Length of Stay: 0  Code Status:  Full Code  Admitting Physician: Sonny Suárez MD  Discharge Physician: Lizzy Carroll MD     Active Discharge Diagnoses:     Primary Problem  Abdominal pain      [place] Problems    Diagnosis Date Noted    SBP (spontaneous bacterial peritonitis) ([facility] [K65.2] 2023    ESRD on peritoneal dialysis ([facility] [N18.6, Z99.2] 2023    Abdominal pain [R10.9] 2019       Admission Condition:  fair     Discharged Condition: fair    Hospital Stay:     Hospital Course:  Gaudencio Ramesh is a 37 y.o. female who was admitted for the management of Abdominal pain , presented to ER with Nausea and Abdominal Pain  40-year-old  lady with history of end-stage kidney disease on peritoneal dialysis recurrent abdominal pain history of peritonitis secondary to peritoneal dialysis catheter history of micrococcus growth was treated appropriate antibiotics readmitted abdominal pain cultures -48 hours pain is resolved  Patient received IV Rocephin input noted from infectious disease and nephrologist patient is very eager to go home  Patient will benefit from outpatient gastroenterology consult possible EGD rule out gastritis  Avoid NSAIDs  Patient is agreeable for above instructions also advised if she develops fever or any similar symptoms she should come back to the emergency room patient is in agreement        Significant therapeutic interventions:     Significant Diagnostic Studies:   Labs / Micro:        ,     Radiology:    CT ABDOMEN PELVIS WO CONTRAST Additional Contrast? None    Result Date: 5/3/2023  EXAMINATION: CT OF THE ABDOMEN AND PELVIS WITHOUT CONTRAST 5/3/2023 5:19 pm TECHNIQUE: CT

## 2023-05-06 NOTE — DISCHARGE INSTR - COC
mcg/0.5mL 04/29/2021, 05/27/2021, 07/22/2022    Influenza Vaccine, unspecified formulation 10/26/2016    Influenza Virus Vaccine 11/24/2014, 10/30/2015, 10/31/2018    Influenza, FLUARIX, FLULAVAL, Gibran Hashimoto (age 10 mo+) AND AFLURIA, (age 1 y+), PF, 0.5mL 02/24/2021, 11/08/2021    Influenza, High Dose (Fluzone 65 yrs and older) 12/06/2011, 10/02/2012    Pneumococcal, PPSV23, PNEUMOVAX 23, (age 2y+), SC/IM, 0.5mL 10/30/2015       Active Problems:  Patient Active Problem List   Diagnosis Code    Asthma J45.909    Smoker F17.200    Polycystic kidney disease Q61.3    Eczema L30.9    Depression with anxiety F41.8    Chronic headaches R51.9, G89.29    Tension vascular headache G44.209    Irregular bleeding N92.6    Metrorrhagia N92.1    Menorrhagia N92.0    Dysmenorrhea N94.6    Pelvic pain in female R10.2    Kidney stone N20.0    Acute back pain M54.9    Anxiety F41.9    Hypertension I10    Headache R51.9    Depression F32. A    CKD (chronic kidney disease) stage 3, GFR 30-59 ml/min (HCC) N18.30    Chronic neck pain M54.2, G89.29    Degenerative disc disease, cervical M50.30    Encounter for long-term (current) use of other medications Z79.899    Cervicalgia M54.2    Chronic intractable headache R51.9, G89.29    Hemorrhage of cyst of native kidney N28.89, N28.1    Abdominal pain R10.9    CKD (chronic kidney disease) stage 4, GFR 15-29 ml/min (HCC) N18.4    Acute renal failure with acute tubular necrosis superimposed on stage 3 chronic kidney disease (HCC) N17.0, N18.30    Allergic rhinitis due to animal hair and dander J30.81    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

## 2023-05-06 NOTE — PROGRESS NOTES
Dorothy Reilly is a 37 y.o. Non- / non  female who presents with Nausea and Abdominal Pain   and is admitted to the hospital for the management of Abdominal pain. According to patient, she has been having constant, achy abdominal pain across her entire abdomen since she had peritonitis in March. Pain becomes stabbing in nature when eating and during peritoneal exchanges and radiates into back at times. Symptoms are associated with intermittent episodes of chills and hot flashes, poor appetite, nausea, and overall feeling \"lousy. \"  Denies fever, chest pain, cough, vomiting, diarrhea, and urinary symptoms. There are no aggravating or alleviating factors. Symptoms are reported as constant and moderate to severe. Patient reports that she normally does her dialysis exchanges every 4 hours for 4 treatments, starting when she wakes up. States that she does not dwell dialysate solution overnight because it causes edema.       Patient status observation in the Progressive Unit/Step down    Hospital Problems             Last Modified POA    * (Principal) Abdominal pain 5/3/2023 Yes       Abdominal Pain  -CT abdomen/pelvis -peritoneal dialysis catheter in LLQuad  -- Moderate amount of ascites with dialysis fluid  -- No acute findings elsewhere in the abdomen or pelvis  -- Polycystic kidney disease; appears similar to prior study  -WBC 12.2: Blood culture pending x2  -Reports that she still makes urine  --sometimes has difficulty starting stream  --UA pending specimen  -Rocephin 2 gm initiated in ED for concern for SBP  --Continue q 24 hrs.    -Pain and nausea control    ESRD  -History of polycystic kidney disease  -on peritoneal dialysis 4x/ daily  --Creatinine 8.60; BUN 37  ---baseline 7.1 - 8.2  -consult nephrology for peritoneal dialysis orders       Disposition 2 days      Consultations:   [address] [facility] CONSULT TO [address], APRN - CNP  5/3/2023  10:39
[facility] -   Infectious diseases evaluation  admission date 5/3/2023    reason for consultation:   Peritonitis    Impression :   Current:  Abdominal pain, rule out peritonitis  Polycystic kidney disease  End-stage renal disease on peritoneal dialysis  Allergy to penicillin        Recommendations   Stop Ceftriaxone. No overt signs of infection. Follow peritoneal fluid analysis and culture  Follow CBC and renal function  Okay to discharge from ID standpoint. Supportive care  Discussed with patient, RN. Infection Control Recommendations   [place] Precautions      Antimicrobial Stewardship Recommendations   Simplification of therapy  Targeted therapy      History of Present Illness:   Initial history:  Sophia Cosme is a 37y.o.-year-old female presented to the hospital with diffuse abdominal pain associated with nausea, chills and decreased appetite, constant, achy radiates to the back . She denied fever, denied cough or shortness of breath, no other complaints. The patient states that her peritoneal fluid is clear. CT abdomen pelvis, patient was found to have peritoneal dialysis catheter in left lower quadrant, with a moderate amount of ascites  Symptoms moderate to severe, no alleviating or aggravating factors. Initial WBC was 12.2  Urinalysis not suggestive of UTI  The patient had end-stage renal disease on peritoneal dialysis  The patient was hospitalized to [facility] in March and April 2023 for similar symptoms. Peritoneal fluid culture on 3/24/2023 was negative  Peritoneal fluid culture on 4/8/2023 was positive for micrococcus peritoneal fluid analysis at that time showed 26 WBC  Peritoneal fluid culture on 4/17 /23 was negative, abdominal fluid analysis at that time showed 15 WBC  The patient was treated with intraperitoneal vancomycin through 3/26/2020      Interval changes  5/6/2023   T. 99.1.   Blood cultures remain negative to
Nephrology Progress Note    Subjective/   37y.o. year old female who we are seeing in consultation for  ESRD on Peritoneal dialysis. Interval history:  Patient seen and examined today. Peritoneal dialysis has been restarted. No cloudy drain fluid observed. Abdominal pain is somewhat relieved by pain medication. Patient denies any fever nausea vomiting. Awaiting PD fluid analysis and culture    History of Present Illness:    Patient is a 57-year-old female with history of ESRD on peritoneal dialysis which she receives 4 times a day, presented to the ED with complaints of worsening abdominal pain and bloating, decreased p.o. intake. Patient states she has had peritonitis in the past, and is concerned she might be developing it again. Patient also has a history of polycystic kidney disease that is severe. CT scan of the abdomen done in the ED yesterday showed peritoneal dialysis catheter in the left lower quadrant with a moderate amount of ascites, no acute findings in the abdomen, also showed polycystic kidney disease which is similar from prior studies. Patient had a mild leukocytosis yesterday, resolved today. Urinalysis was negative for any UTI. Pt denies any history of  prolonged NSAID use. Patient denies dysuria, gross hematuria, flank pain, nocturia, urgency, passing frothy urine or urinary incontinence. Blood cultures x2 shows no growth. Patient was started on Rocephin. There has been no recent exposure to IV contrast. There is no history  of paraprotein disease. Pt denies any history of recurrent UTI or kidney stones. Medication review shows use of ACE-inhibitor/diuretics.     Objective/     Vitals:    05/05/23 0623 05/05/23 0901 05/05/23 1143 05/05/23 1230   BP: 129/83   99/64   Pulse: 63   79   Resp: 18 16 16 18   Temp: 97.6 °F (36.4 °C)   98.4 °F (36.9 °C)   TempSrc: Oral      SpO2: 97%   96%   Weight:       Height:         24HR INTAKE/OUTPUT:    Intake/Output Summary (Last
Nephrology Progress Note    Subjective/   37y.o. year old female who we are seeing in consultation for  ESRD on Peritoneal dialysis. Interval history:  Patient seen and examined today. Peritoneal dialysis has been restarted. No cloudy drain fluid observed. Abdominal pain is somewhat relieved by pain medication. Patient denies any fever nausea vomiting. PD fluid analysis cell count and differential negative for peritonitis    History of Present Illness:    Patient is a 25-year-old female with history of ESRD on peritoneal dialysis which she receives 4 times a day, presented to the ED with complaints of worsening abdominal pain and bloating, decreased p.o. intake. Patient states she has had peritonitis in the past, and is concerned she might be developing it again. Patient also has a history of polycystic kidney disease that is severe. CT scan of the abdomen done in the ED yesterday showed peritoneal dialysis catheter in the left lower quadrant with a moderate amount of ascites, no acute findings in the abdomen, also showed polycystic kidney disease which is similar from prior studies. Patient had a mild leukocytosis yesterday, resolved today. Urinalysis was negative for any UTI. Pt denies any history of  prolonged NSAID use. Patient denies dysuria, gross hematuria, flank pain, nocturia, urgency, passing frothy urine or urinary incontinence. Blood cultures x2 shows no growth. Patient was started on Rocephin. There has been no recent exposure to IV contrast. There is no history  of paraprotein disease. Pt denies any history of recurrent UTI or kidney stones. Medication review shows use of ACE-inhibitor/diuretics.     Objective/     Vitals:    05/05/23 1812 05/05/23 2300 05/06/23 0645 05/06/23 1200   BP:  112/84 129/83 113/80   Pulse:  84 84 84   Resp: 20 16 16 18   Temp:  98 °F (36.7 °C) 98.3 °F (36.8 °C) 99.1 °F (37.3 °C)   TempSrc:  Oral Oral Oral   SpO2:  100% 100% 99%   Weight:
Patient requested to speak to , nurses were kind to hold off on dialysis for a few minutes so patients could express her feelings and to event about her situation. Patient stated that she had so many things happen to her. She trying to understand Why? Patient report having good support from her parents and family. Spiritual care will remain available as needed. Patient was opened to prayer and report feeling much better following writer visit. 05/04/23 1000   Encounter Summary   Encounter Overview/Reason  Initial Encounter;Spiritual/Emotional Needs   Service Provided For: Patient   Referral/Consult From: Nurse   Support System Family members;Parent   Last Encounter  05/04/23   Complexity of Encounter Moderate   Begin Time 1030   End Time  1045   Total Time Calculated 15 min   Spiritual/Emotional needs   Type Spiritual Support   Assessment/Intervention/Outcome   Assessment Anxious; Concerns with suffering; Powerlessness;Questioning darinel   Intervention Active listening;Discussed illness injury and its impact; Explored/Affirmed feelings, thoughts, concerns;Nurtured Hope;Prayer (assurance of)/[place];Read/Provided Scripture;Sustaining Presence/Ministry of presence   Outcome Acceptance;Engaged in conversation;Expressed feelings, needs, and concerns;Expressed Gratitude; Less anxious, Less agitated;Receptive
Pt going \"down for a smoke. \" Wants PD after that, possibly around 11 per pt request.
Writer agrees with Desi.
affect     Investigations:      Laboratory Testing:  Recent Results (from the past 24 hour(s))   Basic Metabolic Panel w/ Reflex to MG    Collection Time: 05/05/23  5:37 AM   Result Value Ref Range    Glucose 93 70 - 99 mg/dL    BUN 42 (H) 6 - 20 mg/dL    Creatinine 8.61 (HH) 0.50 - 0.90 mg/dL    Est, Glom Filt Rate 5 (L) >60 mL/min/1.73m2    Calcium 8.5 (L) 8.6 - 10.4 mg/dL    Sodium 137 135 - 144 mmol/L    Potassium 3.7 3.7 - 5.3 mmol/L    Chloride 96 (L) 98 - 107 mmol/L    CO2 26 20 - 31 mmol/L    Anion Gap 15 9 - 17 mmol/L   CBC with auto differential    Collection Time: 05/05/23  5:37 AM   Result Value Ref Range    WBC 10.0 3.5 - 11.0 k/uL    RBC 3.26 (L) 4.0 - 5.2 m/uL    Hemoglobin 10.2 (L) 12.0 - 16.0 g/dL    Hematocrit 30.6 (L) 36 - 46 %    MCV 94.0 80 - 100 fL    MCH 31.4 26 - 34 pg    MCHC 33.4 31 - 37 g/dL    RDW 13.0 11.5 - 14.9 %    Platelets 821 372 - 309 k/uL    MPV 7.9 6.0 - 12.0 fL    Seg Neutrophils 63 36 - 66 %    Lymphocytes 22 (L) 24 - 44 %    Monocytes 7 1 - 7 %    Eosinophils % 7 (H) 0 - 4 %    Basophils 1 0 - 2 %    Segs Absolute 6.30 1.3 - 9.1 k/uL    Absolute Lymph # 2.10 1.0 - 4.8 k/uL    Absolute Mono # 0.70 0.1 - 1.3 k/uL    Absolute Eos # 0.70 (H) 0.0 - 0.4 k/uL    Basophils Absolute 0.10 0.0 - 0.2 k/uL   Magnesium    Collection Time: 05/05/23  5:37 AM   Result Value Ref Range    Magnesium 1.8 1.6 - 2.6 mg/dL       Imaging/Diagnostics:      Assessment :      Primary Problem  Abdominal pain    Active Hospital Problems    Diagnosis Date Noted    [facility] [N18.6, Z99.2] 03/25/2023    Abdominal pain [R10.9] 08/22/2019       Plan:     Patient status Admit as inpatient in the  Progressive Unit/Step down    Patient, admitted with abdominal pain, nausea, has history of peritoneal dialysis, history of peritonitis from peritoneal dialysis catheter, underwent CT abdomen pelvis suggestive of ascites, patient started on antibiotics  Patient started empirically on

## 2023-05-06 NOTE — FLOWSHEET NOTE
05/06/23 1058   Treatment Team Notification   Reason for Communication Evaluate   Team Member Name Dr. Purvi Gomez Provider   Method of Communication Page   Response Waiting for response   Notification Time 1058     Potassium 3.2

## 2023-05-07 LAB
MICROORGANISM SPEC CULT: NORMAL
MICROORGANISM/AGENT SPEC: NORMAL
SPECIMEN DESCRIPTION: NORMAL

## 2023-05-08 LAB
CASE NUMBER:: NORMAL
SPECIMEN DESCRIPTION: NORMAL

## 2023-05-09 LAB
MICROORGANISM SPEC CULT: NORMAL
MICROORGANISM SPEC CULT: NORMAL
SPECIMEN DESCRIPTION: NORMAL
SPECIMEN DESCRIPTION: NORMAL
SURGICAL PATHOLOGY REPORT: NORMAL

## 2023-05-11 LAB
MICROORGANISM SPEC CULT: NORMAL
MICROORGANISM/AGENT SPEC: NORMAL
SPECIMEN DESCRIPTION: NORMAL

## 2023-07-04 ENCOUNTER — HOSPITAL ENCOUNTER (INPATIENT)
Age: 43
LOS: 2 days | Discharge: HOME | DRG: 248 | End: 2023-07-06
Attending: EMERGENCY MEDICINE | Admitting: INTERNAL MEDICINE
Payer: MEDICAID

## 2023-07-04 DIAGNOSIS — R10.84 GENERALIZED ABDOMINAL PAIN: Primary | ICD-10-CM

## 2023-07-04 DIAGNOSIS — K65.9 PERITONITIS (HCC): ICD-10-CM

## 2023-07-04 LAB
ALBUMIN SERPL-MCNC: 3 G/DL (ref 3.5–5.2)
ALP SERPL-CCNC: 109 U/L (ref 35–104)
ALT SERPL-CCNC: 14 U/L (ref 5–33)
ANION GAP SERPL CALCULATED.3IONS-SCNC: 13 MMOL/L (ref 9–17)
APPEARANCE FLD: CLEAR
AST SERPL-CCNC: 17 U/L
B PARAP IS1001 DNA NPH QL NAA+NON-PROBE: NOT DETECTED
B PERT DNA SPEC QL NAA+PROBE: NOT DETECTED
BACTERIA URNS QL MICRO: NORMAL
BASOPHILS # BLD: 0.2 K/UL (ref 0–0.2)
BASOPHILS NFR BLD: 1 % (ref 0–2)
BILIRUB SERPL-MCNC: 0.2 MG/DL (ref 0.3–1.2)
BILIRUB UR QL STRIP: NEGATIVE
BODY FLD TYPE: NORMAL
BUN SERPL-MCNC: 30 MG/DL (ref 6–20)
C PNEUM DNA NPH QL NAA+NON-PROBE: NOT DETECTED
CALCIUM SERPL-MCNC: 8.4 MG/DL (ref 8.6–10.4)
CASTS #/AREA URNS LPF: NORMAL /LPF
CHLORIDE SERPL-SCNC: 93 MMOL/L (ref 98–107)
CLARITY UR: CLEAR
CO2 SERPL-SCNC: 28 MMOL/L (ref 20–31)
COLOR FLD: COLORLESS
COLOR UR: YELLOW
CREAT SERPL-MCNC: 6.39 MG/DL (ref 0.5–0.9)
CRP SERPL HS-MCNC: 94.1 MG/L (ref 0–5)
EOSINOPHIL # BLD: 0.5 K/UL (ref 0–0.4)
EOSINOPHILS RELATIVE PERCENT: 4 % (ref 0–4)
EPI CELLS #/AREA URNS HPF: NORMAL /HPF
ERYTHROCYTE [DISTWIDTH] IN BLOOD BY AUTOMATED COUNT: 13.7 % (ref 11.5–14.9)
ERYTHROCYTE [SEDIMENTATION RATE] IN BLOOD BY WESTERGREN METHOD: 31 MM/HR (ref 0–20)
FLUAV RNA NPH QL NAA+NON-PROBE: NOT DETECTED
FLUBV RNA NPH QL NAA+NON-PROBE: NOT DETECTED
GFR SERPL CREATININE-BSD FRML MDRD: 8 ML/MIN/1.73M2
GLUCOSE SERPL-MCNC: 95 MG/DL (ref 70–99)
GLUCOSE UR STRIP-MCNC: NEGATIVE MG/DL
HADV DNA NPH QL NAA+NON-PROBE: NOT DETECTED
HCOV 229E RNA NPH QL NAA+NON-PROBE: NOT DETECTED
HCOV HKU1 RNA NPH QL NAA+NON-PROBE: NOT DETECTED
HCOV NL63 RNA NPH QL NAA+NON-PROBE: NOT DETECTED
HCOV OC43 RNA NPH QL NAA+NON-PROBE: NOT DETECTED
HCT VFR BLD AUTO: 36.6 % (ref 36–46)
HGB BLD-MCNC: 12.1 G/DL (ref 12–16)
HGB UR QL STRIP.AUTO: ABNORMAL
HMPV RNA NPH QL NAA+NON-PROBE: NOT DETECTED
HPIV1 RNA NPH QL NAA+NON-PROBE: NOT DETECTED
HPIV2 RNA NPH QL NAA+NON-PROBE: NOT DETECTED
HPIV3 RNA NPH QL NAA+NON-PROBE: NOT DETECTED
HPIV4 RNA NPH QL NAA+NON-PROBE: NOT DETECTED
INR PPP: 1.6
KETONES UR STRIP-MCNC: NEGATIVE MG/DL
LEUKOCYTE ESTERASE UR QL STRIP: NEGATIVE
LYMPHOCYTES # BLD: 13 % (ref 24–44)
LYMPHOCYTES NFR BLD: 1.5 K/UL (ref 1–4.8)
M PNEUMO DNA NPH QL NAA+NON-PROBE: NOT DETECTED
MAGNESIUM SERPL-MCNC: 1.6 MG/DL (ref 1.6–2.6)
MCH RBC QN AUTO: 30.8 PG (ref 26–34)
MCHC RBC AUTO-ENTMCNC: 33.1 G/DL (ref 31–37)
MCV RBC AUTO: 93.1 FL (ref 80–100)
MONOCYTES NFR BLD: 0.7 K/UL (ref 0.1–1.3)
MONOCYTES NFR BLD: 6 % (ref 1–7)
NEUTROPHILS NFR BLD: 76 % (ref 36–66)
NEUTS SEG NFR BLD: 9.2 K/UL (ref 1.3–9.1)
NITRITE UR QL STRIP: NEGATIVE
PH UR STRIP: 7.5 [PH] (ref 5–8)
PLATELET # BLD AUTO: 266 K/UL (ref 150–450)
PMV BLD AUTO: 8.1 FL (ref 6–12)
POTASSIUM SERPL-SCNC: 3.6 MMOL/L (ref 3.7–5.3)
PROCALCITONIN SERPL-MCNC: 1.2 NG/ML
PROT SERPL-MCNC: 6.3 G/DL (ref 6.4–8.3)
PROT UR STRIP-MCNC: ABNORMAL MG/DL
PROTHROMBIN TIME: 19.3 SEC (ref 11.8–14.6)
RBC # BLD AUTO: 3.93 M/UL (ref 4–5.2)
RBC # FLD: 6 CELLS/UL
RBC #/AREA URNS HPF: NORMAL /HPF
RSV RNA NPH QL NAA+NON-PROBE: NOT DETECTED
RV+EV RNA NPH QL NAA+NON-PROBE: NOT DETECTED
SARS-COV-2 RNA NPH QL NAA+NON-PROBE: NOT DETECTED
SODIUM SERPL-SCNC: 134 MMOL/L (ref 135–144)
SP GR UR STRIP: 1.01 (ref 1–1.03)
SPECIMEN DESCRIPTION: NORMAL
UROBILINOGEN UR STRIP-ACNC: NORMAL
WBC # FLD: 2 CELLS/UL
WBC #/AREA URNS HPF: NORMAL /HPF
WBC OTHER # BLD: 12 K/UL (ref 3.5–11)

## 2023-07-04 PROCEDURE — 2580000003 HC RX 258: Performed by: NURSE PRACTITIONER

## 2023-07-04 PROCEDURE — 85610 PROTHROMBIN TIME: CPT

## 2023-07-04 PROCEDURE — 87086 URINE CULTURE/COLONY COUNT: CPT

## 2023-07-04 PROCEDURE — 2580000003 HC RX 258: Performed by: EMERGENCY MEDICINE

## 2023-07-04 PROCEDURE — 86140 C-REACTIVE PROTEIN: CPT

## 2023-07-04 PROCEDURE — 99223 1ST HOSP IP/OBS HIGH 75: CPT | Performed by: INTERNAL MEDICINE

## 2023-07-04 PROCEDURE — 36415 COLL VENOUS BLD VENIPUNCTURE: CPT

## 2023-07-04 PROCEDURE — 87205 SMEAR GRAM STAIN: CPT

## 2023-07-04 PROCEDURE — 87040 BLOOD CULTURE FOR BACTERIA: CPT

## 2023-07-04 PROCEDURE — 6370000000 HC RX 637 (ALT 250 FOR IP): Performed by: INTERNAL MEDICINE

## 2023-07-04 PROCEDURE — 2060000000 HC ICU INTERMEDIATE R&B

## 2023-07-04 PROCEDURE — 2500000003 HC RX 250 WO HCPCS: Performed by: NURSE PRACTITIONER

## 2023-07-04 PROCEDURE — 81001 URINALYSIS AUTO W/SCOPE: CPT

## 2023-07-04 PROCEDURE — 6370000000 HC RX 637 (ALT 250 FOR IP): Performed by: NURSE PRACTITIONER

## 2023-07-04 PROCEDURE — 99285 EMERGENCY DEPT VISIT HI MDM: CPT

## 2023-07-04 PROCEDURE — 87075 CULTR BACTERIA EXCEPT BLOOD: CPT

## 2023-07-04 PROCEDURE — 83735 ASSAY OF MAGNESIUM: CPT

## 2023-07-04 PROCEDURE — 80053 COMPREHEN METABOLIC PANEL: CPT

## 2023-07-04 PROCEDURE — 6360000002 HC RX W HCPCS: Performed by: EMERGENCY MEDICINE

## 2023-07-04 PROCEDURE — 84145 PROCALCITONIN (PCT): CPT

## 2023-07-04 PROCEDURE — 0202U NFCT DS 22 TRGT SARS-COV-2: CPT

## 2023-07-04 PROCEDURE — 2580000003 HC RX 258: Performed by: INTERNAL MEDICINE

## 2023-07-04 PROCEDURE — 85652 RBC SED RATE AUTOMATED: CPT

## 2023-07-04 PROCEDURE — 99255 IP/OBS CONSLTJ NEW/EST HI 80: CPT | Performed by: INTERNAL MEDICINE

## 2023-07-04 PROCEDURE — 87070 CULTURE OTHR SPECIMN AEROBIC: CPT

## 2023-07-04 PROCEDURE — 85027 COMPLETE CBC AUTOMATED: CPT

## 2023-07-04 RX ORDER — CALCIUM ACETATE 667 MG/1
1334 CAPSULE ORAL 3 TIMES DAILY
Status: DISCONTINUED | OUTPATIENT
Start: 2023-07-04 | End: 2023-07-05

## 2023-07-04 RX ORDER — POTASSIUM CHLORIDE 20 MEQ/1
20 TABLET, EXTENDED RELEASE ORAL ONCE
Status: COMPLETED | OUTPATIENT
Start: 2023-07-04 | End: 2023-07-04

## 2023-07-04 RX ORDER — LANOLIN ALCOHOL/MO/W.PET/CERES
9 CREAM (GRAM) TOPICAL NIGHTLY
Status: DISCONTINUED | OUTPATIENT
Start: 2023-07-04 | End: 2023-07-06 | Stop reason: HOSPADM

## 2023-07-04 RX ORDER — SODIUM CHLORIDE, SODIUM LACTATE, CALCIUM CHLORIDE, MAGNESIUM CHLORIDE AND DEXTROSE 4.25; 538; 448; 18.3; 5.08 G/100ML; MG/100ML; MG/100ML; MG/100ML; MG/100ML
2000 INJECTION, SOLUTION INTRAPERITONEAL EVERY 24 HOURS
Status: COMPLETED | OUTPATIENT
Start: 2023-07-04 | End: 2023-07-05

## 2023-07-04 RX ORDER — FENTANYL CITRATE 0.05 MG/ML
50 INJECTION, SOLUTION INTRAMUSCULAR; INTRAVENOUS ONCE
Status: COMPLETED | OUTPATIENT
Start: 2023-07-04 | End: 2023-07-04

## 2023-07-04 RX ORDER — ONDANSETRON 2 MG/ML
4 INJECTION INTRAMUSCULAR; INTRAVENOUS EVERY 6 HOURS PRN
Status: DISCONTINUED | OUTPATIENT
Start: 2023-07-04 | End: 2023-07-06 | Stop reason: HOSPADM

## 2023-07-04 RX ORDER — METOPROLOL SUCCINATE 50 MG/1
50 TABLET, EXTENDED RELEASE ORAL DAILY
Status: DISCONTINUED | OUTPATIENT
Start: 2023-07-04 | End: 2023-07-06 | Stop reason: HOSPADM

## 2023-07-04 RX ORDER — ATORVASTATIN CALCIUM 40 MG/1
40 TABLET, FILM COATED ORAL NIGHTLY
Status: DISCONTINUED | OUTPATIENT
Start: 2023-07-04 | End: 2023-07-06 | Stop reason: HOSPADM

## 2023-07-04 RX ORDER — PAROXETINE HYDROCHLORIDE 20 MG/1
20 TABLET, FILM COATED ORAL DAILY
COMMUNITY
Start: 2023-06-01

## 2023-07-04 RX ORDER — SODIUM CHLORIDE, SODIUM LACTATE, CALCIUM CHLORIDE, MAGNESIUM CHLORIDE AND DEXTROSE 2.5; 538; 448; 18.3; 5.08 G/100ML; MG/100ML; MG/100ML; MG/100ML; MG/100ML
2000 INJECTION, SOLUTION INTRAPERITONEAL
Status: DISCONTINUED | OUTPATIENT
Start: 2023-07-04 | End: 2023-07-06 | Stop reason: HOSPADM

## 2023-07-04 RX ORDER — SODIUM CHLORIDE 0.9 % (FLUSH) 0.9 %
5-40 SYRINGE (ML) INJECTION EVERY 12 HOURS SCHEDULED
Status: DISCONTINUED | OUTPATIENT
Start: 2023-07-04 | End: 2023-07-06 | Stop reason: HOSPADM

## 2023-07-04 RX ORDER — AMLODIPINE BESYLATE 10 MG/1
10 TABLET ORAL DAILY
Status: DISCONTINUED | OUTPATIENT
Start: 2023-07-04 | End: 2023-07-06 | Stop reason: HOSPADM

## 2023-07-04 RX ORDER — SODIUM CHLORIDE 9 MG/ML
INJECTION, SOLUTION INTRAVENOUS PRN
Status: DISCONTINUED | OUTPATIENT
Start: 2023-07-04 | End: 2023-07-06 | Stop reason: HOSPADM

## 2023-07-04 RX ORDER — OXYCODONE HYDROCHLORIDE AND ACETAMINOPHEN 5; 325 MG/1; MG/1
1 TABLET ORAL EVERY 4 HOURS PRN
Status: DISCONTINUED | OUTPATIENT
Start: 2023-07-04 | End: 2023-07-06 | Stop reason: HOSPADM

## 2023-07-04 RX ORDER — OXYCODONE HCL 10 MG/1
20 TABLET, FILM COATED, EXTENDED RELEASE ORAL EVERY 12 HOURS
Status: DISCONTINUED | OUTPATIENT
Start: 2023-07-04 | End: 2023-07-06 | Stop reason: HOSPADM

## 2023-07-04 RX ORDER — ACETAMINOPHEN 650 MG/1
650 SUPPOSITORY RECTAL EVERY 6 HOURS PRN
Status: DISCONTINUED | OUTPATIENT
Start: 2023-07-04 | End: 2023-07-06 | Stop reason: HOSPADM

## 2023-07-04 RX ORDER — CALCIUM ACETATE 667 MG/1
2 TABLET ORAL 3 TIMES DAILY
COMMUNITY

## 2023-07-04 RX ORDER — PAROXETINE HYDROCHLORIDE 20 MG/1
20 TABLET, FILM COATED ORAL DAILY
Status: DISCONTINUED | OUTPATIENT
Start: 2023-07-04 | End: 2023-07-06 | Stop reason: HOSPADM

## 2023-07-04 RX ORDER — ACETAMINOPHEN 325 MG/1
650 TABLET ORAL EVERY 6 HOURS PRN
Status: DISCONTINUED | OUTPATIENT
Start: 2023-07-04 | End: 2023-07-06 | Stop reason: HOSPADM

## 2023-07-04 RX ORDER — SODIUM CHLORIDE 0.9 % (FLUSH) 0.9 %
10 SYRINGE (ML) INJECTION PRN
Status: DISCONTINUED | OUTPATIENT
Start: 2023-07-04 | End: 2023-07-06 | Stop reason: HOSPADM

## 2023-07-04 RX ORDER — OXYCODONE HCL 20 MG/1
20 TABLET, FILM COATED, EXTENDED RELEASE ORAL EVERY 12 HOURS
COMMUNITY

## 2023-07-04 RX ORDER — ONDANSETRON 4 MG/1
4 TABLET, ORALLY DISINTEGRATING ORAL EVERY 8 HOURS PRN
Status: DISCONTINUED | OUTPATIENT
Start: 2023-07-04 | End: 2023-07-06 | Stop reason: HOSPADM

## 2023-07-04 RX ORDER — OXYCODONE HYDROCHLORIDE AND ACETAMINOPHEN 5; 325 MG/1; MG/1
1 TABLET ORAL EVERY 4 HOURS PRN
COMMUNITY

## 2023-07-04 RX ADMIN — SODIUM CHLORIDE, SODIUM LACTATE, CALCIUM CHLORIDE, MAGNESIUM CHLORIDE AND DEXTROSE 2000 ML: 4.25; 538; 448; 18.3; 5.08 INJECTION, SOLUTION INTRAPERITONEAL at 19:25

## 2023-07-04 RX ADMIN — CALCIUM ACETATE 1334 MG: 667 CAPSULE ORAL at 14:53

## 2023-07-04 RX ADMIN — CALCIUM ACETATE 1334 MG: 667 CAPSULE ORAL at 09:56

## 2023-07-04 RX ADMIN — SODIUM CHLORIDE, PRESERVATIVE FREE 10 ML: 5 INJECTION INTRAVENOUS at 20:39

## 2023-07-04 RX ADMIN — CALCIUM ACETATE 1334 MG: 667 CAPSULE ORAL at 20:38

## 2023-07-04 RX ADMIN — OXYCODONE HYDROCHLORIDE 20 MG: 10 TABLET, FILM COATED, EXTENDED RELEASE ORAL at 20:38

## 2023-07-04 RX ADMIN — OXYCODONE HYDROCHLORIDE 20 MG: 10 TABLET, FILM COATED, EXTENDED RELEASE ORAL at 09:56

## 2023-07-04 RX ADMIN — METOPROLOL SUCCINATE 50 MG: 50 TABLET, EXTENDED RELEASE ORAL at 09:52

## 2023-07-04 RX ADMIN — Medication 9 MG: at 20:38

## 2023-07-04 RX ADMIN — SODIUM CHLORIDE, PRESERVATIVE FREE 10 ML: 5 INJECTION INTRAVENOUS at 07:20

## 2023-07-04 RX ADMIN — OXYCODONE HYDROCHLORIDE AND ACETAMINOPHEN 1 TABLET: 5; 325 TABLET ORAL at 19:04

## 2023-07-04 RX ADMIN — CEFEPIME 2000 MG: 2 INJECTION, POWDER, FOR SOLUTION INTRAVENOUS at 06:46

## 2023-07-04 RX ADMIN — PAROXETINE HYDROCHLORIDE 20 MG: 20 TABLET, FILM COATED ORAL at 09:56

## 2023-07-04 RX ADMIN — SODIUM CHLORIDE, SODIUM LACTATE, CALCIUM CHLORIDE, MAGNESIUM CHLORIDE AND DEXTROSE 2000 ML: 2.5; 538; 448; 18.3; 5.08 INJECTION, SOLUTION INTRAPERITONEAL at 11:45

## 2023-07-04 RX ADMIN — ATORVASTATIN CALCIUM 40 MG: 40 TABLET, FILM COATED ORAL at 20:38

## 2023-07-04 RX ADMIN — SODIUM CHLORIDE, SODIUM LACTATE, CALCIUM CHLORIDE, MAGNESIUM CHLORIDE AND DEXTROSE 2000 ML: 2.5; 538; 448; 18.3; 5.08 INJECTION, SOLUTION INTRAPERITONEAL at 23:16

## 2023-07-04 RX ADMIN — SODIUM CHLORIDE, SODIUM LACTATE, CALCIUM CHLORIDE, MAGNESIUM CHLORIDE AND DEXTROSE 2000 ML: 2.5; 538; 448; 18.3; 5.08 INJECTION, SOLUTION INTRAPERITONEAL at 15:45

## 2023-07-04 RX ADMIN — POTASSIUM CHLORIDE 20 MEQ: 1500 TABLET, EXTENDED RELEASE ORAL at 14:53

## 2023-07-04 RX ADMIN — VANCOMYCIN HYDROCHLORIDE 1500 MG: 1.5 INJECTION, POWDER, LYOPHILIZED, FOR SOLUTION INTRAVENOUS at 07:20

## 2023-07-04 RX ADMIN — FENTANYL CITRATE 50 MCG: 0.05 INJECTION, SOLUTION INTRAMUSCULAR; INTRAVENOUS at 05:21

## 2023-07-04 ASSESSMENT — PAIN DESCRIPTION - LOCATION
LOCATION: ABDOMEN
LOCATION: GENERALIZED
LOCATION: GENERALIZED
LOCATION: ABDOMEN

## 2023-07-04 ASSESSMENT — PAIN DESCRIPTION - DESCRIPTORS
DESCRIPTORS: CRAMPING
DESCRIPTORS: ACHING
DESCRIPTORS: DISCOMFORT
DESCRIPTORS: DISCOMFORT
DESCRIPTORS: CRAMPING;STABBING

## 2023-07-04 ASSESSMENT — PAIN SCALES - GENERAL
PAINLEVEL_OUTOF10: 8
PAINLEVEL_OUTOF10: 7
PAINLEVEL_OUTOF10: 5
PAINLEVEL_OUTOF10: 7
PAINLEVEL_OUTOF10: 5
PAINLEVEL_OUTOF10: 7
PAINLEVEL_OUTOF10: 4
PAINLEVEL_OUTOF10: 5

## 2023-07-04 ASSESSMENT — PAIN DESCRIPTION - PAIN TYPE: TYPE: ACUTE PAIN

## 2023-07-04 ASSESSMENT — ENCOUNTER SYMPTOMS: ABDOMINAL PAIN: 1

## 2023-07-04 ASSESSMENT — PAIN DESCRIPTION - ORIENTATION
ORIENTATION: RIGHT;LEFT;LOWER
ORIENTATION: MID;RIGHT;LEFT
ORIENTATION: LOWER
ORIENTATION: MID;RIGHT;LEFT

## 2023-07-04 ASSESSMENT — PAIN - FUNCTIONAL ASSESSMENT: PAIN_FUNCTIONAL_ASSESSMENT: 0-10

## 2023-07-04 ASSESSMENT — PAIN DESCRIPTION - FREQUENCY: FREQUENCY: CONTINUOUS

## 2023-07-04 NOTE — CONSULTS
NEPHROLOGY CONSULT     Patient :  Joshua Dean; 37 y.o. MRN# [id number]  Location:  2108/2108-01  Attending:  Robert Trinidad MD  Admit Date:  7/4/2023   Hospital Day: 0      Reason for Consult: Peritoneal dialysis      Chief Complaint: Abdominal pain and fever  History Obtained From:  patient    History of Present Illness: This is a 37 y.o. female with a significant past medical history of Nephrolithiasis [s/p multiple lithotripsies and ESWL performed by Dr. 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   She also complains of some chills, fatigue weakness patient denies any diarrhea no headache dizziness  Labs showed white cell count of 12,000 ESR elevated 31, C-reactive protein elevated 94.1  Body fluid cell counts shows 2 WBCs and 6 RBCs,  Peritoneal fluid colorless      Past Medical History:        Diagnosis Date    Anxiety     Asthma     Chronic headaches 07/10/2013    CKD (chronic kidney disease) stage 3, GFR 30-59 ml/min ([facility])     Degenerative disc disease, cervical     Depression     Dysmenorrhea 09/30/2014    Eczema 11/08/2012    Hypertension     Hypocalcemia 05/28/2014    Kidney stone     Menorrhagia 09/30/2014    Neck pain, bilateral 05/28/2014    Pelvic pain in female 09/30/2014    Polycystic kidney     Smoker 10/02/2011    Tension vascular headache 01/20/2014       Past Surgical History:        Procedure Laterality Date    BREAST ENHANCEMENT SURGERY      BREAST LUMPECTOMY      left breast    CYSTO/URETERO/PYELOSCOPY, CALCULUS TX Right 6/12/2020    HOLMIUM - CYSTO, RIGHT RETROGRADE PYELOGRAM, RIGHT URETEROSCOPY, LASER LITHO ON STAND BY, RIGHT STENT PLACEMENT performed by Shannon Joseph MD at [facility]  06/12/2020    DILATION AND CURETTAGE OF UTERUS      x2    IR

## 2023-07-04 NOTE — H&P
[facility] Internal Medicine  Jamshid Ramirez MD; Telma Raygoza MD; Juan Carlos Campa MD; MD Briseyda Greene MD; MD N[facility]AEH [facility]   [address]    HISTORY AND PHYSICAL EXAMINATION            Date:   7/4/2023  Patient name:  Stephanie Berman  Date of admission:  7/4/2023  2:10 AM  MRN:   [id number]  Account:  [de-identified]  YOB: 1980  PCP:    Teo Diaz PA-C  Room:   2108/2108-01  Code Status:    Full Code    Chief Complaint:     Chief Complaint   Patient presents with    Abdominal Pain     States that at home she had a fever of 102.6, PD nurse told her to come in        History Obtained From:     Patient medical record nursing staff    History of Present Illness:     Stephanie Berman is a 37 y.o. Non- / non  female who presents with Abdominal Pain (States that at home she had a fever of 102.6, PD nurse told her to come in )   and is admitted to the hospital for the management of Recurrent peritonitis ([address] [facility]).   49-year-old lady with history of end-stage kidney disease secondary to polycystic kidney disease on peritoneal dialysis had recurrent peritoneal peritonitis with a hemodialysis admitted with increasing abdominal pain and fever 102.6 patient admitted for peritonitis secondary to peritoneal dialysis empiric antibiotic started patient denies any headache positive for nausea positive for abdominal pain denies any diarrhea  Patient has elevated white cell count 12,000 elevated sed rate and CRP      Past Medical History:     Past Medical History:   Diagnosis Date    Anxiety     Asthma     Chronic headaches 07/10/2013    CKD (chronic kidney disease) stage 3, GFR 30-59 ml/min ([facility])     Degenerative disc disease, cervical     Depression     Dysmenorrhea 09/30/2014    Eczema 11/08/2012    Hypertension     Hypocalcemia 05/28/2014    Kidney stone     Menorrhagia 09/30/2014    Neck pain,

## 2023-07-04 NOTE — ED PROVIDER NOTES
tenderness or deformity. Normal range of motion. Cervical back: Normal range of motion and neck supple. Skin:     General: Skin is warm and dry. Capillary Refill: Capillary refill takes less than 2 seconds. Findings: No erythema or rash. Neurological:      General: No focal deficit present. Mental Status: She is alert and oriented to person, place, and time. Coordination: Coordination normal.   Psychiatric:         Mood and Affect: Mood normal.         Behavior: Behavior normal.         Thought Content: Thought content normal.         Judgment: Judgment normal.       MEDICAL DECISION MAKING / ED COURSE:         1)  Number and Complexity of Problems Addressed at this Encounter  Problem List This Visit: Fever, abdominal pain, peritoneal dialysis patient. Differential Diagnosis: Suspect peritoneal dialysis fluid infection    Diagnoses Considered but Do Not Suspect: sbo    Pertinent Comorbid Conditions:  peritoneal dialysis    2)  Data Reviewed and Analyzed  (Lab and radiology tests/orders below in next section)    External Documents Reviewed:  past med records    3)  Treatment and Disposition  I do not think she needs a CT abdomen this is typical presentation for her peritonitis. Not suspect intra-abdominal abscess or obstruction at this time or diverticulitis or appendicitis or pancreatitis. Reviewed past medical records past cultures past infectious disease notes. Discussed with charge nurse to get the fluid off her peritoneal dialysis catheter and send for culture and cell count. Patient repeat assessment:  comfortable     Disposition discussion with patient/family, Shared Decision Making:  admit    Case discussed with consulting clinician:  NP hospitalist for admit to pcu      DATA FOR LAB AND RADIOLOGY TESTS ORDERED BELOW ARE REVIEWED BY THE ED CLINICIAN:        LABS: Lab orders shown below, the results are reviewed by myself, and all abnormals are listed below.   Labs

## 2023-07-04 NOTE — ED NOTES
[facility] Gildardo Schulz updated okayed for fluid brought in to be sent completed by Sai Benjamin RN to lab for testing and new cap placed on PD line .      Yoana Louis [place]  07/04/23 6846

## 2023-07-05 LAB
ANION GAP SERPL CALCULATED.3IONS-SCNC: 11 MMOL/L (ref 9–17)
BASOPHILS # BLD: 0.1 K/UL (ref 0–0.2)
BASOPHILS NFR BLD: 1 % (ref 0–2)
BLASTS NFR FLD: ABNORMAL %
BUN SERPL-MCNC: 33 MG/DL (ref 6–20)
CALCIUM SERPL-MCNC: 8.4 MG/DL (ref 8.6–10.4)
CELLS COUNTED: 55
CHLORIDE SERPL-SCNC: 100 MMOL/L (ref 98–107)
CO2 SERPL-SCNC: 29 MMOL/L (ref 20–31)
CREAT SERPL-MCNC: 7.2 MG/DL (ref 0.5–0.9)
EOSINOPHIL # BLD: 0.5 K/UL (ref 0–0.4)
EOSINOPHIL NFR FLD: 2 %
EOSINOPHILS RELATIVE PERCENT: 7 % (ref 0–4)
ERYTHROCYTE [DISTWIDTH] IN BLOOD BY AUTOMATED COUNT: 13.6 % (ref 11.5–14.9)
GFR SERPL CREATININE-BSD FRML MDRD: 7 ML/MIN/1.73M2
GLUCOSE SERPL-MCNC: 92 MG/DL (ref 70–99)
HCT VFR BLD AUTO: 34.9 % (ref 36–46)
HGB BLD-MCNC: 11.6 G/DL (ref 12–16)
LYMPHOCYTES # BLD: 20 % (ref 24–44)
LYMPHOCYTES NFR BLD: 1.4 K/UL (ref 1–4.8)
LYMPHOCYTES NFR FLD: 7 %
MANUAL DIF COMMENT FLD-IMP: ABNORMAL
MCH RBC QN AUTO: 31.1 PG (ref 26–34)
MCHC RBC AUTO-ENTMCNC: 33.3 G/DL (ref 31–37)
MCV RBC AUTO: 93.5 FL (ref 80–100)
MICROORGANISM SPEC CULT: NO GROWTH
MONOCYTES NFR BLD: 0.6 K/UL (ref 0.1–1.3)
MONOCYTES NFR BLD: 9 % (ref 1–7)
MONOCYTES NFR FLD: 60 %
NEUTROPHILS NFR BLD: 63 % (ref 36–66)
NEUTROPHILS NFR FLD: 31 %
NEUTS SEG NFR BLD: 4.5 K/UL (ref 1.3–9.1)
PLATELET # BLD AUTO: 238 K/UL (ref 150–450)
PMV BLD AUTO: 8.7 FL (ref 6–12)
POTASSIUM SERPL-SCNC: 4.2 MMOL/L (ref 3.7–5.3)
RBC # BLD AUTO: 3.73 M/UL (ref 4–5.2)
SODIUM SERPL-SCNC: 140 MMOL/L (ref 135–144)
SPECIMEN DESCRIPTION: NORMAL
UNIDENT CELLS NFR FLD: ABNORMAL %
WBC OTHER # BLD: 7.1 K/UL (ref 3.5–11)

## 2023-07-05 PROCEDURE — 6370000000 HC RX 637 (ALT 250 FOR IP): Performed by: NURSE PRACTITIONER

## 2023-07-05 PROCEDURE — 80048 BASIC METABOLIC PNL TOTAL CA: CPT

## 2023-07-05 PROCEDURE — 36415 COLL VENOUS BLD VENIPUNCTURE: CPT

## 2023-07-05 PROCEDURE — 6370000000 HC RX 637 (ALT 250 FOR IP): Performed by: INTERNAL MEDICINE

## 2023-07-05 PROCEDURE — 2580000003 HC RX 258: Performed by: NURSE PRACTITIONER

## 2023-07-05 PROCEDURE — 99232 SBSQ HOSP IP/OBS MODERATE 35: CPT | Performed by: INTERNAL MEDICINE

## 2023-07-05 PROCEDURE — 2060000000 HC ICU INTERMEDIATE R&B

## 2023-07-05 PROCEDURE — 2500000003 HC RX 250 WO HCPCS: Performed by: NURSE PRACTITIONER

## 2023-07-05 PROCEDURE — 1200000000 HC SEMI PRIVATE

## 2023-07-05 PROCEDURE — 2580000003 HC RX 258: Performed by: INTERNAL MEDICINE

## 2023-07-05 PROCEDURE — 6360000002 HC RX W HCPCS: Performed by: NURSE PRACTITIONER

## 2023-07-05 PROCEDURE — 85027 COMPLETE CBC AUTOMATED: CPT

## 2023-07-05 RX ORDER — CALCIUM ACETATE 667 MG/1
1334 CAPSULE ORAL
Status: DISCONTINUED | OUTPATIENT
Start: 2023-07-05 | End: 2023-07-06 | Stop reason: HOSPADM

## 2023-07-05 RX ADMIN — CALCIUM ACETATE 1334 MG: 667 CAPSULE ORAL at 17:45

## 2023-07-05 RX ADMIN — OXYCODONE HYDROCHLORIDE AND ACETAMINOPHEN 1 TABLET: 5; 325 TABLET ORAL at 23:09

## 2023-07-05 RX ADMIN — OXYCODONE HYDROCHLORIDE AND ACETAMINOPHEN 1 TABLET: 5; 325 TABLET ORAL at 07:48

## 2023-07-05 RX ADMIN — OXYCODONE HYDROCHLORIDE AND ACETAMINOPHEN 1 TABLET: 5; 325 TABLET ORAL at 11:50

## 2023-07-05 RX ADMIN — SODIUM CHLORIDE, SODIUM LACTATE, CALCIUM CHLORIDE, MAGNESIUM CHLORIDE AND DEXTROSE 2000 ML: 4.25; 538; 448; 18.3; 5.08 INJECTION, SOLUTION INTRAPERITONEAL at 18:05

## 2023-07-05 RX ADMIN — SODIUM CHLORIDE, SODIUM LACTATE, CALCIUM CHLORIDE, MAGNESIUM CHLORIDE AND DEXTROSE 2000 ML: 2.5; 538; 448; 18.3; 5.08 INJECTION, SOLUTION INTRAPERITONEAL at 10:50

## 2023-07-05 RX ADMIN — SODIUM CHLORIDE, PRESERVATIVE FREE 10 ML: 5 INJECTION INTRAVENOUS at 07:54

## 2023-07-05 RX ADMIN — PAROXETINE HYDROCHLORIDE 20 MG: 20 TABLET, FILM COATED ORAL at 07:46

## 2023-07-05 RX ADMIN — CALCIUM ACETATE 1334 MG: 667 CAPSULE ORAL at 07:45

## 2023-07-05 RX ADMIN — METOPROLOL SUCCINATE 50 MG: 50 TABLET, EXTENDED RELEASE ORAL at 07:47

## 2023-07-05 RX ADMIN — SODIUM CHLORIDE, SODIUM LACTATE, CALCIUM CHLORIDE, MAGNESIUM CHLORIDE AND DEXTROSE 2000 ML: 2.5; 538; 448; 18.3; 5.08 INJECTION, SOLUTION INTRAPERITONEAL at 15:08

## 2023-07-05 RX ADMIN — OXYCODONE HYDROCHLORIDE 20 MG: 10 TABLET, FILM COATED, EXTENDED RELEASE ORAL at 06:28

## 2023-07-05 RX ADMIN — SODIUM CHLORIDE, SODIUM LACTATE, CALCIUM CHLORIDE, MAGNESIUM CHLORIDE AND DEXTROSE 2000 ML: 2.5; 538; 448; 18.3; 5.08 INJECTION, SOLUTION INTRAPERITONEAL at 06:31

## 2023-07-05 RX ADMIN — Medication 9 MG: at 19:46

## 2023-07-05 RX ADMIN — OXYCODONE HYDROCHLORIDE 20 MG: 10 TABLET, FILM COATED, EXTENDED RELEASE ORAL at 18:30

## 2023-07-05 RX ADMIN — CEFEPIME 1000 MG: 1 INJECTION, POWDER, FOR SOLUTION INTRAMUSCULAR; INTRAVENOUS at 07:58

## 2023-07-05 RX ADMIN — ATORVASTATIN CALCIUM 40 MG: 40 TABLET, FILM COATED ORAL at 19:46

## 2023-07-05 RX ADMIN — CALCIUM ACETATE 1334 MG: 667 CAPSULE ORAL at 13:28

## 2023-07-05 RX ADMIN — OXYCODONE HYDROCHLORIDE AND ACETAMINOPHEN 1 TABLET: 5; 325 TABLET ORAL at 19:46

## 2023-07-05 RX ADMIN — AMLODIPINE BESYLATE 10 MG: 10 TABLET ORAL at 07:46

## 2023-07-05 RX ADMIN — SODIUM CHLORIDE, PRESERVATIVE FREE 10 ML: 5 INJECTION INTRAVENOUS at 19:46

## 2023-07-05 ASSESSMENT — PAIN SCALES - GENERAL
PAINLEVEL_OUTOF10: 6
PAINLEVEL_OUTOF10: 4
PAINLEVEL_OUTOF10: 6

## 2023-07-05 ASSESSMENT — PAIN DESCRIPTION - ORIENTATION
ORIENTATION: RIGHT;LEFT
ORIENTATION: RIGHT
ORIENTATION: RIGHT;LEFT

## 2023-07-05 ASSESSMENT — PAIN DESCRIPTION - DESCRIPTORS
DESCRIPTORS: ACHING;DULL
DESCRIPTORS: ACHING;DISCOMFORT;DULL
DESCRIPTORS: ACHING;NAGGING;DISCOMFORT

## 2023-07-05 ASSESSMENT — PAIN DESCRIPTION - LOCATION
LOCATION: SHOULDER

## 2023-07-05 NOTE — PLAN OF CARE
Problem: Discharge Planning  Goal: Discharge to home or other facility with appropriate resources  Outcome: Progressing     Problem: Pain  Goal: Verbalizes/displays adequate comfort level or baseline comfort level  7/5/2023 0116 by Varun Garcia RN  Outcome: Progressing     Problem: Safety - Adult  Goal: Free from fall injury  7/5/2023 0116 by Varun Garcia RN  Outcome: Progressing     Problem: Chronic Conditions and Co-morbidities  Goal: Patient's chronic conditions and co-morbidity symptoms are monitored and maintained or improved  7/5/2023 0116 by Varun Garcia RN  Outcome: Progressing

## 2023-07-05 NOTE — CONSULTS
[facility] -   Infectious diseases evaluation  admission date 7/4/2023    reason for consultation:   Recurrent peritonitis    Impression :   Current:  Systemic inflammatory response syndrome, peritoneal fluid analysis not suggestive of peritonitis, pending culture, rule out other infectious process. End-stage renal disease on peritoneal dialysis  Polycystic kidney disease  Allergy to penicillin        Recommendations   IV vancomycin and cefepime  Follow cultures  Respiratory panel with COVID-19  UA with reflex to culture  Procalcitonin level  C-reactive protein  Left lower extremity venous Doppler    Infection Control Recommendations   [place] Precautions      Antimicrobial Stewardship Recommendations   Simplification of therapy  Targeted therapy      History of Present Illness:   Initial history:  Gareth Razo is a 37y.o.-year-old female presented to hospital with abdominal pain, generalized ,achy, worsening for 2 days associated with fever with temperature max of 102.6 at home and nausea. No alleviating or aggravating factors. She denied  vomiting, no diarrhea. She also complaining of generalized fatigue and not feeling well, denied cough or shortness of breath, no other complaints. The patient had history of end-stage renal disease on peritoneal dialysis. No redness or drainage at the peritoneal dialysis cath site. The patient was noted to have left more than right lower extremity swelling with no redness. The patient was hospitalized to [facility] in March , April and May 2023 for similar symptoms.   Peritoneal fluid culture on 3/24/2023 was negative  Peritoneal fluid culture on 4/8/2023 was positive for micrococcus peritoneal fluid analysis at that time showed 26 WBC  Peritoneal fluid culture on 4/17 /23 was negative, abdominal fluid analysis at that time showed 15 WBC  On 5/5/2023 peritoneal dialysis fluid no growth  The patient was

## 2023-07-05 NOTE — CARE COORDINATION
Case Management Assessment  Initial Evaluation    Date/Time of Evaluation: 7/5/2023 11:57 AM  Assessment Completed by: Marilyn Franco RN    If patient is discharged prior to next notation, then this note serves as note for discharge by case management. Patient Name: Africa Reyes                   YOB: 1980  Diagnosis: Peritonitis ([address]) [K65.9]  Generalized abdominal pain [R10.84]  Recurrent peritonitis ([address]) [K65.9]                   Date / Time: 7/4/2023  2:10 AM    Patient Admission Status: Inpatient   Readmission Risk (Low < 19, Mod (19-27), High > 27): Readmission Risk Score: 22.9    Current PCP: Cata Olvera PA-C  PCP verified by CM? Yes    Chart Reviewed: Yes      History Provided by: Patient  Patient Orientation: Alert and Oriented    Patient Cognition: Alert    Hospitalization in the last 30 days (Readmission):  Yes    If yes, Readmission Assessment in CM Navigator will be completed. Advance Directives:      Code Status: Full Code   Patient's Primary Decision Maker is: Legal Next of Kin      Discharge Planning:    Patient lives with: Children Type of Home: House  Primary Care Giver: Self  Patient Support Systems include: Family Members   Current Financial resources: Medicaid  Current community resources: None  Current services prior to admission: None            Current DME:              Type of Home Care services:  None    ADLS  Prior functional level: Independent in ADLs/IADLs  Current functional level: Independent in ADLs/IADLs    PT AM-PAC:   /24  OT AM-PAC:   /24    Family can provide assistance at DC: Yes  Would you like Case Management to discuss the discharge plan with any other family members/significant others, and if so, who?  Yes  Plans to Return to Present Housing: Yes  Other Identified Issues/Barriers to RETURNING to current housing: no  Potential Assistance needed at discharge: N/A            Potential DME:    Patient expects to discharge to: [address] [place] [address]

## 2023-07-06 VITALS
HEART RATE: 87 BPM | SYSTOLIC BLOOD PRESSURE: 117 MMHG | BODY MASS INDEX: 21.86 KG/M2 | OXYGEN SATURATION: 97 % | RESPIRATION RATE: 18 BRPM | DIASTOLIC BLOOD PRESSURE: 83 MMHG | HEIGHT: 66 IN | TEMPERATURE: 98.5 F | WEIGHT: 136.02 LBS

## 2023-07-06 LAB
ANION GAP SERPL CALCULATED.3IONS-SCNC: 14 MMOL/L (ref 9–17)
BASOPHILS # BLD: 0.1 K/UL (ref 0–0.2)
BASOPHILS NFR BLD: 1 % (ref 0–2)
BUN SERPL-MCNC: 32 MG/DL (ref 6–20)
CALCIUM SERPL-MCNC: 8.3 MG/DL (ref 8.6–10.4)
CHLORIDE SERPL-SCNC: 98 MMOL/L (ref 98–107)
CO2 SERPL-SCNC: 28 MMOL/L (ref 20–31)
CREAT SERPL-MCNC: 6.82 MG/DL (ref 0.5–0.9)
EOSINOPHIL # BLD: 0.7 K/UL (ref 0–0.4)
EOSINOPHILS RELATIVE PERCENT: 9 % (ref 0–4)
ERYTHROCYTE [DISTWIDTH] IN BLOOD BY AUTOMATED COUNT: 13.8 % (ref 11.5–14.9)
GFR SERPL CREATININE-BSD FRML MDRD: 7 ML/MIN/1.73M2
GLUCOSE SERPL-MCNC: 97 MG/DL (ref 70–99)
HCT VFR BLD AUTO: 37.3 % (ref 36–46)
HGB BLD-MCNC: 12.3 G/DL (ref 12–16)
LYMPHOCYTES # BLD: 23 % (ref 24–44)
LYMPHOCYTES NFR BLD: 2 K/UL (ref 1–4.8)
MCH RBC QN AUTO: 31.1 PG (ref 26–34)
MCHC RBC AUTO-ENTMCNC: 33 G/DL (ref 31–37)
MCV RBC AUTO: 94 FL (ref 80–100)
MONOCYTES NFR BLD: 0.7 K/UL (ref 0.1–1.3)
MONOCYTES NFR BLD: 8 % (ref 1–7)
NEUTROPHILS NFR BLD: 59 % (ref 36–66)
NEUTS SEG NFR BLD: 5.1 K/UL (ref 1.3–9.1)
PLATELET # BLD AUTO: 298 K/UL (ref 150–450)
PMV BLD AUTO: 8.9 FL (ref 6–12)
POTASSIUM SERPL-SCNC: 3.8 MMOL/L (ref 3.7–5.3)
RBC # BLD AUTO: 3.97 M/UL (ref 4–5.2)
SODIUM SERPL-SCNC: 140 MMOL/L (ref 135–144)
WBC OTHER # BLD: 8.6 K/UL (ref 3.5–11)

## 2023-07-06 PROCEDURE — 85027 COMPLETE CBC AUTOMATED: CPT

## 2023-07-06 PROCEDURE — 6370000000 HC RX 637 (ALT 250 FOR IP): Performed by: INTERNAL MEDICINE

## 2023-07-06 PROCEDURE — 6360000002 HC RX W HCPCS: Performed by: NURSE PRACTITIONER

## 2023-07-06 PROCEDURE — 2580000003 HC RX 258: Performed by: NURSE PRACTITIONER

## 2023-07-06 PROCEDURE — 80048 BASIC METABOLIC PNL TOTAL CA: CPT

## 2023-07-06 PROCEDURE — 99232 SBSQ HOSP IP/OBS MODERATE 35: CPT | Performed by: INTERNAL MEDICINE

## 2023-07-06 PROCEDURE — 36415 COLL VENOUS BLD VENIPUNCTURE: CPT

## 2023-07-06 PROCEDURE — 6370000000 HC RX 637 (ALT 250 FOR IP): Performed by: NURSE PRACTITIONER

## 2023-07-06 PROCEDURE — 2580000003 HC RX 258: Performed by: INTERNAL MEDICINE

## 2023-07-06 RX ADMIN — OXYCODONE HYDROCHLORIDE 20 MG: 10 TABLET, FILM COATED, EXTENDED RELEASE ORAL at 06:06

## 2023-07-06 RX ADMIN — SODIUM CHLORIDE, PRESERVATIVE FREE 5 ML: 5 INJECTION INTRAVENOUS at 09:33

## 2023-07-06 RX ADMIN — OXYCODONE HYDROCHLORIDE AND ACETAMINOPHEN 1 TABLET: 5; 325 TABLET ORAL at 15:07

## 2023-07-06 RX ADMIN — SODIUM CHLORIDE, SODIUM LACTATE, CALCIUM CHLORIDE, MAGNESIUM CHLORIDE AND DEXTROSE 2000 ML: 2.5; 538; 448; 18.3; 5.08 INJECTION, SOLUTION INTRAPERITONEAL at 06:18

## 2023-07-06 RX ADMIN — SODIUM CHLORIDE, SODIUM LACTATE, CALCIUM CHLORIDE, MAGNESIUM CHLORIDE AND DEXTROSE 2000 ML: 2.5; 538; 448; 18.3; 5.08 INJECTION, SOLUTION INTRAPERITONEAL at 18:20

## 2023-07-06 RX ADMIN — CEFEPIME 1000 MG: 1 INJECTION, POWDER, FOR SOLUTION INTRAMUSCULAR; INTRAVENOUS at 09:42

## 2023-07-06 RX ADMIN — METOPROLOL SUCCINATE 50 MG: 50 TABLET, EXTENDED RELEASE ORAL at 09:33

## 2023-07-06 RX ADMIN — PAROXETINE HYDROCHLORIDE 20 MG: 20 TABLET, FILM COATED ORAL at 09:33

## 2023-07-06 RX ADMIN — SODIUM CHLORIDE, SODIUM LACTATE, CALCIUM CHLORIDE, MAGNESIUM CHLORIDE AND DEXTROSE 2000 ML: 2.5; 538; 448; 18.3; 5.08 INJECTION, SOLUTION INTRAPERITONEAL at 11:00

## 2023-07-06 RX ADMIN — OXYCODONE HYDROCHLORIDE AND ACETAMINOPHEN 1 TABLET: 5; 325 TABLET ORAL at 09:46

## 2023-07-06 RX ADMIN — AMLODIPINE BESYLATE 10 MG: 10 TABLET ORAL at 09:33

## 2023-07-06 RX ADMIN — SODIUM CHLORIDE, SODIUM LACTATE, CALCIUM CHLORIDE, MAGNESIUM CHLORIDE AND DEXTROSE 2000 ML: 2.5; 538; 448; 18.3; 5.08 INJECTION, SOLUTION INTRAPERITONEAL at 14:46

## 2023-07-06 ASSESSMENT — PAIN SCALES - GENERAL
PAINLEVEL_OUTOF10: 5
PAINLEVEL_OUTOF10: 6
PAINLEVEL_OUTOF10: 6

## 2023-07-06 ASSESSMENT — PAIN DESCRIPTION - DESCRIPTORS
DESCRIPTORS: ACHING;DISCOMFORT
DESCRIPTORS: ACHING

## 2023-07-06 ASSESSMENT — PAIN DESCRIPTION - ORIENTATION
ORIENTATION: RIGHT
ORIENTATION: RIGHT;LEFT;UPPER

## 2023-07-06 ASSESSMENT — PAIN DESCRIPTION - LOCATION
LOCATION: SHOULDER;BACK
LOCATION: HEAD;SHOULDER

## 2023-07-06 NOTE — FLOWSHEET NOTE
Dr Tessa Liang perfect served: pt anxious to go today, awaiting on atb dc plan. PD fluid no growth in 2 days. When do you plan on rounding today?

## 2023-07-07 ENCOUNTER — APPOINTMENT (OUTPATIENT)
Dept: NUCLEAR MEDICINE | Age: 43
End: 2023-07-07
Payer: MEDICAID

## 2023-07-07 ENCOUNTER — HOSPITAL ENCOUNTER (INPATIENT)
Age: 43
LOS: 1 days | Discharge: HOME | End: 2023-07-07
Attending: EMERGENCY MEDICINE
Payer: MEDICAID

## 2023-07-07 ENCOUNTER — APPOINTMENT (OUTPATIENT)
Dept: GENERAL RADIOLOGY | Age: 43
End: 2023-07-07
Payer: MEDICAID

## 2023-07-07 VITALS
TEMPERATURE: 97.9 F | BODY MASS INDEX: 21.86 KG/M2 | DIASTOLIC BLOOD PRESSURE: 88 MMHG | RESPIRATION RATE: 12 BRPM | WEIGHT: 136 LBS | HEART RATE: 81 BPM | SYSTOLIC BLOOD PRESSURE: 122 MMHG | OXYGEN SATURATION: 98 % | HEIGHT: 66 IN

## 2023-07-07 DIAGNOSIS — R07.9 CHEST PAIN, UNSPECIFIED TYPE: Primary | ICD-10-CM

## 2023-07-07 PROBLEM — R79.89 ELEVATED D-DIMER: Status: ACTIVE | Noted: 2023-07-07

## 2023-07-07 LAB
ANION GAP SERPL CALCULATED.3IONS-SCNC: 16 MMOL/L (ref 9–17)
BASOPHILS # BLD: 0.11 K/UL (ref 0–0.2)
BASOPHILS NFR BLD: 1 % (ref 0–2)
BILIRUB UR QL STRIP: NEGATIVE
BNP SERPL-MCNC: 6601 PG/ML
BUN SERPL-MCNC: 34 MG/DL (ref 6–20)
CALCIUM SERPL-MCNC: 8.2 MG/DL (ref 8.6–10.4)
CASTS #/AREA URNS LPF: NORMAL /LPF (ref 0–8)
CHLORIDE SERPL-SCNC: 98 MMOL/L (ref 98–107)
CLARITY UR: CLEAR
CO2 SERPL-SCNC: 22 MMOL/L (ref 20–31)
COLOR UR: YELLOW
CREAT SERPL-MCNC: 7.13 MG/DL (ref 0.5–0.9)
D DIMER PPP FEU-MCNC: 1.12 UG/ML FEU (ref 0–0.57)
EOSINOPHIL # BLD: 0.87 K/UL (ref 0–0.44)
EOSINOPHILS RELATIVE PERCENT: 7 % (ref 1–4)
EPI CELLS #/AREA URNS HPF: NORMAL /HPF (ref 0–5)
ERYTHROCYTE [DISTWIDTH] IN BLOOD BY AUTOMATED COUNT: 13.1 % (ref 11.8–14.4)
GFR SERPL CREATININE-BSD FRML MDRD: 7 ML/MIN/1.73M2
GLUCOSE SERPL-MCNC: 93 MG/DL (ref 70–99)
GLUCOSE UR STRIP-MCNC: NEGATIVE MG/DL
HCT VFR BLD AUTO: 37.9 % (ref 36.3–47.1)
HGB BLD-MCNC: 12.4 G/DL (ref 11.9–15.1)
HGB UR QL STRIP.AUTO: ABNORMAL
IMM GRANULOCYTES # BLD AUTO: 0.04 K/UL (ref 0–0.3)
IMM GRANULOCYTES NFR BLD: 0 %
KETONES UR STRIP-MCNC: NEGATIVE MG/DL
LACTIC ACID, SEPSIS WHOLE BLOOD: 1.1 MMOL/L (ref 0.5–1.9)
LACTIC ACID, SEPSIS WHOLE BLOOD: 1.9 MMOL/L (ref 0.5–1.9)
LEUKOCYTE ESTERASE UR QL STRIP: ABNORMAL
LYMPHOCYTES # BLD: 22 % (ref 24–43)
LYMPHOCYTES NFR BLD: 2.79 K/UL (ref 1.1–3.7)
MCH RBC QN AUTO: 30.8 PG (ref 25.2–33.5)
MCHC RBC AUTO-ENTMCNC: 32.7 G/DL (ref 28.4–34.8)
MCV RBC AUTO: 94.3 FL (ref 82.6–102.9)
MONOCYTES NFR BLD: 0.77 K/UL (ref 0.1–1.2)
MONOCYTES NFR BLD: 6 % (ref 3–12)
NEUTROPHILS NFR BLD: 64 % (ref 36–65)
NEUTS SEG NFR BLD: 8.03 K/UL (ref 1.5–8.1)
NITRITE UR QL STRIP: NEGATIVE
NRBC BLD-RTO: 0 PER 100 WBC
PARTIAL THROMBOPLASTIN TIME: 32.4 SEC (ref 23–36.5)
PARTIAL THROMBOPLASTIN TIME: 63.2 SEC (ref 23–36.5)
PH UR STRIP: 7 [PH] (ref 5–8)
PLATELET # BLD AUTO: 366 K/UL (ref 138–453)
PMV BLD AUTO: 10.4 FL (ref 8.1–13.5)
POTASSIUM SERPL-SCNC: 4.6 MMOL/L (ref 3.7–5.3)
PROT UR STRIP-MCNC: ABNORMAL MG/DL
RBC # BLD AUTO: 4.02 M/UL (ref 3.95–5.11)
RBC #/AREA URNS HPF: NORMAL /HPF (ref 0–2)
SARS-COV-2 RDRP RESP QL NAA+PROBE: NOT DETECTED
SODIUM SERPL-SCNC: 136 MMOL/L (ref 135–144)
SP GR UR STRIP: 1.01 (ref 1–1.03)
SPECIMEN DESCRIPTION: NORMAL
TROPONIN I SERPL HS-MCNC: 26 NG/L (ref 0–14)
TROPONIN I SERPL HS-MCNC: 27 NG/L (ref 0–14)
UROBILINOGEN UR STRIP-ACNC: NORMAL
WBC #/AREA URNS HPF: NORMAL /HPF (ref 0–5)
WBC OTHER # BLD: 12.6 K/UL (ref 3.5–11.3)

## 2023-07-07 PROCEDURE — 87635 SARS-COV-2 COVID-19 AMP PRB: CPT

## 2023-07-07 PROCEDURE — A9539 TC99M PENTETATE: HCPCS | Performed by: FAMILY MEDICINE

## 2023-07-07 PROCEDURE — 71045 X-RAY EXAM CHEST 1 VIEW: CPT

## 2023-07-07 PROCEDURE — 2500000003 HC RX 250 WO HCPCS: Performed by: NURSE PRACTITIONER

## 2023-07-07 PROCEDURE — A9540 TC99M MAA: HCPCS | Performed by: FAMILY MEDICINE

## 2023-07-07 PROCEDURE — 78582 LUNG VENTILAT&PERFUS IMAGING: CPT

## 2023-07-07 PROCEDURE — 1200000000 HC SEMI PRIVATE

## 2023-07-07 PROCEDURE — 36415 COLL VENOUS BLD VENIPUNCTURE: CPT

## 2023-07-07 PROCEDURE — 83605 ASSAY OF LACTIC ACID: CPT

## 2023-07-07 PROCEDURE — 84484 ASSAY OF TROPONIN QUANT: CPT

## 2023-07-07 PROCEDURE — 99235 HOSP IP/OBS SAME DATE MOD 70: CPT | Performed by: FAMILY MEDICINE

## 2023-07-07 PROCEDURE — 6360000002 HC RX W HCPCS

## 2023-07-07 PROCEDURE — 87040 BLOOD CULTURE FOR BACTERIA: CPT

## 2023-07-07 PROCEDURE — 96374 THER/PROPH/DIAG INJ IV PUSH: CPT

## 2023-07-07 PROCEDURE — 81001 URINALYSIS AUTO W/SCOPE: CPT

## 2023-07-07 PROCEDURE — 80048 BASIC METABOLIC PNL TOTAL CA: CPT

## 2023-07-07 PROCEDURE — 6370000000 HC RX 637 (ALT 250 FOR IP)

## 2023-07-07 PROCEDURE — 85379 FIBRIN DEGRADATION QUANT: CPT

## 2023-07-07 PROCEDURE — 85027 COMPLETE CBC AUTOMATED: CPT

## 2023-07-07 PROCEDURE — 3430000000 HC RX DIAGNOSTIC RADIOPHARMACEUTICAL: Performed by: FAMILY MEDICINE

## 2023-07-07 PROCEDURE — 87086 URINE CULTURE/COLONY COUNT: CPT

## 2023-07-07 PROCEDURE — 93005 ELECTROCARDIOGRAM TRACING: CPT

## 2023-07-07 PROCEDURE — 99285 EMERGENCY DEPT VISIT HI MDM: CPT

## 2023-07-07 PROCEDURE — 83880 ASSAY OF NATRIURETIC PEPTIDE: CPT

## 2023-07-07 PROCEDURE — 85730 THROMBOPLASTIN TIME PARTIAL: CPT

## 2023-07-07 PROCEDURE — 6370000000 HC RX 637 (ALT 250 FOR IP): Performed by: FAMILY MEDICINE

## 2023-07-07 PROCEDURE — 96375 TX/PRO/DX INJ NEW DRUG ADDON: CPT

## 2023-07-07 PROCEDURE — 93970 EXTREMITY STUDY: CPT

## 2023-07-07 RX ORDER — CHOLECALCIFEROL (VITAMIN D3) 125 MCG
10 CAPSULE ORAL NIGHTLY
Status: DISCONTINUED | OUTPATIENT
Start: 2023-07-07 | End: 2023-07-07 | Stop reason: HOSPADM

## 2023-07-07 RX ORDER — AMLODIPINE BESYLATE 5 MG/1
5 TABLET ORAL DAILY
COMMUNITY
Start: 2023-07-07 | End: 2023-07-07 | Stop reason: HOSPADM

## 2023-07-07 RX ORDER — FENTANYL CITRATE 50 UG/ML
25 INJECTION, SOLUTION INTRAMUSCULAR; INTRAVENOUS ONCE
Status: COMPLETED | OUTPATIENT
Start: 2023-07-07 | End: 2023-07-07

## 2023-07-07 RX ORDER — FENTANYL CITRATE 50 UG/ML
50 INJECTION, SOLUTION INTRAMUSCULAR; INTRAVENOUS ONCE
Status: COMPLETED | OUTPATIENT
Start: 2023-07-07 | End: 2023-07-07

## 2023-07-07 RX ORDER — POLYETHYLENE GLYCOL 3350 17 G/17G
17 POWDER, FOR SOLUTION ORAL DAILY PRN
Status: DISCONTINUED | OUTPATIENT
Start: 2023-07-07 | End: 2023-07-07 | Stop reason: HOSPADM

## 2023-07-07 RX ORDER — HEPARIN SODIUM 10000 [USP'U]/100ML
5-30 INJECTION, SOLUTION INTRAVENOUS CONTINUOUS
Status: DISCONTINUED | OUTPATIENT
Start: 2023-07-07 | End: 2023-07-07

## 2023-07-07 RX ORDER — SODIUM CHLORIDE 0.9 % (FLUSH) 0.9 %
5-40 SYRINGE (ML) INJECTION EVERY 12 HOURS SCHEDULED
Status: DISCONTINUED | OUTPATIENT
Start: 2023-07-07 | End: 2023-07-07 | Stop reason: HOSPADM

## 2023-07-07 RX ORDER — ACETAMINOPHEN 650 MG/1
650 SUPPOSITORY RECTAL EVERY 6 HOURS PRN
Status: DISCONTINUED | OUTPATIENT
Start: 2023-07-07 | End: 2023-07-07 | Stop reason: HOSPADM

## 2023-07-07 RX ORDER — DILTIAZEM HYDROCHLORIDE 180 MG/1
180 CAPSULE, COATED, EXTENDED RELEASE ORAL DAILY
Qty: 30 CAPSULE | Refills: 1 | Status: SHIPPED | OUTPATIENT
Start: 2023-07-07

## 2023-07-07 RX ORDER — PANTOPRAZOLE SODIUM 40 MG/1
40 TABLET, DELAYED RELEASE ORAL
Status: DISCONTINUED | OUTPATIENT
Start: 2023-07-07 | End: 2023-07-07 | Stop reason: HOSPADM

## 2023-07-07 RX ORDER — METOPROLOL SUCCINATE 50 MG/1
50 TABLET, EXTENDED RELEASE ORAL DAILY
Status: DISCONTINUED | OUTPATIENT
Start: 2023-07-07 | End: 2023-07-07 | Stop reason: HOSPADM

## 2023-07-07 RX ORDER — SODIUM CHLORIDE 9 MG/ML
INJECTION, SOLUTION INTRAVENOUS PRN
Status: DISCONTINUED | OUTPATIENT
Start: 2023-07-07 | End: 2023-07-07 | Stop reason: HOSPADM

## 2023-07-07 RX ORDER — ONDANSETRON 4 MG/1
4 TABLET, ORALLY DISINTEGRATING ORAL EVERY 8 HOURS PRN
Status: DISCONTINUED | OUTPATIENT
Start: 2023-07-07 | End: 2023-07-07 | Stop reason: HOSPADM

## 2023-07-07 RX ORDER — ACETAMINOPHEN 325 MG/1
650 TABLET ORAL EVERY 6 HOURS PRN
Status: DISCONTINUED | OUTPATIENT
Start: 2023-07-07 | End: 2023-07-07 | Stop reason: HOSPADM

## 2023-07-07 RX ORDER — AMLODIPINE BESYLATE 10 MG/1
10 TABLET ORAL DAILY
Status: DISCONTINUED | OUTPATIENT
Start: 2023-07-07 | End: 2023-07-07 | Stop reason: HOSPADM

## 2023-07-07 RX ORDER — SODIUM CHLORIDE 0.9 % (FLUSH) 0.9 %
10 SYRINGE (ML) INJECTION PRN
Status: DISCONTINUED | OUTPATIENT
Start: 2023-07-07 | End: 2023-07-07 | Stop reason: HOSPADM

## 2023-07-07 RX ORDER — NITROGLYCERIN 0.4 MG/1
0.4 TABLET SUBLINGUAL EVERY 5 MIN PRN
Status: DISCONTINUED | OUTPATIENT
Start: 2023-07-07 | End: 2023-07-07 | Stop reason: HOSPADM

## 2023-07-07 RX ORDER — HEPARIN SODIUM 1000 [USP'U]/ML
80 INJECTION, SOLUTION INTRAVENOUS; SUBCUTANEOUS PRN
Status: DISCONTINUED | OUTPATIENT
Start: 2023-07-07 | End: 2023-07-07

## 2023-07-07 RX ORDER — ATORVASTATIN CALCIUM 80 MG/1
40 TABLET, FILM COATED ORAL NIGHTLY
Status: DISCONTINUED | OUTPATIENT
Start: 2023-07-07 | End: 2023-07-07 | Stop reason: HOSPADM

## 2023-07-07 RX ORDER — PAROXETINE HYDROCHLORIDE 20 MG/1
20 TABLET, FILM COATED ORAL DAILY
Status: DISCONTINUED | OUTPATIENT
Start: 2023-07-07 | End: 2023-07-07 | Stop reason: HOSPADM

## 2023-07-07 RX ORDER — DILTIAZEM HYDROCHLORIDE 120 MG/1
120 CAPSULE, COATED, EXTENDED RELEASE ORAL DAILY
Qty: 30 CAPSULE | Refills: 3 | Status: SHIPPED | OUTPATIENT
Start: 2023-07-07 | End: 2023-07-07 | Stop reason: SDUPTHER

## 2023-07-07 RX ORDER — HEPARIN SODIUM 1000 [USP'U]/ML
40 INJECTION, SOLUTION INTRAVENOUS; SUBCUTANEOUS PRN
Status: DISCONTINUED | OUTPATIENT
Start: 2023-07-07 | End: 2023-07-07

## 2023-07-07 RX ORDER — ONDANSETRON 2 MG/ML
4 INJECTION INTRAMUSCULAR; INTRAVENOUS ONCE
Status: COMPLETED | OUTPATIENT
Start: 2023-07-07 | End: 2023-07-07

## 2023-07-07 RX ORDER — ATORVASTATIN CALCIUM 20 MG/1
20 TABLET, FILM COATED ORAL NIGHTLY
Qty: 30 TABLET | Refills: 1 | Status: SHIPPED | OUTPATIENT
Start: 2023-07-07

## 2023-07-07 RX ORDER — ONDANSETRON 2 MG/ML
INJECTION INTRAMUSCULAR; INTRAVENOUS
Status: COMPLETED
Start: 2023-07-07 | End: 2023-07-07

## 2023-07-07 RX ORDER — OXYCODONE HYDROCHLORIDE AND ACETAMINOPHEN 5; 325 MG/1; MG/1
1 TABLET ORAL EVERY 4 HOURS PRN
Status: DISCONTINUED | OUTPATIENT
Start: 2023-07-07 | End: 2023-07-07 | Stop reason: HOSPADM

## 2023-07-07 RX ORDER — SILDENAFIL CITRATE 20 MG/1
20 TABLET ORAL ONCE
Status: COMPLETED | OUTPATIENT
Start: 2023-07-07 | End: 2023-07-07

## 2023-07-07 RX ORDER — ONDANSETRON 2 MG/ML
4 INJECTION INTRAMUSCULAR; INTRAVENOUS EVERY 6 HOURS PRN
Status: DISCONTINUED | OUTPATIENT
Start: 2023-07-07 | End: 2023-07-07 | Stop reason: HOSPADM

## 2023-07-07 RX ORDER — NITROGLYCERIN 0.4 MG/1
0.4 TABLET SUBLINGUAL EVERY 5 MIN PRN
Qty: 25 TABLET | Refills: 0 | Status: SHIPPED | OUTPATIENT
Start: 2023-07-07

## 2023-07-07 RX ORDER — HEPARIN SODIUM 1000 [USP'U]/ML
80 INJECTION, SOLUTION INTRAVENOUS; SUBCUTANEOUS ONCE
Status: COMPLETED | OUTPATIENT
Start: 2023-07-07 | End: 2023-07-07

## 2023-07-07 RX ORDER — CALCIUM ACETATE 667 MG/1
1334 CAPSULE ORAL 3 TIMES DAILY
Status: DISCONTINUED | OUTPATIENT
Start: 2023-07-07 | End: 2023-07-07 | Stop reason: HOSPADM

## 2023-07-07 RX ORDER — KIT FOR THE PREPARATION OF TECHNETIUM TC 99M PENTETATE 20 MG/1
47 INJECTION, POWDER, LYOPHILIZED, FOR SOLUTION INTRAVENOUS; RESPIRATORY (INHALATION)
Status: COMPLETED | OUTPATIENT
Start: 2023-07-07 | End: 2023-07-07

## 2023-07-07 RX ADMIN — FENTANYL CITRATE 25 MCG: 50 INJECTION, SOLUTION INTRAMUSCULAR; INTRAVENOUS at 04:46

## 2023-07-07 RX ADMIN — Medication 6 MILLICURIE: at 10:50

## 2023-07-07 RX ADMIN — HEPARIN SODIUM 4940 UNITS: 1000 INJECTION INTRAVENOUS; SUBCUTANEOUS at 04:48

## 2023-07-07 RX ADMIN — HEPARIN SODIUM 18 UNITS/KG/HR: 10000 INJECTION, SOLUTION INTRAVENOUS at 04:49

## 2023-07-07 RX ADMIN — ONDANSETRON 4 MG: 2 INJECTION INTRAMUSCULAR; INTRAVENOUS at 01:49

## 2023-07-07 RX ADMIN — KIT FOR THE PREPARATION OF TECHNETIUM TC 99M PENTETATE 47 MILLICURIE: 20 INJECTION, POWDER, LYOPHILIZED, FOR SOLUTION INTRAVENOUS; RESPIRATORY (INHALATION) at 10:35

## 2023-07-07 RX ADMIN — FENTANYL CITRATE 50 MCG: 50 INJECTION, SOLUTION INTRAMUSCULAR; INTRAVENOUS at 01:43

## 2023-07-07 RX ADMIN — NITROGLYCERIN 0.4 MG: 0.4 TABLET SUBLINGUAL at 03:55

## 2023-07-07 RX ADMIN — OXYCODONE HYDROCHLORIDE AND ACETAMINOPHEN 1 TABLET: 5; 325 TABLET ORAL at 08:09

## 2023-07-07 RX ADMIN — FENTANYL CITRATE 25 MCG: 50 INJECTION, SOLUTION INTRAMUSCULAR; INTRAVENOUS at 02:42

## 2023-07-07 RX ADMIN — CALCIUM ACETATE 1334 MG: 667 CAPSULE ORAL at 08:13

## 2023-07-07 RX ADMIN — SILDENAFIL 20 MG: 20 TABLET ORAL at 09:57

## 2023-07-07 RX ADMIN — ONDANSETRON 4 MG: 2 INJECTION INTRAMUSCULAR; INTRAVENOUS at 01:43

## 2023-07-07 RX ADMIN — DILTIAZEM HYDROCHLORIDE 30 MG: 30 TABLET, FILM COATED ORAL at 08:13

## 2023-07-07 RX ADMIN — PANTOPRAZOLE SODIUM 40 MG: 40 TABLET, DELAYED RELEASE ORAL at 08:15

## 2023-07-07 ASSESSMENT — PAIN SCALES - GENERAL
PAINLEVEL_OUTOF10: 8
PAINLEVEL_OUTOF10: 10
PAINLEVEL_OUTOF10: 6
PAINLEVEL_OUTOF10: 2
PAINLEVEL_OUTOF10: 10
PAINLEVEL_OUTOF10: 3

## 2023-07-07 ASSESSMENT — PAIN - FUNCTIONAL ASSESSMENT
PAIN_FUNCTIONAL_ASSESSMENT: 0-10
PAIN_FUNCTIONAL_ASSESSMENT: NONE - DENIES PAIN

## 2023-07-07 ASSESSMENT — ENCOUNTER SYMPTOMS
CONSTIPATION: 0
STRIDOR: 0
DIARRHEA: 0
SHORTNESS OF BREATH: 1
WHEEZING: 0
VOMITING: 0
CHOKING: 0
NAUSEA: 0
BLOOD IN STOOL: 0
CHEST TIGHTNESS: 0
RHINORRHEA: 0
COUGH: 0
CHEST TIGHTNESS: 1
NAUSEA: 1
SHORTNESS OF BREATH: 0
ABDOMINAL PAIN: 0

## 2023-07-07 ASSESSMENT — PAIN DESCRIPTION - LOCATION
LOCATION: CHEST

## 2023-07-07 NOTE — ED NOTES
The following labs were labeled with appropriate pt sticker and tubed to lab:     [] Blue     [] Lavender   [] on ice  [] Green/yellow  [x] Green/black [x] on ice  [] Joanna Leys  [] on ice  [] Yellow  [] Red  [] Type/ Screen  [] ABG  [] VBG    [] COVID-19 swab    [] Rapid  [] PCR  [] Flu swab  [] Peds Viral Panel     [] Urine Sample  [] Fecal Sample  [] Pelvic Cultures  [] Blood Cultures  [] X 2  [] STREP Cultures      Aga Xie RN  07/07/23 0288

## 2023-07-07 NOTE — ED NOTES
4mg total of zofran given, additional vial dropped on the ground.       Jess Castillo, YAIMA  07/07/23 1194

## 2023-07-07 NOTE — ED NOTES
Dr. Rocio Bae un room, transport here to take to [address] [place] [address], RN  07/07/23 8942

## 2023-07-07 NOTE — ED NOTES
The following labs were labeled with appropriate pt sticker and tubed to lab:     [] Blue     [] Lavender   [] on ice  [] Green/yellow  [] Green/black [] on ice  [] Ples [place]  [] on ice  [] Yellow  [] Red  [] Type/ Screen  [] ABG  [] VBG    [x] COVID-19 swab    [x] Rapid  [] PCR  [] Flu swab  [] Peds Viral Panel     [] Urine Sample  [] Fecal Sample  [] Pelvic Cultures  [] Blood Cultures  [] X 2  [] STREP Cultures       Patient to vascular lab      Dillon Talbot RN  07/07/23 0174

## 2023-07-07 NOTE — ED NOTES
Lunch given to patient,   The following labs were labeled with appropriate pt sticker and tubed to lab:     [x] Blue     [] Lavender   [] on ice  [] Green/yellow  [] Green/black [] on ice  [] Judythe Maxcy  [] on ice  [] Yellow  [] Red  [] Type/ Screen  [] ABG  [] VBG    [] COVID-19 swab    [] Rapid  [] PCR  [] Flu swab  [] Peds Viral Panel     [] Urine Sample  [] Fecal Sample  [] Pelvic Cultures  [] Blood Cultures  [] X 2  [] STREP Cultures      Margret Johnson RN  07/07/23 6218

## 2023-07-07 NOTE — ED NOTES
The following labs were labeled with appropriate pt sticker and tubed to lab:     [] Blue     [] Lavender   [] on ice  [] Green/yellow  [x] Green/black [x] on ice  [] Savita [place]  [] on ice  [] Yellow  [] Red  [] Type/ Screen  [] ABG  [] VBG    [] COVID-19 swab    [] Rapid  [] PCR  [] Flu swab  [] Peds Viral Panel     [] Urine Sample  [] Fecal Sample  [] Pelvic Cultures  [x] Blood Cultures  [x] X 2  [] STREP Cultures      Jo Byrne RN  07/07/23 9539

## 2023-07-07 NOTE — ED NOTES
Pt came to ed via ems w complaint of chest pain. States that she was d/c from [facility] today, received peritoneal dialysis about an hour ago. States that she started experiencing chest pain after dialysis, states - cardiac hx. Received aspirin via EMS. Makes minimal urine. VSS, NAD, AOx4. Patient resting in bed, respirations even and unlabored. Call light in reach.        Jose Persaud RN  07/07/23 4073

## 2023-07-07 NOTE — ED NOTES
Care received. Patient in room with daughter. Patient still continues to complain that her chest hurts. Pain medication had been given roughly 10 minutes ago.  Will continue to monitor      Felipe Paulson RN  07/07/23 4834

## 2023-07-07 NOTE — DISCHARGE SUMMARY
[facility]  Office: [id number], DO, Sven Park, DO, Ayakakwasi Talley, DO, Rohiniawa Gerard Fitzgerald, DO, Joni Izaguirre MD, Bryan Renner MD, Duke Gamez MD, Nichol Dela Cruz MD,  Hakeem Saldaña MD, Jazmine Boyd MD, Nguyen Wells, DO, Annabelle Martino MD,  Ritu Bullard MD, Omayra Brandon MD, Linn Craft DO, Felisha Chavez MD,  Rockney Galeazzi, DO, Jasper Ram MD, Joann Rodriguez MD, Josiane Rebolledo MD, Carina Liu MD,  Rylie Anglin MD, Suhas Lechuga MD, Shasha Marie DO, Kathleen Cisneros MD,  Javon Bradley MD, Ricarda Smith, Estefany Quevedo, CNP, Keven Ferrera, CNP, Amarilis Perez, CNP,  Kameron Rowland, [facility], Allison Hitchcock, CNP, Alberto Jenkins, CNP, Osmar Cargo, CNP, Qing Brood, CNP, Yasmin Sanchez, CNP, Ovidio Maloney PA-C, Mary Sy, CNS, Shelly Perez, CNP, Mary Cordero, [address] [facility]    Discharge Summary     Patient ID: Africa Reyes  :  1980   MRN: [id number]     ACCOUNT:  [de-identified]   Patient's PCP: Cata Olvera PA-C  Admit Date: 2023   Discharge Date: 2023     Length of Stay: 1  Code Status:  Prior  Admitting Physician: No admitting provider for patient encounter. Discharge Physician: Nichol Dela Cruz MD     Active Discharge Diagnoses:     Hospital Problem Lists:  Principal Problem:    Elevated d-dimer  Active Problems:    Chest pain    Smoker    Polycystic kidney disease    Depression with anxiety    Hypertension    Hyperlipidemia    ESRD on peritoneal dialysis [facility])  Resolved Problems:    * No resolved hospital problems.  *      Admission Condition:  poor     Discharged Condition: fair    Hospital Stay:     HPI:      Patient with known past medical history of persistent kidney disease, end-stage renal disease on peritoneal dialysis, anxiety, asthma, hypertension, hyperlipidemia presented to ER after episode of chest pain that was

## 2023-07-07 NOTE — CONSULTS
Renal Consult Note    Patient :  Mar Velez; 37 y.o. MRN# [id number]  Location:  16/16  Attending:  Arcelia Libman, MD  Admit Date:  7/7/2023   Hospital Day: 0    Reason for Consult:     Asked by Dr Arcelia Libman, MD to see for SILVANO/Elevated Creatinine. History Obtained From:     patient, electronic medical record    HD Access:     previous peritoneal catheter    Nephrologist:     Dr Johnny Garcia    Dry Weight:     62kg    Admission Weight:     61.7kg    History of Present Illness:     Mar Velez; 37 y.o. female with past medical history significant for polycystic kidney disease on peritoneal dialysis. She presented to the hospital with the chief complaint of chest pain which she reports she has intermittently. BMP in the ED showed sodium 136, potassium 4.6, chloride 98, bicarb 22. Originally she was given fentanyl and nitro for the chest pain which did help some. After receiving Protonix she has not been complaining of chest pain. Chest x-ray showed no acute process. VQ scan shows low probability for pulmonary embolism    She was discharged for [facility] yesterday after being treated for peritonitis. She was cleared by nephrology and infectious disease. Today her  White blood cell count is 12.6. Nephrology has consulted for management of peritoneal dialysis. She reports that she uses green bags, does 4-hour dwells with 4 exchanges throughout the day. She reports that she does not consistently. Most recent dialysate was clear, no difficulty with drainage. She has been afebrile today. On exam she is minimally communicative. Past History/Allergies? Social History:     Past Medical History:   Diagnosis Date    Anxiety     Asthma     Chronic headaches 07/10/2013    CKD (chronic kidney disease) stage 3, GFR 30-59 ml/min ([facility])     Degenerative disc disease, cervical     Depression     Dysmenorrhea 09/30/2014    Eczema 11/08/2012    Hypertension     Hypocalcemia 05/28/2014    Kidney stone

## 2023-07-07 NOTE — ED NOTES
The following labs were labeled with appropriate pt sticker and tubed to lab:     [] Blue     [] Lavender   [] on ice  [] Green/yellow  [] Green/black [] on ice  [] Deidra Jami  [] on ice  [] Yellow  [] Red  [] Type/ Screen  [] ABG  [] VBG    [] COVID-19 swab    [] Rapid  [] PCR  [] Flu swab  [] Peds Viral Panel     [x] Urine Sample  [] Fecal Sample  [] Pelvic Cultures  [] Blood Cultures  [] X 2  [] STREP Cultures      Brooks Becker RN  07/07/23 2784

## 2023-07-07 NOTE — ED NOTES
Patient discharged to home. Discharge instructions reviewed and medications reviewed.       Nataliia Villanueva RN  07/07/23 8668

## 2023-07-08 LAB
EKG ATRIAL RATE: 99 BPM
EKG P AXIS: 53 DEGREES
EKG P-R INTERVAL: 132 MS
EKG Q-T INTERVAL: 386 MS
EKG QRS DURATION: 80 MS
EKG QTC CALCULATION (BAZETT): 495 MS
EKG R AXIS: -12 DEGREES
EKG T AXIS: 56 DEGREES
EKG VENTRICULAR RATE: 99 BPM
MICROORGANISM SPEC CULT: NO GROWTH
SPECIMEN DESCRIPTION: NORMAL

## 2023-07-08 PROCEDURE — 93010 ELECTROCARDIOGRAM REPORT: CPT | Performed by: INTERNAL MEDICINE

## 2023-07-09 LAB
MICROORGANISM SPEC CULT: ABNORMAL
MICROORGANISM SPEC CULT: NORMAL
MICROORGANISM/AGENT SPEC: ABNORMAL
SERVICE CMNT-IMP: NORMAL
SERVICE CMNT-IMP: NORMAL
SPECIMEN DESCRIPTION: ABNORMAL
SPECIMEN DESCRIPTION: NORMAL

## 2023-07-12 LAB
MICROORGANISM SPEC CULT: NORMAL
MICROORGANISM SPEC CULT: NORMAL
SERVICE CMNT-IMP: NORMAL
SERVICE CMNT-IMP: NORMAL
SPECIMEN DESCRIPTION: NORMAL
SPECIMEN DESCRIPTION: NORMAL

## 2023-07-14 NOTE — DISCHARGE SUMMARY
!Yes       !Yes            ! None      ! +------------------------------------+----------+---------------+----------+ ! GSV Knee                            ! Yes       ! Yes            ! None      ! +------------------------------------+----------+---------------+----------+ ! GSV Ankle                           ! Yes       ! Yes            ! None      ! +------------------------------------+----------+---------------+----------+ ! SSV                                 ! Yes       ! Yes            ! None      ! +------------------------------------+----------+---------------+----------+ Left Doppler Measurements +---------------------------+------+------+--------------------------------+ ! Location                   ! Signal!Reflux! Reflux (msec)                   ! +---------------------------+------+------+--------------------------------+ ! Common Femoral             !Phasic!      !                                ! +---------------------------+------+------+--------------------------------+ ! Prox Femoral               !Phasic!      !                                ! +---------------------------+------+------+--------------------------------+ ! Popliteal                  !Phasic!      !                                ! +---------------------------+------+------+--------------------------------+        Consultations:    Consults:     Final Specialist Recommendations/Findings:   PHARMACY TO DOSE VANCOMYCIN  IP CONSULT TO INTERNAL MEDICINE  IP CONSULT TO NEPHROLOGY  IP CONSULT TO INFECTIOUS DISEASES      The patient was seen and examined on day of discharge and this discharge summary is in conjunction with any daily progress note from day of discharge. Discharge plan:     Disposition: Home    Physician Follow Up:   No follow-up provider specified.      Requiring Further Evaluation/Follow Up POST HOSPITALIZATION/Incidental Findings:    Diet: cardiac diet    Activity: As tolerated    Instructions to Patient:     Discharge

## 2023-07-17 RX ORDER — ASPIRIN 81 MG/1
TABLET, CHEWABLE ORAL
Qty: 30 TABLET | Refills: 3 | OUTPATIENT
Start: 2023-07-17

## 2023-08-02 RX ORDER — ATORVASTATIN CALCIUM 40 MG/1
TABLET, FILM COATED ORAL
Qty: 30 TABLET | Refills: 3 | OUTPATIENT
Start: 2023-08-02

## 2023-10-25 NOTE — ED PROVIDER NOTES
EMERGENCY DEPARTMENT ENCOUNTER    Pt Name: Remigio Padilla  MRN: [id number]  [place] 1980  Date of evaluation: 5/16/21  CHIEF COMPLAINT       Chief Complaint   Patient presents with    Flank Pain     right     HISTORY OF PRESENT ILLNESS   HPI     This is a 75-year-old female with a history of PKD CKD and renal stones who comes in today. Patient states that she felt something pop a few hours ago in her right flank and since then she has been having right flank pain that now radiates into her right lower quadrant it does not radiate into her groin she does not know if this is a kidney stone or not but has had kidney stones in the past requiring stent placement her urologist is Dr. Mya Lemos. She took Norco without any improvement of her symptoms. She is not allowed to take NSAIDs secondary to her CKD. She feels nauseous without any vomiting. No diarrhea no burning with urination no no blood in her urine no fevers or chills. REVIEW OF SYSTEMS     Review of Systems   Constitutional: Negative for fever. HENT: Negative for congestion. Respiratory: Negative for cough and shortness of breath. Cardiovascular: Negative for chest pain. Gastrointestinal: Positive for abdominal pain. Negative for diarrhea and vomiting. Genitourinary: Positive for flank pain. Negative for dysuria. Musculoskeletal: Negative for back pain. Skin: Negative for rash. Neurological: Negative for headaches. All other systems reviewed and are negative.     PASTMEDICAL HISTORY     Past Medical History:   Diagnosis Date    Anxiety     Asthma     Chronic headaches 7/10/2013    CKD (chronic kidney disease) stage 3, GFR 30-59 ml/min     Degenerative disc disease, cervical     Depression     Dysmenorrhea 9/30/2014    Eczema 11/8/2012    Headache(784.0)     HTN (hypertension) 10/2/2011    Hypertension     Hypocalcemia 5/28/2014    Kidney stone     Menorrhagia 9/30/2014    Neck pain, bilateral 5/28/2014    Pelvic pain in female 9/30/2014    Polycystic kidney     Smoker 10/2/2011    Tachycardia 1/20/2014    Tension vascular headache 1/20/2014     SURGICAL HISTORY       Past Surgical History:   Procedure Laterality Date    BREAST ENHANCEMENT SURGERY      BREAST LUMPECTOMY      left breast    CYSTO/URETERO/PYELOSCOPY, CALCULUS TX Right 6/12/2020    HOLMIUM - CYSTO, RIGHT RETROGRADE PYELOGRAM, RIGHT URETEROSCOPY, LASER LITHO ON STAND BY, RIGHT STENT PLACEMENT performed by Kedar Galindo MD at [facility].  06/12/2020    [facility]      x2    LITHOTRIPSY Right 7/23/2020    ESWL EXTRACORPOREAL SHOCK WAVE LITHOTRIPSY performed by Kedar Galindo MD at [place] [address]  07/01/2013    nerve block(right vervical C3/TON/C4/C5    NERVE BLOCK  07/08/2013    nerve block(right vervical C3/TON/C4/C5    OTHER SURGICAL HISTORY  03/10/2014     botox inj     TOE SURGERY      removal of ingrown toe nail-2nd toe on left foot     URETEROSCOPY Right 06/12/2020    stent placement     CURRENT MEDICATIONS       Previous Medications    AMLODIPINE (NORVASC) 5 MG TABLET    Take 5 mg by mouth daily    BLOOD PRESSURE KIT    Check BP once/day- goal is <140/90. DULOXETINE (CYMBALTA) 60 MG EXTENDED RELEASE CAPSULE    take 1 capsule by mouth once daily    MELATONIN 3 MG TABS TABLET    Take 3 mg by mouth nightly as needed (sleep)    ONDANSETRON (ZOFRAN) 4 MG TABLET    Take 4 mg by mouth every 8 hours as needed for Nausea or Vomiting     ALLERGIES     is allergic to bee pollen, dust mite extract, pcn [penicillins], and pollen extract. FAMILY HISTORY     She indicated that her mother is alive. She indicated that her father is alive. She indicated that her sister is alive. She indicated that her brother is alive. She indicated that all of her four daughters are alive. She indicated that her son is alive.      SOCIAL HISTORY       Social History     Tobacco Use    Smoking status: Current Every Day Smoker Packs/day: 1.00     Years: 16.00     Pack years: 16.00     Types: Cigarettes    Smokeless tobacco: Never Used    Tobacco comment: trying  to  cut  down   Vaping Use    Vaping Use: Never used   Substance Use Topics    Alcohol use: No     Alcohol/week: 0.0 standard drinks    Drug use: No     PHYSICAL EXAM     INITIAL VITALS: BP (!) 141/84   Pulse 93   Temp 98.6 °F (37 °C) (Oral)   Resp 16   Ht 5' 5\" (1.651 m)   Wt 135 lb (61.2 kg)   LMP 04/25/2021 (Approximate)   SpO2 98%   BMI 22.47 kg/m²    Physical Exam  Vitals and nursing note reviewed. Constitutional:       Appearance: She is well-developed. Comments: Appears uncomfortable   HENT:      Head: Normocephalic and atraumatic. Eyes:      Conjunctiva/sclera: Conjunctivae normal.   Cardiovascular:      Rate and Rhythm: Normal rate and regular rhythm. Heart sounds: No murmur heard. No friction rub. Pulmonary:      Effort: Pulmonary effort is normal. No respiratory distress. Breath sounds: Normal breath sounds. No stridor. No wheezing or rhonchi. Abdominal:      General: There is no distension. Palpations: Abdomen is soft. Tenderness: There is no guarding or rebound. Comments: Right CVA tenderness right lower quadrant abdominal tenderness to palpation without rebound or guard   Musculoskeletal:      Cervical back: Neck supple. Skin:     General: Skin is warm and dry. Capillary Refill: Capillary refill takes less than 2 seconds. Neurological:      Mental Status: She is alert. DIAGNOSTIC RESULTS   RADIOLOGY:All plain film, CT, MRI, and formal ultrasound images (except ED bedside ultrasound) are read by the radiologist, see reports below, unless otherwisenoted in MDM or here. CT ABDOMEN PELVIS WO CONTRAST Additional Contrast? None   Final Result   Polycystic kidney disease. 5 mm nonobstructing nephrolith on the right. No   significant change or acute disease.            LABS: All lab results were reviewed by myself, and all abnormals are listed below. Labs Reviewed   CBC WITH AUTO DIFFERENTIAL - Abnormal; Notable for the following components:       Result Value    RBC 3.36 (*)     Hemoglobin 11.1 (*)     Hematocrit 32.3 (*)     Seg Neutrophils 68 (*)     All other components within normal limits   BASIC METABOLIC PANEL - Abnormal; Notable for the following components:    BUN 32 (*)     CREATININE 3.21 (*)     GFR Non- 16 (*)     GFR  19 (*)     All other components within normal limits   APTT - Abnormal; Notable for the following components:    PTT 36.3 (*)     All other components within normal limits   URINALYSIS - Abnormal; Notable for the following components:    Turbidity UA CLOUDY (*)     Urine Hgb LARGE (*)     Protein, UA 1+ (*)     Leukocyte Esterase, Urine MOD (*)     All other components within normal limits   MAGNESIUM   PROTIME-INR   PREGNANCY, URINE   MICROSCOPIC URINALYSIS       EMERGENCY DEPARTMENTCOURSE:   Differential diagnosis includes ruptured renal cyst, renal stone, appendicitis, urinary tract infection. 11:20 PM EDT  Patient's creatinine has worsened from 2.62-3.21 no electrolyte abnormalities no leukocytosis stable anemia with a hemoglobin of 11.1. Her CT scan reveals stable PKD and a 5 mm nonobstructing stone urinalysis is negative for infection no RBCs in her urine. I did speak to the urologist on-call for Dr. Parker Boards is Dr. Mariam Smith who states that this is most likely secondary to her PKD and not secondary to the stone does not recommend any surgical intervention at this time. I did speak to [place] [id number] practitioner who will admit the patient.   He will consult nephrology in the morning           CONSULTS:  IP CONSULT TO UROLOGY    Vitals:    Vitals:    05/16/21 2018   BP: (!) 141/84   Pulse: 93   Resp: 16   Temp: 98.6 °F (37 °C)   TempSrc: Oral   SpO2: 98%   Weight: 135 lb (61.2 kg)   Height: 5' 5\" (1.651 m)       The patient was given the following medications while in the emergency department:  Orders Placed This Encounter   Medications    0.9 % sodium chloride bolus    morphine sulfate (PF) injection 4 mg    ondansetron (ZOFRAN) injection 4 mg    morphine sulfate (PF) injection 4 mg    0.9 % sodium chloride bolus         FINAL IMPRESSION      1. PKD (polycystic kidney disease)    2. Renal stone    3.  SILVANO (acute kidney injury) [facility])         DISPOSITION/PLAN   DISPOSITION Decision To Admit 05/16/2021 11:19:38 PM  Gilbert Kennedy MD  Attending Emergency Physician    This charting supersedes any ED resident or staff charting and was written using speech recognition software        Gilbert Kennedy MD  05/16/21 3795 Detail Level: Zone Detail Level: Detailed

## 2023-11-20 ENCOUNTER — HOSPITAL ENCOUNTER (INPATIENT)
Age: 43
LOS: 1 days | Discharge: HOME | DRG: 283 | End: 2023-11-21
Attending: EMERGENCY MEDICINE | Admitting: INTERNAL MEDICINE
Payer: MEDICAID

## 2023-11-20 DIAGNOSIS — R10.84 GENERALIZED ABDOMINAL PAIN: Primary | ICD-10-CM

## 2023-11-20 DIAGNOSIS — Z99.2 ESRD ON PERITONEAL DIALYSIS (HCC): ICD-10-CM

## 2023-11-20 DIAGNOSIS — N18.6 ESRD ON PERITONEAL DIALYSIS (HCC): ICD-10-CM

## 2023-11-20 PROCEDURE — 81001 URINALYSIS AUTO W/SCOPE: CPT

## 2023-11-20 PROCEDURE — 99285 EMERGENCY DEPT VISIT HI MDM: CPT

## 2023-11-20 ASSESSMENT — PAIN DESCRIPTION - FREQUENCY: FREQUENCY: CONTINUOUS

## 2023-11-20 ASSESSMENT — PAIN - FUNCTIONAL ASSESSMENT: PAIN_FUNCTIONAL_ASSESSMENT: 0-10

## 2023-11-20 ASSESSMENT — PAIN SCALES - GENERAL: PAINLEVEL_OUTOF10: 8

## 2023-11-20 ASSESSMENT — PAIN DESCRIPTION - ORIENTATION: ORIENTATION: MID

## 2023-11-20 ASSESSMENT — PAIN DESCRIPTION - LOCATION: LOCATION: ABDOMEN

## 2023-11-20 ASSESSMENT — PAIN DESCRIPTION - DESCRIPTORS: DESCRIPTORS: ACHING

## 2023-11-21 ENCOUNTER — APPOINTMENT (OUTPATIENT)
Dept: VASCULAR LAB | Age: 43
DRG: 283 | End: 2023-11-21
Attending: EMERGENCY MEDICINE
Payer: MEDICAID

## 2023-11-21 ENCOUNTER — APPOINTMENT (OUTPATIENT)
Dept: CT IMAGING | Age: 43
DRG: 283 | End: 2023-11-21
Payer: MEDICAID

## 2023-11-21 VITALS
TEMPERATURE: 98.3 F | HEIGHT: 66 IN | HEART RATE: 86 BPM | OXYGEN SATURATION: 93 % | RESPIRATION RATE: 18 BRPM | SYSTOLIC BLOOD PRESSURE: 120 MMHG | WEIGHT: 138 LBS | BODY MASS INDEX: 22.18 KG/M2 | DIASTOLIC BLOOD PRESSURE: 79 MMHG

## 2023-11-21 PROBLEM — K76.89 LIVER CYST: Status: ACTIVE | Noted: 2023-11-21

## 2023-11-21 LAB
ALBUMIN SERPL-MCNC: 2.8 G/DL (ref 3.5–5.2)
ALP SERPL-CCNC: 92 U/L (ref 35–104)
ALT SERPL-CCNC: 8 U/L (ref 5–33)
ANION GAP SERPL CALCULATED.3IONS-SCNC: 15 MMOL/L (ref 9–17)
ANION GAP SERPL CALCULATED.3IONS-SCNC: 15 MMOL/L (ref 9–17)
AST SERPL-CCNC: 12 U/L
BACTERIA URNS QL MICRO: ABNORMAL
BASOPHILS # BLD: 0.1 K/UL (ref 0–0.2)
BASOPHILS # BLD: 0.2 K/UL (ref 0–0.2)
BASOPHILS NFR BLD: 1 % (ref 0–2)
BASOPHILS NFR BLD: 2 % (ref 0–2)
BILIRUB SERPL-MCNC: 0.2 MG/DL (ref 0.3–1.2)
BILIRUB UR QL STRIP: NEGATIVE
BUN SERPL-MCNC: 45 MG/DL (ref 6–20)
BUN SERPL-MCNC: 51 MG/DL (ref 6–20)
CALCIUM SERPL-MCNC: 8 MG/DL (ref 8.6–10.4)
CALCIUM SERPL-MCNC: 8.1 MG/DL (ref 8.6–10.4)
CASTS #/AREA URNS LPF: ABNORMAL /LPF
CHLORIDE SERPL-SCNC: 100 MMOL/L (ref 98–107)
CHLORIDE SERPL-SCNC: 97 MMOL/L (ref 98–107)
CLARITY UR: CLEAR
CO2 SERPL-SCNC: 24 MMOL/L (ref 20–31)
CO2 SERPL-SCNC: 27 MMOL/L (ref 20–31)
COLOR UR: YELLOW
CREAT SERPL-MCNC: 7.5 MG/DL (ref 0.5–0.9)
CREAT SERPL-MCNC: 7.8 MG/DL (ref 0.5–0.9)
ECHO BSA: 1.71 M2
EOSINOPHIL # BLD: 0.4 K/UL (ref 0–0.4)
EOSINOPHIL # BLD: 0.4 K/UL (ref 0–0.4)
EOSINOPHILS RELATIVE PERCENT: 4 % (ref 0–4)
EOSINOPHILS RELATIVE PERCENT: 4 % (ref 0–4)
EPI CELLS #/AREA URNS HPF: ABNORMAL /HPF
ERYTHROCYTE [DISTWIDTH] IN BLOOD BY AUTOMATED COUNT: 15.4 % (ref 11.5–14.9)
ERYTHROCYTE [DISTWIDTH] IN BLOOD BY AUTOMATED COUNT: 15.5 % (ref 11.5–14.9)
GFR SERPL CREATININE-BSD FRML MDRD: 6 ML/MIN/1.73M2
GFR SERPL CREATININE-BSD FRML MDRD: 6 ML/MIN/1.73M2
GLUCOSE SERPL-MCNC: 100 MG/DL (ref 70–99)
GLUCOSE SERPL-MCNC: 104 MG/DL (ref 70–99)
GLUCOSE UR STRIP-MCNC: ABNORMAL MG/DL
HCT VFR BLD AUTO: 39.2 % (ref 36–46)
HCT VFR BLD AUTO: 40.5 % (ref 36–46)
HGB BLD-MCNC: 12.8 G/DL (ref 12–16)
HGB BLD-MCNC: 13.4 G/DL (ref 12–16)
HGB UR QL STRIP.AUTO: ABNORMAL
INR PPP: 0.8
KETONES UR STRIP-MCNC: NEGATIVE MG/DL
LACTATE BLDV-SCNC: 1.7 MMOL/L (ref 0.5–2.2)
LEUKOCYTE ESTERASE UR QL STRIP: ABNORMAL
LIPASE SERPL-CCNC: 28 U/L (ref 13–60)
LYMPHOCYTES NFR BLD: 2 K/UL (ref 1–4.8)
LYMPHOCYTES NFR BLD: 2.6 K/UL (ref 1–4.8)
LYMPHOCYTES RELATIVE PERCENT: 19 % (ref 24–44)
LYMPHOCYTES RELATIVE PERCENT: 25 % (ref 24–44)
MCH RBC QN AUTO: 31.8 PG (ref 26–34)
MCH RBC QN AUTO: 32.1 PG (ref 26–34)
MCHC RBC AUTO-ENTMCNC: 32.7 G/DL (ref 31–37)
MCHC RBC AUTO-ENTMCNC: 33 G/DL (ref 31–37)
MCV RBC AUTO: 97 FL (ref 80–100)
MCV RBC AUTO: 97.2 FL (ref 80–100)
MONOCYTES NFR BLD: 0.7 K/UL (ref 0.1–1.3)
MONOCYTES NFR BLD: 0.7 K/UL (ref 0.1–1.3)
MONOCYTES NFR BLD: 6 % (ref 1–7)
MONOCYTES NFR BLD: 6 % (ref 1–7)
NEUTROPHILS NFR BLD: 64 % (ref 36–66)
NEUTROPHILS NFR BLD: 69 % (ref 36–66)
NEUTS SEG NFR BLD: 6.8 K/UL (ref 1.3–9.1)
NEUTS SEG NFR BLD: 7.4 K/UL (ref 1.3–9.1)
NITRITE UR QL STRIP: NEGATIVE
PARTIAL THROMBOPLASTIN TIME: 33.6 SEC (ref 24–36)
PH UR STRIP: 7.5 [PH] (ref 5–8)
PLATELET # BLD AUTO: 350 K/UL (ref 150–450)
PLATELET # BLD AUTO: 382 K/UL (ref 150–450)
PMV BLD AUTO: 7.8 FL (ref 6–12)
PMV BLD AUTO: 7.9 FL (ref 6–12)
POTASSIUM SERPL-SCNC: 4.3 MMOL/L (ref 3.7–5.3)
POTASSIUM SERPL-SCNC: 4.4 MMOL/L (ref 3.7–5.3)
PROT SERPL-MCNC: 5.7 G/DL (ref 6.4–8.3)
PROT UR STRIP-MCNC: ABNORMAL MG/DL
PROTHROMBIN TIME: 11.8 SEC (ref 11.8–14.6)
RBC # BLD AUTO: 4.04 M/UL (ref 4–5.2)
RBC # BLD AUTO: 4.17 M/UL (ref 4–5.2)
RBC #/AREA URNS HPF: ABNORMAL /HPF
SODIUM SERPL-SCNC: 139 MMOL/L (ref 135–144)
SODIUM SERPL-SCNC: 139 MMOL/L (ref 135–144)
SP GR UR STRIP: 1.01 (ref 1–1.03)
UROBILINOGEN UR STRIP-ACNC: NORMAL EU/DL (ref 0–1)
WBC #/AREA URNS HPF: ABNORMAL /HPF
WBC OTHER # BLD: 10.6 K/UL (ref 3.5–11)
WBC OTHER # BLD: 10.6 K/UL (ref 3.5–11)

## 2023-11-21 PROCEDURE — 93970 EXTREMITY STUDY: CPT

## 2023-11-21 PROCEDURE — 74176 CT ABD & PELVIS W/O CONTRAST: CPT

## 2023-11-21 PROCEDURE — 80053 COMPREHEN METABOLIC PANEL: CPT

## 2023-11-21 PROCEDURE — 1200000000 HC SEMI PRIVATE

## 2023-11-21 PROCEDURE — 3E1M39Z IRRIGATION OF PERITONEAL CAVITY USING DIALYSATE, PERCUTANEOUS APPROACH: ICD-10-PCS | Performed by: INTERNAL MEDICINE

## 2023-11-21 PROCEDURE — 99255 IP/OBS CONSLTJ NEW/EST HI 80: CPT | Performed by: INTERNAL MEDICINE

## 2023-11-21 PROCEDURE — 99235 HOSP IP/OBS SAME DATE MOD 70: CPT | Performed by: INTERNAL MEDICINE

## 2023-11-21 PROCEDURE — 85610 PROTHROMBIN TIME: CPT

## 2023-11-21 PROCEDURE — 6360000002 HC RX W HCPCS: Performed by: EMERGENCY MEDICINE

## 2023-11-21 PROCEDURE — 87075 CULTR BACTERIA EXCEPT BLOOD: CPT

## 2023-11-21 PROCEDURE — 85730 THROMBOPLASTIN TIME PARTIAL: CPT

## 2023-11-21 PROCEDURE — 2500000003 HC RX 250 WO HCPCS: Performed by: NURSE PRACTITIONER

## 2023-11-21 PROCEDURE — 36415 COLL VENOUS BLD VENIPUNCTURE: CPT

## 2023-11-21 PROCEDURE — 80048 BASIC METABOLIC PNL TOTAL CA: CPT

## 2023-11-21 PROCEDURE — APPNB30 APP NON BILLABLE TIME 0-30 MINS: Performed by: NURSE PRACTITIONER

## 2023-11-21 PROCEDURE — 6360000002 HC RX W HCPCS: Performed by: NURSE PRACTITIONER

## 2023-11-21 PROCEDURE — 2580000003 HC RX 258: Performed by: INTERNAL MEDICINE

## 2023-11-21 PROCEDURE — 87040 BLOOD CULTURE FOR BACTERIA: CPT

## 2023-11-21 PROCEDURE — 85025 COMPLETE CBC W/AUTO DIFF WBC: CPT

## 2023-11-21 PROCEDURE — 83605 ASSAY OF LACTIC ACID: CPT

## 2023-11-21 PROCEDURE — 87070 CULTURE OTHR SPECIMN AEROBIC: CPT

## 2023-11-21 PROCEDURE — 93970 EXTREMITY STUDY: CPT | Performed by: SURGERY

## 2023-11-21 PROCEDURE — 87154 CUL TYP ID BLD PTHGN 6+ TRGT: CPT

## 2023-11-21 PROCEDURE — 2580000003 HC RX 258: Performed by: EMERGENCY MEDICINE

## 2023-11-21 PROCEDURE — 6370000000 HC RX 637 (ALT 250 FOR IP): Performed by: NURSE PRACTITIONER

## 2023-11-21 PROCEDURE — 96375 TX/PRO/DX INJ NEW DRUG ADDON: CPT

## 2023-11-21 PROCEDURE — 2580000003 HC RX 258: Performed by: NURSE PRACTITIONER

## 2023-11-21 PROCEDURE — 83690 ASSAY OF LIPASE: CPT

## 2023-11-21 PROCEDURE — 87205 SMEAR GRAM STAIN: CPT

## 2023-11-21 PROCEDURE — 96365 THER/PROPH/DIAG IV INF INIT: CPT

## 2023-11-21 RX ORDER — PAROXETINE HYDROCHLORIDE 20 MG/1
20 TABLET, FILM COATED ORAL DAILY
Status: DISCONTINUED | OUTPATIENT
Start: 2023-11-21 | End: 2023-11-21 | Stop reason: HOSPADM

## 2023-11-21 RX ORDER — OXYCODONE HYDROCHLORIDE AND ACETAMINOPHEN 5; 325 MG/1; MG/1
1 TABLET ORAL EVERY 4 HOURS PRN
Status: DISCONTINUED | OUTPATIENT
Start: 2023-11-21 | End: 2023-11-21 | Stop reason: HOSPADM

## 2023-11-21 RX ORDER — ONDANSETRON 2 MG/ML
4 INJECTION INTRAMUSCULAR; INTRAVENOUS EVERY 6 HOURS PRN
Status: DISCONTINUED | OUTPATIENT
Start: 2023-11-21 | End: 2023-11-21 | Stop reason: HOSPADM

## 2023-11-21 RX ORDER — ONDANSETRON 2 MG/ML
4 INJECTION INTRAMUSCULAR; INTRAVENOUS ONCE
Status: COMPLETED | OUTPATIENT
Start: 2023-11-21 | End: 2023-11-21

## 2023-11-21 RX ORDER — SODIUM CHLORIDE 0.9 % (FLUSH) 0.9 %
5-40 SYRINGE (ML) INJECTION EVERY 12 HOURS SCHEDULED
Status: DISCONTINUED | OUTPATIENT
Start: 2023-11-21 | End: 2023-11-21 | Stop reason: HOSPADM

## 2023-11-21 RX ORDER — FENTANYL CITRATE 0.05 MG/ML
25 INJECTION, SOLUTION INTRAMUSCULAR; INTRAVENOUS ONCE
Status: COMPLETED | OUTPATIENT
Start: 2023-11-21 | End: 2023-11-21

## 2023-11-21 RX ORDER — OXYCODONE HCL 20 MG/1
20 TABLET, FILM COATED, EXTENDED RELEASE ORAL EVERY 12 HOURS
Status: DISCONTINUED | OUTPATIENT
Start: 2023-11-21 | End: 2023-11-21 | Stop reason: HOSPADM

## 2023-11-21 RX ORDER — HEPARIN SODIUM 5000 [USP'U]/ML
5000 INJECTION, SOLUTION INTRAVENOUS; SUBCUTANEOUS EVERY 8 HOURS SCHEDULED
Status: DISCONTINUED | OUTPATIENT
Start: 2023-11-21 | End: 2023-11-21 | Stop reason: HOSPADM

## 2023-11-21 RX ORDER — FENTANYL CITRATE 0.05 MG/ML
25 INJECTION, SOLUTION INTRAMUSCULAR; INTRAVENOUS
Status: DISCONTINUED | OUTPATIENT
Start: 2023-11-21 | End: 2023-11-21 | Stop reason: HOSPADM

## 2023-11-21 RX ORDER — PANTOPRAZOLE SODIUM 40 MG/1
40 TABLET, DELAYED RELEASE ORAL
Status: DISCONTINUED | OUTPATIENT
Start: 2023-11-21 | End: 2023-11-21 | Stop reason: HOSPADM

## 2023-11-21 RX ORDER — ACETAMINOPHEN 650 MG/1
650 SUPPOSITORY RECTAL EVERY 6 HOURS PRN
Status: DISCONTINUED | OUTPATIENT
Start: 2023-11-21 | End: 2023-11-21 | Stop reason: HOSPADM

## 2023-11-21 RX ORDER — ONDANSETRON 4 MG/1
4 TABLET, ORALLY DISINTEGRATING ORAL EVERY 8 HOURS PRN
Status: DISCONTINUED | OUTPATIENT
Start: 2023-11-21 | End: 2023-11-21 | Stop reason: HOSPADM

## 2023-11-21 RX ORDER — SODIUM CHLORIDE 0.9 % (FLUSH) 0.9 %
10 SYRINGE (ML) INJECTION PRN
Status: DISCONTINUED | OUTPATIENT
Start: 2023-11-21 | End: 2023-11-21 | Stop reason: HOSPADM

## 2023-11-21 RX ORDER — CALCIUM ACETATE 667 MG/1
2 CAPSULE ORAL 3 TIMES DAILY
Status: DISCONTINUED | OUTPATIENT
Start: 2023-11-21 | End: 2023-11-21 | Stop reason: HOSPADM

## 2023-11-21 RX ORDER — MORPHINE SULFATE 4 MG/ML
4 INJECTION, SOLUTION INTRAMUSCULAR; INTRAVENOUS ONCE
Status: COMPLETED | OUTPATIENT
Start: 2023-11-21 | End: 2023-11-21

## 2023-11-21 RX ORDER — LANOLIN ALCOHOL/MO/W.PET/CERES
9 CREAM (GRAM) TOPICAL NIGHTLY
Status: DISCONTINUED | OUTPATIENT
Start: 2023-11-21 | End: 2023-11-21 | Stop reason: HOSPADM

## 2023-11-21 RX ORDER — SODIUM CHLORIDE 9 MG/ML
INJECTION, SOLUTION INTRAVENOUS PRN
Status: DISCONTINUED | OUTPATIENT
Start: 2023-11-21 | End: 2023-11-21 | Stop reason: HOSPADM

## 2023-11-21 RX ORDER — NITROGLYCERIN 0.4 MG/1
0.4 TABLET SUBLINGUAL EVERY 5 MIN PRN
Status: DISCONTINUED | OUTPATIENT
Start: 2023-11-21 | End: 2023-11-21 | Stop reason: HOSPADM

## 2023-11-21 RX ORDER — ACETAMINOPHEN 325 MG/1
650 TABLET ORAL EVERY 6 HOURS PRN
Status: DISCONTINUED | OUTPATIENT
Start: 2023-11-21 | End: 2023-11-21 | Stop reason: HOSPADM

## 2023-11-21 RX ORDER — SODIUM CHLORIDE, SODIUM LACTATE, CALCIUM CHLORIDE, MAGNESIUM CHLORIDE AND DEXTROSE 2.5; 538; 448; 18.3; 5.08 G/100ML; MG/100ML; MG/100ML; MG/100ML; MG/100ML
2000 INJECTION, SOLUTION INTRAPERITONEAL 4 TIMES DAILY
Status: DISCONTINUED | OUTPATIENT
Start: 2023-11-21 | End: 2023-11-21 | Stop reason: HOSPADM

## 2023-11-21 RX ORDER — DILTIAZEM HYDROCHLORIDE 180 MG/1
180 CAPSULE, COATED, EXTENDED RELEASE ORAL DAILY
Status: DISCONTINUED | OUTPATIENT
Start: 2023-11-21 | End: 2023-11-21 | Stop reason: HOSPADM

## 2023-11-21 RX ORDER — PANTOPRAZOLE SODIUM 40 MG/1
40 FOR SUSPENSION ORAL
COMMUNITY

## 2023-11-21 RX ADMIN — MORPHINE SULFATE 4 MG: 4 INJECTION, SOLUTION INTRAMUSCULAR; INTRAVENOUS at 00:58

## 2023-11-21 RX ADMIN — PANTOPRAZOLE SODIUM 40 MG: 40 TABLET, DELAYED RELEASE ORAL at 07:04

## 2023-11-21 RX ADMIN — CEFTRIAXONE SODIUM 1000 MG: 1 INJECTION, POWDER, FOR SOLUTION INTRAMUSCULAR; INTRAVENOUS at 01:04

## 2023-11-21 RX ADMIN — DILTIAZEM HYDROCHLORIDE 180 MG: 180 CAPSULE, COATED, EXTENDED RELEASE ORAL at 10:35

## 2023-11-21 RX ADMIN — OXYCODONE HYDROCHLORIDE 20 MG: 20 TABLET, FILM COATED, EXTENDED RELEASE ORAL at 10:35

## 2023-11-21 RX ADMIN — SODIUM CHLORIDE, SODIUM LACTATE, CALCIUM CHLORIDE, MAGNESIUM CHLORIDE AND DEXTROSE 2000 ML: 2.5; 538; 448; 18.3; 5.08 INJECTION, SOLUTION INTRAPERITONEAL at 09:50

## 2023-11-21 RX ADMIN — FENTANYL CITRATE 25 MCG: 0.05 INJECTION, SOLUTION INTRAMUSCULAR; INTRAVENOUS at 14:08

## 2023-11-21 RX ADMIN — FENTANYL CITRATE 25 MCG: 0.05 INJECTION, SOLUTION INTRAMUSCULAR; INTRAVENOUS at 10:46

## 2023-11-21 RX ADMIN — ONDANSETRON 4 MG: 2 INJECTION INTRAMUSCULAR; INTRAVENOUS at 00:57

## 2023-11-21 RX ADMIN — SODIUM CHLORIDE, SODIUM LACTATE, CALCIUM CHLORIDE, MAGNESIUM CHLORIDE AND DEXTROSE 2000 ML: 2.5; 538; 448; 18.3; 5.08 INJECTION, SOLUTION INTRAPERITONEAL at 14:26

## 2023-11-21 RX ADMIN — HEPARIN SODIUM 5000 UNITS: 5000 INJECTION INTRAVENOUS; SUBCUTANEOUS at 05:20

## 2023-11-21 RX ADMIN — FENTANYL CITRATE 25 MCG: 0.05 INJECTION, SOLUTION INTRAMUSCULAR; INTRAVENOUS at 05:17

## 2023-11-21 RX ADMIN — PAROXETINE HYDROCHLORIDE 20 MG: 20 TABLET, FILM COATED ORAL at 10:35

## 2023-11-21 RX ADMIN — FENTANYL CITRATE 25 MCG: 0.05 INJECTION, SOLUTION INTRAMUSCULAR; INTRAVENOUS at 02:26

## 2023-11-21 RX ADMIN — SODIUM CHLORIDE, PRESERVATIVE FREE 10 ML: 5 INJECTION INTRAVENOUS at 10:37

## 2023-11-21 ASSESSMENT — ENCOUNTER SYMPTOMS
ABDOMINAL DISTENTION: 1
VOMITING: 0
ABDOMINAL PAIN: 1
COUGH: 0
SHORTNESS OF BREATH: 0
CONSTIPATION: 0
DIARRHEA: 0
NAUSEA: 1

## 2023-11-21 ASSESSMENT — PAIN DESCRIPTION - ORIENTATION
ORIENTATION: RIGHT;LOWER
ORIENTATION: RIGHT;LOWER
ORIENTATION: MID;RIGHT
ORIENTATION: MID

## 2023-11-21 ASSESSMENT — PAIN DESCRIPTION - LOCATION
LOCATION: BACK
LOCATION: ABDOMEN;FLANK
LOCATION: BACK
LOCATION: ABDOMEN;BACK
LOCATION: ABDOMEN;BACK

## 2023-11-21 ASSESSMENT — PAIN SCALES - GENERAL
PAINLEVEL_OUTOF10: 10
PAINLEVEL_OUTOF10: 8
PAINLEVEL_OUTOF10: 6

## 2023-11-21 ASSESSMENT — PAIN DESCRIPTION - DESCRIPTORS
DESCRIPTORS: STABBING
DESCRIPTORS: ACHING
DESCRIPTORS: SHARP
DESCRIPTORS: ACHING

## 2023-11-21 NOTE — CONSULTS
NEPHROLOGY CONSULT     Patient :  Rosi Sepulveda; 37 y.o. MRN# [id number]  Location:  2072/2072-01  Attending:  Ramon Piper MD  Admit Date:  11/20/2023   Hospital Day: 0      Reason for Consult: ESRD/peritoneal dialysis      Chief Complaint: Abdominal pain  History Obtained From:  patient    History of Present Illness: This is a 37 y.o. female with a significant past medical history of Nephrolithiasis [s/p multiple lithotripsies and ESWL,systemic hypertension and end-stage renal disease secondary to autosomal dominant polycystic kidney disease [on continuous ambulatory peritoneal dialysis  Patient presented to the hospital with complaints of abdominal pain that started few days ago, patient denied nausea vomiting no chest pain shortness of breath. Pain has improved after Pain meds. Peritoneal fluid has been sent for evaluation. CT abdomen pelvis showed  IMPRESSION:  Mild biliary dilatation. Dystrophic calcifications involving the pancreatic head/uncinate process. The  pancreatic duct appears prominent. Difficult to exclude underlying mass given  noncontrast technique. Scattered hepatic cysts along with innumerable mixed density renal cysts. Moderate volume ascites, similar to prior.       Past Medical History:        Diagnosis Date    Anxiety     Asthma     Chronic headaches 07/10/2013    CKD (chronic kidney disease) stage 3, GFR 30-59 ml/min ([facility])     Degenerative disc disease, cervical     Depression     Dysmenorrhea 09/30/2014    Eczema 11/08/2012    Hypertension     Hypocalcemia 05/28/2014    Kidney stone     Menorrhagia 09/30/2014    Neck pain, bilateral 05/28/2014    Pelvic pain in female 09/30/2014    Polycystic kidney     Smoker 10/02/2011    Tension vascular headache 01/20/2014       Past Surgical History:        Procedure Laterality Date    BREAST ENHANCEMENT SURGERY      BREAST LUMPECTOMY      left breast    CYSTO/URETERO/PYELOSCOPY, CALCULUS TX Right 6/12/2020    HOLMIUM - CYSTO, RIGHT

## 2023-11-21 NOTE — H&P
BETSY BERRIOS [facility] Internal Medicine  Gavi Garcia MD; Loli Khan MD; Reggie Valencia MD; MD Carl Alvarado MD; MD NEVAEH Boucher JAnat [facility] Internal Medicine   [address]    HISTORY AND PHYSICAL EXAMINATION            Date:   11/21/2023  Patient name:  Rickie Mares  Date of admission:  11/20/2023 11:39 PM  MRN:   [id number]  Account:  [de-identified]  YOB: 1980  PCP:    Oj Becerril  Room:   2072/2072-01  Code Status:    Full Code    Chief Complaint:     Chief Complaint   Patient presents with    Abdominal Pain    Back Pain       History Obtained From:     Patient/EMR/Bedside RN    History of Present Illness:     Rickie Mares is a 37 y.o. Non- / non  female who presents with Abdominal Pain and Back Pain   and is admitted to the hospital for the management of Abdominal pain. According to patient, she has a history of ESRD and is on peritoneal dialysis at home. Reports that for the past week, she has been been having intermittent episodes of sharp pain that radiates across to her entire abdomen and into her right back, where her right kidney is located. Patient reports that the pain significantly worsened today. Symptoms are associated with abdominal swelling, chills, and intermittent nausea without vomiting. Denies chest pain, cough, vomiting, diarrhea, and urinary symptoms. Denies trauma/injury to the area. There are no aggravating or alleviating factors. Symptoms are reported as intermittent and moderate to severe; significantly worsened today. Patient reports that she has been on peritoneal dialysis for 1 year and is currently preparing to transition to hemodialysis. She has a relatively new AV fistula in her left forearm with positive thrill and bruit.      Patient status inpatient in the Med/Surge      Past Medical History:     Past Medical History:   Diagnosis Date    Anxiety

## 2023-11-21 NOTE — PLAN OF CARE
Problem: Discharge Planning  Goal: Discharge to home or other facility with appropriate resources  11/21/2023 1533 by Juana Ly RN  Outcome: Progressing     Problem: Pain  Goal: Verbalizes/displays adequate comfort level or baseline comfort level  11/21/2023 1533 by Juana Ly RN  Outcome: Progressing     Problem: Safety - Adult  Goal: Free from fall injury  11/21/2023 1533 by Juana Ly RN  Outcome: Progressing     Problem: ABCDS Injury Assessment  Goal: Absence of physical injury  11/21/2023 1533 by Juana Ly RN  Outcome: Progressing     Problem: Respiratory - Adult  Goal: Achieves optimal ventilation and oxygenation  11/21/2023 1533 by Juana Ly RN  Outcome: Progressing     Problem: Skin/Tissue Integrity - Adult  Goal: Skin integrity remains intact  11/21/2023 1533 by Juana Ly RN  Outcome: Progressing
Problem: Discharge Planning  Goal: Discharge to home or other facility with appropriate resources  11/21/2023 1616 by Carolina Leo RN  Outcome: Completed     Problem: Pain  Goal: Verbalizes/displays adequate comfort level or baseline comfort level  11/21/2023 1616 by Carolina Leo RN  Outcome: Completed     Problem: Safety - Adult  Goal: Free from fall injury  11/21/2023 1616 by Carolina Leo RN  Outcome: Completed     Problem: ABCDS Injury Assessment  Goal: Absence of physical injury  11/21/2023 1616 by Carolina Leo RN  Outcome: Completed     Problem: Respiratory - Adult  Goal: Achieves optimal ventilation and oxygenation  11/21/2023 1616 by Carolina Leo RN  Outcome: Completed     Problem: Skin/Tissue Integrity - Adult  Goal: Skin integrity remains intact  11/21/2023 1616 by Carolina Leo RN  Outcome: Completed
Problem: Discharge Planning  Goal: Discharge to home or other facility with appropriate resources  Outcome: Progressing  Flowsheets (Taken 11/21/2023 0533)  Discharge to home or other facility with appropriate resources: Identify barriers to discharge with patient and caregiver     Problem: Pain  Goal: Verbalizes/displays adequate comfort level or baseline comfort level  Outcome: Progressing     Problem: Safety - Adult  Goal: Free from fall injury  Outcome: Progressing     Problem: ABCDS Injury Assessment  Goal: Absence of physical injury  Outcome: Progressing     Problem: Respiratory - Adult  Goal: Achieves optimal ventilation and oxygenation  Outcome: Progressing  Flowsheets (Taken 11/21/2023 0533)  Achieves optimal ventilation and oxygenation: Assess for changes in respiratory status     Problem: Skin/Tissue Integrity - Adult  Goal: Skin integrity remains intact  Outcome: Progressing  Flowsheets (Taken 11/21/2023 0533)  Skin Integrity Remains Intact: Monitor for areas of redness and/or skin breakdown
reactive to light, extraocular eye movements intact, conjunctivae normal  ENT: hearing grossly normal bilaterally  Neck: neck supple and non tender without mass, no thyromegaly or thyroid nodules, no cervical lymphadenopathy   Pulmonary/Chest: clear to auscultation bilaterally- no wheezes, rales or rhonchi, normal air movement, no respiratory distress  Cardiovascular: normal rate, regular rhythm, normal S1 and S2, no murmurs, rubs, clicks or gallops, distal pulses intact, no carotid bruits  Abdomen: soft, pd catheter in place non tender, non-distended, normal bowel sounds, no masses or organomegaly no ascites  Extremities: no cyanosis, clubbing or edema  Musculoskeletal: normal range of motion, no joint swelling, deformity or tenderness  Neurologic: no cranial nerve deficit and muscle strength normal    Assessment  Abdominal pain -improved  Esrd on PD  Ductal dilatation with liver cysts and pd prominence, pancreatic calcifications    Plan  D/w md  Await results of PD fluid culture, continue abx for now  Liver cysts are not new; likely related to her polycystic disease  Ok for regular diet  Nephrology consult   Formal gi consult to follow  . Gasper Stauffer, APRN - CNP

## 2023-11-21 NOTE — ED NOTES
Peritoneal fluid taken per sterile technique from abdominal catheter that patient uses for peritoneal dialysis and sent to lab     Randi Edwards, [address]  11/21/23 [id number]

## 2023-11-21 NOTE — CARE COORDINATION
Case Management Assessment  Initial Evaluation    Date/Time of Evaluation: 11/21/2023 9:44 AM  Assessment Completed by: Cuca Garcia RN    If patient is discharged prior to next notation, then this note serves as note for discharge by case management. Patient Name: Emily Nowak                   YOB: 1980  Diagnosis: Abdominal pain [R10.9]  Generalized abdominal pain [R10.84]  ESRD on peritoneal dialysis [facility]) [N18.6, Z99.2]                   Date / Time: 11/20/2023 11:39 PM    Patient Admission Status: Inpatient   Readmission Risk (Low < 19, Mod (19-27), High > 27): Readmission Risk Score: 21.6    Current PCP: Adal Alas PA-C  PCP verified by CM? Yes    Chart Reviewed: Yes      History Provided by: Patient, Child/Family  Patient Orientation: Alert and Oriented    Patient Cognition: Alert    Hospitalization in the last 30 days (Readmission):  No    If yes, Readmission Assessment in CM Navigator will be completed. Advance Directives:      Code Status: Full Code   Patient's Primary Decision Maker is: Legal Next of Kin      Discharge Planning:    Patient lives with: Children, Friends Type of Home: House  Primary Care Giver: Self  Patient Support Systems include: Children, Family Members, Friends/Neighbors   Current Financial resources: Medicaid  Current community resources: None  Current services prior to admission: Other (Comment) (Palliative care)            Current DME:              Type of Home Care services:  None    ADLS  Prior functional level: Independent in ADLs/IADLs  Current functional level: Independent in ADLs/IADLs    PT AM-PAC:   /24  OT AM-PAC:   /24    Family can provide assistance at DC: Yes  Would you like Case Management to discuss the discharge plan with any other family members/significant others, and if so, who?  No  Plans to Return to Present Housing: Yes  Other Identified Issues/Barriers to RETURNING to current housing: NA  Potential Assistance needed at

## 2023-11-21 NOTE — CONSULTS
Gastroenterology Consult Note      Patient: Gareth Razo  : 1980  Acct#:  [id number]     Date:  2023    Subjective:       History of Present Illness  Patient is a 37 y.o.  female admitted with Abdominal pain [R10.9]  Generalized abdominal pain [R10.84]  ESRD on peritoneal dialysis ([address]) [N18.6, Z99.2] who is seen in consult for abdominal pain    25-year-old lady with end-stage renal disease on peritoneal dialysis  History of polycystic kidney  She also had a liver cyst  Came to the emergency room complaining of abdominal pain  Going to her is been going on and off but it was very significant her imaging shows mild ductal dilatation of the biliary tree for which we are consulted. Nevertheless her liver enzymes are all normal and she has no pain in the right upper quadrant area at all  There is generalized in the lower bellybutton and across the lower abdomen  Became worse in the last 2 days  When we asked her if the fluid from the peritoneal dialysis is different she said it was little bit cloudier  Fluid was sent for testing but I cannot find the results yet. She said the pain is gone today  She denied any bleeding or fever or chills  Patient has a known liver cyst and her imaging and MRIs in the past  She had no leukocytosis  She had no elevated liver enzymes as stated    Denied any odynophagia or dysphagia or any other symptoms    Ct abd    IMPRESSION:  Mild biliary dilatation. Dystrophic calcifications involving the pancreatic head/uncinate process. The  pancreatic duct appears prominent. Difficult to exclude underlying mass given  noncontrast technique. Scattered hepatic cysts along with innumerable mixed density renal cysts. Moderate volume ascites, similar to prior.   Endoscopy no colonoscopy 3/7/22 egd (f romario) gastritis  Family reports no hx of liver pancreatic stomach or colon cancer no uc/crohns  Social admits to  smoking no etoh or illicit drugs

## 2023-11-21 NOTE — PROGRESS NOTES
Pt arrived to room 2072 with daughter at bedside. VSS. Pt complaining of persistent abdominal pain. Standard safety measures in place. True Masters NP at bedside. Admission questions and assessment completed.

## 2023-11-21 NOTE — PROGRESS NOTES
Migdalia Walker is a 37 y.o. Non- / non  female who presents with Abdominal Pain and Back Pain   and is admitted to the hospital for the management of Abdominal pain. According to patient, she has a history of ESRD and is on peritoneal dialysis at home. Reports that for the past week, she has been been having intermittent episodes of sharp pain that radiates across to her entire abdomen and into her right back, where her right kidney is located. Patient reports that the pain significantly worsened today. Symptoms are associated with abdominal swelling, chills, and intermittent nausea without vomiting. Denies chest pain, cough, vomiting, diarrhea, and urinary symptoms. Denies trauma/injury to the area. There are no aggravating or alleviating factors. Symptoms are reported as intermittent and moderate to severe; significantly worsened today. Patient reports that she has been on peritoneal dialysis for 1 year and is currently preparing to transition to hemodialysis. She has a relatively new AV fistula in her left forearm with positive thrill and bruit.     Patient status inpatient in the [facility] Problems             Last Modified POA    * (Principal) Abdominal pain 11/21/2023 Yes     Abdominal Pain  -CT abdomen/pelvis - mild biliary dilatation  --Dystrophic calcifications involving the pancreatic head/uncinate process  --The pancreatic duct appears prominent  --Difficult to exclude underlying mass, given non-contrast technique  --Scattered hepatic cysts along with innumerable mixed density renal cysts  --Moderate volume ascites, similar to prior  ---Consult GI   -UA with trace Leukocyte Estrace  --Denies urinary symptoms  -WBC - 10.6; lactic acid 1.7  -Lipase 28  -Concern for SBP  -Peritoneal fluid sent for culture  -- Blood cultures x2  -Rocephin 1 g every 24 hours  -Pain control    ESRD  -Creatinine 7.5 (BUN 45)  -on peritoneal dialysis  -consult nephrology for treatment

## 2023-11-21 NOTE — ED PROVIDER NOTES
EMERGENCY DEPARTMENT ENCOUNTER    Pt Name: Abiola Lance  MRN: [id number]  [address] [facility] 1980  Date of evaluation: 11/21/23  CHIEF COMPLAINT       Chief Complaint   Patient presents with    Abdominal Pain    Back Pain     HISTORY OF PRESENT ILLNESS   78-year-old female who does home peritoneal dialysis is presenting today with chief complaint of generalized abdominal pain. She admits to increased swelling of her abdomen. She is also complaining of a right flank pain. She has had the pain over the last 3 days and it has increased in intensity since onset. The history is provided by the patient. REVIEW OF SYSTEMS     Review of Systems   Constitutional:  Negative for chills and fever. HENT:  Negative for congestion. Eyes:  Negative for visual disturbance. Respiratory:  Negative for cough and shortness of breath. Cardiovascular:  Negative for chest pain. Gastrointestinal:  Positive for abdominal distention, abdominal pain and nausea. Negative for constipation, diarrhea and vomiting. Genitourinary:  Negative for dysuria, flank pain, frequency and urgency. Musculoskeletal:  Negative for myalgias. Neurological:  Negative for dizziness, light-headedness and headaches. Psychiatric/Behavioral:  Negative for behavioral problems.       PASTMEDICAL HISTORY     Past Medical History:   Diagnosis Date    Anxiety     Asthma     Chronic headaches 07/10/2013    CKD (chronic kidney disease) stage 3, GFR 30-59 ml/min ([facility])     Degenerative disc disease, cervical     Depression     Dysmenorrhea 09/30/2014    Eczema 11/08/2012    Hypertension     Hypocalcemia 05/28/2014    Kidney stone     Menorrhagia 09/30/2014    Neck pain, bilateral 05/28/2014    Pelvic pain in female 09/30/2014    Polycystic kidney     Smoker 10/02/2011    Tension vascular headache 01/20/2014     Past Problem List  Patient Active Problem List   Diagnosis Code    Asthma J45.909    Smoker F17.200    Polycystic kidney disease Q61.3

## 2023-11-21 NOTE — PROGRESS NOTES
Writer spoke with Dr. José Miguel Beverly for new pt consult. Pt does PD for 4 cycles at home. PD order received for 4x daily 2.5g dextrose solution and dwell for 4 hours each time.

## 2023-11-21 NOTE — PROGRESS NOTES
Writer went to pt's room to complete a Consult. Pt was sleeping and writer didn't want to wake her. 11/21/23 0930   Encounter Summary   Encounter Overview/Reason  Attempted Encounter; Advance Care Planning   Service Provided For: Patient and family together   Referral/Consult From: Nurse   Support System Children   Last Encounter  11/21/23   Complexity of Encounter Low   Spiritual/Emotional needs   Type Spiritual Support   Plan and Referrals   Plan/Referrals   (Pt sleeping, return later)

## 2023-11-21 NOTE — PROGRESS NOTES
Patient stated that she had a family emergency and had to leave. AMA form was signed. Both of patient IV's were removed without complications. Dr Spencer Haynes was given a verbal notification. Aurea Carreon was notified per perfect serve. Dr Ramiro Stratton was paged.

## 2023-11-22 LAB
BODY FLD TYPE: NORMAL
MICROORGANISM SPEC CULT: ABNORMAL
RBC # FLD: 13 CELLS/UL
SERVICE CMNT-IMP: ABNORMAL
SPECIMEN DESCRIPTION: ABNORMAL
WBC # FLD: 3 CELLS/UL

## 2023-11-24 NOTE — DISCHARGE SUMMARY
[facility] Internal Medicine  Margareth Morrison MD; Hilda Goodwin MD; Sil Kahn MD; MD Rogelio Castellanos MD; Nhi Villegas MD      [facility] Internal Medicine  [address]     Discharge Summary     Patient ID: Basilio Garcia  :  1980   MRN: [id number]     ACCOUNT:  [de-identified]   Patient's PCP: Tung Conway PA-C  Admit Date: 2023   Discharge Date/ left AMA : 23    Length of Stay: 1  Code Status:  Prior  Admitting Physician: Jesse Owens MD  Discharge Physician: Nhi Villegas MD     Active Discharge Diagnoses:     Hospital Problem Lists:  Principal Problem:    Abdominal pain  Active Problems:    Liver cyst  Resolved Problems:    * No resolved hospital problems. *      Admission Condition:  serious     Discharged Condition: left [address] Stay:     Hospital Course:  Basilio Garcia is a 37 y.o. female who was admitted for the management of   Abdominal pain , presented to ER with Abdominal Pain and Back Pain  and is admitted to the hospital for the management of Abdominal pain. According to patient, she has a history of ESRD and is on peritoneal dialysis at home. Reports that for the past week, she has been been having intermittent episodes of sharp pain that radiates across to her entire abdomen and into her right back, where her right kidney is located. Patient reports that the pain significantly worsened today. Symptoms are associated with abdominal swelling, chills, and intermittent nausea without vomiting. Denies chest pain, cough, vomiting, diarrhea, and urinary symptoms. Denies trauma/injury to the area. There are no aggravating or alleviating factors. Symptoms are reported as intermittent and moderate to severe; significantly worsened today. Patient reports that she has been on peritoneal dialysis for 1 year and is currently preparing to transition to hemodialysis.   She has a

## 2023-11-26 LAB
MICROORGANISM SPEC CULT: NORMAL
MICROORGANISM SPEC CULT: NORMAL
MICROORGANISM/AGENT SPEC: NORMAL
SERVICE CMNT-IMP: NORMAL
SPECIMEN DESCRIPTION: NORMAL
SPECIMEN DESCRIPTION: NORMAL

## 2023-11-27 LAB
MICROORGANISM SPEC CULT: NORMAL
MICROORGANISM/AGENT SPEC: NORMAL
SPECIMEN DESCRIPTION: NORMAL

## (undated) DEVICE — STRAP,CATHETER,ELASTIC,HOOK&LOOP: Brand: MEDLINE

## (undated) DEVICE — GARMENT,MEDLINE,DVT,INT,CALF,MED, GEN2: Brand: MEDLINE

## (undated) DEVICE — ENDO KIT W/SYRINGE: Brand: MEDLINE INDUSTRIES, INC.

## (undated) DEVICE — INVOICE RENTAL OHIO MOBILE LITHOTRIPSY

## (undated) DEVICE — PACK PROCEDURE SURG CYSTO SVMMC LF

## (undated) DEVICE — GUIDEWIRE URO L150CM DIA0.035IN STIFF NIT HYDRPHLC STR TIP

## (undated) DEVICE — FORCEPS BX L240CM JAW DIA2.8MM L CAP W/ NDL MIC MESH TOOTH

## (undated) DEVICE — CATHETER F BLLN 30CC 22FR 3 W SPEC HEMA LNG OPN COUDE TIP

## (undated) DEVICE — SYRINGE CATH TIP 50ML

## (undated) DEVICE — BITEBLOCK 54FR W/ DENT RIM BLOX

## (undated) DEVICE — GLOVE ORANGE PI 7 1/2   MSG9075

## (undated) DEVICE — 3M™ STERI-STRIP™ COMPOUND BENZOIN TINCTURE 40 BAGS/CARTON 4 CARTONS/CASE C1544: Brand: 3M™ STERI-STRIP™

## (undated) DEVICE — GARMENT COMPR STD FOR 17IN CALF UNIF THER FLOTRN

## (undated) DEVICE — SINGLE ACTION PUMPING SYSTEM

## (undated) DEVICE — ADAPTER URO SCP UROLOK LL

## (undated) DEVICE — GLOVE ORTHO 8   MSG9480

## (undated) DEVICE — Device

## (undated) DEVICE — STRIP,CLOSURE,WOUND,MEDI-STRIP,1/2X4: Brand: MEDLINE

## (undated) DEVICE — DEFENDO AIR WATER SUCTION AND BIOPSY VALVE KIT FOR  OLYMPUS: Brand: DEFENDO AIR/WATER/SUCTION AND BIOPSY VALVE

## (undated) DEVICE — DUAL LUMEN URETERAL CATHETER

## (undated) DEVICE — DRAPE,REIN 53X77,STERILE: Brand: MEDLINE